# Patient Record
Sex: FEMALE | Race: WHITE | NOT HISPANIC OR LATINO | URBAN - METROPOLITAN AREA
[De-identification: names, ages, dates, MRNs, and addresses within clinical notes are randomized per-mention and may not be internally consistent; named-entity substitution may affect disease eponyms.]

---

## 2017-03-06 ENCOUNTER — EMERGENCY (EMERGENCY)
Facility: HOSPITAL | Age: 52
LOS: 1 days | Discharge: HOME | End: 2017-03-06
Attending: EMERGENCY MEDICINE | Admitting: EMERGENCY MEDICINE
Payer: MEDICARE

## 2017-03-06 VITALS
HEIGHT: 58 IN | RESPIRATION RATE: 16 BRPM | TEMPERATURE: 98 F | OXYGEN SATURATION: 100 % | HEART RATE: 105 BPM | WEIGHT: 87.08 LBS | SYSTOLIC BLOOD PRESSURE: 100 MMHG | DIASTOLIC BLOOD PRESSURE: 67 MMHG

## 2017-03-06 VITALS
DIASTOLIC BLOOD PRESSURE: 62 MMHG | RESPIRATION RATE: 14 BRPM | SYSTOLIC BLOOD PRESSURE: 102 MMHG | HEART RATE: 90 BPM | OXYGEN SATURATION: 97 % | TEMPERATURE: 98 F

## 2017-03-06 DIAGNOSIS — F17.200 NICOTINE DEPENDENCE, UNSPECIFIED, UNCOMPLICATED: ICD-10-CM

## 2017-03-06 DIAGNOSIS — K82.9 DISEASE OF GALLBLADDER, UNSPECIFIED: Chronic | ICD-10-CM

## 2017-03-06 DIAGNOSIS — R10.32 LEFT LOWER QUADRANT PAIN: ICD-10-CM

## 2017-03-06 DIAGNOSIS — M54.9 DORSALGIA, UNSPECIFIED: ICD-10-CM

## 2017-03-06 DIAGNOSIS — R11.2 NAUSEA WITH VOMITING, UNSPECIFIED: ICD-10-CM

## 2017-03-06 DIAGNOSIS — J44.9 CHRONIC OBSTRUCTIVE PULMONARY DISEASE, UNSPECIFIED: ICD-10-CM

## 2017-03-06 DIAGNOSIS — F41.9 ANXIETY DISORDER, UNSPECIFIED: ICD-10-CM

## 2017-03-06 DIAGNOSIS — Z86.19 PERSONAL HISTORY OF OTHER INFECTIOUS AND PARASITIC DISEASES: ICD-10-CM

## 2017-03-06 DIAGNOSIS — E03.9 HYPOTHYROIDISM, UNSPECIFIED: ICD-10-CM

## 2017-03-06 DIAGNOSIS — J38.1 POLYP OF VOCAL CORD AND LARYNX: Chronic | ICD-10-CM

## 2017-03-06 DIAGNOSIS — K21.9 GASTRO-ESOPHAGEAL REFLUX DISEASE WITHOUT ESOPHAGITIS: ICD-10-CM

## 2017-03-06 LAB
ALBUMIN SERPL ELPH-MCNC: 3.5 G/DL — SIGNIFICANT CHANGE UP (ref 3.3–5)
ALP SERPL-CCNC: 90 U/L — SIGNIFICANT CHANGE UP (ref 40–120)
ALT FLD-CCNC: 26 U/L — SIGNIFICANT CHANGE UP (ref 12–78)
ANION GAP SERPL CALC-SCNC: 8 MMOL/L — SIGNIFICANT CHANGE UP (ref 5–17)
APPEARANCE UR: CLEAR — SIGNIFICANT CHANGE UP
AST SERPL-CCNC: 18 U/L — SIGNIFICANT CHANGE UP (ref 15–37)
BASOPHILS # BLD AUTO: 0.2 K/UL — SIGNIFICANT CHANGE UP (ref 0–0.2)
BASOPHILS NFR BLD AUTO: 1.5 % — SIGNIFICANT CHANGE UP (ref 0–2)
BILIRUB SERPL-MCNC: 0.1 MG/DL — LOW (ref 0.2–1.2)
BILIRUB UR-MCNC: NEGATIVE — SIGNIFICANT CHANGE UP
BUN SERPL-MCNC: 14 MG/DL — SIGNIFICANT CHANGE UP (ref 7–23)
CALCIUM SERPL-MCNC: 8.5 MG/DL — SIGNIFICANT CHANGE UP (ref 8.5–10.1)
CHLORIDE SERPL-SCNC: 105 MMOL/L — SIGNIFICANT CHANGE UP (ref 96–108)
CO2 SERPL-SCNC: 28 MMOL/L — SIGNIFICANT CHANGE UP (ref 22–31)
COLOR SPEC: SIGNIFICANT CHANGE UP
CREAT SERPL-MCNC: 0.89 MG/DL — SIGNIFICANT CHANGE UP (ref 0.5–1.3)
DIFF PNL FLD: NEGATIVE — SIGNIFICANT CHANGE UP
EOSINOPHIL # BLD AUTO: 0.3 K/UL — SIGNIFICANT CHANGE UP (ref 0–0.5)
EOSINOPHIL NFR BLD AUTO: 2.8 % — SIGNIFICANT CHANGE UP (ref 0–6)
GLUCOSE SERPL-MCNC: 98 MG/DL — SIGNIFICANT CHANGE UP (ref 70–99)
GLUCOSE UR QL: NEGATIVE — SIGNIFICANT CHANGE UP
HCT VFR BLD CALC: 38.8 % — SIGNIFICANT CHANGE UP (ref 34.5–45)
HGB BLD-MCNC: 13.1 G/DL — SIGNIFICANT CHANGE UP (ref 11.5–15.5)
KETONES UR-MCNC: NEGATIVE — SIGNIFICANT CHANGE UP
LEUKOCYTE ESTERASE UR-ACNC: NEGATIVE — SIGNIFICANT CHANGE UP
LYMPHOCYTES # BLD AUTO: 28.2 % — SIGNIFICANT CHANGE UP (ref 13–44)
LYMPHOCYTES # BLD AUTO: 3.2 K/UL — SIGNIFICANT CHANGE UP (ref 1–3.3)
MCHC RBC-ENTMCNC: 33.6 GM/DL — SIGNIFICANT CHANGE UP (ref 32–36)
MCHC RBC-ENTMCNC: 33.9 PG — SIGNIFICANT CHANGE UP (ref 27–34)
MCV RBC AUTO: 100.7 FL — HIGH (ref 80–100)
MONOCYTES # BLD AUTO: 0.9 K/UL — SIGNIFICANT CHANGE UP (ref 0–0.9)
MONOCYTES NFR BLD AUTO: 7.7 % — SIGNIFICANT CHANGE UP (ref 1–9)
NEUTROPHILS # BLD AUTO: 6.9 K/UL — SIGNIFICANT CHANGE UP (ref 1.8–7.4)
NEUTROPHILS NFR BLD AUTO: 59.8 % — SIGNIFICANT CHANGE UP (ref 43–77)
NITRITE UR-MCNC: NEGATIVE — SIGNIFICANT CHANGE UP
PH UR: 6 — SIGNIFICANT CHANGE UP (ref 4.8–8)
PLATELET # BLD AUTO: 309 K/UL — SIGNIFICANT CHANGE UP (ref 150–400)
POTASSIUM SERPL-MCNC: 4 MMOL/L — SIGNIFICANT CHANGE UP (ref 3.5–5.3)
POTASSIUM SERPL-SCNC: 4 MMOL/L — SIGNIFICANT CHANGE UP (ref 3.5–5.3)
PROT SERPL-MCNC: 6.5 G/DL — SIGNIFICANT CHANGE UP (ref 6–8.3)
PROT UR-MCNC: NEGATIVE — SIGNIFICANT CHANGE UP
RBC # BLD: 3.86 M/UL — SIGNIFICANT CHANGE UP (ref 3.8–5.2)
RBC # FLD: 11.6 % — SIGNIFICANT CHANGE UP (ref 10.3–14.5)
SODIUM SERPL-SCNC: 141 MMOL/L — SIGNIFICANT CHANGE UP (ref 135–145)
SP GR SPEC: 1 — LOW (ref 1.01–1.02)
UROBILINOGEN FLD QL: NEGATIVE — SIGNIFICANT CHANGE UP
WBC # BLD: 11.5 K/UL — HIGH (ref 3.8–10.5)
WBC # FLD AUTO: 11.5 K/UL — HIGH (ref 3.8–10.5)

## 2017-03-06 PROCEDURE — 87086 URINE CULTURE/COLONY COUNT: CPT

## 2017-03-06 PROCEDURE — 99284 EMERGENCY DEPT VISIT MOD MDM: CPT | Mod: 25

## 2017-03-06 PROCEDURE — 81003 URINALYSIS AUTO W/O SCOPE: CPT

## 2017-03-06 PROCEDURE — 85027 COMPLETE CBC AUTOMATED: CPT

## 2017-03-06 PROCEDURE — 96375 TX/PRO/DX INJ NEW DRUG ADDON: CPT

## 2017-03-06 PROCEDURE — 80053 COMPREHEN METABOLIC PANEL: CPT

## 2017-03-06 PROCEDURE — 74176 CT ABD & PELVIS W/O CONTRAST: CPT

## 2017-03-06 PROCEDURE — 96374 THER/PROPH/DIAG INJ IV PUSH: CPT

## 2017-03-06 PROCEDURE — 99285 EMERGENCY DEPT VISIT HI MDM: CPT

## 2017-03-06 PROCEDURE — 74176 CT ABD & PELVIS W/O CONTRAST: CPT | Mod: 26

## 2017-03-06 RX ORDER — ONDANSETRON 8 MG/1
4 TABLET, FILM COATED ORAL ONCE
Qty: 0 | Refills: 0 | Status: COMPLETED | OUTPATIENT
Start: 2017-03-06 | End: 2017-03-06

## 2017-03-06 RX ORDER — SODIUM CHLORIDE 9 MG/ML
3 INJECTION INTRAMUSCULAR; INTRAVENOUS; SUBCUTANEOUS ONCE
Qty: 0 | Refills: 0 | Status: COMPLETED | OUTPATIENT
Start: 2017-03-06 | End: 2017-03-06

## 2017-03-06 RX ORDER — MORPHINE SULFATE 50 MG/1
4 CAPSULE, EXTENDED RELEASE ORAL ONCE
Qty: 0 | Refills: 0 | Status: DISCONTINUED | OUTPATIENT
Start: 2017-03-06 | End: 2017-03-06

## 2017-03-06 RX ADMIN — MORPHINE SULFATE 4 MILLIGRAM(S): 50 CAPSULE, EXTENDED RELEASE ORAL at 15:43

## 2017-03-06 RX ADMIN — ONDANSETRON 4 MILLIGRAM(S): 8 TABLET, FILM COATED ORAL at 15:43

## 2017-03-06 RX ADMIN — SODIUM CHLORIDE 3 MILLILITER(S): 9 INJECTION INTRAMUSCULAR; INTRAVENOUS; SUBCUTANEOUS at 15:43

## 2017-03-06 NOTE — ED ADULT NURSE NOTE - OBJECTIVE STATEMENT
Presents to ER w c/o left flank pain radiating into left groin x 1 week.  Pt states she ate at her friends house a week ago and suddenly developed abd pain shortly after. States her girlfriend also developed GI symptoms and came to our ER as well. Pt states severe left flank pain, N,V,and D.

## 2017-03-06 NOTE — ED PROVIDER NOTE - PROGRESS NOTE DETAILS
All results were explained to patient and/or family and a copy of all available results given.  Pt has ho distended stomach and had bm yesterday.   pt felt better.

## 2017-03-06 NOTE — ED ADULT NURSE NOTE - PMH
Anxiety    COPD (chronic obstructive pulmonary disease)    Gallstones    GERD (gastroesophageal reflux disease)    Hepatitis C    Hypotension    Hypothyroidism    Lupus    Migraine    Neuropathy    Nicotine addiction    Sjogren's disease    UTI (urinary tract infection)    Vertigo

## 2017-03-07 LAB
CULTURE RESULTS: NO GROWTH — SIGNIFICANT CHANGE UP
SPECIMEN SOURCE: SIGNIFICANT CHANGE UP

## 2017-03-31 ENCOUNTER — INPATIENT (INPATIENT)
Facility: HOSPITAL | Age: 52
LOS: 0 days | Discharge: HOME | DRG: 191 | End: 2017-04-01
Attending: INTERNAL MEDICINE | Admitting: INTERNAL MEDICINE
Payer: COMMERCIAL

## 2017-03-31 VITALS
HEART RATE: 94 BPM | WEIGHT: 92.59 LBS | RESPIRATION RATE: 14 BRPM | SYSTOLIC BLOOD PRESSURE: 134 MMHG | TEMPERATURE: 98 F | HEIGHT: 58 IN | OXYGEN SATURATION: 100 % | DIASTOLIC BLOOD PRESSURE: 77 MMHG

## 2017-03-31 DIAGNOSIS — G43.909 MIGRAINE, UNSPECIFIED, NOT INTRACTABLE, WITHOUT STATUS MIGRAINOSUS: ICD-10-CM

## 2017-03-31 DIAGNOSIS — J44.9 CHRONIC OBSTRUCTIVE PULMONARY DISEASE, UNSPECIFIED: ICD-10-CM

## 2017-03-31 DIAGNOSIS — E87.1 HYPO-OSMOLALITY AND HYPONATREMIA: ICD-10-CM

## 2017-03-31 DIAGNOSIS — F41.9 ANXIETY DISORDER, UNSPECIFIED: ICD-10-CM

## 2017-03-31 DIAGNOSIS — K21.9 GASTRO-ESOPHAGEAL REFLUX DISEASE WITHOUT ESOPHAGITIS: ICD-10-CM

## 2017-03-31 DIAGNOSIS — F17.200 NICOTINE DEPENDENCE, UNSPECIFIED, UNCOMPLICATED: ICD-10-CM

## 2017-03-31 DIAGNOSIS — K82.9 DISEASE OF GALLBLADDER, UNSPECIFIED: Chronic | ICD-10-CM

## 2017-03-31 DIAGNOSIS — Z41.8 ENCOUNTER FOR OTHER PROCEDURES FOR PURPOSES OTHER THAN REMEDYING HEALTH STATE: ICD-10-CM

## 2017-03-31 DIAGNOSIS — J38.1 POLYP OF VOCAL CORD AND LARYNX: Chronic | ICD-10-CM

## 2017-03-31 DIAGNOSIS — E03.9 HYPOTHYROIDISM, UNSPECIFIED: ICD-10-CM

## 2017-03-31 DIAGNOSIS — F17.219 NICOTINE DEPENDENCE, CIGARETTES, WITH UNSPECIFIED NICOTINE-INDUCED DISORDERS: ICD-10-CM

## 2017-03-31 LAB
ALBUMIN SERPL ELPH-MCNC: 4.3 G/DL — SIGNIFICANT CHANGE UP (ref 3.3–5)
ALP SERPL-CCNC: 91 U/L — SIGNIFICANT CHANGE UP (ref 30–120)
ALT FLD-CCNC: 41 U/L DA — SIGNIFICANT CHANGE UP (ref 10–60)
ANION GAP SERPL CALC-SCNC: 10 MMOL/L — SIGNIFICANT CHANGE UP (ref 5–17)
ANISOCYTOSIS BLD QL: SLIGHT — SIGNIFICANT CHANGE UP
APPEARANCE UR: CLEAR — SIGNIFICANT CHANGE UP
APTT BLD: 28.5 SEC — SIGNIFICANT CHANGE UP (ref 27.5–37.4)
AST SERPL-CCNC: 26 U/L — SIGNIFICANT CHANGE UP (ref 10–40)
BACTERIA # UR AUTO: ABNORMAL
BASE EXCESS BLDA CALC-SCNC: -3.5 MMOL/L — LOW (ref -2–2)
BILIRUB SERPL-MCNC: 0.3 MG/DL — SIGNIFICANT CHANGE UP (ref 0.2–1.2)
BILIRUB UR-MCNC: NEGATIVE — SIGNIFICANT CHANGE UP
BLOOD GAS SOURCE: SIGNIFICANT CHANGE UP
BUN SERPL-MCNC: 10 MG/DL — SIGNIFICANT CHANGE UP (ref 7–23)
CALCIUM SERPL-MCNC: 9.1 MG/DL — SIGNIFICANT CHANGE UP (ref 8.4–10.5)
CHLORIDE SERPL-SCNC: 90 MMOL/L — LOW (ref 96–108)
CK MB CFR SERPL CALC: 3 NG/ML — SIGNIFICANT CHANGE UP (ref 0–3.6)
CO2 SERPL-SCNC: 26 MMOL/L — SIGNIFICANT CHANGE UP (ref 22–31)
COLOR SPEC: YELLOW — SIGNIFICANT CHANGE UP
CREAT SERPL-MCNC: 0.72 MG/DL — SIGNIFICANT CHANGE UP (ref 0.5–1.3)
DIFF PNL FLD: ABNORMAL
EPI CELLS # UR: SIGNIFICANT CHANGE UP
GLUCOSE SERPL-MCNC: 88 MG/DL — SIGNIFICANT CHANGE UP (ref 70–99)
GLUCOSE UR QL: NEGATIVE MG/DL — SIGNIFICANT CHANGE UP
HCO3 BLDA-SCNC: 22 MMOL/L — SIGNIFICANT CHANGE UP (ref 21–29)
HCT VFR BLD CALC: 41.5 % — SIGNIFICANT CHANGE UP (ref 34.5–45)
HGB BLD-MCNC: 14.5 G/DL — SIGNIFICANT CHANGE UP (ref 11.5–15.5)
HOROWITZ INDEX BLDA+IHG-RTO: 0.21 — SIGNIFICANT CHANGE UP
INR BLD: 0.9 RATIO — SIGNIFICANT CHANGE UP (ref 0.88–1.16)
KETONES UR-MCNC: NEGATIVE — SIGNIFICANT CHANGE UP
LACTATE SERPL-SCNC: 1 MMOL/L — SIGNIFICANT CHANGE UP (ref 0.7–2)
LEUKOCYTE ESTERASE UR-ACNC: ABNORMAL
LYMPHOCYTES # BLD AUTO: 11 % — LOW (ref 13–44)
MACROCYTES BLD QL: SLIGHT — SIGNIFICANT CHANGE UP
MANUAL SMEAR VERIFICATION: SIGNIFICANT CHANGE UP
MCHC RBC-ENTMCNC: 35 GM/DL — SIGNIFICANT CHANGE UP (ref 32–36)
MCHC RBC-ENTMCNC: 35.6 PG — HIGH (ref 27–34)
MCV RBC AUTO: 102 FL — HIGH (ref 80–100)
MONOCYTES NFR BLD AUTO: 13 % — SIGNIFICANT CHANGE UP (ref 2–14)
NEUTROPHILS NFR BLD AUTO: 70 % — SIGNIFICANT CHANGE UP (ref 43–77)
NEUTS BAND # BLD: 2 % — SIGNIFICANT CHANGE UP (ref 0–8)
NITRITE UR-MCNC: NEGATIVE — SIGNIFICANT CHANGE UP
NT-PROBNP SERPL-SCNC: 63 PG/ML — SIGNIFICANT CHANGE UP (ref 0–125)
OVALOCYTES BLD QL SMEAR: SLIGHT — SIGNIFICANT CHANGE UP
PCO2 BLDA: 39 MMHG — SIGNIFICANT CHANGE UP (ref 32–46)
PH BLD: 7.35 — SIGNIFICANT CHANGE UP (ref 7.35–7.45)
PH UR: 7 — SIGNIFICANT CHANGE UP (ref 4.8–8)
PLAT MORPH BLD: NORMAL — SIGNIFICANT CHANGE UP
PLATELET # BLD AUTO: 366 K/UL — SIGNIFICANT CHANGE UP (ref 150–400)
PLATELET COUNT - ESTIMATE: NORMAL — SIGNIFICANT CHANGE UP
PO2 BLDA: 96 MMHG — SIGNIFICANT CHANGE UP (ref 74–108)
POIKILOCYTOSIS BLD QL AUTO: SLIGHT — SIGNIFICANT CHANGE UP
POLYCHROMASIA BLD QL SMEAR: SLIGHT — SIGNIFICANT CHANGE UP
POTASSIUM SERPL-MCNC: 3.9 MMOL/L — SIGNIFICANT CHANGE UP (ref 3.5–5.3)
POTASSIUM SERPL-SCNC: 3.9 MMOL/L — SIGNIFICANT CHANGE UP (ref 3.5–5.3)
PROT SERPL-MCNC: 7.5 G/DL — SIGNIFICANT CHANGE UP (ref 6–8.3)
PROT UR-MCNC: 15 MG/DL
PROTHROM AB SERPL-ACNC: 9.8 SEC — SIGNIFICANT CHANGE UP (ref 9.8–12.7)
RBC # BLD: 4.07 M/UL — SIGNIFICANT CHANGE UP (ref 3.8–5.2)
RBC # FLD: 14.6 % — HIGH (ref 10.3–14.5)
RBC BLD AUTO: ABNORMAL
RBC CASTS # UR COMP ASSIST: SIGNIFICANT CHANGE UP /HPF (ref 0–4)
SAO2 % BLDA: 97 % — HIGH (ref 92–96)
SODIUM SERPL-SCNC: 126 MMOL/L — LOW (ref 135–145)
SP GR SPEC: 1.01 — SIGNIFICANT CHANGE UP (ref 1.01–1.02)
TOXIC GRANULES BLD QL SMEAR: PRESENT — SIGNIFICANT CHANGE UP
TROPONIN I SERPL-MCNC: 0 NG/ML — LOW (ref 0.02–0.06)
UROBILINOGEN FLD QL: NEGATIVE MG/DL — SIGNIFICANT CHANGE UP
VARIANT LYMPHS # BLD: 4 % — SIGNIFICANT CHANGE UP (ref 0–6)
WBC # BLD: 15.7 K/UL — HIGH (ref 3.8–10.5)
WBC # FLD AUTO: 15.7 K/UL — HIGH (ref 3.8–10.5)
WBC UR QL: SIGNIFICANT CHANGE UP

## 2017-03-31 PROCEDURE — 99284 EMERGENCY DEPT VISIT MOD MDM: CPT

## 2017-03-31 PROCEDURE — 71020: CPT | Mod: 26

## 2017-03-31 PROCEDURE — 93010 ELECTROCARDIOGRAM REPORT: CPT

## 2017-03-31 RX ORDER — PANTOPRAZOLE SODIUM 20 MG/1
40 TABLET, DELAYED RELEASE ORAL ONCE
Qty: 0 | Refills: 0 | Status: COMPLETED | OUTPATIENT
Start: 2017-03-31 | End: 2017-03-31

## 2017-03-31 RX ORDER — HYDROXYCHLOROQUINE SULFATE 200 MG
200 TABLET ORAL
Qty: 0 | Refills: 0 | Status: DISCONTINUED | OUTPATIENT
Start: 2017-03-31 | End: 2017-04-01

## 2017-03-31 RX ORDER — ACETAMINOPHEN 500 MG
650 TABLET ORAL EVERY 6 HOURS
Qty: 0 | Refills: 0 | Status: DISCONTINUED | OUTPATIENT
Start: 2017-03-31 | End: 2017-04-01

## 2017-03-31 RX ORDER — BUDESONIDE, MICRONIZED 100 %
0.5 POWDER (GRAM) MISCELLANEOUS
Qty: 0 | Refills: 0 | Status: DISCONTINUED | OUTPATIENT
Start: 2017-03-31 | End: 2017-04-01

## 2017-03-31 RX ORDER — SODIUM CHLORIDE 9 MG/ML
2000 INJECTION INTRAMUSCULAR; INTRAVENOUS; SUBCUTANEOUS ONCE
Qty: 0 | Refills: 0 | Status: COMPLETED | OUTPATIENT
Start: 2017-03-31 | End: 2017-03-31

## 2017-03-31 RX ORDER — PANTOPRAZOLE SODIUM 20 MG/1
40 TABLET, DELAYED RELEASE ORAL
Qty: 0 | Refills: 0 | Status: DISCONTINUED | OUTPATIENT
Start: 2017-03-31 | End: 2017-04-01

## 2017-03-31 RX ORDER — TIOTROPIUM BROMIDE 18 UG/1
1 CAPSULE ORAL; RESPIRATORY (INHALATION) DAILY
Qty: 0 | Refills: 0 | Status: DISCONTINUED | OUTPATIENT
Start: 2017-03-31 | End: 2017-04-01

## 2017-03-31 RX ORDER — SUCRALFATE 1 G
1 TABLET ORAL THREE TIMES A DAY
Qty: 0 | Refills: 0 | Status: DISCONTINUED | OUTPATIENT
Start: 2017-03-31 | End: 2017-04-01

## 2017-03-31 RX ORDER — LEVOTHYROXINE SODIUM 125 MCG
25 TABLET ORAL DAILY
Qty: 0 | Refills: 0 | Status: DISCONTINUED | OUTPATIENT
Start: 2017-03-31 | End: 2017-04-01

## 2017-03-31 RX ORDER — SODIUM CHLORIDE 9 MG/ML
1000 INJECTION INTRAMUSCULAR; INTRAVENOUS; SUBCUTANEOUS
Qty: 0 | Refills: 0 | Status: DISCONTINUED | OUTPATIENT
Start: 2017-03-31 | End: 2017-04-01

## 2017-03-31 RX ORDER — IPRATROPIUM/ALBUTEROL SULFATE 18-103MCG
3 AEROSOL WITH ADAPTER (GRAM) INHALATION ONCE
Qty: 0 | Refills: 0 | Status: COMPLETED | OUTPATIENT
Start: 2017-03-31 | End: 2017-03-31

## 2017-03-31 RX ORDER — ALBUTEROL 90 UG/1
2.5 AEROSOL, METERED ORAL EVERY 6 HOURS
Qty: 0 | Refills: 0 | Status: DISCONTINUED | OUTPATIENT
Start: 2017-03-31 | End: 2017-04-01

## 2017-03-31 RX ORDER — DIAZEPAM 5 MG
5 TABLET ORAL ONCE
Qty: 0 | Refills: 0 | Status: DISCONTINUED | OUTPATIENT
Start: 2017-03-31 | End: 2017-03-31

## 2017-03-31 RX ORDER — ENOXAPARIN SODIUM 100 MG/ML
40 INJECTION SUBCUTANEOUS EVERY 24 HOURS
Qty: 0 | Refills: 0 | Status: DISCONTINUED | OUTPATIENT
Start: 2017-03-31 | End: 2017-04-01

## 2017-03-31 RX ORDER — NICOTINE POLACRILEX 2 MG
1 GUM BUCCAL DAILY
Qty: 0 | Refills: 0 | Status: DISCONTINUED | OUTPATIENT
Start: 2017-03-31 | End: 2017-04-01

## 2017-03-31 RX ADMIN — ENOXAPARIN SODIUM 40 MILLIGRAM(S): 100 INJECTION SUBCUTANEOUS at 15:28

## 2017-03-31 RX ADMIN — Medication 5 MILLIGRAM(S): at 22:23

## 2017-03-31 RX ADMIN — TIOTROPIUM BROMIDE 1 CAPSULE(S): 18 CAPSULE ORAL; RESPIRATORY (INHALATION) at 18:38

## 2017-03-31 RX ADMIN — ALBUTEROL 2.5 MILLIGRAM(S): 90 AEROSOL, METERED ORAL at 20:18

## 2017-03-31 RX ADMIN — Medication 1 GRAM(S): at 21:36

## 2017-03-31 RX ADMIN — PANTOPRAZOLE SODIUM 40 MILLIGRAM(S): 20 TABLET, DELAYED RELEASE ORAL at 21:55

## 2017-03-31 RX ADMIN — Medication 40 MILLIGRAM(S): at 21:36

## 2017-03-31 RX ADMIN — Medication 0.5 MILLIGRAM(S): at 20:18

## 2017-03-31 RX ADMIN — Medication 75 MILLIGRAM(S): at 21:56

## 2017-03-31 RX ADMIN — SODIUM CHLORIDE 1000 MILLILITER(S): 9 INJECTION INTRAMUSCULAR; INTRAVENOUS; SUBCUTANEOUS at 13:07

## 2017-03-31 RX ADMIN — Medication 200 MILLIGRAM(S): at 16:54

## 2017-03-31 RX ADMIN — SODIUM CHLORIDE 60 MILLILITER(S): 9 INJECTION INTRAMUSCULAR; INTRAVENOUS; SUBCUTANEOUS at 22:17

## 2017-03-31 RX ADMIN — Medication 125 MILLIGRAM(S): at 13:07

## 2017-03-31 RX ADMIN — Medication 3 MILLILITER(S): at 13:07

## 2017-03-31 RX ADMIN — SODIUM CHLORIDE 60 MILLILITER(S): 9 INJECTION INTRAMUSCULAR; INTRAVENOUS; SUBCUTANEOUS at 14:40

## 2017-03-31 NOTE — H&P ADULT - PROBLEM SELECTOR PLAN 2
Etiology not clear.  Possibly related to poor solute intake but SIADH can't be ruled out.  Will place patient on NS infusion and monitor BMP.  Will get nephrology to see patient.   Further management as per patient's clinical course.

## 2017-03-31 NOTE — H&P ADULT - NSHPLABSRESULTS_GEN_ALL_CORE
14.5   15.7  )-----------( 366      ( 31 Mar 2017 12:50 )             41.5     31 Mar 2017 12:50    126    |  90     |  10     ----------------------------<  88     3.9     |  26     |  0.72     Ca    9.1        31 Mar 2017 12:50    TPro  7.5    /  Alb  4.3    /  TBili  0.3    /  DBili  x      /  AST  26     /  ALT  41     /  AlkPhos  91     31 Mar 2017 12:50    CAPILLARY BLOOD GLUCOSE    PT/INR - ( 31 Mar 2017 12:50 )   PT: 9.8 sec;   INR: 0.90 ratio         PTT - ( 31 Mar 2017 12:50 )  PTT:28.5 sec 14.5   15.7  )-----------( 366      ( 31 Mar 2017 12:50 )             41.5     31 Mar 2017 12:50    126    |  90     |  10     ----------------------------<  88     3.9     |  26     |  0.72     Ca    9.1        31 Mar 2017 12:50    TPro  7.5    /  Alb  4.3    /  TBili  0.3    /  DBili  x      /  AST  26     /  ALT  41     /  AlkPhos  91     31 Mar 2017 12:50    CAPILLARY BLOOD GLUCOSE    PT/INR - ( 31 Mar 2017 12:50 )   PT: 9.8 sec;   INR: 0.90 ratio         PTT - ( 31 Mar 2017 12:50 )  PTT:28.5 sec    EXAM:  CHEST PA & LAT                                  PROCEDURE DATE:  03/31/2017        INTERPRETATION:  Chest PA and lateral 2 views:    Clinical history:    Vomiting and difficulty in breathing and cough for 2 days.    Findings:    Cardiac sizeand configuration appear normal. Val appear normal. Trachea   is midline. Lungs increased markings, left upper lung, subsegmental.   Otherwise, appear normal. CP angles appear normal. No pneumothorax. Bones   appear normal. Right axillary surgical clips. Evidence of cholecystectomy.    Impression:    Possible developing left upper lung subsegmental airspace disease.   Clinical and lab correlation requested. If clinically indicated, if   clinically warranted, further evaluation by CT scan of the chest   requested.

## 2017-03-31 NOTE — ED PROVIDER NOTE - OBJECTIVE STATEMENT
Chronic smoker with cough and wheezing for 2 days. Recently treated for shingles. Took Medrol at home for her breathing today. Denies fever, sputum, CP.

## 2017-03-31 NOTE — H&P ADULT - NSHPSOCIALHISTORY_GEN_ALL_CORE
+ Smoker + Smoker, smokes 3 PPD.  Has been trying to cut down.   Agreeable to nicotine replacement therapy.  Denies any alcohol abuse.

## 2017-03-31 NOTE — ED ADULT NURSE NOTE - OBJECTIVE STATEMENT
51Y female walked in ER c/o "im feeling really sick" pt indicates she has had vomiting, diarrhea and a dry cough, chills X2 days. pt is afebrile, denies any pain/discomfort. Pt ambulates well and speaks in complete sentences.

## 2017-03-31 NOTE — H&P ADULT - PROBLEM SELECTOR PLAN 3
The patient’s tobacco use - ( x ) YES   (     ) NO   Advised to quit and impact of smoking•  ( x )  Yes   (     ) N0  Assessed willingness to attempt to quit•  (    ) YES   (     ) No  Providing methods, Resources and skills for cessation• ( x ) Yes   (    )  No     (     ) Refused  Medication management of smoking session drugs•	( x )  yes  (    ) No  setting quit date• (   )  Yes   (      ) No	(  x ) not at this time  Follow-up arranged•  (  x  ) Yes   (      ) No	(    ) refused  Amount of time spent counseling patient   ( x )  >10min     (       )  <10 min

## 2017-03-31 NOTE — H&P ADULT - NSHPREVIEWOFSYSTEMS_GEN_ALL_CORE
REVIEW OF SYSTEMS:  CONSTITUTIONAL: Felt feverish, No weight loss. + fatigue  EYES: No eye pain, visual disturbances, or discharge  ENMT:  No difficulty hearing, tinnitus, vertigo; No sinus or throat pain  NECK: No pain or stiffness  BREASTS: No pain, masses, or nipple discharge  RESPIRATORY: + cough, + wheezing and + shortness of breath.   CARDIOVASCULAR: No chest pain, palpitations, dizziness, or leg swelling  GASTROINTESTINAL: No abdominal or epigastric pain. No nausea, vomiting, or hematemesis; No diarrhea or constipation. No melena or hematochezia.  GENITOURINARY: No dysuria, frequency, hematuria, or incontinence  NEUROLOGICAL: No headaches, memory loss, loss of strength, numbness, or tremors  SKIN: No itching, burning, rashes, or lesions   LYMPH NODES: No enlarged glands  ENDOCRINE: No heat or cold intolerance; No hair loss; No polydipsia or polyuria  MUSCULOSKELETAL: No joint pain or swelling; No muscle, back, or extremity pain  PSYCHIATRIC: No depression, anxiety, mood swings, or difficulty sleeping  HEME/LYMPH: No easy bruising, or bleeding gums  ALLERGY AND IMMUNOLOGIC: No hives or eczema

## 2017-03-31 NOTE — H&P ADULT - NSHPPHYSICALEXAM_GEN_ALL_CORE
PHYSICAL EXAM:  GENERAL: NAD, well-groomed, well-developed  HEAD:  Atraumatic, Normocephalic  EYES: EOMI, PERRLA, conjunctiva and sclera clear  ENMT: No tonsillar erythema, exudates, or enlargement; Moist mucous membranes, Good dentition, No lesions  NECK: Supple, No JVD, Normal thyroid  NERVOUS SYSTEM:  Alert & Oriented X3, Good concentration; Motor Strength 5/5 B/L upper and lower extremities; DTRs 2+ intact and symmetric  CHEST/LUNG: + Wheezing.   HEART: Regular rate and rhythm; No murmurs, rubs, or gallops  ABDOMEN: Soft, Nontender, Nondistended; Bowel sounds present  EXTREMITIES:  2+ Peripheral Pulses, No clubbing, cyanosis, or edema  LYMPH: No lymphadenopathy noted  SKIN: No rashes or lesions PHYSICAL EXAM:  GENERAL: NAD, well-groomed, well-developed  HEAD:  Atraumatic, Normocephalic  EYES: EOMI, PERRLA, conjunctiva and sclera clear  ENMT: No tonsillar erythema, exudates, or enlargement; Moist mucous membranes, Good dentition, No lesions  NECK: Supple, No JVD, Normal thyroid  NERVOUS SYSTEM:  Alert & Oriented X3, Good concentration; Motor Strength 5/5 B/L upper and lower extremities; DTRs 2+ intact and symmetric  CHEST/LUNG: + Wheezing. No rhonchi. Decreased breath sounds at bases.   HEART: Regular rate and rhythm; No murmurs, rubs, or gallops  ABDOMEN: Soft, Nontender, Nondistended; Bowel sounds present  EXTREMITIES:  2+ Peripheral Pulses, No clubbing, cyanosis, or edema  LYMPH: No lymphadenopathy noted  SKIN: No rashes or lesions

## 2017-03-31 NOTE — H&P ADULT - ASSESSMENT
51 years old female with history of COPD, Tobacco abuse, Lupus, hypothyroidism and anxiety who comes in with 2-3 days history of increasing shortness of breath, cough and wheezing which is not getting better despite nebulizers at home. Patient has acute exacerbation of COPD and is being admitted for further management as per Pulmonary. She is also noted to have hyponatremia on her labs.

## 2017-03-31 NOTE — ED ADULT NURSE REASSESSMENT NOTE - NS ED NURSE REASSESS COMMENT FT1
pt is stable, pt responded well from breathing treatments and IV fluids. pt L/S are clear bilat. pt continues to have non reproductive cough. pt denies any pain/distress. "im feeling so much better now". pt is admitted, report given to HERMILO Mcdonald

## 2017-03-31 NOTE — H&P ADULT - HISTORY OF PRESENT ILLNESS
51 years old female with 51 years old female with history of COPD, current smoker, Lupus, GERD, Hypotension, hypothyroidism, anxiety, who came in to Er with c/o cough and shortness of breath for last few days. Patient states she started having cough, which was productive of small amount of clear to tan sputum 2-3 days ago and started having wheezing. She has been using nebulizer at home without much relief. As her symptoms got worse today she came in to ER. Patient was seen by Dr. Pinto (covering for Dr. Silver) for pulmonary and is advised admission to the hospital.     Patient denies any chest pain or pressure.   Denies any nausea or vomiting.   Denies any fevers but felt feverish. No chills or rigors.   Denies any dizziness or syncope.

## 2017-03-31 NOTE — CONSULT NOTE ADULT - PROBLEM SELECTOR RECOMMENDATION 9
albuterol every 6 hours  solumedrol IV every 8 hrs  check ABG  official cxr report reviewed  incentive leopoldo  spiriva  pulmicort BID  monitor sats  keep sat > 88 percent

## 2017-03-31 NOTE — CONSULT NOTE ADULT - SUBJECTIVE AND OBJECTIVE BOX
PULMONARY CONSULT NOTE      DOUGLAS AKINS  MRN-[id number]    Patient is a 51y old  Female who presents with a chief complaint of SOB  started prednisone at home  has copd ex  smokes 3 packs per day  has hx of advanced COPD  has SLE and other medical issues  on disability  lives at home  recent admission at [facility], treated for Zoster      HISTORY OF PRESENT ILLNESS:    MEDICATIONS  (STANDING):  nicotine - 21 mG/24Hr(s) Patch 1patch Transdermal daily  sodium chloride 0.9%. 1000milliLiter(s) IV Continuous <Continuous>  ALBUTerol    0.083% 2.5milliGRAM(s) Nebulizer every 6 hours  tiotropium 18 MICROgram(s) Capsule 1Capsule(s) Inhalation daily  methylPREDNISolone sodium succinate Injectable 40milliGRAM(s) IV Push every 8 hours  buDESOnide   0.5 milliGRAM(s) Respule 0.5milliGRAM(s) Inhalation two times a day  pregabalin 75milliGRAM(s) Oral every 8 hours  hydroxychloroquine 200milliGRAM(s) Oral two times a day with meals  sucralfate 1Gram(s) Oral three times a day  pantoprazole    Tablet 40milliGRAM(s) Oral before breakfast  levothyroxine 25MICROGram(s) Oral daily      MEDICATIONS  (PRN):      Allergies    aspirin (Nausea)  Zithromax (Pruritus; Rash)    Intolerances        PAST MEDICAL & SURGICAL HISTORY:  Nicotine addiction  COPD (chronic obstructive pulmonary disease)  Neuropathy  Migraine  Anxiety  Hepatitis C  Gallstones  Hypothyroidism  Hypotension  GERD (gastroesophageal reflux disease)  UTI (urinary tract infection)  Vertigo  Sjogren&#x27;s disease  Lupus  Gall bladder disease: REMOVAL  Vocal cord polyp: REMOVAL      FAMILY HISTORY:  No pertinent family history in first degree relatives  No significant family history      SOCIAL HISTORY  Smoking History:     REVIEW OF SYSTEMS: SOB, VILLALOBOS    REVIEW OF SYSTEMS      General:	anxious    Skin/Breast:  	  Ophthalmologic:  	  ENMT:	    Respiratory and Thorax: dyspnea  	  Cardiovascular:	    Gastrointestinal:	    Genitourinary:	    Musculoskeletal: pains	    Neurological:	    Psychiatric:	    Hematology/Lymphatics:	    Endocrine:	    Allergic/Immunologic:	    Vital Signs Last 24 Hrs  T(C): 36.9, Max: 36.9 ( @ 13:26)  T(F): 98.4, Max: 98.4 ( @ 13:26)  HR: 90 (88 - 98)  BP: 128/90 (128/90 - 134/77)  BP(mean): --  RR: 20 (14 - 20)  SpO2: 99% (99% - 100%)    PHYSICAL EXAMINATION:  PHYSICAL EXAM:      Constitutional:    Eyes:    ENMT: at    Neck: supple    Breasts:    Back:    Respiratory: dim BS, no wheeze    Cardiovascular: s1s2    Gastrointestinal:    Genitourinary:    Rectal:    Extremities:    Vascular:    Neurological:    Skin:    Lymph Nodes:    Musculoskeletal:    Psychiatric:          LABS:                        14.5   15.7  )-----------( 366      ( 31 Mar 2017 12:50 )             41.5     31 Mar 2017 12:50    126    |  90     |  10     ----------------------------<  88     3.9     |  26     |  0.72     Ca    9.1        31 Mar 2017 12:50    TPro  7.5    /  Alb  4.3    /  TBili  0.3    /  DBili  x      /  AST  26     /  ALT  41     /  AlkPhos  91     31 Mar 2017 12:50    PT/INR - ( 31 Mar 2017 12:50 )   PT: 9.8 sec;   INR: 0.90 ratio         PTT - ( 31 Mar 2017 12:50 )  PTT:28.5 sec  Urinalysis Basic - ( 31 Mar 2017 12:59 )    Color: Yellow / Appearance: Clear / S.010 / pH: x  Gluc: x / Ketone: Negative  / Bili: Negative / Urobili: Negative mg/dL   Blood: x / Protein: 15 mg/dL / Nitrite: Negative   Leuk Esterase: Trace / RBC: 0-2 /HPF / WBC 0-2   Sq Epi: x / Non Sq Epi: Few / Bacteria: Few        CARDIAC MARKERS ( 31 Mar 2017 12:50 )  .004 ng/mL / x     / x     / x     / 3.0 ng/mL          Lactate, Blood: 1.0 mmol/L ( @ 12:50)        MICROBIOLOGY:    RADIOLOGY & ADDITIONAL STUDIES:

## 2017-03-31 NOTE — ED PROVIDER NOTE - MEDICAL DECISION MAKING DETAILS
52 yo female heavy smoker with COPD exacerbation. Plan- CXR, Labs, IV steroids, Duonebs. Consult [facility].

## 2017-03-31 NOTE — H&P ADULT - PROBLEM SELECTOR PLAN 1
Will admit to Tele.  Will place patient on Steroids and Nebulizers.  Oxygen supplementation as needed.   Hold off on antibiotics at this time as per discussions with pulmonary.   Further management as per patient's clinical course.

## 2017-04-01 ENCOUNTER — TRANSCRIPTION ENCOUNTER (OUTPATIENT)
Age: 52
End: 2017-04-01

## 2017-04-01 VITALS
SYSTOLIC BLOOD PRESSURE: 99 MMHG | TEMPERATURE: 98 F | OXYGEN SATURATION: 97 % | HEART RATE: 95 BPM | RESPIRATION RATE: 16 BRPM | DIASTOLIC BLOOD PRESSURE: 63 MMHG

## 2017-04-01 DIAGNOSIS — M32.9 SYSTEMIC LUPUS ERYTHEMATOSUS, UNSPECIFIED: ICD-10-CM

## 2017-04-01 DIAGNOSIS — F17.210 NICOTINE DEPENDENCE, CIGARETTES, UNCOMPLICATED: ICD-10-CM

## 2017-04-01 LAB
ANION GAP SERPL CALC-SCNC: 7 MMOL/L — SIGNIFICANT CHANGE UP (ref 5–17)
BASOPHILS # BLD AUTO: 0 K/UL — SIGNIFICANT CHANGE UP (ref 0–0.2)
BASOPHILS NFR BLD AUTO: 0.3 % — SIGNIFICANT CHANGE UP (ref 0–2)
BUN SERPL-MCNC: 10 MG/DL — SIGNIFICANT CHANGE UP (ref 7–23)
CALCIUM SERPL-MCNC: 9.2 MG/DL — SIGNIFICANT CHANGE UP (ref 8.4–10.5)
CHLORIDE SERPL-SCNC: 101 MMOL/L — SIGNIFICANT CHANGE UP (ref 96–108)
CHOLEST SERPL-MCNC: 243 MG/DL — HIGH (ref 10–199)
CO2 SERPL-SCNC: 26 MMOL/L — SIGNIFICANT CHANGE UP (ref 22–31)
CREAT SERPL-MCNC: 0.66 MG/DL — SIGNIFICANT CHANGE UP (ref 0.5–1.3)
CRP SERPL-MCNC: 0.38 MG/DL — SIGNIFICANT CHANGE UP (ref 0–0.4)
CULTURE RESULTS: NO GROWTH — SIGNIFICANT CHANGE UP
EOSINOPHIL # BLD AUTO: 0 K/UL — SIGNIFICANT CHANGE UP (ref 0–0.5)
EOSINOPHIL NFR BLD AUTO: 0.2 % — SIGNIFICANT CHANGE UP (ref 0–6)
GLUCOSE SERPL-MCNC: 112 MG/DL — HIGH (ref 70–99)
HBA1C BLD-MCNC: 5.7 % — HIGH (ref 4–5.6)
HCT VFR BLD CALC: 39.4 % — SIGNIFICANT CHANGE UP (ref 34.5–45)
HDLC SERPL-MCNC: 93 MG/DL — SIGNIFICANT CHANGE UP (ref 40–125)
HGB BLD-MCNC: 13.2 G/DL — SIGNIFICANT CHANGE UP (ref 11.5–15.5)
LIPID PNL WITH DIRECT LDL SERPL: 127 MG/DL — SIGNIFICANT CHANGE UP
LYMPHOCYTES # BLD AUTO: 1.1 K/UL — SIGNIFICANT CHANGE UP (ref 1–3.3)
LYMPHOCYTES # BLD AUTO: 6.9 % — LOW (ref 13–44)
MAGNESIUM SERPL-MCNC: 2.1 MG/DL — SIGNIFICANT CHANGE UP (ref 1.6–2.6)
MCHC RBC-ENTMCNC: 33.4 GM/DL — SIGNIFICANT CHANGE UP (ref 32–36)
MCHC RBC-ENTMCNC: 34.5 PG — HIGH (ref 27–34)
MCV RBC AUTO: 103.3 FL — HIGH (ref 80–100)
MONOCYTES # BLD AUTO: 1 K/UL — HIGH (ref 0–0.9)
MONOCYTES NFR BLD AUTO: 5.9 % — SIGNIFICANT CHANGE UP (ref 2–14)
NEUTROPHILS # BLD AUTO: 14.2 K/UL — HIGH (ref 1.8–7.4)
NEUTROPHILS NFR BLD AUTO: 86.7 % — HIGH (ref 43–77)
PHOSPHATE SERPL-MCNC: 3 MG/DL — SIGNIFICANT CHANGE UP (ref 2.5–4.5)
PLATELET # BLD AUTO: 316 K/UL — SIGNIFICANT CHANGE UP (ref 150–400)
POTASSIUM SERPL-MCNC: 4 MMOL/L — SIGNIFICANT CHANGE UP (ref 3.5–5.3)
POTASSIUM SERPL-SCNC: 4 MMOL/L — SIGNIFICANT CHANGE UP (ref 3.5–5.3)
PROCALCITONIN SERPL-MCNC: <0.05 NG/ML — SIGNIFICANT CHANGE UP (ref 0–0.05)
RBC # BLD: 3.81 M/UL — SIGNIFICANT CHANGE UP (ref 3.8–5.2)
RBC # FLD: 14.6 % — HIGH (ref 10.3–14.5)
SODIUM SERPL-SCNC: 134 MMOL/L — LOW (ref 135–145)
SPECIMEN SOURCE: SIGNIFICANT CHANGE UP
T3 SERPL-MCNC: 80 NG/DL — SIGNIFICANT CHANGE UP (ref 80–200)
T4 AB SER-ACNC: 6.4 UG/DL — SIGNIFICANT CHANGE UP (ref 4.6–12)
TOTAL CHOLESTEROL/HDL RATIO MEASUREMENT: 2.6 RATIO — LOW (ref 3.3–7.1)
TRIGL SERPL-MCNC: 116 MG/DL — SIGNIFICANT CHANGE UP (ref 10–149)
TSH SERPL-MCNC: 0.26 UIU/ML — LOW (ref 0.27–4.2)
TSH SERPL-MCNC: 0.41 UIU/ML — SIGNIFICANT CHANGE UP (ref 0.27–4.2)
WBC # BLD: 16.3 K/UL — HIGH (ref 3.8–10.5)
WBC # FLD AUTO: 16.3 K/UL — HIGH (ref 3.8–10.5)

## 2017-04-01 PROCEDURE — 84145 PROCALCITONIN (PCT): CPT

## 2017-04-01 PROCEDURE — 80048 BASIC METABOLIC PNL TOTAL CA: CPT

## 2017-04-01 PROCEDURE — 71046 X-RAY EXAM CHEST 2 VIEWS: CPT

## 2017-04-01 PROCEDURE — 85027 COMPLETE CBC AUTOMATED: CPT

## 2017-04-01 PROCEDURE — 86140 C-REACTIVE PROTEIN: CPT

## 2017-04-01 PROCEDURE — 83605 ASSAY OF LACTIC ACID: CPT

## 2017-04-01 PROCEDURE — 80053 COMPREHEN METABOLIC PANEL: CPT

## 2017-04-01 PROCEDURE — 84436 ASSAY OF TOTAL THYROXINE: CPT

## 2017-04-01 PROCEDURE — 87040 BLOOD CULTURE FOR BACTERIA: CPT

## 2017-04-01 PROCEDURE — 84443 ASSAY THYROID STIM HORMONE: CPT

## 2017-04-01 PROCEDURE — 94640 AIRWAY INHALATION TREATMENT: CPT

## 2017-04-01 PROCEDURE — 82803 BLOOD GASES ANY COMBINATION: CPT

## 2017-04-01 PROCEDURE — 96374 THER/PROPH/DIAG INJ IV PUSH: CPT

## 2017-04-01 PROCEDURE — 84480 ASSAY TRIIODOTHYRONINE (T3): CPT

## 2017-04-01 PROCEDURE — 85730 THROMBOPLASTIN TIME PARTIAL: CPT

## 2017-04-01 PROCEDURE — 99285 EMERGENCY DEPT VISIT HI MDM: CPT | Mod: 25

## 2017-04-01 PROCEDURE — 84100 ASSAY OF PHOSPHORUS: CPT

## 2017-04-01 PROCEDURE — 80061 LIPID PANEL: CPT

## 2017-04-01 PROCEDURE — 83735 ASSAY OF MAGNESIUM: CPT

## 2017-04-01 PROCEDURE — 85610 PROTHROMBIN TIME: CPT

## 2017-04-01 PROCEDURE — 87086 URINE CULTURE/COLONY COUNT: CPT

## 2017-04-01 PROCEDURE — 83880 ASSAY OF NATRIURETIC PEPTIDE: CPT

## 2017-04-01 PROCEDURE — 83036 HEMOGLOBIN GLYCOSYLATED A1C: CPT

## 2017-04-01 PROCEDURE — 93005 ELECTROCARDIOGRAM TRACING: CPT

## 2017-04-01 PROCEDURE — 82553 CREATINE MB FRACTION: CPT

## 2017-04-01 PROCEDURE — 84484 ASSAY OF TROPONIN QUANT: CPT

## 2017-04-01 PROCEDURE — 81001 URINALYSIS AUTO W/SCOPE: CPT

## 2017-04-01 PROCEDURE — 96372 THER/PROPH/DIAG INJ SC/IM: CPT | Mod: XU

## 2017-04-01 PROCEDURE — 94664 DEMO&/EVAL PT USE INHALER: CPT

## 2017-04-01 RX ORDER — NICOTINE POLACRILEX 2 MG
1 GUM BUCCAL
Qty: 1 | Refills: 0 | OUTPATIENT
Start: 2017-04-01 | End: 2017-05-01

## 2017-04-01 RX ADMIN — Medication 0.5 MILLIGRAM(S): at 07:43

## 2017-04-01 RX ADMIN — Medication 40 MILLIGRAM(S): at 13:21

## 2017-04-01 RX ADMIN — Medication 200 MILLIGRAM(S): at 08:41

## 2017-04-01 RX ADMIN — SODIUM CHLORIDE 60 MILLILITER(S): 9 INJECTION INTRAMUSCULAR; INTRAVENOUS; SUBCUTANEOUS at 08:41

## 2017-04-01 RX ADMIN — Medication 1 GRAM(S): at 05:56

## 2017-04-01 RX ADMIN — Medication 40 MILLIGRAM(S): at 05:56

## 2017-04-01 RX ADMIN — Medication 25 MICROGRAM(S): at 05:56

## 2017-04-01 RX ADMIN — Medication 1 GRAM(S): at 13:21

## 2017-04-01 RX ADMIN — PANTOPRAZOLE SODIUM 40 MILLIGRAM(S): 20 TABLET, DELAYED RELEASE ORAL at 06:00

## 2017-04-01 RX ADMIN — Medication 75 MILLIGRAM(S): at 13:21

## 2017-04-01 RX ADMIN — ALBUTEROL 2.5 MILLIGRAM(S): 90 AEROSOL, METERED ORAL at 07:43

## 2017-04-01 RX ADMIN — TIOTROPIUM BROMIDE 1 CAPSULE(S): 18 CAPSULE ORAL; RESPIRATORY (INHALATION) at 06:20

## 2017-04-01 RX ADMIN — ALBUTEROL 2.5 MILLIGRAM(S): 90 AEROSOL, METERED ORAL at 03:31

## 2017-04-01 RX ADMIN — Medication 75 MILLIGRAM(S): at 05:56

## 2017-04-01 RX ADMIN — ALBUTEROL 2.5 MILLIGRAM(S): 90 AEROSOL, METERED ORAL at 13:38

## 2017-04-01 NOTE — DISCHARGE NOTE ADULT - PLAN OF CARE
Breath better. Regular diet.  Increase activity as tolerated.  Stop Smoking.   Follow up with Dr. Silver next week.

## 2017-04-01 NOTE — DISCHARGE NOTE ADULT - NS AS ACTIVITY OBS
Do not make important decisions/Bathing allowed/Driving allowed/No Heavy lifting/straining/Showering allowed/Walking-Outdoors allowed/Stairs allowed/Walking-Indoors allowed/Do not drive or operate machinery

## 2017-04-01 NOTE — PROGRESS NOTE ADULT - PROBLEM SELECTOR PLAN 2
nebs  solu medrol  can go home with medrol ALYSHA  will follow up with Dr. Silver  no evidence for ABX need at present, normal Procalcitonin, afebrile, no purulent sputum
The patient’s tobacco use - (X) YES   (     ) NO   Advised to quit and impact of smoking•  (X)  Yes   (     ) N0  Assessed willingness to attempt to quit•  (X) YES   (     ) No  Providing methods, Resources and skills for cessation• (X) Yes   (    )  No  (     ) Refused  Medication management of smoking cessation drugs• (X)  yes  (    ) No  setting quit date• (      )  Yes   (      ) NO (X) not at this time  Follow-up arranged•  (X) Yes   (      ) No (    ) refused  Amount of time spent counseling patient   (X)  >10min     (       )  <10 min

## 2017-04-01 NOTE — PROGRESS NOTE ADULT - ASSESSMENT
51 years old female with COPD exacerbation, SLE, Nicotine addition. Patient is doing better and wants to go home.

## 2017-04-01 NOTE — DISCHARGE NOTE ADULT - PATIENT PORTAL LINK FT
“You can access the [facility] Patient Portal, offered by [facility], by registering with the following website: [email or web address]”

## 2017-04-01 NOTE — DISCHARGE NOTE ADULT - CARE PROVIDER_API CALL
Dilip Silver), Critical Care Medicine; Internal Medicine; Pulmonary Disease  [address] [place] [address]  Phone: [phone]  Fax: [phone]

## 2017-04-01 NOTE — DISCHARGE NOTE ADULT - MEDICATION SUMMARY - MEDICATIONS TO CHANGE
I will SWITCH the dose or number of times a day I take the medications listed below when I get home from the hospital:    predniSONE  -- 40 milligram(s) by mouth once a day  -- take with food  for 5 days

## 2017-04-01 NOTE — DISCHARGE NOTE ADULT - CARE PLAN
Principal Discharge DX:	Chronic obstructive pulmonary disease, unspecified COPD type  Goal:	Breath better.  Instructions for follow-up, activity and diet:	Regular diet.  Increase activity as tolerated.  Stop Smoking.   Follow up with Dr. Silver next week.  Secondary Diagnosis:	Gastroesophageal reflux disease, esophagitis presence not specified  Secondary Diagnosis:	Hyponatremia  Secondary Diagnosis:	Systemic lupus erythematosus, unspecified SLE type, unspecified organ involvement status  Secondary Diagnosis:	Anxiety  Secondary Diagnosis:	Cigarette nicotine dependence without complication

## 2017-04-01 NOTE — DISCHARGE NOTE ADULT - MEDICATION SUMMARY - MEDICATIONS TO TAKE
I will START or STAY ON the medications listed below when I get home from the hospital:    Pulmicort Respules 0.5 mg/2 mL inhalation suspension  -- 1  inhaled 2 times a day  -- Indication: For COPD    predniSONE 10 mg oral tablet  -- 4 tab(s) by mouth day x 3 days  3 tab(s) by mouth once a day x 3 days  2 tab(s) by mouth once a day x 3 days  1 tab(s) by mouth once a day x 3 days  -- It is very important that you take or use this exactly as directed.  Do not skip doses or discontinue unless directed by your doctor.  Obtain medical advice before taking any non-prescription drugs as some may affect the action of this medication.  Take with food or milk.    -- Indication: For COPD E    Lyrica 75 mg oral capsule  --  by mouth 3 times a day  -- Indication: For Neuropathy    Plaquenil Sulfate 200 mg oral tablet  --  by mouth 2 times a day  -- Indication: For Lupus    DuoNeb 0.5 mg-2.5 mg/3 mL inhalation solution  -- 3 milliliter(s) inhaled every 4 hours  -- Indication: For Chronic obstructive pulmonary disease    Spiriva 18 mcg inhalation capsule  -- 1 cap(s) inhaled once a day  -- Indication: For Chronic obstructive pulmonary disease    Carafate 1 g oral tablet  -- 1 tab(s) by mouth 3 times a day  -- Indication: For Gastroesophageal reflux disease, esophagitis presence not specified    omeprazole 20 mg oral delayed release capsule  --  by mouth 2 times a day  -- Indication: For Gastroesophageal reflux disease, esophagitis presence not specified    nicotine 21 mg/24 hr transdermal film, extended release  -- 1 patch by transdermal patch once a day  -- Indication: For Nicotine addiction    Synthroid 25 mcg (0.025 mg) oral tablet  -- 1 tab(s) by mouth once a day  -- Indication: For Hypothyroidism, unspecified type

## 2017-04-01 NOTE — PROGRESS NOTE ADULT - PROBLEM SELECTOR PROBLEM 2
Chronic obstructive pulmonary disease, unspecified COPD type
Cigarette nicotine dependence without complication

## 2017-04-01 NOTE — PROGRESS NOTE ADULT - SUBJECTIVE AND OBJECTIVE BOX
Patient is a 51y old  Female who presents with a chief complaint of Shortness of breath, cough (2017 16:21)    INTERVAL HPI/OVERNIGHT EVENTS: Doing better.  Wants to go home.  breathing better      REVIEW OF SYSTEMS:    CONSTITUTIONAL: No weakness, fevers or chills  EYES/ENT: No visual changes;  No vertigo or throat pain   NECK: No pain or stiffness  RESPIRATORY: No cough, wheezing, hemoptysis; No shortness of breath  CARDIOVASCULAR: No chest pain or palpitations  GASTROINTESTINAL: No abdominal or epigastric pain. No nausea, vomiting, or hematemesis; No diarrhea or constipation. No melena or hematochezia.  GENITOURINARY: No dysuria, frequency or hematuria  NEUROLOGICAL: No numbness or weakness  SKIN: No itching, burning, rashes, or lesions   All other review of systems is negative unless indicated above.  Allergies    aspirin (Nausea)  Zithromax (Pruritus; Rash)    Intolerances      MEDICATIONS  (STANDING):  nicotine - 21 mG/24Hr(s) Patch 1patch Transdermal daily  sodium chloride 0.9%. 1000milliLiter(s) IV Continuous <Continuous>  ALBUTerol    0.083% 2.5milliGRAM(s) Nebulizer every 6 hours  tiotropium 18 MICROgram(s) Capsule 1Capsule(s) Inhalation daily  methylPREDNISolone sodium succinate Injectable 40milliGRAM(s) IV Push every 8 hours  buDESOnide   0.5 milliGRAM(s) Respule 0.5milliGRAM(s) Inhalation two times a day  pregabalin 75milliGRAM(s) Oral every 8 hours  hydroxychloroquine 200milliGRAM(s) Oral two times a day with meals  sucralfate 1Gram(s) Oral three times a day  pantoprazole    Tablet 40milliGRAM(s) Oral before breakfast  levothyroxine 25MICROGram(s) Oral daily  enoxaparin Injectable 40milliGRAM(s) SubCutaneous every 24 hours    MEDICATIONS  (PRN):  acetaminophen   Tablet. 650milliGRAM(s) Oral every 6 hours PRN Mild Pain (1 - 3)    Vital Signs Last 24 Hrs  T(C): 37.1, Max: 37.1 ( @ 14:00)  T(F): 98.7, Max: 98.7 ( @ 14:00)  HR: 86 (64 - 91)  BP: 90/54 (90/54 - 117/74)  BP(mean): --  RR: 16 (16 - 22)  SpO2: 98% (95% - 100%)  PHYSICAL EXAM:    Constitutional: NAD, awake and alert, well-developed  HEENT: PERR, EOMI, Normal Hearing, MMM  Neck: Soft and supple, No LAD, No JVD  Respiratory: Breath sounds are clear bilaterally, No wheezing, rales or rhonchi  Cardiovascular: S1 and S2, regular rate and rhythm, no Murmurs, gallops or rubs  Gastrointestinal: Bowel Sounds present, soft, nontender, nondistended, no guarding, no rebound  Extremities: No peripheral edema  Vascular: 2+ peripheral pulses  Neurological: A/O x 3, no focal deficits  Musculoskeletal: 5/5 strength b/l upper and lower extremities  Skin: No rashes      2017 08:23    134    |  101    |  10     ----------------------------<  112    4.0     |  26     |  0.66     Ca    9.2        2017 08:23  Phos  3.0       2017 08:23  Mg     2.1       2017 08:23    TPro  7.5    /  Alb  4.3    /  TBili  0.3    /  DBili  x      /  AST  26     /  ALT  41     /  AlkPhos  91     31 Mar 2017 12:50                          13.2   16.3  )-----------( 316      ( 2017 08:23 )             39.4     PT/INR - ( 31 Mar 2017 12:50 )   PT: 9.8 sec;   INR: 0.90 ratio         PTT - ( 31 Mar 2017 12:50 )  PTT:28.5 sec  Urinalysis Basic - ( 31 Mar 2017 12:59 )    Color: Yellow / Appearance: Clear / S.010 / pH: x  Gluc: x / Ketone: Negative  / Bili: Negative / Urobili: Negative mg/dL   Blood: x / Protein: 15 mg/dL / Nitrite: Negative   Leuk Esterase: Trace / RBC: 0-2 /HPF / WBC 0-2   Sq Epi: x / Non Sq Epi: Few / Bacteria: Few      CARDIAC MARKERS ( 31 Mar 2017 12:50 )  .004 ng/mL / x     / x     / x     / 3.0 ng/mL      CAPILLARY BLOOD GLUCOSE    HEALTH ISSUES - PROBLEM Dx:  Need for prophylactic measure: Need for prophylactic measure  Migraine without status migrainosus, not intractable, unspecified migraine type: Migraine without status migrainosus, not intractable, unspecified migraine type  Gastroesophageal reflux disease, esophagitis presence not specified: Gastroesophageal reflux disease, esophagitis presence not specified  Hypothyroidism, unspecified type: Hypothyroidism, unspecified type  Anxiety: Anxiety  Cigarette nicotine dependence with nicotine-induced disorder: Cigarette nicotine dependence with nicotine-induced disorder  Hyponatremia: Hyponatremia  Nicotine addiction: Nicotine addiction  Chronic obstructive pulmonary disease, unspecified COPD type: Chronic obstructive pulmonary disease, unspecified COPD type

## 2017-04-01 NOTE — DISCHARGE NOTE ADULT - MEDICATION SUMMARY - MEDICATIONS TO STOP TAKING
I will STOP taking the medications listed below when I get home from the hospital:    Medrol Dosepak  -- 1

## 2017-04-01 NOTE — DISCHARGE NOTE ADULT - SECONDARY DIAGNOSIS.
Gastroesophageal reflux disease, esophagitis presence not specified Hyponatremia Systemic lupus erythematosus, unspecified SLE type, unspecified organ involvement status Anxiety Cigarette nicotine dependence without complication

## 2017-04-01 NOTE — PROGRESS NOTE ADULT - SUBJECTIVE AND OBJECTIVE BOX
Date/Time Patient Seen:  		  Referring MD:   Data Reviewed	       Patient is a 51y old  Female who presents with a chief complaint of Shortness of breath, cough (31 Mar 2017 14:22)  seen and examined  on room air  feels better  wants to go home      Subjective/HPI       Medication list         MEDICATIONS  (STANDING):  nicotine - 21 mG/24Hr(s) Patch 1patch Transdermal daily  sodium chloride 0.9%. 1000milliLiter(s) IV Continuous <Continuous>  ALBUTerol    0.083% 2.5milliGRAM(s) Nebulizer every 6 hours  tiotropium 18 MICROgram(s) Capsule 1Capsule(s) Inhalation daily  methylPREDNISolone sodium succinate Injectable 40milliGRAM(s) IV Push every 8 hours  buDESOnide   0.5 milliGRAM(s) Respule 0.5milliGRAM(s) Inhalation two times a day  pregabalin 75milliGRAM(s) Oral every 8 hours  hydroxychloroquine 200milliGRAM(s) Oral two times a day with meals  sucralfate 1Gram(s) Oral three times a day  pantoprazole    Tablet 40milliGRAM(s) Oral before breakfast  levothyroxine 25MICROGram(s) Oral daily  enoxaparin Injectable 40milliGRAM(s) SubCutaneous every 24 hours    MEDICATIONS  (PRN):  acetaminophen   Tablet. 650milliGRAM(s) Oral every 6 hours PRN Mild Pain (1 - 3)         Vitals log        ICU Vital Signs Last 24 Hrs  T(C): 36.7, Max: 36.9 (03-31 @ 13:26)  T(F): 98.1, Max: 98.4 (03-31 @ 13:26)  HR: 80 (64 - 98)  BP: 113/64 (113/64 - 134/77)  BP(mean): --  ABP: --  ABP(mean): --  RR: 16 (14 - 22)  SpO2: 100% (95% - 100%)           Input and Output:  I&O's Detail    I & Os for current day (as of 01 Apr 2017 06:42)  =============================================  IN:    Sodium Chloride 0.9% IV Bolus: 2000 ml    sodium chloride 0.9%.: 720 ml    Oral Fluid: 240 ml    Total IN: 2960 ml  ---------------------------------------------  OUT:    Voided: 550 ml    Total OUT: 550 ml  ---------------------------------------------  Total NET: 2410 ml      Lab Data                        14.5   15.7  )-----------( 366      ( 31 Mar 2017 12:50 )             41.5     31 Mar 2017 12:50    126    |  90     |  10     ----------------------------<  88     3.9     |  26     |  0.72     Ca    9.1        31 Mar 2017 12:50    TPro  7.5    /  Alb  4.3    /  TBili  0.3    /  DBili  x      /  AST  26     /  ALT  41     /  AlkPhos  91     31 Mar 2017 12:50    ABG - ( 31 Mar 2017 15:03 )  pH: x     /  pCO2: 39    /  pO2: 96    / HCO3: 22    / Base Excess: -3.5  /  SaO2: 97                CARDIAC MARKERS ( 31 Mar 2017 12:50 )  .004 ng/mL / x     / x     / x     / 3.0 ng/mL        Review of Systems	      Objective       Resp  no wheeze  no rhonchi  occ cough    CV       GI    Extremities       Neuro       Skin         Pertinent Lab findings & Imaging      Zoila:  NO   Adequate UO     I&O's Detail    I & Os for current day (as of 01 Apr 2017 06:42)  =============================================  IN:    Sodium Chloride 0.9% IV Bolus: 2000 ml    sodium chloride 0.9%.: 720 ml    Oral Fluid: 240 ml    Total IN: 2960 ml  ---------------------------------------------  OUT:    Voided: 550 ml    Total OUT: 550 ml  ---------------------------------------------  Total NET: 2410 ml           Discussed with:     Cultures:	        Radiology

## 2017-04-01 NOTE — PROGRESS NOTE ADULT - PROBLEM SELECTOR PLAN 5
Improved.  Most likely due to poor solute intake.  Advised follow up with Dr. Silver as out patient.

## 2017-04-01 NOTE — PROGRESS NOTE ADULT - PROBLEM SELECTOR PROBLEM 1
Gastroesophageal reflux disease, esophagitis presence not specified
Chronic obstructive pulmonary disease, unspecified COPD type

## 2017-04-01 NOTE — DISCHARGE NOTE ADULT - HOSPITAL COURSE
Patient was admitted with shortness of breath and cough. She had COPD exacerbation. Patient was also noted to have hyponatremia. Patient was treated with IV steroids, neb treatments and IV saline. Patient has improved and is being discharged as she is cleared by Pulmonary. Patient is counselled to quit smoking.

## 2017-04-01 NOTE — PROGRESS NOTE ADULT - PROBLEM SELECTOR PLAN 1
PPI
Improved. Cleared by Pulmonary for discharge.   Will discharge home on Prednisone taper and Nebs.  Out patient follow up with Dr. Silver.

## 2017-04-01 NOTE — PROGRESS NOTE ADULT - PROBLEM SELECTOR PROBLEM 3
Nicotine addiction
Systemic lupus erythematosus, unspecified SLE type, unspecified organ involvement status

## 2017-04-01 NOTE — PROGRESS NOTE ADULT - PROBLEM SELECTOR PLAN 3
pt needs to stop smoking  3 packs a day is deadly  discussed with pt  NRT  Smoking cessation  will follow up with Dr. Silver and explore
Continue Plaquenil.

## 2017-04-05 LAB
CULTURE RESULTS: SIGNIFICANT CHANGE UP
CULTURE RESULTS: SIGNIFICANT CHANGE UP
SPECIMEN SOURCE: SIGNIFICANT CHANGE UP
SPECIMEN SOURCE: SIGNIFICANT CHANGE UP

## 2017-04-28 ENCOUNTER — INPATIENT (INPATIENT)
Facility: HOSPITAL | Age: 52
LOS: 1 days | Discharge: HOME | DRG: 394 | End: 2017-04-30
Attending: INTERNAL MEDICINE | Admitting: INTERNAL MEDICINE
Payer: COMMERCIAL

## 2017-04-28 VITALS
WEIGHT: 95.02 LBS | OXYGEN SATURATION: 98 % | HEART RATE: 79 BPM | SYSTOLIC BLOOD PRESSURE: 134 MMHG | TEMPERATURE: 99 F | DIASTOLIC BLOOD PRESSURE: 78 MMHG | RESPIRATION RATE: 14 BRPM | HEIGHT: 58 IN

## 2017-04-28 DIAGNOSIS — J38.1 POLYP OF VOCAL CORD AND LARYNX: Chronic | ICD-10-CM

## 2017-04-28 DIAGNOSIS — K82.9 DISEASE OF GALLBLADDER, UNSPECIFIED: Chronic | ICD-10-CM

## 2017-04-28 LAB
ALBUMIN SERPL ELPH-MCNC: 3.5 G/DL — SIGNIFICANT CHANGE UP (ref 3.3–5)
ALP SERPL-CCNC: 63 U/L — SIGNIFICANT CHANGE UP (ref 30–120)
ALT FLD-CCNC: 31 U/L DA — SIGNIFICANT CHANGE UP (ref 10–60)
ANION GAP SERPL CALC-SCNC: 11 MMOL/L — SIGNIFICANT CHANGE UP (ref 5–17)
APTT BLD: 28.2 SEC — SIGNIFICANT CHANGE UP (ref 27.5–37.4)
AST SERPL-CCNC: 23 U/L — SIGNIFICANT CHANGE UP (ref 10–40)
BASOPHILS # BLD AUTO: 0.2 K/UL — SIGNIFICANT CHANGE UP (ref 0–0.2)
BASOPHILS NFR BLD AUTO: 1.3 % — SIGNIFICANT CHANGE UP (ref 0–2)
BILIRUB SERPL-MCNC: 0.3 MG/DL — SIGNIFICANT CHANGE UP (ref 0.2–1.2)
BUN SERPL-MCNC: 9 MG/DL — SIGNIFICANT CHANGE UP (ref 7–23)
CALCIUM SERPL-MCNC: 8.4 MG/DL — SIGNIFICANT CHANGE UP (ref 8.4–10.5)
CHLORIDE SERPL-SCNC: 93 MMOL/L — LOW (ref 96–108)
CO2 SERPL-SCNC: 24 MMOL/L — SIGNIFICANT CHANGE UP (ref 22–31)
CREAT SERPL-MCNC: 0.83 MG/DL — SIGNIFICANT CHANGE UP (ref 0.5–1.3)
EOSINOPHIL # BLD AUTO: 0.1 K/UL — SIGNIFICANT CHANGE UP (ref 0–0.5)
EOSINOPHIL NFR BLD AUTO: 0.4 % — SIGNIFICANT CHANGE UP (ref 0–6)
GLUCOSE SERPL-MCNC: 86 MG/DL — SIGNIFICANT CHANGE UP (ref 70–99)
HCT VFR BLD CALC: 35.4 % — SIGNIFICANT CHANGE UP (ref 34.5–45)
HGB BLD-MCNC: 12.2 G/DL — SIGNIFICANT CHANGE UP (ref 11.5–15.5)
INR BLD: 1.04 RATIO — SIGNIFICANT CHANGE UP (ref 0.88–1.16)
LACTATE SERPL-SCNC: 0.6 MMOL/L — LOW (ref 0.7–2)
LYMPHOCYTES # BLD AUTO: 18.6 % — SIGNIFICANT CHANGE UP (ref 13–44)
LYMPHOCYTES # BLD AUTO: 2.6 K/UL — SIGNIFICANT CHANGE UP (ref 1–3.3)
MCHC RBC-ENTMCNC: 34.2 PG — HIGH (ref 27–34)
MCHC RBC-ENTMCNC: 34.6 GM/DL — SIGNIFICANT CHANGE UP (ref 32–36)
MCV RBC AUTO: 98.9 FL — SIGNIFICANT CHANGE UP (ref 80–100)
MONOCYTES # BLD AUTO: 1.2 K/UL — HIGH (ref 0–0.9)
MONOCYTES NFR BLD AUTO: 8.3 % — SIGNIFICANT CHANGE UP (ref 2–14)
NEUTROPHILS # BLD AUTO: 9.9 K/UL — HIGH (ref 1.8–7.4)
NEUTROPHILS NFR BLD AUTO: 71.5 % — SIGNIFICANT CHANGE UP (ref 43–77)
PLATELET # BLD AUTO: 315 K/UL — SIGNIFICANT CHANGE UP (ref 150–400)
POTASSIUM SERPL-MCNC: 4 MMOL/L — SIGNIFICANT CHANGE UP (ref 3.5–5.3)
POTASSIUM SERPL-SCNC: 4 MMOL/L — SIGNIFICANT CHANGE UP (ref 3.5–5.3)
PROT SERPL-MCNC: 6.4 G/DL — SIGNIFICANT CHANGE UP (ref 6–8.3)
PROTHROM AB SERPL-ACNC: 11.4 SEC — SIGNIFICANT CHANGE UP (ref 9.8–12.7)
RBC # BLD: 3.57 M/UL — LOW (ref 3.8–5.2)
RBC # FLD: 13.1 % — SIGNIFICANT CHANGE UP (ref 10.3–14.5)
SODIUM SERPL-SCNC: 128 MMOL/L — LOW (ref 135–145)
WBC # BLD: 13.9 K/UL — HIGH (ref 3.8–10.5)
WBC # FLD AUTO: 13.9 K/UL — HIGH (ref 3.8–10.5)

## 2017-04-28 RX ORDER — SODIUM CHLORIDE 9 MG/ML
1000 INJECTION INTRAMUSCULAR; INTRAVENOUS; SUBCUTANEOUS ONCE
Qty: 0 | Refills: 0 | Status: COMPLETED | OUTPATIENT
Start: 2017-04-28 | End: 2017-04-28

## 2017-04-28 RX ORDER — NICOTINE POLACRILEX 2 MG
1 GUM BUCCAL DAILY
Qty: 0 | Refills: 0 | Status: DISCONTINUED | OUTPATIENT
Start: 2017-04-28 | End: 2017-04-30

## 2017-04-28 RX ORDER — MORPHINE SULFATE 50 MG/1
4 CAPSULE, EXTENDED RELEASE ORAL ONCE
Qty: 0 | Refills: 0 | Status: DISCONTINUED | OUTPATIENT
Start: 2017-04-28 | End: 2017-04-28

## 2017-04-28 RX ADMIN — MORPHINE SULFATE 4 MILLIGRAM(S): 50 CAPSULE, EXTENDED RELEASE ORAL at 21:08

## 2017-04-28 RX ADMIN — Medication 1 PATCH: at 23:16

## 2017-04-28 RX ADMIN — MORPHINE SULFATE 4 MILLIGRAM(S): 50 CAPSULE, EXTENDED RELEASE ORAL at 23:15

## 2017-04-28 RX ADMIN — SODIUM CHLORIDE 2000 MILLILITER(S): 9 INJECTION INTRAMUSCULAR; INTRAVENOUS; SUBCUTANEOUS at 21:08

## 2017-04-28 NOTE — ED PROVIDER NOTE - MEDICAL DECISION MAKING DETAILS
pt with rectal bleed several days ago seen at [facility], ct with splenic flexure colitis, state sent by gi to er. check labs, consult gi, no bleeding since [facility]

## 2017-04-28 NOTE — ED PROVIDER NOTE - PROGRESS NOTE DETAILS
case dw dr mobley gi, pt with ct and hx cw vascular ischemic colitis and feels pt requires admit, surg eval dr ayoub called and refusing admit, believes pt should go to surg D/w Dr. Yadav who feels not a surgical issue. Requests admission to hospitalist

## 2017-04-28 NOTE — ED PROVIDER NOTE - OBJECTIVE STATEMENT
53 y/o F pt presents to the ED c/o rectal bleeding starting 3 days ago. Pt was at [place] 2 days ago and has not had bleeding since then, but her PCP saw the results of her tests done at [facility] and suggested that she come to the ED. Denies nausea, vomiting. No further complaints at this time.     Dr. Jr Reid 53 y/o F pt presents to the ED c/o rectal bleeding starting 3 days ago. Pt was at [place] 2 days ago and has not had bleeding since then, but her PCP saw the results of her tests done at [place] and suggested that she come to the ED. Denies nausea, vomiting. No further complaints at this time.   Dr. Jr Rodriguez

## 2017-04-28 NOTE — ED ADULT NURSE NOTE - OBJECTIVE STATEMENT
Patient states having had multiple bouts of rectal bleeding states 20 in 48 hours.  Patient pleasant and cooperative.

## 2017-04-29 DIAGNOSIS — J44.9 CHRONIC OBSTRUCTIVE PULMONARY DISEASE, UNSPECIFIED: ICD-10-CM

## 2017-04-29 DIAGNOSIS — E03.9 HYPOTHYROIDISM, UNSPECIFIED: ICD-10-CM

## 2017-04-29 DIAGNOSIS — M32.9 SYSTEMIC LUPUS ERYTHEMATOSUS, UNSPECIFIED: ICD-10-CM

## 2017-04-29 DIAGNOSIS — G62.9 POLYNEUROPATHY, UNSPECIFIED: ICD-10-CM

## 2017-04-29 DIAGNOSIS — K52.9 NONINFECTIVE GASTROENTERITIS AND COLITIS, UNSPECIFIED: ICD-10-CM

## 2017-04-29 DIAGNOSIS — Z29.9 ENCOUNTER FOR PROPHYLACTIC MEASURES, UNSPECIFIED: ICD-10-CM

## 2017-04-29 DIAGNOSIS — E87.1 HYPO-OSMOLALITY AND HYPONATREMIA: ICD-10-CM

## 2017-04-29 DIAGNOSIS — K21.9 GASTRO-ESOPHAGEAL REFLUX DISEASE WITHOUT ESOPHAGITIS: ICD-10-CM

## 2017-04-29 DIAGNOSIS — D72.829 ELEVATED WHITE BLOOD CELL COUNT, UNSPECIFIED: ICD-10-CM

## 2017-04-29 DIAGNOSIS — R19.7 DIARRHEA, UNSPECIFIED: ICD-10-CM

## 2017-04-29 DIAGNOSIS — F17.210 NICOTINE DEPENDENCE, CIGARETTES, UNCOMPLICATED: ICD-10-CM

## 2017-04-29 DIAGNOSIS — F41.9 ANXIETY DISORDER, UNSPECIFIED: ICD-10-CM

## 2017-04-29 LAB
ANION GAP SERPL CALC-SCNC: 9 MMOL/L — SIGNIFICANT CHANGE UP (ref 5–17)
BUN SERPL-MCNC: 8 MG/DL — SIGNIFICANT CHANGE UP (ref 7–23)
CALCIUM SERPL-MCNC: 7.4 MG/DL — LOW (ref 8.4–10.5)
CHLORIDE SERPL-SCNC: 98 MMOL/L — SIGNIFICANT CHANGE UP (ref 96–108)
CO2 SERPL-SCNC: 25 MMOL/L — SIGNIFICANT CHANGE UP (ref 22–31)
CREAT SERPL-MCNC: 0.57 MG/DL — SIGNIFICANT CHANGE UP (ref 0.5–1.3)
GLUCOSE SERPL-MCNC: 81 MG/DL — SIGNIFICANT CHANGE UP (ref 70–99)
HCT VFR BLD CALC: 34.1 % — LOW (ref 34.5–45)
HGB BLD-MCNC: 11.5 G/DL — SIGNIFICANT CHANGE UP (ref 11.5–15.5)
MCHC RBC-ENTMCNC: 33.7 GM/DL — SIGNIFICANT CHANGE UP (ref 32–36)
MCHC RBC-ENTMCNC: 35 PG — HIGH (ref 27–34)
MCV RBC AUTO: 103.8 FL — HIGH (ref 80–100)
PLATELET # BLD AUTO: 245 K/UL — SIGNIFICANT CHANGE UP (ref 150–400)
POTASSIUM SERPL-MCNC: 4.1 MMOL/L — SIGNIFICANT CHANGE UP (ref 3.5–5.3)
POTASSIUM SERPL-SCNC: 4.1 MMOL/L — SIGNIFICANT CHANGE UP (ref 3.5–5.3)
RBC # BLD: 3.29 M/UL — LOW (ref 3.8–5.2)
RBC # FLD: 13.8 % — SIGNIFICANT CHANGE UP (ref 10.3–14.5)
SODIUM SERPL-SCNC: 132 MMOL/L — LOW (ref 135–145)
WBC # BLD: 13.1 K/UL — HIGH (ref 3.8–10.5)
WBC # FLD AUTO: 13.1 K/UL — HIGH (ref 3.8–10.5)

## 2017-04-29 PROCEDURE — 99223 1ST HOSP IP/OBS HIGH 75: CPT | Mod: AI

## 2017-04-29 PROCEDURE — 93010 ELECTROCARDIOGRAM REPORT: CPT

## 2017-04-29 PROCEDURE — 99285 EMERGENCY DEPT VISIT HI MDM: CPT

## 2017-04-29 RX ORDER — SODIUM CHLORIDE 9 MG/ML
1000 INJECTION INTRAMUSCULAR; INTRAVENOUS; SUBCUTANEOUS ONCE
Qty: 0 | Refills: 0 | Status: COMPLETED | OUTPATIENT
Start: 2017-04-29 | End: 2017-04-29

## 2017-04-29 RX ORDER — SODIUM CHLORIDE 9 MG/ML
1000 INJECTION INTRAMUSCULAR; INTRAVENOUS; SUBCUTANEOUS
Qty: 0 | Refills: 0 | Status: DISCONTINUED | OUTPATIENT
Start: 2017-04-29 | End: 2017-04-29

## 2017-04-29 RX ORDER — ONDANSETRON 8 MG/1
4 TABLET, FILM COATED ORAL EVERY 6 HOURS
Qty: 0 | Refills: 0 | Status: DISCONTINUED | OUTPATIENT
Start: 2017-04-29 | End: 2017-04-30

## 2017-04-29 RX ORDER — METRONIDAZOLE 500 MG
500 TABLET ORAL ONCE
Qty: 0 | Refills: 0 | Status: COMPLETED | OUTPATIENT
Start: 2017-04-29 | End: 2017-04-29

## 2017-04-29 RX ORDER — SODIUM CHLORIDE 9 MG/ML
1000 INJECTION INTRAMUSCULAR; INTRAVENOUS; SUBCUTANEOUS
Qty: 0 | Refills: 0 | Status: DISCONTINUED | OUTPATIENT
Start: 2017-04-29 | End: 2017-04-30

## 2017-04-29 RX ORDER — IPRATROPIUM/ALBUTEROL SULFATE 18-103MCG
3 AEROSOL WITH ADAPTER (GRAM) INHALATION EVERY 6 HOURS
Qty: 0 | Refills: 0 | Status: DISCONTINUED | OUTPATIENT
Start: 2017-04-29 | End: 2017-04-30

## 2017-04-29 RX ORDER — TIOTROPIUM BROMIDE 18 UG/1
1 CAPSULE ORAL; RESPIRATORY (INHALATION) DAILY
Qty: 0 | Refills: 0 | Status: DISCONTINUED | OUTPATIENT
Start: 2017-04-29 | End: 2017-04-30

## 2017-04-29 RX ORDER — SUCRALFATE 1 G
1 TABLET ORAL THREE TIMES A DAY
Qty: 0 | Refills: 0 | Status: DISCONTINUED | OUTPATIENT
Start: 2017-04-29 | End: 2017-04-30

## 2017-04-29 RX ORDER — LEVOTHYROXINE SODIUM 125 MCG
25 TABLET ORAL DAILY
Qty: 0 | Refills: 0 | Status: DISCONTINUED | OUTPATIENT
Start: 2017-04-29 | End: 2017-04-30

## 2017-04-29 RX ORDER — CIPROFLOXACIN LACTATE 400MG/40ML
400 VIAL (ML) INTRAVENOUS EVERY 12 HOURS
Qty: 0 | Refills: 0 | Status: DISCONTINUED | OUTPATIENT
Start: 2017-04-29 | End: 2017-04-29

## 2017-04-29 RX ORDER — PIPERACILLIN AND TAZOBACTAM 4; .5 G/20ML; G/20ML
3.38 INJECTION, POWDER, LYOPHILIZED, FOR SOLUTION INTRAVENOUS ONCE
Qty: 0 | Refills: 0 | Status: COMPLETED | OUTPATIENT
Start: 2017-04-29 | End: 2017-04-29

## 2017-04-29 RX ORDER — PIPERACILLIN AND TAZOBACTAM 4; .5 G/20ML; G/20ML
3.38 INJECTION, POWDER, LYOPHILIZED, FOR SOLUTION INTRAVENOUS EVERY 8 HOURS
Qty: 0 | Refills: 0 | Status: DISCONTINUED | OUTPATIENT
Start: 2017-04-29 | End: 2017-04-30

## 2017-04-29 RX ORDER — PANTOPRAZOLE SODIUM 20 MG/1
40 TABLET, DELAYED RELEASE ORAL
Qty: 0 | Refills: 0 | Status: DISCONTINUED | OUTPATIENT
Start: 2017-04-29 | End: 2017-04-30

## 2017-04-29 RX ORDER — IPRATROPIUM/ALBUTEROL SULFATE 18-103MCG
3 AEROSOL WITH ADAPTER (GRAM) INHALATION ONCE
Qty: 0 | Refills: 0 | Status: COMPLETED | OUTPATIENT
Start: 2017-04-29 | End: 2017-04-29

## 2017-04-29 RX ORDER — MORPHINE SULFATE 50 MG/1
2 CAPSULE, EXTENDED RELEASE ORAL EVERY 4 HOURS
Qty: 0 | Refills: 0 | Status: DISCONTINUED | OUTPATIENT
Start: 2017-04-29 | End: 2017-04-30

## 2017-04-29 RX ORDER — HYDROXYCHLOROQUINE SULFATE 200 MG
200 TABLET ORAL
Qty: 0 | Refills: 0 | Status: DISCONTINUED | OUTPATIENT
Start: 2017-04-29 | End: 2017-04-30

## 2017-04-29 RX ORDER — MORPHINE SULFATE 50 MG/1
4 CAPSULE, EXTENDED RELEASE ORAL
Qty: 0 | Refills: 0 | Status: DISCONTINUED | OUTPATIENT
Start: 2017-04-29 | End: 2017-04-30

## 2017-04-29 RX ORDER — MORPHINE SULFATE 50 MG/1
4 CAPSULE, EXTENDED RELEASE ORAL ONCE
Qty: 0 | Refills: 0 | Status: DISCONTINUED | OUTPATIENT
Start: 2017-04-29 | End: 2017-04-30

## 2017-04-29 RX ORDER — BUDESONIDE, MICRONIZED 100 %
0.5 POWDER (GRAM) MISCELLANEOUS
Qty: 0 | Refills: 0 | Status: DISCONTINUED | OUTPATIENT
Start: 2017-04-29 | End: 2017-04-30

## 2017-04-29 RX ORDER — CIPROFLOXACIN LACTATE 400MG/40ML
400 VIAL (ML) INTRAVENOUS ONCE
Qty: 0 | Refills: 0 | Status: COMPLETED | OUTPATIENT
Start: 2017-04-29 | End: 2017-04-29

## 2017-04-29 RX ORDER — METRONIDAZOLE 500 MG
500 TABLET ORAL EVERY 8 HOURS
Qty: 0 | Refills: 0 | Status: DISCONTINUED | OUTPATIENT
Start: 2017-04-29 | End: 2017-04-29

## 2017-04-29 RX ADMIN — SODIUM CHLORIDE 125 MILLILITER(S): 9 INJECTION INTRAMUSCULAR; INTRAVENOUS; SUBCUTANEOUS at 03:20

## 2017-04-29 RX ADMIN — Medication 200 MILLIGRAM(S): at 03:20

## 2017-04-29 RX ADMIN — ONDANSETRON 4 MILLIGRAM(S): 8 TABLET, FILM COATED ORAL at 15:25

## 2017-04-29 RX ADMIN — SODIUM CHLORIDE 100 MILLILITER(S): 9 INJECTION INTRAMUSCULAR; INTRAVENOUS; SUBCUTANEOUS at 18:16

## 2017-04-29 RX ADMIN — Medication 75 MILLIGRAM(S): at 21:20

## 2017-04-29 RX ADMIN — TIOTROPIUM BROMIDE 1 CAPSULE(S): 18 CAPSULE ORAL; RESPIRATORY (INHALATION) at 08:39

## 2017-04-29 RX ADMIN — PIPERACILLIN AND TAZOBACTAM 25 GRAM(S): 4; .5 INJECTION, POWDER, LYOPHILIZED, FOR SOLUTION INTRAVENOUS at 18:16

## 2017-04-29 RX ADMIN — Medication 1 GRAM(S): at 21:20

## 2017-04-29 RX ADMIN — Medication 3 MILLILITER(S): at 18:41

## 2017-04-29 RX ADMIN — Medication 3 MILLILITER(S): at 03:25

## 2017-04-29 RX ADMIN — SODIUM CHLORIDE 100 MILLILITER(S): 9 INJECTION INTRAMUSCULAR; INTRAVENOUS; SUBCUTANEOUS at 21:37

## 2017-04-29 RX ADMIN — SODIUM CHLORIDE 1000 MILLILITER(S): 9 INJECTION INTRAMUSCULAR; INTRAVENOUS; SUBCUTANEOUS at 09:57

## 2017-04-29 RX ADMIN — Medication 0.5 MILLIGRAM(S): at 18:41

## 2017-04-29 RX ADMIN — ONDANSETRON 4 MILLIGRAM(S): 8 TABLET, FILM COATED ORAL at 08:39

## 2017-04-29 RX ADMIN — PIPERACILLIN AND TAZOBACTAM 25 GRAM(S): 4; .5 INJECTION, POWDER, LYOPHILIZED, FOR SOLUTION INTRAVENOUS at 21:20

## 2017-04-29 RX ADMIN — Medication 3 MILLILITER(S): at 08:39

## 2017-04-29 RX ADMIN — Medication 0.5 MILLIGRAM(S): at 08:39

## 2017-04-29 RX ADMIN — PIPERACILLIN AND TAZOBACTAM 200 GRAM(S): 4; .5 INJECTION, POWDER, LYOPHILIZED, FOR SOLUTION INTRAVENOUS at 10:17

## 2017-04-29 RX ADMIN — Medication 3 MILLILITER(S): at 14:08

## 2017-04-29 RX ADMIN — Medication 100 MILLIGRAM(S): at 02:55

## 2017-04-29 NOTE — H&P ADULT - PROBLEM SELECTOR PLAN 3
moderate, asymptomatic, was 134 meq/L 2 days ago as per records, received NS resuscitation as stated above, will repeat sodium level in am, and go from there.

## 2017-04-29 NOTE — H&P ADULT - NSHPPHYSICALEXAM_GEN_ALL_CORE
GENERAL: NAD, well-groomed, well-developed.  HEAD:  Atraumatic, Normocephalic.  EYES: PERRLA, conjunctiva clear.  ENMT: no nasal discharge, no tiffany-pharyngeal erythema or exudates, dry MM.  NECK: Supple, No JVD.  NERVOUS SYSTEM:  Alert & oriented X3, neurologically intact grossly.  CHEST/LUNG: Fair air entry B/L, (+) rales B/L lung base, no rhonchi or wheezing.  HEART: Normal S1 & S2, no murmurs, or extra sounds.  ABDOMEN: Soft, Diffuse mild tenderness with guarding, non distended; bowel sounds present, no palpable masses or organomegaly.  EXTREMITIES:  (+) mild clubbing, no cyanosis, or edema, extensive erythematous rash over both forearms & arms.  VASCULAR: 2+ radial, DPA / PTA pulses B/L.  SKIN: As stated above.  PSYCH: normal affect & behavior.

## 2017-04-29 NOTE — H&P ADULT - PROBLEM SELECTOR PLAN 8
Patient is at high risk for VTE, Pharmacological DVT prophylaxis is contraindicated given the active bleeding, will start her on B/L sequential compression device for mechanical DVT prophylaxis.

## 2017-04-29 NOTE — H&P ADULT - NSHPLABSRESULTS_GEN_ALL_CORE
Labs:    Lactate Trend  04-28 @ 20:27 Lactate:0.6                           12.2   13.9  )-----------( 315      ( 28 Apr 2017 20:27 )             35.4            04-28    128<L>  |  93<L>  |  9   ----------------------------<  86  4.0   |  24  |  0.83    Ca    8.4      28 Apr 2017 20:27    TPro  6.4  /  Alb  3.5  /  TBili  0.3  /  DBili  x   /  AST  23  /  ALT  31  /  AlkPhos  63  04-28            PT/INR - ( 28 Apr 2017 20:27 )   PT: 11.4 sec;   INR: 1.04 ratio         PTT - ( 28 Apr 2017 20:27 )  PTT:28.2 sec        Culture Results:   No growth (03-31 @ 21:12)  Culture Results:   No growth at 5 days. (03-31 @ 21:09)  Culture Results:   No growth at 5 days. (03-31 @ 21:09)      CT RENAL STONE HUNT      No obstructive uropathy or urolithiasis.   Distended stomach similar to prior possible chronic gastroparesis.  Constipation, possibly also chronic. No bowel obstruction.      CT abdomen & pelvis with PO & IV contrast  2 days ago :  Colitis of left colonic flexure of undetermined aretology.              EKG   As per my review show SR at 75/min, normal DC & QTc intervals, normal QRS voltage, duration, and axis ( +45), with normal transition, no ST-T abnormality.

## 2017-04-29 NOTE — H&P ADULT - PROBLEM SELECTOR PLAN 4
stable, currently heavily smoker, will continue on Spiriva, Pulmicort INH, and Albuterol/ipratropium nebs Q 6h around the clock. patient states that she is almost constantly on nebs at home, will finish the Prednisone tapering course that patient is already on.

## 2017-04-29 NOTE — H&P ADULT - NSHPREVIEWOFSYSTEMS_GEN_ALL_CORE
CONSTITUTIONAL: No fever, weight loss, or fatigue.  EYES: No eye pain, visual disturbances, or discharge.  ENMT:  No difficulty hearing, tinnitus, had vertigo in the past but not anymore, no sinus or throat pain, but reports dry mouth constantly.  NECK: No pain or stiffness.  RESPIRATORY: (+) infrequent cough, no wheezing, or hemoptysis; no shortness of breath.  CARDIOVASCULAR: No chest pain, palpitations, dizziness, or leg swelling.  GASTROINTESTINAL: (+) crampy abdominal, no nausea, vomiting, or hematemesis; (+) diarrhea preceded by couple o days with constipation no melena, only bright red blood per rectum mixed with diarrhea.  GENITOURINARY: No dysuria but reports difficulty sometimes to pass urine. no hematuria, or incontinence.  NEUROLOGICAL: reports yasmani facial pain episodes, no loss of strength, numbness, or tremors.  SKIN: No itching or burning, but almost constantly has rash for her Lupus.  MUSCULOSKELETAL: No muscle or back pain.  PSYCHIATRIC: No depression, or agitation, reports anxiety.  HEME/LYMPH: No easy bruising, bleeding gums, or nose bleed.

## 2017-04-29 NOTE — CONSULT NOTE ADULT - SUBJECTIVE AND OBJECTIVE BOX
full consult dictated  hematochezia/colitis ? ischemic vs IBD  zorfran prn  cipro and flagyl  stool cultures  colonoscopy monday  diet as tolerated  daily cbc   transfuse if hgb less than 8  smoking cessation  seq teds

## 2017-04-29 NOTE — H&P ADULT - PROBLEM SELECTOR PLAN 1
following ana bright red bleeding per rectum, patient keeps photos of large amounts of bright blood with big dark blood clots on her cellphone, CT with contrast 2 days ago showed left colic flexure colitis that raised the suspicion for watershed ischemic colitis, was sent home from [facility] on Cipro & Flagyl & was resent by her GI to ED for surgical evaluation, admitted to telemetry unit, will continue Cipro & Flagyl, Clear liquid diet, IVF hydration with NS at 125 ml/h following 2000 ml NS bolus, pain control, monitor H&H, stool culture, surgery consult with Dr. Thomas was called by ED physician, also GI consult with Dr. Velarde was called.

## 2017-04-29 NOTE — H&P ADULT - HISTORY OF PRESENT ILLNESS
This is a 51 y/o F with PMH of HTN, COPD, SLE, Sjogren's Syndrome, Glossopharyngeal Neuropathy pending Gamma Knife procedure, Hep-C, Hypothyroidism, GERD, Migraine, and anxiety presented with bloody diarrhea over the past 3 days. Patient states that she was admitted with rectal bleeding in [facility] 2 days ago, sent home on Cipro & Flagyl for colitis, blood per rectum is much less now but still having abdominal cramps, distension with episodes of "castro" abdomen, and watery bloody diarrhea about 10 times a day, was seen by her gastroenterologist who sent her to ED to be evaluated for the possibility of bowel ischemia as an underlying cause of her problem. Denies nausea, vomiting, or fever but reports intermittent chills, no bleeding from any other site in the body.

## 2017-04-29 NOTE — H&P ADULT - FAMILY HISTORY
Sibling  Still living? Yes, Estimated age: Age Unknown  Family history of systemic lupus erythematosus (SLE) in mother, Age at diagnosis: Age Unknown  Family history of psoriatic arthritis, Age at diagnosis: Age Unknown  Family history of diabetes mellitus, Age at diagnosis: Age Unknown

## 2017-04-29 NOTE — CONSULT NOTE ADULT - SUBJECTIVE AND OBJECTIVE BOX
Onset 3 days ago of red blood and clots per rectum, small to moderate amount (pt showed me phot of blood in bowl on her phone) Her GI Doctor Jennifer sent her to ER at [place], who sent her home.  Since then she has had colicky abdominal pain, sometimes severe and diffuse, with diarrhea "over 30 times" but with no further blood.  Pain now mild.  No prior history of GI bleed.      PMH lap levar, SLE, glossopharyngaeal neuralgia, COPD Rest of PMH reviewed on chart    SH; cigarette smoker, current;  denies alcohol use    Afeb  BP as low as 84/43 at & AM, 98/56 now  HR 72    Abd very mild diffuse tenderness, no distention    WBC 13.1  HB 12.2 to 11.5 this AM    Impr poss colitis, poss c.diff since she was on antibiotic 2 weeks ago for COPD.    P  as per GI  ID and medicine

## 2017-04-29 NOTE — H&P ADULT - PROBLEM SELECTOR PLAN 2
Possibly reactive versus infectious colitis, TLC was 12.4 2 days ago, now 13.9, also patient is on tapering course of prednisone, will continue Cipro & flagyl as stated above & f/u stool culture, and trend TLC.

## 2017-04-29 NOTE — PROGRESS NOTE ADULT - SUBJECTIVE AND OBJECTIVE BOX
Patient is a 52y old  Female who presents with a chief complaint of rectal bleeding recently admitted to [facility] (29 Apr 2017 07:26)    HPI:  This is a 53 y/o F with PMH of HTN, COPD, SLE, Sjogren's Syndrome, Glossopharyngeal Neuropathy pending Gamma Knife procedure, Hep-C, Hypothyroidism, GERD, Migraine, and anxiety presented with bloody diarrhea over the past 3 days. Patient states that she was admitted with rectal bleeding in [facility] 2 days ago, sent home on Cipro & Flagyl for colitis, blood per rectum is much less now but still having abdominal cramps, distension with episodes of "castro" abdomen, and watery bloody diarrhea about 10 times a day, was seen by her gastroenterologist who sent her to ED to be evaluated for the possibility of bowel ischemia as an underlying cause of her problem. Denies nausea, vomiting, or fever but reports intermittent chills, no bleeding from any other site in the body. (29 Apr 2017 04:18)    INTERVAL HPI/OVERNIGHT EVENTS:  Chart reviewed, notes reviewed.   Patient seen and examined.  C/o some abdominal discomfort.  No further rectal bleeding.  No nausea or vomiting.   No Fever or chills.  Wants to go home soon.       Consultant(s) Notes Reviewed:  [X] Yes    Care Discussed with Consultants/Other Providers: [ ] Yes    REVIEW OF SYSTEMS:  CONSTITUTIONAL: + Fatigue.  EYES: No eye pain, visual disturbances, or discharge  ENMT:  No difficulty hearing, tinnitus, vertigo; No sinus or throat pain  NECK: No pain or stiffness  BREASTS: No pain, masses, or nipple discharge  RESPIRATORY: No cough, wheezing, chills or hemoptysis; No shortness of breath  CARDIOVASCULAR: No chest pain, palpitations, dizziness, or leg swelling  GASTROINTESTINAL: Some abdominal discomfort. No further rectal bleeding.   GENITOURINARY: No dysuria, frequency, hematuria, or incontinence  NEUROLOGICAL: No headaches, memory loss, loss of strength, numbness, or tremors  SKIN: No itching, burning, rashes, or lesions   LYMPH NODES: No enlarged glands  ENDOCRINE: No heat or cold intolerance; No hair loss; No polydipsia or polyuria  MUSCULOSKELETAL: No joint pain or swelling; No muscle, back, or extremity pain  PSYCHIATRIC: No depression, anxiety, mood swings, or difficulty sleeping  HEME/LYMPH: No easy bruising, or bleeding gums  ALLERGY AND IMMUNOLOGIC: No hives or eczema  Allergies    aspirin (Nausea)  Zithromax (Pruritus; Rash)    Intolerances      MEDICATIONS  (STANDING):  nicotine - 21 mG/24Hr(s) Patch 1patch Transdermal daily  sodium chloride 0.9%. 1000milliLiter(s) IV Continuous <Continuous>  morphine  - Injectable 4milliGRAM(s) IV Push once  ALBUTerol/ipratropium for Nebulization 3milliLiter(s) Nebulizer every 6 hours  predniSONE   Tablet 20milliGRAM(s) Oral daily  buDESOnide   0.5 milliGRAM(s) Respule 0.5milliGRAM(s) Inhalation two times a day  pregabalin 75milliGRAM(s) Oral three times a day  hydroxychloroquine 200milliGRAM(s) Oral two times a day with meals  tiotropium 18 MICROgram(s) Capsule 1Capsule(s) Inhalation daily  sucralfate 1Gram(s) Oral three times a day  pantoprazole    Tablet 40milliGRAM(s) Oral before breakfast  levothyroxine 25MICROGram(s) Oral daily  piperacillin/tazobactam IVPB. 3.375Gram(s) IV Intermittent every 8 hours    MEDICATIONS  (PRN):  ondansetron Injectable 4milliGRAM(s) IV Push every 6 hours PRN Nausea  morphine  - Injectable 4milliGRAM(s) IV Push every 3 hours PRN Severe Pain (7 - 10)  morphine  - Injectable 2milliGRAM(s) IV Push every 4 hours PRN Moderate Pain (4 - 6)    Vital Signs Last 24 Hrs  T(C): 36.7, Max: 37 (04-28 @ 19:03)  T(F): 98.1, Max: 98.6 (04-28 @ 19:03)  HR: 76 (72 - 82)  BP: 126/85 (84/43 - 134/78)  BP(mean): --  RR: 20 (14 - 21)  SpO2: 93% (93% - 98%)    PHYSICAL EXAM:  GENERAL: NAD, well-groomed, well-developed  HEAD:  Atraumatic, Normocephalic  EYES: EOMI, PERRLA, conjunctiva and sclera clear  ENMT: No tonsillar erythema, exudates, or enlargement; Moist mucous membranes, Good dentition, No lesions  NECK: Supple, No JVD, Normal thyroid  CHEST/LUNG: Clear to auscultation bilaterally; No rales, rhonchi, wheezing, or rubs  HEART: Regular rate and rhythm; No murmurs, rubs, or gallops  ABDOMEN: Soft, Nontender, Nondistended; Bowel sounds present  EXTREMITIES:  2+ Peripheral Pulses, No clubbing, cyanosis, or edema  MS: No joint swelling or deformity.   LYMPH: No lymphadenopathy noted  SKIN: No rashes or lesions  NERVOUS SYSTEM:  Motor Strength 4/5 B/L upper and lower extremities; DTRs 2+ intact and symmetric  PSYCH:  Alert & Oriented X3, Good concentration.                          11.5   13.1  )-----------( 245      ( 29 Apr 2017 06:12 )             34.1     04-29    132<L>  |  98  |  8   ----------------------------<  81  4.1   |  25  |  0.57    Ca    7.4<L>      29 Apr 2017 06:12    TPro  6.4  /  Alb  3.5  /  TBili  0.3  /  DBili  x   /  AST  23  /  ALT  31  /  AlkPhos  63  04-28    CAPILLARY BLOOD GLUCOSE    PT/INR - ( 28 Apr 2017 20:27 )   PT: 11.4 sec;   INR: 1.04 ratio         PTT - ( 28 Apr 2017 20:27 )  PTT:28.2 sec  04-01 [id number] 5.7    04-01 Chol 243<H>  HDL 93 Trig 116    Thyroid      PT/INR - ( 28 Apr 2017 20:27 )   PT: 11.4 sec;   INR: 1.04 ratio         PTT - ( 28 Apr 2017 20:27 )  PTT:28.2 sec      RADIOLOGY TEST: (IMAGES REVIEWED BY ME)    Imaging Personally Reviewed:  [X] YES        HEALTH ISSUES - PROBLEM Dx:  Need for prophylactic measure: Need for prophylactic measure  Hypothyroidism: Hypothyroidism  Lupus: Lupus  Neuropathy: Neuropathy  COPD (chronic obstructive pulmonary disease): COPD (chronic obstructive pulmonary disease)  Hyponatremia: Hyponatremia  Leukocytosis: Leukocytosis  Bloody diarrhea: Bloody diarrhea Patient is a 52y old  Female who presents with a chief complaint of rectal bleeding recently admitted to [facility] (29 Apr 2017 07:26)    HPI:  This is a 51 y/o F with PMH of HTN, COPD, SLE, Sjogren's Syndrome, Glossopharyngeal Neuropathy pending Gamma Knife procedure, Hep-C, Hypothyroidism, GERD, Migraine, and anxiety presented with bloody diarrhea over the past 3 days. Patient states that she was admitted with rectal bleeding in [facility] 2 days ago, sent home on Cipro & Flagyl for colitis, blood per rectum is much less now but still having abdominal cramps, distension with episodes of "castro" abdomen, and watery bloody diarrhea about 10 times a day, was seen by her gastroenterologist who sent her to ED to be evaluated for the possibility of bowel ischemia as an underlying cause of her problem. Denies nausea, vomiting, or fever but reports intermittent chills, no bleeding from any other site in the body. (29 Apr 2017 04:18)    INTERVAL HPI/OVERNIGHT EVENTS:  Chart reviewed, notes reviewed.   Patient seen and examined.  C/o some abdominal discomfort.  No further rectal bleeding.  No nausea or vomiting.   No Fever or chills.  Wants to go home soon.       Consultant(s) Notes Reviewed:  [X] Yes    Care Discussed with Consultants/Other Providers: [ ] Yes    REVIEW OF SYSTEMS:  CONSTITUTIONAL: + Fatigue.  EYES: No eye pain, visual disturbances, or discharge  ENMT:  No difficulty hearing, tinnitus, vertigo; No sinus or throat pain  NECK: No pain or stiffness  BREASTS: No pain, masses, or nipple discharge  RESPIRATORY: No cough, wheezing, chills or hemoptysis; No shortness of breath  CARDIOVASCULAR: No chest pain, palpitations, dizziness, or leg swelling  GASTROINTESTINAL: Some abdominal discomfort. No further rectal bleeding.   GENITOURINARY: No dysuria, frequency, hematuria, or incontinence  NEUROLOGICAL: No headaches, memory loss, loss of strength, numbness, or tremors  SKIN: No itching, burning, rashes, or lesions   LYMPH NODES: No enlarged glands  ENDOCRINE: No heat or cold intolerance; No hair loss; No polydipsia or polyuria  MUSCULOSKELETAL: No joint pain or swelling; No muscle, back, or extremity pain  PSYCHIATRIC: No depression, anxiety, mood swings, or difficulty sleeping  HEME/LYMPH: No easy bruising, or bleeding gums  ALLERGY AND IMMUNOLOGIC: No hives or eczema  Allergies    aspirin (Nausea)  Zithromax (Pruritus; Rash)    Intolerances      MEDICATIONS  (STANDING):  nicotine - 21 mG/24Hr(s) Patch 1patch Transdermal daily  sodium chloride 0.9%. 1000milliLiter(s) IV Continuous <Continuous>  morphine  - Injectable 4milliGRAM(s) IV Push once  ALBUTerol/ipratropium for Nebulization 3milliLiter(s) Nebulizer every 6 hours  predniSONE   Tablet 20milliGRAM(s) Oral daily  buDESOnide   0.5 milliGRAM(s) Respule 0.5milliGRAM(s) Inhalation two times a day  pregabalin 75milliGRAM(s) Oral three times a day  hydroxychloroquine 200milliGRAM(s) Oral two times a day with meals  tiotropium 18 MICROgram(s) Capsule 1Capsule(s) Inhalation daily  sucralfate 1Gram(s) Oral three times a day  pantoprazole    Tablet 40milliGRAM(s) Oral before breakfast  levothyroxine 25MICROGram(s) Oral daily  piperacillin/tazobactam IVPB. 3.375Gram(s) IV Intermittent every 8 hours    MEDICATIONS  (PRN):  ondansetron Injectable 4milliGRAM(s) IV Push every 6 hours PRN Nausea  morphine  - Injectable 4milliGRAM(s) IV Push every 3 hours PRN Severe Pain (7 - 10)  morphine  - Injectable 2milliGRAM(s) IV Push every 4 hours PRN Moderate Pain (4 - 6)    Vital Signs Last 24 Hrs  T(C): 36.7, Max: 37 (04-28 @ 19:03)  T(F): 98.1, Max: 98.6 (04-28 @ 19:03)  HR: 76 (72 - 82)  BP: 126/85 (84/43 - 134/78)  BP(mean): --  RR: 20 (14 - 21)  SpO2: 93% (93% - 98%)    PHYSICAL EXAM:  GENERAL: NAD, well-groomed, well-developed  HEAD:  Atraumatic, Normocephalic  EYES: EOMI, PERRLA, conjunctiva and sclera clear  ENMT: No tonsillar erythema, exudates, or enlargement; Moist mucous membranes, Good dentition, No lesions  NECK: Supple, No JVD, Normal thyroid  CHEST/LUNG: Clear to auscultation bilaterally; No rales, rhonchi, wheezing, or rubs  HEART: Regular rate and rhythm; No murmurs, rubs, or gallops  ABDOMEN: Soft, slight discomfort + to palpation in LUQ. No masses felt. BS +  EXTREMITIES:  2+ Peripheral Pulses, No clubbing, cyanosis, or edema  MS: No joint swelling or deformity.   LYMPH: No lymphadenopathy noted  SKIN: No rashes or lesions  NERVOUS SYSTEM:  Motor Strength 4/5 B/L upper and lower extremities; DTRs 2+ intact and symmetric  PSYCH:  Alert & Oriented X3, Good concentration.                          11.5   13.1  )-----------( 245      ( 29 Apr 2017 06:12 )             34.1     04-29    132<L>  |  98  |  8   ----------------------------<  81  4.1   |  25  |  0.57    Ca    7.4<L>      29 Apr 2017 06:12    TPro  6.4  /  Alb  3.5  /  TBili  0.3  /  DBili  x   /  AST  23  /  ALT  31  /  AlkPhos  63  04-28    CAPILLARY BLOOD GLUCOSE    PT/INR - ( 28 Apr 2017 20:27 )   PT: 11.4 sec;   INR: 1.04 ratio         PTT - ( 28 Apr 2017 20:27 )  PTT:28.2 sec  04-01 [id number] 5.7    04-01 Chol 243<H>  HDL 93 Trig 116    Thyroid      PT/INR - ( 28 Apr 2017 20:27 )   PT: 11.4 sec;   INR: 1.04 ratio         PTT - ( 28 Apr 2017 20:27 )  PTT:28.2 sec      RADIOLOGY TEST: (IMAGES REVIEWED BY ME)    Imaging Personally Reviewed:  [X] YES        HEALTH ISSUES - PROBLEM Dx:  Need for prophylactic measure: Need for prophylactic measure  Hypothyroidism: Hypothyroidism  Lupus: Lupus  Neuropathy: Neuropathy  COPD (chronic obstructive pulmonary disease): COPD (chronic obstructive pulmonary disease)  Hyponatremia: Hyponatremia  Leukocytosis: Leukocytosis  Bloody diarrhea: Bloody diarrhea

## 2017-04-29 NOTE — H&P ADULT - PROBLEM SELECTOR PLAN 5
Glossopharyngeal Neuropathy, supposed to go for Gamma Knife procedure on Monday as per patient, will continue Lyrica at the same dose in addition to PRN Morphine IVP Q 4 h for pain control.

## 2017-04-29 NOTE — H&P ADULT - ASSESSMENT
53 y/o F with PMH of HTN, COPD, SLE, Sjogren's Syndrome, Glossopharyngeal Neuropathy pending Gamma Knife procedure, Hep-C, Hypothyroidism, GERD, Migraine, and anxiety presented with rectal bleeding followed by bloody diarrhea over the past 3 days.

## 2017-04-30 ENCOUNTER — TRANSCRIPTION ENCOUNTER (OUTPATIENT)
Age: 52
End: 2017-04-30

## 2017-04-30 VITALS
HEART RATE: 83 BPM | TEMPERATURE: 99 F | OXYGEN SATURATION: 95 % | DIASTOLIC BLOOD PRESSURE: 71 MMHG | SYSTOLIC BLOOD PRESSURE: 111 MMHG | RESPIRATION RATE: 19 BRPM

## 2017-04-30 LAB
ANION GAP SERPL CALC-SCNC: 9 MMOL/L — SIGNIFICANT CHANGE UP (ref 5–17)
BASOPHILS # BLD AUTO: 0.1 K/UL — SIGNIFICANT CHANGE UP (ref 0–0.2)
BASOPHILS NFR BLD AUTO: 1.4 % — SIGNIFICANT CHANGE UP (ref 0–2)
BUN SERPL-MCNC: 5 MG/DL — LOW (ref 7–23)
CALCIUM SERPL-MCNC: 8.1 MG/DL — LOW (ref 8.4–10.5)
CHLORIDE SERPL-SCNC: 106 MMOL/L — SIGNIFICANT CHANGE UP (ref 96–108)
CO2 SERPL-SCNC: 24 MMOL/L — SIGNIFICANT CHANGE UP (ref 22–31)
CREAT SERPL-MCNC: 0.77 MG/DL — SIGNIFICANT CHANGE UP (ref 0.5–1.3)
EOSINOPHIL # BLD AUTO: 0 K/UL — SIGNIFICANT CHANGE UP (ref 0–0.5)
EOSINOPHIL NFR BLD AUTO: 0.5 % — SIGNIFICANT CHANGE UP (ref 0–6)
GLUCOSE SERPL-MCNC: 89 MG/DL — SIGNIFICANT CHANGE UP (ref 70–99)
HCT VFR BLD CALC: 34.4 % — LOW (ref 34.5–45)
HGB BLD-MCNC: 12.5 G/DL — SIGNIFICANT CHANGE UP (ref 11.5–15.5)
LYMPHOCYTES # BLD AUTO: 1.8 K/UL — SIGNIFICANT CHANGE UP (ref 1–3.3)
LYMPHOCYTES # BLD AUTO: 22.1 % — SIGNIFICANT CHANGE UP (ref 13–44)
MAGNESIUM SERPL-MCNC: 2.1 MG/DL — SIGNIFICANT CHANGE UP (ref 1.6–2.6)
MCHC RBC-ENTMCNC: 36.4 GM/DL — HIGH (ref 32–36)
MCHC RBC-ENTMCNC: 36.4 PG — HIGH (ref 27–34)
MCV RBC AUTO: 100.1 FL — HIGH (ref 80–100)
MONOCYTES # BLD AUTO: 0.8 K/UL — SIGNIFICANT CHANGE UP (ref 0–0.9)
MONOCYTES NFR BLD AUTO: 9.7 % — SIGNIFICANT CHANGE UP (ref 2–14)
NEUTROPHILS # BLD AUTO: 5.5 K/UL — SIGNIFICANT CHANGE UP (ref 1.8–7.4)
NEUTROPHILS NFR BLD AUTO: 66.3 % — SIGNIFICANT CHANGE UP (ref 43–77)
PLATELET # BLD AUTO: 315 K/UL — SIGNIFICANT CHANGE UP (ref 150–400)
POTASSIUM SERPL-MCNC: 3.8 MMOL/L — SIGNIFICANT CHANGE UP (ref 3.5–5.3)
POTASSIUM SERPL-SCNC: 3.8 MMOL/L — SIGNIFICANT CHANGE UP (ref 3.5–5.3)
RBC # BLD: 3.43 M/UL — LOW (ref 3.8–5.2)
RBC # FLD: 13.4 % — SIGNIFICANT CHANGE UP (ref 10.3–14.5)
SODIUM SERPL-SCNC: 139 MMOL/L — SIGNIFICANT CHANGE UP (ref 135–145)
WBC # BLD: 8.3 K/UL — SIGNIFICANT CHANGE UP (ref 3.8–10.5)
WBC # FLD AUTO: 8.3 K/UL — SIGNIFICANT CHANGE UP (ref 3.8–10.5)

## 2017-04-30 PROCEDURE — 83735 ASSAY OF MAGNESIUM: CPT

## 2017-04-30 PROCEDURE — 85610 PROTHROMBIN TIME: CPT

## 2017-04-30 PROCEDURE — 80048 BASIC METABOLIC PNL TOTAL CA: CPT

## 2017-04-30 PROCEDURE — 80053 COMPREHEN METABOLIC PANEL: CPT

## 2017-04-30 PROCEDURE — 85730 THROMBOPLASTIN TIME PARTIAL: CPT

## 2017-04-30 PROCEDURE — 86850 RBC ANTIBODY SCREEN: CPT

## 2017-04-30 PROCEDURE — 93005 ELECTROCARDIOGRAM TRACING: CPT

## 2017-04-30 PROCEDURE — 86900 BLOOD TYPING SEROLOGIC ABO: CPT

## 2017-04-30 PROCEDURE — 99285 EMERGENCY DEPT VISIT HI MDM: CPT | Mod: 25

## 2017-04-30 PROCEDURE — 94640 AIRWAY INHALATION TREATMENT: CPT

## 2017-04-30 PROCEDURE — 96374 THER/PROPH/DIAG INJ IV PUSH: CPT

## 2017-04-30 PROCEDURE — 85027 COMPLETE CBC AUTOMATED: CPT

## 2017-04-30 PROCEDURE — 96375 TX/PRO/DX INJ NEW DRUG ADDON: CPT

## 2017-04-30 PROCEDURE — 83605 ASSAY OF LACTIC ACID: CPT

## 2017-04-30 PROCEDURE — 86901 BLOOD TYPING SEROLOGIC RH(D): CPT

## 2017-04-30 RX ORDER — CIPROFLOXACIN LACTATE 400MG/40ML
500 VIAL (ML) INTRAVENOUS EVERY 12 HOURS
Qty: 0 | Refills: 0 | Status: DISCONTINUED | OUTPATIENT
Start: 2017-04-30 | End: 2017-04-30

## 2017-04-30 RX ORDER — METRONIDAZOLE 500 MG
500 TABLET ORAL EVERY 8 HOURS
Qty: 0 | Refills: 0 | Status: DISCONTINUED | OUTPATIENT
Start: 2017-04-30 | End: 2017-04-30

## 2017-04-30 RX ORDER — METRONIDAZOLE 500 MG
1 TABLET ORAL
Qty: 15 | Refills: 0 | OUTPATIENT
Start: 2017-04-30 | End: 2017-05-05

## 2017-04-30 RX ORDER — CIPROFLOXACIN LACTATE 400MG/40ML
1 VIAL (ML) INTRAVENOUS
Qty: 10 | Refills: 0 | OUTPATIENT
Start: 2017-04-30 | End: 2017-05-05

## 2017-04-30 RX ADMIN — Medication 75 MILLIGRAM(S): at 13:03

## 2017-04-30 RX ADMIN — PANTOPRAZOLE SODIUM 40 MILLIGRAM(S): 20 TABLET, DELAYED RELEASE ORAL at 05:44

## 2017-04-30 RX ADMIN — Medication 20 MILLIGRAM(S): at 05:45

## 2017-04-30 RX ADMIN — Medication 200 MILLIGRAM(S): at 08:53

## 2017-04-30 RX ADMIN — TIOTROPIUM BROMIDE 1 CAPSULE(S): 18 CAPSULE ORAL; RESPIRATORY (INHALATION) at 08:54

## 2017-04-30 RX ADMIN — Medication 25 MICROGRAM(S): at 05:44

## 2017-04-30 RX ADMIN — Medication 75 MILLIGRAM(S): at 05:47

## 2017-04-30 RX ADMIN — Medication 500 MILLIGRAM(S): at 13:03

## 2017-04-30 RX ADMIN — Medication 0.5 MILLIGRAM(S): at 07:46

## 2017-04-30 RX ADMIN — PIPERACILLIN AND TAZOBACTAM 25 GRAM(S): 4; .5 INJECTION, POWDER, LYOPHILIZED, FOR SOLUTION INTRAVENOUS at 05:44

## 2017-04-30 RX ADMIN — Medication 1 GRAM(S): at 05:44

## 2017-04-30 RX ADMIN — Medication 500 MILLIGRAM(S): at 11:49

## 2017-04-30 RX ADMIN — Medication 3 MILLILITER(S): at 07:46

## 2017-04-30 RX ADMIN — SODIUM CHLORIDE 100 MILLILITER(S): 9 INJECTION INTRAMUSCULAR; INTRAVENOUS; SUBCUTANEOUS at 05:45

## 2017-04-30 NOTE — CONSULT NOTE ADULT - SUBJECTIVE AND OBJECTIVE BOX
Infectious Diseases Consult by Sav Nunez MD    Reason for Consult : possible ischemic colitis or infectious diarrhea     HPI:  This is a 53 y/o F active smoker  with PMH of HTN, COPD, SLE, Sjogren's Syndrome, Glossopharyngeal Neuropathy pending Gamma Knife procedure, Hep-C, Hypothyroidism, GERD, Migraine, and anxiety presented with bloody diarrhea over the past 3 days. She had seen her GI Dr. Wilson who sent her to [facility],  Patient states that she was admitted overnight with rectal bleeding in [facility] 2 days ago, sent home on Cipro & Flagyl for colitis, blood per rectum is much less now but still having abdominal cramps, distension with episodes of "castro" abdomen, and watery bloody diarrhea about 10 times a day, was seen by her gastroenterologist who sent her to ED to be evaluated for the possibility of bowel ischemia as an underlying cause of her problem. She had taken imodium before she came her . Denies nausea, vomiting, or fever but reports intermittent chills, no bleeding from any other site in the body. She has been treated on and off with antibiotics for COPD exacerbation, bronchitis . last time was 2 weeks ago , she does not remember what she got.    Since she has been her , she has had no BM. She was seen by surgery and GI. She refused colonoscopy want it done by her GI .      PAST MEDICAL & SURGICAL HISTORY:  Nicotine addiction  COPD (chronic obstructive pulmonary disease)  Neuropathy  Migraine  Anxiety  Hepatitis C  Gallstones  Hypothyroidism  Hypotension  GERD (gastroesophageal reflux disease)  UTI (urinary tract infection)  Vertigo  Sjogren's disease  Lupus  Gall bladder disease: REMOVAL  Vocal cord polyp: REMOVAL      Social History-- Disabled lives alone   EtOH: denies   Tobacco: Smokes over 1 PPD, not motivated to quit   Drug Use: denies       FAMILY HISTORY:  Family history of diabetes mellitus (Sibling): Sister  Family history of psoriatic arthritis (Sibling): Sister  Family history of systemic lupus erythematosus (SLE) in mother (Sibling): Sister  No pertinent family history in first degree relatives  No significant family history      Allergies    aspirin (Nausea)  Zithromax (Pruritus; Rash)    Intolerances    Home/ Out patient  Medications :  * Patient Currently Takes Medications as of 01-Apr-2017 16:26 documented in Prescription [facility]  · 	predniSONE 10 mg oral tablet: 4 tab(s) orally day x 3 days  	3 tab(s) orally once a day x 3 days  	2 tab(s) orally once a day x 3 days  	1 tab(s) orally once a day x 3 days  · 	nicotine 21 mg/24 hr transdermal film, extended release: 1 patch transdermal once a day  · 	Pulmicort Respules 0.5 mg/2 mL inhalation suspension: 1  inhaled 2 times a day  · 	Carafate 1 g oral tablet: 1 tab(s) orally 3 times a day  · 	DuoNeb 0.5 mg-2.5 mg/3 mL inhalation solution: 3 milliliter(s) inhaled every 4 hours  · 	Spiriva 18 mcg inhalation capsule: 1 cap(s) inhaled once a day  · 	Synthroid 25 mcg (0.025 mg) oral tablet: 1 tab(s) orally once a day  · 	omeprazole 20 mg oral delayed release capsule:  orally 2 times a day  · 	Plaquenil Sulfate 200 mg oral tablet:  orally 2 times a day  · 	Lyrica 75 mg oral capsule:  orally 3 times a day      Current Inpatient Medications :    ANTIBIOTICS:   hydroxychloroquine 200milliGRAM(s) Oral two times a day with meals  piperacillin/tazobactam IVPB. 3.375Gram(s) IV Intermittent every 8 hours      OTHER RELEVANT MEDICATIONS :  morphine  - Injectable 4milliGRAM(s) IV Push once  ondansetron Injectable 4milliGRAM(s) IV Push every 6 hours PRN  morphine  - Injectable 4milliGRAM(s) IV Push every 3 hours PRN  morphine  - Injectable 2milliGRAM(s) IV Push every 4 hours PRN  ALBUTerol/ipratropium for Nebulization 3milliLiter(s) Nebulizer every 6 hours  buDESOnide   0.5 milliGRAM(s) Respule 0.5milliGRAM(s) Inhalation two times a day  pregabalin 75milliGRAM(s) Oral three times a day  tiotropium 18 MICROgram(s) Capsule 1Capsule(s) Inhalation daily  sucralfate 1Gram(s) Oral three times a day  pantoprazole    Tablet 40milliGRAM(s) Oral before breakfast  levothyroxine 25MICROGram(s) Oral daily  sodium chloride 0.9%. 1000milliLiter(s) IV Continuous <Continuous>      ROS:  CONSTITUTIONAL:  Negative fever or chills, feels well, good appetite  EYES:  Negative  blurry vision or double vision  CARDIOVASCULAR:  Negative for chest pain or palpitations  RESPIRATORY:  Negative for cough, wheezing, or SOB   GASTROINTESTINAL:  Negative for nausea, vomiting, diarrhea, constipation, or abdominal pain  GENITOURINARY:  Negative frequency, urgency , dysuria or hematuria   NEUROLOGIC:  No headache, confusion, dizziness, lightheadedness  All other systems were reviewed and are negative          I&O's Detail    I & Os for current day (as of 30 Apr 2017 09:26)  =============================================  IN:    sodium chloride 0.9%.: 1100 ml    Oral Fluid: 400 ml    Solution: 200 ml    Total IN: 1700 ml  ---------------------------------------------  OUT:    Voided: 350 ml    Total OUT: 350 ml  ---------------------------------------------  Total NET: 1350 ml      Physical Exam:  Vital Signs Last 24 Hrs  T(C): 37.2, Max: 37.2 (04-29 @ 21:42)  T(F): 98.9, Max: 99 (04-29 @ 21:42)  HR: 74 (70 - 84)  BP: 117/76 (97/74 - 129/75)  BP(mean): --  RR: 20 (19 - 20)  SpO2: 96% (93% - 99%)    GEN: NAD, pleasant  HEENT: normocephalic and atraumatic. EOMI. DIRK. Conjunctival  & sclera clear, Moist mucosa. Clear Posterior pharynx.  NECK: Supple. No carotid bruits.  No lymphadenopathy or thyromegaly.  LUNGS: Clear to auscultation.  HEART: Regular rate and rhythm without murmur.  ABDOMEN: Soft, nontender, and nondistended.  Positive bowel sounds.  No hepatosplenomegaly was noted.  EXTREMITIES: Without any cyanosis, clubbing, rash, lesions or edema. Peripheral pulses  2 +  NEUROLOGIC: A & O x 3 , No focal neurological deficits   SKIN: No ulceration or induration present.      Labs:   04-30    139  |  106  |  5<L>  ----------------------------<  89  3.8   |  24  |  0.77    Ca    8.1<L>      30 Apr 2017 07:00    TPro  6.4  /  Alb  3.5  /  TBili  0.3  /  DBili  x   /  AST  23  /  ALT  31  /  AlkPhos  63  04-28                          12.5   8.3   )-----------( 315      ( 30 Apr 2017 07:00 )             34.4       PT/INR - ( 28 Apr 2017 20:27 )   PT: 11.4 sec;   INR: 1.04 ratio         PTT - ( 28 Apr 2017 20:27 )  PTT:28.2 sec    LIVER FUNCTIONS - ( 28 Apr 2017 20:27 )  Alb: 3.5 g/dL / Pro: 6.4 g/dL / ALK PHOS: 63 U/L / ALT: 31 U/L DA / AST: 23 U/L / GGT: x           Assessment :     Labs, Radiology, Cardiology, and Other Results: Labs:  Lactate Trend 04-28 @ 20:27 Lactate:0.6                        12.2  13.9  )-----------( 315      ( 28 Apr 2017 20:27 )            35.4          04-28  128<L>  |  93<L>  |  9  ----------------------------<  86 4.0   |  24  |  0.83  Ca    8.4      28 Apr 2017 20:27  TPro  6.4  /  Alb  3.5  /  TBili  0.3  /  DBili  x   /  AST  23  /  ALT  31  /  AlkPhos  63  04-28        PT/INR - ( 28 Apr 2017 20:27 )   PT: 11.4 sec;   INR: 1.04 ratio      PTT - ( 28 Apr 2017 20:27 )  PTT:28.2 sec    Culture Results:  No growth (03-31 @ 21:12) Culture Results:  No growth at 5 days. (03-31 @ 21:09) Culture Results:  No growth at 5 days. (03-31 @ 21:09)   CT RENAL STONE HUNT     No obstructive uropathy or urolithiasis.  Distended stomach similar to prior possible chronic gastroparesis. Constipation, possibly also chronic. No bowel obstruction.   CT abdomen & pelvis with PO & IV contrast  2 days ago done at [facility]  Colitis of left colonic flexure of undetermined aretology.            	    Assessment and Plan:   Assessment:  · Assessment		  53 y/o F with PMH of HTN, COPD, SLE, Sjogren's Syndrome, Glossopharyngeal Neuropathy pending Gamma Knife procedure, Hep-C, Hypothyroidism, GERD, Migraine, and anxiety presented with rectal bleeding followed by bloody diarrhea over the past 3 days. Most likely has ischemic colitis ,in absence of fever, leukocytosis doubt it is infectious or inflammatory bowel disease. Although sh is at high risk for C difficile due to repeated use of antibiotics she has no BM since admission and has no leukocytosis. She ideally needs either a full colonoscopy or even a flexible sigmoidoscopy to evaluate her colitis  . patient was offered and she refused.    Plan :   - DC stool for C difficile and isolation   - if she tolerated low residue diet and has no pain or diarrhea , she can be dc home   - change back to Cipro and flagyl x 5 more days   - follow with her GI dr. Xavier  after dc and consider colonoscopy   - patient advised to stop smoking and continue with nicotine patch     Continue with present supportive  regime .  Appropriate use of antibiotics and adverse effects reviewed.      I have discussed the above plan of care with patient  in detail. She expressed understanding of the treatment plan . Risks, benefits and alternatives discussed in detail. I have asked if she has any questions or concerns and appropriately addressed them to the best of my ability .      > 45 minutes spent in direct patient care reviewing  the notes, lab data/ imaging , discussion with multidisciplinary team. All questions were addressed and answered to the best of my capacity .    Thank you for allowing me to participate in the care of your patient .      Sav Nunez MD  [phone]

## 2017-04-30 NOTE — PROGRESS NOTE ADULT - PROBLEM SELECTOR PLAN 5
Will change patient back to Omeprazole and Carafate on discharge.
Continue patient on Protonix and Carafate.

## 2017-04-30 NOTE — PROGRESS NOTE ADULT - ATTENDING COMMENTS
Stable for discharge home.   I spent more than 40 mins on discharging the patient.
Patient in detail.

## 2017-04-30 NOTE — PROGRESS NOTE ADULT - SUBJECTIVE AND OBJECTIVE BOX
Patient is a 52y old  Female who presents with a chief complaint of rectal bleeding recently admitted to [facility] (30 Apr 2017 11:40)    HPI:  This is a 53 y/o F with PMH of HTN, COPD, SLE, Sjogren's Syndrome, Glossopharyngeal Neuropathy pending Gamma Knife procedure, Hep-C, Hypothyroidism, GERD, Migraine, and anxiety presented with bloody diarrhea over the past 3 days. Patient states that she was admitted with rectal bleeding in [facility] 2 days ago, sent home on Cipro & Flagyl for colitis, blood per rectum is much less now but still having abdominal cramps, distension with episodes of "castro" abdomen, and watery bloody diarrhea about 10 times a day, was seen by her gastroenterologist who sent her to ED to be evaluated for the possibility of bowel ischemia as an underlying cause of her problem. Denies nausea, vomiting, or fever but reports intermittent chills, no bleeding from any other site in the body. (29 Apr 2017 04:18)    INTERVAL HPI/OVERNIGHT EVENTS:  Chart reviewed, notes reviewed.   Patient seen and examined.  Being followed by following specialists: GI and ID.    Consultant(s) Notes Reviewed:  [x] Yes    Care Discussed with Consultants/Other Providers: [x] Yes    Patient denies any further diarrhea. No bleeding per rectum.   Tolerating diet. Cleared by GI and ID for discharge.     REVIEW OF SYSTEMS:  CONSTITUTIONAL: No fever, weight loss, or fatigue  EYES: No eye pain, visual disturbances, or discharge  ENMT:  No difficulty hearing, tinnitus, vertigo; No sinus or throat pain  NECK: No pain or stiffness  BREASTS: No pain, masses, or nipple discharge  RESPIRATORY: No cough, wheezing, chills or hemoptysis; No shortness of breath  CARDIOVASCULAR: No chest pain, palpitations, dizziness, or leg swelling  GASTROINTESTINAL: No further diarrhea or bleeding per rectum.   GENITOURINARY: No dysuria, frequency, hematuria, or incontinence  NEUROLOGICAL: No headaches, memory loss, loss of strength, numbness, or tremors  SKIN: No itching, burning, rashes, or lesions   LYMPH NODES: No enlarged glands  ENDOCRINE: No heat or cold intolerance; No hair loss; No polydipsia or polyuria  MUSCULOSKELETAL: No joint pain or swelling; No muscle, back, or extremity pain  PSYCHIATRIC: No depression, anxiety, mood swings, or difficulty sleeping  HEME/LYMPH: No easy bruising, or bleeding gums  ALLERGY AND IMMUNOLOGIC: No hives or eczema  Allergies    aspirin (Nausea)  Zithromax (Pruritus; Rash)    Intolerances      MEDICATIONS  (STANDING):  nicotine - 21 mG/24Hr(s) Patch 1patch Transdermal daily  morphine  - Injectable 4milliGRAM(s) IV Push once  ALBUTerol/ipratropium for Nebulization 3milliLiter(s) Nebulizer every 6 hours  buDESOnide   0.5 milliGRAM(s) Respule 0.5milliGRAM(s) Inhalation two times a day  pregabalin 75milliGRAM(s) Oral three times a day  hydroxychloroquine 200milliGRAM(s) Oral two times a day with meals  tiotropium 18 MICROgram(s) Capsule 1Capsule(s) Inhalation daily  sucralfate 1Gram(s) Oral three times a day  pantoprazole    Tablet 40milliGRAM(s) Oral before breakfast  levothyroxine 25MICROGram(s) Oral daily  ciprofloxacin     Tablet 500milliGRAM(s) Oral every 12 hours  metroNIDAZOLE    Tablet 500milliGRAM(s) Oral every 8 hours    MEDICATIONS  (PRN):  ondansetron Injectable 4milliGRAM(s) IV Push every 6 hours PRN Nausea  morphine  - Injectable 4milliGRAM(s) IV Push every 3 hours PRN Severe Pain (7 - 10)  morphine  - Injectable 2milliGRAM(s) IV Push every 4 hours PRN Moderate Pain (4 - 6)    Vital Signs Last 24 Hrs  T(C): 37, Max: 37.2 (04-29 @ 21:42)  T(F): 98.6, Max: 99 (04-29 @ 21:42)  HR: 83 (70 - 84)  BP: 111/71 (103/67 - 129/75)  BP(mean): --  RR: 19 (19 - 20)  SpO2: 95% (93% - 99%)    PHYSICAL EXAM:  GENERAL: NAD, well-groomed, well-developed  HEAD:  Atraumatic, Normocephalic  EYES: EOMI, PERRLA, conjunctiva and sclera clear  ENMT: No tonsillar erythema, exudates, or enlargement; Moist mucous membranes, Good dentition, No lesions  NECK: Supple, No JVD, Normal thyroid  CHEST/LUNG: Clear to auscultation bilaterally; No rales, rhonchi, wheezing, or rubs  HEART: Regular rate and rhythm; No murmurs, rubs, or gallops  ABDOMEN: Soft, Nontender, Nondistended; Bowel sounds present  EXTREMITIES:  2+ Peripheral Pulses, No clubbing, cyanosis, or edema  MS: No joint swelling or deformity.   LYMPH: No lymphadenopathy noted  SKIN: No rashes or lesions  NERVOUS SYSTEM:  Motor Strength 4/5 B/L upper and lower extremities; DTRs 2+ intact and symmetric  PSYCH:  Alert & Oriented X3, Good concentration.                          12.5   8.3   )-----------( 315      ( 30 Apr 2017 07:00 )             34.4     04-30    139  |  106  |  5<L>  ----------------------------<  89  3.8   |  24  |  0.77    Ca    8.1<L>      30 Apr 2017 07:00  Mg     2.1     04-30    TPro  6.4  /  Alb  3.5  /  TBili  0.3  /  DBili  x   /  AST  23  /  ALT  31  /  AlkPhos  63  04-28    CAPILLARY BLOOD GLUCOSE    PT/INR - ( 28 Apr 2017 20:27 )   PT: 11.4 sec;   INR: 1.04 ratio         PTT - ( 28 Apr 2017 20:27 )  PTT:28.2 sec  04-01 [id number] 5.7    04-01 Chol 243<H>  HDL 93 Trig 116    Thyroid      PT/INR - ( 28 Apr 2017 20:27 )   PT: 11.4 sec;   INR: 1.04 ratio         PTT - ( 28 Apr 2017 20:27 )  PTT:28.2 sec      RADIOLOGY TEST: (IMAGES REVIEWED BY ME)    Imaging Personally Reviewed:  [ ] YES        HEALTH ISSUES - PROBLEM Dx:  Anxiety: Anxiety  Cigarette nicotine dependence without complication: Cigarette nicotine dependence without complication  Systemic lupus erythematosus, unspecified SLE type, unspecified organ involvement status: Systemic lupus erythematosus, unspecified SLE type, unspecified organ involvement status  Gastroesophageal reflux disease, esophagitis presence not specified: Gastroesophageal reflux disease, esophagitis presence not specified  Hypothyroidism, unspecified type: Hypothyroidism, unspecified type  Chronic obstructive pulmonary disease, unspecified COPD type: Chronic obstructive pulmonary disease, unspecified COPD type  Need for prophylactic measure: Need for prophylactic measure  Hypothyroidism: Hypothyroidism  Lupus: Lupus  Neuropathy: Neuropathy  COPD (chronic obstructive pulmonary disease): COPD (chronic obstructive pulmonary disease)  Hyponatremia: Hyponatremia  Leukocytosis: Leukocytosis  Bloody diarrhea: Bloody diarrhea

## 2017-04-30 NOTE — PROGRESS NOTE ADULT - PROBLEM SELECTOR PLAN 3
Continue patient on Prednisone taper and bronchodilators.
Continue patient on Prednisone taper and bronchodilators.

## 2017-04-30 NOTE — DISCHARGE NOTE ADULT - SECONDARY DIAGNOSIS.
Chronic obstructive pulmonary disease, unspecified COPD type Bloody diarrhea Hypothyroidism, unspecified type Gastroesophageal reflux disease, esophagitis presence not specified Hyponatremia Hypotension, unspecified hypotension type

## 2017-04-30 NOTE — DISCHARGE NOTE ADULT - CARE PLAN
Principal Discharge DX:	Colitis  Goal:	Resolution of colitis.  Instructions for follow-up, activity and diet:	Low fiber diet.  Increase activity as tolerated at home.  Complete course of antibiotics at home.  Taper Prednisone complete course.   Stop Smoking.   Follow up with Dr. Silver as out patient in 1 week.  Follow up with Dr. Durán (GI) in 1 week.  Secondary Diagnosis:	Chronic obstructive pulmonary disease, unspecified COPD type  Secondary Diagnosis:	Bloody diarrhea  Secondary Diagnosis:	Gastroesophageal reflux disease, esophagitis presence not specified  Secondary Diagnosis:	Hyponatremia  Secondary Diagnosis:	Hypothyroidism, unspecified type  Secondary Diagnosis:	Hypotension, unspecified hypotension type

## 2017-04-30 NOTE — DISCHARGE NOTE ADULT - NS AS ACTIVITY OBS
Showering allowed/Driving allowed/Stairs allowed/Do not make important decisions/No Heavy lifting/straining/Walking-Indoors allowed/Bathing allowed/Walking-Outdoors allowed

## 2017-04-30 NOTE — DISCHARGE NOTE ADULT - MEDICATION SUMMARY - MEDICATIONS TO TAKE
I will START or STAY ON the medications listed below when I get home from the hospital:    Pulmicort Respules 0.5 mg/2 mL inhalation suspension  -- 1  inhaled 2 times a day  -- Indication: For COPD (chronic obstructive pulmonary disease)    predniSONE 10 mg oral tablet  -- 2 tab(s) by mouth once a day x 3 days  1 tab(s) by mouth once a day x 3 days  -- It is very important that you take or use this exactly as directed.  Do not skip doses or discontinue unless directed by your doctor.  Obtain medical advice before taking any non-prescription drugs as some may affect the action of this medication.  Take with food or milk.    -- Indication: For COPD (chronic obstructive pulmonary disease)    metroNIDAZOLE 500 mg oral tablet  -- 1 tab(s) by mouth every 8 hours  -- Indication: For Colitis    Lyrica 75 mg oral capsule  --  by mouth 3 times a day  -- Indication: For pain    Plaquenil Sulfate 200 mg oral tablet  --  by mouth 2 times a day  -- Indication: For RA    DuoNeb 0.5 mg-2.5 mg/3 mL inhalation solution  -- 3 milliliter(s) inhaled every 4 hours  -- Indication: For COPD (chronic obstructive pulmonary disease)    Spiriva 18 mcg inhalation capsule  -- 1 cap(s) inhaled once a day  -- Indication: For COPD (chronic obstructive pulmonary disease)    Carafate 1 g oral tablet  -- 1 tab(s) by mouth 3 times a day  -- Indication: For GERD    omeprazole 20 mg oral delayed release capsule  --  by mouth 2 times a day  -- Indication: For GERD    ciprofloxacin 500 mg oral tablet  -- 1 tab(s) by mouth every 12 hours  -- Indication: For Colitis    nicotine 21 mg/24 hr transdermal film, extended release  -- 1 patch by transdermal patch once a day  -- Indication: For Nicotine dependence.     Synthroid 25 mcg (0.025 mg) oral tablet  -- 1 tab(s) by mouth once a day  -- Indication: For Hypothyroidism

## 2017-04-30 NOTE — DISCHARGE NOTE ADULT - CARE PROVIDER_API CALL
Dilip Silver (MD), Critical Care Medicine; Internal Medicine; Pulmonary Disease  [address] [place] [address]  Phone: [phone]  Fax: [phone]    Jr Rodriguez (), Gastroenterology  [address] [place] [address]  Phone: [phone]  Fax: [phone]

## 2017-04-30 NOTE — PROGRESS NOTE ADULT - PROBLEM SELECTOR PLAN 2
Possibly due to poor oral intake of solutes and SIADH from pain.   improved.
Possibly due to poor oral intake of solutes and SIADH from pain.   Improving with NS infusion.  Monitor serial BMP.

## 2017-04-30 NOTE — DISCHARGE NOTE ADULT - PLAN OF CARE
Resolution of colitis. Low fiber diet.  Increase activity as tolerated at home.  Complete course of antibiotics at home.  Taper Prednisone complete course.   Stop Smoking.   Follow up with Dr. Silver as out patient in 1 week.  Follow up with Dr. Durán (GI) in 1 week.

## 2017-04-30 NOTE — DISCHARGE NOTE ADULT - REASON FOR ADMISSION
[facility]

## 2017-04-30 NOTE — DISCHARGE NOTE ADULT - HOSPITAL COURSE
Patient admitted with bloody diarrhea and some abdominal pain. Patient was seen by GI and ID.   Patient was treated with IV antibiotics and IVF.  Patient most likely has ischemic colitis.  She was ruled out for Sepsis.  Patient has history of chronic hypotension which improved with IVF in hospital.   Patient had hyponatremia most likely due to poor solute intake and SIADH from abdominal pain and nausea. Her sodium improved with NS infusion.   patient is cleared by ID and GI for discharge.   She is being discharged home.  patient was counselled to quit smoking.

## 2017-04-30 NOTE — PROGRESS NOTE ADULT - PROBLEM SELECTOR PLAN 1
Possibly from Ischemic colitis.  Seems to have improved.  Tolerating diet.   GI consult noted.   ID input noted.  Will discharge patient home on oral antibiotics and low residue diet.   Out patient follow up with Dr. Silver and GI.
Possibly from Ischemic colitis.  Seems to have improved.  Will continue clear liquid diet and antibiotics for now.   GI consult noted.   Awaiting ID eval, called by hospitalist.   Further management as per patient's clinical course.

## 2017-04-30 NOTE — DISCHARGE NOTE ADULT - MEDICATION SUMMARY - MEDICATIONS TO CHANGE
I will SWITCH the dose or number of times a day I take the medications listed below when I get home from the hospital:    predniSONE 10 mg oral tablet  -- 4 tab(s) by mouth day x 3 days  3 tab(s) by mouth once a day x 3 days  2 tab(s) by mouth once a day x 3 days  1 tab(s) by mouth once a day x 3 days  -- It is very important that you take or use this exactly as directed.  Do not skip doses or discontinue unless directed by your doctor.  Obtain medical advice before taking any non-prescription drugs as some may affect the action of this medication.  Take with food or milk.

## 2017-04-30 NOTE — PROGRESS NOTE ADULT - SUBJECTIVE AND OBJECTIVE BOX
No diarrhea or bloody stools over night  No nausea, vomiting, fever or chills    t- 98.9/117/76/70/20/94percent  HEENT alert and oriented x 3 NAD anicteric no jvd  LUNGS cta b/l no wheezing or rhonchi  CVS s1s2 RRR  ABD soft NT ND pos bs  EXT no edema    wbc 13.1  HGB 11.5  stool cx pend  assessment   hematochezia/colitis ? ischemic vs IBD- patient refusing colonoscopy   GERD  clinically improved from yesterday  rec   zorfran prn  currently on zosyn rec 7 day cours eof antibiotics for presumed ischemia   f/u stool cultures  low residue diet lactose free  daily cbc   transfuse if hgb less than 8  smoking cessation  seq teds  pronix daily/carafate   patient desires to go home

## 2017-04-30 NOTE — PROGRESS NOTE ADULT - PROBLEM SELECTOR PROBLEM 6
Systemic lupus erythematosus, unspecified SLE type, unspecified organ involvement status
Systemic lupus erythematosus, unspecified SLE type, unspecified organ involvement status

## 2017-04-30 NOTE — PROGRESS NOTE ADULT - PROBLEM SELECTOR PROBLEM 7
Cigarette nicotine dependence without complication
Cigarette nicotine dependence without complication

## 2017-04-30 NOTE — PROGRESS NOTE ADULT - PROBLEM SELECTOR PLAN 7
The patient’s tobacco use - (X) YES   (     ) NO   Advised to quit and impact of smoking•  (X)  Yes   (     ) N0  Assessed willingness to attempt to quit•  (X) YES   (     ) No  Providing methods, Resources and skills for cessation• (X) Yes ( )  No   (  ) Refused  Medication management of smoking session drugs•	(X )  yes  (  ) No   setting quit date• (      )  Yes   (      ) No	(X) not at this time  Follow-up arranged•  (      )    (      ) No	(X) refused  Amount of time spent counseling patient   (    )  >10min     (X)  <10 min
The patient’s tobacco use - (X) YES   (     ) NO   Advised to quit and impact of smoking•  (X)  Yes   (     ) N0  Assessed willingness to attempt to quit•  (X) YES   (     ) No  Providing methods, Resources and skills for cessation• (X) Yes ( )  No   (  ) Refused  Medication management of smoking session drugs•	(X )  yes  (  ) No   setting quit date• (      )  Yes   (      ) No	(X) not at this time  Follow-up arranged•  (      )    (      ) No	(X) refused  Amount of time spent counseling patient   (    )  >10min     (X)  <10 min

## 2017-04-30 NOTE — PROGRESS NOTE ADULT - PROBLEM SELECTOR PROBLEM 5
Gastroesophageal reflux disease, esophagitis presence not specified
Gastroesophageal reflux disease, esophagitis presence not specified

## 2017-04-30 NOTE — DISCHARGE NOTE ADULT - PATIENT PORTAL LINK FT
“You can access the [facility] Patient Portal, offered by [facility], by registering with the following website: [email or web address]”

## 2017-05-31 ENCOUNTER — EMERGENCY (EMERGENCY)
Facility: HOSPITAL | Age: 52
LOS: 1 days | Discharge: HOME | End: 2017-05-31
Attending: EMERGENCY MEDICINE | Admitting: EMERGENCY MEDICINE
Payer: MEDICARE

## 2017-05-31 VITALS
HEART RATE: 88 BPM | TEMPERATURE: 98 F | DIASTOLIC BLOOD PRESSURE: 86 MMHG | SYSTOLIC BLOOD PRESSURE: 110 MMHG | OXYGEN SATURATION: 98 % | RESPIRATION RATE: 17 BRPM

## 2017-05-31 VITALS
OXYGEN SATURATION: 100 % | SYSTOLIC BLOOD PRESSURE: 131 MMHG | DIASTOLIC BLOOD PRESSURE: 85 MMHG | TEMPERATURE: 98 F | HEIGHT: 58 IN | HEART RATE: 96 BPM | RESPIRATION RATE: 18 BRPM | WEIGHT: 88.18 LBS

## 2017-05-31 DIAGNOSIS — J38.1 POLYP OF VOCAL CORD AND LARYNX: Chronic | ICD-10-CM

## 2017-05-31 DIAGNOSIS — K82.9 DISEASE OF GALLBLADDER, UNSPECIFIED: Chronic | ICD-10-CM

## 2017-05-31 LAB
ALBUMIN SERPL ELPH-MCNC: 3.8 G/DL — SIGNIFICANT CHANGE UP (ref 3.3–5)
ALP SERPL-CCNC: 85 U/L — SIGNIFICANT CHANGE UP (ref 30–120)
ALT FLD-CCNC: 32 U/L DA — SIGNIFICANT CHANGE UP (ref 10–60)
AMPHET UR-MCNC: NEGATIVE — SIGNIFICANT CHANGE UP
ANION GAP SERPL CALC-SCNC: 10 MMOL/L — SIGNIFICANT CHANGE UP (ref 5–17)
APPEARANCE UR: CLEAR — SIGNIFICANT CHANGE UP
APTT BLD: 32.7 SEC — SIGNIFICANT CHANGE UP (ref 27.5–37.4)
AST SERPL-CCNC: 23 U/L — SIGNIFICANT CHANGE UP (ref 10–40)
BACTERIA # UR AUTO: ABNORMAL
BARBITURATES UR SCN-MCNC: POSITIVE
BASOPHILS # BLD AUTO: 0.1 K/UL — SIGNIFICANT CHANGE UP (ref 0–0.2)
BASOPHILS NFR BLD AUTO: 0.8 % — SIGNIFICANT CHANGE UP (ref 0–2)
BENZODIAZ UR-MCNC: POSITIVE
BILIRUB SERPL-MCNC: 0.3 MG/DL — SIGNIFICANT CHANGE UP (ref 0.2–1.2)
BILIRUB UR-MCNC: NEGATIVE — SIGNIFICANT CHANGE UP
BUN SERPL-MCNC: 9 MG/DL — SIGNIFICANT CHANGE UP (ref 7–23)
CALCIUM SERPL-MCNC: 9.3 MG/DL — SIGNIFICANT CHANGE UP (ref 8.4–10.5)
CHLORIDE SERPL-SCNC: 94 MMOL/L — LOW (ref 96–108)
CK MB CFR SERPL CALC: 2.2 NG/ML — SIGNIFICANT CHANGE UP (ref 0–3.6)
CO2 SERPL-SCNC: 24 MMOL/L — SIGNIFICANT CHANGE UP (ref 22–31)
COCAINE METAB.OTHER UR-MCNC: NEGATIVE — SIGNIFICANT CHANGE UP
COLOR SPEC: YELLOW — SIGNIFICANT CHANGE UP
CREAT SERPL-MCNC: 0.75 MG/DL — SIGNIFICANT CHANGE UP (ref 0.5–1.3)
DIFF PNL FLD: ABNORMAL
EOSINOPHIL # BLD AUTO: 0.1 K/UL — SIGNIFICANT CHANGE UP (ref 0–0.5)
EOSINOPHIL NFR BLD AUTO: 1.1 % — SIGNIFICANT CHANGE UP (ref 0–6)
EPI CELLS # UR: SIGNIFICANT CHANGE UP
ETHANOL SERPL-MCNC: <3 MG/DL — SIGNIFICANT CHANGE UP (ref 0–3)
FLUAV SPEC QL CULT: NEGATIVE — SIGNIFICANT CHANGE UP
FLUBV AG SPEC QL IA: NEGATIVE — SIGNIFICANT CHANGE UP
GLUCOSE SERPL-MCNC: 66 MG/DL — LOW (ref 70–99)
GLUCOSE UR QL: NEGATIVE — SIGNIFICANT CHANGE UP
HCT VFR BLD CALC: 39.8 % — SIGNIFICANT CHANGE UP (ref 34.5–45)
HGB BLD-MCNC: 14.1 G/DL — SIGNIFICANT CHANGE UP (ref 11.5–15.5)
INR BLD: 1.08 RATIO — SIGNIFICANT CHANGE UP (ref 0.88–1.16)
KETONES UR-MCNC: ABNORMAL
LACTATE SERPL-SCNC: 1 MMOL/L — SIGNIFICANT CHANGE UP (ref 0.7–2)
LEUKOCYTE ESTERASE UR-ACNC: ABNORMAL
LYMPHOCYTES # BLD AUTO: 2.1 K/UL — SIGNIFICANT CHANGE UP (ref 1–3.3)
LYMPHOCYTES # BLD AUTO: 21.6 % — SIGNIFICANT CHANGE UP (ref 13–44)
MCHC RBC-ENTMCNC: 33.7 PG — SIGNIFICANT CHANGE UP (ref 27–34)
MCHC RBC-ENTMCNC: 35.3 GM/DL — SIGNIFICANT CHANGE UP (ref 32–36)
MCV RBC AUTO: 95.4 FL — SIGNIFICANT CHANGE UP (ref 80–100)
METHADONE UR-MCNC: NEGATIVE — SIGNIFICANT CHANGE UP
MONOCYTES # BLD AUTO: 0.9 K/UL — SIGNIFICANT CHANGE UP (ref 0–0.9)
MONOCYTES NFR BLD AUTO: 9.3 % — SIGNIFICANT CHANGE UP (ref 2–14)
NEUTROPHILS # BLD AUTO: 6.7 K/UL — SIGNIFICANT CHANGE UP (ref 1.8–7.4)
NEUTROPHILS NFR BLD AUTO: 67.2 % — SIGNIFICANT CHANGE UP (ref 43–77)
NITRITE UR-MCNC: NEGATIVE — SIGNIFICANT CHANGE UP
OPIATES UR-MCNC: NEGATIVE — SIGNIFICANT CHANGE UP
PCP SPEC-MCNC: SIGNIFICANT CHANGE UP
PCP UR-MCNC: NEGATIVE — SIGNIFICANT CHANGE UP
PH UR: 5 — SIGNIFICANT CHANGE UP (ref 5–8)
PLATELET # BLD AUTO: 394 K/UL — SIGNIFICANT CHANGE UP (ref 150–400)
POTASSIUM SERPL-MCNC: 4.3 MMOL/L — SIGNIFICANT CHANGE UP (ref 3.5–5.3)
POTASSIUM SERPL-SCNC: 4.3 MMOL/L — SIGNIFICANT CHANGE UP (ref 3.5–5.3)
PROT SERPL-MCNC: 7 G/DL — SIGNIFICANT CHANGE UP (ref 6–8.3)
PROT UR-MCNC: 15
PROTHROM AB SERPL-ACNC: 11.8 SEC — SIGNIFICANT CHANGE UP (ref 9.8–12.7)
RBC # BLD: 4.17 M/UL — SIGNIFICANT CHANGE UP (ref 3.8–5.2)
RBC # FLD: 12.5 % — SIGNIFICANT CHANGE UP (ref 10.3–14.5)
RBC CASTS # UR COMP ASSIST: SIGNIFICANT CHANGE UP /HPF (ref 0–4)
SODIUM SERPL-SCNC: 128 MMOL/L — LOW (ref 135–145)
SP GR SPEC: 1.01 — SIGNIFICANT CHANGE UP (ref 1.01–1.02)
THC UR QL: NEGATIVE — SIGNIFICANT CHANGE UP
TROPONIN I SERPL-MCNC: 0 NG/ML — LOW (ref 0.02–0.06)
UROBILINOGEN FLD QL: NEGATIVE — SIGNIFICANT CHANGE UP
WBC # BLD: 9.9 K/UL — SIGNIFICANT CHANGE UP (ref 3.8–10.5)
WBC # FLD AUTO: 9.9 K/UL — SIGNIFICANT CHANGE UP (ref 3.8–10.5)
WBC UR QL: SIGNIFICANT CHANGE UP

## 2017-05-31 PROCEDURE — 87486 CHLMYD PNEUM DNA AMP PROBE: CPT

## 2017-05-31 PROCEDURE — 85610 PROTHROMBIN TIME: CPT

## 2017-05-31 PROCEDURE — 87040 BLOOD CULTURE FOR BACTERIA: CPT

## 2017-05-31 PROCEDURE — 84484 ASSAY OF TROPONIN QUANT: CPT

## 2017-05-31 PROCEDURE — 71046 X-RAY EXAM CHEST 2 VIEWS: CPT

## 2017-05-31 PROCEDURE — 83605 ASSAY OF LACTIC ACID: CPT

## 2017-05-31 PROCEDURE — 81001 URINALYSIS AUTO W/SCOPE: CPT

## 2017-05-31 PROCEDURE — 87086 URINE CULTURE/COLONY COUNT: CPT

## 2017-05-31 PROCEDURE — 80053 COMPREHEN METABOLIC PANEL: CPT

## 2017-05-31 PROCEDURE — 87400 INFLUENZA A/B EACH AG IA: CPT

## 2017-05-31 PROCEDURE — 99285 EMERGENCY DEPT VISIT HI MDM: CPT

## 2017-05-31 PROCEDURE — 80307 DRUG TEST PRSMV CHEM ANLYZR: CPT

## 2017-05-31 PROCEDURE — 85027 COMPLETE CBC AUTOMATED: CPT

## 2017-05-31 PROCEDURE — 87633 RESP VIRUS 12-25 TARGETS: CPT

## 2017-05-31 PROCEDURE — 99284 EMERGENCY DEPT VISIT MOD MDM: CPT | Mod: 25

## 2017-05-31 PROCEDURE — 82553 CREATINE MB FRACTION: CPT

## 2017-05-31 PROCEDURE — 87798 DETECT AGENT NOS DNA AMP: CPT

## 2017-05-31 PROCEDURE — 85730 THROMBOPLASTIN TIME PARTIAL: CPT

## 2017-05-31 PROCEDURE — 74020: CPT | Mod: 26

## 2017-05-31 PROCEDURE — 71020: CPT | Mod: 26

## 2017-05-31 PROCEDURE — 87581 M.PNEUMON DNA AMP PROBE: CPT

## 2017-05-31 PROCEDURE — 74020: CPT

## 2017-05-31 RX ORDER — SODIUM CHLORIDE 9 MG/ML
2000 INJECTION INTRAMUSCULAR; INTRAVENOUS; SUBCUTANEOUS ONCE
Qty: 0 | Refills: 0 | Status: COMPLETED | OUTPATIENT
Start: 2017-05-31 | End: 2017-05-31

## 2017-05-31 RX ADMIN — SODIUM CHLORIDE 1000 MILLILITER(S): 9 INJECTION INTRAMUSCULAR; INTRAVENOUS; SUBCUTANEOUS at 12:45

## 2017-05-31 NOTE — ED PROVIDER NOTE - PROGRESS NOTE DETAILS
No diarrhea while in ED. Tolerating PO fluids. Refused to give stool sample in ED. will take steroids as ordered by Dr. Xavier and fu in his office as discussed.

## 2017-05-31 NOTE — ED ADULT TRIAGE NOTE - CHIEF COMPLAINT QUOTE
" I have colitis, I have diarrhea x 3 days " " I have colitis, I have diarrhea x 3 days, I may also have pneumonia "

## 2017-05-31 NOTE — ED ADULT NURSE NOTE - OBJECTIVE STATEMENT
Pt came in for diarrhea x 3 days now. Pt is ambulatory, comfortable looking, no in distress but reported abdominal pain comes in waves. Pt came in for multiple episodes of diarrhea abdominal pain x 3 days now. Pt is ambulatory, comfortable looking, not in distress but reported abdominal pain comes in waves. Pt also complains of chest congestion, cough and chest pain with coughing. No fever no chills

## 2017-05-31 NOTE — ED PROVIDER NOTE - DETAILS:
Corby Lamb MD - The scribe's documentation has been prepared under my direction and personally reviewed by me in its entirety. I confirm that the note above accurately reflects all work, treatment, procedures, and medical decision making performed by me.

## 2017-05-31 NOTE — ED PROVIDER NOTE - NS ED MD SCRIBE ATTENDING SCRIBE SECTIONS
INTAKE ASSESSMENT/SCREENINGS/HISTORY OF PRESENT ILLNESS/PHYSICAL EXAM/VITAL SIGNS( Pullset)/HIV/PAST MEDICAL/SURGICAL/SOCIAL HISTORY/DISPOSITION/REVIEW OF SYSTEMS

## 2017-05-31 NOTE — ED PROVIDER NOTE - OBJECTIVE STATEMENT
53 y/o F presents to the ED c/o multiple episodes of diarrhea with associated abdominal pain x couple days. Pt notes bloody stools in the earlier episodes of diarrhea. Pt notes pain with coughing. She also notes congestion. Yellowish green sputum noted. Pt notes recent hospitalizations for colitis and COPD. Pt went to visited her GI who advised pt to visit [facility] to r/o COPD and PNA. Pt denies fever, chills, or any other complaints. Pt smokes .5 pack daily. Denies ETOH usage. 51 y/o F presents to the ED c/o multiple episodes of diarrhea with associated abdominal pain x couple days. Pt notes bloody stools in the earlier episodes of diarrhea. Pt notes pain with coughing. She also notes congestion. Yellowish green sputum noted. Pt notes recent hospitalizations for colitis and COPD. Pt went to visited her GI who advised pt to visit [facility] to r/o COPD and PNA. Pt denies fever, chills, or any other complaints. Pt smokes .5 pack daily. Denies ETOH usage. Pt has RX for medrol dosepak filled today for these symptoms.

## 2017-06-01 LAB
CULTURE RESULTS: SIGNIFICANT CHANGE UP
SPECIMEN SOURCE: SIGNIFICANT CHANGE UP

## 2017-08-03 ENCOUNTER — APPOINTMENT (OUTPATIENT)
Dept: GASTROENTEROLOGY | Facility: CLINIC | Age: 52
End: 2017-08-03
Payer: MEDICARE

## 2017-08-03 VITALS
BODY MASS INDEX: 17.42 KG/M2 | WEIGHT: 83 LBS | HEIGHT: 58 IN | HEART RATE: 78 BPM | OXYGEN SATURATION: 99 % | SYSTOLIC BLOOD PRESSURE: 103 MMHG | DIASTOLIC BLOOD PRESSURE: 69 MMHG

## 2017-08-03 DIAGNOSIS — Z86.39 PERSONAL HISTORY OF OTHER ENDOCRINE, NUTRITIONAL AND METABOLIC DISEASE: ICD-10-CM

## 2017-08-03 DIAGNOSIS — Z86.79 PERSONAL HISTORY OF OTHER DISEASES OF THE CIRCULATORY SYSTEM: ICD-10-CM

## 2017-08-03 DIAGNOSIS — Z86.69 PERSONAL HISTORY OF OTHER DISEASES OF THE NERVOUS SYSTEM AND SENSE ORGANS: ICD-10-CM

## 2017-08-03 DIAGNOSIS — Z87.39 PERSONAL HISTORY OF OTHER DISEASES OF THE MUSCULOSKELETAL SYSTEM AND CONNECTIVE TISSUE: ICD-10-CM

## 2017-08-03 DIAGNOSIS — Z87.19 PERSONAL HISTORY OF OTHER DISEASES OF THE DIGESTIVE SYSTEM: ICD-10-CM

## 2017-08-03 DIAGNOSIS — R19.7 DIARRHEA, UNSPECIFIED: ICD-10-CM

## 2017-08-03 DIAGNOSIS — Z78.9 OTHER SPECIFIED HEALTH STATUS: ICD-10-CM

## 2017-08-03 DIAGNOSIS — Z87.09 PERSONAL HISTORY OF OTHER DISEASES OF THE RESPIRATORY SYSTEM: ICD-10-CM

## 2017-08-03 DIAGNOSIS — J38.1 POLYP OF VOCAL CORD AND LARYNX: ICD-10-CM

## 2017-08-03 DIAGNOSIS — F17.210 NICOTINE DEPENDENCE, CIGARETTES, UNCOMPLICATED: ICD-10-CM

## 2017-08-03 DIAGNOSIS — Z86.19 PERSONAL HISTORY OF OTHER INFECTIOUS AND PARASITIC DISEASES: ICD-10-CM

## 2017-08-03 PROBLEM — Z00.00 ENCOUNTER FOR PREVENTIVE HEALTH EXAMINATION: Noted: 2017-08-03

## 2017-08-03 PROCEDURE — 99204 OFFICE O/P NEW MOD 45 MIN: CPT

## 2017-08-04 PROBLEM — Z86.19 HISTORY OF HEPATITIS: Status: RESOLVED | Noted: 2017-08-03 | Resolved: 2017-08-04

## 2017-08-04 PROBLEM — F17.210 CIGARETTE SMOKER: Status: ACTIVE | Noted: 2017-08-04

## 2017-08-04 PROBLEM — J38.1 VOCAL CORD POLYP: Status: RESOLVED | Noted: 2017-08-03 | Resolved: 2017-08-04

## 2017-08-04 PROBLEM — Z87.39 HISTORY OF OSTEOPOROSIS: Status: RESOLVED | Noted: 2017-08-03 | Resolved: 2017-08-04

## 2017-08-04 PROBLEM — Z87.19 HISTORY OF GASTRIC ULCER: Status: RESOLVED | Noted: 2017-08-03 | Resolved: 2017-08-04

## 2017-08-04 PROBLEM — Z87.09 HISTORY OF PULMONARY EMPHYSEMA: Status: RESOLVED | Noted: 2017-08-03 | Resolved: 2017-08-04

## 2017-08-04 PROBLEM — Z87.39 HISTORY OF SJOGREN'S DISEASE: Status: RESOLVED | Noted: 2017-08-03 | Resolved: 2017-08-04

## 2017-08-04 PROBLEM — Z87.19 HISTORY OF ISCHEMIC COLITIS: Status: RESOLVED | Noted: 2017-08-03 | Resolved: 2017-08-04

## 2017-08-04 PROBLEM — Z86.79 HISTORY OF RAYNAUD'S SYNDROME: Status: RESOLVED | Noted: 2017-08-03 | Resolved: 2017-08-04

## 2017-08-04 PROBLEM — Z86.39 HISTORY OF THYROID DISORDER: Status: RESOLVED | Noted: 2017-08-03 | Resolved: 2017-08-04

## 2017-08-04 PROBLEM — Z87.39 HISTORY OF SYSTEMIC LUPUS ERYTHEMATOSUS (SLE): Status: RESOLVED | Noted: 2017-08-03 | Resolved: 2017-08-04

## 2017-08-04 PROBLEM — Z87.19 HISTORY OF GASTROESOPHAGEAL REFLUX (GERD): Status: RESOLVED | Noted: 2017-08-03 | Resolved: 2017-08-04

## 2017-08-04 PROBLEM — Z86.69 HISTORY OF GLOSSOPHARYNGEAL NEURALGIA: Status: RESOLVED | Noted: 2017-08-03 | Resolved: 2017-08-04

## 2017-08-30 ENCOUNTER — LABORATORY RESULT (OUTPATIENT)
Age: 52
End: 2017-08-30

## 2017-09-06 LAB — C DIFF TOX B STL QL CT TISS CULT: NORMAL

## 2017-09-07 ENCOUNTER — APPOINTMENT (OUTPATIENT)
Dept: GASTROENTEROLOGY | Facility: CLINIC | Age: 52
End: 2017-09-07

## 2017-09-07 ENCOUNTER — APPOINTMENT (OUTPATIENT)
Dept: GASTROENTEROLOGY | Facility: CLINIC | Age: 52
End: 2017-09-07
Payer: MEDICARE

## 2017-09-07 VITALS
BODY MASS INDEX: 17.42 KG/M2 | HEIGHT: 58 IN | WEIGHT: 83 LBS | OXYGEN SATURATION: 100 % | SYSTOLIC BLOOD PRESSURE: 95 MMHG | DIASTOLIC BLOOD PRESSURE: 64 MMHG | HEART RATE: 67 BPM

## 2017-09-07 DIAGNOSIS — K21.9 GASTRO-ESOPHAGEAL REFLUX DISEASE W/OUT ESOPHAGITIS: ICD-10-CM

## 2017-09-07 LAB — DEPRECATED O AND P PREP STL: NORMAL

## 2017-09-07 PROCEDURE — 99214 OFFICE O/P EST MOD 30 MIN: CPT

## 2017-09-08 ENCOUNTER — LABORATORY RESULT (OUTPATIENT)
Age: 52
End: 2017-09-08

## 2017-09-18 ENCOUNTER — RESULT REVIEW (OUTPATIENT)
Age: 52
End: 2017-09-18

## 2017-09-18 ENCOUNTER — OUTPATIENT (OUTPATIENT)
Dept: OUTPATIENT SERVICES | Facility: HOSPITAL | Age: 52
LOS: 1 days | Discharge: HOME | End: 2017-09-18
Payer: MEDICARE

## 2017-09-18 ENCOUNTER — TRANSCRIPTION ENCOUNTER (OUTPATIENT)
Age: 52
End: 2017-09-18

## 2017-09-18 ENCOUNTER — RX RENEWAL (OUTPATIENT)
Age: 52
End: 2017-09-18

## 2017-09-18 ENCOUNTER — APPOINTMENT (OUTPATIENT)
Dept: GASTROENTEROLOGY | Facility: HOSPITAL | Age: 52
End: 2017-09-18

## 2017-09-18 DIAGNOSIS — K82.9 DISEASE OF GALLBLADDER, UNSPECIFIED: Chronic | ICD-10-CM

## 2017-09-18 DIAGNOSIS — R19.7 DIARRHEA, UNSPECIFIED: ICD-10-CM

## 2017-09-18 DIAGNOSIS — J38.1 POLYP OF VOCAL CORD AND LARYNX: Chronic | ICD-10-CM

## 2017-09-18 DIAGNOSIS — K21.9 GASTRO-ESOPHAGEAL REFLUX DISEASE WITHOUT ESOPHAGITIS: ICD-10-CM

## 2017-09-18 PROCEDURE — 43239 EGD BIOPSY SINGLE/MULTIPLE: CPT

## 2017-09-18 PROCEDURE — 88305 TISSUE EXAM BY PATHOLOGIST: CPT

## 2017-09-18 PROCEDURE — 88313 SPECIAL STAINS GROUP 2: CPT

## 2017-09-18 PROCEDURE — 88312 SPECIAL STAINS GROUP 1: CPT | Mod: 26

## 2017-09-18 PROCEDURE — 88313 SPECIAL STAINS GROUP 2: CPT | Mod: 26

## 2017-09-18 PROCEDURE — 45380 COLONOSCOPY AND BIOPSY: CPT

## 2017-09-18 PROCEDURE — 43239 EGD BIOPSY SINGLE/MULTIPLE: CPT | Mod: 59

## 2017-09-18 PROCEDURE — 88305 TISSUE EXAM BY PATHOLOGIST: CPT | Mod: 26

## 2017-09-18 PROCEDURE — 88312 SPECIAL STAINS GROUP 1: CPT

## 2017-09-20 LAB — SURGICAL PATHOLOGY FINAL REPORT - CH: SIGNIFICANT CHANGE UP

## 2017-09-21 DIAGNOSIS — Z88.6 ALLERGY STATUS TO ANALGESIC AGENT: ICD-10-CM

## 2017-09-21 DIAGNOSIS — F17.210 NICOTINE DEPENDENCE, CIGARETTES, UNCOMPLICATED: ICD-10-CM

## 2017-09-21 DIAGNOSIS — K21.9 GASTRO-ESOPHAGEAL REFLUX DISEASE WITHOUT ESOPHAGITIS: ICD-10-CM

## 2017-09-21 DIAGNOSIS — E03.9 HYPOTHYROIDISM, UNSPECIFIED: ICD-10-CM

## 2017-09-21 DIAGNOSIS — K29.50 UNSPECIFIED CHRONIC GASTRITIS WITHOUT BLEEDING: ICD-10-CM

## 2017-09-21 DIAGNOSIS — J44.9 CHRONIC OBSTRUCTIVE PULMONARY DISEASE, UNSPECIFIED: ICD-10-CM

## 2017-09-21 DIAGNOSIS — D64.9 ANEMIA, UNSPECIFIED: ICD-10-CM

## 2017-09-21 DIAGNOSIS — Z88.3 ALLERGY STATUS TO OTHER ANTI-INFECTIVE AGENTS: ICD-10-CM

## 2017-09-21 DIAGNOSIS — M32.9 SYSTEMIC LUPUS ERYTHEMATOSUS, UNSPECIFIED: ICD-10-CM

## 2017-09-22 PROBLEM — K21.9 GERD WITHOUT ESOPHAGITIS: Status: ACTIVE | Noted: 2017-09-22

## 2017-09-25 ENCOUNTER — RX RENEWAL (OUTPATIENT)
Age: 52
End: 2017-09-25

## 2017-10-10 ENCOUNTER — INPATIENT (INPATIENT)
Facility: HOSPITAL | Age: 52
LOS: 1 days | Discharge: HOME | DRG: 191 | End: 2017-10-12
Attending: INTERNAL MEDICINE | Admitting: INTERNAL MEDICINE
Payer: MEDICARE

## 2017-10-10 VITALS
OXYGEN SATURATION: 100 % | TEMPERATURE: 98 F | HEART RATE: 61 BPM | SYSTOLIC BLOOD PRESSURE: 119 MMHG | HEIGHT: 58 IN | RESPIRATION RATE: 18 BRPM | WEIGHT: 82.01 LBS | DIASTOLIC BLOOD PRESSURE: 74 MMHG

## 2017-10-10 DIAGNOSIS — L93.0 DISCOID LUPUS ERYTHEMATOSUS: ICD-10-CM

## 2017-10-10 DIAGNOSIS — E87.1 HYPO-OSMOLALITY AND HYPONATREMIA: ICD-10-CM

## 2017-10-10 DIAGNOSIS — J44.1 CHRONIC OBSTRUCTIVE PULMONARY DISEASE WITH (ACUTE) EXACERBATION: ICD-10-CM

## 2017-10-10 DIAGNOSIS — B18.2 CHRONIC VIRAL HEPATITIS C: ICD-10-CM

## 2017-10-10 DIAGNOSIS — J38.1 POLYP OF VOCAL CORD AND LARYNX: Chronic | ICD-10-CM

## 2017-10-10 DIAGNOSIS — F41.9 ANXIETY DISORDER, UNSPECIFIED: ICD-10-CM

## 2017-10-10 DIAGNOSIS — K21.9 GASTRO-ESOPHAGEAL REFLUX DISEASE WITHOUT ESOPHAGITIS: ICD-10-CM

## 2017-10-10 DIAGNOSIS — F17.210 NICOTINE DEPENDENCE, CIGARETTES, UNCOMPLICATED: ICD-10-CM

## 2017-10-10 DIAGNOSIS — Z29.9 ENCOUNTER FOR PROPHYLACTIC MEASURES, UNSPECIFIED: ICD-10-CM

## 2017-10-10 DIAGNOSIS — K82.9 DISEASE OF GALLBLADDER, UNSPECIFIED: Chronic | ICD-10-CM

## 2017-10-10 PROBLEM — Z00.00 ENCOUNTER FOR PREVENTIVE HEALTH EXAMINATION: Noted: 2017-10-10

## 2017-10-10 LAB
ALBUMIN SERPL ELPH-MCNC: 3.5 G/DL — SIGNIFICANT CHANGE UP (ref 3.3–5)
ALP SERPL-CCNC: 66 U/L — SIGNIFICANT CHANGE UP (ref 30–120)
ALT FLD-CCNC: 19 U/L DA — SIGNIFICANT CHANGE UP (ref 10–60)
ANION GAP SERPL CALC-SCNC: 8 MMOL/L — SIGNIFICANT CHANGE UP (ref 5–17)
AST SERPL-CCNC: 18 U/L — SIGNIFICANT CHANGE UP (ref 10–40)
BASOPHILS # BLD AUTO: 0.1 K/UL — SIGNIFICANT CHANGE UP (ref 0–0.2)
BASOPHILS NFR BLD AUTO: 0.6 % — SIGNIFICANT CHANGE UP (ref 0–2)
BILIRUB SERPL-MCNC: 0.4 MG/DL — SIGNIFICANT CHANGE UP (ref 0.2–1.2)
BUN SERPL-MCNC: 9 MG/DL — SIGNIFICANT CHANGE UP (ref 7–23)
CALCIUM SERPL-MCNC: 8.4 MG/DL — SIGNIFICANT CHANGE UP (ref 8.4–10.5)
CHLORIDE SERPL-SCNC: 89 MMOL/L — LOW (ref 96–108)
CO2 SERPL-SCNC: 25 MMOL/L — SIGNIFICANT CHANGE UP (ref 22–31)
CREAT SERPL-MCNC: 0.7 MG/DL — SIGNIFICANT CHANGE UP (ref 0.5–1.3)
EOSINOPHIL # BLD AUTO: 0.2 K/UL — SIGNIFICANT CHANGE UP (ref 0–0.5)
EOSINOPHIL NFR BLD AUTO: 1.5 % — SIGNIFICANT CHANGE UP (ref 0–6)
GLUCOSE SERPL-MCNC: 83 MG/DL — SIGNIFICANT CHANGE UP (ref 70–99)
HCT VFR BLD CALC: 36.8 % — SIGNIFICANT CHANGE UP (ref 34.5–45)
HGB BLD-MCNC: 12.3 G/DL — SIGNIFICANT CHANGE UP (ref 11.5–15.5)
LACTATE SERPL-SCNC: 0.6 MMOL/L — LOW (ref 0.7–2)
LYMPHOCYTES # BLD AUTO: 2.8 K/UL — SIGNIFICANT CHANGE UP (ref 1–3.3)
LYMPHOCYTES # BLD AUTO: 22.5 % — SIGNIFICANT CHANGE UP (ref 13–44)
MCHC RBC-ENTMCNC: 32.7 PG — SIGNIFICANT CHANGE UP (ref 27–34)
MCHC RBC-ENTMCNC: 33.3 GM/DL — SIGNIFICANT CHANGE UP (ref 32–36)
MCV RBC AUTO: 98.2 FL — SIGNIFICANT CHANGE UP (ref 80–100)
MONOCYTES # BLD AUTO: 0.8 K/UL — SIGNIFICANT CHANGE UP (ref 0–0.9)
MONOCYTES NFR BLD AUTO: 6.4 % — SIGNIFICANT CHANGE UP (ref 2–14)
NEUTROPHILS # BLD AUTO: 8.5 K/UL — HIGH (ref 1.8–7.4)
NEUTROPHILS NFR BLD AUTO: 68.9 % — SIGNIFICANT CHANGE UP (ref 43–77)
PLATELET # BLD AUTO: 252 K/UL — SIGNIFICANT CHANGE UP (ref 150–400)
POTASSIUM SERPL-MCNC: 3.8 MMOL/L — SIGNIFICANT CHANGE UP (ref 3.5–5.3)
POTASSIUM SERPL-SCNC: 3.8 MMOL/L — SIGNIFICANT CHANGE UP (ref 3.5–5.3)
PROT SERPL-MCNC: 6.1 G/DL — SIGNIFICANT CHANGE UP (ref 6–8.3)
RBC # BLD: 3.75 M/UL — LOW (ref 3.8–5.2)
RBC # FLD: 11 % — SIGNIFICANT CHANGE UP (ref 10.3–14.5)
SODIUM SERPL-SCNC: 122 MMOL/L — LOW (ref 135–145)
WBC # BLD: 12.3 K/UL — HIGH (ref 3.8–10.5)
WBC # FLD AUTO: 12.3 K/UL — HIGH (ref 3.8–10.5)

## 2017-10-10 PROCEDURE — 99285 EMERGENCY DEPT VISIT HI MDM: CPT

## 2017-10-10 PROCEDURE — 71010: CPT | Mod: 26

## 2017-10-10 RX ORDER — HYDROXYCHLOROQUINE SULFATE 200 MG
200 TABLET ORAL
Qty: 0 | Refills: 0 | Status: DISCONTINUED | OUTPATIENT
Start: 2017-10-10 | End: 2017-10-12

## 2017-10-10 RX ORDER — ENOXAPARIN SODIUM 100 MG/ML
30 INJECTION SUBCUTANEOUS EVERY 24 HOURS
Qty: 0 | Refills: 0 | Status: DISCONTINUED | OUTPATIENT
Start: 2017-10-10 | End: 2017-10-12

## 2017-10-10 RX ORDER — BUDESONIDE AND FORMOTEROL FUMARATE DIHYDRATE 160; 4.5 UG/1; UG/1
2 AEROSOL RESPIRATORY (INHALATION)
Qty: 0 | Refills: 0 | Status: DISCONTINUED | OUTPATIENT
Start: 2017-10-10 | End: 2017-10-11

## 2017-10-10 RX ORDER — FAMOTIDINE 10 MG/ML
20 INJECTION INTRAVENOUS
Qty: 0 | Refills: 0 | Status: DISCONTINUED | OUTPATIENT
Start: 2017-10-10 | End: 2017-10-12

## 2017-10-10 RX ORDER — PANTOPRAZOLE SODIUM 20 MG/1
40 TABLET, DELAYED RELEASE ORAL
Qty: 0 | Refills: 0 | Status: DISCONTINUED | OUTPATIENT
Start: 2017-10-10 | End: 2017-10-12

## 2017-10-10 RX ORDER — NICOTINE POLACRILEX 2 MG
1 GUM BUCCAL DAILY
Qty: 0 | Refills: 0 | Status: DISCONTINUED | OUTPATIENT
Start: 2017-10-10 | End: 2017-10-12

## 2017-10-10 RX ORDER — DIAZEPAM 5 MG
5 TABLET ORAL THREE TIMES A DAY
Qty: 0 | Refills: 0 | Status: DISCONTINUED | OUTPATIENT
Start: 2017-10-10 | End: 2017-10-12

## 2017-10-10 RX ORDER — IPRATROPIUM/ALBUTEROL SULFATE 18-103MCG
3 AEROSOL WITH ADAPTER (GRAM) INHALATION ONCE
Qty: 0 | Refills: 0 | Status: COMPLETED | OUTPATIENT
Start: 2017-10-10 | End: 2017-10-10

## 2017-10-10 RX ORDER — OXCARBAZEPINE 300 MG/1
300 TABLET, FILM COATED ORAL
Qty: 0 | Refills: 0 | Status: DISCONTINUED | OUTPATIENT
Start: 2017-10-10 | End: 2017-10-12

## 2017-10-10 RX ORDER — NICOTINE POLACRILEX 2 MG
1 GUM BUCCAL
Qty: 0 | Refills: 0 | Status: DISCONTINUED | OUTPATIENT
Start: 2017-10-10 | End: 2017-10-12

## 2017-10-10 RX ORDER — LEVOTHYROXINE SODIUM 125 MCG
25 TABLET ORAL DAILY
Qty: 0 | Refills: 0 | Status: DISCONTINUED | OUTPATIENT
Start: 2017-10-10 | End: 2017-10-12

## 2017-10-10 RX ORDER — IPRATROPIUM/ALBUTEROL SULFATE 18-103MCG
3 AEROSOL WITH ADAPTER (GRAM) INHALATION EVERY 4 HOURS
Qty: 0 | Refills: 0 | Status: DISCONTINUED | OUTPATIENT
Start: 2017-10-10 | End: 2017-10-12

## 2017-10-10 RX ORDER — ACETAMINOPHEN 500 MG
650 TABLET ORAL EVERY 6 HOURS
Qty: 0 | Refills: 0 | Status: DISCONTINUED | OUTPATIENT
Start: 2017-10-10 | End: 2017-10-12

## 2017-10-10 RX ORDER — TRAZODONE HCL 50 MG
50 TABLET ORAL AT BEDTIME
Qty: 0 | Refills: 0 | Status: DISCONTINUED | OUTPATIENT
Start: 2017-10-10 | End: 2017-10-12

## 2017-10-10 RX ADMIN — Medication 5 MILLIGRAM(S): at 22:58

## 2017-10-10 RX ADMIN — BUDESONIDE AND FORMOTEROL FUMARATE DIHYDRATE 2 PUFF(S): 160; 4.5 AEROSOL RESPIRATORY (INHALATION) at 18:46

## 2017-10-10 RX ADMIN — ENOXAPARIN SODIUM 30 MILLIGRAM(S): 100 INJECTION SUBCUTANEOUS at 16:12

## 2017-10-10 RX ADMIN — FAMOTIDINE 20 MILLIGRAM(S): 10 INJECTION INTRAVENOUS at 17:43

## 2017-10-10 RX ADMIN — Medication 125 MILLIGRAM(S): at 16:12

## 2017-10-10 RX ADMIN — OXCARBAZEPINE 300 MILLIGRAM(S): 300 TABLET, FILM COATED ORAL at 17:43

## 2017-10-10 RX ADMIN — Medication 75 MILLIGRAM(S): at 22:58

## 2017-10-10 RX ADMIN — Medication 40 MILLIGRAM(S): at 22:58

## 2017-10-10 RX ADMIN — Medication 200 MILLIGRAM(S): at 17:44

## 2017-10-10 RX ADMIN — Medication 3 MILLILITER(S): at 15:33

## 2017-10-10 NOTE — H&P ADULT - HISTORY OF PRESENT ILLNESS
52 years old female with history of COPD, Tobacco abuse, Lupus, Chronic Hepatitis C, Colitis, GERD, hypotension, hypothyroidism, Anxiety and insomnia, who is current 2 pack per day smoker, who came in to Er from Dr. Silver's office due to worsening shortness of breath for last 2 weeks. Patient was treated as out patient for COPD exacerbation, but she continued to have shortness of breath and cough and was advised to come in to ER.   She denies any fevers.  She has a cough, which is not productive for any sputum.   She denies any nausea or vomiting.   Patient has history of similar  episodes in past.   no chest pain or pressure.   No orthopnea or PND.

## 2017-10-10 NOTE — H&P ADULT - PROBLEM SELECTOR PLAN 3
Patient counselled at length about smoking cessation.   Will place on Nicotine patch and Nicotine inhaler.

## 2017-10-10 NOTE — H&P ADULT - PROBLEM SELECTOR PLAN 2
Patient with hyponatremia, etiology not clear.  Will place on Fluid restriction.   Will get Nephrology to see patient.   Follow serial BMP.  Further management as per patient's clinical course.

## 2017-10-10 NOTE — H&P ADULT - ASSESSMENT
52 years old female with history of COPD, Hypothyroidism, GERD, Anxiety, insomnia, current smoker who comes in to ER with 2 weeks history of increasing shortness of breath. She is being admitted for COPD exacerbation. Patient is also noted to have hyponatremia. She is being admitted for further management.

## 2017-10-10 NOTE — ED ADULT NURSE REASSESSMENT NOTE - NS ED NURSE REASSESS COMMENT FT1
Spoke with Dr. Dominguez and pt will be seen in morning by another MD to have her sodium addressed.  Also pt can be transferred off monitor to [facility].  Kenna from [facility] has been made aware of our conversation.

## 2017-10-10 NOTE — H&P ADULT - PROBLEM SELECTOR PLAN 4
Continue on PPI and H2 blockers as per out patient regimen.   Change to Protonix and Pepcid per hospital formulary.

## 2017-10-10 NOTE — H&P ADULT - PROBLEM SELECTOR PLAN 1
Patient with COPD exacerbation.  Will place on IV Solu Medrol and Duoneb.  Continue Symbicort.  Pulmonary follow up.   Further management as per patient's clinical course.

## 2017-10-10 NOTE — H&P ADULT - NSHPSOCIALHISTORY_GEN_ALL_CORE
Social History:    Marital Status: Single  Occupation: Disabled  Lives with: Mother    Substance Use :  Tobacco Usage:  (   ) never smoked   (   ) former smoker   (X) current smoker  (     ) pack year  (        ) last tobacco use date  Alcohol Usage: Denies.

## 2017-10-10 NOTE — H&P ADULT - NSHPPHYSICALEXAM_GEN_ALL_CORE
Vital Signs Last 24 Hrs  T(C): 36.8 (10 Oct 2017 12:30), Max: 36.8 (10 Oct 2017 12:30)  T(F): 98.3 (10 Oct 2017 12:30), Max: 98.3 (10 Oct 2017 12:30)  HR: 61 (10 Oct 2017 12:30) (61 - 61)  BP: 119/74 (10 Oct 2017 12:30) (119/74 - 119/74)  BP(mean): --  RR: 18 (10 Oct 2017 12:30) (18 - 18)  SpO2: 97% (10 Oct 2017 14:22) (97% - 100%)    PHYSICAL EXAM:  GENERAL: well-groomed, thin built.   HEAD:  Atraumatic, Normocephalic  EYES: EOMI, PERRLA, conjunctiva and sclera clear  ENMT: No tonsillar erythema, exudates, or enlargement; Moist mucous membranes, Good dentition, No lesions  NECK: Supple, No JVD, Normal thyroid  CHEST/LUNG: Occasional expiratory wheeze +  HEART: Regular rate and rhythm; No murmurs, rubs, or gallops  ABDOMEN: Soft, Nontender, Nondistended; Bowel sounds present  EXTREMITIES:  2+ Peripheral Pulses, No clubbing, cyanosis, or edema  MS: Right wrist in splint.   LYMPH: No lymphadenopathy noted  SKIN: No rashes or lesions  NERVOUS SYSTEM:  Motor Strength 4/5 B/L upper and lower extremities; DTRs 2+ intact and symmetric  PSYCH:  Alert & Oriented X3, Good concentration.

## 2017-10-10 NOTE — H&P ADULT - NSHPREVIEWOFSYSTEMS_GEN_ALL_CORE
REVIEW OF SYSTEMS:  CONSTITUTIONAL: No fever, + weight loss + fatigue  EYES: No eye pain, visual disturbances, or discharge  ENMT:  No difficulty hearing, tinnitus, vertigo; No sinus or throat pain  NECK: No pain or stiffness  BREASTS: No pain, masses, or nipple discharge  RESPIRATORY: + cough, + wheezing, + shortness of breath  CARDIOVASCULAR: No chest pain, palpitations, dizziness, or leg swelling  GASTROINTESTINAL: No abdominal or epigastric pain. No nausea, vomiting, or hematemesis; No diarrhea or constipation. No melena or hematochezia.  GENITOURINARY: No dysuria, frequency, hematuria, or incontinence  NEUROLOGICAL: No headaches, memory loss, loss of strength, numbness, or tremors  SKIN: No itching, burning, rashes, or lesions   LYMPH NODES: No enlarged glands  ENDOCRINE: No heat or cold intolerance; No hair loss; No polydipsia or polyuria  MUSCULOSKELETAL: Chronic joint pain.   PSYCHIATRIC: + anxiety, + difficulty sleeping  HEME/LYMPH: No easy bruising, or bleeding gums  ALLERGY AND IMMUNOLOGIC: No hives or eczema

## 2017-10-10 NOTE — ED PROVIDER NOTE - OBJECTIVE STATEMENT
51 y/o F pt w/ PMHx anxiety, COPD, Hepatitis C, Hypotension, ischemic colitis, lupus, nicotine addiction present to the ED c/o SOB x 2 weeks. Pt states she saw Dr. Silver this morning and was sent to [facility] for admission for COPD exacerbation. Pt states a Chest X-ray was completed today, which was negative. Pt also states she was vomiting this morning. Pt denies fever or any other complaints at this time.

## 2017-10-10 NOTE — ED ADULT NURSE REASSESSMENT NOTE - NS ED NURSE REASSESS COMMENT FT1
Report given to Kenna on [address].  Call out to Dr. Dominguez to address sodium level and to get a SELMA.  Awaiting call back.

## 2017-10-10 NOTE — H&P ADULT - NSHPLABSRESULTS_GEN_ALL_CORE
LABS:                   12.3   12.3  )-----------( 252      ( 10 Oct 2017 14:25 )             36.8     10 Oct 2017 14:25    122    |  89     |  9      ----------------------------<  83     3.8     |  25     |  0.70     Ca    8.4        10 Oct 2017 14:25    TPro  6.1    /  Alb  3.5    /  TBili  0.4    /  DBili  x      /  AST  18     /  ALT  19     /  AlkPhos  66     10 Oct 2017 14:25    < from: Xray Chest 1 View AP/PA (10.10.17 @ 14:30) >    EXAM:  CHEST (SINGLE AP PA)                        PROCEDURE DATE:  10/10/2017    INTERPRETATION:  Clinical information: Difficulty breathing    Portable exam, 2:29 PM    Comparison study dated 5/31/2017    Flattened diaphragm, an emphysematous change. No sign of lobar   consolidation vascular congestion or effusion. Heart size within normal   limits. Hilar regions, mediastinal contours intact. No acute osseous   abnormalities. Surgical clips present right axilla.    IMPRESSION:    See above report    < end of copied text >  X-ray reviewed by me on the monitor.

## 2017-10-11 LAB
ANION GAP SERPL CALC-SCNC: 7 MMOL/L — SIGNIFICANT CHANGE UP (ref 5–17)
BUN SERPL-MCNC: 10 MG/DL — SIGNIFICANT CHANGE UP (ref 7–23)
CALCIUM SERPL-MCNC: 8.7 MG/DL — SIGNIFICANT CHANGE UP (ref 8.4–10.5)
CHLORIDE SERPL-SCNC: 93 MMOL/L — LOW (ref 96–108)
CO2 SERPL-SCNC: 25 MMOL/L — SIGNIFICANT CHANGE UP (ref 22–31)
CREAT SERPL-MCNC: 0.83 MG/DL — SIGNIFICANT CHANGE UP (ref 0.5–1.3)
GLUCOSE SERPL-MCNC: 103 MG/DL — HIGH (ref 70–99)
HCT VFR BLD CALC: 40.2 % — SIGNIFICANT CHANGE UP (ref 34.5–45)
HGB BLD-MCNC: 13.2 G/DL — SIGNIFICANT CHANGE UP (ref 11.5–15.5)
MCHC RBC-ENTMCNC: 32.7 PG — SIGNIFICANT CHANGE UP (ref 27–34)
MCHC RBC-ENTMCNC: 33 GM/DL — SIGNIFICANT CHANGE UP (ref 32–36)
MCV RBC AUTO: 99.2 FL — SIGNIFICANT CHANGE UP (ref 80–100)
PLATELET # BLD AUTO: 311 K/UL — SIGNIFICANT CHANGE UP (ref 150–400)
POTASSIUM SERPL-MCNC: 4.4 MMOL/L — SIGNIFICANT CHANGE UP (ref 3.5–5.3)
POTASSIUM SERPL-SCNC: 4.4 MMOL/L — SIGNIFICANT CHANGE UP (ref 3.5–5.3)
RBC # BLD: 4.05 M/UL — SIGNIFICANT CHANGE UP (ref 3.8–5.2)
RBC # FLD: 11.3 % — SIGNIFICANT CHANGE UP (ref 10.3–14.5)
SODIUM SERPL-SCNC: 125 MMOL/L — LOW (ref 135–145)
T4 FREE SERPL-MCNC: 1.3 NG/DL — SIGNIFICANT CHANGE UP (ref 0.9–1.8)
TSH SERPL-MCNC: 0.33 UIU/ML — SIGNIFICANT CHANGE UP (ref 0.27–4.2)
URATE SERPL-MCNC: 2.2 MG/DL — LOW (ref 2.5–7)
WBC # BLD: 15.1 K/UL — HIGH (ref 3.8–10.5)
WBC # FLD AUTO: 15.1 K/UL — HIGH (ref 3.8–10.5)

## 2017-10-11 PROCEDURE — 71250 CT THORAX DX C-: CPT | Mod: 26

## 2017-10-11 RX ORDER — SODIUM CHLORIDE 9 MG/ML
2 INJECTION INTRAMUSCULAR; INTRAVENOUS; SUBCUTANEOUS EVERY 6 HOURS
Qty: 0 | Refills: 0 | Status: COMPLETED | OUTPATIENT
Start: 2017-10-11 | End: 2017-10-11

## 2017-10-11 RX ORDER — SODIUM CHLORIDE 9 MG/ML
1000 INJECTION INTRAMUSCULAR; INTRAVENOUS; SUBCUTANEOUS
Qty: 0 | Refills: 0 | Status: DISCONTINUED | OUTPATIENT
Start: 2017-10-11 | End: 2017-10-12

## 2017-10-11 RX ORDER — BUDESONIDE AND FORMOTEROL FUMARATE DIHYDRATE 160; 4.5 UG/1; UG/1
2 AEROSOL RESPIRATORY (INHALATION)
Qty: 0 | Refills: 0 | Status: DISCONTINUED | OUTPATIENT
Start: 2017-10-11 | End: 2017-10-12

## 2017-10-11 RX ORDER — TIOTROPIUM BROMIDE 18 UG/1
1 CAPSULE ORAL; RESPIRATORY (INHALATION) DAILY
Qty: 0 | Refills: 0 | Status: DISCONTINUED | OUTPATIENT
Start: 2017-10-11 | End: 2017-10-12

## 2017-10-11 RX ADMIN — TIOTROPIUM BROMIDE 1 CAPSULE(S): 18 CAPSULE ORAL; RESPIRATORY (INHALATION) at 09:44

## 2017-10-11 RX ADMIN — Medication 650 MILLIGRAM(S): at 10:15

## 2017-10-11 RX ADMIN — FAMOTIDINE 20 MILLIGRAM(S): 10 INJECTION INTRAVENOUS at 18:00

## 2017-10-11 RX ADMIN — Medication 200 MILLIGRAM(S): at 08:50

## 2017-10-11 RX ADMIN — SODIUM CHLORIDE 2 GRAM(S): 9 INJECTION INTRAMUSCULAR; INTRAVENOUS; SUBCUTANEOUS at 18:00

## 2017-10-11 RX ADMIN — Medication 40 MILLIGRAM(S): at 13:46

## 2017-10-11 RX ADMIN — Medication 200 MILLIGRAM(S): at 17:59

## 2017-10-11 RX ADMIN — Medication 1 PATCH: at 11:38

## 2017-10-11 RX ADMIN — Medication 3 MILLILITER(S): at 18:00

## 2017-10-11 RX ADMIN — Medication 40 MILLIGRAM(S): at 22:18

## 2017-10-11 RX ADMIN — BUDESONIDE AND FORMOTEROL FUMARATE DIHYDRATE 2 PUFF(S): 160; 4.5 AEROSOL RESPIRATORY (INHALATION) at 09:45

## 2017-10-11 RX ADMIN — ENOXAPARIN SODIUM 30 MILLIGRAM(S): 100 INJECTION SUBCUTANEOUS at 18:00

## 2017-10-11 RX ADMIN — BUDESONIDE AND FORMOTEROL FUMARATE DIHYDRATE 2 PUFF(S): 160; 4.5 AEROSOL RESPIRATORY (INHALATION) at 05:42

## 2017-10-11 RX ADMIN — OXCARBAZEPINE 300 MILLIGRAM(S): 300 TABLET, FILM COATED ORAL at 18:00

## 2017-10-11 RX ADMIN — Medication 650 MILLIGRAM(S): at 11:00

## 2017-10-11 RX ADMIN — Medication 40 MILLIGRAM(S): at 05:42

## 2017-10-11 RX ADMIN — Medication 25 MICROGRAM(S): at 05:42

## 2017-10-11 RX ADMIN — FAMOTIDINE 20 MILLIGRAM(S): 10 INJECTION INTRAVENOUS at 05:42

## 2017-10-11 RX ADMIN — Medication 75 MILLIGRAM(S): at 22:18

## 2017-10-11 RX ADMIN — Medication 75 MILLIGRAM(S): at 13:46

## 2017-10-11 RX ADMIN — SODIUM CHLORIDE 50 MILLILITER(S): 9 INJECTION INTRAMUSCULAR; INTRAVENOUS; SUBCUTANEOUS at 11:41

## 2017-10-11 RX ADMIN — PANTOPRAZOLE SODIUM 40 MILLIGRAM(S): 20 TABLET, DELAYED RELEASE ORAL at 05:42

## 2017-10-11 RX ADMIN — SODIUM CHLORIDE 2 GRAM(S): 9 INJECTION INTRAMUSCULAR; INTRAVENOUS; SUBCUTANEOUS at 10:13

## 2017-10-11 RX ADMIN — BUDESONIDE AND FORMOTEROL FUMARATE DIHYDRATE 2 PUFF(S): 160; 4.5 AEROSOL RESPIRATORY (INHALATION) at 20:38

## 2017-10-11 RX ADMIN — OXCARBAZEPINE 300 MILLIGRAM(S): 300 TABLET, FILM COATED ORAL at 05:42

## 2017-10-11 RX ADMIN — Medication 50 MILLIGRAM(S): at 22:18

## 2017-10-11 NOTE — CONSULT NOTE ADULT - ASSESSMENT
·	Hyponatremia, ? Chronic: Low solute intake, SIADH  ·	COPD      Maintain PO fluid restriction. Check urine sodium, urine osm and serum uric acid level. Avoid hypotonic fluids. Monitor BP closely. Check thyroid function tests if not recently checked. Check serial sodium levels. Salt tablets if BP acceptable. Pt advised on compliance with PO fluid restriction. Rx for COPD. Will follow electrolytes and renal function trend. Further recommendations pending clinical course. Thank you for the courtesy of this referral.

## 2017-10-11 NOTE — CONSULT NOTE ADULT - SUBJECTIVE AND OBJECTIVE BOX
Patient is a 52y old  Female who presents with a chief complaint of Shortness of breath, cough (10 Oct 2017 15:28)    HPI:  52 years old female with history of COPD, Tobacco abuse, Lupus, Chronic Hepatitis C, Colitis, GERD, hypotension, hypothyroidism, Anxiety and insomnia, who is current 2 pack per day smoker, who came in to Er from Dr. Silver's office due to worsening shortness of breath for last 2 weeks. Patient was treated as out patient for COPD exacerbation, but she continued to have shortness of breath and cough and was advised to come in to ER.   She denies any fevers.  She has a cough, which is not productive for any sputum.   She denies any nausea or vomiting.   Patient has history of similar  episodes in past.   no chest pain or pressure.   No orthopnea or PND. (10 Oct 2017 15:28)      Renal consult called for hyponatremia. Pt had nausea / vomitting on admission. Pt states that she has had low sodium for a while. Denies any urinary complaints or use of diuretics. Poor PO intake secondary to ? colitis.       PAST MEDICAL HISTORY:  Nicotine addiction  COPD (chronic obstructive pulmonary disease)  Neuropathy  Migraine  Anxiety  Hepatitis C  Gallstones  Hypothyroidism  Hypotension  GERD (gastroesophageal reflux disease)  UTI (urinary tract infection)  Vertigo  Sjogren's disease  Lupus  ? Colitis      PAST SURGICAL HISTORY:  Gall bladder disease  Vocal cord polyp      FAMILY HISTORY:  Family history of diabetes mellitus (Sibling): Sister  Family history of psoriatic arthritis (Sibling): Sister  Family history of systemic lupus erythematosus (SLE) in mother (Sibling): Sister      SOCIAL HISTORY: + smoking, no alcohol use    Allergies    aspirin (Nausea)  Zithromax (Pruritus; Rash)    Intolerances      Home Medications:  DuoNeb 0.5 mg-2.5 mg/3 mL inhalation solution: 3 milliliter(s) inhaled every 4 hours (31 May 2017 11:48)  Fioricet oral tablet: 2 tab(s) orally every 4 hours, As Needed (31 May 2017 11:48)  hydroxychloroquine 200 mg oral tablet: 1 tab(s) orally 2 times a day (31 May 2017 11:48)  levothyroxine 25 mcg (0.025 mg) oral capsule: 1 cap(s) orally once a day (31 May 2017 11:48)  Lyrica 75 mg oral capsule:  orally 3 times a day, As Needed (31 May 2017 11:48)  omeprazole 40 mg oral delayed release capsule: 1 cap(s) orally once a day (31 May 2017 11:48)  OXcarbazepine 150 mg oral tablet: 1 tab(s) orally 4 times a day (31 May 2017 11:48)  ProAir HFA 90 mcg/inh inhalation aerosol: 2 puff(s) inhaled 4 times a day, As Needed (31 May 2017 11:49)  raNITIdine 150 mg oral tablet: 1 tab(s) orally once a day (31 May 2017 11:48)  Symbicort 80 mcg-4.5 mcg/inh inhalation aerosol: 2 puff(s) inhaled 2 times a day (31 May 2017 11:49)  traZODone 50 mg oral tablet: 1 tab(s) orally 3 times a day, As Needed (31 May 2017 11:48)  Valium 5 mg oral tablet: 1 tab(s) orally 3 times a day, As Needed (31 May 2017 11:48)    MEDICATIONS  (STANDING):  buDESOnide 160 MICROgram(s)/formoterol 4.5 MICROgram(s) Inhaler 2 Puff(s) Inhalation two times a day  enoxaparin Injectable 30 milliGRAM(s) SubCutaneous every 24 hours  famotidine    Tablet 20 milliGRAM(s) Oral two times a day  hydroxychloroquine 200 milliGRAM(s) Oral two times a day with meals  levothyroxine 25 MICROGram(s) Oral daily  methylPREDNISolone sodium succinate Injectable 40 milliGRAM(s) IV Push every 8 hours  nicotine - 21 mG/24Hr(s) Patch 1 patch Transdermal daily  OXcarbazepine 300 milliGRAM(s) Oral two times a day  pantoprazole    Tablet 40 milliGRAM(s) Oral before breakfast  pregabalin 75 milliGRAM(s) Oral three times a day  sodium chloride 0.9%. 1000 milliLiter(s) (50 mL/Hr) IV Continuous <Continuous>  tiotropium 18 MICROgram(s) Capsule 1 Capsule(s) Inhalation daily    MEDICATIONS  (PRN):  acetaminophen   Tablet. 650 milliGRAM(s) Oral every 6 hours PRN Mild Pain (1 - 3)  ALBUTerol/ipratropium for Nebulization 3 milliLiter(s) Nebulizer every 4 hours PRN Shortness of Breath and/or Wheezing  diazepam    Tablet 5 milliGRAM(s) Oral three times a day PRN Anxiety  nicotine - Inhaler 1 Each Inhalation every 2 hours PRN Nicotine withdrawal  traZODone 50 milliGRAM(s) Oral at bedtime PRN Insomnia      REVIEW OF SYSTEMS:  General: NAD  Respiratory: No cough, SOB  Cardiovascular: No CP or Palpitations	  Gastrointestinal: + nausea, Vomiting. No diarrhea  Genitourinary: No urinary complaints	  Musculoskeletal: No edema, No new rash or lesions	  	    T(F): 98 (10-11-17 @ 05:21), Max: 98.3 (10-10-17 @ 12:30)  HR: 59 (10-11-17 @ 05:21) (56 - 61)  BP: 107/67 (10-11-17 @ 05:21) (93/58 - 119/74)  RR: 18 (10-11-17 @ 05:21) (18 - 18)  SpO2: 98% (10-11-17 @ 05:21) (96% - 100%)  Wt(kg): --    PHYSICAL EXAM:  General: NAD  Respiratory: b/l air entry  Cardiovascular: S1 S2  Gastrointestinal: soft  Extremities: no edema        10-10    122<L>  |  89<L>  |  9   ----------------------------<  83  3.8   |  25  |  0.70    Ca    8.4      10 Oct 2017 14:25    TPro  6.1  /  Alb  3.5  /  TBili  0.4  /  DBili  x   /  AST  18  /  ALT  19  /  AlkPhos  66  10-10                          12.3   12.3  )-----------( 252      ( 10 Oct 2017 14:25 )             36.8       Calcium, Total Serum: 8.4 mg/dL (10-10 @ 14:25)  Blood Urea Nitrogen, Serum: 9 mg/dL (10-10 @ 14:25)  Potassium, Serum: 3.8 mmol/L (10-10 @ 14:25)  Hemoglobin: 12.3 g/dL (10-10 @ 14:25)      Creatinine, Serum: 0.70 (10-10 @ 14:25)        LIVER FUNCTIONS - ( 10 Oct 2017 14:25 )  Alb: 3.5 g/dL / Pro: 6.1 g/dL / ALK PHOS: 66 U/L / ALT: 19 U/L DA / AST: 18 U/L / GGT: x                       I&O's Detail    10 Oct 2017 07:01  -  11 Oct 2017 07:00  --------------------------------------------------------  IN:    Oral Fluid: 120 mL  Total IN: 120 mL    OUT:  Total OUT: 0 mL    Total NET: 120 mL    < from: Xray Chest 1 View AP/PA (10.10.17 @ 14:30) >  EXAM:  CHEST (SINGLE AP PA)                                  PROCEDURE DATE:  10/10/2017          INTERPRETATION:  Clinical information: Difficulty breathing    Portable exam, 2:29 PM    Comparison study dated 5/31/2017    Flattened diaphragm, an emphysematous change. No sign of lobar   consolidation vascular congestion or effusion. Heart size within normal   limits. Hilar regions, mediastinal contours intact. No acute osseous   abnormalities. Surgical clips present right axilla.    IMPRESSION:    See above report    < end of copied text >

## 2017-10-11 NOTE — DIETITIAN INITIAL EVALUATION ADULT. - OTHER INFO
COPD exacerbation Hx: smoking, Lupus, Hep C, colitis, GERD, hypotension, hypothyroidism, anxiety COPD exacerbation Hx: smoking, Lupus, Hep C, colitis, GERD, hypotension, hypothyroidism, anxiety. Pt states she has had low po intake and lost 13 # over 2 months (severe weight loss) She was diagnosed c colitis about 4 months ago and states that ever since she has had N/V/D and pain which caused her to lose her appetite. Currently she states po intake slightly improved. Diet rx: Regular c 1000 cc FR: Reinforced importance of compliance to FR.  Pt tolerating diet along c ensure supplements.  Provided diet education for colitis as pt stated she didn't know how to eat. Encouraged balanced, healthy diet.

## 2017-10-12 ENCOUNTER — APPOINTMENT (OUTPATIENT)
Dept: GASTROENTEROLOGY | Facility: CLINIC | Age: 52
End: 2017-10-12

## 2017-10-12 ENCOUNTER — TRANSCRIPTION ENCOUNTER (OUTPATIENT)
Age: 52
End: 2017-10-12

## 2017-10-12 VITALS
DIASTOLIC BLOOD PRESSURE: 81 MMHG | HEART RATE: 78 BPM | RESPIRATION RATE: 18 BRPM | OXYGEN SATURATION: 96 % | TEMPERATURE: 98 F | SYSTOLIC BLOOD PRESSURE: 120 MMHG

## 2017-10-12 LAB
ALBUMIN SERPL ELPH-MCNC: 3.7 G/DL — SIGNIFICANT CHANGE UP (ref 3.3–5)
ALP SERPL-CCNC: 78 U/L — SIGNIFICANT CHANGE UP (ref 30–120)
ALT FLD-CCNC: 18 U/L DA — SIGNIFICANT CHANGE UP (ref 10–60)
ANION GAP SERPL CALC-SCNC: 7 MMOL/L — SIGNIFICANT CHANGE UP (ref 5–17)
AST SERPL-CCNC: 12 U/L — SIGNIFICANT CHANGE UP (ref 10–40)
BILIRUB SERPL-MCNC: 0.2 MG/DL — SIGNIFICANT CHANGE UP (ref 0.2–1.2)
BUN SERPL-MCNC: 12 MG/DL — SIGNIFICANT CHANGE UP (ref 7–23)
CALCIUM SERPL-MCNC: 9.2 MG/DL — SIGNIFICANT CHANGE UP (ref 8.4–10.5)
CHLORIDE SERPL-SCNC: 96 MMOL/L — SIGNIFICANT CHANGE UP (ref 96–108)
CO2 SERPL-SCNC: 27 MMOL/L — SIGNIFICANT CHANGE UP (ref 22–31)
CREAT SERPL-MCNC: 0.8 MG/DL — SIGNIFICANT CHANGE UP (ref 0.5–1.3)
GLUCOSE SERPL-MCNC: 115 MG/DL — HIGH (ref 70–99)
HCT VFR BLD CALC: 38.7 % — SIGNIFICANT CHANGE UP (ref 34.5–45)
HGB BLD-MCNC: 13.6 G/DL — SIGNIFICANT CHANGE UP (ref 11.5–15.5)
MCHC RBC-ENTMCNC: 34.6 PG — HIGH (ref 27–34)
MCHC RBC-ENTMCNC: 35 GM/DL — SIGNIFICANT CHANGE UP (ref 32–36)
MCV RBC AUTO: 98.8 FL — SIGNIFICANT CHANGE UP (ref 80–100)
PLATELET # BLD AUTO: 301 K/UL — SIGNIFICANT CHANGE UP (ref 150–400)
POTASSIUM SERPL-MCNC: 4.4 MMOL/L — SIGNIFICANT CHANGE UP (ref 3.5–5.3)
POTASSIUM SERPL-SCNC: 4.4 MMOL/L — SIGNIFICANT CHANGE UP (ref 3.5–5.3)
PROT SERPL-MCNC: 6.9 G/DL — SIGNIFICANT CHANGE UP (ref 6–8.3)
RBC # BLD: 3.92 M/UL — SIGNIFICANT CHANGE UP (ref 3.8–5.2)
RBC # FLD: 11.4 % — SIGNIFICANT CHANGE UP (ref 10.3–14.5)
SODIUM SERPL-SCNC: 130 MMOL/L — LOW (ref 135–145)
WBC # BLD: 17.5 K/UL — HIGH (ref 3.8–10.5)
WBC # FLD AUTO: 17.5 K/UL — HIGH (ref 3.8–10.5)

## 2017-10-12 PROCEDURE — 94760 N-INVAS EAR/PLS OXIMETRY 1: CPT

## 2017-10-12 PROCEDURE — 80053 COMPREHEN METABOLIC PANEL: CPT

## 2017-10-12 PROCEDURE — 84550 ASSAY OF BLOOD/URIC ACID: CPT

## 2017-10-12 PROCEDURE — 94664 DEMO&/EVAL PT USE INHALER: CPT

## 2017-10-12 PROCEDURE — 85027 COMPLETE CBC AUTOMATED: CPT

## 2017-10-12 PROCEDURE — 80048 BASIC METABOLIC PNL TOTAL CA: CPT

## 2017-10-12 PROCEDURE — 84300 ASSAY OF URINE SODIUM: CPT

## 2017-10-12 PROCEDURE — 83935 ASSAY OF URINE OSMOLALITY: CPT

## 2017-10-12 PROCEDURE — 83605 ASSAY OF LACTIC ACID: CPT

## 2017-10-12 PROCEDURE — 71045 X-RAY EXAM CHEST 1 VIEW: CPT

## 2017-10-12 PROCEDURE — 99285 EMERGENCY DEPT VISIT HI MDM: CPT | Mod: 25

## 2017-10-12 PROCEDURE — 84439 ASSAY OF FREE THYROXINE: CPT

## 2017-10-12 PROCEDURE — 84443 ASSAY THYROID STIM HORMONE: CPT

## 2017-10-12 PROCEDURE — 71250 CT THORAX DX C-: CPT

## 2017-10-12 PROCEDURE — 87040 BLOOD CULTURE FOR BACTERIA: CPT

## 2017-10-12 PROCEDURE — 94640 AIRWAY INHALATION TREATMENT: CPT

## 2017-10-12 RX ORDER — BUDESONIDE AND FORMOTEROL FUMARATE DIHYDRATE 160; 4.5 UG/1; UG/1
2 AEROSOL RESPIRATORY (INHALATION)
Qty: 0 | Refills: 0 | COMMUNITY

## 2017-10-12 RX ORDER — NICOTINE POLACRILEX 2 MG
1 GUM BUCCAL
Qty: 1 | Refills: 0 | OUTPATIENT
Start: 2017-10-12 | End: 2017-11-11

## 2017-10-12 RX ORDER — BUDESONIDE AND FORMOTEROL FUMARATE DIHYDRATE 160; 4.5 UG/1; UG/1
2 AEROSOL RESPIRATORY (INHALATION)
Qty: 1 | Refills: 0 | OUTPATIENT
Start: 2017-10-12 | End: 2017-11-11

## 2017-10-12 RX ORDER — SODIUM CHLORIDE 9 MG/ML
2 INJECTION INTRAMUSCULAR; INTRAVENOUS; SUBCUTANEOUS ONCE
Qty: 0 | Refills: 0 | Status: COMPLETED | OUTPATIENT
Start: 2017-10-12 | End: 2017-10-12

## 2017-10-12 RX ORDER — TIOTROPIUM BROMIDE 18 UG/1
1 CAPSULE ORAL; RESPIRATORY (INHALATION)
Qty: 1 | Refills: 0
Start: 2017-10-12 | End: 2017-11-11

## 2017-10-12 RX ADMIN — Medication 1 PATCH: at 11:15

## 2017-10-12 RX ADMIN — BUDESONIDE AND FORMOTEROL FUMARATE DIHYDRATE 2 PUFF(S): 160; 4.5 AEROSOL RESPIRATORY (INHALATION) at 06:02

## 2017-10-12 RX ADMIN — OXCARBAZEPINE 300 MILLIGRAM(S): 300 TABLET, FILM COATED ORAL at 06:01

## 2017-10-12 RX ADMIN — PANTOPRAZOLE SODIUM 40 MILLIGRAM(S): 20 TABLET, DELAYED RELEASE ORAL at 06:01

## 2017-10-12 RX ADMIN — SODIUM CHLORIDE 2 GRAM(S): 9 INJECTION INTRAMUSCULAR; INTRAVENOUS; SUBCUTANEOUS at 13:30

## 2017-10-12 RX ADMIN — FAMOTIDINE 20 MILLIGRAM(S): 10 INJECTION INTRAVENOUS at 06:01

## 2017-10-12 RX ADMIN — Medication 75 MILLIGRAM(S): at 06:01

## 2017-10-12 RX ADMIN — Medication 75 MILLIGRAM(S): at 13:02

## 2017-10-12 RX ADMIN — TIOTROPIUM BROMIDE 1 CAPSULE(S): 18 CAPSULE ORAL; RESPIRATORY (INHALATION) at 06:53

## 2017-10-12 RX ADMIN — Medication 25 MICROGRAM(S): at 06:01

## 2017-10-12 RX ADMIN — Medication 1 PATCH: at 11:26

## 2017-10-12 RX ADMIN — Medication 200 MILLIGRAM(S): at 08:05

## 2017-10-12 RX ADMIN — Medication 40 MILLIGRAM(S): at 06:01

## 2017-10-12 RX ADMIN — Medication 5 MILLIGRAM(S): at 11:38

## 2017-10-12 RX ADMIN — Medication 3 MILLILITER(S): at 05:43

## 2017-10-12 NOTE — DISCHARGE NOTE ADULT - SECONDARY DIAGNOSIS.
Cigarette nicotine dependence without complication Hyponatremia Hypothyroidism, unspecified type Lupus erythematosus, unspecified form Gastroesophageal reflux disease without esophagitis Chronic hepatitis C without hepatic coma

## 2017-10-12 NOTE — PROGRESS NOTE ADULT - PROBLEM SELECTOR PLAN 8
Lovenox for DVT prophylaxis.

## 2017-10-12 NOTE — DISCHARGE NOTE ADULT - HOSPITAL COURSE
Patient admitted with COPD exacerbation and hyponatremia. She was seen by Pulmonary and nephrology. Patient improved with IV steroids and Duoneb. She was placed on fluid restriction and NS. Her sodium improved. Her hyponatremia is most likely due to SIADH.  She is changed to oral steroids and cleared by Pulmonary for discharge. Patient is counselled on smoking cessation. She is provided with scripts for patches and nicotine inhaler.

## 2017-10-12 NOTE — DISCHARGE NOTE ADULT - CARE PROVIDER_API CALL
Dilip Silver), Critical Care Medicine; Internal Medicine; Pulmonary Disease  [address] [place] [address]  Phone: [phone]  Fax: [phone]

## 2017-10-12 NOTE — PROGRESS NOTE ADULT - PROBLEM SELECTOR PROBLEM 5
Chronic hepatitis C without hepatic coma

## 2017-10-12 NOTE — DISCHARGE NOTE ADULT - PLAN OF CARE
Breath better. Regular diet.   Fluid restriction to 1500 ml/day  Quit Smoking.  Increase activity as tolerated.   Follow up with Dr. Silver next week.

## 2017-10-12 NOTE — DISCHARGE NOTE ADULT - MEDICATION SUMMARY - MEDICATIONS TO TAKE
I will START or STAY ON the medications listed below when I get home from the hospital:    predniSONE 5 mg oral tablet  -- 4 tab(s) once a day x 4 days  3 tab(s)  once a day x 4 days  2 tab(s)  once a day x 4 days  1 tab(s)  once a day x 4 days  -- Indication: For COPD with acute exacerbation    Fioricet oral tablet  -- 2 tab(s) by mouth every 4 hours, As Needed  -- Indication: For Migraine    OXcarbazepine 150 mg oral tablet  -- 1 tab(s) by mouth 4 times a day  -- Indication: For Seizure    Lyrica 75 mg oral capsule  --  by mouth 3 times a day, As Needed  -- Indication: For =    Valium 5 mg oral tablet  -- 1 tab(s) by mouth 3 times a day, As Needed  -- Indication: For Anxiety    traZODone 50 mg oral tablet  -- 1 tab(s) by mouth 3 times a day, As Needed  -- Indication: For =    hydroxychloroquine 200 mg oral tablet  -- 1 tab(s) by mouth 2 times a day  -- Indication: For Lupus erythematosus, unspecified form    DuoNeb 0.5 mg-2.5 mg/3 mL inhalation solution  -- 3 milliliter(s) inhaled every 4 hours  -- Indication: For COPD with acute exacerbation    ProAir HFA 90 mcg/inh inhalation aerosol  -- 2 puff(s) inhaled 4 times a day, As Needed  -- Indication: For COPD with acute exacerbation    budesonide-formoterol 160 mcg-4.5 mcg/inh inhalation aerosol  -- 2 puff(s) inhaled 2 times a day  -- Indication: For COPD with acute exacerbation    tiotropium 18 mcg inhalation capsule  -- 1 cap(s) inhaled once a day  -- Indication: For COPD with acute exacerbation    raNITIdine 150 mg oral tablet  -- 1 tab(s) by mouth once a day  -- Indication: For Gastroesophageal reflux disease without esophagitis    omeprazole 40 mg oral delayed release capsule  -- 1 cap(s) by mouth once a day  -- Indication: For Gastroesophageal reflux disease without esophagitis    nicotine 21 mg/24 hr transdermal film, extended release  -- 1 patch by transdermal patch once a day   -- Indication: For Cigarette nicotine dependence without complication    nicotine 10 mg inhalation device  -- 1 dose(s) inhaled every 2 hours, As Needed   -- Indication: For Cigarette nicotine dependence without complication    levothyroxine 25 mcg (0.025 mg) oral capsule  -- 1 cap(s) by mouth once a day  -- Indication: For Hypothyroidism

## 2017-10-12 NOTE — PROGRESS NOTE ADULT - PROBLEM SELECTOR PLAN 2
Patient with hyponatremia, ? Siadh  Will place on Fluid restriction.     Follow serial BMP.  IV NS  CT chest
Patient with hyponatremia, etiology not clear.  Will place on Fluid restriction.   Renal eval noted  Started on Salt Tabs  Serial bmp  CT chest noted
Patient with hyponatremia, most likely due to SIADH.  Improved.   Will continue fluid restriction.   Out patient follow up.
Patient with hyponatremia, etiology not clear.  Will place on Fluid restriction.   Will get Nephrology to see patient.   Follow serial BMP.  IV NS  CT chest

## 2017-10-12 NOTE — PROGRESS NOTE ADULT - PROBLEM SELECTOR PLAN 3
Patient counselled at length about smoking cessation.   Continue Nicotine patch and Nicotine inhaler.
Patient counselled at length about smoking cessation.   Will place on Nicotine patch and Nicotine inhaler.

## 2017-10-12 NOTE — PROGRESS NOTE ADULT - ASSESSMENT
52 years old female with history of COPD, Hypothyroidism, GERD, Anxiety, insomnia, current smoker who comes in to ER with 2 weeks history of increasing shortness of breath. She is being admitted for COPD exacerbation. Patient is also noted to have hyponatremia. She is being admitted for further management.
·	Hyponatremia, ? Chronic: Low solute intake, SIADH  ·	COPD      Improved sodium levels. Salt tabs x a dose. Clinically stable. Rx for COPD. D/c planning.
52 years old female with history of COPD, Hypothyroidism, GERD, Anxiety, insomnia, current smoker who comes in to ER with 2 weeks history of increasing shortness of breath. She is being admitted for COPD exacerbation. Patient is also noted to have hyponatremia. She is being admitted for further management.
52 years old female with history of COPD, Hypothyroidism, GERD, Anxiety, insomnia, current smoker who comes in to ER with 2 weeks history of increasing shortness of breath. She is being admitted for COPD exacerbation. Patient is also noted to have hyponatremia. She is being admitted for further management. May have had respiratory infection which resolved but left residual airway inflammation.  Pt. has intractable nicotine addiction despite frequent exacerbations.   Markedly improved.  Wants to go home  Can follow with me in office Monday.  Will need to recheck Na.
52 years old female with history of COPD, Hypothyroidism, GERD, Anxiety, insomnia, current smoker who comes in to ER with 2 weeks history of increasing shortness of breath. She is being admitted for COPD exacerbation. Patient is also noted to have hyponatremia. She is being admitted for further management. May have had respiratory infection which resolved but left residual airway inflammation.  Pt. has intractable nicotine addiction despite frequent exacerbations.

## 2017-10-12 NOTE — PROGRESS NOTE ADULT - PROBLEM SELECTOR PLAN 1
Patient with COPD exacerbation.  Improving.   Continue prednisone taper as per pulmonary.  Continue Symbicort and DuoNeb  Will DC home as per pulmonary.
Patient with COPD exacerbation.  Will place on IV Solu Medrol and Duoneb.  Continue Symbicort.  Pulmonary follow up.   Further management as per patient's clinical course.  CT chest noted
Prednisone slow taper over 2 weeks.  Continue Symbicort 160  Spiriva
IV Solu Medrol and Duoneb.  Continue Symbicort 160  Spiriva

## 2017-10-12 NOTE — PROGRESS NOTE ADULT - PROBLEM SELECTOR PLAN 4
Continue on PPI and H2 blockers as per out patient regimen.   Change to Protonix and Pepcid per hospital formulary.
Continue on PPI and H2 blockers as per out patient regimen.
Continue on PPI and H2 blockers as per out patient regimen.
Continue on PPI and H2 blockers as per out patient regimen.   Change to Protonix and Pepcid per hospital formulary.

## 2017-10-12 NOTE — PROGRESS NOTE ADULT - PROBLEM SELECTOR PROBLEM 3
Cigarette nicotine dependence without complication

## 2017-10-12 NOTE — DISCHARGE NOTE ADULT - PATIENT PORTAL LINK FT
“You can access the [facility] Patient Portal, offered by [facility], by registering with the following website: [email or web address]”

## 2017-10-12 NOTE — PROGRESS NOTE ADULT - PROBLEM SELECTOR PROBLEM 1
COPD with acute exacerbation

## 2017-10-12 NOTE — DISCHARGE NOTE ADULT - MEDICATION SUMMARY - MEDICATIONS TO CHANGE
I will SWITCH the dose or number of times a day I take the medications listed below when I get home from the hospital:    Symbicort 80 mcg-4.5 mcg/inh inhalation aerosol  -- 2 puff(s) inhaled 2 times a day

## 2017-10-12 NOTE — PROGRESS NOTE ADULT - PROBLEM SELECTOR PROBLEM 6
Lupus erythematosus, unspecified form

## 2017-10-12 NOTE — PROGRESS NOTE ADULT - PROBLEM SELECTOR PLAN 5
Not on any active treatment at this time.  Supportive care.

## 2017-10-12 NOTE — PROGRESS NOTE ADULT - SUBJECTIVE AND OBJECTIVE BOX
Patient is a 52y old  Female who presents with a chief complaint of Shortness of breath, cough (10 Oct 2017 15:28)      Patient seen in follow up for hyponatremia. Improving sodium levels.     PAST MEDICAL HISTORY:  Nicotine addiction  COPD (chronic obstructive pulmonary disease)  Neuropathy  Migraine  Anxiety  Hepatitis C  Gallstones  Hypothyroidism  Hypotension  GERD (gastroesophageal reflux disease)  UTI (urinary tract infection)  Vertigo  Sjogren's disease  Lupus    MEDICATIONS  (STANDING):  buDESOnide 160 MICROgram(s)/formoterol 4.5 MICROgram(s) Inhaler 2 Puff(s) Inhalation two times a day  enoxaparin Injectable 30 milliGRAM(s) SubCutaneous every 24 hours  famotidine    Tablet 20 milliGRAM(s) Oral two times a day  hydroxychloroquine 200 milliGRAM(s) Oral two times a day with meals  levothyroxine 25 MICROGram(s) Oral daily  nicotine - 21 mG/24Hr(s) Patch 1 patch Transdermal daily  OXcarbazepine 300 milliGRAM(s) Oral two times a day  pantoprazole    Tablet 40 milliGRAM(s) Oral before breakfast  predniSONE   Tablet 40 milliGRAM(s) Oral daily  pregabalin 75 milliGRAM(s) Oral three times a day  sodium chloride 0.9%. 1000 milliLiter(s) (50 mL/Hr) IV Continuous <Continuous>  tiotropium 18 MICROgram(s) Capsule 1 Capsule(s) Inhalation daily    MEDICATIONS  (PRN):  acetaminophen   Tablet. 650 milliGRAM(s) Oral every 6 hours PRN Mild Pain (1 - 3)  ALBUTerol/ipratropium for Nebulization 3 milliLiter(s) Nebulizer every 4 hours PRN Shortness of Breath and/or Wheezing  diazepam    Tablet 5 milliGRAM(s) Oral three times a day PRN Anxiety  nicotine - Inhaler 1 Each Inhalation every 2 hours PRN Nicotine withdrawal  traZODone 50 milliGRAM(s) Oral at bedtime PRN Insomnia    T(C): 36.9 (10-12-17 @ 09:54), Max: 36.9 (10-12-17 @ 09:54)  HR: 85 (10-12-17 @ 09:54) (59 - 98)  BP: 94/53 (10-12-17 @ 09:54) (92/57 - 108/58)  RR: 18 (10-12-17 @ 09:54) (16 - 18)  SpO2: 96% (10-12-17 @ 09:54) (96% - 100%)  Wt(kg): --  I&O's Detail    11 Oct 2017 07:01  -  12 Oct 2017 07:00  --------------------------------------------------------  IN:    Oral Fluid: 520 mL    sodium chloride 0.9%.: 1050 mL  Total IN: 1570 mL    OUT:  Total OUT: 0 mL    Total NET: 1570 mL          PHYSICAL EXAM:  General: NAD  Respiratory: b/l air entry  Cardiovascular: S1 S2  Gastrointestinal: soft  Extremities:  no edema                          13.6   17.5  )-----------( 301      ( 12 Oct 2017 08:03 )             38.7     10-12    130<L>  |  96  |  12  ----------------------------<  115<H>  4.4   |  27  |  0.80    Ca    9.2      12 Oct 2017 08:03    TPro  6.9  /  Alb  3.7  /  TBili  0.2  /  DBili  x   /  AST  12  /  ALT  18  /  AlkPhos  78  10-12        LIVER FUNCTIONS - ( 12 Oct 2017 08:03 )  Alb: 3.7 g/dL / Pro: 6.9 g/dL / ALK PHOS: 78 U/L / ALT: 18 U/L DA / AST: 12 U/L / GGT: x               Sodium, Serum: 130 (10-12 @ 08:03)  Sodium, Serum: 125 (10-11 @ 12:31)  Sodium, Serum: 122 (10-10 @ 14:25)    Creatinine, Serum: 0.80 (10-12 @ 08:03)  Creatinine, Serum: 0.83 (10-11 @ 12:31)  Creatinine, Serum: 0.70 (10-10 @ 14:25)    Potassium, Serum: 4.4 (10-12 @ 08:03)  Potassium, Serum: 4.4 (10-11 @ 12:31)  Potassium, Serum: 3.8 (10-10 @ 14:25)    Hemoglobin: 13.6 (10-12 @ 08:03)  Hemoglobin: 13.2 (10-11 @ 12:31)  Hemoglobin: 12.3 (10-10 @ 14:25)
Patient is a 52y old  Female who presents with a chief complaint of Shortness of breath, cough (12 Oct 2017 14:58)    HPI:  52 years old female with history of COPD, Tobacco abuse, Lupus, Chronic Hepatitis C, Colitis, GERD, hypotension, hypothyroidism, Anxiety and insomnia, who is current 2 pack per day smoker, who came in to Er from Dr. Silver's office due to worsening shortness of breath for last 2 weeks. Patient was treated as out patient for COPD exacerbation, but she continued to have shortness of breath and cough and was advised to come in to ER.   She denies any fevers.  She has a cough, which is not productive for any sputum.   She denies any nausea or vomiting.   Patient has history of similar  episodes in past.   no chest pain or pressure.   No orthopnea or PND. (10 Oct 2017 15:28)    INTERVAL HPI/OVERNIGHT EVENTS:  Chart reviewed, notes reviewed.   Patient seen and examined.  Feeling better.   Breathing is better.  Cough is better.   Cleared by pulmonary for discharge.   Being followed by following specialists: Pulmonary and Nephrology.     Consultant(s) Notes Reviewed:  [X] Yes    Care Discussed with Consultants/Other Providers: [X] Yes    REVIEW OF SYSTEMS:  CONSTITUTIONAL: + Fatigue  EYES: No eye pain, visual disturbances, or discharge  ENMT:  No difficulty hearing, tinnitus, vertigo; No sinus or throat pain  NECK: No pain or stiffness  BREASTS: No pain, masses, or nipple discharge  RESPIRATORY: + shortness of breath (improved), + Cough (improved)  CARDIOVASCULAR: No chest pain, palpitations, dizziness, or leg swelling  GASTROINTESTINAL: No abdominal or epigastric pain. No nausea, vomiting, or hematemesis; No diarrhea or constipation. No melena or hematochezia.  GENITOURINARY: No dysuria, frequency, hematuria, or incontinence  NEUROLOGICAL: No headaches, memory loss, loss of strength, numbness, or tremors  SKIN: No itching, burning, rashes, or lesions   LYMPH NODES: No enlarged glands  ENDOCRINE: No heat or cold intolerance; No hair loss; No polydipsia or polyuria  MUSCULOSKELETAL: No joint pain or swelling; No muscle, back, or extremity pain  PSYCHIATRIC: No depression, anxiety, mood swings, or difficulty sleeping  HEME/LYMPH: No easy bruising, or bleeding gums  ALLERGY AND IMMUNOLOGIC: No hives or eczema    Allergies: aspirin (Nausea), Zithromax (Pruritus; Rash)    Intolerances    MEDICATIONS  (STANDING):  buDESOnide 160 MICROgram(s)/formoterol 4.5 MICROgram(s) Inhaler 2 Puff(s) Inhalation two times a day  enoxaparin Injectable 30 milliGRAM(s) SubCutaneous every 24 hours  famotidine    Tablet 20 milliGRAM(s) Oral two times a day  hydroxychloroquine 200 milliGRAM(s) Oral two times a day with meals  levothyroxine 25 MICROGram(s) Oral daily  nicotine - 21 mG/24Hr(s) Patch 1 patch Transdermal daily  OXcarbazepine 300 milliGRAM(s) Oral two times a day  pantoprazole    Tablet 40 milliGRAM(s) Oral before breakfast  predniSONE   Tablet 40 milliGRAM(s) Oral daily  pregabalin 75 milliGRAM(s) Oral three times a day  sodium chloride 0.9%. 1000 milliLiter(s) (50 mL/Hr) IV Continuous <Continuous>  tiotropium 18 MICROgram(s) Capsule 1 Capsule(s) Inhalation daily    MEDICATIONS  (PRN):  acetaminophen   Tablet. 650 milliGRAM(s) Oral every 6 hours PRN Mild Pain (1 - 3)  ALBUTerol/ipratropium for Nebulization 3 milliLiter(s) Nebulizer every 4 hours PRN Shortness of Breath and/or Wheezing  diazepam    Tablet 5 milliGRAM(s) Oral three times a day PRN Anxiety  nicotine - Inhaler 1 Each Inhalation every 2 hours PRN Nicotine withdrawal  traZODone 50 milliGRAM(s) Oral at bedtime PRN Insomnia    Vital Signs Last 24 Hrs  T(C): 36.7 (12 Oct 2017 13:45), Max: 36.9 (12 Oct 2017 09:54)  T(F): 98.1 (12 Oct 2017 13:45), Max: 98.4 (12 Oct 2017 09:54)  HR: 78 (12 Oct 2017 13:45) (61 - 98)  BP: 99/73 (12 Oct 2017 13:45) (92/57 - 108/58)  BP(mean): --  RR: 18 (12 Oct 2017 13:45) (17 - 18)  SpO2: 96% (12 Oct 2017 13:45) (96% - 100%)    PHYSICAL EXAM:  GENERAL: NAD, well-groomed, well-developed  HEAD:  Atraumatic, Normocephalic  EYES: EOMI, PERRLA, conjunctiva and sclera clear  ENMT: No tonsillar erythema, exudates, or enlargement; Moist mucous membranes, Good dentition, No lesions  NECK: Supple, No JVD, Normal thyroid  CHEST/LUNG: occasional exp wheeze +  HEART: Regular rate and rhythm; No murmurs, rubs, or gallops  ABDOMEN: Soft, Nontender, Nondistended; Bowel sounds present  EXTREMITIES:  2+ Peripheral Pulses, No clubbing, cyanosis, or edema  MS: No joint swelling or deformity.   LYMPH: No lymphadenopathy noted  SKIN: No rashes or lesions  NERVOUS SYSTEM:  Motor Strength 4/5 B/L upper and lower extremities; DTRs 2+ intact and symmetric  PSYCH:  Alert & Oriented X3, Good concentration.      LABS:                     13.6   17.5  )-----------( 301      ( 12 Oct 2017 08:03 )             38.7     12 Oct 2017 08:03    130    |  96     |  12     ----------------------------<  115    4.4     |  27     |  0.80     Ca    9.2        12 Oct 2017 08:03    TPro  6.9    /  Alb  3.7    /  TBili  0.2    /  DBili  x      /  AST  12     /  ALT  18     /  AlkPhos  78     12 Oct 2017 08:03    Thyroid Stimulating Hormone, Serum: 0.33 uIU/mL (10-11 @ 15:43)  Free Thyroxine, Serum: 1.3 ng/dL (10-11 @ 15:43)    Culture Results:   No growth to date. (10-11 @ 00:51)  Culture Results:   No growth to date. (10-11 @ 00:51)    RADIOLOGY TEST: (IMAGES REVIEWED BY ME)    Imaging Personally Reviewed:  [ ] YES        HEALTH ISSUES - PROBLEM Dx:  Need for prophylactic measure: Need for prophylactic measure  Anxiety: Anxiety  Lupus erythematosus, unspecified form: Lupus erythematosus, unspecified form  Chronic hepatitis C without hepatic coma: Chronic hepatitis C without hepatic coma  Gastroesophageal reflux disease without esophagitis: Gastroesophageal reflux disease without esophagitis  Cigarette nicotine dependence without complication: Cigarette nicotine dependence without complication  Hyponatremia: Hyponatremia  COPD with acute exacerbation: COPD with acute exacerbation
PULMONARY/CRITICAL CARE      INTERVAL HPI/OVERNIGHT EVENTS: Much improved. No sob, wheeze. No fever. Ambulated. Wants to go home.    52y FemaleHPI:  52 years old female with history of COPD, Tobacco abuse, Lupus, Chronic Hepatitis C, Colitis, GERD, hypotension, hypothyroidism, Anxiety and insomnia, who is current 2 pack per day smoker, who came in to Er from Dr. Silver's office due to worsening shortness of breath for last 2 weeks. Patient was treated as out patient for COPD exacerbation, but she continued to have shortness of breath and cough and was advised to come in to ER.   She denies any fevers.  She has a cough, which is not productive for any sputum.   She denies any nausea or vomiting.   Patient has history of similar  episodes in past.   no chest pain or pressure.   No orthopnea or PND. (10 Oct 2017 15:28)        PAST MEDICAL & SURGICAL HISTORY:  Nicotine addiction  COPD (chronic obstructive pulmonary disease)  Neuropathy  Migraine  Anxiety  Hepatitis C  Gallstones  Hypothyroidism  Hypotension  GERD (gastroesophageal reflux disease)  UTI (urinary tract infection)  Vertigo  Sjogren's disease  Lupus  Gall bladder disease: REMOVAL  Vocal cord polyp: REMOVAL        ICU Vital Signs Last 24 Hrs  T(C): 36.8 (12 Oct 2017 05:15), Max: 36.8 (11 Oct 2017 21:35)  T(F): 98.2 (12 Oct 2017 05:15), Max: 98.3 (11 Oct 2017 21:35)  HR: 73 (12 Oct 2017 05:43) (61 - 98)  BP: 101/60 (12 Oct 2017 05:15) (92/57 - 108/58)  BP(mean): --  ABP: --  ABP(mean): --  RR: 18 (12 Oct 2017 05:15) (16 - 18)  SpO2: 98% (12 Oct 2017 05:43) (97% - 100%)    Qtts:     I&O's Summary    11 Oct 2017 07:01  -  12 Oct 2017 07:00  --------------------------------------------------------  IN: 1570 mL / OUT: 0 mL / NET: 1570 mL            REVIEW OF SYSTEMS:    CONSTITUTIONAL: No fever, weight loss, or fatigue  EYES: No eye pain, visual disturbances, or discharge  ENMT:  No difficulty hearing, tinnitus, vertigo; No sinus or throat pain  NECK: No pain or stiffness  BREASTS: No pain, masses, or nipple discharge  RESPIRATORY: No cough, wheezing, chills or hemoptysis; No shortness of breath  CARDIOVASCULAR: No chest pain, palpitations, dizziness, or leg swelling  GASTROINTESTINAL: No abdominal or epigastric pain. No nausea, vomiting, or hematemesis; No diarrhea or constipation. No melena or hematochezia.  GENITOURINARY: No dysuria, frequency, hematuria, or incontinence  NEUROLOGICAL: No headaches, memory loss, loss of strength, numbness, or tremors  SKIN: No itching, burning, rashes, or lesions   LYMPH NODES: No enlarged glands  ENDOCRINE: No heat or cold intolerance; No hair loss  MUSCULOSKELETAL: No joint pain or swelling; No muscle, back, or extremity pain, no calf tenderness  PSYCHIATRIC: No depression, anxiety, mood swings, or difficulty sleeping  HEME/LYMPH: No easy bruising, or bleeding gums  ALLERGY AND IMMUNOLOGIC: No hives or eczema      PHYSICAL EXAM:    GENERAL: NAD, well-groomed, well-developed, NAD  HEAD:  Atraumatic, Normocephalic  EYES: EOMI, PERRLA, conjunctiva and sclera clear  ENMT: No tonsillar erythema, exudates, or enlargement; Moist mucous membranes, Good dentition, No lesions  NECK: Supple, No JVD, Normal thyroid  NERVOUS SYSTEM:  Alert & Oriented X3, Good concentration; Motor Strength 5/5 B/L upper and lower extremities  CHEST/LUNG: Clear to percussion bilaterally; No rales, rhonchi, wheezing, or rubs  Decreased bs.  HEART: Regular rate and rhythm; No murmurs, rubs, or gallops  ABDOMEN: Soft, Nontender, Nondistended; Bowel sounds present  EXTREMITIES:  2+ Peripheral Pulses, No clubbing, cyanosis, or edema  LYMPH: No lymphadenopathy noted  SKIN: No rashes or lesions        LABS:                        13.2   15.1  )-----------( 311      ( 11 Oct 2017 12:31 )             40.2     10-11    125<L>  |  93<L>  |  10  ----------------------------<  103<H>  4.4   |  25  |  0.83    Ca    8.7      11 Oct 2017 12:31    TPro  6.1  /  Alb  3.5  /  TBili  0.4  /  DBili  x   /  AST  18  /  ALT  19  /  AlkPhos  66  10-10            RADIOLOGY & ADDITIONAL STUDIES:  CT chest--emphysema    CRITICAL CARE TIME SPENT:
Patient is a 52y old  Female who presents with a chief complaint of Shortness of breath, cough (10 Oct 2017 15:28)      INTERVAL HPI/OVERNIGHT EVENTS:Pt feels ok, still with sob, renal and pulm noted    MEDICATIONS  (STANDING):  buDESOnide 160 MICROgram(s)/formoterol 4.5 MICROgram(s) Inhaler 2 Puff(s) Inhalation two times a day  enoxaparin Injectable 30 milliGRAM(s) SubCutaneous every 24 hours  famotidine    Tablet 20 milliGRAM(s) Oral two times a day  hydroxychloroquine 200 milliGRAM(s) Oral two times a day with meals  levothyroxine 25 MICROGram(s) Oral daily  methylPREDNISolone sodium succinate Injectable 40 milliGRAM(s) IV Push every 8 hours  nicotine - 21 mG/24Hr(s) Patch 1 patch Transdermal daily  OXcarbazepine 300 milliGRAM(s) Oral two times a day  pantoprazole    Tablet 40 milliGRAM(s) Oral before breakfast  pregabalin 75 milliGRAM(s) Oral three times a day  sodium chloride 2 Gram(s) Oral every 6 hours  sodium chloride 0.9%. 1000 milliLiter(s) (50 mL/Hr) IV Continuous <Continuous>  tiotropium 18 MICROgram(s) Capsule 1 Capsule(s) Inhalation daily    MEDICATIONS  (PRN):  acetaminophen   Tablet. 650 milliGRAM(s) Oral every 6 hours PRN Mild Pain (1 - 3)  ALBUTerol/ipratropium for Nebulization 3 milliLiter(s) Nebulizer every 4 hours PRN Shortness of Breath and/or Wheezing  diazepam    Tablet 5 milliGRAM(s) Oral three times a day PRN Anxiety  nicotine - Inhaler 1 Each Inhalation every 2 hours PRN Nicotine withdrawal  traZODone 50 milliGRAM(s) Oral at bedtime PRN Insomnia      Allergies    aspirin (Nausea)  Zithromax (Pruritus; Rash)    Intolerances        REVIEW OF SYSTEMS:  CONSTITUTIONAL: No fever, weight loss, or fatigue  EYES: No eye pain, visual disturbances  ENMT:  No difficulty hearing, tinnitus, vertigo; No sinus or throat pain  NECK: No pain or stiffness  RESPIRATORY: sob  CARDIOVASCULAR: No chest pain, palpitations, dizziness  GASTROINTESTINAL: No abdominal or epigastric pain. No nausea, vomiting, or hematemesis; No diarrhea or constipation. No melena or hematochezia.  GENITOURINARY: No dysuria, frequency, hematuria, or incontinence  NEUROLOGICAL: No headaches, memory loss, loss of strength, numbness, or tremors  SKIN: No itching, burning  LYMPH NODES: No enlarged glands  MUSCULOSKELETAL: No joint pain or swelling; No muscle, back, or extremity pain  PSYCHIATRIC: No depression, mood swings  HEME/LYMPH: No easy bruising, or bleeding gums  ALLERGY AND IMMUNOLOGIC: No hives    Vital Signs Last 24 Hrs  T(C): 36.7 (11 Oct 2017 10:19), Max: 36.8 (10 Oct 2017 12:30)  T(F): 98.1 (11 Oct 2017 10:19), Max: 98.3 (10 Oct 2017 12:30)  HR: 74 (11 Oct 2017 10:19) (56 - 74)  BP: 97/59 (11 Oct 2017 10:19) (93/58 - 119/74)  BP(mean): --  RR: 16 (11 Oct 2017 10:19) (16 - 18)  SpO2: 98% (11 Oct 2017 10:19) (96% - 100%)    PHYSICAL EXAM:  GENERAL: NAD  HEAD:  Atraumatic, Normocephalic  EYES: EOMI, PERRLA, conjunctiva and sclera clear  ENMT: No tonsillar erythema, exudates, or enlargement   NECK: Supple, No JVD  NERVOUS SYSTEM:  Alert & Oriented X3, Good concentration  CHEST/LUNG: decreased bs, no wheezing  HEART: Regular rate and rhythm  ABDOMEN: Soft, Nontender, Nondistended; Bowel sounds present  EXTREMITIES:  2+ Peripheral Pulses   LYMPH: No lymphadenopathy noted  SKIN: No rashes     LABS:                        12.3   12.3  )-----------( 252      ( 10 Oct 2017 14:25 )             36.8     10 Oct 2017 14:25    122    |  89     |  9      ----------------------------<  83     3.8     |  25     |  0.70     Ca    8.4        10 Oct 2017 14:25    TPro  6.1    /  Alb  3.5    /  TBili  0.4    /  DBili  x      /  AST  18     /  ALT  19     /  AlkPhos  66     10 Oct 2017 14:25        CAPILLARY BLOOD GLUCOSE                RADIOLOGY & ADDITIONAL TESTS:  < from: CT Chest No Cont (10.11.17 @ 08:38) >    EXAM:  CT CHEST                                  PROCEDURE DATE:  10/11/2017          INTERPRETATION:  Clinical information: Pneumonia, emphysema.    Axial images obtained, coronal and sagittal images computer reformatted.   Noncontrast study limits evaluation of hilar and mediastinal regions.    Comparison exam dated 9/23/2014.    No thoracic aortic aneurysm or pericardial effusion. No mediastinal or   hilar lesions, evaluation limited due to lack of IV contrast. Central   airway intact. Thyroid gland not enlarged.    Evidence of centrilobular emphysema, mild. No evidence of lobar   consolidation effusion or vascular congestion. Linear stranding at the   lung bases, minimal finding, chronic. Trace pleural-parenchymal stranding   lung apices, left greater than right.    No adrenal lesions. Postcholecystectomy clips present. Surgical clips   present right axilla.    IMPRESSION:    Mild centrilobular emphysema, see above report.                  DANUTA MEJÍA M.D.,ATTENDING RADIOLOGIST  This document has been electronically signed. Oct 11 2017 10:21AM          < end of copied text >        Consultant(s) Notes Reviewed:  [x ] YES  [ ] NO    Care Discussed with Consultants/Other Providers [ x] YES  [ ] NO    Advanced Care Planning Discussed with Patien/Family x ] YES  [ ] NO
PULMONARY/CRITICAL CARE      INTERVAL HPI/OVERNIGHT EVENTS:    52y FemaleHPI:  Pt. well known to me came to office for severe sob, cough, wheezing for one week. Had been on prednisone and Levaquin, with no improvement as outpt.  Improved today. No purulent sputum.  52 years old female with history of COPD, Tobacco abuse, Lupus, Chronic Hepatitis C, Colitis, GERD, hypotension, hypothyroidism, Anxiety and insomnia, who is current 2 pack per day smoker, who came in to Er from Dr. Silver's office due to worsening shortness of breath for last 2 weeks. Patient was treated as out patient for COPD exacerbation, but she continued to have shortness of breath and cough and was advised to come in to ER.   She denies any fevers.  She has a cough, which is not productive for any sputum.   She denies any nausea or vomiting.   Patient has history of similar  episodes in past.   no chest pain or pressure.   No orthopnea or PND. (10 Oct 2017 15:28)        PAST MEDICAL & SURGICAL HISTORY:  Nicotine addiction  COPD (chronic obstructive pulmonary disease)  Neuropathy  Migraine  Anxiety  Hepatitis C  Gallstones  Hypothyroidism  Hypotension  GERD (gastroesophageal reflux disease)  UTI (urinary tract infection)  Vertigo  Sjogren's disease  Lupus  Gall bladder disease: REMOVAL  Vocal cord polyp: REMOVAL    FAM AND SOC HX:  smokes 1-2ppd for more than 20 yrs. No etoh.    ICU Vital Signs Last 24 Hrs  T(C): 36.7 (11 Oct 2017 05:21), Max: 36.8 (10 Oct 2017 12:30)  T(F): 98 (11 Oct 2017 05:21), Max: 98.3 (10 Oct 2017 12:30)  HR: 59 (11 Oct 2017 05:21) (56 - 61)  BP: 107/67 (11 Oct 2017 05:21) (93/58 - 119/74)  BP(mean): --  ABP: --  ABP(mean): --  RR: 18 (11 Oct 2017 05:21) (18 - 18)  SpO2: 98% (11 Oct 2017 05:21) (96% - 100%)    Qtts:     I&O's Summary    10 Oct 2017 07:01  -  11 Oct 2017 07:00  --------------------------------------------------------  IN: 120 mL / OUT: 0 mL / NET: 120 mL            REVIEW OF SYSTEMS:    CONSTITUTIONAL: No fever, weight loss, or fatigue  EYES: No eye pain, visual disturbances, or discharge  ENMT:  No difficulty hearing, tinnitus, vertigo; No sinus or throat pain  NECK: No pain or stiffness  BREASTS: No pain, masses, or nipple discharge  RESPIRATORY: Had cough, wheezing, No chills or hemoptysis; sob shortness of breath  CARDIOVASCULAR: No chest pain, palpitations, dizziness, or leg swelling  GASTROINTESTINAL: No abdominal or epigastric pain. No nausea, vomiting, or hematemesis; No diarrhea or constipation. No melena or hematochezia.  GENITOURINARY: No dysuria, frequency, hematuria, or incontinence  NEUROLOGICAL: No headaches, memory loss, loss of strength, numbness, or tremors  SKIN: No itching, burning, rashes, or lesions   LYMPH NODES: No enlarged glands  ENDOCRINE: No heat or cold intolerance; No hair loss  MUSCULOSKELETAL: No joint pain or swelling; No muscle, back, or extremity pain, no calf tenderness  PSYCHIATRIC: No depression, anxiety, mood swings, or difficulty sleeping  HEME/LYMPH: No easy bruising, or bleeding gums  ALLERGY AND IMMUNOLOGIC: No hives or eczema      PHYSICAL EXAM:    GENERAL: NAD, well-groomed, well-developed, NAD  HEAD:  Atraumatic, Normocephalic  EYES: EOMI, PERRLA, conjunctiva and sclera clear  ENMT: No tonsillar erythema, exudates, or enlargement; Moist mucous membranes, Good dentition, No lesions  NECK: Supple, No JVD, Normal thyroid  NERVOUS SYSTEM:  Alert & Oriented X3, Good concentration; Motor Strength 5/5 B/L upper and lower extremities  CHEST/LUNG: Few wheezing bilat, good excursion, decreased bs. No rubs  HEART: Regular rate and rhythm; No murmurs, rubs, or gallops  ABDOMEN: Soft, Nontender, Nondistended; Bowel sounds present  EXTREMITIES:  2+ Peripheral Pulses, No clubbing, cyanosis, or edema  LYMPH: No lymphadenopathy noted  SKIN: No rashes or lesions        LABS:                        12.3   12.3  )-----------( 252      ( 10 Oct 2017 14:25 )             36.8     10-10    122<L>  |  89<L>  |  9   ----------------------------<  83  3.8   |  25  |  0.70    Ca    8.4      10 Oct 2017 14:25    TPro  6.1  /  Alb  3.5  /  TBili  0.4  /  DBili  x   /  AST  18  /  ALT  19  /  AlkPhos  66  10-10          vanco through     RADIOLOGY & ADDITIONAL STUDIES:  CXR-copd    CRITICAL CARE TIME SPENT:

## 2017-10-12 NOTE — DISCHARGE NOTE ADULT - CARE PLAN
Principal Discharge DX:	COPD with acute exacerbation  Goal:	Breath better.  Instructions for follow-up, activity and diet:	Regular diet.   Fluid restriction to 1500 ml/day  Quit Smoking.  Increase activity as tolerated.   Follow up with Dr. Silver next week.  Secondary Diagnosis:	Cigarette nicotine dependence without complication  Secondary Diagnosis:	Hyponatremia  Secondary Diagnosis:	Hypothyroidism, unspecified type  Secondary Diagnosis:	Lupus erythematosus, unspecified form  Secondary Diagnosis:	Gastroesophageal reflux disease without esophagitis  Secondary Diagnosis:	Chronic hepatitis C without hepatic coma

## 2017-10-12 NOTE — PROGRESS NOTE ADULT - PROBLEM SELECTOR PLAN 7
Continue Valium as needed.

## 2017-10-13 PROBLEM — Z00.00 ENCOUNTER FOR PREVENTIVE HEALTH EXAMINATION: Status: ACTIVE | Noted: 2017-10-13

## 2017-10-13 LAB
OSMOLALITY UR: 489 MOS/KG — SIGNIFICANT CHANGE UP (ref 50–1200)
SODIUM UR-SCNC: 122 MMOL/L — SIGNIFICANT CHANGE UP

## 2017-12-27 NOTE — ED PROVIDER NOTE - ALLERGIC/IMMUNOLOGIC NEGATIVE STATEMENT, MLM
CBC/INR/EKG/BMP/PT/PTT
no dermatitis, no environmental allergies, no food allergies, no immunosuppressive disorder, and no pruritus.

## 2018-01-15 ENCOUNTER — APPOINTMENT (OUTPATIENT)
Dept: GASTROENTEROLOGY | Facility: CLINIC | Age: 53
End: 2018-01-15
Payer: MEDICARE

## 2018-01-15 VITALS
SYSTOLIC BLOOD PRESSURE: 100 MMHG | DIASTOLIC BLOOD PRESSURE: 60 MMHG | HEART RATE: 77 BPM | WEIGHT: 63 LBS | OXYGEN SATURATION: 98 % | BODY MASS INDEX: 13.23 KG/M2 | HEIGHT: 58 IN | TEMPERATURE: 98.8 F

## 2018-01-15 DIAGNOSIS — K64.8 OTHER HEMORRHOIDS: ICD-10-CM

## 2018-01-15 DIAGNOSIS — K62.5 HEMORRHAGE OF ANUS AND RECTUM: ICD-10-CM

## 2018-01-15 DIAGNOSIS — R10.12 LEFT UPPER QUADRANT PAIN: ICD-10-CM

## 2018-01-15 DIAGNOSIS — K86.9 DISEASE OF PANCREAS, UNSPECIFIED: ICD-10-CM

## 2018-01-15 PROCEDURE — 99214 OFFICE O/P EST MOD 30 MIN: CPT

## 2018-01-16 ENCOUNTER — EMERGENCY (EMERGENCY)
Facility: HOSPITAL | Age: 53
LOS: 1 days | Discharge: HOME | End: 2018-01-16
Attending: EMERGENCY MEDICINE | Admitting: EMERGENCY MEDICINE
Payer: MEDICARE

## 2018-01-16 VITALS
TEMPERATURE: 98 F | WEIGHT: 84 LBS | HEIGHT: 58 IN | OXYGEN SATURATION: 99 % | RESPIRATION RATE: 18 BRPM | DIASTOLIC BLOOD PRESSURE: 66 MMHG | HEART RATE: 72 BPM | SYSTOLIC BLOOD PRESSURE: 116 MMHG

## 2018-01-16 VITALS
DIASTOLIC BLOOD PRESSURE: 65 MMHG | SYSTOLIC BLOOD PRESSURE: 110 MMHG | OXYGEN SATURATION: 100 % | RESPIRATION RATE: 16 BRPM | TEMPERATURE: 98 F | HEART RATE: 70 BPM

## 2018-01-16 DIAGNOSIS — K82.9 DISEASE OF GALLBLADDER, UNSPECIFIED: Chronic | ICD-10-CM

## 2018-01-16 DIAGNOSIS — J38.1 POLYP OF VOCAL CORD AND LARYNX: Chronic | ICD-10-CM

## 2018-01-16 PROCEDURE — 99284 EMERGENCY DEPT VISIT MOD MDM: CPT

## 2018-01-16 PROCEDURE — 70450 CT HEAD/BRAIN W/O DYE: CPT

## 2018-01-16 PROCEDURE — 96372 THER/PROPH/DIAG INJ SC/IM: CPT

## 2018-01-16 PROCEDURE — 99284 EMERGENCY DEPT VISIT MOD MDM: CPT | Mod: 25

## 2018-01-16 PROCEDURE — 70450 CT HEAD/BRAIN W/O DYE: CPT | Mod: 26

## 2018-01-16 RX ORDER — KETOROLAC TROMETHAMINE 30 MG/ML
60 SYRINGE (ML) INJECTION ONCE
Qty: 0 | Refills: 0 | Status: DISCONTINUED | OUTPATIENT
Start: 2018-01-16 | End: 2018-01-16

## 2018-01-16 RX ADMIN — Medication 60 MILLIGRAM(S): at 14:38

## 2018-01-16 RX ADMIN — Medication 60 MILLIGRAM(S): at 16:30

## 2018-01-16 NOTE — ED ADULT TRIAGE NOTE - CHIEF COMPLAINT QUOTE
"I have a severe headache and vomiting for a few days. I can only tolerate morphine with antinausea meds"

## 2018-01-16 NOTE — ED PROVIDER NOTE - MEDICAL DECISION MAKING DETAILS
53 yo female with multiple med problems pain mgmnt meds co headache and multiple somatic complaints for a few days. Has pain meds at home but claims she didn't take them. Requesting morphine.  [facility] review reveals monthly scripts for Valium and Lyrica. Also has Fioricet at home. Pt continues to smoke. Looks comfortable on exam. No positive findings on PE. Plan - CT brain, Toradol IM. Reassess.  No aspirin allergy reported by pt. Has stomach upset from PO aspirin.

## 2018-01-16 NOTE — ED PROVIDER NOTE - OBJECTIVE STATEMENT
53 y/o F pt with history of anxiety, COPD, hypotension, hypothyroidism, Srogren's disease, migraines, neuropathy presents to the ED c/o right-sided headache, right shoulder pain, dizziness and vomiting for the past few days. Pt took Advil this morning, with no relief. Pt is also c/o difficulty seeing out of right eye. Denies numbness/tingling, abdominal pain. No further complaints at this time.  PCP: Dr. Bev Forman: Dr. Silver 51 y/o F pt with history of anxiety, COPD, hypotension, hypothyroidism, Sjogren's disease, migraines, neuropathy presents to the ED c/o right-sided headache, right shoulder pain, dizziness and vomiting for the past few days. Pt took Advil this morning, with no relief. Pt is also c/o difficulty seeing out of right eye. Denies numbness/tingling, abdominal pain. No further complaints at this time.  PCP: Dr. Bev Forman: Dr. Silver

## 2018-01-20 PROBLEM — K64.8 INTERNAL HEMORRHOIDS: Status: ACTIVE | Noted: 2018-01-20

## 2018-02-02 ENCOUNTER — INPATIENT (INPATIENT)
Facility: HOSPITAL | Age: 53
LOS: 1 days | Discharge: HOME | DRG: 389 | End: 2018-02-04
Attending: HOSPITALIST | Admitting: INTERNAL MEDICINE
Payer: MEDICARE

## 2018-02-02 VITALS
SYSTOLIC BLOOD PRESSURE: 149 MMHG | OXYGEN SATURATION: 98 % | HEART RATE: 85 BPM | WEIGHT: 78.04 LBS | DIASTOLIC BLOOD PRESSURE: 70 MMHG | RESPIRATION RATE: 22 BRPM | HEIGHT: 58 IN

## 2018-02-02 DIAGNOSIS — K56.609 UNSPECIFIED INTESTINAL OBSTRUCTION, UNSPECIFIED AS TO PARTIAL VERSUS COMPLETE OBSTRUCTION: ICD-10-CM

## 2018-02-02 DIAGNOSIS — K82.9 DISEASE OF GALLBLADDER, UNSPECIFIED: Chronic | ICD-10-CM

## 2018-02-02 DIAGNOSIS — R42 DIZZINESS AND GIDDINESS: ICD-10-CM

## 2018-02-02 DIAGNOSIS — Z29.9 ENCOUNTER FOR PROPHYLACTIC MEASURES, UNSPECIFIED: ICD-10-CM

## 2018-02-02 DIAGNOSIS — E03.9 HYPOTHYROIDISM, UNSPECIFIED: ICD-10-CM

## 2018-02-02 DIAGNOSIS — S69.91XD UNSPECIFIED INJURY OF RIGHT WRIST, HAND AND FINGER(S), SUBSEQUENT ENCOUNTER: Chronic | ICD-10-CM

## 2018-02-02 DIAGNOSIS — J44.9 CHRONIC OBSTRUCTIVE PULMONARY DISEASE, UNSPECIFIED: ICD-10-CM

## 2018-02-02 DIAGNOSIS — L93.0 DISCOID LUPUS ERYTHEMATOSUS: ICD-10-CM

## 2018-02-02 DIAGNOSIS — J38.1 POLYP OF VOCAL CORD AND LARYNX: Chronic | ICD-10-CM

## 2018-02-02 DIAGNOSIS — I95.9 HYPOTENSION, UNSPECIFIED: ICD-10-CM

## 2018-02-02 DIAGNOSIS — K21.9 GASTRO-ESOPHAGEAL REFLUX DISEASE WITHOUT ESOPHAGITIS: ICD-10-CM

## 2018-02-02 DIAGNOSIS — F41.9 ANXIETY DISORDER, UNSPECIFIED: ICD-10-CM

## 2018-02-02 DIAGNOSIS — M35.00 SJOGREN SYNDROME, UNSPECIFIED: ICD-10-CM

## 2018-02-02 LAB
ALBUMIN SERPL ELPH-MCNC: 4.1 G/DL — SIGNIFICANT CHANGE UP (ref 3.3–5)
ALP SERPL-CCNC: 74 U/L — SIGNIFICANT CHANGE UP (ref 40–120)
ALT FLD-CCNC: 30 U/L — SIGNIFICANT CHANGE UP (ref 12–78)
ANION GAP SERPL CALC-SCNC: 9 MMOL/L — SIGNIFICANT CHANGE UP (ref 5–17)
APTT BLD: 29 SEC — SIGNIFICANT CHANGE UP (ref 27.5–37.4)
AST SERPL-CCNC: 23 U/L — SIGNIFICANT CHANGE UP (ref 15–37)
BASOPHILS # BLD AUTO: 0.1 K/UL — SIGNIFICANT CHANGE UP (ref 0–0.2)
BASOPHILS NFR BLD AUTO: 0.6 % — SIGNIFICANT CHANGE UP (ref 0–2)
BILIRUB SERPL-MCNC: 0.3 MG/DL — SIGNIFICANT CHANGE UP (ref 0.2–1.2)
BUN SERPL-MCNC: 12 MG/DL — SIGNIFICANT CHANGE UP (ref 7–23)
CALCIUM SERPL-MCNC: 8.7 MG/DL — SIGNIFICANT CHANGE UP (ref 8.5–10.1)
CHLORIDE SERPL-SCNC: 94 MMOL/L — LOW (ref 96–108)
CO2 SERPL-SCNC: 28 MMOL/L — SIGNIFICANT CHANGE UP (ref 22–31)
CREAT SERPL-MCNC: 0.95 MG/DL — SIGNIFICANT CHANGE UP (ref 0.5–1.3)
EOSINOPHIL # BLD AUTO: 0.1 K/UL — SIGNIFICANT CHANGE UP (ref 0–0.5)
EOSINOPHIL NFR BLD AUTO: 0.6 % — SIGNIFICANT CHANGE UP (ref 0–6)
GLUCOSE SERPL-MCNC: 117 MG/DL — HIGH (ref 70–99)
HCT VFR BLD CALC: 39.5 % — SIGNIFICANT CHANGE UP (ref 34.5–45)
HGB BLD-MCNC: 13.7 G/DL — SIGNIFICANT CHANGE UP (ref 11.5–15.5)
INR BLD: 1.01 RATIO — SIGNIFICANT CHANGE UP (ref 0.88–1.16)
LACTATE SERPL-SCNC: 1.3 MMOL/L — SIGNIFICANT CHANGE UP (ref 0.7–2)
LIDOCAIN IGE QN: 88 U/L — SIGNIFICANT CHANGE UP (ref 73–393)
LYMPHOCYTES # BLD AUTO: 15.7 % — SIGNIFICANT CHANGE UP (ref 13–44)
LYMPHOCYTES # BLD AUTO: 2.7 K/UL — SIGNIFICANT CHANGE UP (ref 1–3.3)
MCHC RBC-ENTMCNC: 33.9 PG — SIGNIFICANT CHANGE UP (ref 27–34)
MCHC RBC-ENTMCNC: 34.6 GM/DL — SIGNIFICANT CHANGE UP (ref 32–36)
MCV RBC AUTO: 97.8 FL — SIGNIFICANT CHANGE UP (ref 80–100)
MONOCYTES # BLD AUTO: 1 K/UL — HIGH (ref 0–0.9)
MONOCYTES NFR BLD AUTO: 5.8 % — SIGNIFICANT CHANGE UP (ref 1–9)
NEUTROPHILS # BLD AUTO: 13.5 K/UL — HIGH (ref 1.8–7.4)
NEUTROPHILS NFR BLD AUTO: 77.2 % — HIGH (ref 43–77)
PLATELET # BLD AUTO: 324 K/UL — SIGNIFICANT CHANGE UP (ref 150–400)
POTASSIUM SERPL-MCNC: 4.3 MMOL/L — SIGNIFICANT CHANGE UP (ref 3.5–5.3)
POTASSIUM SERPL-SCNC: 4.3 MMOL/L — SIGNIFICANT CHANGE UP (ref 3.5–5.3)
PROT SERPL-MCNC: 7 G/DL — SIGNIFICANT CHANGE UP (ref 6–8.3)
PROTHROM AB SERPL-ACNC: 11 SEC — SIGNIFICANT CHANGE UP (ref 9.8–12.7)
RBC # BLD: 4.03 M/UL — SIGNIFICANT CHANGE UP (ref 3.8–5.2)
RBC # FLD: 11.4 % — SIGNIFICANT CHANGE UP (ref 10.3–14.5)
SODIUM SERPL-SCNC: 131 MMOL/L — LOW (ref 135–145)
WBC # BLD: 17.4 K/UL — HIGH (ref 3.8–10.5)
WBC # FLD AUTO: 17.4 K/UL — HIGH (ref 3.8–10.5)

## 2018-02-02 PROCEDURE — 99223 1ST HOSP IP/OBS HIGH 75: CPT | Mod: AI,GC

## 2018-02-02 PROCEDURE — 99285 EMERGENCY DEPT VISIT HI MDM: CPT

## 2018-02-02 PROCEDURE — 93010 ELECTROCARDIOGRAM REPORT: CPT

## 2018-02-02 PROCEDURE — 71045 X-RAY EXAM CHEST 1 VIEW: CPT | Mod: 26

## 2018-02-02 RX ORDER — BUDESONIDE AND FORMOTEROL FUMARATE DIHYDRATE 160; 4.5 UG/1; UG/1
2 AEROSOL RESPIRATORY (INHALATION)
Qty: 0 | Refills: 0 | Status: DISCONTINUED | OUTPATIENT
Start: 2018-02-02 | End: 2018-02-04

## 2018-02-02 RX ORDER — ONDANSETRON 8 MG/1
4 TABLET, FILM COATED ORAL ONCE
Qty: 0 | Refills: 0 | Status: COMPLETED | OUTPATIENT
Start: 2018-02-02 | End: 2018-02-02

## 2018-02-02 RX ORDER — MORPHINE SULFATE 50 MG/1
4 CAPSULE, EXTENDED RELEASE ORAL ONCE
Qty: 0 | Refills: 0 | Status: DISCONTINUED | OUTPATIENT
Start: 2018-02-02 | End: 2018-02-02

## 2018-02-02 RX ORDER — RANITIDINE HYDROCHLORIDE 150 MG/1
1 TABLET, FILM COATED ORAL
Qty: 0 | Refills: 0 | COMMUNITY

## 2018-02-02 RX ORDER — OMEPRAZOLE 10 MG/1
1 CAPSULE, DELAYED RELEASE ORAL
Qty: 0 | Refills: 0 | COMMUNITY

## 2018-02-02 RX ORDER — OXCARBAZEPINE 300 MG/1
150 TABLET, FILM COATED ORAL
Qty: 0 | Refills: 0 | Status: DISCONTINUED | OUTPATIENT
Start: 2018-02-02 | End: 2018-02-04

## 2018-02-02 RX ORDER — SODIUM CHLORIDE 9 MG/ML
1000 INJECTION INTRAMUSCULAR; INTRAVENOUS; SUBCUTANEOUS
Qty: 0 | Refills: 0 | Status: DISCONTINUED | OUTPATIENT
Start: 2018-02-02 | End: 2018-02-04

## 2018-02-02 RX ORDER — MECLIZINE HCL 12.5 MG
12.5 TABLET ORAL THREE TIMES A DAY
Qty: 0 | Refills: 0 | Status: DISCONTINUED | OUTPATIENT
Start: 2018-02-02 | End: 2018-02-04

## 2018-02-02 RX ORDER — PANTOPRAZOLE SODIUM 20 MG/1
40 TABLET, DELAYED RELEASE ORAL
Qty: 0 | Refills: 0 | Status: DISCONTINUED | OUTPATIENT
Start: 2018-02-02 | End: 2018-02-03

## 2018-02-02 RX ORDER — OXCARBAZEPINE 300 MG/1
1 TABLET, FILM COATED ORAL
Qty: 0 | Refills: 0 | COMMUNITY

## 2018-02-02 RX ORDER — TRAZODONE HCL 50 MG
1 TABLET ORAL
Qty: 0 | Refills: 0 | COMMUNITY

## 2018-02-02 RX ORDER — LEVOTHYROXINE SODIUM 125 MCG
25 TABLET ORAL DAILY
Qty: 0 | Refills: 0 | Status: DISCONTINUED | OUTPATIENT
Start: 2018-02-02 | End: 2018-02-04

## 2018-02-02 RX ORDER — ALBUTEROL 90 UG/1
2 AEROSOL, METERED ORAL EVERY 6 HOURS
Qty: 0 | Refills: 0 | Status: DISCONTINUED | OUTPATIENT
Start: 2018-02-02 | End: 2018-02-04

## 2018-02-02 RX ORDER — SUCRALFATE 1 G
1 TABLET ORAL
Qty: 0 | Refills: 0 | COMMUNITY

## 2018-02-02 RX ORDER — HYDROXYCHLOROQUINE SULFATE 200 MG
200 TABLET ORAL EVERY 12 HOURS
Qty: 0 | Refills: 0 | Status: DISCONTINUED | OUTPATIENT
Start: 2018-02-02 | End: 2018-02-04

## 2018-02-02 RX ORDER — NICOTINE POLACRILEX 2 MG
1 GUM BUCCAL DAILY
Qty: 0 | Refills: 0 | Status: DISCONTINUED | OUTPATIENT
Start: 2018-02-02 | End: 2018-02-04

## 2018-02-02 RX ORDER — MORPHINE SULFATE 50 MG/1
2 CAPSULE, EXTENDED RELEASE ORAL ONCE
Qty: 0 | Refills: 0 | Status: DISCONTINUED | OUTPATIENT
Start: 2018-02-02 | End: 2018-02-02

## 2018-02-02 RX ORDER — DIAZEPAM 5 MG
1 TABLET ORAL
Qty: 0 | Refills: 0 | COMMUNITY

## 2018-02-02 RX ORDER — FLUDROCORTISONE ACETATE 0.1 MG/1
0.1 TABLET ORAL DAILY
Qty: 0 | Refills: 0 | Status: DISCONTINUED | OUTPATIENT
Start: 2018-02-02 | End: 2018-02-04

## 2018-02-02 RX ORDER — SODIUM CHLORIDE 9 MG/ML
3 INJECTION INTRAMUSCULAR; INTRAVENOUS; SUBCUTANEOUS ONCE
Qty: 0 | Refills: 0 | Status: COMPLETED | OUTPATIENT
Start: 2018-02-02 | End: 2018-02-02

## 2018-02-02 RX ORDER — ONDANSETRON 8 MG/1
8 TABLET, FILM COATED ORAL THREE TIMES A DAY
Qty: 0 | Refills: 0 | Status: DISCONTINUED | OUTPATIENT
Start: 2018-02-02 | End: 2018-02-03

## 2018-02-02 RX ORDER — SODIUM CHLORIDE 9 MG/ML
1000 INJECTION INTRAMUSCULAR; INTRAVENOUS; SUBCUTANEOUS
Qty: 0 | Refills: 0 | Status: COMPLETED | OUTPATIENT
Start: 2018-02-02 | End: 2018-02-02

## 2018-02-02 RX ADMIN — MORPHINE SULFATE 4 MILLIGRAM(S): 50 CAPSULE, EXTENDED RELEASE ORAL at 22:00

## 2018-02-02 RX ADMIN — SODIUM CHLORIDE 1000 MILLILITER(S): 9 INJECTION INTRAMUSCULAR; INTRAVENOUS; SUBCUTANEOUS at 19:51

## 2018-02-02 RX ADMIN — Medication 1 PATCH: at 22:02

## 2018-02-02 RX ADMIN — ONDANSETRON 4 MILLIGRAM(S): 8 TABLET, FILM COATED ORAL at 19:51

## 2018-02-02 RX ADMIN — MORPHINE SULFATE 4 MILLIGRAM(S): 50 CAPSULE, EXTENDED RELEASE ORAL at 20:22

## 2018-02-02 RX ADMIN — SODIUM CHLORIDE 3 MILLILITER(S): 9 INJECTION INTRAMUSCULAR; INTRAVENOUS; SUBCUTANEOUS at 19:51

## 2018-02-02 RX ADMIN — SODIUM CHLORIDE 1000 MILLILITER(S): 9 INJECTION INTRAMUSCULAR; INTRAVENOUS; SUBCUTANEOUS at 20:21

## 2018-02-02 RX ADMIN — ONDANSETRON 4 MILLIGRAM(S): 8 TABLET, FILM COATED ORAL at 20:22

## 2018-02-02 RX ADMIN — MORPHINE SULFATE 4 MILLIGRAM(S): 50 CAPSULE, EXTENDED RELEASE ORAL at 21:06

## 2018-02-02 RX ADMIN — MORPHINE SULFATE 4 MILLIGRAM(S): 50 CAPSULE, EXTENDED RELEASE ORAL at 19:51

## 2018-02-02 NOTE — H&P ADULT - PROBLEM SELECTOR PLAN 10
-Lovenox for DVT ppx -DVT PPX SCD's  IMPROVE VTE Individual Risk Assessment          RISK                                                          Points  [  ] Previous VTE                                                3  [  ] Thrombophilia                                             2  [  ] Lower limb paralysis                                   2        (unable to hold up >15 seconds)    [  ] Current Cancer                                             2         (within 6 months)  [  ] Immobilization > 24 hrs                              1  [  ] ICU/CCU stay > 24 hours                             1  [  ] Age > 60                                                         1    IMPROVE VTE Score: 0

## 2018-02-02 NOTE — H&P ADULT - PMH
Anxiety    COPD (chronic obstructive pulmonary disease)    Gallstones    GERD (gastroesophageal reflux disease)    Hepatitis C    Hypotension    Hypothyroidism    Lupus    Migraine    Neuropathy    Nicotine addiction    Sjogren's disease    UTI (urinary tract infection)    Vertigo Anxiety    COPD (chronic obstructive pulmonary disease)    Gallstones    GERD (gastroesophageal reflux disease)    Glossopharyngeal neuralgia syndrome    Hepatitis C    Hypotension    Hypothyroidism    Lupus    Migraine    Neuropathy    Nicotine addiction    Sjogren's disease    UTI (urinary tract infection)    Vertigo

## 2018-02-02 NOTE — H&P ADULT - NSHPREVIEWOFSYSTEMS_GEN_ALL_CORE
Constitutional: denies fever, chills, diaphoresis   HEENT: denies blurry vision, double vision, eye pain, difficulty hearing  Respiratory: denies SOB, VILLALOBOS, cough, sputum production, wheezing, hemoptysis  Cardiovascular: denies CP, palpitations, edema  Gastrointestinal: +nausea and abdominal pain, denies vomiting, diarrhea, constipation, abdominal pain, melena, hematochezia   Genitourinary: denies dysuria, frequency, urgency, hematuria   Skin/Breast: denies rash, itching  Musculoskeletal: denies myalgias, joint swelling, muscle weakness  Neurologic: denies headache, weakness, dizziness, paresthesias, numbness/tingling  Psychiatric: denies feeling anxious, depressed, suicidal, homicidal thoughts  Hematology/Oncology: denies bruising, tender or enlarged lymph nodes   ROS negative except as noted above Constitutional: denies fever, chills, diaphoresis   HEENT: denies blurry vision, double vision, eye pain, difficulty hearing  Respiratory: +SOB   Cardiovascular: denies CP, palpitations, edema  Gastrointestinal: +nausea and abdominal pain, + vomiting, +melena/hematochezia, +RUQ abd pain  Genitourinary: denies dysuria, frequency, urgency, hematuria   Skin/Breast: denies rash, itching  Musculoskeletal: denies myalgias, joint swelling, muscle weakness  Neurologic: +weakness, neuropathic pain fingers and toes, also sharp pain in neck-glossopharyngeal neuralgia

## 2018-02-02 NOTE — H&P ADULT - ATTENDING COMMENTS
Seen and examined by attending MD, agree with above and edited to reflect my findings. Case discussed with patient who is alert and oriented , and voiced understanding and agreement to aforementioned plan.

## 2018-02-02 NOTE — H&P ADULT - HISTORY OF PRESENT ILLNESS
51 y/o f pmhx anxiety, COPD, gallstones, GERD, hep C, hypotension, hypothyroidism, lupus, migrane, neuropathy, nictoine addiction, Sjogren's disease, vertigo presented to the ED sent in by GI for SBO. Pt stated she has had intermittent abdominal pain x3 months. She went to see her GI who did outpatient CT which showed SBO and sent her in. ***    In the ED: HR 85, /70, RR 22, O2 98% on RA  WBC 17.4, sodium 131 51 y/o f pmhx anxiety, COPD, gallstones, GERD, hep C, hypotension, hypothyroidism, lupus, migrane, neuropathy, nictoine addiction, Sjogren's disease, vertigo presented to the ED sent in by GI for SBO. Pt stated she has had intermittent abdominal pain x 7 month accompanied with rectal bleeding, nausea, and at times bilious and bloody vomiting. Patient was told she had ischemic colitis 2/2 vascular compromise due to her Lupus. As per patient pain 10/10 sharp, located RUQ but can radiate to whole abdomen, she takes as many pills and bottles of imodium as she "can get her hands on" -more than prescribed, and Gas-ex tabs with minimal improvement. Patient had episode of bright red blood w/ dark stool last week. Saw GI Dr. Murphy who recommended CT scan- performed today revealed SBO and told to come in.     In the ED: HR 85, /70, RR 22, O2 98% on RA  WBC 17.4, sodium 131    In ED given Zofran, morphine for pain, nicotine patch, 2x 1L bolus NS

## 2018-02-02 NOTE — ED ADULT NURSE NOTE - OBJECTIVE STATEMENT
received pt in bed #10a Pt A&Oc/o reectal bleeding on/off, colitis & abdominal pain x 7 months states had a CT scan today & was told she has a bowel obstruction

## 2018-02-02 NOTE — H&P ADULT - PSH
Gall bladder disease  REMOVAL  Vocal cord polyp  REMOVAL Gall bladder disease  REMOVAL  Injury of right wrist, subsequent encounter    Vocal cord polyp  REMOVAL

## 2018-02-02 NOTE — ED ADULT NURSE NOTE - CHIEF COMPLAINT QUOTE
patient sent for small bowel obstruction had imaging at [facility] today

## 2018-02-02 NOTE — H&P ADULT - ASSESSMENT
53 y/o f pmhx anxiety, COPD, gallstones, GERD, hep C, hypotension, hypothyroidism, lupus, migraine neuropathy, nictoine addiction, Sjogren's disease, vertigo sent in by GI for SBO a/w SBO, hyponatremia and elevated WBC

## 2018-02-02 NOTE — H&P ADULT - NSHPPHYSICALEXAM_GEN_ALL_CORE
Physical Exam:  General: Well developed, well nourished, NAD  HEENT: NCAT, PERRLA, EOMI bl, moist mucous membranes   Neck: Supple, nontender, no mass  Neurology: A&Ox3, nonfocal, CN II-XII grossly intact, sensation intact, no gait abnormalities   Respiratory: CTA B/L, No W/R/R  CV: RRR, +S1/S2, no murmurs, rubs or gallops  Abdominal: Soft, NT, ND +BSx4  Extremities: No C/C/E, + peripheral pulses  MSK: Normal ROM, no joint erythema or warmth, no joint swelling   Skin: warm, dry, normal color, no rash or abnormal lesions Physical Exam:  General:  female in NAD  HEENT: NCAT, PERRLA, EOMI bl, dry mucous membranes, NG tube in place  Neck: Supple, tender, no mass  Neurology: A&Ox3, nonfocal, CN II-XII grossly intact  Respiratory: CTA B/L, No W/R/R  CV: RRR, +S1/S2, no murmurs, rubs or gallops  Abdominal: Mild guarding, RUQ tender to palpation, Hyperactive bowel sounds upper quadrants, diminished sounds in lower quadrants  Extremities: +Right Wrist spint, No C/C/E, + peripheral pulses  MSK: no joint erythema or warmth, no joint swelling   Skin: warm, dry, normal color, no rash or abnormal lesions

## 2018-02-02 NOTE — H&P ADULT - NSHPSOCIALHISTORY_GEN_ALL_CORE
Current daily smoker ***pack years  ETOH use:   Lives with:  Flu shot: Current daily smoker 84 years  ETOH use: Denies  Lives with: Parents at home  Flu shot: this season  Colonoscopy 2017- unknown results

## 2018-02-02 NOTE — ED PROVIDER NOTE - CHPI ED SYMPTOMS NEG
no dysuria/no fever/no burning urination/no hematuria/no chills/no diarrhea/no blood in stool/no abdominal distension

## 2018-02-02 NOTE — ED PROVIDER NOTE - OBJECTIVE STATEMENT
53 yo F p/w chronic abd pain, on / off nv x past few months. Pt seen by GI and was sent for outpt CT. Pt found to have SBO and was sent for admission. No cp/sob/palp. NO fever/chills. no weak / dizzy / malaise. NO agg/allev factors. No other inj or co. NO recent travel / sick contacts. No other inj or co.

## 2018-02-03 DIAGNOSIS — D72.829 ELEVATED WHITE BLOOD CELL COUNT, UNSPECIFIED: ICD-10-CM

## 2018-02-03 DIAGNOSIS — R93.3 ABNORMAL FINDINGS ON DIAGNOSTIC IMAGING OF OTHER PARTS OF DIGESTIVE TRACT: ICD-10-CM

## 2018-02-03 DIAGNOSIS — E87.1 HYPO-OSMOLALITY AND HYPONATREMIA: ICD-10-CM

## 2018-02-03 LAB
ANION GAP SERPL CALC-SCNC: 6 MMOL/L — SIGNIFICANT CHANGE UP (ref 5–17)
BUN SERPL-MCNC: 12 MG/DL — SIGNIFICANT CHANGE UP (ref 7–23)
CALCIUM SERPL-MCNC: 7.6 MG/DL — LOW (ref 8.5–10.1)
CHLORIDE SERPL-SCNC: 105 MMOL/L — SIGNIFICANT CHANGE UP (ref 96–108)
CO2 SERPL-SCNC: 28 MMOL/L — SIGNIFICANT CHANGE UP (ref 22–31)
CREAT SERPL-MCNC: 0.67 MG/DL — SIGNIFICANT CHANGE UP (ref 0.5–1.3)
GLUCOSE SERPL-MCNC: 86 MG/DL — SIGNIFICANT CHANGE UP (ref 70–99)
HCT VFR BLD CALC: 35.4 % — SIGNIFICANT CHANGE UP (ref 34.5–45)
HGB BLD-MCNC: 11.9 G/DL — SIGNIFICANT CHANGE UP (ref 11.5–15.5)
MCHC RBC-ENTMCNC: 33.5 GM/DL — SIGNIFICANT CHANGE UP (ref 32–36)
MCHC RBC-ENTMCNC: 33.7 PG — SIGNIFICANT CHANGE UP (ref 27–34)
MCV RBC AUTO: 100.6 FL — HIGH (ref 80–100)
PLATELET # BLD AUTO: 264 K/UL — SIGNIFICANT CHANGE UP (ref 150–400)
POTASSIUM SERPL-MCNC: 3.9 MMOL/L — SIGNIFICANT CHANGE UP (ref 3.5–5.3)
POTASSIUM SERPL-SCNC: 3.9 MMOL/L — SIGNIFICANT CHANGE UP (ref 3.5–5.3)
RBC # BLD: 3.52 M/UL — LOW (ref 3.8–5.2)
RBC # FLD: 11.7 % — SIGNIFICANT CHANGE UP (ref 10.3–14.5)
SODIUM SERPL-SCNC: 139 MMOL/L — SIGNIFICANT CHANGE UP (ref 135–145)
WBC # BLD: 12.2 K/UL — HIGH (ref 3.8–10.5)
WBC # FLD AUTO: 12.2 K/UL — HIGH (ref 3.8–10.5)

## 2018-02-03 PROCEDURE — 99233 SBSQ HOSP IP/OBS HIGH 50: CPT

## 2018-02-03 PROCEDURE — 74019 RADEX ABDOMEN 2 VIEWS: CPT | Mod: 26

## 2018-02-03 RX ORDER — PANTOPRAZOLE SODIUM 20 MG/1
40 TABLET, DELAYED RELEASE ORAL
Qty: 0 | Refills: 0 | Status: DISCONTINUED | OUTPATIENT
Start: 2018-02-03 | End: 2018-02-04

## 2018-02-03 RX ORDER — HYDROMORPHONE HYDROCHLORIDE 2 MG/ML
1 INJECTION INTRAMUSCULAR; INTRAVENOUS; SUBCUTANEOUS EVERY 4 HOURS
Qty: 0 | Refills: 0 | Status: DISCONTINUED | OUTPATIENT
Start: 2018-02-03 | End: 2018-02-03

## 2018-02-03 RX ORDER — MORPHINE SULFATE 50 MG/1
4 CAPSULE, EXTENDED RELEASE ORAL EVERY 6 HOURS
Qty: 0 | Refills: 0 | Status: DISCONTINUED | OUTPATIENT
Start: 2018-02-03 | End: 2018-02-04

## 2018-02-03 RX ORDER — METOCLOPRAMIDE HCL 10 MG
10 TABLET ORAL ONCE
Qty: 0 | Refills: 0 | Status: COMPLETED | OUTPATIENT
Start: 2018-02-03 | End: 2018-02-03

## 2018-02-03 RX ORDER — BENZOCAINE 10 %
1 GEL (GRAM) MUCOUS MEMBRANE THREE TIMES A DAY
Qty: 0 | Refills: 0 | Status: DISCONTINUED | OUTPATIENT
Start: 2018-02-03 | End: 2018-02-04

## 2018-02-03 RX ORDER — METOCLOPRAMIDE HCL 10 MG
10 TABLET ORAL EVERY 8 HOURS
Qty: 0 | Refills: 0 | Status: DISCONTINUED | OUTPATIENT
Start: 2018-02-03 | End: 2018-02-04

## 2018-02-03 RX ORDER — ACETAMINOPHEN 500 MG
650 TABLET ORAL EVERY 6 HOURS
Qty: 0 | Refills: 0 | Status: DISCONTINUED | OUTPATIENT
Start: 2018-02-03 | End: 2018-02-04

## 2018-02-03 RX ORDER — MORPHINE SULFATE 50 MG/1
2 CAPSULE, EXTENDED RELEASE ORAL EVERY 4 HOURS
Qty: 0 | Refills: 0 | Status: DISCONTINUED | OUTPATIENT
Start: 2018-02-03 | End: 2018-02-04

## 2018-02-03 RX ORDER — ONDANSETRON 8 MG/1
4 TABLET, FILM COATED ORAL
Qty: 0 | Refills: 0 | Status: DISCONTINUED | OUTPATIENT
Start: 2018-02-03 | End: 2018-02-03

## 2018-02-03 RX ORDER — HYDROMORPHONE HYDROCHLORIDE 2 MG/ML
1 INJECTION INTRAMUSCULAR; INTRAVENOUS; SUBCUTANEOUS ONCE
Qty: 0 | Refills: 0 | Status: DISCONTINUED | OUTPATIENT
Start: 2018-02-03 | End: 2018-02-03

## 2018-02-03 RX ORDER — MORPHINE SULFATE 50 MG/1
2 CAPSULE, EXTENDED RELEASE ORAL EVERY 4 HOURS
Qty: 0 | Refills: 0 | Status: DISCONTINUED | OUTPATIENT
Start: 2018-02-03 | End: 2018-02-03

## 2018-02-03 RX ORDER — ONDANSETRON 8 MG/1
4 TABLET, FILM COATED ORAL
Qty: 0 | Refills: 0 | Status: DISCONTINUED | OUTPATIENT
Start: 2018-02-03 | End: 2018-02-04

## 2018-02-03 RX ADMIN — Medication 10 MILLIGRAM(S): at 02:54

## 2018-02-03 RX ADMIN — BUDESONIDE AND FORMOTEROL FUMARATE DIHYDRATE 2 PUFF(S): 160; 4.5 AEROSOL RESPIRATORY (INHALATION) at 19:04

## 2018-02-03 RX ADMIN — Medication 200 MILLIGRAM(S): at 18:41

## 2018-02-03 RX ADMIN — MORPHINE SULFATE 4 MILLIGRAM(S): 50 CAPSULE, EXTENDED RELEASE ORAL at 10:47

## 2018-02-03 RX ADMIN — BUDESONIDE AND FORMOTEROL FUMARATE DIHYDRATE 2 PUFF(S): 160; 4.5 AEROSOL RESPIRATORY (INHALATION) at 07:57

## 2018-02-03 RX ADMIN — MORPHINE SULFATE 4 MILLIGRAM(S): 50 CAPSULE, EXTENDED RELEASE ORAL at 04:07

## 2018-02-03 RX ADMIN — MORPHINE SULFATE 4 MILLIGRAM(S): 50 CAPSULE, EXTENDED RELEASE ORAL at 21:24

## 2018-02-03 RX ADMIN — ONDANSETRON 4 MILLIGRAM(S): 8 TABLET, FILM COATED ORAL at 21:16

## 2018-02-03 RX ADMIN — Medication 20 MILLIGRAM(S): at 06:27

## 2018-02-03 RX ADMIN — ONDANSETRON 4 MILLIGRAM(S): 8 TABLET, FILM COATED ORAL at 00:20

## 2018-02-03 RX ADMIN — MORPHINE SULFATE 2 MILLIGRAM(S): 50 CAPSULE, EXTENDED RELEASE ORAL at 00:20

## 2018-02-03 RX ADMIN — SODIUM CHLORIDE 100 MILLILITER(S): 9 INJECTION INTRAMUSCULAR; INTRAVENOUS; SUBCUTANEOUS at 00:24

## 2018-02-03 RX ADMIN — SODIUM CHLORIDE 100 MILLILITER(S): 9 INJECTION INTRAMUSCULAR; INTRAVENOUS; SUBCUTANEOUS at 11:30

## 2018-02-03 RX ADMIN — Medication 1 PATCH: at 12:03

## 2018-02-03 RX ADMIN — Medication 200 MILLIGRAM(S): at 06:28

## 2018-02-03 RX ADMIN — OXCARBAZEPINE 150 MILLIGRAM(S): 300 TABLET, FILM COATED ORAL at 18:41

## 2018-02-03 RX ADMIN — OXCARBAZEPINE 150 MILLIGRAM(S): 300 TABLET, FILM COATED ORAL at 06:28

## 2018-02-03 RX ADMIN — PANTOPRAZOLE SODIUM 40 MILLIGRAM(S): 20 TABLET, DELAYED RELEASE ORAL at 18:41

## 2018-02-03 RX ADMIN — Medication 10 MILLIGRAM(S): at 14:14

## 2018-02-03 RX ADMIN — MORPHINE SULFATE 4 MILLIGRAM(S): 50 CAPSULE, EXTENDED RELEASE ORAL at 11:17

## 2018-02-03 RX ADMIN — PANTOPRAZOLE SODIUM 40 MILLIGRAM(S): 20 TABLET, DELAYED RELEASE ORAL at 06:27

## 2018-02-03 RX ADMIN — ALBUTEROL 2 PUFF(S): 90 AEROSOL, METERED ORAL at 07:56

## 2018-02-03 RX ADMIN — Medication 25 MICROGRAM(S): at 06:28

## 2018-02-03 RX ADMIN — MORPHINE SULFATE 4 MILLIGRAM(S): 50 CAPSULE, EXTENDED RELEASE ORAL at 03:37

## 2018-02-03 RX ADMIN — FLUDROCORTISONE ACETATE 0.1 MILLIGRAM(S): 0.1 TABLET ORAL at 06:28

## 2018-02-03 RX ADMIN — ONDANSETRON 4 MILLIGRAM(S): 8 TABLET, FILM COATED ORAL at 10:37

## 2018-02-03 NOTE — DIETITIAN INITIAL EVALUATION ADULT. - PROBLEM SELECTOR PLAN 1
-Admit to [facility]  -NPO  -NGT  -IVF while NPO  -GI consulted Dr. Mahmood, appreciate recs  Surgery Dr Pulido consulted  -Obtain CD of CT abdomen (report in chart)  F/u AM labs

## 2018-02-03 NOTE — DIETITIAN INITIAL EVALUATION ADULT. - PROBLEM SELECTOR PLAN 10
-DVT PPX SCD's  IMPROVE VTE Individual Risk Assessment          RISK                                                          Points  [  ] Previous VTE                                                3  [  ] Thrombophilia                                             2  [  ] Lower limb paralysis                                   2        (unable to hold up >15 seconds)    [  ] Current Cancer                                             2         (within 6 months)  [  ] Immobilization > 24 hrs                              1  [  ] ICU/CCU stay > 24 hours                             1  [  ] Age > 60                                                         1    IMPROVE VTE Score: 0

## 2018-02-03 NOTE — PATIENT PROFILE ADULT. - PSH
Gall bladder disease  REMOVAL  Injury of right wrist, subsequent encounter    Vocal cord polyp  REMOVAL

## 2018-02-03 NOTE — CONSULT NOTE ADULT - SUBJECTIVE AND OBJECTIVE BOX
pt is a 53 yo female with 7 months history of abdominal pain. PT states pain is in middle abdomen.  Sharp in nature.  No relieving or aggravating factors.  PT states for 7 months along with abdominal pain she has been having vomiting along with diarrhea.  PT diarrhea had episodes of blood in it.  Ranging  from small flecks to massive clots.  The diarrhea worsens to point where pt needs to drink a whole bottle of immodium to resolve.  PT states she had multiple colonoscopy and workup with given diagnosis of ulcerative colitis and ischemic colitis.  Currently pt last BM was yesterday, loose.  Last flatus yesterday evening.  Seen by GI and had outpt CT that showed SBO. Directed to come to ER    REVIEW OF SYSTEMS:    CONSTITUTIONAL+ weakness, fatigue, +wt lost  EYES: No visual changes; No double vision,  No vertigo, eye pain  Ears: no otalgia, no otorhea, no hearing loss, tinnitus  Nose: no epistaxis, rhinorrhea, sinus pressure  Throat: no throat pain, no oral lesions  NECK: No pain or stiffness  RESPIRATORY: No cough (productive or dry), wheezing, hemoptysis; No shortness of breath  CARDIOVASCULAR: No chest pain or palpitations,    GASTROINTESTINAL: as above  GENITOURINARY: No dysuria, frequency, urgency or hematuria,    NEUROLOGICAL: + headaches, weakness  SKIN: No pruritis, rashes, lesions or new moles    Medical history  Glossopharyngeal neuralgia syndrome  Nicotine addiction  COPD (chronic obstructive pulmonary disease)  Neuropathy  Migraine  Anxiety  Hepatitis C  Gallstones  Hypothyroidism  Hypotension  GERD (gastroesophageal reflux disease)  UTI (urinary tract infection)  Vertigo  Sjogren's disease  Lupus  Injury of right wrist, subsequent encounter      Surgical History  cholecystectomy  Vocal cord polyp: REMOVAL      Home Medications:  Advair Diskus 250 mcg-50 mcg inhalation powder: 1 puff(s) inhaled 2 times a day (02 Feb 2018 23:21)  dilTIAZem 30 mg oral tablet: 1 tab(s) orally every 8 hours (02 Feb 2018 23:21)  Fioricet oral capsule: 1 cap(s) orally 2 times a day, As Needed (02 Feb 2018 23:21)  Florinef Acetate 0.1 mg oral tablet: 1 tab(s) orally once a day (02 Feb 2018 23:21)  hydroxychloroquine 200 mg oral tablet: 1 tab(s) orally 2 times a day (02 Feb 2018 23:21)  Imodium 2 mg oral capsule: 3 cap(s) orally once a day (02 Feb 2018 23:21)  levothyroxine 25 mcg (0.025 mg) oral capsule: 1 cap(s) orally once a day (02 Feb 2018 23:21)  Lyrica 75 mg oral capsule:  orally 3 times a day, As Needed (02 Feb 2018 23:21)  meclizine 12.5 mg oral tablet: 1 tab(s) orally 3 times a day, As Needed (02 Feb 2018 23:21)  omeprazole 40 mg oral delayed release capsule: 1 cap(s) orally 2 times a day (02 Feb 2018 23:21)  ondansetron 8 mg oral tablet: 1 tab(s) orally 3 times a day (02 Feb 2018 23:21)  OXcarbazepine 150 mg oral tablet: 1 tab(s) orally 2 times a day (02 Feb 2018 23:21)  predniSONE 20 mg oral tablet: 1 tab(s) orally once a day (02 Feb 2018 23:21)  ProAir HFA 90 mcg/inh inhalation aerosol: 2 puff(s) inhaled 4 times a day, As Needed (02 Feb 2018 23:21)  traZODone 50 mg oral tablet: 1 tab(s) orally 2 times a day, As Needed (02 Feb 2018 23:21)  Valium 5 mg oral tablet: 1 tab(s) orally 2 times a day, As Needed (02 Feb 2018 23:21)    Allergies    Zithromax (Stomach Upset)    Intolerances    aspirin (Stomach Upset)    SH- 2 ppd cigarettes, -etoh, -drugs      .  VITAL SIGNS:  T(C): 36.8 (02-03-18 @ 07:20), Max: 36.8 (02-03-18 @ 07:20)  T(F): 98.2 (02-03-18 @ 07:20), Max: 98.2 (02-03-18 @ 07:20)  HR: 77 (02-03-18 @ 07:20) (67 - 87)  BP: 101/67 (02-03-18 @ 07:20) (92/60 - 149/70)  BP(mean): --  RR: 17 (02-03-18 @ 07:20) (17 - 189)  SpO2: 89% (02-03-18 @ 07:20) (89% - 99%)  Wt(kg): --    PHYSICAL EXAM:    Constitutional:  mild distress, very anxious  Head: NC/AT  Eyes: PERRL b/l  ENT: MMM  Neck: supple; no JVD or thyromegaly  Respiratory: CTA B/L   Cardiac: +S1/S2; RRR   Gastrointestinal: soft,  ND, tender epigastric area  -rebound, -guarding  Musculoskeletal: NROM x4;   Dermatologic: skin warm, dry and intact; no rashes, wounds, or scars  Lymphatic: no submandibular, cervical or  LAD  Neurologic: AAOx3; CNII-XII grossly intact; no focal deficits                            11.9   12.2  )-----------( 264      ( 03 Feb 2018 09:41 )             35.4   02-03    139  |  105  |  12  ----------------------------<  86  3.9   |  28  |  0.67    Ca    7.6<L>      03 Feb 2018 09:41    TPro  7.0  /  Alb  4.1  /  TBili  0.3  /  DBili  x   /  AST  23  /  ALT  30  /  AlkPhos  74  02-02      CT- outpt- mild SBO

## 2018-02-03 NOTE — PATIENT PROFILE ADULT. - PMH
Anxiety    COPD (chronic obstructive pulmonary disease)    Gallstones    GERD (gastroesophageal reflux disease)    Glossopharyngeal neuralgia syndrome    Hepatitis C    Hypotension    Hypothyroidism    Lupus    Migraine    Neuropathy    Nicotine addiction    Sjogren's disease    UTI (urinary tract infection)    Vertigo

## 2018-02-03 NOTE — PROGRESS NOTE ADULT - SUBJECTIVE AND OBJECTIVE BOX
pt is a 53 yo female with 7 months history of abdominal pain. PT states pain is in middle abdomen.  Sharp in nature.  No relieving or aggravating factors.  PT states for 7 months along with abdominal pain she has been having vomiting along with diarrhea.  PT diarrhea had episodes of blood in it.  Ranging  from small flecks to massive clots.  The diarrhea worsens to point where pt needs to drink a whole bottle of immodium to resolve.  PT states she had multiple colonoscopy and workup with given diagnosis of ulcerative colitis and ischemic colitis.  Currently pt last BM was yesterday, loose.  Last flatus yesterday evening.  Seen by GI and had outpt CT that showed SBO. Directed to come to ER    INTERVAL HPI: Pt seen and examined bedside, c/o pain in abd with Nausea. has NGT  OVERNIGHT EVENTS:  T(F): 98.2 (02-03-18 @ 07:20), Max: 98.2 (02-03-18 @ 07:20)  HR: 75 (02-03-18 @ 14:09) (67 - 87)  BP: 108/67 (02-03-18 @ 14:09) (92/60 - 149/70)  RR: 17 (02-03-18 @ 07:20) (17 - 189)  SpO2: 94% (02-03-18 @ 11:43) (89% - 99%)  Wt(kg): --  I&O's Summary    02 Feb 2018 07:01  -  03 Feb 2018 07:00  --------------------------------------------------------  IN: 0 mL / OUT: 850 mL / NET: -850 mL    03 Feb 2018 07:01  -  03 Feb 2018 15:12  --------------------------------------------------------  IN: 1000 mL / OUT: 1050 mL / NET: -50 mL        REVIEW OF SYSTEM:    Constitutional: No fever, chills, fatigue  Neuro: No headache, numbness, weakness  Resp: No cough, wheezing, shortness of breath  CVS: No chest pain, palpitations, leg swelling  GI: + abdominal pain, +nausea, no vomiting, or diarrhea   : No dysuria, frequency, incontinence  Skin: No itching, burning, rashes, or lesions   Msk: No joint pain or swelling  Psych: No depression, anxiety, mood swings          PHYSICAL EXAM:  GENERAL: NAD, well-developed  HEAD:  Atraumatic, Normocephalic  EYES: EOMI, PERRLA, conjunctiva and sclera clear  ENMT: No tonsillar erythema, exudates, or enlargement; Moist mucous membranes,   NECK: Supple, No JVD, Normal thyroid  HEART: Regular rate and rhythm; No murmurs, rubs, or gallops  RESPIRATORY: CTA B/L, No wheezing / rhonchi  ABDOMEN: Soft, mild tenderness in RUQ, Nondistended; Bowel sounds not present  NEUROLOGY: A&Ox3, nonfocal, moving all extremities.  EXTREMITIES:  2+ Peripheral Pulses, No clubbing, cyanosis, or edema  SKIN: warm, dry, normal color, no rash or abnormal lesions        LABS:                        11.9   12.2  )-----------( 264      ( 03 Feb 2018 09:41 )             35.4     02-03    139  |  105  |  12  ----------------------------<  86  3.9   |  28  |  0.67    Ca    7.6<L>      03 Feb 2018 09:41    TPro  7.0  /  Alb  4.1  /  TBili  0.3  /  DBili  x   /  AST  23  /  ALT  30  /  AlkPhos  74  02-02    PT/INR - ( 02 Feb 2018 19:57 )   PT: 11.0 sec;   INR: 1.01 ratio         PTT - ( 02 Feb 2018 19:57 )  PTT:29.0 sec    CAPILLARY BLOOD GLUCOSE                  MEDICATIONS  (STANDING):  buDESOnide 160 MICROgram(s)/formoterol 4.5 MICROgram(s) Inhaler 2 Puff(s) Inhalation two times a day  diltiazem    Tablet 30 milliGRAM(s) Oral every 8 hours  fludroCORTISONE 0.1 milliGRAM(s) Oral daily  hydroxychloroquine 200 milliGRAM(s) Oral every 12 hours  levothyroxine 25 MICROGram(s) Oral daily  methylPREDNISolone sodium succinate Injectable 20 milliGRAM(s) IV Push daily  nicotine -   7 mG/24Hr(s) Patch 1 patch Transdermal daily  OXcarbazepine 150 milliGRAM(s) Oral two times a day  pantoprazole  Injectable 40 milliGRAM(s) IV Push two times a day  sodium chloride 0.9%. 1000 milliLiter(s) (100 mL/Hr) IV Continuous <Continuous>    MEDICATIONS  (PRN):  acetaminophen   Tablet. 650 milliGRAM(s) Oral every 6 hours PRN Mild Pain (1 - 3)  ALBUTerol    90 MICROgram(s) HFA Inhaler 2 Puff(s) Inhalation every 6 hours PRN Shortness of Breath and/or Wheezing  benzocaine 20% Spray 1 Spray(s) Topical three times a day PRN Throat pain  meclizine 12.5 milliGRAM(s) Oral three times a day PRN Dizziness  metoclopramide Injectable 10 milliGRAM(s) IV Push every 8 hours PRN nausea/vomiting  morphine  - Injectable 4 milliGRAM(s) IV Push every 6 hours PRN Severe Pain (7 - 10)  morphine  - Injectable 2 milliGRAM(s) IV Push every 4 hours PRN Moderate Pain (4 - 6)  ondansetron Injectable 4 milliGRAM(s) IV Push two times a day PRN Nausea  pregabalin 75 milliGRAM(s) Oral three times a day PRN neuropathy

## 2018-02-03 NOTE — CONSULT NOTE ADULT - ASSESSMENT
53 yo with multiple medical problems presents with chronic abdominal pain, diarrhea/vomiting.  As per PT has had multiple workups by multiple doctors.  9/17 had upper and lower endoscopy with no significant findings.  Unsure where diagnosis of ulcerative colitis and ischemic colitis came from. At thistime, unable to discern what pt's medical condition is contributing to above symptoms     -abdominal xray ordered to evaluate SBO     -cont NGT for now     -pt's primary GI doctor consulted.  Spoke with consulting MD     -no acute surgical intervention indicated     -will cont to follow

## 2018-02-03 NOTE — CONSULT NOTE ADULT - SUBJECTIVE AND OBJECTIVE BOX
53 y/o woman with hx of Lupus, Sjogren's, Raynaud's who presents for follow up of diarrhea.     She says since the past 8 months she can have up to 20 watery stools a day. She takes Imodium regularly to control her BMs. At times she has fecal incontinence. She is constipated sometimes. Sometimes she sees red blood in stools. She gets shooting rectal pain.     She complains of severe sharp pains in LUQ - she can be in bed for 4 days because of the pain. As soon as she eats she gets bloated, her abdomen gets "hard as a rock." She has "tremendous amount of gas". She says in the past she was told she has a "pancreatic lesion".     Abdominal pain and diarrhea can wake her up at night.     She has been vomiting a couple times a week mainly in the morning for a few years. She has to take nausea pills- gets gets nauseas "24 hours a day". She takes Prilosec, gas pills, IBGuard.     On March of 2017 the patient had a CAT scan without IV contrast. She was found to have markedly distended stomach with fluid and similar to prior study possibly functional. Functional distention of the colon with large volume retained stool similar to prior. Bladder markedly distended.    On May of 2017 she had an abdominal x-ray for diarrhea and was found to have moderate colonic stool burden.    On September 19, 2017 the patient had an upper endoscopy was found to have mild gastric erythema status post biopsy. She also underwent a colonoscopy was found to have mild erythema in the rectum that was biopsied. Random biopsies were also taken of the right colon to rule out microscopic colitis otherwise normal exam from rectum to the terminal ileum. Duodenal biopsies were normal. Gastric biopsies showed mild chronic gastritis without H. pylori bacteria. GE junction biopsies were normal. Random biopsies of right colon were normal. Random biopsies of the rectum were normal.    Stool studies on August of 2017 with normal.    Her last blood test on file was in October of 2017. She was found to have a WBC of 17.5. Normal hemoglobin and hematocrit. Her sodium was 130 previously it had been as low as 122.      From prior notes:   Pt seen by multiple GI's in past for constipation, now having diarrhea 5-20 times per day, worked up with prior colonoscopies, imaging. She says she may have ischemic colitis, though CT reports not available. Her reported hospitalization at [facility] is not seen on computer. SHe reports Bloating, reflux, heartburn. Low stable weight. On multiple medications with multiple med problems. Frustrated that advice from GI not helping, including use of Imodium and LINZESS for alternating bowel habits.      Cancers found in family include: cousins ovarian cancer/cervical cancer. Grandfather lung cancer.     The patient is now admitted after having out pt CT reveal ?SBO.  She reports profuse diarrhea preceding CT for which she took "a whole bottle of imodium".  She continued to have diffuse abdominal pain and diarrhea with bloating.  No n/v.                          11.9   12.2  )-----------( 264      ( 03 Feb 2018 09:41 )             35.4              02-03    139  |  105  |  12  ----------------------------<  86  3.9   |  28  |  0.67    Ca    7.6<L>      03 Feb 2018 09:41    TPro  7.0  /  Alb  4.1  /  TBili  0.3  /  DBili  x   /  AST  23  /  ALT  30  /  AlkPhos  74  02-02             11.9   < from: Xray Abdomen 2 Views (02.03.18 @ 11:49) >    EXAM:  XR ABDOMEN 2V                            PROCEDURE DATE:  02/03/2018          INTERPRETATION:  Clinical information: Chronic abdominal pain. CT scan   performed at outside institution demonstrates small bowel obstruction.    Abdomen supine and upright.    Prior CT scan is not available for comparison.    Nasogastric tube is present tip in the region of the stomach left abdomen.    Fluid is present within the stomach.    There are scattered air/fluid-filled loops of small bowel centrally.  There is enteric contrast within the right and transverse colon.    No gross free intraperitoneal air is noted.    Surgical clips are present right upper quadrant.    Impression:    Findings as discussed which may reflect resolving or partial smallbowel   obstruction.      PE reveals a thin female supine in bed with NGT clamped... hungry, asking for fluids  scl anict, mmm, opc  Lungs cta ant   CV rr  abd soft, ntnd with hsm  a and o x 3

## 2018-02-03 NOTE — PROGRESS NOTE ADULT - ASSESSMENT
53 yo with multiple medical problems presents with chronic abdominal pain, diarrhea/vomiting.  As per PT has had multiple workups by multiple doctors.  9/17 had upper and lower endoscopy with no significant findings.  Unsure where diagnosis of ulcerative colitis and ischemic colitis came from. At thistime, unable to discern what pt's medical condition is contributing to above symptoms

## 2018-02-04 ENCOUNTER — TRANSCRIPTION ENCOUNTER (OUTPATIENT)
Age: 53
End: 2018-02-04

## 2018-02-04 VITALS
HEART RATE: 86 BPM | RESPIRATION RATE: 17 BRPM | SYSTOLIC BLOOD PRESSURE: 115 MMHG | DIASTOLIC BLOOD PRESSURE: 74 MMHG | TEMPERATURE: 99 F | OXYGEN SATURATION: 96 %

## 2018-02-04 LAB
ANION GAP SERPL CALC-SCNC: 7 MMOL/L — SIGNIFICANT CHANGE UP (ref 5–17)
BUN SERPL-MCNC: 10 MG/DL — SIGNIFICANT CHANGE UP (ref 7–23)
CALCIUM SERPL-MCNC: 8.3 MG/DL — LOW (ref 8.5–10.1)
CHLORIDE SERPL-SCNC: 102 MMOL/L — SIGNIFICANT CHANGE UP (ref 96–108)
CO2 SERPL-SCNC: 28 MMOL/L — SIGNIFICANT CHANGE UP (ref 22–31)
CREAT SERPL-MCNC: 0.61 MG/DL — SIGNIFICANT CHANGE UP (ref 0.5–1.3)
GLUCOSE SERPL-MCNC: 58 MG/DL — LOW (ref 70–99)
HCT VFR BLD CALC: 39.2 % — SIGNIFICANT CHANGE UP (ref 34.5–45)
HGB BLD-MCNC: 13.5 G/DL — SIGNIFICANT CHANGE UP (ref 11.5–15.5)
MAGNESIUM SERPL-MCNC: 2.1 MG/DL — SIGNIFICANT CHANGE UP (ref 1.6–2.6)
MCHC RBC-ENTMCNC: 34.2 PG — HIGH (ref 27–34)
MCHC RBC-ENTMCNC: 34.4 GM/DL — SIGNIFICANT CHANGE UP (ref 32–36)
MCV RBC AUTO: 99.5 FL — SIGNIFICANT CHANGE UP (ref 80–100)
PLATELET # BLD AUTO: 274 K/UL — SIGNIFICANT CHANGE UP (ref 150–400)
POTASSIUM SERPL-MCNC: 3.5 MMOL/L — SIGNIFICANT CHANGE UP (ref 3.5–5.3)
POTASSIUM SERPL-SCNC: 3.5 MMOL/L — SIGNIFICANT CHANGE UP (ref 3.5–5.3)
RBC # BLD: 3.94 M/UL — SIGNIFICANT CHANGE UP (ref 3.8–5.2)
RBC # FLD: 11.8 % — SIGNIFICANT CHANGE UP (ref 10.3–14.5)
SODIUM SERPL-SCNC: 137 MMOL/L — SIGNIFICANT CHANGE UP (ref 135–145)
WBC # BLD: 13.1 K/UL — HIGH (ref 3.8–10.5)
WBC # FLD AUTO: 13.1 K/UL — HIGH (ref 3.8–10.5)

## 2018-02-04 PROCEDURE — 99231 SBSQ HOSP IP/OBS SF/LOW 25: CPT

## 2018-02-04 PROCEDURE — 74019 RADEX ABDOMEN 2 VIEWS: CPT | Mod: 26

## 2018-02-04 PROCEDURE — 96376 TX/PRO/DX INJ SAME DRUG ADON: CPT

## 2018-02-04 PROCEDURE — 85610 PROTHROMBIN TIME: CPT

## 2018-02-04 PROCEDURE — 83735 ASSAY OF MAGNESIUM: CPT

## 2018-02-04 PROCEDURE — 80048 BASIC METABOLIC PNL TOTAL CA: CPT

## 2018-02-04 PROCEDURE — 93005 ELECTROCARDIOGRAM TRACING: CPT

## 2018-02-04 PROCEDURE — 71045 X-RAY EXAM CHEST 1 VIEW: CPT

## 2018-02-04 PROCEDURE — 85027 COMPLETE CBC AUTOMATED: CPT

## 2018-02-04 PROCEDURE — 83605 ASSAY OF LACTIC ACID: CPT

## 2018-02-04 PROCEDURE — 99239 HOSP IP/OBS DSCHRG MGMT >30: CPT

## 2018-02-04 PROCEDURE — 96375 TX/PRO/DX INJ NEW DRUG ADDON: CPT

## 2018-02-04 PROCEDURE — 94640 AIRWAY INHALATION TREATMENT: CPT

## 2018-02-04 PROCEDURE — 83690 ASSAY OF LIPASE: CPT

## 2018-02-04 PROCEDURE — 96374 THER/PROPH/DIAG INJ IV PUSH: CPT

## 2018-02-04 PROCEDURE — 85730 THROMBOPLASTIN TIME PARTIAL: CPT

## 2018-02-04 PROCEDURE — 74019 RADEX ABDOMEN 2 VIEWS: CPT

## 2018-02-04 PROCEDURE — 80053 COMPREHEN METABOLIC PANEL: CPT

## 2018-02-04 PROCEDURE — 99285 EMERGENCY DEPT VISIT HI MDM: CPT | Mod: 25

## 2018-02-04 RX ORDER — NICOTINE POLACRILEX 2 MG
1 GUM BUCCAL
Qty: 71 | Refills: 0 | OUTPATIENT
Start: 2018-02-04

## 2018-02-04 RX ADMIN — OXCARBAZEPINE 150 MILLIGRAM(S): 300 TABLET, FILM COATED ORAL at 05:29

## 2018-02-04 RX ADMIN — Medication 1 PATCH: at 11:44

## 2018-02-04 RX ADMIN — MORPHINE SULFATE 2 MILLIGRAM(S): 50 CAPSULE, EXTENDED RELEASE ORAL at 09:23

## 2018-02-04 RX ADMIN — Medication 10 MILLIGRAM(S): at 01:31

## 2018-02-04 RX ADMIN — Medication 20 MILLIGRAM(S): at 05:29

## 2018-02-04 RX ADMIN — Medication 200 MILLIGRAM(S): at 05:30

## 2018-02-04 RX ADMIN — Medication 25 MICROGRAM(S): at 05:30

## 2018-02-04 RX ADMIN — FLUDROCORTISONE ACETATE 0.1 MILLIGRAM(S): 0.1 TABLET ORAL at 05:30

## 2018-02-04 RX ADMIN — PANTOPRAZOLE SODIUM 40 MILLIGRAM(S): 20 TABLET, DELAYED RELEASE ORAL at 05:29

## 2018-02-04 RX ADMIN — Medication 1 PATCH: at 11:45

## 2018-02-04 RX ADMIN — ONDANSETRON 4 MILLIGRAM(S): 8 TABLET, FILM COATED ORAL at 09:23

## 2018-02-04 RX ADMIN — BUDESONIDE AND FORMOTEROL FUMARATE DIHYDRATE 2 PUFF(S): 160; 4.5 AEROSOL RESPIRATORY (INHALATION) at 05:31

## 2018-02-04 RX ADMIN — MORPHINE SULFATE 2 MILLIGRAM(S): 50 CAPSULE, EXTENDED RELEASE ORAL at 09:59

## 2018-02-04 NOTE — DISCHARGE NOTE ADULT - CARE PLAN
Principal Discharge DX:	SBO (small bowel obstruction)  Goal:	Resolved  Assessment and plan of treatment:	Tolerated regular diet.  follow with GI  Secondary Diagnosis:	COPD (chronic obstructive pulmonary disease)  Assessment and plan of treatment:	Stable on home inhalers and nebs.  follow with PMD  Secondary Diagnosis:	Anxiety  Assessment and plan of treatment:	C/W home meds. follow with Psych  Secondary Diagnosis:	Lupus  Assessment and plan of treatment:	C/W home meds.   follow with PMD  Secondary Diagnosis:	Sjogren's disease  Assessment and plan of treatment:	C/W home meds.   follow with PMD  Secondary Diagnosis:	Vertigo  Assessment and plan of treatment:	C/W home meds.   follow with PMD  Secondary Diagnosis:	Depression  Assessment and plan of treatment:	C/W home meds.   follow with Psych

## 2018-02-04 NOTE — DISCHARGE NOTE ADULT - PLAN OF CARE
Resolved Tolerated regular diet.  follow with GI Stable on home inhalers and nebs.  follow with PMD C/W home meds. follow with Psych C/W home meds.   follow with PMD C/W home meds.   follow with Psych

## 2018-02-04 NOTE — DISCHARGE NOTE ADULT - CARE PROVIDER_API CALL
Lorne Pablo (DO), Family Medicine  Internal Medicine  [address] [place] [address]  Phone: [phone]  Fax: [phone]    Erinn Mahmood), Gastroenterology; Internal Medicine  [address] [place] [address]  Phone: [phone]  Fax: [phone]

## 2018-02-04 NOTE — PROGRESS NOTE ADULT - SUBJECTIVE AND OBJECTIVE BOX
53 y/o woman with hx of Lupus, Sjogren's, Raynaud's who presents for follow up of diarrhea.     She says since the past 8 months she can have up to 20 watery stools a day. She takes Imodium regularly to control her BMs. At times she has fecal incontinence. She is constipated sometimes. Sometimes she sees red blood in stools. She gets shooting rectal pain.     She complains of severe sharp pains in LUQ - she can be in bed for 4 days because of the pain. As soon as she eats she gets bloated, her abdomen gets "hard as a rock." She has "tremendous amount of gas". She says in the past she was told she has a "pancreatic lesion".     Abdominal pain and diarrhea can wake her up at night.     She has been vomiting a couple times a week mainly in the morning for a few years. She has to take nausea pills- gets gets nauseas "24 hours a day". She takes Prilosec, gas pills, IBGuard.     On March of 2017 the patient had a CAT scan without IV contrast. She was found to have markedly distended stomach with fluid and similar to prior study possibly functional. Functional distention of the colon with large volume retained stool similar to prior. Bladder markedly distended.    On May of 2017 she had an abdominal x-ray for diarrhea and was found to have moderate colonic stool burden.    On September 19, 2017 the patient had an upper endoscopy was found to have mild gastric erythema status post biopsy. She also underwent a colonoscopy was found to have mild erythema in the rectum that was biopsied. Random biopsies were also taken of the right colon to rule out microscopic colitis otherwise normal exam from rectum to the terminal ileum. Duodenal biopsies were normal. Gastric biopsies showed mild chronic gastritis without H. pylori bacteria. GE junction biopsies were normal. Random biopsies of right colon were normal. Random biopsies of the rectum were normal.    Stool studies on August of 2017 with normal.    Her last blood test on file was in October of 2017. She was found to have a WBC of 17.5. Normal hemoglobin and hematocrit. Her sodium was 130 previously it had been as low as 122.      From prior notes:   Pt seen by multiple GI's in past for constipation, now having diarrhea 5-20 times per day, worked up with prior colonoscopies, imaging. She says she may have ischemic colitis, though CT reports not available. Her reported hospitalization at [facility] is not seen on computer. SHe reports Bloating, reflux, heartburn. Low stable weight. On multiple medications with multiple med problems. Frustrated that advice from GI not helping, including use of Imodium and LINZESS for alternating bowel habits.      Cancers found in family include: cousins ovarian cancer/cervical cancer. Grandfather lung cancer.     The patient is now admitted after having out pt CT reveal ?SBO.  She reports profuse diarrhea preceding CT for which she took "a whole bottle of imodium".  She continued to have diffuse abdominal pain and diarrhea with bloating.  No n/v.        Today's visit:         MEDICATIONS  (STANDING):  buDESOnide 160 MICROgram(s)/formoterol 4.5 MICROgram(s) Inhaler 2 Puff(s) Inhalation two times a day  diltiazem    Tablet 30 milliGRAM(s) Oral every 8 hours  fludroCORTISONE 0.1 milliGRAM(s) Oral daily  hydroxychloroquine 200 milliGRAM(s) Oral every 12 hours  levothyroxine 25 MICROGram(s) Oral daily  methylPREDNISolone sodium succinate Injectable 20 milliGRAM(s) IV Push daily  nicotine -   7 mG/24Hr(s) Patch 1 patch Transdermal daily  OXcarbazepine 150 milliGRAM(s) Oral two times a day  pantoprazole  Injectable 40 milliGRAM(s) IV Push two times a day  sodium chloride 0.9%. 1000 milliLiter(s) (100 mL/Hr) IV Continuous <Continuous>    MEDICATIONS  (PRN):  acetaminophen   Tablet. 650 milliGRAM(s) Oral every 6 hours PRN Mild Pain (1 - 3)  ALBUTerol    90 MICROgram(s) HFA Inhaler 2 Puff(s) Inhalation every 6 hours PRN Shortness of Breath and/or Wheezing  benzocaine 20% Spray 1 Spray(s) Topical three times a day PRN Throat pain  meclizine 12.5 milliGRAM(s) Oral three times a day PRN Dizziness  metoclopramide Injectable 10 milliGRAM(s) IV Push every 8 hours PRN nausea/vomiting  morphine  - Injectable 4 milliGRAM(s) IV Push every 6 hours PRN Severe Pain (7 - 10)  morphine  - Injectable 2 milliGRAM(s) IV Push every 4 hours PRN Moderate Pain (4 - 6)  ondansetron Injectable 4 milliGRAM(s) IV Push two times a day PRN Nausea  pregabalin 75 milliGRAM(s) Oral three times a day PRN neuropathy        ICU Vital Signs Last 24 Hrs  T(C): 37 (04 Feb 2018 07:43), Max: 37 (04 Feb 2018 00:13)  T(F): 98.6 (04 Feb 2018 07:43), Max: 98.6 (04 Feb 2018 00:13)  HR: 86 (04 Feb 2018 07:43) (66 - 86)  BP: 115/74 (04 Feb 2018 07:43) (104/65 - 121/72)  BP(mean): --  ABP: --  ABP(mean): --  RR: 17 (04 Feb 2018 07:43) (16 - 17)  SpO2: 96% (04 Feb 2018 07:43) (93% - 100%)        PHYSICAL EXAM:    Constitutional:  Comfortable appearing  HEENT: NCAT, anicteric sclera, moist mucous membranes  Respiratory:  CTA b/l, no w/r/r  Cardiovascular:  nl S1, S2, no m/r/g  Gastrointestinal:  Soft, +BS, NT, ND, no hepatosplenomegally  Extremities:  no E/C/C  Neurological:  Alert and orriented x 3.  Skin:  no Jaundice  Lymph Nodes:  no lymphadenopathy in neck, no supraclavicular adenopathy  Musculoskeletal:  normal gait  Psychiatric:  Normal Mood and affect                              11.9   12.2  )-----------( 264      ( 03 Feb 2018 09:41 )             35.4     02-03    139  |  105  |  12  ----------------------------<  86  3.9   |  28  |  0.67    Ca    7.6<L>      03 Feb 2018 09:41    TPro  7.0  /  Alb  4.1  /  TBili  0.3  /  DBili  x   /  AST  23  /  ALT  30  /  AlkPhos  74  02-02              EXAM:  XR ABDOMEN 2V                            PROCEDURE DATE:  02/03/2018          INTERPRETATION:  Clinical information: Chronic abdominal pain. CT scan   performed at outside institution demonstrates small bowel obstruction.    Abdomen supine and upright.    Prior CT scan is not available for comparison.    Nasogastric tube is present tip in the region of the stomach left abdomen.    Fluid is present within the stomach.    There are scattered air/fluid-filled loops of small bowel centrally.  There is enteric contrast within the right and transverse colon.    No gross free intraperitoneal air is noted.    Surgical clips are present right upper quadrant.    Impression:    Findings as discussed which may reflect resolving or partial smallbowel   obstruction. 51 y/o woman with hx of Lupus, Sjogren's, Raynaud's who presents for follow up of diarrhea.     She says since the past 8 months she can have up to 20 watery stools a day. She takes Imodium regularly to control her BMs. At times she has fecal incontinence. She is constipated sometimes. Sometimes she sees red blood in stools. She gets shooting rectal pain.     She complains of severe sharp pains in LUQ - she can be in bed for 4 days because of the pain. As soon as she eats she gets bloated, her abdomen gets "hard as a rock." She has "tremendous amount of gas". She says in the past she was told she has a "pancreatic lesion".     Abdominal pain and diarrhea can wake her up at night.     She has been vomiting a couple times a week mainly in the morning for a few years. She has to take nausea pills- gets gets nauseas "24 hours a day". She takes Prilosec, gas pills, IBGuard.     On March of 2017 the patient had a CAT scan without IV contrast. She was found to have markedly distended stomach with fluid and similar to prior study possibly functional. Functional distention of the colon with large volume retained stool similar to prior. Bladder markedly distended.    On May of 2017 she had an abdominal x-ray for diarrhea and was found to have moderate colonic stool burden.    On September 19, 2017 the patient had an upper endoscopy was found to have mild gastric erythema status post biopsy. She also underwent a colonoscopy was found to have mild erythema in the rectum that was biopsied. Random biopsies were also taken of the right colon to rule out microscopic colitis otherwise normal exam from rectum to the terminal ileum. Duodenal biopsies were normal. Gastric biopsies showed mild chronic gastritis without H. pylori bacteria. GE junction biopsies were normal. Random biopsies of right colon were normal. Random biopsies of the rectum were normal.    Stool studies on August of 2017 with normal.    Her last blood test on file was in October of 2017. She was found to have a WBC of 17.5. Normal hemoglobin and hematocrit. Her sodium was 130 previously it had been as low as 122.      From prior notes:   Pt seen by multiple GI's in past for constipation, now having diarrhea 5-20 times per day, worked up with prior colonoscopies, imaging. She says she may have ischemic colitis, though CT reports not available. Her reported hospitalization at [facility] is not seen on computer. SHe reports Bloating, reflux, heartburn. Low stable weight. On multiple medications with multiple med problems. Frustrated that advice from GI not helping, including use of Imodium and LINZESS for alternating bowel habits.      Cancers found in family include: cousins ovarian cancer/cervical cancer. Grandfather lung cancer.     The patient is now admitted after having out pt CT reveal ?SBO.  She reports profuse diarrhea preceding CT for which she took "a whole bottle of imodium".  She continued to have diffuse abdominal pain and diarrhea with bloating.  No n/v.        Today's visit:   Pt reports feeling better today.  She says abdominal pain has completely resolved.  She is nauseas but no longer vomiting.  She had once small BM yesterday.   No BM today.  She really wants to have NG tube removed and go home today.            MEDICATIONS  (STANDING):  buDESOnide 160 MICROgram(s)/formoterol 4.5 MICROgram(s) Inhaler 2 Puff(s) Inhalation two times a day  diltiazem    Tablet 30 milliGRAM(s) Oral every 8 hours  fludroCORTISONE 0.1 milliGRAM(s) Oral daily  hydroxychloroquine 200 milliGRAM(s) Oral every 12 hours  levothyroxine 25 MICROGram(s) Oral daily  methylPREDNISolone sodium succinate Injectable 20 milliGRAM(s) IV Push daily  nicotine -   7 mG/24Hr(s) Patch 1 patch Transdermal daily  OXcarbazepine 150 milliGRAM(s) Oral two times a day  pantoprazole  Injectable 40 milliGRAM(s) IV Push two times a day  sodium chloride 0.9%. 1000 milliLiter(s) (100 mL/Hr) IV Continuous <Continuous>    MEDICATIONS  (PRN):  acetaminophen   Tablet. 650 milliGRAM(s) Oral every 6 hours PRN Mild Pain (1 - 3)  ALBUTerol    90 MICROgram(s) HFA Inhaler 2 Puff(s) Inhalation every 6 hours PRN Shortness of Breath and/or Wheezing  benzocaine 20% Spray 1 Spray(s) Topical three times a day PRN Throat pain  meclizine 12.5 milliGRAM(s) Oral three times a day PRN Dizziness  metoclopramide Injectable 10 milliGRAM(s) IV Push every 8 hours PRN nausea/vomiting  morphine  - Injectable 4 milliGRAM(s) IV Push every 6 hours PRN Severe Pain (7 - 10)  morphine  - Injectable 2 milliGRAM(s) IV Push every 4 hours PRN Moderate Pain (4 - 6)  ondansetron Injectable 4 milliGRAM(s) IV Push two times a day PRN Nausea  pregabalin 75 milliGRAM(s) Oral three times a day PRN neuropathy        ICU Vital Signs Last 24 Hrs  T(C): 37 (04 Feb 2018 07:43), Max: 37 (04 Feb 2018 00:13)  T(F): 98.6 (04 Feb 2018 07:43), Max: 98.6 (04 Feb 2018 00:13)  HR: 86 (04 Feb 2018 07:43) (66 - 86)  BP: 115/74 (04 Feb 2018 07:43) (104/65 - 121/72)  BP(mean): --  ABP: --  ABP(mean): --  RR: 17 (04 Feb 2018 07:43) (16 - 17)  SpO2: 96% (04 Feb 2018 07:43) (93% - 100%)        PHYSICAL EXAM:    Constitutional:  Comfortable appearing  HEENT: NCAT, anicteric sclera, moist mucous membranes  Respiratory:  CTA b/l, no w/r/r  Cardiovascular:  nl S1, S2, no m/r/g  Gastrointestinal:  Soft, +BS, NT, ND, no hepatosplenomegally  Extremities:  no E/C/C  Neurological:  Alert and orriented x 3.  Skin:  no Jaundice  Lymph Nodes:  no lymphadenopathy in neck, no supraclavicular adenopathy  Musculoskeletal:  normal gait  Psychiatric:  Normal Mood and affect                              11.9   12.2  )-----------( 264      ( 03 Feb 2018 09:41 )             35.4     02-03    139  |  105  |  12  ----------------------------<  86  3.9   |  28  |  0.67    Ca    7.6<L>      03 Feb 2018 09:41    TPro  7.0  /  Alb  4.1  /  TBili  0.3  /  DBili  x   /  AST  23  /  ALT  30  /  AlkPhos  74  02-02              EXAM:  XR ABDOMEN 2V                            PROCEDURE DATE:  02/03/2018          INTERPRETATION:  Clinical information: Chronic abdominal pain. CT scan   performed at outside institution demonstrates small bowel obstruction.    Abdomen supine and upright.    Prior CT scan is not available for comparison.    Nasogastric tube is present tip in the region of the stomach left abdomen.    Fluid is present within the stomach.    There are scattered air/fluid-filled loops of small bowel centrally.  There is enteric contrast within the right and transverse colon.    No gross free intraperitoneal air is noted.    Surgical clips are present right upper quadrant.    Impression:    Findings as discussed which may reflect resolving or partial smallbowel   obstruction. 51 y/o woman with hx of Lupus, Sjogren's, Raynaud's who presents for follow up of diarrhea.     She says since the past 8 months she can have up to 20 watery stools a day. She takes Imodium regularly to control her BMs. At times she has fecal incontinence. She is constipated sometimes. Sometimes she sees red blood in stools. She gets shooting rectal pain.     She complains of severe sharp pains in LUQ - she can be in bed for 4 days because of the pain. As soon as she eats she gets bloated, her abdomen gets "hard as a rock." She has "tremendous amount of gas". She says in the past she was told she has a "pancreatic lesion".     Abdominal pain and diarrhea can wake her up at night.     She has been vomiting a couple times a week mainly in the morning for a few years. She has to take nausea pills- gets gets nauseas "24 hours a day". She takes Prilosec, gas pills, IBGuard.     On March of 2017 the patient had a CAT scan without IV contrast. She was found to have markedly distended stomach with fluid and similar to prior study possibly functional. Functional distention of the colon with large volume retained stool similar to prior. Bladder markedly distended.    On May of 2017 she had an abdominal x-ray for diarrhea and was found to have moderate colonic stool burden.    On September 19, 2017 the patient had an upper endoscopy was found to have mild gastric erythema status post biopsy. She also underwent a colonoscopy was found to have mild erythema in the rectum that was biopsied. Random biopsies were also taken of the right colon to rule out microscopic colitis otherwise normal exam from rectum to the terminal ileum. Duodenal biopsies were normal. Gastric biopsies showed mild chronic gastritis without H. pylori bacteria. GE junction biopsies were normal. Random biopsies of right colon were normal. Random biopsies of the rectum were normal.    Stool studies on August of 2017 with normal.    Her last blood test on file was in October of 2017. She was found to have a WBC of 17.5. Normal hemoglobin and hematocrit. Her sodium was 130 previously it had been as low as 122.      From prior notes:   Pt seen by multiple GI's in past for constipation, now having diarrhea 5-20 times per day, worked up with prior colonoscopies, imaging. She says she may have ischemic colitis, though CT reports not available. Her reported hospitalization at [facility] is not seen on computer. SHe reports Bloating, reflux, heartburn. Low stable weight. On multiple medications with multiple med problems. Frustrated that advice from GI not helping, including use of Imodium and LINZESS for alternating bowel habits.      Cancers found in family include: cousins ovarian cancer/cervical cancer. Grandfather lung cancer.     The patient is now admitted after having out pt CT reveal ?SBO.  She reports profuse diarrhea preceding CT for which she took "a whole bottle of imodium".  She continued to have diffuse abdominal pain and diarrhea with bloating.  No n/v.        Today's visit:   Pt reports feeling better today.  She says abdominal pain has completely resolved.  She is nauseas but no longer vomiting.  She had once small BM yesterday.   No BM today.  She really wants to have NG tube removed and go home today.            MEDICATIONS  (STANDING):  buDESOnide 160 MICROgram(s)/formoterol 4.5 MICROgram(s) Inhaler 2 Puff(s) Inhalation two times a day  diltiazem    Tablet 30 milliGRAM(s) Oral every 8 hours  fludroCORTISONE 0.1 milliGRAM(s) Oral daily  hydroxychloroquine 200 milliGRAM(s) Oral every 12 hours  levothyroxine 25 MICROGram(s) Oral daily  methylPREDNISolone sodium succinate Injectable 20 milliGRAM(s) IV Push daily  nicotine -   7 mG/24Hr(s) Patch 1 patch Transdermal daily  OXcarbazepine 150 milliGRAM(s) Oral two times a day  pantoprazole  Injectable 40 milliGRAM(s) IV Push two times a day  sodium chloride 0.9%. 1000 milliLiter(s) (100 mL/Hr) IV Continuous <Continuous>    MEDICATIONS  (PRN):  acetaminophen   Tablet. 650 milliGRAM(s) Oral every 6 hours PRN Mild Pain (1 - 3)  ALBUTerol    90 MICROgram(s) HFA Inhaler 2 Puff(s) Inhalation every 6 hours PRN Shortness of Breath and/or Wheezing  benzocaine 20% Spray 1 Spray(s) Topical three times a day PRN Throat pain  meclizine 12.5 milliGRAM(s) Oral three times a day PRN Dizziness  metoclopramide Injectable 10 milliGRAM(s) IV Push every 8 hours PRN nausea/vomiting  morphine  - Injectable 4 milliGRAM(s) IV Push every 6 hours PRN Severe Pain (7 - 10)  morphine  - Injectable 2 milliGRAM(s) IV Push every 4 hours PRN Moderate Pain (4 - 6)  ondansetron Injectable 4 milliGRAM(s) IV Push two times a day PRN Nausea  pregabalin 75 milliGRAM(s) Oral three times a day PRN neuropathy        ICU Vital Signs Last 24 Hrs  T(C): 37 (04 Feb 2018 07:43), Max: 37 (04 Feb 2018 00:13)  T(F): 98.6 (04 Feb 2018 07:43), Max: 98.6 (04 Feb 2018 00:13)  HR: 86 (04 Feb 2018 07:43) (66 - 86)  BP: 115/74 (04 Feb 2018 07:43) (104/65 - 121/72)  BP(mean): --  ABP: --  ABP(mean): --  RR: 17 (04 Feb 2018 07:43) (16 - 17)  SpO2: 96% (04 Feb 2018 07:43) (93% - 100%)        PHYSICAL EXAM:    Constitutional:  Lying in bed with NG Tube    Gastrointestinal:  Soft, +BS, NT, ND, no hepatosplenomegally  Extremities:  no E/C/C  Neurological:  Alert and orriented x 3.                            11.9   12.2  )-----------( 264      ( 03 Feb 2018 09:41 )             35.4     02-03    139  |  105  |  12  ----------------------------<  86  3.9   |  28  |  0.67    Ca    7.6<L>      03 Feb 2018 09:41    TPro  7.0  /  Alb  4.1  /  TBili  0.3  /  DBili  x   /  AST  23  /  ALT  30  /  AlkPhos  74  02-02              EXAM:  XR ABDOMEN 2V                            PROCEDURE DATE:  02/03/2018          INTERPRETATION:  Clinical information: Chronic abdominal pain. CT scan   performed at outside institution demonstrates small bowel obstruction.    Abdomen supine and upright.    Prior CT scan is not available for comparison.    Nasogastric tube is present tip in the region of the stomach left abdomen.    Fluid is present within the stomach.    There are scattered air/fluid-filled loops of small bowel centrally.  There is enteric contrast within the right and transverse colon.    No gross free intraperitoneal air is noted.    Surgical clips are present right upper quadrant.    Impression:    Findings as discussed which may reflect resolving or partial smallbowel   obstruction.

## 2018-02-04 NOTE — PROGRESS NOTE ADULT - PROBLEM SELECTOR PLAN 1
1.  Serial abdominal x ray  2.  Continue NG Tube to LIWS  3.  Follow up with surgeon as to when NG tube can be removed.  4.  Avoid narcotics 1.  Serial abdominal x rays  2.  Continue NG Tube to LIWS  3.  Out of bed to chair, encourage movement in bed  4.  Follow up with surgeon as to when NG tube can be removed.  5.  Avoid narcotics

## 2018-02-04 NOTE — PROGRESS NOTE ADULT - ASSESSMENT
51 y/o woman with hx of Lupus, Sjogren's Raynaud's with hx of chronic diarrhea, LUQ pain, nausea, vomiting.  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  s/p recent EGD/colonoscopy with unrmarkbale biopsies.  Recent visit to the office, she was advised that chronic diarreha likely overflow diarrhea since previous imagin both CT scan and x-ray showed moderate stool burdne.  She was advised to discontinue imodium and to try MiraLAX dialy.   She was advised that rectal bleeding likely from hemorrhoids.     Her current admission of partial small bowel obstruction likely from ileus, perhaps due to excessive imodium. 53 y/o woman with hx of Lupus, Sjogren's Raynaud's with hx of chronic diarrhea, LUQ pain, nausea, vomiting.  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  s/p recent EGD/colonoscopy with unrmarkbale biopsies.  Recent visit to the office, she was advised that chronic diarreha likely overflow diarrhea since previous imagin both CT scan and x-ray showed moderate stool burdne.  She was advised to discontinue imodium and to try MiraLAX dialy.   She was advised that rectal bleeding likely from hemorrhoids.     Her current admission of partial small bowel obstruction likely from ileus, perhaps due to excessive imodium use.  Unclear if functional bowel disorder from underlying vasculitis, lupus.  Follow up with Rheumatology advised.

## 2018-02-04 NOTE — DISCHARGE NOTE ADULT - PATIENT PORTAL LINK FT
You can access the [facility], offered by [facility], by registering with the following website: [email or web address]

## 2018-02-04 NOTE — PROGRESS NOTE ADULT - SUBJECTIVE AND OBJECTIVE BOX
pt seen  feeling better  -n-v  tolerating liquids  +F+BM  ICU Vital Signs Last 24 Hrs  T(C): 37 (04 Feb 2018 07:43), Max: 37 (04 Feb 2018 00:13)  T(F): 98.6 (04 Feb 2018 07:43), Max: 98.6 (04 Feb 2018 00:13)  HR: 86 (04 Feb 2018 07:43) (66 - 86)  BP: 115/74 (04 Feb 2018 07:43) (104/65 - 121/72)  BP(mean): --  ABP: --  ABP(mean): --  RR: 17 (04 Feb 2018 07:43) (16 - 17)  SpO2: 96% (04 Feb 2018 07:43) (93% - 100%)  NAD  CTA b./l  RRR  soft NT/ND    I&O's Detail    03 Feb 2018 07:01  -  04 Feb 2018 07:00  --------------------------------------------------------  IN:    sodium chloride 0.9%.: 2100 mL  Total IN: 2100 mL    OUT:    Nasoenteral Tube: 250 mL    Voided: 3400 mL  Total OUT: 3650 mL    Total NET: -1550 mL      04 Feb 2018 07:01  -  04 Feb 2018 10:23  --------------------------------------------------------  IN:    Oral Fluid: 400 mL  Total IN: 400 mL    OUT:    Nasoenteral Tube: 600 mL    Voided: 1000 mL  Total OUT: 1600 mL    Total NET: -1200 mL        AXR- no signs of obstruction,  contrast in colon

## 2018-02-04 NOTE — PROGRESS NOTE ADULT - ASSESSMENT
53 yo with resolved sbo      d/c ngt      regular diet      if tolerates cleared for d/c from surgical standpoint

## 2018-02-04 NOTE — DISCHARGE NOTE ADULT - MEDICATION SUMMARY - MEDICATIONS TO TAKE
I will START or STAY ON the medications listed below when I get home from the hospital:    predniSONE 20 mg oral tablet  -- 1 tab(s) by mouth once a day  -- Indication: For COPD (chronic obstructive pulmonary disease)    Florinef Acetate 0.1 mg oral tablet  -- 1 tab(s) by mouth once a day  -- Indication: For Hypotension    Fioricet oral capsule  -- 1 cap(s) by mouth 2 times a day, As Needed  -- Indication: For Headache    dilTIAZem 30 mg oral tablet  -- 1 tab(s) by mouth every 8 hours  -- Indication: For HTN    Lyrica 75 mg oral capsule  --  by mouth 3 times a day, As Needed  -- Indication: For Neuropathy    Valium 5 mg oral tablet  -- 1 tab(s) by mouth 2 times a day, As Needed  -- Indication: For Anxiety    OXcarbazepine 150 mg oral tablet  -- 1 tab(s) by mouth 2 times a day  -- Indication: For depression    traZODone 50 mg oral tablet  -- 1 tab(s) by mouth 2 times a day, As Needed  -- Indication: For depression    Imodium 2 mg oral capsule  -- 3 cap(s) by mouth once a day  -- Indication: For diarrhea    ondansetron 8 mg oral tablet  -- 1 tab(s) by mouth 3 times a day  -- Indication: For Nausea    meclizine 12.5 mg oral tablet  -- 1 tab(s) by mouth 3 times a day, As Needed  -- Indication: For Vertigo    hydroxychloroquine 200 mg oral tablet  -- 1 tab(s) by mouth 2 times a day  -- Indication: For Lupus    ProAir HFA 90 mcg/inh inhalation aerosol  -- 2 puff(s) inhaled 4 times a day, As Needed  -- Indication: For COPD (chronic obstructive pulmonary disease)    tiotropium 18 mcg inhalation capsule  -- 1 cap(s) inhaled once a day  -- Indication: For COPD (chronic obstructive pulmonary disease)    Advair Diskus 250 mcg-50 mcg inhalation powder  -- 1 puff(s) inhaled 2 times a day  -- Indication: For COPD (chronic obstructive pulmonary disease)    omeprazole 40 mg oral delayed release capsule  -- 1 cap(s) by mouth 2 times a day  -- Indication: For GERD (gastroesophageal reflux disease)    nicotine 7 mg/24 hr transdermal film, extended release  -- 1 patch by transdermal patch once a day  -- Indication: For Smoking    levothyroxine 25 mcg (0.025 mg) oral capsule  -- 1 cap(s) by mouth once a day  -- Indication: For Hypothyroidism

## 2018-02-04 NOTE — DISCHARGE NOTE ADULT - HOSPITAL COURSE
51 y/o f pmhx anxiety, COPD, gallstones, GERD, hep C, hypotension, hypothyroidism, lupus, migrane, neuropathy, nictoine addiction, Sjogren's disease, vertigo presented to the ED sent in by GI for SBO. Pt stated she has had intermittent abdominal pain x 7 month accompanied with rectal bleeding, nausea, and at times bilious and bloody vomiting. Patient was told she had ischemic colitis 2/2 vascular compromise due to her Lupus. As per patient pain 10/10 sharp, located RUQ but can radiate to whole abdomen, she takes as many pills and bottles of imodium as she "can get her hands on" -more than prescribed, and Gas-ex tabs with minimal improvement. Patient had episode of bright red blood w/ dark stool last week. Saw GI Dr. Murphy who recommended CT scan- performed today revealed SBO and told to come in.   In the ED: HR 85, /70, RR 22, O2 98% on RA,WBC 17.4, sodium 131.  In ED given Zofran, morphine for pain, nicotine patch, 2x 1L bolus NS  Admitted for SBO, Hyponatremia and leucocytosis.  Pt was evaluated by GI and Surgery. had NGT to suction and kept NPO. Treated with IVF and pain meds. on 2/3/18 abd Xray was c/w partial obs of small bowel. Leucocytosis was 2/2 prednisone. no evidence of infection.   Today pt is doing well NGT d/raymond and she was feeling better. tolerated PO diet and passing flatus. As per sx pt is stable for d/c. and will follow with GI as out pt.  Vital Signs Last 24 Hrs  T(C): 37 (04 Feb 2018 07:43), Max: 37 (04 Feb 2018 00:13)  T(F): 98.6 (04 Feb 2018 07:43), Max: 98.6 (04 Feb 2018 00:13)  HR: 86 (04 Feb 2018 07:43) (66 - 86)  BP: 115/74 (04 Feb 2018 07:43) (104/65 - 121/72)  BP(mean): --  RR: 17 (04 Feb 2018 07:43) (16 - 17)  SpO2: 96% (04 Feb 2018 07:43) (93% - 100%)  General: NAD,   Neurology: A&Ox3, nonfocal,  moving all extremities  Respiratory: CTA B/L  CV: RRR, S1S2, no murmurs, rubs or gallops  Abdominal: Soft, NT, ND +BS  Extremities: No edema, + peripheral pulses  LABS:                        13.5   13.1  )-----------( 274      ( 04 Feb 2018 08:39 )             39.2     04 Feb 2018 08:39    137    |  102    |  10     ----------------------------<  58     3.5     |  28     |  0.61     Ca    8.3        04 Feb 2018 08:39  Mg     2.1       04 Feb 2018 08:39      PT/INR - ( 02 Feb 2018 19:57 )   PT: 11.0 sec;   INR: 1.01 ratio         PTT - ( 02 Feb 2018 19:57 )  PTT:29.0 sec    PMD Dr. Pablo service called and left message.

## 2018-02-27 ENCOUNTER — RX RENEWAL (OUTPATIENT)
Age: 53
End: 2018-02-27

## 2018-03-15 ENCOUNTER — RX RENEWAL (OUTPATIENT)
Age: 53
End: 2018-03-15

## 2018-03-21 ENCOUNTER — RX RENEWAL (OUTPATIENT)
Age: 53
End: 2018-03-21

## 2018-04-05 ENCOUNTER — APPOINTMENT (OUTPATIENT)
Dept: CT IMAGING | Facility: CLINIC | Age: 53
End: 2018-04-05

## 2018-04-05 ENCOUNTER — OUTPATIENT (OUTPATIENT)
Dept: OUTPATIENT SERVICES | Facility: HOSPITAL | Age: 53
LOS: 1 days | End: 2018-04-05
Payer: MEDICARE

## 2018-04-05 DIAGNOSIS — J38.1 POLYP OF VOCAL CORD AND LARYNX: Chronic | ICD-10-CM

## 2018-04-05 DIAGNOSIS — S69.91XD UNSPECIFIED INJURY OF RIGHT WRIST, HAND AND FINGER(S), SUBSEQUENT ENCOUNTER: Chronic | ICD-10-CM

## 2018-04-05 DIAGNOSIS — Z00.8 ENCOUNTER FOR OTHER GENERAL EXAMINATION: ICD-10-CM

## 2018-04-05 DIAGNOSIS — K82.9 DISEASE OF GALLBLADDER, UNSPECIFIED: Chronic | ICD-10-CM

## 2018-04-05 PROCEDURE — 71250 CT THORAX DX C-: CPT

## 2018-04-05 PROCEDURE — 71250 CT THORAX DX C-: CPT | Mod: 26

## 2018-04-06 ENCOUNTER — APPOINTMENT (OUTPATIENT)
Dept: GASTROENTEROLOGY | Facility: CLINIC | Age: 53
End: 2018-04-06

## 2018-04-11 ENCOUNTER — RX RENEWAL (OUTPATIENT)
Age: 53
End: 2018-04-11

## 2018-05-14 ENCOUNTER — RX RENEWAL (OUTPATIENT)
Age: 53
End: 2018-05-14

## 2018-05-17 ENCOUNTER — OUTPATIENT (OUTPATIENT)
Dept: OUTPATIENT SERVICES | Facility: HOSPITAL | Age: 53
LOS: 1 days | End: 2018-05-17
Payer: MEDICARE

## 2018-05-17 DIAGNOSIS — S69.91XD UNSPECIFIED INJURY OF RIGHT WRIST, HAND AND FINGER(S), SUBSEQUENT ENCOUNTER: Chronic | ICD-10-CM

## 2018-05-17 DIAGNOSIS — K82.9 DISEASE OF GALLBLADDER, UNSPECIFIED: Chronic | ICD-10-CM

## 2018-05-17 DIAGNOSIS — M54.16 RADICULOPATHY, LUMBAR REGION: ICD-10-CM

## 2018-05-17 DIAGNOSIS — J38.1 POLYP OF VOCAL CORD AND LARYNX: Chronic | ICD-10-CM

## 2018-05-17 PROCEDURE — 77003 FLUOROGUIDE FOR SPINE INJECT: CPT

## 2018-05-17 PROCEDURE — 62323 NJX INTERLAMINAR LMBR/SAC: CPT

## 2018-05-31 ENCOUNTER — RX RENEWAL (OUTPATIENT)
Age: 53
End: 2018-05-31

## 2018-06-12 ENCOUNTER — APPOINTMENT (OUTPATIENT)
Dept: GASTROENTEROLOGY | Facility: CLINIC | Age: 53
End: 2018-06-12
Payer: MEDICARE

## 2018-06-12 VITALS
OXYGEN SATURATION: 95 % | WEIGHT: 88 LBS | SYSTOLIC BLOOD PRESSURE: 101 MMHG | HEIGHT: 58 IN | DIASTOLIC BLOOD PRESSURE: 67 MMHG | HEART RATE: 75 BPM | BODY MASS INDEX: 18.47 KG/M2

## 2018-06-12 DIAGNOSIS — K56.600 PARTIAL INTESTINAL OBSTRUCTION, UNSPECIFIED AS TO CAUSE: ICD-10-CM

## 2018-06-12 PROCEDURE — 99214 OFFICE O/P EST MOD 30 MIN: CPT

## 2018-06-12 RX ORDER — ONDANSETRON 8 MG/1
8 TABLET ORAL EVERY 8 HOURS
Qty: 90 | Refills: 1 | Status: DISCONTINUED | COMMUNITY
Start: 2018-01-15 | End: 2018-06-12

## 2018-06-12 RX ORDER — ONDANSETRON 4 MG/1
4 TABLET ORAL
Qty: 60 | Refills: 3 | Status: DISCONTINUED | COMMUNITY
Start: 2017-06-06 | End: 2018-06-12

## 2018-06-12 RX ORDER — CEFDINIR 300 MG/1
300 CAPSULE ORAL
Qty: 20 | Refills: 0 | Status: DISCONTINUED | COMMUNITY
Start: 2017-06-06 | End: 2018-06-12

## 2018-06-14 ENCOUNTER — OUTPATIENT (OUTPATIENT)
Dept: OUTPATIENT SERVICES | Facility: HOSPITAL | Age: 53
LOS: 1 days | End: 2018-06-14
Payer: MEDICARE

## 2018-06-14 DIAGNOSIS — J38.1 POLYP OF VOCAL CORD AND LARYNX: Chronic | ICD-10-CM

## 2018-06-14 DIAGNOSIS — K82.9 DISEASE OF GALLBLADDER, UNSPECIFIED: Chronic | ICD-10-CM

## 2018-06-14 DIAGNOSIS — M54.16 RADICULOPATHY, LUMBAR REGION: ICD-10-CM

## 2018-06-14 DIAGNOSIS — S69.91XD UNSPECIFIED INJURY OF RIGHT WRIST, HAND AND FINGER(S), SUBSEQUENT ENCOUNTER: Chronic | ICD-10-CM

## 2018-06-14 PROCEDURE — 62323 NJX INTERLAMINAR LMBR/SAC: CPT

## 2018-06-14 PROCEDURE — 77003 FLUOROGUIDE FOR SPINE INJECT: CPT

## 2018-06-22 ENCOUNTER — APPOINTMENT (OUTPATIENT)
Dept: GASTROENTEROLOGY | Facility: CLINIC | Age: 53
End: 2018-06-22
Payer: MEDICARE

## 2018-06-22 DIAGNOSIS — R19.7 DIARRHEA, UNSPECIFIED: ICD-10-CM

## 2018-06-22 DIAGNOSIS — K92.0 HEMATEMESIS: ICD-10-CM

## 2018-06-22 PROCEDURE — 99214 OFFICE O/P EST MOD 30 MIN: CPT

## 2018-06-22 RX ORDER — CEFUROXIME AXETIL 250 MG/1
250 TABLET ORAL
Qty: 20 | Refills: 0 | Status: DISCONTINUED | COMMUNITY
Start: 2017-04-05 | End: 2018-06-22

## 2018-06-24 PROBLEM — R19.7 OVERFLOW DIARRHEA: Status: ACTIVE | Noted: 2018-01-15

## 2018-06-27 ENCOUNTER — INPATIENT (INPATIENT)
Facility: HOSPITAL | Age: 53
LOS: 1 days | Discharge: HOME | DRG: 191 | End: 2018-06-29
Attending: INTERNAL MEDICINE | Admitting: INTERNAL MEDICINE
Payer: MEDICARE

## 2018-06-27 VITALS
WEIGHT: 91.05 LBS | DIASTOLIC BLOOD PRESSURE: 72 MMHG | SYSTOLIC BLOOD PRESSURE: 115 MMHG | OXYGEN SATURATION: 100 % | RESPIRATION RATE: 18 BRPM | HEIGHT: 57 IN | HEART RATE: 68 BPM | TEMPERATURE: 99 F

## 2018-06-27 DIAGNOSIS — F17.200 NICOTINE DEPENDENCE, UNSPECIFIED, UNCOMPLICATED: ICD-10-CM

## 2018-06-27 DIAGNOSIS — J38.1 POLYP OF VOCAL CORD AND LARYNX: Chronic | ICD-10-CM

## 2018-06-27 DIAGNOSIS — L93.0 DISCOID LUPUS ERYTHEMATOSUS: ICD-10-CM

## 2018-06-27 DIAGNOSIS — J44.9 CHRONIC OBSTRUCTIVE PULMONARY DISEASE, UNSPECIFIED: ICD-10-CM

## 2018-06-27 DIAGNOSIS — E87.1 HYPO-OSMOLALITY AND HYPONATREMIA: ICD-10-CM

## 2018-06-27 DIAGNOSIS — E03.9 HYPOTHYROIDISM, UNSPECIFIED: ICD-10-CM

## 2018-06-27 DIAGNOSIS — K82.9 DISEASE OF GALLBLADDER, UNSPECIFIED: Chronic | ICD-10-CM

## 2018-06-27 DIAGNOSIS — G62.9 POLYNEUROPATHY, UNSPECIFIED: ICD-10-CM

## 2018-06-27 DIAGNOSIS — J18.9 PNEUMONIA, UNSPECIFIED ORGANISM: ICD-10-CM

## 2018-06-27 DIAGNOSIS — S69.91XD UNSPECIFIED INJURY OF RIGHT WRIST, HAND AND FINGER(S), SUBSEQUENT ENCOUNTER: Chronic | ICD-10-CM

## 2018-06-27 LAB
ALBUMIN SERPL ELPH-MCNC: 3.9 G/DL — SIGNIFICANT CHANGE UP (ref 3.3–5)
ALP SERPL-CCNC: 63 U/L — SIGNIFICANT CHANGE UP (ref 30–120)
ALT FLD-CCNC: 29 U/L DA — SIGNIFICANT CHANGE UP (ref 10–60)
ANION GAP SERPL CALC-SCNC: 11 MMOL/L — SIGNIFICANT CHANGE UP (ref 5–17)
APPEARANCE UR: CLEAR — SIGNIFICANT CHANGE UP
AST SERPL-CCNC: 25 U/L — SIGNIFICANT CHANGE UP (ref 10–40)
BACTERIA # UR AUTO: ABNORMAL
BASOPHILS # BLD AUTO: 0.04 K/UL — SIGNIFICANT CHANGE UP (ref 0–0.2)
BASOPHILS NFR BLD AUTO: 0.3 % — SIGNIFICANT CHANGE UP (ref 0–2)
BILIRUB SERPL-MCNC: 0.3 MG/DL — SIGNIFICANT CHANGE UP (ref 0.2–1.2)
BILIRUB UR-MCNC: NEGATIVE — SIGNIFICANT CHANGE UP
BUN SERPL-MCNC: 13 MG/DL — SIGNIFICANT CHANGE UP (ref 7–23)
CALCIUM SERPL-MCNC: 8.6 MG/DL — SIGNIFICANT CHANGE UP (ref 8.4–10.5)
CHLORIDE SERPL-SCNC: 85 MMOL/L — LOW (ref 96–108)
CO2 SERPL-SCNC: 24 MMOL/L — SIGNIFICANT CHANGE UP (ref 22–31)
COLOR SPEC: YELLOW — SIGNIFICANT CHANGE UP
CREAT SERPL-MCNC: 0.69 MG/DL — SIGNIFICANT CHANGE UP (ref 0.5–1.3)
DIFF PNL FLD: NEGATIVE — SIGNIFICANT CHANGE UP
EOSINOPHIL # BLD AUTO: 0.03 K/UL — SIGNIFICANT CHANGE UP (ref 0–0.5)
EOSINOPHIL NFR BLD AUTO: 0.2 % — SIGNIFICANT CHANGE UP (ref 0–6)
GLUCOSE SERPL-MCNC: 81 MG/DL — SIGNIFICANT CHANGE UP (ref 70–99)
GLUCOSE UR QL: NEGATIVE MG/DL — SIGNIFICANT CHANGE UP
HCT VFR BLD CALC: 34.8 % — SIGNIFICANT CHANGE UP (ref 34.5–45)
HGB BLD-MCNC: 12.5 G/DL — SIGNIFICANT CHANGE UP (ref 11.5–15.5)
IMM GRANULOCYTES NFR BLD AUTO: 1 % — SIGNIFICANT CHANGE UP (ref 0–1.5)
KETONES UR-MCNC: NEGATIVE — SIGNIFICANT CHANGE UP
LACTATE SERPL-SCNC: 0.5 MMOL/L — LOW (ref 0.7–2)
LEUKOCYTE ESTERASE UR-ACNC: ABNORMAL
LIDOCAIN IGE QN: 83 U/L — SIGNIFICANT CHANGE UP (ref 73–393)
LYMPHOCYTES # BLD AUTO: 17.2 % — SIGNIFICANT CHANGE UP (ref 13–44)
LYMPHOCYTES # BLD AUTO: 2.61 K/UL — SIGNIFICANT CHANGE UP (ref 1–3.3)
MCHC RBC-ENTMCNC: 34.4 PG — HIGH (ref 27–34)
MCHC RBC-ENTMCNC: 35.9 GM/DL — SIGNIFICANT CHANGE UP (ref 32–36)
MCV RBC AUTO: 95.9 FL — SIGNIFICANT CHANGE UP (ref 80–100)
MONOCYTES # BLD AUTO: 1.24 K/UL — HIGH (ref 0–0.9)
MONOCYTES NFR BLD AUTO: 8.2 % — SIGNIFICANT CHANGE UP (ref 2–14)
NEUTROPHILS # BLD AUTO: 11.07 K/UL — HIGH (ref 1.8–7.4)
NEUTROPHILS NFR BLD AUTO: 73.1 % — SIGNIFICANT CHANGE UP (ref 43–77)
NITRITE UR-MCNC: NEGATIVE — SIGNIFICANT CHANGE UP
NRBC # BLD: 0 /100 WBCS — SIGNIFICANT CHANGE UP (ref 0–0)
PH UR: 7 — SIGNIFICANT CHANGE UP (ref 5–8)
PLATELET # BLD AUTO: 352 K/UL — SIGNIFICANT CHANGE UP (ref 150–400)
POTASSIUM SERPL-MCNC: 4.8 MMOL/L — SIGNIFICANT CHANGE UP (ref 3.5–5.3)
POTASSIUM SERPL-SCNC: 4.8 MMOL/L — SIGNIFICANT CHANGE UP (ref 3.5–5.3)
PROT SERPL-MCNC: 6.7 G/DL — SIGNIFICANT CHANGE UP (ref 6–8.3)
PROT UR-MCNC: NEGATIVE MG/DL — SIGNIFICANT CHANGE UP
RBC # BLD: 3.63 M/UL — LOW (ref 3.8–5.2)
RBC # FLD: 12.4 % — SIGNIFICANT CHANGE UP (ref 10.3–14.5)
RBC CASTS # UR COMP ASSIST: SIGNIFICANT CHANGE UP /HPF (ref 0–4)
SODIUM SERPL-SCNC: 120 MMOL/L — CRITICAL LOW (ref 135–145)
SP GR SPEC: 1.01 — SIGNIFICANT CHANGE UP (ref 1.01–1.02)
UROBILINOGEN FLD QL: NEGATIVE MG/DL — SIGNIFICANT CHANGE UP
WBC # BLD: 15.14 K/UL — HIGH (ref 3.8–10.5)
WBC # FLD AUTO: 15.14 K/UL — HIGH (ref 3.8–10.5)
WBC UR QL: SIGNIFICANT CHANGE UP

## 2018-06-27 PROCEDURE — 93010 ELECTROCARDIOGRAM REPORT: CPT

## 2018-06-27 PROCEDURE — 99285 EMERGENCY DEPT VISIT HI MDM: CPT

## 2018-06-27 PROCEDURE — 71046 X-RAY EXAM CHEST 2 VIEWS: CPT | Mod: 26

## 2018-06-27 RX ORDER — NICOTINE POLACRILEX 2 MG
1 GUM BUCCAL DAILY
Qty: 0 | Refills: 0 | Status: DISCONTINUED | OUTPATIENT
Start: 2018-06-27 | End: 2018-06-29

## 2018-06-27 RX ORDER — PANTOPRAZOLE SODIUM 20 MG/1
40 TABLET, DELAYED RELEASE ORAL
Qty: 0 | Refills: 0 | Status: DISCONTINUED | OUTPATIENT
Start: 2018-06-27 | End: 2018-06-29

## 2018-06-27 RX ORDER — FLUTICASONE PROPIONATE AND SALMETEROL 50; 250 UG/1; UG/1
1 POWDER ORAL; RESPIRATORY (INHALATION)
Qty: 0 | Refills: 0 | COMMUNITY

## 2018-06-27 RX ORDER — MORPHINE SULFATE 50 MG/1
2 CAPSULE, EXTENDED RELEASE ORAL ONCE
Qty: 0 | Refills: 0 | Status: DISCONTINUED | OUTPATIENT
Start: 2018-06-27 | End: 2018-06-27

## 2018-06-27 RX ORDER — LEVOTHYROXINE SODIUM 125 MCG
25 TABLET ORAL DAILY
Qty: 0 | Refills: 0 | Status: DISCONTINUED | OUTPATIENT
Start: 2018-06-27 | End: 2018-06-29

## 2018-06-27 RX ORDER — IPRATROPIUM/ALBUTEROL SULFATE 18-103MCG
3 AEROSOL WITH ADAPTER (GRAM) INHALATION EVERY 6 HOURS
Qty: 0 | Refills: 0 | Status: DISCONTINUED | OUTPATIENT
Start: 2018-06-27 | End: 2018-06-29

## 2018-06-27 RX ORDER — OXCARBAZEPINE 300 MG/1
150 TABLET, FILM COATED ORAL
Qty: 0 | Refills: 0 | Status: DISCONTINUED | OUTPATIENT
Start: 2018-06-27 | End: 2018-06-29

## 2018-06-27 RX ORDER — SODIUM CHLORIDE 9 MG/ML
1000 INJECTION INTRAMUSCULAR; INTRAVENOUS; SUBCUTANEOUS ONCE
Qty: 0 | Refills: 0 | Status: COMPLETED | OUTPATIENT
Start: 2018-06-27 | End: 2018-06-27

## 2018-06-27 RX ORDER — FLUDROCORTISONE ACETATE 0.1 MG/1
0.1 TABLET ORAL DAILY
Qty: 0 | Refills: 0 | Status: DISCONTINUED | OUTPATIENT
Start: 2018-06-27 | End: 2018-06-27

## 2018-06-27 RX ORDER — IPRATROPIUM/ALBUTEROL SULFATE 18-103MCG
3 AEROSOL WITH ADAPTER (GRAM) INHALATION ONCE
Qty: 0 | Refills: 0 | Status: COMPLETED | OUTPATIENT
Start: 2018-06-27 | End: 2018-06-27

## 2018-06-27 RX ORDER — HYDROXYCHLOROQUINE SULFATE 200 MG
200 TABLET ORAL
Qty: 0 | Refills: 0 | Status: DISCONTINUED | OUTPATIENT
Start: 2018-06-27 | End: 2018-06-29

## 2018-06-27 RX ORDER — TRAZODONE HCL 50 MG
50 TABLET ORAL
Qty: 0 | Refills: 0 | Status: DISCONTINUED | OUTPATIENT
Start: 2018-06-27 | End: 2018-06-29

## 2018-06-27 RX ORDER — MORPHINE SULFATE 50 MG/1
2 CAPSULE, EXTENDED RELEASE ORAL EVERY 6 HOURS
Qty: 0 | Refills: 0 | Status: DISCONTINUED | OUTPATIENT
Start: 2018-06-27 | End: 2018-06-29

## 2018-06-27 RX ORDER — SODIUM CHLORIDE 9 MG/ML
1000 INJECTION INTRAMUSCULAR; INTRAVENOUS; SUBCUTANEOUS
Qty: 0 | Refills: 0 | Status: DISCONTINUED | OUTPATIENT
Start: 2018-06-27 | End: 2018-06-28

## 2018-06-27 RX ORDER — BUDESONIDE AND FORMOTEROL FUMARATE DIHYDRATE 160; 4.5 UG/1; UG/1
2 AEROSOL RESPIRATORY (INHALATION)
Qty: 0 | Refills: 0 | Status: DISCONTINUED | OUTPATIENT
Start: 2018-06-27 | End: 2018-06-29

## 2018-06-27 RX ORDER — TIOTROPIUM BROMIDE 18 UG/1
1 CAPSULE ORAL; RESPIRATORY (INHALATION) DAILY
Qty: 0 | Refills: 0 | Status: DISCONTINUED | OUTPATIENT
Start: 2018-06-27 | End: 2018-06-29

## 2018-06-27 RX ORDER — ONDANSETRON 8 MG/1
4 TABLET, FILM COATED ORAL ONCE
Qty: 0 | Refills: 0 | Status: COMPLETED | OUTPATIENT
Start: 2018-06-27 | End: 2018-06-27

## 2018-06-27 RX ORDER — SODIUM CHLORIDE 9 MG/ML
3 INJECTION INTRAMUSCULAR; INTRAVENOUS; SUBCUTANEOUS ONCE
Qty: 0 | Refills: 0 | Status: COMPLETED | OUTPATIENT
Start: 2018-06-27 | End: 2018-06-27

## 2018-06-27 RX ADMIN — MORPHINE SULFATE 2 MILLIGRAM(S): 50 CAPSULE, EXTENDED RELEASE ORAL at 23:27

## 2018-06-27 RX ADMIN — SODIUM CHLORIDE 100 MILLILITER(S): 9 INJECTION INTRAMUSCULAR; INTRAVENOUS; SUBCUTANEOUS at 23:25

## 2018-06-27 RX ADMIN — Medication 125 MILLIGRAM(S): at 16:35

## 2018-06-27 RX ADMIN — MORPHINE SULFATE 2 MILLIGRAM(S): 50 CAPSULE, EXTENDED RELEASE ORAL at 19:35

## 2018-06-27 RX ADMIN — SODIUM CHLORIDE 500 MILLILITER(S): 9 INJECTION INTRAMUSCULAR; INTRAVENOUS; SUBCUTANEOUS at 14:00

## 2018-06-27 RX ADMIN — Medication 1 PATCH: at 22:07

## 2018-06-27 RX ADMIN — ONDANSETRON 4 MILLIGRAM(S): 8 TABLET, FILM COATED ORAL at 16:00

## 2018-06-27 RX ADMIN — OXCARBAZEPINE 150 MILLIGRAM(S): 300 TABLET, FILM COATED ORAL at 22:07

## 2018-06-27 RX ADMIN — Medication 3 MILLILITER(S): at 17:56

## 2018-06-27 RX ADMIN — MORPHINE SULFATE 2 MILLIGRAM(S): 50 CAPSULE, EXTENDED RELEASE ORAL at 22:57

## 2018-06-27 RX ADMIN — MORPHINE SULFATE 2 MILLIGRAM(S): 50 CAPSULE, EXTENDED RELEASE ORAL at 19:05

## 2018-06-27 RX ADMIN — SODIUM CHLORIDE 3 MILLILITER(S): 9 INJECTION INTRAMUSCULAR; INTRAVENOUS; SUBCUTANEOUS at 15:25

## 2018-06-27 RX ADMIN — ONDANSETRON 4 MILLIGRAM(S): 8 TABLET, FILM COATED ORAL at 22:56

## 2018-06-27 NOTE — ED ADULT NURSE NOTE - OBJECTIVE STATEMENT
patient is dx with PNA from [facility], c/o weakness and cough x about 4 days, hx of copd and currently smoker, no sick contact, Pt is in no acute distress and Pt's vital sign is within normal range. will continue to monitor.

## 2018-06-27 NOTE — H&P ADULT - PROBLEM SELECTOR PLAN 2
sent by her pulm  iv levaquin  cxr in er neg for pna  iv steroids  duo nebs  continue home inhalers  pulm fu

## 2018-06-27 NOTE — H&P ADULT - HISTORY OF PRESENT ILLNESS
54 y/o F pt with hx of lupus, COPD and cholecystectomy  presents to ED c/o constant cough, fatigue and SOB for the past few days. She was seen by Dr. Silver today CXR completed; dx with Pneumonia. Pt admits to having chronic abdominal pain which is being worked up, recent CT abdomen 3 days ago; awaiting results. Admits having prior intestinal blockage. No recent hospitalization within the last 3 months. Denies fevers, nausea, vomiting.  no further complaints at this time.

## 2018-06-27 NOTE — ED PROVIDER NOTE - CARE PLAN
Principal Discharge DX:	Pneumonia due to infectious organism, unspecified laterality, unspecified part of lung  Secondary Diagnosis:	Hyponatremia

## 2018-06-28 DIAGNOSIS — J18.9 PNEUMONIA, UNSPECIFIED ORGANISM: ICD-10-CM

## 2018-06-28 DIAGNOSIS — K21.9 GASTRO-ESOPHAGEAL REFLUX DISEASE WITHOUT ESOPHAGITIS: ICD-10-CM

## 2018-06-28 DIAGNOSIS — M35.00 SJOGREN SYNDROME, UNSPECIFIED: ICD-10-CM

## 2018-06-28 LAB
ANION GAP SERPL CALC-SCNC: 8 MMOL/L — SIGNIFICANT CHANGE UP (ref 5–17)
BUN SERPL-MCNC: 17 MG/DL — SIGNIFICANT CHANGE UP (ref 7–23)
CALCIUM SERPL-MCNC: 8.1 MG/DL — LOW (ref 8.4–10.5)
CHLORIDE SERPL-SCNC: 101 MMOL/L — SIGNIFICANT CHANGE UP (ref 96–108)
CO2 SERPL-SCNC: 26 MMOL/L — SIGNIFICANT CHANGE UP (ref 22–31)
CREAT SERPL-MCNC: 0.82 MG/DL — SIGNIFICANT CHANGE UP (ref 0.5–1.3)
GLUCOSE SERPL-MCNC: 126 MG/DL — HIGH (ref 70–99)
GRAM STN FLD: SIGNIFICANT CHANGE UP
HCT VFR BLD CALC: 33.5 % — LOW (ref 34.5–45)
HGB BLD-MCNC: 11.6 G/DL — SIGNIFICANT CHANGE UP (ref 11.5–15.5)
MCHC RBC-ENTMCNC: 33.9 PG — SIGNIFICANT CHANGE UP (ref 27–34)
MCHC RBC-ENTMCNC: 34.6 GM/DL — SIGNIFICANT CHANGE UP (ref 32–36)
MCV RBC AUTO: 98 FL — SIGNIFICANT CHANGE UP (ref 80–100)
NRBC # BLD: 0 /100 WBCS — SIGNIFICANT CHANGE UP (ref 0–0)
PLATELET # BLD AUTO: 329 K/UL — SIGNIFICANT CHANGE UP (ref 150–400)
POTASSIUM SERPL-MCNC: 4.1 MMOL/L — SIGNIFICANT CHANGE UP (ref 3.5–5.3)
POTASSIUM SERPL-SCNC: 4.1 MMOL/L — SIGNIFICANT CHANGE UP (ref 3.5–5.3)
PROCALCITONIN SERPL-MCNC: 0.02 NG/ML — SIGNIFICANT CHANGE UP (ref 0.02–0.1)
RBC # BLD: 3.42 M/UL — LOW (ref 3.8–5.2)
RBC # FLD: 12.9 % — SIGNIFICANT CHANGE UP (ref 10.3–14.5)
SODIUM SERPL-SCNC: 135 MMOL/L — SIGNIFICANT CHANGE UP (ref 135–145)
SPECIMEN SOURCE: SIGNIFICANT CHANGE UP
WBC # BLD: 22.2 K/UL — HIGH (ref 3.8–10.5)
WBC # FLD AUTO: 22.2 K/UL — HIGH (ref 3.8–10.5)

## 2018-06-28 PROCEDURE — 12345: CPT | Mod: NC

## 2018-06-28 PROCEDURE — 71250 CT THORAX DX C-: CPT | Mod: 26

## 2018-06-28 RX ORDER — SODIUM CHLORIDE 9 MG/ML
1000 INJECTION INTRAMUSCULAR; INTRAVENOUS; SUBCUTANEOUS
Qty: 0 | Refills: 0 | Status: DISCONTINUED | OUTPATIENT
Start: 2018-06-28 | End: 2018-06-29

## 2018-06-28 RX ORDER — ONDANSETRON 8 MG/1
4 TABLET, FILM COATED ORAL EVERY 6 HOURS
Qty: 0 | Refills: 0 | Status: DISCONTINUED | OUTPATIENT
Start: 2018-06-28 | End: 2018-06-29

## 2018-06-28 RX ORDER — BUPROPION HYDROCHLORIDE 150 MG/1
150 TABLET, EXTENDED RELEASE ORAL DAILY
Qty: 0 | Refills: 0 | Status: DISCONTINUED | OUTPATIENT
Start: 2018-06-28 | End: 2018-06-29

## 2018-06-28 RX ORDER — ONDANSETRON 8 MG/1
4 TABLET, FILM COATED ORAL ONCE
Qty: 0 | Refills: 0 | Status: COMPLETED | OUTPATIENT
Start: 2018-06-28 | End: 2018-06-28

## 2018-06-28 RX ADMIN — OXCARBAZEPINE 150 MILLIGRAM(S): 300 TABLET, FILM COATED ORAL at 05:30

## 2018-06-28 RX ADMIN — ONDANSETRON 4 MILLIGRAM(S): 8 TABLET, FILM COATED ORAL at 05:39

## 2018-06-28 RX ADMIN — BUDESONIDE AND FORMOTEROL FUMARATE DIHYDRATE 2 PUFF(S): 160; 4.5 AEROSOL RESPIRATORY (INHALATION) at 08:36

## 2018-06-28 RX ADMIN — Medication 3 MILLILITER(S): at 08:51

## 2018-06-28 RX ADMIN — MORPHINE SULFATE 2 MILLIGRAM(S): 50 CAPSULE, EXTENDED RELEASE ORAL at 05:51

## 2018-06-28 RX ADMIN — TIOTROPIUM BROMIDE 1 CAPSULE(S): 18 CAPSULE ORAL; RESPIRATORY (INHALATION) at 08:37

## 2018-06-28 RX ADMIN — MORPHINE SULFATE 2 MILLIGRAM(S): 50 CAPSULE, EXTENDED RELEASE ORAL at 16:49

## 2018-06-28 RX ADMIN — MORPHINE SULFATE 2 MILLIGRAM(S): 50 CAPSULE, EXTENDED RELEASE ORAL at 17:30

## 2018-06-28 RX ADMIN — MORPHINE SULFATE 2 MILLIGRAM(S): 50 CAPSULE, EXTENDED RELEASE ORAL at 11:55

## 2018-06-28 RX ADMIN — ONDANSETRON 4 MILLIGRAM(S): 8 TABLET, FILM COATED ORAL at 16:49

## 2018-06-28 RX ADMIN — SODIUM CHLORIDE 50 MILLILITER(S): 9 INJECTION INTRAMUSCULAR; INTRAVENOUS; SUBCUTANEOUS at 16:37

## 2018-06-28 RX ADMIN — MORPHINE SULFATE 2 MILLIGRAM(S): 50 CAPSULE, EXTENDED RELEASE ORAL at 23:00

## 2018-06-28 RX ADMIN — Medication 1 PATCH: at 11:24

## 2018-06-28 RX ADMIN — MORPHINE SULFATE 2 MILLIGRAM(S): 50 CAPSULE, EXTENDED RELEASE ORAL at 23:15

## 2018-06-28 RX ADMIN — ONDANSETRON 4 MILLIGRAM(S): 8 TABLET, FILM COATED ORAL at 11:25

## 2018-06-28 RX ADMIN — Medication 3 MILLILITER(S): at 19:54

## 2018-06-28 RX ADMIN — SODIUM CHLORIDE 100 MILLILITER(S): 9 INJECTION INTRAMUSCULAR; INTRAVENOUS; SUBCUTANEOUS at 05:52

## 2018-06-28 RX ADMIN — MORPHINE SULFATE 2 MILLIGRAM(S): 50 CAPSULE, EXTENDED RELEASE ORAL at 05:31

## 2018-06-28 RX ADMIN — Medication 200 MILLIGRAM(S): at 05:30

## 2018-06-28 RX ADMIN — OXCARBAZEPINE 150 MILLIGRAM(S): 300 TABLET, FILM COATED ORAL at 18:17

## 2018-06-28 RX ADMIN — Medication 1 PATCH: at 22:15

## 2018-06-28 RX ADMIN — ONDANSETRON 4 MILLIGRAM(S): 8 TABLET, FILM COATED ORAL at 23:00

## 2018-06-28 RX ADMIN — MORPHINE SULFATE 2 MILLIGRAM(S): 50 CAPSULE, EXTENDED RELEASE ORAL at 11:25

## 2018-06-28 RX ADMIN — PANTOPRAZOLE SODIUM 40 MILLIGRAM(S): 20 TABLET, DELAYED RELEASE ORAL at 06:07

## 2018-06-28 RX ADMIN — Medication 40 MILLIGRAM(S): at 05:31

## 2018-06-28 RX ADMIN — Medication 3 MILLILITER(S): at 13:04

## 2018-06-28 RX ADMIN — Medication 25 MICROGRAM(S): at 05:30

## 2018-06-28 RX ADMIN — Medication 200 MILLIGRAM(S): at 18:17

## 2018-06-29 ENCOUNTER — TRANSCRIPTION ENCOUNTER (OUTPATIENT)
Age: 53
End: 2018-06-29

## 2018-06-29 VITALS
OXYGEN SATURATION: 97 % | TEMPERATURE: 98 F | RESPIRATION RATE: 18 BRPM | HEART RATE: 82 BPM | SYSTOLIC BLOOD PRESSURE: 121 MMHG | DIASTOLIC BLOOD PRESSURE: 69 MMHG

## 2018-06-29 DIAGNOSIS — F17.218 NICOTINE DEPENDENCE, CIGARETTES, WITH OTHER NICOTINE-INDUCED DISORDERS: ICD-10-CM

## 2018-06-29 DIAGNOSIS — J44.1 CHRONIC OBSTRUCTIVE PULMONARY DISEASE WITH (ACUTE) EXACERBATION: ICD-10-CM

## 2018-06-29 DIAGNOSIS — Z29.9 ENCOUNTER FOR PROPHYLACTIC MEASURES, UNSPECIFIED: ICD-10-CM

## 2018-06-29 LAB
ANION GAP SERPL CALC-SCNC: 7 MMOL/L — SIGNIFICANT CHANGE UP (ref 5–17)
BASOPHILS # BLD AUTO: 0.03 K/UL — SIGNIFICANT CHANGE UP (ref 0–0.2)
BASOPHILS NFR BLD AUTO: 0.2 % — SIGNIFICANT CHANGE UP (ref 0–2)
BUN SERPL-MCNC: 16 MG/DL — SIGNIFICANT CHANGE UP (ref 7–23)
CALCIUM SERPL-MCNC: 8.3 MG/DL — LOW (ref 8.4–10.5)
CHLORIDE SERPL-SCNC: 98 MMOL/L — SIGNIFICANT CHANGE UP (ref 96–108)
CO2 SERPL-SCNC: 26 MMOL/L — SIGNIFICANT CHANGE UP (ref 22–31)
CREAT SERPL-MCNC: 0.83 MG/DL — SIGNIFICANT CHANGE UP (ref 0.5–1.3)
EOSINOPHIL # BLD AUTO: 0.08 K/UL — SIGNIFICANT CHANGE UP (ref 0–0.5)
EOSINOPHIL NFR BLD AUTO: 0.6 % — SIGNIFICANT CHANGE UP (ref 0–6)
GLUCOSE SERPL-MCNC: 94 MG/DL — SIGNIFICANT CHANGE UP (ref 70–99)
HCT VFR BLD CALC: 32.9 % — LOW (ref 34.5–45)
HGB BLD-MCNC: 11.5 G/DL — SIGNIFICANT CHANGE UP (ref 11.5–15.5)
IMM GRANULOCYTES NFR BLD AUTO: 0.5 % — SIGNIFICANT CHANGE UP (ref 0–1.5)
LYMPHOCYTES # BLD AUTO: 14.6 % — SIGNIFICANT CHANGE UP (ref 13–44)
LYMPHOCYTES # BLD AUTO: 2 K/UL — SIGNIFICANT CHANGE UP (ref 1–3.3)
MCHC RBC-ENTMCNC: 34.1 PG — HIGH (ref 27–34)
MCHC RBC-ENTMCNC: 35 GM/DL — SIGNIFICANT CHANGE UP (ref 32–36)
MCV RBC AUTO: 97.6 FL — SIGNIFICANT CHANGE UP (ref 80–100)
MONOCYTES # BLD AUTO: 0.94 K/UL — HIGH (ref 0–0.9)
MONOCYTES NFR BLD AUTO: 6.8 % — SIGNIFICANT CHANGE UP (ref 2–14)
NEUTROPHILS # BLD AUTO: 10.61 K/UL — HIGH (ref 1.8–7.4)
NEUTROPHILS NFR BLD AUTO: 77.3 % — HIGH (ref 43–77)
PLATELET # BLD AUTO: 338 K/UL — SIGNIFICANT CHANGE UP (ref 150–400)
POTASSIUM SERPL-MCNC: 4.4 MMOL/L — SIGNIFICANT CHANGE UP (ref 3.5–5.3)
POTASSIUM SERPL-SCNC: 4.4 MMOL/L — SIGNIFICANT CHANGE UP (ref 3.5–5.3)
RBC # BLD: 3.37 M/UL — LOW (ref 3.8–5.2)
RBC # FLD: 12.7 % — SIGNIFICANT CHANGE UP (ref 10.3–14.5)
SODIUM SERPL-SCNC: 131 MMOL/L — LOW (ref 135–145)
WBC # BLD: 13.73 K/UL — HIGH (ref 3.8–10.5)
WBC # FLD AUTO: 13.73 K/UL — HIGH (ref 3.8–10.5)

## 2018-06-29 PROCEDURE — 94640 AIRWAY INHALATION TREATMENT: CPT

## 2018-06-29 PROCEDURE — 85027 COMPLETE CBC AUTOMATED: CPT

## 2018-06-29 PROCEDURE — 87070 CULTURE OTHR SPECIMN AEROBIC: CPT

## 2018-06-29 PROCEDURE — 96374 THER/PROPH/DIAG INJ IV PUSH: CPT

## 2018-06-29 PROCEDURE — 94664 DEMO&/EVAL PT USE INHALER: CPT

## 2018-06-29 PROCEDURE — 99285 EMERGENCY DEPT VISIT HI MDM: CPT | Mod: 25

## 2018-06-29 PROCEDURE — 93005 ELECTROCARDIOGRAM TRACING: CPT

## 2018-06-29 PROCEDURE — 94760 N-INVAS EAR/PLS OXIMETRY 1: CPT

## 2018-06-29 PROCEDURE — 84145 PROCALCITONIN (PCT): CPT

## 2018-06-29 PROCEDURE — 71250 CT THORAX DX C-: CPT

## 2018-06-29 PROCEDURE — 36415 COLL VENOUS BLD VENIPUNCTURE: CPT

## 2018-06-29 PROCEDURE — 71046 X-RAY EXAM CHEST 2 VIEWS: CPT

## 2018-06-29 PROCEDURE — 80053 COMPREHEN METABOLIC PANEL: CPT

## 2018-06-29 PROCEDURE — 96375 TX/PRO/DX INJ NEW DRUG ADDON: CPT

## 2018-06-29 PROCEDURE — 83605 ASSAY OF LACTIC ACID: CPT

## 2018-06-29 PROCEDURE — 87040 BLOOD CULTURE FOR BACTERIA: CPT

## 2018-06-29 PROCEDURE — 80048 BASIC METABOLIC PNL TOTAL CA: CPT

## 2018-06-29 PROCEDURE — 83690 ASSAY OF LIPASE: CPT

## 2018-06-29 PROCEDURE — 81001 URINALYSIS AUTO W/SCOPE: CPT

## 2018-06-29 RX ORDER — BUPROPION HYDROCHLORIDE 150 MG/1
1 TABLET, EXTENDED RELEASE ORAL
Qty: 30 | Refills: 0
Start: 2018-06-29 | End: 2018-07-28

## 2018-06-29 RX ADMIN — PANTOPRAZOLE SODIUM 40 MILLIGRAM(S): 20 TABLET, DELAYED RELEASE ORAL at 05:10

## 2018-06-29 RX ADMIN — ONDANSETRON 4 MILLIGRAM(S): 8 TABLET, FILM COATED ORAL at 05:11

## 2018-06-29 RX ADMIN — OXCARBAZEPINE 150 MILLIGRAM(S): 300 TABLET, FILM COATED ORAL at 05:09

## 2018-06-29 RX ADMIN — TIOTROPIUM BROMIDE 1 CAPSULE(S): 18 CAPSULE ORAL; RESPIRATORY (INHALATION) at 05:10

## 2018-06-29 RX ADMIN — Medication 200 MILLIGRAM(S): at 05:09

## 2018-06-29 RX ADMIN — ONDANSETRON 4 MILLIGRAM(S): 8 TABLET, FILM COATED ORAL at 11:21

## 2018-06-29 RX ADMIN — MORPHINE SULFATE 2 MILLIGRAM(S): 50 CAPSULE, EXTENDED RELEASE ORAL at 11:16

## 2018-06-29 RX ADMIN — Medication 1 PATCH: at 11:17

## 2018-06-29 RX ADMIN — Medication 40 MILLIGRAM(S): at 05:10

## 2018-06-29 RX ADMIN — MORPHINE SULFATE 2 MILLIGRAM(S): 50 CAPSULE, EXTENDED RELEASE ORAL at 05:15

## 2018-06-29 RX ADMIN — Medication 3 MILLILITER(S): at 07:42

## 2018-06-29 RX ADMIN — Medication 25 MICROGRAM(S): at 05:09

## 2018-06-29 RX ADMIN — BUDESONIDE AND FORMOTEROL FUMARATE DIHYDRATE 2 PUFF(S): 160; 4.5 AEROSOL RESPIRATORY (INHALATION) at 05:19

## 2018-06-29 RX ADMIN — MORPHINE SULFATE 2 MILLIGRAM(S): 50 CAPSULE, EXTENDED RELEASE ORAL at 05:00

## 2018-06-29 NOTE — DISCHARGE NOTE ADULT - MEDICATION SUMMARY - MEDICATIONS TO TAKE
I will START or STAY ON the medications listed below when I get home from the hospital:    Florinef Acetate 0.1 mg oral tablet  -- 1 tab(s) by mouth once a day  -- Indication: For Home med    predniSONE 10 mg oral tablet  -- 4 tab(s)  once a day x 5 days  3 tab(s)  once a day x 5 days  2 tab(s)  once a day x 5 days  1 tab(s)  once a day x 5 days  -- Indication: For COPD (chronic obstructive pulmonary disease)    Fioricet oral capsule  -- 1 cap(s) by mouth 2 times a day, As Needed  -- Indication: For Migraine    dilTIAZem 30 mg oral tablet  -- 1 tab(s) by mouth every 8 hours  -- Indication: For Hypertension    Lyrica 75 mg oral capsule  --  by mouth 3 times a day, As Needed  -- Indication: For Pain    OXcarbazepine 150 mg oral tablet  -- 1 tab(s) by mouth 2 times a day  -- Indication: For Sjogren's disease    Valium 5 mg oral tablet  -- 1 tab(s) by mouth 2 times a day, As Needed  -- Indication: For Anxiety    traZODone 50 mg oral tablet  -- 1 tab(s) by mouth 2 times a day, As Needed  -- Indication: For Insomnia    Imodium 2 mg oral capsule  -- 3 cap(s) by mouth once a day  -- Indication: For Loose stools.     ondansetron 8 mg oral tablet  -- 1 tab(s) by mouth 3 times a day  -- Indication: For Nausea    meclizine 12.5 mg oral tablet  -- 1 tab(s) by mouth 3 times a day, As Needed  -- Indication: For Dizziness    hydroxychloroquine 200 mg oral tablet  -- 1 tab(s) by mouth 2 times a day  -- Indication: For Lupus    ProAir HFA 90 mcg/inh inhalation aerosol  -- 2 puff(s) inhaled 4 times a day, As Needed  -- Indication: For COPD (chronic obstructive pulmonary disease)    tiotropium 18 mcg inhalation capsule  -- 1 cap(s) inhaled once a day  -- Indication: For COPD (chronic obstructive pulmonary disease)    Advair Diskus 500 mcg-50 mcg inhalation powder  -- 1 puff(s) inhaled 2 times a day  -- Indication: For COPD (chronic obstructive pulmonary disease)    omeprazole 40 mg oral delayed release capsule  -- 1 cap(s) by mouth 2 times a day  -- Indication: For GERD (gastroesophageal reflux disease)    levoFLOXacin 500 mg oral tablet  -- 1 tab(s) by mouth once a day   -- Indication: For Bronchitis    buPROPion 150 mg/24 hours (XL) oral tablet, extended release  -- 1 tab(s) by mouth once a day  -- Indication: For Depression    nicotine 7 mg/24 hr transdermal film, extended release  -- 1 patch by transdermal patch once a day  -- Indication: For Nicotine addiction    levothyroxine 25 mcg (0.025 mg) oral capsule  -- 1 cap(s) by mouth once a day  -- Indication: For Hypothyroidism

## 2018-06-29 NOTE — CHART NOTE - NSCHARTNOTEFT_GEN_A_CORE
Late entry,  Called earlier by RN for neck swelling, seen & examined at the bedside, AAO X3, states that she just noticed a swelling of her face/upper neck on both sides, and is worried that it may close her throat, O/E: B/L enlarged submandibular salivary gland, vaguely tender, (+) 2 small upper deep cervical LN, freely mobile, and non tender, throat is unremarkable, no mouth ulcers, no obvious dental infection, no postnasal drip or sinus tenderness. Patient has H/O Sjogren syndrome, reassurance.

## 2018-06-29 NOTE — PROGRESS NOTE ADULT - SUBJECTIVE AND OBJECTIVE BOX
PULMONARY/CRITICAL CARE      INTERVAL HPI/OVERNIGHT EVENTS:  Still very sob, cough, wheeze, but better.  No fever.  53y FemaleHPI:  54 y/o F pt with hx of lupus, COPD and cholecystectomy  presents to ED c/o constant cough, fatigue and SOB for the past few days. Pt admits to having chronic abdominal pain which is being worked up, recent CT abdomen 3 days ago; awaiting results. Admits having prior intestinal blockage. No recent hospitalization within the last 3 months. Denies fevers, nausea, vomiting.  no further complaints at this time. (2018 20:07)        PAST MEDICAL & SURGICAL HISTORY:  Glossopharyngeal neuralgia syndrome  Nicotine addiction  COPD (chronic obstructive pulmonary disease)  Neuropathy  Migraine  Anxiety  Hepatitis C  Gallstones  Hypothyroidism  Hypotension  GERD (gastroesophageal reflux disease)  UTI (urinary tract infection)  Vertigo  Sjogren's disease  Lupus  Injury of right wrist, subsequent encounter  Gall bladder disease: REMOVAL  Vocal cord polyp: REMOVAL        ICU Vital Signs Last 24 Hrs  T(C): 37 (2018 23:00), Max: 37.4 (2018 20:00)  T(F): 98.6 (2018 23:00), Max: 99.3 (2018 20:00)  HR: 87 (2018 05:00) (65 - 92)  BP: 101/59 (2018 05:00) (88/52 - 128/67)  BP(mean): --  ABP: --  ABP(mean): --  RR: 16 (2018 05:00) (16 - 18)  SpO2: 97% (2018 05:00) (96% - 100%)    Qtts:     I&O's Summary    2018 07:01  -  2018 07:00  --------------------------------------------------------  IN: 700 mL / OUT: 0 mL / NET: 700 mL            REVIEW OF SYSTEMS:    CONSTITUTIONAL: No fever, weight loss, or fatigue  EYES: No eye pain, visual disturbances, or discharge  ENMT:  No difficulty hearing, tinnitus, vertigo; No sinus or throat pain  NECK: No pain or stiffness  BREASTS: No pain, masses, or nipple discharge  RESPIRATORY: has  cough, wheezing , no  chills or hemoptysis; moderate shortness of breath  CARDIOVASCULAR: No chest pain, palpitations, dizziness, or leg swelling  GASTROINTESTINAL: No abdominal or epigastric pain. No nausea, vomiting, or hematemesis; No diarrhea or constipation. No melena or hematochezia.  GENITOURINARY: No dysuria, frequency, hematuria, or incontinence  NEUROLOGICAL: No headaches, memory loss, loss of strength, numbness, or tremors  SKIN: No itching, burning, rashes, or lesions   LYMPH NODES: No enlarged glands  ENDOCRINE: No heat or cold intolerance; No hair loss  MUSCULOSKELETAL: No joint pain or swelling; No muscle, back, or extremity pain, no calf tenderness  PSYCHIATRIC: No depression, anxiety, mood swings, or difficulty sleeping  HEME/LYMPH: No easy bruising, or bleeding gums  ALLERGY AND IMMUNOLOGIC: No hives or eczema      PHYSICAL EXAM:    GENERAL: NAD, well-groomed, well-developed, NAD  HEAD:  Atraumatic, Normocephalic  EYES: EOMI, PERRLA, conjunctiva and sclera clear  ENMT: No tonsillar erythema, exudates, or enlargement; Moist mucous membranes, Good dentition, No lesions  NECK: Supple, No JVD, Normal thyroid  NERVOUS SYSTEM:  Alert & Oriented X3, Good concentration; Motor Strength 5/5 B/L upper and lower extremities  CHEST/LUNG: few bilat, rhonchi, wheezing,  Good excursion, no rubs  HEART: Regular rate and rhythm; No murmurs, rubs, or gallops  ABDOMEN: Soft, Nontender, Nondistended; Bowel sounds present  EXTREMITIES:  2+ Peripheral Pulses, No clubbing, cyanosis, or edema  LYMPH: No lymphadenopathy noted  SKIN: No rashes or lesions        LABS:                        12.5   15.14 )-----------( 352      ( 2018 15:58 )             34.8         120<LL>  |  85<L>  |  13  ----------------------------<  81  4.8   |  24  |  0.69    Ca    8.6      2018 15:58    TPro  6.7  /  Alb  3.9  /  TBili  0.3  /  DBili  x   /  AST  25  /  ALT  29  /  AlkPhos  63        Urinalysis Basic - ( 2018 19:17 )    Color: Yellow / Appearance: Clear / S.010 / pH: x  Gluc: x / Ketone: Negative  / Bili: Negative / Urobili: Negative mg/dL   Blood: x / Protein: Negative mg/dL / Nitrite: Negative   Leuk Esterase: Trace / RBC: 0-2 /HPF / WBC 0-2   Sq Epi: x / Non Sq Epi: x / Bacteria: Few        vanco through     RADIOLOGY & ADDITIONAL STUDIES:< from: Xray Chest 2 Views PA/Lat (18 @ 19:10) >    EXAM:  XR CHEST PA LAT 2V                          < from: CT Chest No Cont (18 @ 14:52) >  EXAM:  CT CHEST        PROCEDURE DATE:  2018           INTERPRETATION:  Reason for Exam:  Nodule    CT of the chest was performed from the thoracic inlet to the level of the   adrenal glands without contrast injection.    Comparison: 2017 and 2014    Tubes/Lines: None    Mediastinum/Vessels/Heart: Aorta and pulmonary arteries are normal in   size. Thyroid gland is unremarkable. No mediastinal lymphadenopathy.   Enlarged left axillary lymph node is seen measuring 7 x 13 mm, new since   previous examination. This is a nonspecific finding.      Lungs/Pleura/Airways: Centrilobular emphysema noted. Innumerable   bilateral ground glass centrilobular nodules seen consistent with   respiratory bronchiolitis. A more solid-appearing 3 mm nodule seen in the   right lung on series 3, image 87 and is stable since 2017   exam.     Visualized abdomen: Unremarkable    Bones and soft tissues: Unremarkable     IMPRESSION:    Centrilobular emphysema.    Innumerable bilateral centrilobular groundglass nodules consistent with   respiratory bronchiolitis/smoking-related interstitial lung disease.    Solid-appearing 3 mm nodule seen in the right lung is stable since   2014. No new solid appearing nodules.                    RADHA GRAY M.D., ATTENDING RADIOLOGIST  This document has been electronically signed. 2018 12:03PM              < end of copied text >          PROCEDURE DATE:  2018          INTERPRETATION:  Comparison exam dated 10/10/2017    Clinical information: Cough    2 views, frontal, lateral.    Flattened diaphragm, an emphysematous change. There is no sign of acute   infiltrate effusion or congestive failure. Heart size is within normal   limits. Hilar regions and mediastinal contours are unremarkable. The bony   thorax appears intact. Surgical clips present right axilla.    IMPRESSION: See above report                  DANUTA MEJÍA M.D.,ATTENDING RADIOLOGIST  This document has been electronically signed. 2018  5:52PM        < end of copied text >        CRITICAL CARE TIME SPENT:
PULMONARY/CRITICAL CARE      INTERVAL HPI/OVERNIGHT EVENTS: Less sob. Ambulated. Feels neck is slightly swollen. Minimal symmetrical adenopathy noted.    53y FemaleHPI:  52 y/o F pt with hx of lupus, COPD and cholecystectomy  presents to ED c/o constant cough, fatigue and SOB for the past few days. She was seen by Dr. Silver today CXR completed; dx with Pneumonia. Pt admits to having chronic abdominal pain which is being worked up, recent CT abdomen 3 days ago; awaiting results. Admits having prior intestinal blockage. No recent hospitalization within the last 3 months. Denies fevers, nausea, vomiting.  no further complaints at this time. (2018 20:07)        PAST MEDICAL & SURGICAL HISTORY:  Glossopharyngeal neuralgia syndrome  Nicotine addiction  COPD (chronic obstructive pulmonary disease)  Neuropathy  Migraine  Anxiety  Hepatitis C  Gallstones  Hypothyroidism  Hypotension  GERD (gastroesophageal reflux disease)  UTI (urinary tract infection)  Vertigo  Sjogren's disease  Lupus  Injury of right wrist, subsequent encounter  Gall bladder disease: REMOVAL  Vocal cord polyp: REMOVAL        ICU Vital Signs Last 24 Hrs  T(C): 37.3 (2018 09:57), Max: 37.3 (2018 09:57)  T(F): 99.1 (2018 09:57), Max: 99.1 (2018 09:57)  HR: 81 (2018 09:57) (62 - 89)  BP: 109/72 (2018 09:57) (99/59 - 121/54)  BP(mean): --  ABP: --  ABP(mean): --  RR: 16 (2018 09:57) (16 - 18)  SpO2: 96% (2018 09:57) (92% - 99%)    Qtts:     I&O's Summary    2018 07:01  -  2018 07:00  --------------------------------------------------------  IN: 0 mL / OUT: 400 mL / NET: -400 mL    REVIEW OF SYSTEMS:    CONSTITUTIONAL: No fever, weight loss, or fatigue  EYES: No eye pain, visual disturbances, or discharge  ENMT:  No difficulty hearing, tinnitus, vertigo; No sinus or throat pain  NECK: No pain or stiffness  BREASTS: No pain, masses, or nipple discharge  RESPIRATORY: has  cough, wheezing , no  chills or hemoptysis; moderate shortness of breath  CARDIOVASCULAR: No chest pain, palpitations, dizziness, or leg swelling  GASTROINTESTINAL: No abdominal or epigastric pain. No nausea, vomiting, or hematemesis; No diarrhea or constipation. No melena or hematochezia.  GENITOURINARY: No dysuria, frequency, hematuria, or incontinence  NEUROLOGICAL: No headaches, memory loss, loss of strength, numbness, or tremors  SKIN: No itching, burning, rashes, or lesions   LYMPH NODES: No enlarged glands  ENDOCRINE: No heat or cold intolerance; No hair loss  MUSCULOSKELETAL: No joint pain or swelling; No muscle, back, or extremity pain, no calf tenderness  PSYCHIATRIC: No depression, anxiety, mood swings, or difficulty sleeping  HEME/LYMPH: No easy bruising, or bleeding gums  ALLERGY AND IMMUNOLOGIC: No hives or eczema      PHYSICAL EXAM:    GENERAL: NAD, well-groomed, well-developed, NAD  HEAD:  Atraumatic, Normocephalic  EYES: EOMI, PERRLA, conjunctiva and sclera clear  ENMT: No tonsillar erythema, exudates, or enlargement; Moist mucous membranes, Good dentition, No lesions  NECK: Supple, No JVD, Normal thyroid  NERVOUS SYSTEM:  Alert & Oriented X3, Good concentration; Motor Strength 5/5 B/L upper and lower extremities  CHEST/LUNG: few bilat, rhonchi, wheezing,  Good excursion, no rubs  HEART: Regular rate and rhythm; No murmurs, rubs, or gallops  ABDOMEN: Soft, Nontender, Nondistended; Bowel sounds present  EXTREMITIES:  2+ Peripheral Pulses, No clubbing, cyanosis, or edema  LYMPH: No lymphadenopathy noted  SKIN: No rashes or lesions              LABS:                        11.5   13.73 )-----------( 338      ( 2018 08:06 )             32.9     -    131<L>  |  98  |  16  ----------------------------<  94  4.4   |  26  |  0.83    Ca    8.3<L>      2018 08:06    TPro  6.7  /  Alb  3.9  /  TBili  0.3  /  DBili  x   /  AST  25  /  ALT  29  /  AlkPhos  63        Urinalysis Basic - ( 2018 19:17 )    Color: Yellow / Appearance: Clear / S.010 / pH: x  Gluc: x / Ketone: Negative  / Bili: Negative / Urobili: Negative mg/dL   Blood: x / Protein: Negative mg/dL / Nitrite: Negative   Leuk Esterase: Trace / RBC: 0-2 /HPF / WBC 0-2   Sq Epi: x / Non Sq Epi: x / Bacteria: Few        vanco through     RADIOLOGY & ADDITIONAL STUDIES:  < from: CT Chest No Cont (18 @ 10:34) >  EXAM:  CT CHEST                                  PROCEDURE DATE:  2018          INTERPRETATION:  .    CLINICAL INFORMATION: Cough. Evaluate for pneumonia. Pulmonary nodule   follow-up.    TECHNIQUE: Helical axial images of the chest were obtained from the   thoracic inlet to the adrenal glands without the administration of   intravenous contrast. Sagittal and coronal reformations were obtained   from the source data.     COMPARISON: Prior chest CT examination from 2018.     FINDINGS: There are unchanged nonspecific bilateral breasts   calcifications.    There is no axillary, mediastinal, or hilar lymphadenopathy. Right-sided   axillary metallic surgical clips are noted.    The heart size is normal. The pericardium appears unremarkable. There is   minimal atherosclerosis of the aortic arch. There is atherosclerotic   disease of the infrarenal abdominal aorta. The imaged portions of the   aorta are normal in caliber.    The central airways remain patent. A tiny adherent secretion is notable   within the left side of the trachea. Emphysema is again notable.   Scattered areas of linear atelectasis are notable within the bilateral   lung bases. There is no lobar consolidation to suggest pneumonia. There   is an unchanged 3 mm solid pulmonary nodule within the right upper lobe   (series 4, image 43). Tiny calcified granulomas are notable within the   left lower lobe. Previously noted findings of respiratory bronchiolitis   are not seen on the current exam.    Oral contrast is notable within the splenic flexure of the colon. The   patient is status post cholecystectomy. The other visualized upper   abdominal organs appear unremarkable.    The visualized osseous structures are unremarkable.    IMPRESSION: No pneumonia.    Emphysema.    Unchanged 3 mm solid pulmonary nodule within the right upper lobe. A   follow-up chest CT examination in 6 months is recommended to assess for   stability or change.                  CABRERA VANCE M.D., ATTENDING RADIOLOGIST  This document has been electronically signed. Marcos 28 2018 10:36AM              < end of copied text >      CRITICAL CARE TIME SPENT:
Patient is a 53y old  Female who presents with a chief complaint of abd pain, SBO (2018 23:40)    HPI: 54 y/o F pt with hx of lupus, COPD and cholecystectomy  presents to ED c/o constant cough, fatigue and SOB for the past few days. She was seen by Dr. Silver today CXR completed; dx with Pneumonia. Pt admits to having chronic abdominal pain which is being worked up, recent CT abdomen 3 days ago; awaiting results. Admits having prior intestinal blockage. No recent hospitalization within the last 3 months. Denies fevers, nausea, vomiting.  no further complaints at this time. (2018 20:07)    INTERVAL HPI/OVERNIGHT EVENTS:  Chart reviewed, notes reviewed.   Patient seen and examined.  Being followed by following specialists: Pulmonary/CC.     Consultant(s) Notes Reviewed:  [X] Yes    Care Discussed with Consultants/Other Providers: [X] Yes    18 --> patient is feeling better. Cough is better. Denies any chest pain or pressure. Wants to go home. No dizziness or syncope.     REVIEW OF SYSTEMS:  CONSTITUTIONAL:  + fatigue  EYES: No eye pain or discharge  ENMT:  No sinus or throat pain  NECK: No pain or stiffness  BREASTS: No pain, masses, or nipple discharge  RESPIRATORY: + cough, + wheezing, + shortness of breath  CARDIOVASCULAR: No chest pain, palpitations, dizziness, or leg swelling  GASTROINTESTINAL: No abdominal or epigastric pain. No nausea, vomiting, or hematemesis; No diarrhea or constipation. No melena or hematochezia.  GENITOURINARY: No dysuria, frequency, hematuria, or incontinence  NEUROLOGICAL: No headaches, memory loss, loss of strength, numbness, or tremors  SKIN: No itching, burning, rashes, or lesions   LYMPH NODES: No enlarged glands  ENDOCRINE: No heat or cold intolerance; No hair loss; No polydipsia or polyuria  MUSCULOSKELETAL: No joint pain or swelling; No muscle, back, or extremity pain  PSYCHIATRIC: No depression, anxiety, mood swings, or difficulty sleeping  HEME/LYMPH: No easy bruising, or bleeding gums  ALLERGY AND IMMUNOLOGIC: No hives or eczema    Allergies: Zithromax (Stomach Upset)    Intolerances    aspirin (Stomach Upset)    MEDICATIONS  (STANDING):  ALBUTerol/ipratropium for Nebulization 3 milliLiter(s) Nebulizer every 6 hours  buDESOnide 160 MICROgram(s)/formoterol 4.5 MICROgram(s) Inhaler 2 Puff(s) Inhalation two times a day  buPROPion XL . 150 milliGRAM(s) Oral daily  diltiazem    Tablet 30 milliGRAM(s) Oral every 8 hours  hydroxychloroquine 200 milliGRAM(s) Oral two times a day  levoFLOXacin IVPB 500 milliGRAM(s) IV Intermittent every 24 hours  levothyroxine 25 MICROGram(s) Oral daily  methylPREDNISolone sodium succinate Injectable 40 milliGRAM(s) IV Push daily  nicotine -   7 mG/24Hr(s) Patch 1 patch Transdermal daily  OXcarbazepine 150 milliGRAM(s) Oral two times a day  pantoprazole    Tablet 40 milliGRAM(s) Oral before breakfast  sodium chloride 0.9%. 1000 milliLiter(s) (50 mL/Hr) IV Continuous <Continuous>  tiotropium 18 MICROgram(s) Capsule 1 Capsule(s) Inhalation daily    MEDICATIONS  (PRN):  diazepam    Tablet 5 milliGRAM(s) Oral two times a day PRN anxiety  morphine  - Injectable 2 milliGRAM(s) IV Push every 6 hours PRN Severe Pain (7 - 10)  ondansetron Injectable 4 milliGRAM(s) IV Push every 6 hours PRN Nausea  pregabalin 75 milliGRAM(s) Oral three times a day PRN pain  traZODone 50 milliGRAM(s) Oral two times a day PRN psych    Vital Signs Last 24 Hrs  T(C): 36.5 (2018 10:24), Max: 37.4 (2018 20:00)  T(F): 97.7 (2018 10:24), Max: 99.3 (2018 20:00)  HR: 87 (2018 13:06) (65 - 92)  BP: 115/72 (2018 10:24) (88/52 - 128/67)  BP(mean): --  RR: 16 (2018 10:24) (16 - 18)  SpO2: 99% (2018 13:06) (96% - 100%)    PHYSICAL EXAM:  GENERAL: NAD, well-groomed, well-developed  HEAD:  Atraumatic, Normocephalic  EYES: EOMI, PERRLA, conjunctiva and sclera clear  ENMT: No tonsillar erythema, exudates, or enlargement; Moist mucous membranes, Good dentition, No lesions  NECK: Supple, No JVD, Normal thyroid  CHEST/LUNG: decreased breath sounds at bases, rest is clear.   HEART: Regular rate and rhythm; No murmurs, rubs, or gallops  ABDOMEN: Soft, Nontender, Nondistended; Bowel sounds present  EXTREMITIES:  2+ Peripheral Pulses, No clubbing, cyanosis, or edema  MS: No joint swelling or deformity.   LYMPH: No lymphadenopathy noted  SKIN: No rashes or lesions  NERVOUS SYSTEM:  No focal deficit.   PSYCH:  Awake and alert.     LABS:                       12.5   15.14 )-----------( 352      ( 2018 15:58 )             34.8     2018 15:58    120    |  85     |  13     ----------------------------<  81     4.8     |  24     |  0.69     Ca    8.6        2018 15:58    TPro  6.7    /  Alb  3.9    /  TBili  0.3    /  DBili  x      /  AST  25     /  ALT  29     /  AlkPhos  63     2018 15:58    Urinalysis Basic - ( 2018 19:17 )    Color: Yellow / Appearance: Clear / S.010 / pH: x  Gluc: x / Ketone: Negative  / Bili: Negative / Urobili: Negative mg/dL   Blood: x / Protein: Negative mg/dL / Nitrite: Negative   Leuk Esterase: Trace / RBC: 0-2 /HPF / WBC 0-2   Sq Epi: x / Non Sq Epi: x / Bacteria: Few    RADIOLOGY TEST: (IMAGES REVIEWED BY ME)  < from: CT Chest No Cont (18 @ 10:34) >  EXAM:  CT CHEST                        PROCEDURE DATE:  2018    INTERPRETATION:  .    CLINICAL INFORMATION: Cough. Evaluate for pneumonia. Pulmonary nodule   follow-up.    TECHNIQUE: Helical axial images of the chest were obtained from the   thoracic inlet to the adrenal glands without the administration of   intravenous contrast. Sagittal and coronal reformations were obtained   from the source data.     COMPARISON: Prior chest CT examination from 2018.     FINDINGS: There are unchanged nonspecific bilateral breasts   calcifications.    There is no axillary, mediastinal, or hilar lymphadenopathy. Right-sided   axillary metallic surgical clips are noted.    The heart size is normal. The pericardium appears unremarkable. There is   minimal atherosclerosis of the aortic arch. There is atherosclerotic   disease of the infrarenal abdominal aorta. The imaged portions of the   aorta are normal in caliber.    The central airways remain patent. A tiny adherent secretion is notable   within the left side of the trachea. Emphysema is again notable.   Scattered areas of linear atelectasis are notable within the bilateral   lung bases. There is no lobar consolidation to suggest pneumonia. There   is an unchanged 3 mm solid pulmonary nodule within the right upper lobe   (series 4, image 43). Tiny calcified granulomas are notable within the   left lower lobe. Previously noted findings of respiratory bronchiolitis   are not seen on the current exam.    Oral contrast is notable within the splenic flexure of the colon. The   patient is status post cholecystectomy. The other visualized upper   abdominal organs appear unremarkable.    The visualized osseous structures are unremarkable.    IMPRESSION: No pneumonia.    Emphysema.    Unchanged 3 mm solid pulmonary nodule within the right upper lobe. A   follow-up chest CT examination in 6 months is recommended to assess for   stability or change.    < end of copied text >    Imaging Personally Reviewed:  [X] YES        HEALTH ISSUES - PROBLEM Dx:  GERD (gastroesophageal reflux disease): GERD (gastroesophageal reflux disease)  Sjogren's disease: Sjogren&#x27;s disease  Pneumonia due to infectious organism, unspecified laterality, unspecified part of lung: Pneumonia due to infectious organism, unspecified laterality, unspecified part of lung  Lupus: Lupus  Hypothyroidism: Hypothyroidism  Neuropathy: Neuropathy  Nicotine addiction: Nicotine addiction  COPD (chronic obstructive pulmonary disease): COPD (chronic obstructive pulmonary disease)  Hyponatremia: Hyponatremia

## 2018-06-29 NOTE — DISCHARGE NOTE ADULT - NS AS ACTIVITY OBS
Showering allowed/Walking-Indoors allowed/Walking-Outdoors allowed/Stairs allowed/Bathing allowed/No Heavy lifting/straining/Do not drive or operate machinery

## 2018-06-29 NOTE — DISCHARGE NOTE ADULT - HOSPITAL COURSE
Patient was admitted with shortness of breath. She was found to have COPD exacerbation. Concern was Pneumonia. Patient was ruled out for pneumonia by CT scan. Patient also had hyponatremia, most likely die to chronic SIADH. Patient improved with IV steroids and Nebs. She was on IV antibiotics. She has submandibular lymphadenopathy and  is recommended out patient follow up with ENT or heme/Onc for further work up and management. Patient is counselled on smoking cessation.

## 2018-06-29 NOTE — DISCHARGE NOTE ADULT - CARE PROVIDER_API CALL
Dilip Silver), Critical Care Medicine; Internal Medicine; Pulmonary Disease  [address] [place] [address]  Phone: [phone]  Fax: [phone]

## 2018-06-29 NOTE — PROGRESS NOTE ADULT - PROBLEM SELECTOR PLAN 2
Inhalers  Steroids  HHN
Inhalers  Steroids  HHN
Patient without any pneumonia on CT scan. RULED OUT FOR PNEUMONIA.   Continue abx as per pulmonary.

## 2018-06-29 NOTE — PROGRESS NOTE ADULT - PROBLEM SELECTOR PROBLEM 2
COPD (chronic obstructive pulmonary disease)
COPD (chronic obstructive pulmonary disease)
R/O Pneumonia due to infectious organism, unspecified laterality, unspecified part of lung

## 2018-06-29 NOTE — DISCHARGE NOTE ADULT - ADDITIONAL INSTRUCTIONS
Follow up with Dr. Silver next week.   Follow up with ENT or Dr. Ferrell (hematology/oncology) for lymphadenopathy of neck.

## 2018-06-29 NOTE — PROGRESS NOTE ADULT - PROBLEM SELECTOR PLAN 1
Levaquin  FU CT chest
Levaquin  FU CT chest unchanged
Patient with COPD exacerbation, POA.  Continue IV steroids and empiric antibiotics.   Continue BD and Nebs.   Pulmonary follow up.

## 2018-06-29 NOTE — DISCHARGE NOTE ADULT - PATIENT PORTAL LINK FT
You can access the [facility] Patient Portal, offered by [facility], by registering with the following website: [email or web address]

## 2018-06-29 NOTE — DISCHARGE NOTE ADULT - NS MD DC FALL RISK RISK
For information on Fall & Injury Prevention, visit [email or web address]
For information on Fall & Injury Prevention, visit [email or web address]

## 2018-06-29 NOTE — DISCHARGE NOTE ADULT - PLAN OF CARE
Breath better Regular diet.  Complete course of antibiotics as prescribed.   Taper Prednisone as out patient.   STOP SMOKING  Follow up with Dr. Silver next week.   Follow up with ENT or Dr. Ferrell (hematology/oncology) for lymphadenopathy of neck.

## 2018-06-29 NOTE — PROGRESS NOTE ADULT - PROBLEM SELECTOR PLAN 3
Watch Na
Patient with history of chronic hyponatremia, possibly due to SIADH.  Continue NS and repeat BMP
Watch Na

## 2018-06-29 NOTE — PROGRESS NOTE ADULT - PROBLEM SELECTOR PROBLEM 1
Pneumonia due to infectious organism, unspecified laterality, unspecified part of lung
Chronic obstructive pulmonary disease with acute exacerbation
Pneumonia due to infectious organism, unspecified laterality, unspecified part of lung

## 2018-06-29 NOTE — PROGRESS NOTE ADULT - ASSESSMENT
Pt with severe COPD, continued smoking, admitted for exac. COPD, possible early pneumonia. Slight improvement
HPI:  52 y/o F pt with hx of lupus, COPD and cholecystectomy  presents to ED c/o constant cough, fatigue and SOB for the past few days. She was seen by Dr. Silver today CXR completed; dx with Pneumonia. Pt admits to having chronic abdominal pain which is being worked up, recent CT abdomen 3 days ago; awaiting results. Admits having prior intestinal blockage. No recent hospitalization within the last 3 months. Denies fevers, nausea, vomiting.  no further complaints at this time. (27 Jun 2018 20:07)
Pt with severe COPD, continued smoking, admitted for exac. COPD, possible early pneumonia. Slight improvement  Pt. wants to go home. Advised avoid cigarettes.  See me in office next week.  Taper steroids slowly.

## 2018-06-29 NOTE — DISCHARGE NOTE ADULT - CARE PLAN
Principal Discharge DX:	Chronic obstructive pulmonary disease with acute exacerbation  Goal:	Breath better  Assessment and plan of treatment:	Regular diet.  Complete course of antibiotics as prescribed.   Taper Prednisone as out patient.   STOP SMOKING  Follow up with Dr. Silver next week.   Follow up with ENT or Dr. Ferrell (hematology/oncology) for lymphadenopathy of neck.  Secondary Diagnosis:	Nicotine addiction  Secondary Diagnosis:	Neuropathy  Secondary Diagnosis:	Lupus  Secondary Diagnosis:	Migraine  Secondary Diagnosis:	Hypothyroidism  Secondary Diagnosis:	Hyponatremia

## 2018-06-29 NOTE — DISCHARGE NOTE ADULT - PATIENT PORTAL LINK FT
You can access the [facility] Patient Portal, offered by [facility], by registering with the following website: [email or web address]

## 2018-06-30 LAB
CULTURE RESULTS: SIGNIFICANT CHANGE UP
SPECIMEN SOURCE: SIGNIFICANT CHANGE UP

## 2018-07-03 ENCOUNTER — RX RENEWAL (OUTPATIENT)
Age: 53
End: 2018-07-03
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
## 2018-07-17 NOTE — PROGRESS NOTE ADULT - PROBLEM/PLAN-2
Additional Anticipated Plans: If malignant consider curettage and destruction DISPLAY PLAN FREE TEXT

## 2018-07-24 PROBLEM — Z78.9 ALCOHOL USE: Status: ACTIVE | Noted: 2017-08-04

## 2018-07-31 ENCOUNTER — APPOINTMENT (OUTPATIENT)
Dept: GASTROENTEROLOGY | Facility: HOSPITAL | Age: 53
End: 2018-07-31

## 2018-08-06 ENCOUNTER — RX RENEWAL (OUTPATIENT)
Age: 53
End: 2018-08-06

## 2018-08-07 ENCOUNTER — RX RENEWAL (OUTPATIENT)
Age: 53
End: 2018-08-07

## 2018-08-09 ENCOUNTER — INPATIENT (INPATIENT)
Facility: HOSPITAL | Age: 53
LOS: 1 days | Discharge: HOME | DRG: 392 | End: 2018-08-11
Attending: FAMILY MEDICINE | Admitting: FAMILY MEDICINE
Payer: MEDICARE

## 2018-08-09 VITALS
HEIGHT: 57 IN | DIASTOLIC BLOOD PRESSURE: 59 MMHG | TEMPERATURE: 98 F | WEIGHT: 85.1 LBS | OXYGEN SATURATION: 97 % | SYSTOLIC BLOOD PRESSURE: 92 MMHG | HEART RATE: 82 BPM | RESPIRATION RATE: 16 BRPM

## 2018-08-09 DIAGNOSIS — F17.200 NICOTINE DEPENDENCE, UNSPECIFIED, UNCOMPLICATED: ICD-10-CM

## 2018-08-09 DIAGNOSIS — S69.91XD UNSPECIFIED INJURY OF RIGHT WRIST, HAND AND FINGER(S), SUBSEQUENT ENCOUNTER: Chronic | ICD-10-CM

## 2018-08-09 DIAGNOSIS — L93.0 DISCOID LUPUS ERYTHEMATOSUS: ICD-10-CM

## 2018-08-09 DIAGNOSIS — J44.9 CHRONIC OBSTRUCTIVE PULMONARY DISEASE, UNSPECIFIED: ICD-10-CM

## 2018-08-09 DIAGNOSIS — K82.9 DISEASE OF GALLBLADDER, UNSPECIFIED: Chronic | ICD-10-CM

## 2018-08-09 DIAGNOSIS — E87.1 HYPO-OSMOLALITY AND HYPONATREMIA: ICD-10-CM

## 2018-08-09 DIAGNOSIS — E03.9 HYPOTHYROIDISM, UNSPECIFIED: ICD-10-CM

## 2018-08-09 DIAGNOSIS — R10.9 UNSPECIFIED ABDOMINAL PAIN: ICD-10-CM

## 2018-08-09 DIAGNOSIS — J38.1 POLYP OF VOCAL CORD AND LARYNX: Chronic | ICD-10-CM

## 2018-08-09 LAB
ALBUMIN SERPL ELPH-MCNC: 4.5 G/DL — SIGNIFICANT CHANGE UP (ref 3.3–5)
ALP SERPL-CCNC: 82 U/L — SIGNIFICANT CHANGE UP (ref 30–120)
ALT FLD-CCNC: 41 U/L DA — SIGNIFICANT CHANGE UP (ref 10–60)
ANION GAP SERPL CALC-SCNC: 11 MMOL/L — SIGNIFICANT CHANGE UP (ref 5–17)
ANION GAP SERPL CALC-SCNC: 12 MMOL/L — SIGNIFICANT CHANGE UP (ref 5–17)
APPEARANCE UR: CLEAR — SIGNIFICANT CHANGE UP
APTT BLD: 30 SEC — SIGNIFICANT CHANGE UP (ref 27.5–37.4)
AST SERPL-CCNC: 34 U/L — SIGNIFICANT CHANGE UP (ref 10–40)
BASOPHILS # BLD AUTO: 0.05 K/UL — SIGNIFICANT CHANGE UP (ref 0–0.2)
BASOPHILS NFR BLD AUTO: 0.4 % — SIGNIFICANT CHANGE UP (ref 0–2)
BILIRUB SERPL-MCNC: 0.7 MG/DL — SIGNIFICANT CHANGE UP (ref 0.2–1.2)
BILIRUB UR-MCNC: NEGATIVE — SIGNIFICANT CHANGE UP
BUN SERPL-MCNC: 12 MG/DL — SIGNIFICANT CHANGE UP (ref 7–23)
BUN SERPL-MCNC: 9 MG/DL — SIGNIFICANT CHANGE UP (ref 7–23)
CALCIUM SERPL-MCNC: 7.9 MG/DL — LOW (ref 8.4–10.5)
CALCIUM SERPL-MCNC: 9.4 MG/DL — SIGNIFICANT CHANGE UP (ref 8.4–10.5)
CHLORIDE SERPL-SCNC: 101 MMOL/L — SIGNIFICANT CHANGE UP (ref 96–108)
CHLORIDE SERPL-SCNC: 87 MMOL/L — LOW (ref 96–108)
CO2 SERPL-SCNC: 18 MMOL/L — LOW (ref 22–31)
CO2 SERPL-SCNC: 22 MMOL/L — SIGNIFICANT CHANGE UP (ref 22–31)
COLOR SPEC: YELLOW — SIGNIFICANT CHANGE UP
CREAT SERPL-MCNC: 0.64 MG/DL — SIGNIFICANT CHANGE UP (ref 0.5–1.3)
CREAT SERPL-MCNC: 0.69 MG/DL — SIGNIFICANT CHANGE UP (ref 0.5–1.3)
DIFF PNL FLD: NEGATIVE — SIGNIFICANT CHANGE UP
EOSINOPHIL # BLD AUTO: 0.04 K/UL — SIGNIFICANT CHANGE UP (ref 0–0.5)
EOSINOPHIL NFR BLD AUTO: 0.3 % — SIGNIFICANT CHANGE UP (ref 0–6)
GLUCOSE SERPL-MCNC: 104 MG/DL — HIGH (ref 70–99)
GLUCOSE SERPL-MCNC: 56 MG/DL — LOW (ref 70–99)
GLUCOSE UR QL: NEGATIVE MG/DL — SIGNIFICANT CHANGE UP
HCT VFR BLD CALC: 43.4 % — SIGNIFICANT CHANGE UP (ref 34.5–45)
HGB BLD-MCNC: 15.8 G/DL — HIGH (ref 11.5–15.5)
IMM GRANULOCYTES NFR BLD AUTO: 0.9 % — SIGNIFICANT CHANGE UP (ref 0–1.5)
INR BLD: 0.93 RATIO — SIGNIFICANT CHANGE UP (ref 0.88–1.16)
KETONES UR-MCNC: NEGATIVE — SIGNIFICANT CHANGE UP
LACTATE SERPL-SCNC: 0.8 MMOL/L — SIGNIFICANT CHANGE UP (ref 0.7–2)
LACTATE SERPL-SCNC: 1.6 MMOL/L — SIGNIFICANT CHANGE UP (ref 0.7–2)
LEUKOCYTE ESTERASE UR-ACNC: NEGATIVE — SIGNIFICANT CHANGE UP
LIDOCAIN IGE QN: 135 U/L — SIGNIFICANT CHANGE UP (ref 73–393)
LYMPHOCYTES # BLD AUTO: 1.65 K/UL — SIGNIFICANT CHANGE UP (ref 1–3.3)
LYMPHOCYTES # BLD AUTO: 11.8 % — LOW (ref 13–44)
MCHC RBC-ENTMCNC: 33.8 PG — SIGNIFICANT CHANGE UP (ref 27–34)
MCHC RBC-ENTMCNC: 36.4 GM/DL — HIGH (ref 32–36)
MCV RBC AUTO: 92.9 FL — SIGNIFICANT CHANGE UP (ref 80–100)
MONOCYTES # BLD AUTO: 1.09 K/UL — HIGH (ref 0–0.9)
MONOCYTES NFR BLD AUTO: 7.8 % — SIGNIFICANT CHANGE UP (ref 2–14)
NEUTROPHILS # BLD AUTO: 11.01 K/UL — HIGH (ref 1.8–7.4)
NEUTROPHILS NFR BLD AUTO: 78.8 % — HIGH (ref 43–77)
NITRITE UR-MCNC: NEGATIVE — SIGNIFICANT CHANGE UP
PH UR: 7 — SIGNIFICANT CHANGE UP (ref 5–8)
PLATELET # BLD AUTO: 413 K/UL — HIGH (ref 150–400)
POTASSIUM SERPL-MCNC: 3.6 MMOL/L — SIGNIFICANT CHANGE UP (ref 3.5–5.3)
POTASSIUM SERPL-MCNC: 4.1 MMOL/L — SIGNIFICANT CHANGE UP (ref 3.5–5.3)
POTASSIUM SERPL-SCNC: 3.6 MMOL/L — SIGNIFICANT CHANGE UP (ref 3.5–5.3)
POTASSIUM SERPL-SCNC: 4.1 MMOL/L — SIGNIFICANT CHANGE UP (ref 3.5–5.3)
PROT SERPL-MCNC: 8 G/DL — SIGNIFICANT CHANGE UP (ref 6–8.3)
PROT UR-MCNC: NEGATIVE MG/DL — SIGNIFICANT CHANGE UP
PROTHROM AB SERPL-ACNC: 10.1 SEC — SIGNIFICANT CHANGE UP (ref 9.8–12.7)
RBC # BLD: 4.67 M/UL — SIGNIFICANT CHANGE UP (ref 3.8–5.2)
RBC # FLD: 11.4 % — SIGNIFICANT CHANGE UP (ref 10.3–14.5)
SODIUM SERPL-SCNC: 121 MMOL/L — LOW (ref 135–145)
SODIUM SERPL-SCNC: 130 MMOL/L — LOW (ref 135–145)
SODIUM SERPL-SCNC: 131 MMOL/L — LOW (ref 135–145)
SP GR SPEC: 1 — LOW (ref 1.01–1.02)
UROBILINOGEN FLD QL: NEGATIVE MG/DL — SIGNIFICANT CHANGE UP
WBC # BLD: 13.97 K/UL — HIGH (ref 3.8–10.5)
WBC # FLD AUTO: 13.97 K/UL — HIGH (ref 3.8–10.5)

## 2018-08-09 PROCEDURE — 99285 EMERGENCY DEPT VISIT HI MDM: CPT

## 2018-08-09 PROCEDURE — 70450 CT HEAD/BRAIN W/O DYE: CPT | Mod: 26

## 2018-08-09 PROCEDURE — 99223 1ST HOSP IP/OBS HIGH 75: CPT | Mod: AI

## 2018-08-09 PROCEDURE — 74177 CT ABD & PELVIS W/CONTRAST: CPT | Mod: 26

## 2018-08-09 PROCEDURE — 72040 X-RAY EXAM NECK SPINE 2-3 VW: CPT | Mod: 26

## 2018-08-09 PROCEDURE — 93010 ELECTROCARDIOGRAM REPORT: CPT

## 2018-08-09 RX ORDER — BUDESONIDE AND FORMOTEROL FUMARATE DIHYDRATE 160; 4.5 UG/1; UG/1
2 AEROSOL RESPIRATORY (INHALATION)
Qty: 0 | Refills: 0 | Status: DISCONTINUED | OUTPATIENT
Start: 2018-08-09 | End: 2018-08-11

## 2018-08-09 RX ORDER — ACETAMINOPHEN 500 MG
650 TABLET ORAL EVERY 6 HOURS
Qty: 0 | Refills: 0 | Status: DISCONTINUED | OUTPATIENT
Start: 2018-08-09 | End: 2018-08-11

## 2018-08-09 RX ORDER — NICOTINE POLACRILEX 2 MG
1 GUM BUCCAL DAILY
Qty: 0 | Refills: 0 | Status: DISCONTINUED | OUTPATIENT
Start: 2018-08-09 | End: 2018-08-11

## 2018-08-09 RX ORDER — SODIUM CHLORIDE 9 MG/ML
1000 INJECTION INTRAMUSCULAR; INTRAVENOUS; SUBCUTANEOUS ONCE
Qty: 0 | Refills: 0 | Status: COMPLETED | OUTPATIENT
Start: 2018-08-09 | End: 2018-08-09

## 2018-08-09 RX ORDER — OXCARBAZEPINE 300 MG/1
150 TABLET, FILM COATED ORAL
Qty: 0 | Refills: 0 | Status: DISCONTINUED | OUTPATIENT
Start: 2018-08-09 | End: 2018-08-11

## 2018-08-09 RX ORDER — SODIUM CHLORIDE 9 MG/ML
3 INJECTION INTRAMUSCULAR; INTRAVENOUS; SUBCUTANEOUS ONCE
Qty: 0 | Refills: 0 | Status: COMPLETED | OUTPATIENT
Start: 2018-08-09 | End: 2018-08-09

## 2018-08-09 RX ORDER — SODIUM CHLORIDE 9 MG/ML
1000 INJECTION, SOLUTION INTRAVENOUS
Qty: 0 | Refills: 0 | Status: DISCONTINUED | OUTPATIENT
Start: 2018-08-09 | End: 2018-08-09

## 2018-08-09 RX ORDER — HYDROMORPHONE HYDROCHLORIDE 2 MG/ML
0.5 INJECTION INTRAMUSCULAR; INTRAVENOUS; SUBCUTANEOUS ONCE
Qty: 0 | Refills: 0 | Status: DISCONTINUED | OUTPATIENT
Start: 2018-08-09 | End: 2018-08-09

## 2018-08-09 RX ORDER — HYDROXYCHLOROQUINE SULFATE 200 MG
200 TABLET ORAL
Qty: 0 | Refills: 0 | Status: DISCONTINUED | OUTPATIENT
Start: 2018-08-09 | End: 2018-08-11

## 2018-08-09 RX ORDER — PIPERACILLIN AND TAZOBACTAM 4; .5 G/20ML; G/20ML
3.38 INJECTION, POWDER, LYOPHILIZED, FOR SOLUTION INTRAVENOUS ONCE
Qty: 0 | Refills: 0 | Status: COMPLETED | OUTPATIENT
Start: 2018-08-09 | End: 2018-08-09

## 2018-08-09 RX ORDER — BUPROPION HYDROCHLORIDE 150 MG/1
150 TABLET, EXTENDED RELEASE ORAL DAILY
Qty: 0 | Refills: 0 | Status: DISCONTINUED | OUTPATIENT
Start: 2018-08-09 | End: 2018-08-11

## 2018-08-09 RX ORDER — ONDANSETRON 8 MG/1
4 TABLET, FILM COATED ORAL ONCE
Qty: 0 | Refills: 0 | Status: COMPLETED | OUTPATIENT
Start: 2018-08-09 | End: 2018-08-09

## 2018-08-09 RX ORDER — HEPARIN SODIUM 5000 [USP'U]/ML
5000 INJECTION INTRAVENOUS; SUBCUTANEOUS EVERY 12 HOURS
Qty: 0 | Refills: 0 | Status: DISCONTINUED | OUTPATIENT
Start: 2018-08-09 | End: 2018-08-11

## 2018-08-09 RX ORDER — DOCUSATE SODIUM 100 MG
100 CAPSULE ORAL THREE TIMES A DAY
Qty: 0 | Refills: 0 | Status: DISCONTINUED | OUTPATIENT
Start: 2018-08-09 | End: 2018-08-11

## 2018-08-09 RX ORDER — SODIUM CHLORIDE 9 MG/ML
1000 INJECTION INTRAMUSCULAR; INTRAVENOUS; SUBCUTANEOUS
Qty: 0 | Refills: 0 | Status: DISCONTINUED | OUTPATIENT
Start: 2018-08-09 | End: 2018-08-11

## 2018-08-09 RX ORDER — ONDANSETRON 8 MG/1
4 TABLET, FILM COATED ORAL EVERY 6 HOURS
Qty: 0 | Refills: 0 | Status: DISCONTINUED | OUTPATIENT
Start: 2018-08-09 | End: 2018-08-11

## 2018-08-09 RX ORDER — MECLIZINE HCL 12.5 MG
12.5 TABLET ORAL THREE TIMES A DAY
Qty: 0 | Refills: 0 | Status: DISCONTINUED | OUTPATIENT
Start: 2018-08-09 | End: 2018-08-11

## 2018-08-09 RX ORDER — ALBUTEROL 90 UG/1
2 AEROSOL, METERED ORAL EVERY 6 HOURS
Qty: 0 | Refills: 0 | Status: DISCONTINUED | OUTPATIENT
Start: 2018-08-09 | End: 2018-08-11

## 2018-08-09 RX ORDER — IOHEXOL 300 MG/ML
30 INJECTION, SOLUTION INTRAVENOUS ONCE
Qty: 0 | Refills: 0 | Status: COMPLETED | OUTPATIENT
Start: 2018-08-09 | End: 2018-08-09

## 2018-08-09 RX ORDER — MECLIZINE HCL 12.5 MG
25 TABLET ORAL ONCE
Qty: 0 | Refills: 0 | Status: COMPLETED | OUTPATIENT
Start: 2018-08-09 | End: 2018-08-09

## 2018-08-09 RX ORDER — TIOTROPIUM BROMIDE 18 UG/1
1 CAPSULE ORAL; RESPIRATORY (INHALATION) DAILY
Qty: 0 | Refills: 0 | Status: DISCONTINUED | OUTPATIENT
Start: 2018-08-09 | End: 2018-08-11

## 2018-08-09 RX ORDER — KETOROLAC TROMETHAMINE 30 MG/ML
30 SYRINGE (ML) INJECTION ONCE
Qty: 0 | Refills: 0 | Status: DISCONTINUED | OUTPATIENT
Start: 2018-08-09 | End: 2018-08-09

## 2018-08-09 RX ORDER — LEVOTHYROXINE SODIUM 125 MCG
25 TABLET ORAL DAILY
Qty: 0 | Refills: 0 | Status: DISCONTINUED | OUTPATIENT
Start: 2018-08-09 | End: 2018-08-11

## 2018-08-09 RX ORDER — FLUDROCORTISONE ACETATE 0.1 MG/1
0.1 TABLET ORAL DAILY
Qty: 0 | Refills: 0 | Status: DISCONTINUED | OUTPATIENT
Start: 2018-08-09 | End: 2018-08-11

## 2018-08-09 RX ORDER — IPRATROPIUM/ALBUTEROL SULFATE 18-103MCG
3 AEROSOL WITH ADAPTER (GRAM) INHALATION ONCE
Qty: 0 | Refills: 0 | Status: COMPLETED | OUTPATIENT
Start: 2018-08-09 | End: 2018-08-09

## 2018-08-09 RX ORDER — PIPERACILLIN AND TAZOBACTAM 4; .5 G/20ML; G/20ML
3.38 INJECTION, POWDER, LYOPHILIZED, FOR SOLUTION INTRAVENOUS EVERY 8 HOURS
Qty: 0 | Refills: 0 | Status: DISCONTINUED | OUTPATIENT
Start: 2018-08-09 | End: 2018-08-11

## 2018-08-09 RX ORDER — FAMOTIDINE 10 MG/ML
20 INJECTION INTRAVENOUS
Qty: 0 | Refills: 0 | Status: DISCONTINUED | OUTPATIENT
Start: 2018-08-09 | End: 2018-08-11

## 2018-08-09 RX ORDER — TRAMADOL HYDROCHLORIDE 50 MG/1
50 TABLET ORAL EVERY 6 HOURS
Qty: 0 | Refills: 0 | Status: DISCONTINUED | OUTPATIENT
Start: 2018-08-09 | End: 2018-08-11

## 2018-08-09 RX ADMIN — SODIUM CHLORIDE 1000 MILLILITER(S): 9 INJECTION INTRAMUSCULAR; INTRAVENOUS; SUBCUTANEOUS at 15:55

## 2018-08-09 RX ADMIN — SODIUM CHLORIDE 50 MILLILITER(S): 9 INJECTION INTRAMUSCULAR; INTRAVENOUS; SUBCUTANEOUS at 20:43

## 2018-08-09 RX ADMIN — ONDANSETRON 4 MILLIGRAM(S): 8 TABLET, FILM COATED ORAL at 18:41

## 2018-08-09 RX ADMIN — ONDANSETRON 4 MILLIGRAM(S): 8 TABLET, FILM COATED ORAL at 12:45

## 2018-08-09 RX ADMIN — FAMOTIDINE 20 MILLIGRAM(S): 10 INJECTION INTRAVENOUS at 18:37

## 2018-08-09 RX ADMIN — Medication 30 MILLIGRAM(S): at 16:03

## 2018-08-09 RX ADMIN — SODIUM CHLORIDE 3 MILLILITER(S): 9 INJECTION INTRAMUSCULAR; INTRAVENOUS; SUBCUTANEOUS at 12:45

## 2018-08-09 RX ADMIN — Medication 30 MILLIGRAM(S): at 13:57

## 2018-08-09 RX ADMIN — TIOTROPIUM BROMIDE 1 CAPSULE(S): 18 CAPSULE ORAL; RESPIRATORY (INHALATION) at 18:38

## 2018-08-09 RX ADMIN — PIPERACILLIN AND TAZOBACTAM 200 GRAM(S): 4; .5 INJECTION, POWDER, LYOPHILIZED, FOR SOLUTION INTRAVENOUS at 18:41

## 2018-08-09 RX ADMIN — IOHEXOL 30 MILLILITER(S): 300 INJECTION, SOLUTION INTRAVENOUS at 13:17

## 2018-08-09 RX ADMIN — TRAMADOL HYDROCHLORIDE 50 MILLIGRAM(S): 50 TABLET ORAL at 20:21

## 2018-08-09 RX ADMIN — SODIUM CHLORIDE 250 MILLILITER(S): 9 INJECTION, SOLUTION INTRAVENOUS at 13:45

## 2018-08-09 RX ADMIN — Medication 100 MILLIGRAM(S): at 22:53

## 2018-08-09 RX ADMIN — BUDESONIDE AND FORMOTEROL FUMARATE DIHYDRATE 2 PUFF(S): 160; 4.5 AEROSOL RESPIRATORY (INHALATION) at 18:37

## 2018-08-09 RX ADMIN — Medication 12.5 MILLIGRAM(S): at 18:37

## 2018-08-09 RX ADMIN — OXCARBAZEPINE 150 MILLIGRAM(S): 300 TABLET, FILM COATED ORAL at 18:37

## 2018-08-09 RX ADMIN — Medication 25 MILLIGRAM(S): at 13:17

## 2018-08-09 RX ADMIN — Medication 3 MILLILITER(S): at 13:09

## 2018-08-09 RX ADMIN — SODIUM CHLORIDE 1000 MILLILITER(S): 9 INJECTION INTRAMUSCULAR; INTRAVENOUS; SUBCUTANEOUS at 12:45

## 2018-08-09 RX ADMIN — Medication 75 MILLIGRAM(S): at 20:21

## 2018-08-09 RX ADMIN — Medication 200 MILLIGRAM(S): at 18:46

## 2018-08-09 RX ADMIN — SODIUM CHLORIDE 1000 MILLILITER(S): 9 INJECTION INTRAMUSCULAR; INTRAVENOUS; SUBCUTANEOUS at 18:37

## 2018-08-09 RX ADMIN — SODIUM CHLORIDE 1000 MILLILITER(S): 9 INJECTION INTRAMUSCULAR; INTRAVENOUS; SUBCUTANEOUS at 17:02

## 2018-08-09 RX ADMIN — ONDANSETRON 4 MILLIGRAM(S): 8 TABLET, FILM COATED ORAL at 15:00

## 2018-08-09 NOTE — H&P ADULT - ASSESSMENT
Patient is 52 yo female with complexed PMHX presenting with     1.  Abdominal pain.  Associated with N/V/D.  Abdm CT scan noticed.  Will start patient on IVF, IV antibiotic pending Blood culture, and stool culture.  GI consult    2. dizziness.  seems to be chronic in setting of dehydration.  Head CT scan pending.  Neurologically intact.  Continue with meclizine    3.  COPD.  Counselled about smoking.  stable.  Continue with home inhalation.  Nicotine patch    4.  GERD.  Continue with pepcid    5.  Hyponatremia.  Will obtain Urine osmolarity, serum osmolarity, urine sodium, and Nephrology consult.  1 liter fluid restriction.  fluid hydration, and Monitor sodium level closely.      Plan of care was discussed with patient in great details, All questions were answered to their satisfaction  Seems to understand, and in agreement

## 2018-08-09 NOTE — ED PROVIDER NOTE - ABDOMINAL TENDER
epigastric/left upper quadrant/left lower quadrant/right lower quadrant/right upper quadrant/periumbilical/umbilical

## 2018-08-09 NOTE — ED ADULT NURSE REASSESSMENT NOTE - NS ED NURSE REASSESS COMMENT FT1
Patient complained of 10/10 neck pain, MD Levine was notified, awaiting intervention.
Patient received from HERMILO Santana, Celio3, ambulatory with a steady gait, complaining of dizziness, abdominal pain and diarrhea. Patient denies any pain at present, noted to have fluids infusing, awaiting CT scan, results and disposition, nursing care ongoing.
Patient returned from CT scan in stable condition, awaiting results from CT scan and disposition, nursing care ongoing.
Patient taken to CT scan.
Patient was transferred to admitting floor by PCA in stable condition with fluids infusing, report was given to Norm Patton.
Pt reported feeling better Pt able to tolerate PO contrast No Vomiting noted.

## 2018-08-09 NOTE — ED PROVIDER NOTE - PROGRESS NOTE DETAILS
54 y/o F pt with Hx of lupus, recently hospitalized at [place] for intestinal obstruction. C/o of vomiting and ab pain since yesterday. Vomiting bilious, denies fever.   PE: benign abd.   Plan: r/o recurrence of intestinal obstruction with CAT scan. Likely will need admission. Attending Contribution to Care: 52 y/o F pt with Hx of lupus, recently hospitalized at [place] for intestinal obstruction. C/o of vomiting and ab pain since yesterday. Vomiting bilious, denies fever.   PE: benign abd.   Plan: r/o recurrence of intestinal obstruction with CAT scan. Likely will need admission. hyponatremic and hypoglycemia. will give IVF. ct pending LABS AND CT REVIEWED. WILL ADMIT TO HOSPITALIST. WILL CONSULT GI

## 2018-08-09 NOTE — H&P ADULT - NEGATIVE NEUROLOGICAL SYMPTOMS
no difficulty walking/no generalized seizures/no syncope/no tremors/no focal seizures/no transient paralysis/no weakness/no paresthesias/no loss of sensation/no headache

## 2018-08-09 NOTE — ED PROVIDER NOTE - CARE PLAN
Principal Discharge DX:	Hyponatremia Principal Discharge DX:	Hyponatremia  Secondary Diagnosis:	Abdominal pain

## 2018-08-09 NOTE — ED PROVIDER NOTE - OBJECTIVE STATEMENT
pt is a 54yo female with pmhx of lupus, COPD ON HOME O2 IN PM, HCV, GERD, VERTIGO, SBO C/O ABD PAIN x 2 days. pt reports n/v with one episode of diarrhea and sharp constant severe abd pain all over. pt reports dizziness but associates that with her chronic neck pain and vertigo . pt reports this "feels like her normal vertigo". pt reports dizziness when moving, intermittent. pt not dizzy at this time.   PMD: CARVO  PULM: [place].

## 2018-08-09 NOTE — ED ADULT NURSE NOTE - NSFALLRSKHRMRISKTYPE_ED_ALL_ED
coagulation(Bleeding disorder R/T clinical cond/anti-coags)/bones(Osteoporosis,prev fx,steroid use,metastatic bone ca)

## 2018-08-09 NOTE — ED ADULT NURSE NOTE - NSIMPLEMENTINTERV_GEN_ALL_ED
Implemented All Fall with Harm Risk Interventions:  [place] to call system. Call bell, personal items and telephone within reach. Instruct patient to call for assistance. Room bathroom lighting operational. Non-slip footwear when patient is off stretcher. Physically safe environment: no spills, clutter or unnecessary equipment. Stretcher in lowest position, wheels locked, appropriate side rails in place. Provide visual cue, wrist band, yellow gown, etc. Monitor gait and stability. Monitor for mental status changes and reorient to person, place, and time. Review medications for side effects contributing to fall risk. Reinforce activity limits and safety measures with patient and family. Provide visual clues: red socks.

## 2018-08-09 NOTE — H&P ADULT - HISTORY OF PRESENT ILLNESS
Patient is 54 yo female with hx of SBO, HTN, and Vertigo presenting with diffuse acute on Chronic abdominal pain that has been going on for the  past several days, and associated with multiple episode of N/V/D.  Afebrile.  Patient denies any headache, blurry vision, CP, SOB, palpitation, blood per stool, numbness or weakness  + chronic dizziness

## 2018-08-10 DIAGNOSIS — R10.84 GENERALIZED ABDOMINAL PAIN: ICD-10-CM

## 2018-08-10 LAB
ALBUMIN SERPL ELPH-MCNC: 3.3 G/DL — SIGNIFICANT CHANGE UP (ref 3.3–5)
ALP SERPL-CCNC: 60 U/L — SIGNIFICANT CHANGE UP (ref 30–120)
ALT FLD-CCNC: 30 U/L DA — SIGNIFICANT CHANGE UP (ref 10–60)
ANION GAP SERPL CALC-SCNC: 6 MMOL/L — SIGNIFICANT CHANGE UP (ref 5–17)
ANION GAP SERPL CALC-SCNC: 9 MMOL/L — SIGNIFICANT CHANGE UP (ref 5–17)
AST SERPL-CCNC: 22 U/L — SIGNIFICANT CHANGE UP (ref 10–40)
BILIRUB SERPL-MCNC: 0.5 MG/DL — SIGNIFICANT CHANGE UP (ref 0.2–1.2)
BUN SERPL-MCNC: 11 MG/DL — SIGNIFICANT CHANGE UP (ref 7–23)
BUN SERPL-MCNC: 7 MG/DL — SIGNIFICANT CHANGE UP (ref 7–23)
CALCIUM SERPL-MCNC: 8 MG/DL — LOW (ref 8.4–10.5)
CALCIUM SERPL-MCNC: 8.4 MG/DL — SIGNIFICANT CHANGE UP (ref 8.4–10.5)
CHLORIDE SERPL-SCNC: 101 MMOL/L — SIGNIFICANT CHANGE UP (ref 96–108)
CHLORIDE SERPL-SCNC: 101 MMOL/L — SIGNIFICANT CHANGE UP (ref 96–108)
CO2 SERPL-SCNC: 22 MMOL/L — SIGNIFICANT CHANGE UP (ref 22–31)
CO2 SERPL-SCNC: 26 MMOL/L — SIGNIFICANT CHANGE UP (ref 22–31)
CREAT SERPL-MCNC: 0.75 MG/DL — SIGNIFICANT CHANGE UP (ref 0.5–1.3)
CREAT SERPL-MCNC: 0.77 MG/DL — SIGNIFICANT CHANGE UP (ref 0.5–1.3)
GLUCOSE SERPL-MCNC: 80 MG/DL — SIGNIFICANT CHANGE UP (ref 70–99)
GLUCOSE SERPL-MCNC: 96 MG/DL — SIGNIFICANT CHANGE UP (ref 70–99)
HCT VFR BLD CALC: 37.5 % — SIGNIFICANT CHANGE UP (ref 34.5–45)
HGB BLD-MCNC: 13.3 G/DL — SIGNIFICANT CHANGE UP (ref 11.5–15.5)
MCHC RBC-ENTMCNC: 33.7 PG — SIGNIFICANT CHANGE UP (ref 27–34)
MCHC RBC-ENTMCNC: 35.5 GM/DL — SIGNIFICANT CHANGE UP (ref 32–36)
MCV RBC AUTO: 94.9 FL — SIGNIFICANT CHANGE UP (ref 80–100)
NRBC # BLD: 0 /100 WBCS — SIGNIFICANT CHANGE UP (ref 0–0)
OSMOLALITY SERPL: 274 MOS/KG — LOW (ref 275–300)
OSMOLALITY UR: 184 MOS/KG — SIGNIFICANT CHANGE UP (ref 50–1200)
PLATELET # BLD AUTO: 193 K/UL — SIGNIFICANT CHANGE UP (ref 150–400)
POTASSIUM SERPL-MCNC: 4.1 MMOL/L — SIGNIFICANT CHANGE UP (ref 3.5–5.3)
POTASSIUM SERPL-MCNC: 4.1 MMOL/L — SIGNIFICANT CHANGE UP (ref 3.5–5.3)
POTASSIUM SERPL-SCNC: 4.1 MMOL/L — SIGNIFICANT CHANGE UP (ref 3.5–5.3)
POTASSIUM SERPL-SCNC: 4.1 MMOL/L — SIGNIFICANT CHANGE UP (ref 3.5–5.3)
PROCALCITONIN SERPL-MCNC: 0.02 NG/ML — SIGNIFICANT CHANGE UP (ref 0.02–0.1)
PROT SERPL-MCNC: 5.6 G/DL — LOW (ref 6–8.3)
RBC # BLD: 3.95 M/UL — SIGNIFICANT CHANGE UP (ref 3.8–5.2)
RBC # FLD: 11.6 % — SIGNIFICANT CHANGE UP (ref 10.3–14.5)
SODIUM SERPL-SCNC: 132 MMOL/L — LOW (ref 135–145)
SODIUM SERPL-SCNC: 133 MMOL/L — LOW (ref 135–145)
WBC # BLD: 13.86 K/UL — HIGH (ref 3.8–10.5)
WBC # FLD AUTO: 13.86 K/UL — HIGH (ref 3.8–10.5)

## 2018-08-10 PROCEDURE — 99233 SBSQ HOSP IP/OBS HIGH 50: CPT

## 2018-08-10 RX ORDER — ALPRAZOLAM 0.25 MG
0.25 TABLET ORAL ONCE
Qty: 0 | Refills: 0 | Status: DISCONTINUED | OUTPATIENT
Start: 2018-08-10 | End: 2018-08-10

## 2018-08-10 RX ORDER — LACTOBACILLUS ACIDOPHILUS 100MM CELL
1 CAPSULE ORAL
Qty: 0 | Refills: 0 | Status: DISCONTINUED | OUTPATIENT
Start: 2018-08-10 | End: 2018-08-11

## 2018-08-10 RX ADMIN — Medication 1 TABLET(S): at 16:57

## 2018-08-10 RX ADMIN — Medication 200 MILLIGRAM(S): at 18:37

## 2018-08-10 RX ADMIN — TRAMADOL HYDROCHLORIDE 50 MILLIGRAM(S): 50 TABLET ORAL at 02:21

## 2018-08-10 RX ADMIN — BUDESONIDE AND FORMOTEROL FUMARATE DIHYDRATE 2 PUFF(S): 160; 4.5 AEROSOL RESPIRATORY (INHALATION) at 06:32

## 2018-08-10 RX ADMIN — TRAMADOL HYDROCHLORIDE 50 MILLIGRAM(S): 50 TABLET ORAL at 09:30

## 2018-08-10 RX ADMIN — TRAMADOL HYDROCHLORIDE 50 MILLIGRAM(S): 50 TABLET ORAL at 22:23

## 2018-08-10 RX ADMIN — TRAMADOL HYDROCHLORIDE 50 MILLIGRAM(S): 50 TABLET ORAL at 08:34

## 2018-08-10 RX ADMIN — Medication 1 TABLET(S): at 12:43

## 2018-08-10 RX ADMIN — TRAMADOL HYDROCHLORIDE 50 MILLIGRAM(S): 50 TABLET ORAL at 21:46

## 2018-08-10 RX ADMIN — ALBUTEROL 2 PUFF(S): 90 AEROSOL, METERED ORAL at 13:43

## 2018-08-10 RX ADMIN — PIPERACILLIN AND TAZOBACTAM 25 GRAM(S): 4; .5 INJECTION, POWDER, LYOPHILIZED, FOR SOLUTION INTRAVENOUS at 10:17

## 2018-08-10 RX ADMIN — HEPARIN SODIUM 5000 UNIT(S): 5000 INJECTION INTRAVENOUS; SUBCUTANEOUS at 18:37

## 2018-08-10 RX ADMIN — Medication 0.25 MILLIGRAM(S): at 23:22

## 2018-08-10 RX ADMIN — PIPERACILLIN AND TAZOBACTAM 25 GRAM(S): 4; .5 INJECTION, POWDER, LYOPHILIZED, FOR SOLUTION INTRAVENOUS at 18:37

## 2018-08-10 RX ADMIN — TIOTROPIUM BROMIDE 1 CAPSULE(S): 18 CAPSULE ORAL; RESPIRATORY (INHALATION) at 06:32

## 2018-08-10 RX ADMIN — PIPERACILLIN AND TAZOBACTAM 25 GRAM(S): 4; .5 INJECTION, POWDER, LYOPHILIZED, FOR SOLUTION INTRAVENOUS at 01:27

## 2018-08-10 RX ADMIN — Medication 25 MICROGRAM(S): at 05:58

## 2018-08-10 RX ADMIN — FAMOTIDINE 20 MILLIGRAM(S): 10 INJECTION INTRAVENOUS at 16:58

## 2018-08-10 RX ADMIN — Medication 1 PATCH: at 11:48

## 2018-08-10 RX ADMIN — SODIUM CHLORIDE 50 MILLILITER(S): 9 INJECTION INTRAMUSCULAR; INTRAVENOUS; SUBCUTANEOUS at 18:40

## 2018-08-10 RX ADMIN — TRAMADOL HYDROCHLORIDE 50 MILLIGRAM(S): 50 TABLET ORAL at 02:45

## 2018-08-10 RX ADMIN — OXCARBAZEPINE 150 MILLIGRAM(S): 300 TABLET, FILM COATED ORAL at 18:37

## 2018-08-10 RX ADMIN — Medication 30 MILLILITER(S): at 19:48

## 2018-08-10 RX ADMIN — OXCARBAZEPINE 150 MILLIGRAM(S): 300 TABLET, FILM COATED ORAL at 05:58

## 2018-08-10 RX ADMIN — FAMOTIDINE 20 MILLIGRAM(S): 10 INJECTION INTRAVENOUS at 05:58

## 2018-08-10 RX ADMIN — FLUDROCORTISONE ACETATE 0.1 MILLIGRAM(S): 0.1 TABLET ORAL at 05:58

## 2018-08-10 RX ADMIN — ONDANSETRON 4 MILLIGRAM(S): 8 TABLET, FILM COATED ORAL at 21:46

## 2018-08-10 RX ADMIN — Medication 200 MILLIGRAM(S): at 05:58

## 2018-08-10 RX ADMIN — HEPARIN SODIUM 5000 UNIT(S): 5000 INJECTION INTRAVENOUS; SUBCUTANEOUS at 05:58

## 2018-08-10 RX ADMIN — BUDESONIDE AND FORMOTEROL FUMARATE DIHYDRATE 2 PUFF(S): 160; 4.5 AEROSOL RESPIRATORY (INHALATION) at 18:37

## 2018-08-10 NOTE — CONSULT NOTE ADULT - ASSESSMENT
Pt with multiple admission for abd pain.  Alt between constipation and diarrhea suggestive of iatrogenic (meds) and functional etiologies.  However, given ?CT will consider repeat colonoscopy as outpt.  Cont abx for min time needed and have pt f/u in office.

## 2018-08-10 NOTE — CONSULT NOTE ADULT - SUBJECTIVE AND OBJECTIVE BOX
HPI:   Patient is a 53y old  Female who presents with vomiting, diarrhea, dizziness. Found to have low serum Na on presentation. Responded well to isotonic saline. No dizziness at present. Of note, receives Trileptal for glosso-pharyngeal neurolgia. Relates chronic intermittent hypoNa. in past.           PAST MEDICAL & SURGICAL HISTORY:  Glossopharyngeal neuralgia syndrome  Nicotine addiction  COPD (chronic obstructive pulmonary disease)  Neuropathy  Migraine  Anxiety  Hepatitis C  Gallstones  Hypothyroidism  Hypotension  GERD (gastroesophageal reflux disease)  UTI (urinary tract infection)  Vertigo  Sjogren's disease  Lupus  Injury of right wrist, subsequent encounter  Gall bladder disease: REMOVAL  Vocal cord polyp: REMOVAL      Social Hx:     FAMILY HISTORY:  Family history of diabetes mellitus (Sibling): Sister  Family history of psoriatic arthritis (Sibling): Sister  Family history of systemic lupus erythematosus (SLE) in mother (Sibling): Sister      Allergies    Zithromax (Stomach Upset)    Intolerances    aspirin (Stomach Upset)      MEDICATIONS  (STANDING):  buDESOnide 160 MICROgram(s)/formoterol 4.5 MICROgram(s) Inhaler 2 Puff(s) Inhalation two times a day  buPROPion XL . 150 milliGRAM(s) Oral daily  diltiazem    Tablet 30 milliGRAM(s) Oral every 8 hours  docusate sodium 100 milliGRAM(s) Oral three times a day  famotidine    Tablet 20 milliGRAM(s) Oral two times a day  fludroCORTISONE 0.1 milliGRAM(s) Oral daily  heparin  Injectable 5000 Unit(s) SubCutaneous every 12 hours  hydroxychloroquine 200 milliGRAM(s) Oral two times a day  lactobacillus acidophilus 1 Tablet(s) Oral three times a day with meals  levothyroxine 25 MICROGram(s) Oral daily  nicotine - 21 mG/24Hr(s) Patch 1 patch Transdermal daily  OXcarbazepine 150 milliGRAM(s) Oral two times a day  piperacillin/tazobactam IVPB. 3.375 Gram(s) IV Intermittent every 8 hours  sodium chloride 0.9%. 1000 milliLiter(s) (50 mL/Hr) IV Continuous <Continuous>  tiotropium 18 MICROgram(s) Capsule 1 Capsule(s) Inhalation daily    MEDICATIONS  (PRN):  acetaminophen   Tablet. 650 milliGRAM(s) Oral every 6 hours PRN Mild Pain (1 - 3)  ALBUTerol    90 MICROgram(s) HFA Inhaler 2 Puff(s) Inhalation every 6 hours PRN Shortness of Breath and/or Wheezing  meclizine 12.5 milliGRAM(s) Oral three times a day PRN Dizziness  ondansetron Injectable 4 milliGRAM(s) IV Push every 6 hours PRN Nausea  pregabalin 75 milliGRAM(s) Oral three times a day PRN neuropathy  traMADol 50 milliGRAM(s) Oral every 6 hours PRN Severe Pain (7 - 10)      Daily     Daily     Drug Dosing Weight  Height (cm): 144.78 (09 Aug 2018 11:11)  Weight (kg): 38.6 (09 Aug 2018 11:11)  BMI (kg/m2): 18.4 (09 Aug 2018 11:11)  BSA (m2): 1.25 (09 Aug 2018 11:11)      REVIEW OF SYSTEMS:    CONSTITUTIONAL: fatigue  ENMT:  No difficulty hearing, tinnitus, vertigo; No sinus or throat pain  NECK: No pain or stiffness  RESPIRATORY: No cough, wheezing, chills or hemoptysis; No shortness of breath  CARDIOVASCULAR: No chest pain, palpitations, dizziness, or leg swelling  GASTROINTESTINAL: No abdominal or epigastric pain. No nausea, vomiting, or hematemesis; No diarrhea or constipation. No melena or hematochezia.  GENITOURINARY: No dysuria, frequency, hematuria, or incontinence  SKIN: No itching, burning, rashes, or lesions   LYMPH NODES: No enlarged glands  NEURO: no asterixis            I&O's Detail    09 Aug 2018 07:01  -  10 Aug 2018 07:00  --------------------------------------------------------  IN:    IV PiggyBack: 100 mL    sodium chloride 0.9%.: 460 mL  Total IN: 560 mL    OUT:  Total OUT: 0 mL    Total NET: 560 mL           @ 07:  -  08-10 @ 07:00  --------------------------------------------------------  IN: 560 mL / OUT: 0 mL / NET: 560 mL        PHYSICAL EXAM:    GENERAL: NAD  HEAD:  Atraumatic, Normocephalic  EYES: EOMI, PERRLA, conjunctiva and sclera clear  ENMT: No tonsillar erythema, exudates, or enlargement; Moist mucous membranes, Good dentition, No lesions  NECK: Supple, No JVD, Normal thyroid  NERVOUS SYSTEM:  Alert & Oriented X3, Good concentration; Motor Strength 5/5 B/L upper and lower extremities; DTRs 2+ intact and symmetric  CHEST/LUNG: Clear to percussion bilaterally; No rales, rhonchi, wheezing, or rubs  HEART: Regular rate and rhythm; No murmurs, rubs, or gallops  ABDOMEN: Soft, Nontender, Nondistended; Bowel sounds present  EXTREMITIES:  2+ Peripheral Pulses, No clubbing, cyanosis, or edema  LYMPH: No lymphadenopathy noted  SKIN: No rashes or lesions    LABS:  CBC Full  -  ( 10 Aug 2018 06:48 )  WBC Count : 13.86 K/uL  Hemoglobin : 13.3 g/dL  Hematocrit : 37.5 %  Platelet Count - Automated : 193 K/uL  Mean Cell Volume : 94.9 fl  Mean Cell Hemoglobin : 33.7 pg  Mean Cell Hemoglobin Concentration : 35.5 gm/dL  Auto Neutrophil # : x  Auto Lymphocyte # : x  Auto Monocyte # : x  Auto Eosinophil # : x  Auto Basophil # : x  Auto Neutrophil % : x  Auto Lymphocyte % : x  Auto Monocyte % : x  Auto Eosinophil % : x  Auto Basophil % : x    08-10    132<L>  |  101  |  7   ----------------------------<  80  4.1   |  22  |  0.75    Ca    8.4      10 Aug 2018 06:48    TPro  5.6<L>  /  Alb  3.3  /  TBili  0.5  /  DBili  x   /  AST  22  /  ALT  30  /  AlkPhos  60  08-10    CAPILLARY BLOOD GLUCOSE      POCT Blood Glucose.: 173 mg/dL (10 Aug 2018 00:13)    PT/INR - ( 09 Aug 2018 12:44 )   PT: 10.1 sec;   INR: 0.93 ratio         PTT - ( 09 Aug 2018 12:44 )  PTT:30.0 sec  Urinalysis Basic - ( 09 Aug 2018 20:56 )    Color: Yellow / Appearance: Clear / S.005 / pH: x  Gluc: x / Ketone: Negative  / Bili: Negative / Urobili: Negative mg/dL   Blood: x / Protein: Negative mg/dL / Nitrite: Negative   Leuk Esterase: Negative / RBC: x / WBC x   Sq Epi: x / Non Sq Epi: x / Bacteria: x            Impression:  * Acute HypoNa -- hypovolemic. Corrected on isotonic saline.   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 indication for florinef ( pt is uncertain why on it )\  * Outpt w/u for chronic hypoNa. FR stressed
Pt came in with worsening pain and diarrhea.  CT with ?col thickening.  Now pain is resolving on abx.  No n/v/f/c.    Tolerating diet.                          13.3   13.86 )-----------( 193      ( 10 Aug 2018 06:48 )             37.5   108-10    133<L>  |  101  |  11  ----------------------------<  96  4.1   |  26  |  0.77    Ca    8.0<L>      10 Aug 2018 17:06    TPro  5.6<L>  /  Alb  3.3  /  TBili  0.5  /  DBili  x   /  AST  22  /  ALT  30  /  AlkPhos  60  08-10

## 2018-08-10 NOTE — PROVIDER CONTACT NOTE (OTHER) - BACKGROUND
admitted for abdominal pain,nausea,malaise and vertigo today. BS on admission was 56.pt was treated in ER with D5L/R prior to arrive on unit

## 2018-08-10 NOTE — DIETITIAN INITIAL EVALUATION ADULT. - OTHER INFO
Pt was assessed as per nutrition triggers: BMI 18 and N/V PTA. Pt c hx Anxiety, COPD, gallstones, glossopharyngeal syndrome, Hep C, Hypotension, lupus, Sjogren's disease, Vertigo, UTI, Migraine. admitted c abdominal pain, N/V/D. Pt states she wasn't eating well PTA but doesn't believe she lost weight over past few months. Per EMR, pt was 85# as of November 2017. Current weight 84#. Weight during June 2018 [facility] admission 84# Pt weight seems to be stable although she is considered underweight.  Pt reports N/V improving but still having loose stool. However, she reports that this is her baseline. (states hx of IBS) Per CT, no bowel obstruction. Colonoscopy recommended. Briefly reviewed nutritional strategies to help c diarrhea ie: consuming low fiber foods and avoiding lactose, coffee. Pt was assessed as per nutrition triggers: BMI 18 and N/V PTA. Pt c hx Anxiety, COPD, gallstones, glossopharyngeal syndrome, Hep C, Hypotension, lupus, Sjogren's disease, Vertigo, UTI, Migraine. admitted c abdominal pain, N/V/D and hyponatremia. Pt states she wasn't eating well PTA but doesn't believe she lost weight over past few months. Per EMR, pt was 85# as of November 2017. Current weight 84#. Pt weight seems to be stable although she is considered underweight.  Pt reports N/V improving but still having loose stool. However, she reports that this is her baseline. (states hx of IBS) Per CT, no bowel obstruction. Colonoscopy recommended. Briefly reviewed nutritional strategies to help c diarrhea ie: consuming low fiber foods and avoiding lactose, coffee. Pt on 1000cc FR due to hyponatremia. Reinforced importance of compliance

## 2018-08-11 ENCOUNTER — TRANSCRIPTION ENCOUNTER (OUTPATIENT)
Age: 53
End: 2018-08-11

## 2018-08-11 VITALS
HEART RATE: 71 BPM | OXYGEN SATURATION: 96 % | RESPIRATION RATE: 16 BRPM | TEMPERATURE: 99 F | DIASTOLIC BLOOD PRESSURE: 76 MMHG | SYSTOLIC BLOOD PRESSURE: 109 MMHG

## 2018-08-11 LAB
ANION GAP SERPL CALC-SCNC: 5 MMOL/L — SIGNIFICANT CHANGE UP (ref 5–17)
BUN SERPL-MCNC: 8 MG/DL — SIGNIFICANT CHANGE UP (ref 7–23)
CALCIUM SERPL-MCNC: 8.4 MG/DL — SIGNIFICANT CHANGE UP (ref 8.4–10.5)
CHLORIDE SERPL-SCNC: 101 MMOL/L — SIGNIFICANT CHANGE UP (ref 96–108)
CO2 SERPL-SCNC: 26 MMOL/L — SIGNIFICANT CHANGE UP (ref 22–31)
CREAT SERPL-MCNC: 0.78 MG/DL — SIGNIFICANT CHANGE UP (ref 0.5–1.3)
GLUCOSE SERPL-MCNC: 84 MG/DL — SIGNIFICANT CHANGE UP (ref 70–99)
HCT VFR BLD CALC: 35.4 % — SIGNIFICANT CHANGE UP (ref 34.5–45)
HGB BLD-MCNC: 12.4 G/DL — SIGNIFICANT CHANGE UP (ref 11.5–15.5)
MCHC RBC-ENTMCNC: 33.7 PG — SIGNIFICANT CHANGE UP (ref 27–34)
MCHC RBC-ENTMCNC: 35 GM/DL — SIGNIFICANT CHANGE UP (ref 32–36)
MCV RBC AUTO: 96.2 FL — SIGNIFICANT CHANGE UP (ref 80–100)
NRBC # BLD: 0 /100 WBCS — SIGNIFICANT CHANGE UP (ref 0–0)
PLATELET # BLD AUTO: 304 K/UL — SIGNIFICANT CHANGE UP (ref 150–400)
POTASSIUM SERPL-MCNC: 4.5 MMOL/L — SIGNIFICANT CHANGE UP (ref 3.5–5.3)
POTASSIUM SERPL-SCNC: 4.5 MMOL/L — SIGNIFICANT CHANGE UP (ref 3.5–5.3)
RBC # BLD: 3.68 M/UL — LOW (ref 3.8–5.2)
RBC # FLD: 11.8 % — SIGNIFICANT CHANGE UP (ref 10.3–14.5)
SODIUM SERPL-SCNC: 132 MMOL/L — LOW (ref 135–145)
WBC # BLD: 8.53 K/UL — SIGNIFICANT CHANGE UP (ref 3.8–10.5)
WBC # FLD AUTO: 8.53 K/UL — SIGNIFICANT CHANGE UP (ref 3.8–10.5)

## 2018-08-11 PROCEDURE — 83935 ASSAY OF URINE OSMOLALITY: CPT

## 2018-08-11 PROCEDURE — 80048 BASIC METABOLIC PNL TOTAL CA: CPT

## 2018-08-11 PROCEDURE — 87040 BLOOD CULTURE FOR BACTERIA: CPT

## 2018-08-11 PROCEDURE — 94640 AIRWAY INHALATION TREATMENT: CPT

## 2018-08-11 PROCEDURE — 82962 GLUCOSE BLOOD TEST: CPT

## 2018-08-11 PROCEDURE — 96376 TX/PRO/DX INJ SAME DRUG ADON: CPT

## 2018-08-11 PROCEDURE — 80053 COMPREHEN METABOLIC PANEL: CPT

## 2018-08-11 PROCEDURE — 84295 ASSAY OF SERUM SODIUM: CPT

## 2018-08-11 PROCEDURE — 74177 CT ABD & PELVIS W/CONTRAST: CPT

## 2018-08-11 PROCEDURE — 99285 EMERGENCY DEPT VISIT HI MDM: CPT | Mod: 25

## 2018-08-11 PROCEDURE — 87045 FECES CULTURE AEROBIC BACT: CPT

## 2018-08-11 PROCEDURE — 99239 HOSP IP/OBS DSCHRG MGMT >30: CPT

## 2018-08-11 PROCEDURE — 83930 ASSAY OF BLOOD OSMOLALITY: CPT

## 2018-08-11 PROCEDURE — 70450 CT HEAD/BRAIN W/O DYE: CPT

## 2018-08-11 PROCEDURE — 83605 ASSAY OF LACTIC ACID: CPT

## 2018-08-11 PROCEDURE — 81003 URINALYSIS AUTO W/O SCOPE: CPT

## 2018-08-11 PROCEDURE — 87046 STOOL CULTR AEROBIC BACT EA: CPT

## 2018-08-11 PROCEDURE — 85027 COMPLETE CBC AUTOMATED: CPT

## 2018-08-11 PROCEDURE — 93005 ELECTROCARDIOGRAM TRACING: CPT

## 2018-08-11 PROCEDURE — 72040 X-RAY EXAM NECK SPINE 2-3 VW: CPT

## 2018-08-11 PROCEDURE — 96375 TX/PRO/DX INJ NEW DRUG ADDON: CPT

## 2018-08-11 PROCEDURE — 85610 PROTHROMBIN TIME: CPT

## 2018-08-11 PROCEDURE — 36415 COLL VENOUS BLD VENIPUNCTURE: CPT

## 2018-08-11 PROCEDURE — 84145 PROCALCITONIN (PCT): CPT

## 2018-08-11 PROCEDURE — 85730 THROMBOPLASTIN TIME PARTIAL: CPT

## 2018-08-11 PROCEDURE — 83690 ASSAY OF LIPASE: CPT

## 2018-08-11 PROCEDURE — 96374 THER/PROPH/DIAG INJ IV PUSH: CPT

## 2018-08-11 RX ORDER — LACTOBACILLUS ACIDOPHILUS 100MM CELL
1 CAPSULE ORAL
Qty: 5 | Refills: 0 | OUTPATIENT
Start: 2018-08-11

## 2018-08-11 RX ORDER — LOPERAMIDE HCL 2 MG
3 TABLET ORAL
Qty: 0 | Refills: 0 | COMMUNITY

## 2018-08-11 RX ORDER — METRONIDAZOLE 500 MG
1 TABLET ORAL
Qty: 15 | Refills: 0 | OUTPATIENT
Start: 2018-08-11

## 2018-08-11 RX ORDER — MECLIZINE HCL 12.5 MG
1 TABLET ORAL
Qty: 30 | Refills: 0
Start: 2018-08-11

## 2018-08-11 RX ORDER — MECLIZINE HCL 12.5 MG
1 TABLET ORAL
Qty: 0 | Refills: 0 | COMMUNITY

## 2018-08-11 RX ADMIN — PIPERACILLIN AND TAZOBACTAM 25 GRAM(S): 4; .5 INJECTION, POWDER, LYOPHILIZED, FOR SOLUTION INTRAVENOUS at 09:44

## 2018-08-11 RX ADMIN — Medication 1 TABLET(S): at 09:37

## 2018-08-11 RX ADMIN — PIPERACILLIN AND TAZOBACTAM 25 GRAM(S): 4; .5 INJECTION, POWDER, LYOPHILIZED, FOR SOLUTION INTRAVENOUS at 02:31

## 2018-08-11 RX ADMIN — FAMOTIDINE 20 MILLIGRAM(S): 10 INJECTION INTRAVENOUS at 05:46

## 2018-08-11 RX ADMIN — Medication 200 MILLIGRAM(S): at 05:47

## 2018-08-11 RX ADMIN — BUDESONIDE AND FORMOTEROL FUMARATE DIHYDRATE 2 PUFF(S): 160; 4.5 AEROSOL RESPIRATORY (INHALATION) at 06:36

## 2018-08-11 RX ADMIN — HEPARIN SODIUM 5000 UNIT(S): 5000 INJECTION INTRAVENOUS; SUBCUTANEOUS at 05:46

## 2018-08-11 RX ADMIN — Medication 1 PATCH: at 11:29

## 2018-08-11 RX ADMIN — TIOTROPIUM BROMIDE 1 CAPSULE(S): 18 CAPSULE ORAL; RESPIRATORY (INHALATION) at 06:36

## 2018-08-11 RX ADMIN — Medication 25 MICROGRAM(S): at 05:46

## 2018-08-11 RX ADMIN — FLUDROCORTISONE ACETATE 0.1 MILLIGRAM(S): 0.1 TABLET ORAL at 05:46

## 2018-08-11 RX ADMIN — OXCARBAZEPINE 150 MILLIGRAM(S): 300 TABLET, FILM COATED ORAL at 05:46

## 2018-08-11 RX ADMIN — Medication 1 PATCH: at 11:28

## 2018-08-11 NOTE — DISCHARGE NOTE ADULT - MEDICATION SUMMARY - MEDICATIONS TO TAKE
I will START or STAY ON the medications listed below when I get home from the hospital:    Florinef Acetate 0.1 mg oral tablet  -- 1 tab(s) by mouth once a day  -- Indication: For Hormonal deficiency    Fioricet oral capsule  -- 1 cap(s) by mouth 2 times a day, As Needed  -- Indication: For migraine pain    dilTIAZem 30 mg oral tablet  -- 1 tab(s) by mouth every 8 hours  -- Indication: For HTN    OXcarbazepine 150 mg oral tablet  -- 1 tab(s) by mouth 2 times a day  -- Indication: For Neuralgia    Valium 5 mg oral tablet  -- 1 tab(s) by mouth 2 times a day, As Needed  -- Indication: For Neuralgia    Lyrica 75 mg oral capsule  -- 1 cap(s) by mouth 3 times a day, As Needed for nerve pain MDD:225 mg  -- Indication: For Nerve pain    traZODone 50 mg oral tablet  -- 1 tab(s) by mouth 2 times a day, As Needed  -- Indication: For depression    Imodium 2 mg oral capsule  -- 3 cap(s) by mouth once a day, As Needed for diarrhea  -- Indication: For diarrhea    ondansetron 8 mg oral tablet  -- 1 tab(s) by mouth 3 times a day  -- Indication: For Nausea    meclizine 12.5 mg oral tablet  -- 1 tab(s) by mouth 3 times a day, As Needed  -- Indication: For Vertigo    hydroxychloroquine 200 mg oral tablet  -- 1 tab(s) by mouth 2 times a day  -- Indication: For SLE    ProAir HFA 90 mcg/inh inhalation aerosol  -- 2 puff(s) inhaled 4 times a day, As Needed  -- Indication: For COPD (chronic obstructive pulmonary disease)    tiotropium 18 mcg inhalation capsule  -- 1 cap(s) inhaled once a day  -- Indication: For COPD (chronic obstructive pulmonary disease)    Advair Diskus 500 mcg-50 mcg inhalation powder  -- 1 puff(s) inhaled 2 times a day  -- Indication: For COPD (chronic obstructive pulmonary disease)    omeprazole 40 mg oral delayed release capsule  -- 1 cap(s) by mouth 2 times a day  -- Indication: For GeRD    buPROPion 150 mg/24 hours (XL) oral tablet, extended release  -- 1 tab(s) by mouth once a day  -- Indication: For depression    nicotine 7 mg/24 hr transdermal film, extended release  -- 1 patch by transdermal patch once a day  -- Indication: For smoker    levothyroxine 25 mcg (0.025 mg) oral capsule  -- 1 cap(s) by mouth once a day  -- Indication: For Hypothyroidism

## 2018-08-11 NOTE — DISCHARGE NOTE ADULT - SECONDARY DIAGNOSIS.
COPD (chronic obstructive pulmonary disease) Vertigo Lupus Hyponatremia Glossopharyngeal neuralgia syndrome

## 2018-08-11 NOTE — DISCHARGE NOTE ADULT - PLAN OF CARE
feel better No confirmed evidence of active colitis.  Procalcitonin negative.  Doubt benefit of abx; may increase chances of c.diff inhalers meclizine home meds. Steroids as per PCP repeat BMP next week and f/up with PCP home meds

## 2018-08-11 NOTE — DISCHARGE NOTE ADULT - CARE PROVIDER_API CALL
Lorne Pablo (), Family Medicine  Internal Medicine  [address] [place] [address]  Phone: [phone]  Fax: [phone]    Jaxson Haro), Gastroenterology  [address] [place] [address]  Phone: [phone]  Fax: [phone]

## 2018-08-11 NOTE — DISCHARGE NOTE ADULT - MEDICATION SUMMARY - MEDICATIONS TO STOP TAKING
I will STOP taking the medications listed below when I get home from the hospital:    levoFLOXacin 500 mg oral tablet  -- 1 tab(s) by mouth once a day

## 2018-08-11 NOTE — PROGRESS NOTE ADULT - PROBLEM SELECTOR PROBLEM 6
COPD (chronic obstructive pulmonary disease)
COPD (chronic obstructive pulmonary disease)
Hypothyroidism

## 2018-08-11 NOTE — DISCHARGE NOTE ADULT - HOSPITAL COURSE
Patient is 52 yo female with hx of SBO, HTN, and Vertigo presenting with diffuse acute on Chronic abdominal pain that has been going on for the  past several days, and associated with multiple episode of N/V/D.  Afebrile.  Patient denies any headache, blurry vision, CP, SOB, palpitation, blood per stool, numbness or weakness  + chronic dizziness - takes Meclizine.  No acute findings on CT abdomen.  Clinically improved and able to tolerate diet without N/V.  Outpatient PCP/GI f/up and repeat labs next week.    Patient seen on day of discharge.  NAD  lungs clear  RRR  abdomen soft non-tender  no edema

## 2018-08-11 NOTE — PROGRESS NOTE ADULT - SUBJECTIVE AND OBJECTIVE BOX
CC.  Abdm pain  HPI.  Patient reports abdominal pain improving.  N/V/D resolved.  Afebrile.  Neck pain/Chronic improving.  Dizziness resolved              Constitutional: No fever, fatigue or weight loss.  Skin: No rash.  Eyes: No recent vision problems or eye pain.  ENT: No congestion, ear pain, or sore throat.  Endocrine: No thyroid problems.  Cardiovascular: No chest pain or palpation.  Respiratory: No cough, shortness of breath, congestion, or wheezing.  Gastrointestinal: No nausea, vomiting, or diarrhea.  Genitourinary: No dysuria.  Musculoskeletal: No joint swelling.  Neurologic: No headache.      Vital Signs Last 24 Hrs  T(C): 37.1 (08-10-18 @ 09:22), Max: 38.2 (18 @ 22:54)  T(F): 98.7 (08-10-18 @ 09:22), Max: 100.7 (18 @ 22:54)  HR: 73 (08-10-18 @ 09:22) (67 - 82)  BP: 131/76 (08-10-18 @ 09:22) (92/59 - 131/76)  BP(mean): 70 (18 @ 18:07) (70 - 70)  RR: 16 (08-10-18 @ 09:22) (15 - 16)  SpO2: 97% (08-10-18 @ 09:22) (96% - 100%)        PHYSICAL EXAM-  GENERAL: NAD, well-groomed, well-developed  HEAD:  Atraumatic, Normocephalic  EYES: EOMI, PERRLA, conjunctiva and sclera clear  NECK: Supple, No JVD, Normal thyroid  NERVOUS SYSTEM:  Alert & Oriented X3, Motor Strength 5/5 B/L upper and lower extremities; DTRs 2+ intact and symmetric  CHEST/LUNG: Clear to percussion bilaterally; No rales, rhonchi, wheezing, or rubs  HEART: Regular rate and rhythm; No murmurs, rubs, or gallops  ABDOMEN: Soft, Mild tendrness, Nondistended; Bowel sounds present  EXTREMITIES:  2+ Peripheral Pulses, No clubbing, cyanosis, or edema  SKIN: No rashes or lesions                                  13.3   13.86 )-----------( 193      ( 10 Aug 2018 06:48 )             37.5     08-10    132<L>  |  101  |  7   ----------------------------<  80  4.1   |  22  |  0.75    Ca    8.4      10 Aug 2018 06:48    TPro  5.6<L>  /  Alb  3.3  /  TBili  0.5  /  DBili  x   /  AST  22  /  ALT  30  /  AlkPhos  60  08-10          Urinalysis Basic - ( 09 Aug 2018 20:56 )    Color: Yellow / Appearance: Clear / S.005 / pH: x  Gluc: x / Ketone: Negative  / Bili: Negative / Urobili: Negative mg/dL   Blood: x / Protein: Negative mg/dL / Nitrite: Negative   Leuk Esterase: Negative / RBC: x / WBC x   Sq Epi: x / Non Sq Epi: x / Bacteria: x      PT/INR - ( 09 Aug 2018 12:44 )   PT: 10.1 sec;   INR: 0.93 ratio         PTT - ( 09 Aug 2018 12:44 )  PTT:30.0 sec        MEDICATIONS  (STANDING):  buDESOnide 160 MICROgram(s)/formoterol 4.5 MICROgram(s) Inhaler 2 Puff(s) Inhalation two times a day  buPROPion XL . 150 milliGRAM(s) Oral daily  diltiazem    Tablet 30 milliGRAM(s) Oral every 8 hours  docusate sodium 100 milliGRAM(s) Oral three times a day  famotidine    Tablet 20 milliGRAM(s) Oral two times a day  fludroCORTISONE 0.1 milliGRAM(s) Oral daily  heparin  Injectable 5000 Unit(s) SubCutaneous every 12 hours  hydroxychloroquine 200 milliGRAM(s) Oral two times a day  levothyroxine 25 MICROGram(s) Oral daily  nicotine - 21 mG/24Hr(s) Patch 1 patch Transdermal daily  OXcarbazepine 150 milliGRAM(s) Oral two times a day  piperacillin/tazobactam IVPB. 3.375 Gram(s) IV Intermittent every 8 hours  sodium chloride 0.9%. 1000 milliLiter(s) (50 mL/Hr) IV Continuous <Continuous>  tiotropium 18 MICROgram(s) Capsule 1 Capsule(s) Inhalation daily    MEDICATIONS  (PRN):  acetaminophen   Tablet. 650 milliGRAM(s) Oral every 6 hours PRN Mild Pain (1 - 3)  ALBUTerol    90 MICROgram(s) HFA Inhaler 2 Puff(s) Inhalation every 6 hours PRN Shortness of Breath and/or Wheezing  meclizine 12.5 milliGRAM(s) Oral three times a day PRN Dizziness  ondansetron Injectable 4 milliGRAM(s) IV Push every 6 hours PRN Nausea  pregabalin 75 milliGRAM(s) Oral three times a day PRN neuropathy  traMADol 50 milliGRAM(s) Oral every 6 hours PRN Severe Pain (7 - 10)          RADIOLOGY RESULTS:
PULMONARY/CRITICAL CARE      INTERVAL HPI/OVERNIGHT EVENTS:  Pt. well known to me from office  53y FemaleHPI:  · Chief Complaint: The patient is a 53y Female complaining of dizziness/vertigo.	  · HPI Objective Statement: pt is a 54yo female with pmhx of lupus, COPD ON HOME O2 IN PM, HCV, GERD, VERTIGO, SBO C/O ABD PAIN x 2 days. pt reports n/v with one episode of diarrhea and sharp constant severe abd pain all over. pt reports dizziness but associates that with her chronic neck pain and vertigo . pt reports this "feels like her normal vertigo". pt reports dizziness when moving, intermittent. pt not dizzy at this time.   PMD: CARVO PULM: NEWMARK.	  · Presenting Symptoms: DIARRHEA, NAUSEA, PAIN, TENDERNESS, VOMITING	  · Negative Findings: no burning urination, no dysuria	  · Location: abdomen	        PAST MEDICAL & SURGICAL HISTORY:  Glossopharyngeal neuralgia syndrome  Nicotine addiction  COPD (chronic obstructive pulmonary disease)  Neuropathy  Migraine  Anxiety  Hepatitis C  Gallstones  Hypothyroidism  Hypotension  GERD (gastroesophageal reflux disease)  UTI (urinary tract infection)  Vertigo  Sjogren's disease  Lupus  Injury of right wrist, subsequent encounter  Gall bladder disease: REMOVAL  Vocal cord polyp: REMOVAL   Fam and SOCIAL: Smokes 1ppd, no etoh      ICU Vital Signs Last 24 Hrs  T(C): 36.8 (09 Aug 2018 16:12), Max: 36.8 (09 Aug 2018 16:12)  T(F): 98.2 (09 Aug 2018 16:12), Max: 98.2 (09 Aug 2018 16:12)  HR: 69 (09 Aug 2018 16:12) (69 - 82)  BP: 122/69 (09 Aug 2018 16:12) (92/59 - 122/69)  BP(mean): --  ABP: --  ABP(mean): --  RR: 16 (09 Aug 2018 16:12) (16 - 16)  SpO2: 100% (09 Aug 2018 16:12) (97% - 100%)    Qtts:     I&O's Summary          REVIEW OF SYSTEMS:    CONSTITUTIONAL: No fever, weight loss, or fatigue  EYES: No eye pain, visual disturbances, or discharge  ENMT:  No difficulty hearing, tinnitus,  Has vertigo; No sinus or throat pain  NECK: No pain or stiffness  BREASTS: No pain, masses, or nipple discharge  RESPIRATORY: No cough,  Has wheezing, no chills or hemoptysis; No shortness of breath  CARDIOVASCULAR: No chest pain, palpitations, dizziness, or leg swelling  GASTROINTESTINAL: mild  abdominal  pain. had  nausea, no vomiting, or hematemesis; No diarrhea or constipation. No melena or hematochezia.  GENITOURINARY: No dysuria, frequency, hematuria, or incontinence  NEUROLOGICAL: No headaches, memory loss, loss of strength, numbness, or tremors  SKIN: No itching, burning, rashes, or lesions   LYMPH NODES: No enlarged glands  ENDOCRINE: No heat or cold intolerance; No hair loss  MUSCULOSKELETAL: No joint pain or swelling; No muscle, back, or extremity pain, no calf tenderness  PSYCHIATRIC: No depression, anxiety, mood swings, or difficulty sleeping  HEME/LYMPH: No easy bruising, or bleeding gums  ALLERGY AND IMMUNOLOGIC: No hives or eczema      PHYSICAL EXAM:    GENERAL: NAD, well-groomed, well-developed, NAD  HEAD:  Atraumatic, Normocephalic  EYES: EOMI, PERRLA, conjunctiva and sclera clear  ENMT: No tonsillar erythema, exudates, or enlargement; Moist mucous membranes, Good dentition, No lesions  NECK: Supple, No JVD, Normal thyroid  NERVOUS SYSTEM:  Alert & Oriented X3, Good concentration; Motor Strength 5/5 B/L upper and lower extremities  CHEST/LUNG:few , rhonchi, wheezing,  Good excursion,  no rubs  HEART: Regular rate and rhythm; No murmurs, rubs, or gallops  ABDOMEN: Soft, Nontender, Nondistended; Bowel sounds present  EXTREMITIES:  2+ Peripheral Pulses, No clubbing, cyanosis, or edema  LYMPH: No lymphadenopathy noted  SKIN: No rashes or lesions        LABS:                        15.8   13.97 )-----------( 413      ( 09 Aug 2018 12:44 )             43.4     08-09    121<L>  |  87<L>  |  12  ----------------------------<  56<L>  4.1   |  22  |  0.69    Ca    9.4      09 Aug 2018 12:44    TPro  8.0  /  Alb  4.5  /  TBili  0.7  /  DBili  x   /  AST  34  /  ALT  41  /  AlkPhos  82  08-09    PT/INR - ( 09 Aug 2018 12:44 )   PT: 10.1 sec;   INR: 0.93 ratio         PTT - ( 09 Aug 2018 12:44 )  PTT:30.0 sec      vanco through     RADIOLOGY & ADDITIONAL STUDIES:< from: CT Abdomen and Pelvis w/ Oral Cont and w/ IV Cont (08.09.18 @ 15:49) >  EXAM:  CT ABDOMEN AND PELVIS OC IC                                  PROCEDURE DATE:  08/09/2018          INTERPRETATION:  Comparison exam dated 3/6/2017    Clinical information: Abdominal pain    Axial images obtained, coronal and sagittal images computer reformatted.    Intravenous and oral contrast material administered. 80 cc of Omnipaque   300 intravenously administered, 20 cc discarded.    Lung bases clear. Hepatic parenchyma homogeneous. Spleen is not enlarged.   Pancreas is unremarkable.No adrenal lesions present. Normal caliber   abdominal aorta. No retroperitoneal lymphadenopathy, ascites, or biliary   ductal dilatation. Postcholecystectomy clips present.    Right kidney shows no masses obstructive changes or calculi.    Hypodensity anterior upper pole left kidney has the appearance of a cyst.   Additional hypodensities mid polar region and lower pole, too small to   characterize. Obtain follow-up sonography to confirm.    No bowel obstruction. No appendicitis. Minimal diverticulosis present.   Question mild wall thickening versus underdistention left side of large   bowel. Follow up with colonoscopy when clinically appropriate.    Inguinal regions intact. Urinary bladder is filled, no stones evident. No   evidence of freepelvic fluid. Vacuum disc phenomena L4-L5. Posterior   spurring L5-S1.    IMPRESSION: No bowel obstruction. Questionable mild wall thickening   versus underdistention left side of large bowel.    See additional findings and follow-up recommendation as described above.                  DANUTA MEJÍA M.D.,ATTENDING RADIOLOGIST  This document has been electronically signed. Aug  9 2018  4:07PM              < end of copied text >        CRITICAL CARE TIME SPENT:
PULMONARY/CRITICAL CARE      INTERVAL HPI/OVERNIGHT EVENTS: No sob, no fever. Slight vertigo. Ambulated.     53y FemaleHPI:  Patient is 52 yo female with hx of SBO, HTN, and Vertigo presenting with diffuse acute on Chronic abdominal pain that has been going on for the  past several days, and associated with multiple episode of N/V/D.  Afebrile.  Patient denies any headache, blurry vision, CP, SOB, palpitation, blood per stool, numbness or weakness  + chronic dizziness (09 Aug 2018 18:07)        PAST MEDICAL & SURGICAL HISTORY:  Glossopharyngeal neuralgia syndrome  Nicotine addiction  COPD (chronic obstructive pulmonary disease)  Neuropathy  Migraine  Anxiety  Hepatitis C  Gallstones  Hypothyroidism  Hypotension  GERD (gastroesophageal reflux disease)  UTI (urinary tract infection)  Vertigo  Sjogren's disease  Lupus  Injury of right wrist, subsequent encounter  Gall bladder disease: REMOVAL  Vocal cord polyp: REMOVAL        ICU Vital Signs Last 24 Hrs  T(C): 36.8 (11 Aug 2018 05:02), Max: 37.2 (10 Aug 2018 13:32)  T(F): 98.3 (11 Aug 2018 05:02), Max: 98.9 (10 Aug 2018 13:32)  HR: 56 (11 Aug 2018 05:02) (56 - 76)  BP: 115/68 (11 Aug 2018 05:02) (111/68 - 131/76)  BP(mean): --  ABP: --  ABP(mean): --  RR: 18 (11 Aug 2018 05:02) (16 - 18)  SpO2: 100% (11 Aug 2018 05:02) (97% - 100%)    Qtts:     I&O's Summary    10 Aug 2018 07:01  -  11 Aug 2018 07:00  --------------------------------------------------------  IN: 600 mL      REVIEW OF SYSTEMS:    CONSTITUTIONAL: No fever, weight loss, or fatigue  EYES: No eye pain, visual disturbances, or discharge  ENMT:  No difficulty hearing, tinnitus,  Has vertigo; No sinus or throat pain  NECK: No pain or stiffness  BREASTS: No pain, masses, or nipple discharge  RESPIRATORY: No cough,  Has wheezing, no chills or hemoptysis; No shortness of breath  CARDIOVASCULAR: No chest pain, palpitations, dizziness, or leg swelling  GASTROINTESTINAL: mild  abdominal  pain. had  nausea, no vomiting, or hematemesis; No diarrhea or constipation. No melena or hematochezia.  GENITOURINARY: No dysuria, frequency, hematuria, or incontinence  NEUROLOGICAL: No headaches, memory loss, loss of strength, numbness, or tremors  SKIN: No itching, burning, rashes, or lesions   LYMPH NODES: No enlarged glands  ENDOCRINE: No heat or cold intolerance; No hair loss  MUSCULOSKELETAL: No joint pain or swelling; No muscle, back, or extremity pain, no calf tenderness  PSYCHIATRIC: No depression, anxiety, mood swings, or difficulty sleeping  HEME/LYMPH: No easy bruising, or bleeding gums  ALLERGY AND IMMUNOLOGIC: No hives or eczema      PHYSICAL EXAM:    GENERAL: NAD, well-groomed, well-developed, NAD  HEAD:  Atraumatic, Normocephalic  EYES: EOMI, PERRLA, conjunctiva and sclera clear  ENMT: No tonsillar erythema, exudates, or enlargement; Moist mucous membranes, Good dentition, No lesions  NECK: Supple, No JVD, Normal thyroid  NERVOUS SYSTEM:  Alert & Oriented X3, Good concentration; Motor Strength 5/5 B/L upper and lower extremities  CHEST/LUNG: few , rhonchi, wheezing,  Good excursion,  no rubs  HEART: Regular rate and rhythm; No murmurs, rubs, or gallops  ABDOMEN: Soft, Nontender, Nondistended; Bowel sounds present  EXTREMITIES:  2+ Peripheral Pulses, No clubbing, cyanosis, or edema  LYMPH: No lymphadenopathy noted  SKIN: No rashes or lesions    LABS:                        12.4   8.53  )-----------( 304      ( 11 Aug 2018 07:14 )             35.4     08-11    132<L>  |  101  |  8   ----------------------------<  84  4.5   |  26  |  0.78    Ca    8.4      11 Aug 2018 07:14    TPro  5.6<L>  /  Alb  3.3  /  TBili  0.5  /  DBili  x   /  AST  22  /  ALT  30  /  AlkPhos  60  08-10    PT/INR - ( 09 Aug 2018 12:44 )   PT: 10.1 sec;   INR: 0.93 ratio         PTT - ( 09 Aug 2018 12:44 )  PTT:30.0 sec  Urinalysis Basic - ( 09 Aug 2018 20:56 )    Color: Yellow / Appearance: Clear / S.005 / pH: x  Gluc: x / Ketone: Negative  / Bili: Negative / Urobili: Negative mg/dL   Blood: x / Protein: Negative mg/dL / Nitrite: Negative   Leuk Esterase: Negative / RBC: x / WBC x   Sq Epi: x / Non Sq Epi: x / Bacteria: x        vanco through     RADIOLOGY & ADDITIONAL STUDIES:< from: Xray Cervical Spine AP, Lat, Dens Max 3 View (18 @ 19:35) >    EXAM:  C-SPINE AP LAT DENS MAX 3 VIEWS                                  PROCEDURE DATE:  2018          INTERPRETATION:  Neck pain.    Three-view cervical spine.  Dilated limited. C7 partially obscured on lateral projection  Straightening lordosis may be positional or reflect muscle spasm. There   is no fracture or subluxation. Mild narrowing C5/C6 disc space.   Multilevel facet arthropathy.    Impression: As above                  LUMA GARDNER M.D., ATTENDING RADIOLOGIST  This documenthas been electronically signed. Aug  9 2018  7:51PM              < end of copied text >  < from: CT Head No Cont (18 @ 18:24) >    EXAM:  CT BRAIN                                  PROCEDURE DATE:  2018    < from: CT Abdomen and Pelvis w/ Oral Cont and w/ IV Cont (18 @ 15:49) >  EXAM:  CT ABDOMEN AND PELVIS OC IC                                  PROCEDURE DATE:  2018          INTERPRETATION:  Comparison exam dated 3/6/2017    Clinical information: Abdominal pain    Axial images obtained, coronal and sagittal images computer reformatted.    Intravenous and oral contrast material administered. 80 cc of Omnipaque   300 intravenously administered, 20 cc discarded.    Lung bases clear. Hepatic parenchyma homogeneous. Spleen is not enlarged.   Pancreas is unremarkable.No adrenal lesions present. Normal caliber   abdominal aorta. No retroperitoneal lymphadenopathy, ascites, or biliary   ductal dilatation. Postcholecystectomy clips present.    Right kidney shows no masses obstructive changes or calculi.    Hypodensity anterior upper pole left kidney has the appearance of a cyst.   Additional hypodensities mid polar region and lower pole, too small to   characterize. Obtain follow-up sonography to confirm.    No bowel obstruction. No appendicitis. Minimal diverticulosis present.   Question mild wall thickening versus underdistention left side of large   bowel. Follow up with colonoscopy when clinically appropriate.    Inguinal regions intact. Urinary bladder is filled, no stones evident. No   evidence of freepelvic fluid. Vacuum disc phenomena L4-L5. Posterior   spurring L5-S1.    IMPRESSION: No bowel obstruction. Questionable mild wall thickening   versus underdistention left side of large bowel.    See additional findings and follow-up recommendation as described above.                  DANUTA MEJÍA M.D.,ATTENDING RADIOLOGIST  This document has been electronically signed. Aug  9 2018  4:07PM            < from: CT Chest No Cont (18 @ 10:34) >    EXAM:  CT CHEST                                  PROCEDURE DATE:  2018          INTERPRETATION:  .    CLINICAL INFORMATION: Cough. Evaluate for pneumonia. Pulmonary nodule   follow-up.    TECHNIQUE: Helical axial images of the chest were obtained from the   thoracic inlet to the adrenal glands without the administration of   intravenous contrast. Sagittal and coronal reformations were obtained   from the source data.     COMPARISON: Prior chest CT examination from 2018.     FINDINGS: There are unchanged nonspecific bilateral breasts   calcifications.    There is no axillary, mediastinal, or hilar lymphadenopathy. Right-sided   axillary metallic surgical clips are noted.    The heart size is normal. The pericardium appears unremarkable. There is   minimal atherosclerosis of the aortic arch. There is atherosclerotic   disease of the infrarenal abdominal aorta. The imaged portions of the   aorta are normal in caliber.    The central airways remain patent. A tiny adherent secretion is notable   within the left side of the trachea. Emphysema is again notable.   Scattered areas of linear atelectasis are notable within the bilateral   lung bases. There is no lobar consolidation to suggest pneumonia. There   is an unchanged 3 mm solid pulmonary nodule within the right upper lobe   (series 4, image 43). Tiny calcified granulomas are notable within the   left lower lobe. Previously noted findings of respiratory bronchiolitis   are not seen on the current exam.    Oral contrast is notable within the splenic flexure of the colon. The   patient is status post cholecystectomy. The other visualized upper   abdominal organs appear unremarkable.    The visualized osseous structures are unremarkable.    IMPRESSION: No pneumonia.    Emphysema.    Unchanged 3 mm solid pulmonary nodule within the right upper lobe. A   follow-up chest CT examination in 6 months is recommended to assess for   stability or change.        < end of copied text >    < end of copied text >        INTERPRETATION:  History: Vertigo, rule out acoustic neuroma.    Noncontrast head CT. Prior 2018.    This brain morphology parenchymal volume and density normal for age and   stable. No bleed extra-axial collection edema territorial infarct or mass   effect. No gross widening of the internal auditory canal bilaterally.   Mucosal thickening paranasal sinuses. Cranium intact.    Impression:    No acute or focal pathology on this noncontrast study. Stable exam.    To maximize diagnostic confidence in excluding acoustic neurinoma suggest   a contrast-enhanced MR                   LUMA GARDNER M.D., ATTENDING RADIOLOGIST  This document has been electronically signed. Aug  9 2018  6:30PM        < end of copied text >        CRITICAL CARE TIME SPENT:
PULMONARY/CRITICAL CARE      INTERVAL HPI/OVERNIGHT EVENTS:    53y FemaleHPI:  pt has less vertigo. Low grade temp. Mild sob. No cough.  Patient is 54 yo female with hx of SBO, HTN, and Vertigo presenting with diffuse acute on Chronic abdominal pain that has been going on for the  past several days, and associated with multiple episode of N/V/D.  Afebrile.  Patient denies any headache, blurry vision, CP, SOB, palpitation, blood per stool, numbness or weakness  + chronic dizziness (09 Aug 2018 18:07)        PAST MEDICAL & SURGICAL HISTORY:  Glossopharyngeal neuralgia syndrome  Nicotine addiction  COPD (chronic obstructive pulmonary disease)  Neuropathy  Migraine  Anxiety  Hepatitis C  Gallstones  Hypothyroidism  Hypotension  GERD (gastroesophageal reflux disease)  UTI (urinary tract infection)  Vertigo  Sjogren's disease  Lupus  Injury of right wrist, subsequent encounter  Gall bladder disease: REMOVAL  Vocal cord polyp: REMOVAL        ICU Vital Signs Last 24 Hrs  T(C): 36.9 (10 Aug 2018 05:12), Max: 38.2 (09 Aug 2018 22:54)  T(F): 98.4 (10 Aug 2018 05:12), Max: 100.7 (09 Aug 2018 22:54)  HR: 70 (10 Aug 2018 05:12) (67 - 82)  BP: 106/66 (10 Aug 2018 05:12) (92/59 - 122/69)  BP(mean): 70 (09 Aug 2018 18:07) (70 - 70)  ABP: --  ABP(mean): --  RR: 16 (10 Aug 2018 05:12) (15 - 16)  SpO2: 96% (10 Aug 2018 05:12) (96% - 100%)    Qtts:     I&O's Summary    09 Aug 2018 07:01  -  10 Aug 2018 07:00  --------------------------------------------------------  IN: 560 mL / OUT: 0 mL / NET: 560 mL      REVIEW OF SYSTEMS:    CONSTITUTIONAL: No fever, weight loss, or fatigue  EYES: No eye pain, visual disturbances, or discharge  ENMT:  No difficulty hearing, tinnitus,  Has vertigo; No sinus or throat pain  NECK: No pain or stiffness  BREASTS: No pain, masses, or nipple discharge  RESPIRATORY: No cough,  Has wheezing, no chills or hemoptysis; No shortness of breath  CARDIOVASCULAR: No chest pain, palpitations, dizziness, or leg swelling  GASTROINTESTINAL: mild  abdominal  pain. had  nausea, no vomiting, or hematemesis; No diarrhea or constipation. No melena or hematochezia.  GENITOURINARY: No dysuria, frequency, hematuria, or incontinence  NEUROLOGICAL: No headaches, memory loss, loss of strength, numbness, or tremors  SKIN: No itching, burning, rashes, or lesions   LYMPH NODES: No enlarged glands  ENDOCRINE: No heat or cold intolerance; No hair loss  MUSCULOSKELETAL: No joint pain or swelling; No muscle, back, or extremity pain, no calf tenderness  PSYCHIATRIC: No depression, anxiety, mood swings, or difficulty sleeping  HEME/LYMPH: No easy bruising, or bleeding gums  ALLERGY AND IMMUNOLOGIC: No hives or eczema      PHYSICAL EXAM:    GENERAL: NAD, well-groomed, well-developed, NAD  HEAD:  Atraumatic, Normocephalic  EYES: EOMI, PERRLA, conjunctiva and sclera clear  ENMT: No tonsillar erythema, exudates, or enlargement; Moist mucous membranes, Good dentition, No lesions  NECK: Supple, No JVD, Normal thyroid  NERVOUS SYSTEM:  Alert & Oriented X3, Good concentration; Motor Strength 5/5 B/L upper and lower extremities  CHEST/LUNG: few , rhonchi, wheezing,  Good excursion,  no rubs  HEART: Regular rate and rhythm; No murmurs, rubs, or gallops  ABDOMEN: Soft, Nontender, Nondistended; Bowel sounds present  EXTREMITIES:  2+ Peripheral Pulses, No clubbing, cyanosis, or edema  LYMPH: No lymphadenopathy noted  SKIN: No rashes or lesions          LABS:                        13.3   13.86 )-----------( 193      ( 10 Aug 2018 06:48 )             37.5     08-10    132<L>  |  101  |  7   ----------------------------<  80  4.1   |  22  |  0.75    Ca    8.4      10 Aug 2018 06:48    TPro  5.6<L>  /  Alb  3.3  /  TBili  0.5  /  DBili  x   /  AST  22  /  ALT  30  /  AlkPhos  60  08-10    PT/INR - ( 09 Aug 2018 12:44 )   PT: 10.1 sec;   INR: 0.93 ratio         PTT - ( 09 Aug 2018 12:44 )  PTT:30.0 sec  Urinalysis Basic - ( 09 Aug 2018 20:56 )    Color: Yellow / Appearance: Clear / S.005 / pH: x  Gluc: x / Ketone: Negative  / Bili: Negative / Urobili: Negative mg/dL   Blood: x / Protein: Negative mg/dL / Nitrite: Negative   Leuk Esterase: Negative / RBC: x / WBC x   Sq Epi: x / Non Sq Epi: x / Bacteria: x        vanco through     RADIOLOGY & ADDITIONAL STUDIES:      CRITICAL CARE TIME SPENT:

## 2018-08-11 NOTE — DISCHARGE NOTE ADULT - PATIENT PORTAL LINK FT
You can access the [facility] Portal, offered by [facility], by registering with the following website: [email or web address]

## 2018-08-11 NOTE — DISCHARGE NOTE ADULT - CARE PLAN
Principal Discharge DX:	Abdominal pain  Goal:	feel better  Assessment and plan of treatment:	No confirmed evidence of active colitis.  Procalcitonin negative.  Doubt benefit of abx; may increase chances of c.diff  Secondary Diagnosis:	COPD (chronic obstructive pulmonary disease)  Assessment and plan of treatment:	inhalers  Secondary Diagnosis:	Vertigo  Assessment and plan of treatment:	meclizine  Secondary Diagnosis:	Lupus  Assessment and plan of treatment:	home meds. Steroids as per PCP  Secondary Diagnosis:	Hyponatremia  Assessment and plan of treatment:	repeat BMP next week and f/up with PCP  Secondary Diagnosis:	Glossopharyngeal neuralgia syndrome  Assessment and plan of treatment:	home meds

## 2018-08-11 NOTE — PROGRESS NOTE ADULT - ASSESSMENT
Patient is 52 yo female with complexed PMHX presenting with     1.  Abdominal pain.  Associated with N/V/D.  N/V/D resolved.  Abdm CT scan noticed.  Continue with IVF, and IV antibiotic pending Blood culture, and stool culture.  GI consult pending    2. dizziness.  seems to be chronic in setting of dehydration.  Head CT scan shows no acute process.  Neurologically intact.  Continue with meclizine    3.  COPD.  Counselled about smoking.  stable.  Continue with home inhalation.  Nicotine patch    4.  GERD.  Continue with pepcid    5.  Hyponatremia.  Urine osmolarity, serum osmolarity, urine sodium, are pending.  Improving.  Continue with 1 liter fluid restriction, and nephrology to follow up.  monitor sodium level closely.      Plan of care was discussed with patient in great details, All questions were answered to their satisfaction  Seems to understand, and in agreement
Pt well known to me with severe COPD, nicotine addiction admitted for vertigo and nonspecific abdominal pain.  Hyponatremia  Elevated WBC
Pt well known to me with severe COPD, nicotine addiction admitted for vertigo and nonspecific abdominal pain.  Hyponatremia improved  Elevated WBC and low grade temp, but no obvious source of infection. ??viral  Negative procalcitonin.  No abd pain  Improved today    May dc pt to see me in office next week.
Pt well known to me with severe COPD, nicotine addiction admitted for vertigo and nonspecific abdominal pain.  Hyponatremia improved  Elevated WBC and low grade temp, but no obvious source of infection. ??viral

## 2018-08-12 LAB
CULTURE RESULTS: SIGNIFICANT CHANGE UP
SPECIMEN SOURCE: SIGNIFICANT CHANGE UP

## 2018-09-06 ENCOUNTER — OUTPATIENT (OUTPATIENT)
Dept: OUTPATIENT SERVICES | Facility: HOSPITAL | Age: 53
LOS: 1 days | End: 2018-09-06
Payer: MEDICARE

## 2018-09-06 DIAGNOSIS — S69.91XD UNSPECIFIED INJURY OF RIGHT WRIST, HAND AND FINGER(S), SUBSEQUENT ENCOUNTER: Chronic | ICD-10-CM

## 2018-09-06 DIAGNOSIS — M54.12 RADICULOPATHY, CERVICAL REGION: ICD-10-CM

## 2018-09-06 DIAGNOSIS — J38.1 POLYP OF VOCAL CORD AND LARYNX: Chronic | ICD-10-CM

## 2018-09-06 DIAGNOSIS — K82.9 DISEASE OF GALLBLADDER, UNSPECIFIED: Chronic | ICD-10-CM

## 2018-09-06 PROCEDURE — 77003 FLUOROGUIDE FOR SPINE INJECT: CPT

## 2018-09-06 PROCEDURE — 62321 NJX INTERLAMINAR CRV/THRC: CPT

## 2018-09-24 ENCOUNTER — APPOINTMENT (OUTPATIENT)
Dept: GASTROENTEROLOGY | Facility: CLINIC | Age: 53
End: 2018-09-24
Payer: MEDICARE

## 2018-09-24 VITALS
HEART RATE: 87 BPM | BODY MASS INDEX: 18.89 KG/M2 | HEIGHT: 58 IN | DIASTOLIC BLOOD PRESSURE: 68 MMHG | WEIGHT: 90 LBS | OXYGEN SATURATION: 99 % | SYSTOLIC BLOOD PRESSURE: 101 MMHG

## 2018-09-24 DIAGNOSIS — R93.3 ABNORMAL FINDINGS ON DIAGNOSTIC IMAGING OF OTHER PARTS OF DIGESTIVE TRACT: ICD-10-CM

## 2018-09-24 DIAGNOSIS — R14.0 ABDOMINAL DISTENSION (GASEOUS): ICD-10-CM

## 2018-09-24 DIAGNOSIS — K59.09 OTHER CONSTIPATION: ICD-10-CM

## 2018-09-24 DIAGNOSIS — K59.1 FUNCTIONAL DIARRHEA: ICD-10-CM

## 2018-09-24 DIAGNOSIS — K58.9 IRRITABLE BOWEL SYNDROME W/OUT DIARRHEA: ICD-10-CM

## 2018-09-24 DIAGNOSIS — R11.2 NAUSEA WITH VOMITING, UNSPECIFIED: ICD-10-CM

## 2018-09-24 PROCEDURE — 99214 OFFICE O/P EST MOD 30 MIN: CPT

## 2018-09-25 PROBLEM — R93.3 ABNORMAL CT SCAN, GASTROINTESTINAL TRACT: Status: ACTIVE | Noted: 2018-09-25

## 2018-09-25 PROBLEM — K58.9 IBS (IRRITABLE BOWEL SYNDROME): Status: ACTIVE | Noted: 2017-09-22

## 2018-09-25 PROBLEM — R14.0 ABDOMINAL BLOATING: Status: ACTIVE | Noted: 2018-01-15

## 2018-09-25 PROBLEM — R11.2 NAUSEA & VOMITING: Status: ACTIVE | Noted: 2018-01-15

## 2018-09-25 PROBLEM — K59.1 DIARRHEA, FUNCTIONAL: Status: ACTIVE | Noted: 2018-01-15

## 2018-09-25 PROBLEM — K59.09 CHRONIC CONSTIPATION: Status: ACTIVE | Noted: 2018-06-22

## 2018-09-25 RX ORDER — SODIUM SULFATE, POTASSIUM SULFATE, MAGNESIUM SULFATE 17.5; 3.13; 1.6 G/ML; G/ML; G/ML
17.5-3.13-1.6 SOLUTION, CONCENTRATE ORAL
Qty: 1 | Refills: 0 | Status: ACTIVE | COMMUNITY
Start: 2018-09-25 | End: 1900-01-01

## 2018-09-26 ENCOUNTER — OTHER (OUTPATIENT)
Age: 53
End: 2018-09-26

## 2018-09-26 ENCOUNTER — CHART COPY (OUTPATIENT)
Age: 53
End: 2018-09-26

## 2018-09-26 ENCOUNTER — CXD DOCUMENT (OUTPATIENT)
Age: 53
End: 2018-09-26

## 2018-09-26 ENCOUNTER — TRANSCRIPTION ENCOUNTER (OUTPATIENT)
Age: 53
End: 2018-09-26

## 2018-09-27 ENCOUNTER — APPOINTMENT (OUTPATIENT)
Dept: GASTROENTEROLOGY | Facility: HOSPITAL | Age: 53
End: 2018-09-27

## 2018-09-27 ENCOUNTER — OUTPATIENT (OUTPATIENT)
Dept: OUTPATIENT SERVICES | Facility: HOSPITAL | Age: 53
LOS: 1 days | End: 2018-09-27
Payer: MEDICARE

## 2018-09-27 ENCOUNTER — RESULT REVIEW (OUTPATIENT)
Age: 53
End: 2018-09-27

## 2018-09-27 DIAGNOSIS — S69.91XD UNSPECIFIED INJURY OF RIGHT WRIST, HAND AND FINGER(S), SUBSEQUENT ENCOUNTER: Chronic | ICD-10-CM

## 2018-09-27 DIAGNOSIS — R11.0 NAUSEA: ICD-10-CM

## 2018-09-27 DIAGNOSIS — J38.1 POLYP OF VOCAL CORD AND LARYNX: Chronic | ICD-10-CM

## 2018-09-27 DIAGNOSIS — K59.1 FUNCTIONAL DIARRHEA: ICD-10-CM

## 2018-09-27 DIAGNOSIS — R14.0 ABDOMINAL DISTENSION (GASEOUS): ICD-10-CM

## 2018-09-27 DIAGNOSIS — K62.5 HEMORRHAGE OF ANUS AND RECTUM: ICD-10-CM

## 2018-09-27 DIAGNOSIS — K59.09 OTHER CONSTIPATION: ICD-10-CM

## 2018-09-27 DIAGNOSIS — K82.9 DISEASE OF GALLBLADDER, UNSPECIFIED: Chronic | ICD-10-CM

## 2018-09-27 LAB — HCG UR QL: NEGATIVE — SIGNIFICANT CHANGE UP

## 2018-09-27 PROCEDURE — 43239 EGD BIOPSY SINGLE/MULTIPLE: CPT

## 2018-09-27 PROCEDURE — 88305 TISSUE EXAM BY PATHOLOGIST: CPT | Mod: 26

## 2018-09-27 PROCEDURE — 81025 URINE PREGNANCY TEST: CPT

## 2018-09-27 PROCEDURE — 45378 DIAGNOSTIC COLONOSCOPY: CPT

## 2018-09-27 PROCEDURE — 88313 SPECIAL STAINS GROUP 2: CPT | Mod: 26

## 2018-09-27 PROCEDURE — 88305 TISSUE EXAM BY PATHOLOGIST: CPT

## 2018-09-27 PROCEDURE — 88313 SPECIAL STAINS GROUP 2: CPT

## 2018-09-27 PROCEDURE — 88312 SPECIAL STAINS GROUP 1: CPT

## 2018-09-27 PROCEDURE — 88312 SPECIAL STAINS GROUP 1: CPT | Mod: 26

## 2018-10-16 ENCOUNTER — INPATIENT (INPATIENT)
Facility: HOSPITAL | Age: 53
LOS: 0 days | Discharge: HOME | DRG: 641 | End: 2018-10-17
Attending: HOSPITALIST | Admitting: HOSPITALIST
Payer: COMMERCIAL

## 2018-10-16 VITALS
DIASTOLIC BLOOD PRESSURE: 78 MMHG | SYSTOLIC BLOOD PRESSURE: 143 MMHG | OXYGEN SATURATION: 99 % | RESPIRATION RATE: 20 BRPM | HEART RATE: 74 BPM | TEMPERATURE: 98 F | HEIGHT: 58 IN | WEIGHT: 89.95 LBS

## 2018-10-16 DIAGNOSIS — F41.9 ANXIETY DISORDER, UNSPECIFIED: ICD-10-CM

## 2018-10-16 DIAGNOSIS — Z98.890 OTHER SPECIFIED POSTPROCEDURAL STATES: Chronic | ICD-10-CM

## 2018-10-16 DIAGNOSIS — K82.9 DISEASE OF GALLBLADDER, UNSPECIFIED: Chronic | ICD-10-CM

## 2018-10-16 DIAGNOSIS — R10.13 EPIGASTRIC PAIN: ICD-10-CM

## 2018-10-16 DIAGNOSIS — F17.219 NICOTINE DEPENDENCE, CIGARETTES, WITH UNSPECIFIED NICOTINE-INDUCED DISORDERS: ICD-10-CM

## 2018-10-16 DIAGNOSIS — J38.1 POLYP OF VOCAL CORD AND LARYNX: Chronic | ICD-10-CM

## 2018-10-16 DIAGNOSIS — Z29.9 ENCOUNTER FOR PROPHYLACTIC MEASURES, UNSPECIFIED: ICD-10-CM

## 2018-10-16 DIAGNOSIS — E87.1 HYPO-OSMOLALITY AND HYPONATREMIA: ICD-10-CM

## 2018-10-16 DIAGNOSIS — L93.0 DISCOID LUPUS ERYTHEMATOSUS: ICD-10-CM

## 2018-10-16 DIAGNOSIS — J44.9 CHRONIC OBSTRUCTIVE PULMONARY DISEASE, UNSPECIFIED: ICD-10-CM

## 2018-10-16 DIAGNOSIS — E03.9 HYPOTHYROIDISM, UNSPECIFIED: ICD-10-CM

## 2018-10-16 DIAGNOSIS — S69.91XD UNSPECIFIED INJURY OF RIGHT WRIST, HAND AND FINGER(S), SUBSEQUENT ENCOUNTER: Chronic | ICD-10-CM

## 2018-10-16 DIAGNOSIS — G52.1 DISORDERS OF GLOSSOPHARYNGEAL NERVE: ICD-10-CM

## 2018-10-16 LAB
ALBUMIN SERPL ELPH-MCNC: 4 G/DL — SIGNIFICANT CHANGE UP (ref 3.3–5)
ALP SERPL-CCNC: 81 U/L — SIGNIFICANT CHANGE UP (ref 30–120)
ALT FLD-CCNC: 40 U/L DA — SIGNIFICANT CHANGE UP (ref 10–60)
ANION GAP SERPL CALC-SCNC: 10 MMOL/L — SIGNIFICANT CHANGE UP (ref 5–17)
APPEARANCE UR: CLEAR — SIGNIFICANT CHANGE UP
AST SERPL-CCNC: 46 U/L — HIGH (ref 10–40)
BASOPHILS # BLD AUTO: 0.07 K/UL — SIGNIFICANT CHANGE UP (ref 0–0.2)
BASOPHILS NFR BLD AUTO: 0.5 % — SIGNIFICANT CHANGE UP (ref 0–2)
BILIRUB SERPL-MCNC: 0.6 MG/DL — SIGNIFICANT CHANGE UP (ref 0.2–1.2)
BILIRUB UR-MCNC: NEGATIVE — SIGNIFICANT CHANGE UP
BUN SERPL-MCNC: 11 MG/DL — SIGNIFICANT CHANGE UP (ref 7–23)
CALCIUM SERPL-MCNC: 9.3 MG/DL — SIGNIFICANT CHANGE UP (ref 8.4–10.5)
CHLORIDE SERPL-SCNC: 87 MMOL/L — LOW (ref 96–108)
CO2 SERPL-SCNC: 23 MMOL/L — SIGNIFICANT CHANGE UP (ref 22–31)
COLOR SPEC: YELLOW — SIGNIFICANT CHANGE UP
CREAT SERPL-MCNC: 0.56 MG/DL — SIGNIFICANT CHANGE UP (ref 0.5–1.3)
DIFF PNL FLD: NEGATIVE — SIGNIFICANT CHANGE UP
EOSINOPHIL # BLD AUTO: 0.27 K/UL — SIGNIFICANT CHANGE UP (ref 0–0.5)
EOSINOPHIL NFR BLD AUTO: 2.1 % — SIGNIFICANT CHANGE UP (ref 0–6)
GLUCOSE BLDC GLUCOMTR-MCNC: 109 MG/DL — HIGH (ref 70–99)
GLUCOSE SERPL-MCNC: 66 MG/DL — LOW (ref 70–99)
GLUCOSE UR QL: NEGATIVE MG/DL — SIGNIFICANT CHANGE UP
HCT VFR BLD CALC: 38 % — SIGNIFICANT CHANGE UP (ref 34.5–45)
HGB BLD-MCNC: 13.6 G/DL — SIGNIFICANT CHANGE UP (ref 11.5–15.5)
IMM GRANULOCYTES NFR BLD AUTO: 0.5 % — SIGNIFICANT CHANGE UP (ref 0–1.5)
KETONES UR-MCNC: ABNORMAL
LACTATE SERPL-SCNC: 0.7 MMOL/L — SIGNIFICANT CHANGE UP (ref 0.7–2)
LEUKOCYTE ESTERASE UR-ACNC: NEGATIVE — SIGNIFICANT CHANGE UP
LIDOCAIN IGE QN: 78 U/L — SIGNIFICANT CHANGE UP (ref 73–393)
LYMPHOCYTES # BLD AUTO: 1.85 K/UL — SIGNIFICANT CHANGE UP (ref 1–3.3)
LYMPHOCYTES # BLD AUTO: 14.3 % — SIGNIFICANT CHANGE UP (ref 13–44)
MCHC RBC-ENTMCNC: 34.3 PG — HIGH (ref 27–34)
MCHC RBC-ENTMCNC: 35.8 GM/DL — SIGNIFICANT CHANGE UP (ref 32–36)
MCV RBC AUTO: 96 FL — SIGNIFICANT CHANGE UP (ref 80–100)
MONOCYTES # BLD AUTO: 1.1 K/UL — HIGH (ref 0–0.9)
MONOCYTES NFR BLD AUTO: 8.5 % — SIGNIFICANT CHANGE UP (ref 2–14)
NEUTROPHILS # BLD AUTO: 9.62 K/UL — HIGH (ref 1.8–7.4)
NEUTROPHILS NFR BLD AUTO: 74.1 % — SIGNIFICANT CHANGE UP (ref 43–77)
NITRITE UR-MCNC: NEGATIVE — SIGNIFICANT CHANGE UP
PH UR: 7 — SIGNIFICANT CHANGE UP (ref 5–8)
PLATELET # BLD AUTO: 214 K/UL — SIGNIFICANT CHANGE UP (ref 150–400)
POTASSIUM SERPL-MCNC: 5.1 MMOL/L — SIGNIFICANT CHANGE UP (ref 3.5–5.3)
POTASSIUM SERPL-SCNC: 5.1 MMOL/L — SIGNIFICANT CHANGE UP (ref 3.5–5.3)
PROT SERPL-MCNC: 7 G/DL — SIGNIFICANT CHANGE UP (ref 6–8.3)
PROT UR-MCNC: NEGATIVE MG/DL — SIGNIFICANT CHANGE UP
RAPID RVP RESULT: SIGNIFICANT CHANGE UP
RBC # BLD: 3.96 M/UL — SIGNIFICANT CHANGE UP (ref 3.8–5.2)
RBC # FLD: 11.9 % — SIGNIFICANT CHANGE UP (ref 10.3–14.5)
SODIUM SERPL-SCNC: 120 MMOL/L — CRITICAL LOW (ref 135–145)
SP GR SPEC: 1.01 — SIGNIFICANT CHANGE UP (ref 1.01–1.02)
UROBILINOGEN FLD QL: NEGATIVE MG/DL — SIGNIFICANT CHANGE UP
WBC # BLD: 12.97 K/UL — HIGH (ref 3.8–10.5)
WBC # FLD AUTO: 12.97 K/UL — HIGH (ref 3.8–10.5)

## 2018-10-16 PROCEDURE — 99223 1ST HOSP IP/OBS HIGH 75: CPT | Mod: AI

## 2018-10-16 PROCEDURE — 99285 EMERGENCY DEPT VISIT HI MDM: CPT

## 2018-10-16 PROCEDURE — 74019 RADEX ABDOMEN 2 VIEWS: CPT | Mod: 26

## 2018-10-16 PROCEDURE — 71250 CT THORAX DX C-: CPT | Mod: 26

## 2018-10-16 PROCEDURE — 71046 X-RAY EXAM CHEST 2 VIEWS: CPT | Mod: 26

## 2018-10-16 RX ORDER — ENOXAPARIN SODIUM 100 MG/ML
40 INJECTION SUBCUTANEOUS EVERY 24 HOURS
Qty: 0 | Refills: 0 | Status: DISCONTINUED | OUTPATIENT
Start: 2018-10-16 | End: 2018-10-17

## 2018-10-16 RX ORDER — SODIUM CHLORIDE 9 MG/ML
1000 INJECTION INTRAMUSCULAR; INTRAVENOUS; SUBCUTANEOUS ONCE
Qty: 0 | Refills: 0 | Status: COMPLETED | OUTPATIENT
Start: 2018-10-16 | End: 2018-10-16

## 2018-10-16 RX ORDER — BUPROPION HYDROCHLORIDE 150 MG/1
150 TABLET, EXTENDED RELEASE ORAL DAILY
Qty: 0 | Refills: 0 | Status: DISCONTINUED | OUTPATIENT
Start: 2018-10-16 | End: 2018-10-17

## 2018-10-16 RX ORDER — HYDROXYCHLOROQUINE SULFATE 200 MG
200 TABLET ORAL DAILY
Qty: 0 | Refills: 0 | Status: DISCONTINUED | OUTPATIENT
Start: 2018-10-16 | End: 2018-10-17

## 2018-10-16 RX ORDER — TRAZODONE HCL 50 MG
50 TABLET ORAL
Qty: 0 | Refills: 0 | Status: DISCONTINUED | OUTPATIENT
Start: 2018-10-16 | End: 2018-10-17

## 2018-10-16 RX ORDER — SODIUM CHLORIDE 9 MG/ML
1000 INJECTION, SOLUTION INTRAVENOUS
Qty: 0 | Refills: 0 | Status: DISCONTINUED | OUTPATIENT
Start: 2018-10-16 | End: 2018-10-17

## 2018-10-16 RX ORDER — DILTIAZEM HCL 120 MG
1 CAPSULE, EXT RELEASE 24 HR ORAL
Qty: 0 | Refills: 0 | COMMUNITY

## 2018-10-16 RX ORDER — IPRATROPIUM/ALBUTEROL SULFATE 18-103MCG
3 AEROSOL WITH ADAPTER (GRAM) INHALATION EVERY 6 HOURS
Qty: 0 | Refills: 0 | Status: DISCONTINUED | OUTPATIENT
Start: 2018-10-16 | End: 2018-10-17

## 2018-10-16 RX ORDER — LEVOTHYROXINE SODIUM 125 MCG
25 TABLET ORAL DAILY
Qty: 0 | Refills: 0 | Status: DISCONTINUED | OUTPATIENT
Start: 2018-10-16 | End: 2018-10-17

## 2018-10-16 RX ORDER — OXCARBAZEPINE 300 MG/1
150 TABLET, FILM COATED ORAL
Qty: 0 | Refills: 0 | Status: DISCONTINUED | OUTPATIENT
Start: 2018-10-16 | End: 2018-10-16

## 2018-10-16 RX ORDER — FLUDROCORTISONE ACETATE 0.1 MG/1
1 TABLET ORAL
Qty: 0 | Refills: 0 | COMMUNITY

## 2018-10-16 RX ORDER — ONDANSETRON 8 MG/1
4 TABLET, FILM COATED ORAL ONCE
Qty: 0 | Refills: 0 | Status: COMPLETED | OUTPATIENT
Start: 2018-10-16 | End: 2018-10-16

## 2018-10-16 RX ORDER — INFLUENZA VIRUS VACCINE 15; 15; 15; 15 UG/.5ML; UG/.5ML; UG/.5ML; UG/.5ML
0.5 SUSPENSION INTRAMUSCULAR ONCE
Qty: 0 | Refills: 0 | Status: COMPLETED | OUTPATIENT
Start: 2018-10-16 | End: 2018-10-17

## 2018-10-16 RX ORDER — MECLIZINE HCL 12.5 MG
12.5 TABLET ORAL THREE TIMES A DAY
Qty: 0 | Refills: 0 | Status: DISCONTINUED | OUTPATIENT
Start: 2018-10-16 | End: 2018-10-17

## 2018-10-16 RX ORDER — SODIUM CHLORIDE 9 MG/ML
1000 INJECTION INTRAMUSCULAR; INTRAVENOUS; SUBCUTANEOUS
Qty: 0 | Refills: 0 | Status: DISCONTINUED | OUTPATIENT
Start: 2018-10-16 | End: 2018-10-16

## 2018-10-16 RX ORDER — PANTOPRAZOLE SODIUM 20 MG/1
40 TABLET, DELAYED RELEASE ORAL
Qty: 0 | Refills: 0 | Status: DISCONTINUED | OUTPATIENT
Start: 2018-10-16 | End: 2018-10-17

## 2018-10-16 RX ORDER — BUDESONIDE AND FORMOTEROL FUMARATE DIHYDRATE 160; 4.5 UG/1; UG/1
2 AEROSOL RESPIRATORY (INHALATION)
Qty: 0 | Refills: 0 | Status: DISCONTINUED | OUTPATIENT
Start: 2018-10-16 | End: 2018-10-17

## 2018-10-16 RX ORDER — ONDANSETRON 8 MG/1
4 TABLET, FILM COATED ORAL EVERY 8 HOURS
Qty: 0 | Refills: 0 | Status: DISCONTINUED | OUTPATIENT
Start: 2018-10-16 | End: 2018-10-17

## 2018-10-16 RX ORDER — DIAZEPAM 5 MG
1 TABLET ORAL
Qty: 0 | Refills: 0 | COMMUNITY

## 2018-10-16 RX ADMIN — Medication 200 MILLIGRAM(S): at 22:46

## 2018-10-16 RX ADMIN — ONDANSETRON 4 MILLIGRAM(S): 8 TABLET, FILM COATED ORAL at 17:41

## 2018-10-16 RX ADMIN — SODIUM CHLORIDE 1000 MILLILITER(S): 9 INJECTION INTRAMUSCULAR; INTRAVENOUS; SUBCUTANEOUS at 17:42

## 2018-10-16 RX ADMIN — SODIUM CHLORIDE 1000 MILLILITER(S): 9 INJECTION INTRAMUSCULAR; INTRAVENOUS; SUBCUTANEOUS at 20:40

## 2018-10-16 RX ADMIN — Medication 25 MICROGRAM(S): at 22:46

## 2018-10-16 RX ADMIN — ENOXAPARIN SODIUM 40 MILLIGRAM(S): 100 INJECTION SUBCUTANEOUS at 22:47

## 2018-10-16 RX ADMIN — Medication 75 MILLIGRAM(S): at 22:46

## 2018-10-16 RX ADMIN — ONDANSETRON 4 MILLIGRAM(S): 8 TABLET, FILM COATED ORAL at 22:11

## 2018-10-16 NOTE — H&P ADULT - PROBLEM SELECTOR PLAN 1
No mental status changes so not acute hyponatremia.   Likely a medication side effect given her multiple meds, especially oxcarbazepine.  Also could be worsened with vomiting/diarrhea.  Patient has had hyponatremia in the past that resolved when she is admitted.  Other concern would be SIADH from a malignancy such as SCLC in this habitual heavy smoker.    - admitted to medicine  - check serum and urine osms  - check urine sodium  - free water restrict to 1L  - CT chest to evaluate for any malignancies (high cancer risk, likely needs screening anyhow)  - hold oxcarbazepine for now

## 2018-10-16 NOTE — ED PROVIDER NOTE - PROGRESS NOTE DETAILS
Kaylee ZAMUDIO for Dr. Lamb: 52 y/o female with PMHx of lupus, COPD presents to the ED c/o NV and abd pain today. Recent hospitalization for same sx found to have hyponatremia. PMD Dr. Zavaleta. PE: Afebrile, abd benign, lungs clear. Plan for labs, CXR, possible admission. Pt admits to feeling better, no vomiting in ER. Sleeping prior to re-assessment. Pt admits to no pain currently. Abd soft NT. Xray negative. Hyponatremia noted. Discussed with Dr. Díaz, no further imaging currently, will admit. Discussed with Dr. John Sun, accepting admission, no acute intervention currently.

## 2018-10-16 NOTE — ED PROVIDER NOTE - OBJECTIVE STATEMENT
54 y/o female with PMHxAnxiety COPD, GERD, Glossopharyngeal neuralgia syndrome, Hepatitis C, Hypothyroidism, Lupus, Migraine , Neuropathy presents today due to N/V/D x 1 day. pt notes she had these similar symptoms previously in which was treated in this ER. Pt notes she has some abdominal pain described as cramping, non-radiating, and currently 8/10. Admits to smoking. Pt admits to taking Z-deng currently for sinus congestion. Pt denies hematuria, dysuria, fever, chills, recent travel, blood stool, or any other complaints. PCP Adrianne Santos (pulm).

## 2018-10-16 NOTE — H&P ADULT - ASSESSMENT
53F with COPD, chronic abdominal pain, GERD, anxiety, glossopharyngeal neuralgia, hx of hepatitis C, hypothyroidism, lupus, smoking, sjogren's disease, who presents with one day of abdominal pain, nausea/vomiting, diarrhea.

## 2018-10-16 NOTE — H&P ADULT - NSHPSOCIALHISTORY_GEN_ALL_CORE
+ current every day smoker (~2 ppd for about 43 years)  - denies any etoh use  - denies any illegal drug use  - lives with parents

## 2018-10-16 NOTE — H&P ADULT - PROBLEM SELECTOR PLAN 9
IMPROVE VTE Individual Risk Assessment          RISK                                                          Points  [  ] Previous VTE                                                 3  [  ] Thrombophilia                                              2  [  ] Lower limb paralysis                                    2        (unable to hold up >15 seconds)    [  ] Current Cancer                                             2         (within 6 months)  [  ] Immobilization > 24 hrs                              1  [  ] ICU/CCU stay > 24 hours                            1  [  ] Age > 60                                                        1    IMPROVE VTE Score 0     - will place on lovenox for DVT ppx

## 2018-10-16 NOTE — H&P ADULT - NSHPREVIEWOFSYSTEMS_GEN_ALL_CORE
REVIEW OF SYSTEMS:  CONSTITUTIONAL:    +feeling flushed, but denies any fevers, +weight loss of 7lbs in a month  EYES:    no eye pain or visual disturbances or discharge  ENMT:     +ear pain, +throat pain, no difficulty hearing or tinnitus or vertigo  NECK:    no pain or stiffness  RESPIRATORY:    no cough or wheezing or chills or hemoptysis, no shortness of breath  CARDIOVASCULAR:    no chest pain or palpitations or dizziness or leg swelling  GASTROINTESTINAL:    +abdominal pain, +nausea/vomiting, +diarrhea,   GENITOURINARY:    no dysuria or frequency or hematuria or incontinence  NEUROLOGICAL:    +sinus headache and congestion, no memory loss or loss of strength or numbness or tremors  SKIN:    no itching or burning or rashes or lesions   LYMPH NODES:    no enlarged glands  ENDOCRINE:    no heat or cold intolerance, no hair loss, no polydipsia or polyuria  MUSCULOSKELETAL:    +joint pain (wrist, etc.), no muscle or back or extremity pain  PSYCHIATRIC:    no depression or anxiety or mood swings or difficulty sleeping  HEME/LYMPH:    no easy bruising or bleeding gums  ALLERGY AND IMMUNOLOGIC:    no hives or eczema

## 2018-10-16 NOTE — ED PROVIDER NOTE - MEDICAL DECISION MAKING DETAILS
54 y/o female with PMHxAnxiety COPD, GERD, Glossopharyngeal neuralgia syndrome, Hepatitis C, Hypothyroidism, Lupus, Migraine , Neuropathy presents today due to N/V/D x 1 day. pt notes she had these similar symptoms previously in which was treated in this ER. Pt notes she has some abdominal pain described as cramping, non-radiating, and currently 8/10. Admits to smoking. Pt admits to taking Z-deng currently for sinus congestion.  PLan includes IVF and labs.

## 2018-10-16 NOTE — H&P ADULT - HISTORY OF PRESENT ILLNESS
53F with COPD, chronic abdominal pain, GERD, anxiety, glossopharyngeal neuralgia, hx of hepatitis C, hypothyroidism, lupus, smoking, sjogren's disease, who presents with one day of abdominal pain, nausea/vomiting, diarrhea.  Patient was admitted in August 2018 for similar symptoms, found to be hyponatremic (she is hyponatremic now too), CT A/P with questionable wall thickening, and was seen by GI who recommended outpatient colonoscopy.  Per patient, she did have an unremarkable outpatient colonoscopy.  This episode, symptoms started this morning with sharp, non-radiating waxing/waning epigastric pain.  Denied any alleviating/aggravating factors.  Also associated with nausea, non-bloody vomiting, and non-bloody diarrhea.  Denied any fevers though felt warm throughout the day.  Denied any travel or sick contacts.  Aside from the GI symptoms, patient has been suffering from sinus congestion symptoms for the past couple of months as well as throat, chest, ear pain from her glossopharyngeal neuralgia syndrome.  In the ED, patient's lab was significant for leukocytosis of 13 and sodium of 120 (was 121 on previous admission).  Patient's AXR was unremarkable.   Currently, patient's symptoms have improved and is already tolerating PO.

## 2018-10-16 NOTE — ED ADULT NURSE NOTE - NSIMPLEMENTINTERV_GEN_ALL_ED
Implemented All Universal Safety Interventions:  [place] to call system. Call bell, personal items and telephone within reach. Instruct patient to call for assistance. Room bathroom lighting operational. Non-slip footwear when patient is off stretcher. Physically safe environment: no spills, clutter or unnecessary equipment. Stretcher in lowest position, wheels locked, appropriate side rails in place.

## 2018-10-16 NOTE — H&P ADULT - PSH
Gall bladder disease  REMOVAL  H/O lumpectomy    Injury of right wrist, subsequent encounter    Vocal cord polyp  REMOVAL

## 2018-10-16 NOTE — H&P ADULT - NSHPLABSRESULTS_GEN_ALL_CORE
LABS:                        13.6   12.97<H> )-----------( 214      ( 16 Oct 2018 18:01 )             38.0     120<LL>  |  87<L>  |  11     ----------------------------<  66<L>    16 Oct 2018 17:56  5.1     |  23     |  0.56         Ca 9.3           16 Oct 2018 17:56        TPro  7.0    /  Alb  4.0    /  TBili  0.6    /  DBili  x      /  AST  46<H>  /  ALT  40     /  AlkPhos  81     16 Oct 2018 17:56      Urinalysis Basic - ( 16 Oct 2018 19:57 )    Color: Yellow / Appearance: Clear / S.010 / pH: x  Gluc: x / Ketone: Small  / Bili: Negative / Urobili: Negative mg/dL   Blood: x / Protein: Negative mg/dL / Nitrite: Negative   Leuk Esterase: Negative / RBC: x / WBC x   Sq Epi: x / Non Sq Epi: x / Bacteria: x    Radiology:  < from: Xray Abdomen 2 Views (10.16.18 @ 19:28) >    INTERPRETATION:  Clinical information: Vomiting.    Technique: Upright and supine AP abdominal films.    Comparison: Prior abdomen and pelvis CT examination from 2018.    Findings: Right upper quadrant metallic surgical clips are again noted.    There is a nonobstructive bowel gas pattern. There is no free air   visualized. The visualized osseous structures are unremarkable.     IMPRESSION: Nonobstructive bowel gas pattern.      < end of copied text >

## 2018-10-16 NOTE — H&P ADULT - PROBLEM SELECTOR PLAN 2
Had similar episode in the past.  Per patient outpatient GI colonoscopy negative.  Symptoms resolved already.    - contact Dr. Haro for colonoscopy report and consult  - trial of PO  - hold CT A/P for now

## 2018-10-16 NOTE — ED PROVIDER NOTE - CHPI ED SYMPTOMS NEG
no abdominal distension/no dysuria/no fever/no chills/no hematuria/no blood in stool/no burning urination

## 2018-10-17 ENCOUNTER — TRANSCRIPTION ENCOUNTER (OUTPATIENT)
Age: 53
End: 2018-10-17

## 2018-10-17 VITALS
SYSTOLIC BLOOD PRESSURE: 104 MMHG | HEART RATE: 80 BPM | OXYGEN SATURATION: 96 % | TEMPERATURE: 98 F | DIASTOLIC BLOOD PRESSURE: 60 MMHG | RESPIRATION RATE: 16 BRPM

## 2018-10-17 LAB
ALBUMIN SERPL ELPH-MCNC: 3.2 G/DL — LOW (ref 3.3–5)
ALP SERPL-CCNC: 73 U/L — SIGNIFICANT CHANGE UP (ref 30–120)
ALT FLD-CCNC: 30 U/L DA — SIGNIFICANT CHANGE UP (ref 10–60)
ANION GAP SERPL CALC-SCNC: 6 MMOL/L — SIGNIFICANT CHANGE UP (ref 5–17)
AST SERPL-CCNC: 18 U/L — SIGNIFICANT CHANGE UP (ref 10–40)
BASOPHILS # BLD AUTO: 0.05 K/UL — SIGNIFICANT CHANGE UP (ref 0–0.2)
BASOPHILS NFR BLD AUTO: 0.7 % — SIGNIFICANT CHANGE UP (ref 0–2)
BILIRUB SERPL-MCNC: 0.2 MG/DL — SIGNIFICANT CHANGE UP (ref 0.2–1.2)
BUN SERPL-MCNC: 9 MG/DL — SIGNIFICANT CHANGE UP (ref 7–23)
CALCIUM SERPL-MCNC: 8.3 MG/DL — LOW (ref 8.4–10.5)
CHLORIDE SERPL-SCNC: 103 MMOL/L — SIGNIFICANT CHANGE UP (ref 96–108)
CO2 SERPL-SCNC: 24 MMOL/L — SIGNIFICANT CHANGE UP (ref 22–31)
CREAT SERPL-MCNC: 0.64 MG/DL — SIGNIFICANT CHANGE UP (ref 0.5–1.3)
EOSINOPHIL # BLD AUTO: 0.21 K/UL — SIGNIFICANT CHANGE UP (ref 0–0.5)
EOSINOPHIL NFR BLD AUTO: 3 % — SIGNIFICANT CHANGE UP (ref 0–6)
GLUCOSE SERPL-MCNC: 69 MG/DL — LOW (ref 70–99)
HCT VFR BLD CALC: 37.6 % — SIGNIFICANT CHANGE UP (ref 34.5–45)
HGB BLD-MCNC: 13.3 G/DL — SIGNIFICANT CHANGE UP (ref 11.5–15.5)
IMM GRANULOCYTES NFR BLD AUTO: 0.3 % — SIGNIFICANT CHANGE UP (ref 0–1.5)
LYMPHOCYTES # BLD AUTO: 1.8 K/UL — SIGNIFICANT CHANGE UP (ref 1–3.3)
LYMPHOCYTES # BLD AUTO: 25.4 % — SIGNIFICANT CHANGE UP (ref 13–44)
MAGNESIUM SERPL-MCNC: 2 MG/DL — SIGNIFICANT CHANGE UP (ref 1.6–2.6)
MCHC RBC-ENTMCNC: 34.5 PG — HIGH (ref 27–34)
MCHC RBC-ENTMCNC: 35.4 GM/DL — SIGNIFICANT CHANGE UP (ref 32–36)
MCV RBC AUTO: 97.4 FL — SIGNIFICANT CHANGE UP (ref 80–100)
MONOCYTES # BLD AUTO: 0.95 K/UL — HIGH (ref 0–0.9)
MONOCYTES NFR BLD AUTO: 13.4 % — SIGNIFICANT CHANGE UP (ref 2–14)
NEUTROPHILS # BLD AUTO: 4.06 K/UL — SIGNIFICANT CHANGE UP (ref 1.8–7.4)
NEUTROPHILS NFR BLD AUTO: 57.2 % — SIGNIFICANT CHANGE UP (ref 43–77)
OSMOLALITY SERPL: 246 MOS/KG — LOW (ref 275–300)
OSMOLALITY UR: 222 MOS/KG — SIGNIFICANT CHANGE UP (ref 50–1200)
PHOSPHATE SERPL-MCNC: 3.3 MG/DL — SIGNIFICANT CHANGE UP (ref 2.5–4.5)
PLATELET # BLD AUTO: 369 K/UL — SIGNIFICANT CHANGE UP (ref 150–400)
POTASSIUM SERPL-MCNC: 4.1 MMOL/L — SIGNIFICANT CHANGE UP (ref 3.5–5.3)
POTASSIUM SERPL-SCNC: 4.1 MMOL/L — SIGNIFICANT CHANGE UP (ref 3.5–5.3)
PROT SERPL-MCNC: 5.7 G/DL — LOW (ref 6–8.3)
RBC # BLD: 3.86 M/UL — SIGNIFICANT CHANGE UP (ref 3.8–5.2)
RBC # FLD: 12.1 % — SIGNIFICANT CHANGE UP (ref 10.3–14.5)
SODIUM SERPL-SCNC: 133 MMOL/L — LOW (ref 135–145)
SODIUM UR-SCNC: 57 MMOL/L — SIGNIFICANT CHANGE UP
TSH SERPL-MCNC: 0.72 UIU/ML — SIGNIFICANT CHANGE UP (ref 0.27–4.2)
WBC # BLD: 7.09 K/UL — SIGNIFICANT CHANGE UP (ref 3.8–10.5)
WBC # FLD AUTO: 7.09 K/UL — SIGNIFICANT CHANGE UP (ref 3.8–10.5)

## 2018-10-17 PROCEDURE — 99239 HOSP IP/OBS DSCHRG MGMT >30: CPT

## 2018-10-17 RX ORDER — LEVOTHYROXINE SODIUM 125 MCG
1 TABLET ORAL
Qty: 0 | Refills: 0 | COMMUNITY

## 2018-10-17 RX ADMIN — INFLUENZA VIRUS VACCINE 0.5 MILLILITER(S): 15; 15; 15; 15 SUSPENSION INTRAMUSCULAR at 11:53

## 2018-10-17 RX ADMIN — BUDESONIDE AND FORMOTEROL FUMARATE DIHYDRATE 2 PUFF(S): 160; 4.5 AEROSOL RESPIRATORY (INHALATION) at 05:55

## 2018-10-17 RX ADMIN — PANTOPRAZOLE SODIUM 40 MILLIGRAM(S): 20 TABLET, DELAYED RELEASE ORAL at 05:54

## 2018-10-17 NOTE — DISCHARGE NOTE ADULT - CARE PLAN
Principal Discharge DX:	Epigastric pain  Goal:	Resolved Tolerating po intake.  Outpatient follow up with Dr. Haro  Assessment and plan of treatment:	regular diet  Secondary Diagnosis:	Hyponatremia  Goal:	Resolved.  Continue with 1 liter fluid restriction

## 2018-10-17 NOTE — DISCHARGE NOTE ADULT - ADDITIONAL INSTRUCTIONS
Please follow up with your doctor in one week  Please follow up with gastroenterology in 1 week  Please come back to the hospital if you develop any further abdominal pain, Nausea, vomiting, diarrhea, or any new symptoms or concerns

## 2018-10-17 NOTE — PROGRESS NOTE ADULT - ASSESSMENT
53F with COPD, chronic abdominal pain, GERD, anxiety, glossopharyngeal neuralgia, hx of hepatitis C, hypothyroidism, lupus, smoking, sjogren's disease, who presents with one day of abdominal pain, nausea/vomiting, which has resolved.  Recurrent hyponatremia

## 2018-10-17 NOTE — DISCHARGE NOTE ADULT - HOSPITAL COURSE
53F with COPD, chronic abdominal pain, GERD, anxiety, glossopharyngeal neuralgia, hx of hepatitis C, hypothyroidism, lupus, smoking, sjogren's disease, who presents with one day of abdominal pain, nausea/vomiting, diarrhea.     1.  Hyponatremia.  Resolved.  Continue with 1 liter fluid restriction.  outpatient follow up with PMD  Chest CT scan noticed.  Outpatient follow up with pulmonary     2:  Epigastric pain.Resolved now.  tolerating po intake.  No diarrhea/N/V.  Wants to go home, and follow up with Dr. samayoa    3.  Lupus erythematosus, cont with hydroxychloroquine.     4.  Hypothyroidism, cont with synthroid.   plan of care and discharge planning were discussed with patient in details.  Seems to understand, and in agreement

## 2018-10-17 NOTE — DISCHARGE NOTE ADULT - CARE PROVIDER_API CALL
Dilip Silver), Critical Care Medicine; Internal Medicine; Pulmonary Disease  [address] [place] [address]  Phone: [phone]  Fax: [phone]    Jaxson Haro), Gastroenterology  [address] [place] [address]  Phone: [phone]  Fax: [phone]

## 2018-10-17 NOTE — DISCHARGE NOTE ADULT - MEDICATION SUMMARY - MEDICATIONS TO TAKE
I will START or STAY ON the medications listed below when I get home from the hospital:    Fioricet oral capsule  -- 1 cap(s) by mouth 2 times a day, As Needed  -- Indication: For Headache    OXcarbazepine 150 mg oral tablet  -- 1 tab(s) by mouth 2 times a day  -- Indication: For med    Lyrica 75 mg oral capsule  -- 1 cap(s) by mouth 2 times a day  -- Indication: For neuropathy    traZODone 50 mg oral tablet  -- 1 tab(s) by mouth 2 times a day, As Needed  -- Indication: For insomnia    Imodium 2 mg oral capsule  -- 3 cap(s) by mouth once a day, As Needed for diarrhea  -- Indication: For diarrhea    meclizine 12.5 mg oral tablet  -- 1 tab(s) by mouth 3 times a day, As Needed  -- Indication: For dizziness    ondansetron 8 mg oral tablet  -- 1 tab(s) by mouth 3 times a day  -- Indication: For nausea/vomiting    hydroxychloroquine 200 mg oral tablet  -- 1 tab(s) by mouth 2 times a day  -- Indication: For Lupus erythematosus, unspecified form    ProAir HFA 90 mcg/inh inhalation aerosol  -- 2 puff(s) inhaled 4 times a day, As Needed  -- Indication: For Chronic obstructive pulmonary disease, unspecified COPD type    tiotropium 18 mcg inhalation capsule  -- 1 cap(s) inhaled once a day  -- Indication: For Chronic obstructive pulmonary disease, unspecified COPD type    Advair Diskus 500 mcg-50 mcg inhalation powder  -- 1 puff(s) inhaled 2 times a day  -- Indication: For Chronic obstructive pulmonary disease, unspecified COPD type    omeprazole 40 mg oral delayed release capsule  -- 1 cap(s) by mouth 2 times a day  -- Indication: For Gerd    buPROPion 150 mg/24 hours (XL) oral tablet, extended release  -- 1 tab(s) by mouth once a day  -- Indication: For Anxiety

## 2018-10-17 NOTE — PROGRESS NOTE ADULT - SUBJECTIVE AND OBJECTIVE BOX
PULMONARY/CRITICAL CARE      INTERVAL HPI/OVERNIGHT EVENTS:    53y FemaleHPI: Pt well known to me.  53F with COPD, chronic abdominal pain, GERD, anxiety, glossopharyngeal neuralgia, hx of hepatitis C, hypothyroidism, lupus, smoking, sjogren's disease, who presents with one day of abdominal pain, nausea/vomiting, diarrhea.  Patient was admitted in 2018 for similar symptoms, found to be hyponatremic (she is hyponatremic now too), CT A/P with questionable wall thickening, and was seen by GI who recommended outpatient colonoscopy.  Per patient, she did have an unremarkable outpatient colonoscopy.  This episode, symptoms started this morning with sharp, non-radiating waxing/waning epigastric pain.  Denied any alleviating/aggravating factors.  Also associated with nausea, non-bloody vomiting, and non-bloody diarrhea.  Denied any fevers though felt warm throughout the day.  Denied any travel or sick contacts.  Aside from the GI symptoms, patient has been suffering from sinus congestion symptoms for the past couple of months as well as throat, chest, ear pain from her glossopharyngeal neuralgia syndrome.  In the ED, patient's lab was significant for leukocytosis of 13 and sodium of 120 (was 121 on previous admission).  Patient's AXR was unremarkable.   Currently, patient's symptoms have improved and is already tolerating PO. (16 Oct 2018 20:31)    Smokes 1ppd for more than 30 yrs.    PAST MEDICAL & SURGICAL HISTORY:  Glossopharyngeal neuralgia syndrome  Nicotine addiction  COPD (chronic obstructive pulmonary disease)  Neuropathy  Migraine  Anxiety  Hepatitis C  Gallstones  Hypothyroidism  Hypotension  GERD (gastroesophageal reflux disease)  UTI (urinary tract infection)  Vertigo  Sjogren's disease  Lupus  H/O lumpectomy  Injury of right wrist, subsequent encounter  Gall bladder disease: REMOVAL  Vocal cord polyp: REMOVAL        ICU Vital Signs Last 24 Hrs  T(C): 36.9 (17 Oct 2018 05:45), Max: 36.9 (17 Oct 2018 05:45)  T(F): 98.4 (17 Oct 2018 05:45), Max: 98.4 (17 Oct 2018 05:45)  HR: 80 (17 Oct 2018 05:45) (65 - 80)  BP: 104/60 (17 Oct 2018 05:45) (104/60 - 143/78)  BP(mean): --  ABP: --  ABP(mean): --  RR: 16 (17 Oct 2018 05:45) (16 - 20)  SpO2: 96% (17 Oct 2018 05:45) (96% - 100%)    Qtts:     I&O's Summary          REVIEW OF SYSTEMS:    CONSTITUTIONAL: No fever, weight loss, or fatigue  EYES: No eye pain, visual disturbances, or discharge  ENMT:  No difficulty hearing, tinnitus, vertigo; No sinus or throat pain  NECK: No pain or stiffness  BREASTS: No pain, masses, or nipple discharge  RESPIRATORY: has chronic cough, wheezing,no chills or hemoptysis; has chronic shortness of breath  CARDIOVASCULAR: No chest pain, palpitations, dizziness, or leg swelling  GASTROINTESTINAL: had abdominal and  nausea, vomiting,  no  hematemesis; No diarrhea or constipation. No melena or hematochezia.  GENITOURINARY: No dysuria, frequency, hematuria, or incontinence  NEUROLOGICAL: No headaches, memory loss, loss of strength, numbness, or tremors  SKIN: No itching, burning, rashes, or lesions   LYMPH NODES: No enlarged glands  ENDOCRINE: No heat or cold intolerance; No hair loss  MUSCULOSKELETAL: No joint pain or swelling; No muscle, back, or extremity pain, no calf tenderness  PSYCHIATRIC: No depression, anxiety, mood swings, or difficulty sleeping  HEME/LYMPH: No easy bruising, or bleeding gums  ALLERGY AND IMMUNOLOGIC: No hives or eczema      PHYSICAL EXAM:    GENERAL: NAD, well-groomed, well-developed, NAD  HEAD:  Atraumatic, Normocephalic  EYES: EOMI, PERRLA, conjunctiva and sclera clear  ENMT: No tonsillar erythema, exudates, or enlargement; Moist mucous membranes, Good dentition, No lesions  NECK: Supple, No JVD, Normal thyroid  NERVOUS SYSTEM:  Alert & Oriented X3, Good concentration; Motor Strength 5/5 B/L upper and lower extremities  CHEST/LUNG: decreased bs ,no wheeze rhonchi rales.  HEART: Regular rate and rhythm; No murmurs, rubs, or gallops  ABDOMEN: Soft, Nontender, Nondistended; Bowel sounds present  EXTREMITIES:  2+ Peripheral Pulses, No clubbing, cyanosis, or edema  LYMPH: No lymphadenopathy noted  SKIN: No rashes or lesions        LABS:                        13.3   7.09  )-----------( 369      ( 17 Oct 2018 05:40 )             37.6     10-17    133<L>  |  103  |  9   ----------------------------<  69<L>  4.1   |  24  |  0.64    Ca    8.3<L>      17 Oct 2018 05:40  Phos  3.3     10-17  Mg     2.0     10-17    TPro  5.7<L>  /  Alb  3.2<L>  /  TBili  0.2  /  DBili  x   /  AST  18  /  ALT  30  /  AlkPhos  73  10-17      Urinalysis Basic - ( 16 Oct 2018 19:57 )    Color: Yellow / Appearance: Clear / S.010 / pH: x  Gluc: x / Ketone: Small  / Bili: Negative / Urobili: Negative mg/dL   Blood: x / Protein: Negative mg/dL / Nitrite: Negative   Leuk Esterase: Negative / RBC: x / WBC x   Sq Epi: x / Non Sq Epi: x / Bacteria: x        vanco through     RADIOLOGY & ADDITIONAL STUDIES:  < from: CT Chest No Cont (10.16.18 @ 22:45) >  EXAM:  CT CHEST                                  PROCEDURE DATE:  10/16/2018          INTERPRETATION:  [facility] RADIOLOG[facility] PRELIMINARY REPORT    EXAM:    CT Chest Without Intravenous Contrast     EXAM DATE/TIME:    10/16/2018 10:30 PM     CLINICAL HISTORY:    53 years old, female; Signs and symptoms; Cough; Smoker&apos;s cough     TECHNIQUE:    Axial computed tomography images of the chest without intravenous   contrast.    All CT scans at this facility use at least one of these dose   optimization   techniques: automated exposure control; mA and/or kV adjustment per   patient   size (includes targeted exams where dose is matched to clinical   indication); or   iterative reconstruction.    Coronal and sagittal reformatted images were created and reviewed.    MIP reconstructed images were created and reviewed.     COMPARISON:    CT CHEST 2018 9:49 AM     FINDINGS:    Lungs: Mild panlobular emphysematous changes. Bronchiectasis   bilaterally. Left   basilar atelectasis  . Pleural space:Normal. No pneumothorax. No pleural effusion.    Heart: Normal. No cardiomegaly. No pericardial effusion.    Aorta: Normal. No aortic aneurysm.    Lymph nodes:  Clips in the right axilla. Small nodes in the left axilla    Bones/joints: Unremarkable. No acute fracture.    Soft tissues: Unremarkable.     IMPRESSION:   Mild panlobular emphysematous changes. Minimal bronchiectasis   bilaterally. Left basilar   atelectasis.      AGREE WITH ABOVE REPORT      < end of copied text >  < from: Xray Abdomen 2 Views (10..18 @ 19:28) >    EXAM:  XR ABDOMEN 2V                                  PROCEDURE DATE:  10/16/2018          INTERPRETATION:  Clinical information: Vomiting.    Technique: Upright and supine AP abdominal films.    Comparison: Prior abdomen and pelvis CT examination from 2018.    Findings: Right upper quadrant metallic surgical clips are again noted.    There is a nonobstructive bowel gas pattern. There is no free air   visualized. The visualized osseous structures are unremarkable.     IMPRESSION: Nonobstructive bowel gas pattern.    < end of copied text >      CRITICAL CARE TIME SPENT:

## 2018-10-17 NOTE — DISCHARGE NOTE ADULT - PLAN OF CARE
Resolved Tolerating po intake.  Outpatient follow up with Dr. Haro regular diet Resolved.  Continue with 1 liter fluid restriction

## 2018-10-17 NOTE — DISCHARGE NOTE ADULT - PATIENT PORTAL LINK FT
You can access the [facility], offered by [facility], by registering with the following website: [email or web address]

## 2018-10-17 NOTE — PROGRESS NOTE ADULT - PROBLEM SELECTOR PLAN 2
No mental status changes so not acute hyponatremia.   Likely a medication side effect given her multiple meds, especially oxcarbazepine.  Also could be worsened with vomiting/diarrhea.  Patient has had hyponatremia in the past that resolved when she is admitted.  Other concern would be SIADH from a malignancy such as SCLC in this habitual heavy smoker.    - admitted to medicine  - check serum and urine osms  - check urine sodium  - free water restrict to 1L    - hold oxcarbazepine for now

## 2018-10-17 NOTE — ED ADULT NURSE REASSESSMENT NOTE - NS ED NURSE REASSESS COMMENT FT1
pt feels better and want to go home, dr abreu aware
Pt received awake and alert, reports abdominal pain has resolved. Pt still has mild nausea but wants to eat. Saltine crackers and ginger ale given.

## 2018-11-11 PROCEDURE — 71046 X-RAY EXAM CHEST 2 VIEWS: CPT

## 2018-11-11 PROCEDURE — 87486 CHLMYD PNEUM DNA AMP PROBE: CPT

## 2018-11-11 PROCEDURE — 87581 M.PNEUMON DNA AMP PROBE: CPT

## 2018-11-11 PROCEDURE — 83935 ASSAY OF URINE OSMOLALITY: CPT

## 2018-11-11 PROCEDURE — 96374 THER/PROPH/DIAG INJ IV PUSH: CPT

## 2018-11-11 PROCEDURE — 82962 GLUCOSE BLOOD TEST: CPT

## 2018-11-11 PROCEDURE — 94640 AIRWAY INHALATION TREATMENT: CPT

## 2018-11-11 PROCEDURE — 87633 RESP VIRUS 12-25 TARGETS: CPT

## 2018-11-11 PROCEDURE — 85027 COMPLETE CBC AUTOMATED: CPT

## 2018-11-11 PROCEDURE — 83930 ASSAY OF BLOOD OSMOLALITY: CPT

## 2018-11-11 PROCEDURE — 74019 RADEX ABDOMEN 2 VIEWS: CPT

## 2018-11-11 PROCEDURE — 36415 COLL VENOUS BLD VENIPUNCTURE: CPT

## 2018-11-11 PROCEDURE — 83690 ASSAY OF LIPASE: CPT

## 2018-11-11 PROCEDURE — 84300 ASSAY OF URINE SODIUM: CPT

## 2018-11-11 PROCEDURE — 84100 ASSAY OF PHOSPHORUS: CPT

## 2018-11-11 PROCEDURE — 99285 EMERGENCY DEPT VISIT HI MDM: CPT | Mod: 25

## 2018-11-11 PROCEDURE — 83735 ASSAY OF MAGNESIUM: CPT

## 2018-11-11 PROCEDURE — 81003 URINALYSIS AUTO W/O SCOPE: CPT

## 2018-11-11 PROCEDURE — 80053 COMPREHEN METABOLIC PANEL: CPT

## 2018-11-11 PROCEDURE — 90686 IIV4 VACC NO PRSV 0.5 ML IM: CPT

## 2018-11-11 PROCEDURE — 87040 BLOOD CULTURE FOR BACTERIA: CPT

## 2018-11-11 PROCEDURE — 71250 CT THORAX DX C-: CPT

## 2018-11-11 PROCEDURE — 96361 HYDRATE IV INFUSION ADD-ON: CPT

## 2018-11-11 PROCEDURE — 87798 DETECT AGENT NOS DNA AMP: CPT

## 2018-11-11 PROCEDURE — 83605 ASSAY OF LACTIC ACID: CPT

## 2018-11-11 PROCEDURE — 84443 ASSAY THYROID STIM HORMONE: CPT

## 2018-12-05 ENCOUNTER — RX RENEWAL (OUTPATIENT)
Age: 53
End: 2018-12-05

## 2018-12-17 NOTE — DISCHARGE NOTE ADULT - IF YOU ARE A SMOKER, IT IS IMPORTANT FOR YOUR HEALTH TO STOP SMOKING. PLEASE BE AWARE THAT SECOND HAND SMOKE IS ALSO HARMFUL.
"a spare tire fell on my head" was in garage and a spare tire on a shelf above his head came loose and hit him at 1430  fell to the ground and felt dizzy, did not lose consciousness  no blood thinners
Statement Selected

## 2019-01-01 ENCOUNTER — OUTPATIENT (OUTPATIENT)
Dept: OUTPATIENT SERVICES | Facility: HOSPITAL | Age: 54
LOS: 1 days | End: 2019-01-01

## 2019-01-01 DIAGNOSIS — K82.9 DISEASE OF GALLBLADDER, UNSPECIFIED: Chronic | ICD-10-CM

## 2019-01-01 DIAGNOSIS — Z98.890 OTHER SPECIFIED POSTPROCEDURAL STATES: Chronic | ICD-10-CM

## 2019-01-01 DIAGNOSIS — S69.91XD UNSPECIFIED INJURY OF RIGHT WRIST, HAND AND FINGER(S), SUBSEQUENT ENCOUNTER: Chronic | ICD-10-CM

## 2019-01-01 DIAGNOSIS — J38.1 POLYP OF VOCAL CORD AND LARYNX: Chronic | ICD-10-CM

## 2019-01-03 ENCOUNTER — RX RENEWAL (OUTPATIENT)
Age: 54
End: 2019-01-03

## 2019-01-14 ENCOUNTER — EMERGENCY (EMERGENCY)
Facility: HOSPITAL | Age: 54
LOS: 1 days | Discharge: HOME | End: 2019-01-14
Attending: EMERGENCY MEDICINE | Admitting: EMERGENCY MEDICINE
Payer: MEDICARE

## 2019-01-14 VITALS
HEART RATE: 141 BPM | OXYGEN SATURATION: 99 % | DIASTOLIC BLOOD PRESSURE: 61 MMHG | RESPIRATION RATE: 25 BRPM | WEIGHT: 89.95 LBS | HEIGHT: 57 IN | TEMPERATURE: 98 F | SYSTOLIC BLOOD PRESSURE: 107 MMHG

## 2019-01-14 VITALS
RESPIRATION RATE: 16 BRPM | DIASTOLIC BLOOD PRESSURE: 68 MMHG | SYSTOLIC BLOOD PRESSURE: 100 MMHG | TEMPERATURE: 98 F | HEART RATE: 88 BPM | OXYGEN SATURATION: 100 %

## 2019-01-14 DIAGNOSIS — J38.1 POLYP OF VOCAL CORD AND LARYNX: Chronic | ICD-10-CM

## 2019-01-14 DIAGNOSIS — Z98.890 OTHER SPECIFIED POSTPROCEDURAL STATES: Chronic | ICD-10-CM

## 2019-01-14 DIAGNOSIS — S69.91XD UNSPECIFIED INJURY OF RIGHT WRIST, HAND AND FINGER(S), SUBSEQUENT ENCOUNTER: Chronic | ICD-10-CM

## 2019-01-14 DIAGNOSIS — K82.9 DISEASE OF GALLBLADDER, UNSPECIFIED: Chronic | ICD-10-CM

## 2019-01-14 LAB
ALBUMIN SERPL ELPH-MCNC: 3.4 G/DL — SIGNIFICANT CHANGE UP (ref 3.3–5)
ALP SERPL-CCNC: 83 U/L — SIGNIFICANT CHANGE UP (ref 30–120)
ALT FLD-CCNC: 28 U/L DA — SIGNIFICANT CHANGE UP (ref 10–60)
ANION GAP SERPL CALC-SCNC: 9 MMOL/L — SIGNIFICANT CHANGE UP (ref 5–17)
APTT BLD: 32.8 SEC — SIGNIFICANT CHANGE UP (ref 28.5–37)
AST SERPL-CCNC: 19 U/L — SIGNIFICANT CHANGE UP (ref 10–40)
BASOPHILS # BLD AUTO: 0.04 K/UL — SIGNIFICANT CHANGE UP (ref 0–0.2)
BASOPHILS NFR BLD AUTO: 0.4 % — SIGNIFICANT CHANGE UP (ref 0–2)
BILIRUB SERPL-MCNC: 0.2 MG/DL — SIGNIFICANT CHANGE UP (ref 0.2–1.2)
BUN SERPL-MCNC: 14 MG/DL — SIGNIFICANT CHANGE UP (ref 7–23)
CALCIUM SERPL-MCNC: 8.7 MG/DL — SIGNIFICANT CHANGE UP (ref 8.4–10.5)
CHLORIDE SERPL-SCNC: 99 MMOL/L — SIGNIFICANT CHANGE UP (ref 96–108)
CO2 SERPL-SCNC: 28 MMOL/L — SIGNIFICANT CHANGE UP (ref 22–31)
CREAT SERPL-MCNC: 0.77 MG/DL — SIGNIFICANT CHANGE UP (ref 0.5–1.3)
EOSINOPHIL # BLD AUTO: 0.07 K/UL — SIGNIFICANT CHANGE UP (ref 0–0.5)
EOSINOPHIL NFR BLD AUTO: 0.7 % — SIGNIFICANT CHANGE UP (ref 0–6)
GLUCOSE SERPL-MCNC: 98 MG/DL — SIGNIFICANT CHANGE UP (ref 70–99)
HCT VFR BLD CALC: 42.8 % — SIGNIFICANT CHANGE UP (ref 34.5–45)
HGB BLD-MCNC: 14.7 G/DL — SIGNIFICANT CHANGE UP (ref 11.5–15.5)
IMM GRANULOCYTES NFR BLD AUTO: 0.5 % — SIGNIFICANT CHANGE UP (ref 0–1.5)
INR BLD: 0.96 RATIO — SIGNIFICANT CHANGE UP (ref 0.88–1.16)
LYMPHOCYTES # BLD AUTO: 1.91 K/UL — SIGNIFICANT CHANGE UP (ref 1–3.3)
LYMPHOCYTES # BLD AUTO: 18.2 % — SIGNIFICANT CHANGE UP (ref 13–44)
MCHC RBC-ENTMCNC: 34.3 GM/DL — SIGNIFICANT CHANGE UP (ref 32–36)
MCHC RBC-ENTMCNC: 34.6 PG — HIGH (ref 27–34)
MCV RBC AUTO: 100.7 FL — HIGH (ref 80–100)
MONOCYTES # BLD AUTO: 1.04 K/UL — HIGH (ref 0–0.9)
MONOCYTES NFR BLD AUTO: 9.9 % — SIGNIFICANT CHANGE UP (ref 2–14)
NEUTROPHILS # BLD AUTO: 7.4 K/UL — SIGNIFICANT CHANGE UP (ref 1.8–7.4)
NEUTROPHILS NFR BLD AUTO: 70.3 % — SIGNIFICANT CHANGE UP (ref 43–77)
NRBC # BLD: 0 /100 WBCS — SIGNIFICANT CHANGE UP (ref 0–0)
PLATELET # BLD AUTO: 354 K/UL — SIGNIFICANT CHANGE UP (ref 150–400)
POTASSIUM SERPL-MCNC: 3.8 MMOL/L — SIGNIFICANT CHANGE UP (ref 3.5–5.3)
POTASSIUM SERPL-SCNC: 3.8 MMOL/L — SIGNIFICANT CHANGE UP (ref 3.5–5.3)
PROT SERPL-MCNC: 6.6 G/DL — SIGNIFICANT CHANGE UP (ref 6–8.3)
PROTHROM AB SERPL-ACNC: 10.4 SEC — SIGNIFICANT CHANGE UP (ref 10–12.9)
RBC # BLD: 4.25 M/UL — SIGNIFICANT CHANGE UP (ref 3.8–5.2)
RBC # FLD: 12.8 % — SIGNIFICANT CHANGE UP (ref 10.3–14.5)
SODIUM SERPL-SCNC: 136 MMOL/L — SIGNIFICANT CHANGE UP (ref 135–145)
TROPONIN I SERPL-MCNC: 0 NG/ML — LOW (ref 0.02–0.06)
WBC # BLD: 10.51 K/UL — HIGH (ref 3.8–10.5)
WBC # FLD AUTO: 10.51 K/UL — HIGH (ref 3.8–10.5)

## 2019-01-14 PROCEDURE — 93005 ELECTROCARDIOGRAM TRACING: CPT

## 2019-01-14 PROCEDURE — 85730 THROMBOPLASTIN TIME PARTIAL: CPT

## 2019-01-14 PROCEDURE — 99284 EMERGENCY DEPT VISIT MOD MDM: CPT | Mod: 25

## 2019-01-14 PROCEDURE — 80053 COMPREHEN METABOLIC PANEL: CPT

## 2019-01-14 PROCEDURE — 93010 ELECTROCARDIOGRAM REPORT: CPT

## 2019-01-14 PROCEDURE — 85610 PROTHROMBIN TIME: CPT

## 2019-01-14 PROCEDURE — 71045 X-RAY EXAM CHEST 1 VIEW: CPT

## 2019-01-14 PROCEDURE — 84484 ASSAY OF TROPONIN QUANT: CPT

## 2019-01-14 PROCEDURE — 36415 COLL VENOUS BLD VENIPUNCTURE: CPT

## 2019-01-14 PROCEDURE — 99285 EMERGENCY DEPT VISIT HI MDM: CPT

## 2019-01-14 PROCEDURE — 96374 THER/PROPH/DIAG INJ IV PUSH: CPT

## 2019-01-14 PROCEDURE — 71045 X-RAY EXAM CHEST 1 VIEW: CPT | Mod: 26

## 2019-01-14 PROCEDURE — 85027 COMPLETE CBC AUTOMATED: CPT

## 2019-01-14 RX ORDER — ACETAMINOPHEN 500 MG
650 TABLET ORAL ONCE
Qty: 0 | Refills: 0 | Status: COMPLETED | OUTPATIENT
Start: 2019-01-14 | End: 2019-01-14

## 2019-01-14 RX ORDER — SODIUM CHLORIDE 9 MG/ML
1000 INJECTION INTRAMUSCULAR; INTRAVENOUS; SUBCUTANEOUS ONCE
Qty: 0 | Refills: 0 | Status: COMPLETED | OUTPATIENT
Start: 2019-01-14 | End: 2019-01-14

## 2019-01-14 RX ORDER — DIGOXIN 250 MCG
0.12 TABLET ORAL ONCE
Qty: 0 | Refills: 0 | Status: COMPLETED | OUTPATIENT
Start: 2019-01-14 | End: 2019-01-14

## 2019-01-14 RX ORDER — ADENOSINE 3 MG/ML
6 INJECTION INTRAVENOUS ONCE
Qty: 0 | Refills: 0 | Status: COMPLETED | OUTPATIENT
Start: 2019-01-14 | End: 2019-01-14

## 2019-01-14 RX ORDER — DIGOXIN 250 MCG
1 TABLET ORAL
Qty: 30 | Refills: 0
Start: 2019-01-14 | End: 2019-02-12

## 2019-01-14 RX ADMIN — Medication 0.12 MILLIGRAM(S): at 08:32

## 2019-01-14 RX ADMIN — Medication 1 CAPSULE(S): at 09:42

## 2019-01-14 RX ADMIN — SODIUM CHLORIDE 1000 MILLILITER(S): 9 INJECTION INTRAMUSCULAR; INTRAVENOUS; SUBCUTANEOUS at 07:25

## 2019-01-14 RX ADMIN — ADENOSINE 6 MILLIGRAM(S): 3 INJECTION INTRAVENOUS at 07:44

## 2019-01-14 RX ADMIN — Medication 1 CAPSULE(S): at 09:57

## 2019-01-14 RX ADMIN — SODIUM CHLORIDE 1000 MILLILITER(S): 9 INJECTION INTRAMUSCULAR; INTRAVENOUS; SUBCUTANEOUS at 08:25

## 2019-01-14 NOTE — ED PROVIDER NOTE - PROGRESS NOTE DETAILS
No further arrhythmia after adenosine. Seen by Dr. TJ Newman who spoke to pts cardiologist Dr Machado. Will start Digoxin 0.125 daily and FU in office for further eval. Reevaluated patient at bedside.  Patient feeling much improved.  Discussed the results of all diagnostic testing in ED and copies of all reports given.   An opportunity to ask questions was given.  Discussed the importance of prompt, close medical follow-up.  Patient will return with any changes, concerns or persistent / worsening symptoms.  Understanding of all instructions verbalized.

## 2019-01-14 NOTE — ED PROVIDER NOTE - CARE PROVIDER_API CALL
Lorne Pablo (), Family Medicine  Internal Medicine  [address] [place] [address]  Phone: [phone]  Fax: [phone]    Julia Machado), Cardiovascular Disease; Internal Medicine  [address] [place] [address]  Phone: [phone]  Fax: [phone]

## 2019-01-14 NOTE — ED PROVIDER NOTE - OBJECTIVE STATEMENT
52yo F with multiple med problems brought from pain mgmnt for eval of SVT noted in preop when she presented for epidural injection today. Pt denies symptoms, palps, CP, sob or other symptom. Had recent brain surgery at [facility] without complication. Continues to smoke.    PMD Carvo  Cardio Carter.

## 2019-01-14 NOTE — ED PROVIDER NOTE - MUSCULOSKELETAL, MLM
Spine appears normal, range of motion is not limited, no muscle or joint tenderness. Rt wrist in cast.

## 2019-01-14 NOTE — ED PROVIDER NOTE - NS ED MD EKG INTERPRETATION 1
normal sinus rhythm, Normal axis, Normal NC interval and QRS complex. There are no acute ischemic ST or T-wave changes./Repeat EKG NSR after adenosine

## 2019-01-14 NOTE — CONSULT NOTE ADULT - ASSESSMENT
The patient is a 53 year old female with a history of hypotension, hypothyroidism, SLE, Sjogren's, COPD, HCV who presents with palpitations in the setting of SVT, likely AVNRT.    Plan:  - Remains in sinus rhythm after adenosine  - Electrolytes within normal limits  - BP on low side with systolics 80s-100s  - Explained vagal maneuvers to patient  - Due to chronically low BPs, patient is not an ideal candidate for beta blocker or calcium channel blocker therapy. Discussed with patient that since episodes are not frequent, can watch and see how often they occur without medication therapy. Patient prefers to be started on medical therapy. Will start digoxin 0.125 mg daily. She will follow-up with her cardiologist as outpatient.

## 2019-01-14 NOTE — ED PROVIDER NOTE - CHPI ED SYMPTOMS NEG
no dizziness/no fever/no nausea/no chills/no back pain/no diaphoresis/no syncope/no chest pain/no cough/no shortness of breath/no vomiting

## 2019-01-14 NOTE — ED ADULT NURSE NOTE - OBJECTIVE STATEMENT
Sent from Pain Management, with rapid heart rate, 130's on the monitor. Pt denies chest pain, had an episode of palpitations this am.

## 2019-01-14 NOTE — CONSULT NOTE ADULT - SUBJECTIVE AND OBJECTIVE BOX
History of Present Illness: The patient is a 53 year old female with a history of hypotension, hypothyroidism, SLE, Sjogren's, COPD, HCV who presents with palpitations. She was scheduled for epidural of neck today. On ride to hospital, she felt lightheaded. When she arrived in pre-op area, she was noted to be tachycardic to 140s. She was sent to ED, given adenosine, which broke rhythm to normal. No associated chest pain or shortness of breath. She had a similar episode about one month ago that lasted for a few hours and self-terminated.    Past Medical/Surgical History:  hypotension, hypothyroidism, SLE, Sjogren's, COPD, HCV     Medications:  Home Medications:  Advair Diskus 500 mcg-50 mcg inhalation powder: 1 puff(s) inhaled 2 times a day (16 Oct 2018 17:08)  Fioricet oral capsule: 1 cap(s) orally 2 times a day, As Needed (16 Oct 2018 17:08)  hydroxychloroquine 200 mg oral tablet: 1 tab(s) orally 2 times a day (16 Oct 2018 17:08)  Imodium 2 mg oral capsule: 3 cap(s) orally once a day, As Needed for diarrhea (16 Oct 2018 17:08)  Lyrica 75 mg oral capsule: 1 cap(s) orally 2 times a day (16 Oct 2018 20:28)  omeprazole 40 mg oral delayed release capsule: 1 cap(s) orally 2 times a day (16 Oct 2018 17:08)  ondansetron 8 mg oral tablet: 1 tab(s) orally 3 times a day (16 Oct 2018 17:08)  OXcarbazepine 150 mg oral tablet: 1 tab(s) orally 2 times a day (16 Oct 2018 17:08)  ProAir HFA 90 mcg/inh inhalation aerosol: 2 puff(s) inhaled 4 times a day, As Needed (16 Oct 2018 17:08)  traZODone 50 mg oral tablet: 1 tab(s) orally 2 times a day, As Needed (16 Oct 2018 17:08)      Family History: Non-contributory family history of premature cardiovascular atherosclerotic disease    Social History: No tobacco, alcohol or drug use    Review of Systems:  General: No fevers, chills, weight loss or gain  Skin: No rashes, color changes  Cardiovascular: No chest pain, orthopnea  Respiratory: No shortness of breath, cough  Gastrointestinal: No nausea, abdominal pain  Genitourinary: No incontinence, pain with urination  Musculoskeletal: No pain, swelling, decreased range of motion  Neurological: No headache, weakness  Psychiatric: No depression, anxiety  Endocrine: No weight loss or gain, increased thirst  All other systems are comprehensively negative.    Physical Exam:  Vitals:        Vital Signs Last 24 Hrs  T(C): 36.8 (14 Jan 2019 07:02), Max: 36.8 (14 Jan 2019 07:02)  T(F): 98.2 (14 Jan 2019 07:02), Max: 98.2 (14 Jan 2019 07:02)  HR: 90 (14 Jan 2019 07:55) (90 - 144)  BP: 97/64 (14 Jan 2019 07:55) (87/63 - 107/61)  BP(mean): --  RR: 18 (14 Jan 2019 07:55) (18 - 25)  SpO2: 100% (14 Jan 2019 07:55) (99% - 100%)  General: NAD  HEENT: MMM  Neck: No JVD, no carotid bruit  Lungs: CTAB  CV: RRR, nl S1/S2, no M/R/G  Abdomen: S/NT/ND, +BS  Extremities: No LE edema, no cyanosis  Neuro: AAOx3, non-focal  Skin: No rash    Labs:                        14.7   10.51 )-----------( 354      ( 14 Jan 2019 07:34 )             42.8     01-14    136  |  99  |  14  ----------------------------<  98  3.8   |  28  |  0.77    Ca    8.7      14 Jan 2019 07:34    TPro  6.6  /  Alb  3.4  /  TBili  0.2  /  DBili  x   /  AST  19  /  ALT  28  /  AlkPhos  83  01-14    CARDIAC MARKERS ( 14 Jan 2019 07:34 )  .000 ng/mL / x     / x     / x     / x          PT/INR - ( 14 Jan 2019 07:34 )   PT: 10.4 sec;   INR: 0.96 ratio         PTT - ( 14 Jan 2019 07:34 )  PTT:32.8 sec    ECG:   #1: SVT, likely AVNRT  #2: NSR, normal axis, no ST abnormality

## 2019-01-17 DIAGNOSIS — Z71.89 OTHER SPECIFIED COUNSELING: ICD-10-CM

## 2019-02-05 ENCOUNTER — RX RENEWAL (OUTPATIENT)
Age: 54
End: 2019-02-05

## 2019-03-05 ENCOUNTER — RX RENEWAL (OUTPATIENT)
Age: 54
End: 2019-03-05

## 2019-03-10 ENCOUNTER — EMERGENCY (EMERGENCY)
Facility: HOSPITAL | Age: 54
LOS: 1 days | Discharge: HOME | End: 2019-03-10
Attending: EMERGENCY MEDICINE | Admitting: EMERGENCY MEDICINE
Payer: MEDICARE

## 2019-03-10 VITALS
HEIGHT: 57 IN | RESPIRATION RATE: 14 BRPM | HEART RATE: 94 BPM | TEMPERATURE: 97 F | WEIGHT: 95.02 LBS | SYSTOLIC BLOOD PRESSURE: 113 MMHG | OXYGEN SATURATION: 97 % | DIASTOLIC BLOOD PRESSURE: 62 MMHG

## 2019-03-10 DIAGNOSIS — J38.1 POLYP OF VOCAL CORD AND LARYNX: Chronic | ICD-10-CM

## 2019-03-10 DIAGNOSIS — S69.91XD UNSPECIFIED INJURY OF RIGHT WRIST, HAND AND FINGER(S), SUBSEQUENT ENCOUNTER: Chronic | ICD-10-CM

## 2019-03-10 DIAGNOSIS — K82.9 DISEASE OF GALLBLADDER, UNSPECIFIED: Chronic | ICD-10-CM

## 2019-03-10 DIAGNOSIS — Z98.890 OTHER SPECIFIED POSTPROCEDURAL STATES: Chronic | ICD-10-CM

## 2019-03-10 PROCEDURE — 93971 EXTREMITY STUDY: CPT

## 2019-03-10 PROCEDURE — 99284 EMERGENCY DEPT VISIT MOD MDM: CPT

## 2019-03-10 PROCEDURE — 93926 LOWER EXTREMITY STUDY: CPT

## 2019-03-10 PROCEDURE — 93971 EXTREMITY STUDY: CPT | Mod: 26,RT

## 2019-03-10 PROCEDURE — 96374 THER/PROPH/DIAG INJ IV PUSH: CPT

## 2019-03-10 PROCEDURE — 99284 EMERGENCY DEPT VISIT MOD MDM: CPT | Mod: 25

## 2019-03-10 PROCEDURE — 93926 LOWER EXTREMITY STUDY: CPT | Mod: 26

## 2019-03-10 RX ORDER — ONDANSETRON 8 MG/1
4 TABLET, FILM COATED ORAL ONCE
Qty: 0 | Refills: 0 | Status: COMPLETED | OUTPATIENT
Start: 2019-03-10 | End: 2019-03-10

## 2019-03-10 RX ORDER — MORPHINE SULFATE 50 MG/1
4 CAPSULE, EXTENDED RELEASE ORAL ONCE
Qty: 0 | Refills: 0 | Status: DISCONTINUED | OUTPATIENT
Start: 2019-03-10 | End: 2019-03-10

## 2019-03-10 RX ADMIN — MORPHINE SULFATE 4 MILLIGRAM(S): 50 CAPSULE, EXTENDED RELEASE ORAL at 14:48

## 2019-03-10 RX ADMIN — ONDANSETRON 4 MILLIGRAM(S): 8 TABLET, FILM COATED ORAL at 14:48

## 2019-03-10 NOTE — ED ADULT TRIAGE NOTE - CHIEF COMPLAINT QUOTE
"Ever since my heart ablation I have had severe pain in my groin."  pt had procedure over a month ago at [place] but states her surgeon has not addressed the groin pain; pt was seen at [facility] 2 days ago and diagnosed with blood clot in right arm and is currently on coated aspirin; pt states that she did not have [facility] address the groin pain because she was concerned over the lump in her arm

## 2019-03-10 NOTE — ED PROVIDER NOTE - OBJECTIVE STATEMENT
pt with hx cardiac ablation at [place] in january c/o right thigh pain radiating from puncture site down thigh to posterior knee. no fevers, chills, weakness, numbness, cp, sob, abd pain. pt refusing PO pain meds, insists can only take morphine  EP - [facility] ([place])  pmd - carvo

## 2019-03-10 NOTE — ED ADULT NURSE NOTE - PMH
Anxiety    COPD (chronic obstructive pulmonary disease)    Dvt femoral (deep venous thrombosis)  right arm  Gallstones    GERD (gastroesophageal reflux disease)    Glossopharyngeal neuralgia syndrome    Hepatitis C    Hypotension    Hypothyroidism    Lupus    Migraine    Neuropathy    Nicotine addiction    Sjogren's disease    UTI (urinary tract infection)    Vertigo

## 2019-03-10 NOTE — ED ADULT NURSE NOTE - CHIEF COMPLAINT QUOTE
"Ever since my heart ablation I have had severe pain in my groin."  pt had procedure over a month ago at [place] but states her surgeon has not addressed the groin pain; pt was seen at [facility] 2 days ago and diagnosed with blood clot in right arm and is currently on coated aspirin; pt states that she did not have [facility] address the groin pain because she was concerned over the lump in her arm

## 2019-03-29 ENCOUNTER — RESULT REVIEW (OUTPATIENT)
Age: 54
End: 2019-03-29

## 2019-04-03 DIAGNOSIS — R11.0 NAUSEA: ICD-10-CM

## 2019-04-17 ENCOUNTER — EMERGENCY (EMERGENCY)
Facility: HOSPITAL | Age: 54
LOS: 1 days | Discharge: HOME | End: 2019-04-17
Attending: EMERGENCY MEDICINE | Admitting: EMERGENCY MEDICINE
Payer: MEDICARE

## 2019-04-17 VITALS
OXYGEN SATURATION: 98 % | DIASTOLIC BLOOD PRESSURE: 64 MMHG | TEMPERATURE: 98 F | HEIGHT: 57 IN | SYSTOLIC BLOOD PRESSURE: 99 MMHG | HEART RATE: 71 BPM | WEIGHT: 95.02 LBS | RESPIRATION RATE: 15 BRPM

## 2019-04-17 VITALS
DIASTOLIC BLOOD PRESSURE: 54 MMHG | OXYGEN SATURATION: 99 % | SYSTOLIC BLOOD PRESSURE: 90 MMHG | HEART RATE: 57 BPM | RESPIRATION RATE: 16 BRPM | TEMPERATURE: 98 F

## 2019-04-17 DIAGNOSIS — K82.9 DISEASE OF GALLBLADDER, UNSPECIFIED: Chronic | ICD-10-CM

## 2019-04-17 DIAGNOSIS — J38.1 POLYP OF VOCAL CORD AND LARYNX: Chronic | ICD-10-CM

## 2019-04-17 DIAGNOSIS — Z98.890 OTHER SPECIFIED POSTPROCEDURAL STATES: Chronic | ICD-10-CM

## 2019-04-17 DIAGNOSIS — S69.91XD UNSPECIFIED INJURY OF RIGHT WRIST, HAND AND FINGER(S), SUBSEQUENT ENCOUNTER: Chronic | ICD-10-CM

## 2019-04-17 LAB
ALBUMIN SERPL ELPH-MCNC: 3.6 G/DL — SIGNIFICANT CHANGE UP (ref 3.3–5)
ALP SERPL-CCNC: 111 U/L — SIGNIFICANT CHANGE UP (ref 40–120)
ALT FLD-CCNC: 26 U/L — SIGNIFICANT CHANGE UP (ref 12–78)
ANION GAP SERPL CALC-SCNC: 5 MMOL/L — SIGNIFICANT CHANGE UP (ref 5–17)
APTT BLD: 33.6 SEC — SIGNIFICANT CHANGE UP (ref 28.5–37)
AST SERPL-CCNC: 22 U/L — SIGNIFICANT CHANGE UP (ref 15–37)
BASOPHILS # BLD AUTO: 0.05 K/UL — SIGNIFICANT CHANGE UP (ref 0–0.2)
BASOPHILS NFR BLD AUTO: 0.4 % — SIGNIFICANT CHANGE UP (ref 0–2)
BILIRUB SERPL-MCNC: 0.3 MG/DL — SIGNIFICANT CHANGE UP (ref 0.2–1.2)
BUN SERPL-MCNC: 11 MG/DL — SIGNIFICANT CHANGE UP (ref 7–23)
CALCIUM SERPL-MCNC: 8.5 MG/DL — SIGNIFICANT CHANGE UP (ref 8.5–10.1)
CHLORIDE SERPL-SCNC: 94 MMOL/L — LOW (ref 96–108)
CO2 SERPL-SCNC: 28 MMOL/L — SIGNIFICANT CHANGE UP (ref 22–31)
CREAT SERPL-MCNC: 0.84 MG/DL — SIGNIFICANT CHANGE UP (ref 0.5–1.3)
EOSINOPHIL # BLD AUTO: 0.24 K/UL — SIGNIFICANT CHANGE UP (ref 0–0.5)
EOSINOPHIL NFR BLD AUTO: 2.1 % — SIGNIFICANT CHANGE UP (ref 0–6)
GLUCOSE SERPL-MCNC: 115 MG/DL — HIGH (ref 70–99)
HCT VFR BLD CALC: 36.2 % — SIGNIFICANT CHANGE UP (ref 34.5–45)
HGB BLD-MCNC: 12.7 G/DL — SIGNIFICANT CHANGE UP (ref 11.5–15.5)
IMM GRANULOCYTES NFR BLD AUTO: 0.5 % — SIGNIFICANT CHANGE UP (ref 0–1.5)
INR BLD: 1.07 RATIO — SIGNIFICANT CHANGE UP (ref 0.88–1.16)
LACTATE SERPL-SCNC: 1 MMOL/L — SIGNIFICANT CHANGE UP (ref 0.7–2)
LIDOCAIN IGE QN: 69 U/L — LOW (ref 73–393)
LYMPHOCYTES # BLD AUTO: 2.71 K/UL — SIGNIFICANT CHANGE UP (ref 1–3.3)
LYMPHOCYTES # BLD AUTO: 24.1 % — SIGNIFICANT CHANGE UP (ref 13–44)
MAGNESIUM SERPL-MCNC: 2 MG/DL — SIGNIFICANT CHANGE UP (ref 1.6–2.6)
MCHC RBC-ENTMCNC: 33.2 PG — SIGNIFICANT CHANGE UP (ref 27–34)
MCHC RBC-ENTMCNC: 35.1 GM/DL — SIGNIFICANT CHANGE UP (ref 32–36)
MCV RBC AUTO: 94.8 FL — SIGNIFICANT CHANGE UP (ref 80–100)
MONOCYTES # BLD AUTO: 0.95 K/UL — HIGH (ref 0–0.9)
MONOCYTES NFR BLD AUTO: 8.5 % — SIGNIFICANT CHANGE UP (ref 2–14)
NEUTROPHILS # BLD AUTO: 7.23 K/UL — SIGNIFICANT CHANGE UP (ref 1.8–7.4)
NEUTROPHILS NFR BLD AUTO: 64.4 % — SIGNIFICANT CHANGE UP (ref 43–77)
NRBC # BLD: 0 /100 WBCS — SIGNIFICANT CHANGE UP (ref 0–0)
PLATELET # BLD AUTO: 317 K/UL — SIGNIFICANT CHANGE UP (ref 150–400)
POTASSIUM SERPL-MCNC: 4.1 MMOL/L — SIGNIFICANT CHANGE UP (ref 3.5–5.3)
POTASSIUM SERPL-SCNC: 4.1 MMOL/L — SIGNIFICANT CHANGE UP (ref 3.5–5.3)
PROT SERPL-MCNC: 6.5 G/DL — SIGNIFICANT CHANGE UP (ref 6–8.3)
PROTHROM AB SERPL-ACNC: 12.2 SEC — SIGNIFICANT CHANGE UP (ref 10–12.9)
RBC # BLD: 3.82 M/UL — SIGNIFICANT CHANGE UP (ref 3.8–5.2)
RBC # FLD: 12.3 % — SIGNIFICANT CHANGE UP (ref 10.3–14.5)
SODIUM SERPL-SCNC: 127 MMOL/L — LOW (ref 135–145)
TSH SERPL-MCNC: 1.53 UIU/ML — SIGNIFICANT CHANGE UP (ref 0.36–3.74)
WBC # BLD: 11.24 K/UL — HIGH (ref 3.8–10.5)
WBC # FLD AUTO: 11.24 K/UL — HIGH (ref 3.8–10.5)

## 2019-04-17 PROCEDURE — 36415 COLL VENOUS BLD VENIPUNCTURE: CPT

## 2019-04-17 PROCEDURE — 96374 THER/PROPH/DIAG INJ IV PUSH: CPT

## 2019-04-17 PROCEDURE — 87040 BLOOD CULTURE FOR BACTERIA: CPT

## 2019-04-17 PROCEDURE — 83690 ASSAY OF LIPASE: CPT

## 2019-04-17 PROCEDURE — 85730 THROMBOPLASTIN TIME PARTIAL: CPT

## 2019-04-17 PROCEDURE — 83735 ASSAY OF MAGNESIUM: CPT

## 2019-04-17 PROCEDURE — 99284 EMERGENCY DEPT VISIT MOD MDM: CPT | Mod: 25

## 2019-04-17 PROCEDURE — 85027 COMPLETE CBC AUTOMATED: CPT

## 2019-04-17 PROCEDURE — 85610 PROTHROMBIN TIME: CPT

## 2019-04-17 PROCEDURE — 96375 TX/PRO/DX INJ NEW DRUG ADDON: CPT

## 2019-04-17 PROCEDURE — 83605 ASSAY OF LACTIC ACID: CPT

## 2019-04-17 PROCEDURE — 99284 EMERGENCY DEPT VISIT MOD MDM: CPT

## 2019-04-17 PROCEDURE — 80053 COMPREHEN METABOLIC PANEL: CPT

## 2019-04-17 PROCEDURE — 84443 ASSAY THYROID STIM HORMONE: CPT

## 2019-04-17 RX ORDER — ONDANSETRON 8 MG/1
4 TABLET, FILM COATED ORAL ONCE
Qty: 0 | Refills: 0 | Status: COMPLETED | OUTPATIENT
Start: 2019-04-17 | End: 2019-04-17

## 2019-04-17 RX ORDER — SODIUM CHLORIDE 9 MG/ML
1000 INJECTION INTRAMUSCULAR; INTRAVENOUS; SUBCUTANEOUS ONCE
Qty: 0 | Refills: 0 | Status: COMPLETED | OUTPATIENT
Start: 2019-04-17 | End: 2019-04-17

## 2019-04-17 RX ORDER — FAMOTIDINE 10 MG/ML
20 INJECTION INTRAVENOUS ONCE
Qty: 0 | Refills: 0 | Status: COMPLETED | OUTPATIENT
Start: 2019-04-17 | End: 2019-04-17

## 2019-04-17 RX ORDER — MORPHINE SULFATE 50 MG/1
4 CAPSULE, EXTENDED RELEASE ORAL ONCE
Qty: 0 | Refills: 0 | Status: DISCONTINUED | OUTPATIENT
Start: 2019-04-17 | End: 2019-04-17

## 2019-04-17 RX ADMIN — SODIUM CHLORIDE 1000 MILLILITER(S): 9 INJECTION INTRAMUSCULAR; INTRAVENOUS; SUBCUTANEOUS at 17:51

## 2019-04-17 RX ADMIN — ONDANSETRON 4 MILLIGRAM(S): 8 TABLET, FILM COATED ORAL at 18:00

## 2019-04-17 RX ADMIN — FAMOTIDINE 20 MILLIGRAM(S): 10 INJECTION INTRAVENOUS at 18:01

## 2019-04-17 RX ADMIN — SODIUM CHLORIDE 1000 MILLILITER(S): 9 INJECTION INTRAMUSCULAR; INTRAVENOUS; SUBCUTANEOUS at 17:49

## 2019-04-17 RX ADMIN — MORPHINE SULFATE 4 MILLIGRAM(S): 50 CAPSULE, EXTENDED RELEASE ORAL at 18:00

## 2019-04-17 NOTE — ED ADULT NURSE NOTE - NSIMPLEMENTINTERV_GEN_ALL_ED
Implemented All Universal Safety Interventions:  [place] to call system. Call bell, personal items and telephone within reach. Instruct patient to call for assistance. Room bathroom lighting operational. Non-slip footwear when patient is off stretcher. Physically safe environment: no spills, clutter or unnecessary equipment. Stretcher in lowest position, wheels locked, appropriate side rails in place.

## 2019-04-17 NOTE — ED ADULT TRIAGE NOTE - LOCATION:
SUBMANDIBULAR SALIVARY GLAND INFECTION MEASURES  1. Use lemon slice sialogogues: 1/4 inch slice cut in half, bite and suck slowly for 5 minutes, then massage the left submandibular gland for five minutes, four times daily. Rinse your mouth with clear water after using the lemon slice. 2. Heating pad therapy:  Apply heat to the affected area as directed, four times daily, on medium heat setting, for 30 to 45 minutes, with a one minute break every 5 minutes. 3. Continue your Augmentin 825/125 BID for total 10 days. ADVERSE AND SIDE EFFECTS OF MEDICATIONS:    Please be aware of the following possible adverse reactions, side effects, and complications of the following medications, including, but not limited to: allergic reaction, interactions with other medications, nausea, headache, diarrhea, persistent symptoms, failure to improve, and the following:     Augmentin:  diarrhea, colitis (severe infection and inflammation of the large intestine), pseudomembranous colitis (severe infection and inflammation of the colon, usually due to C. difficile bacteria)  yeast or fungal  infections, Waite-Joaquín syndrome (very rare necrotic skin reaction with peeling of skin, blisters and arthritis), persistent symptoms/infection, and failure to improve. Taking Probiotic while you are taking antibiotics, may help to prevent diarrhea, stomach upset, pseudomembranous colitis, and C. difficile diarrhea. This may be obtained at your pharmacy or health food store. Alternatively, you may eat one cup of yogurt with active or live cultures twice daily while taking the antibiotic.   Continue for two to three days after completion of the antibiotic.    ~~~>>> Also please read all information given to you by the pharmacist.
Left arm;

## 2019-04-17 NOTE — ED PROVIDER NOTE - PROGRESS NOTE DETAILS
patient resting comfortably in bed, no bowel movement since she has been in the ER, labs reviewed, no acute findings, abdomen soft, non tender, dc home, f/u as outpaitent

## 2019-04-17 NOTE — ED PROVIDER NOTE - CLINICAL SUMMARY MEDICAL DECISION MAKING FREE TEXT BOX
h/o IBS with diarrhea, dehydrated, f/u labs, ua, send stool for c.diff/cultures iv fluids, having chronic pain from known pelvic fracture, pain control, nausea

## 2019-04-17 NOTE — ED ADULT NURSE NOTE - CHPI ED NUR SYMPTOMS NEG
no abdominal distension/no vomiting/no fever/no hematuria/no dysuria/no blood in stool/no burning urination/no chills/no nausea

## 2019-04-17 NOTE — ED ADULT NURSE NOTE - OBJECTIVE STATEMENT
53 y/o F patient presents to ED from home c/o diarrhea x4 weeks. As per patient she has had similar symptoms in the past and alternates between diarrhea and constipation intermittently. Patient reports she took 12 imodium today without relief. As per patient she has been feeling more tired and fatigued than usual. Patient A&Ox4. lungs CTA. skin warm and intact. abdomens soft, non tender, non distended. ambulatory. Patient denies HA, dizziness, SOB, chest pain, fevers/chills. Safety and comfort measures provided and maintained. MD bedside.

## 2019-04-17 NOTE — ED PROVIDER NOTE - OBJECTIVE STATEMENT
54 female presents to ER c/o diarrhea for the past 4 weeks, states she has a history of IBS and alternates diarrhea and constipation, states diarrhea is worse this time, today took 12 immodium with little relief, feels dehydrated, generalized weakness, denies recent antibiotic treatment and no recent travel, no fever, patient states she tried to go to her GI today Dr. Haro but was told there was a problem with her insurance and told to go to the ER.

## 2019-04-17 NOTE — ED ADULT NURSE REASSESSMENT NOTE - NS ED NURSE REASSESS COMMENT FT1
Patient resting and comfortable. No complaints of pain at this time. VSS. Safety and comfort measures provided and maintained.

## 2019-04-26 NOTE — ED ADULT TRIAGE NOTE - HEIGHT IN CM
Reviewed with patient and agree with technician note, also regarding: Past Ocular/Family Ocular Hx,   allergies and medications.   Past Ocular history notation:  GLC SUSPECT per Dr. Cho 2016; Right BRAO, (asymtpmatic Did have normal carotid dopplers and echo, and normal CRP).;floaters; Lattice degeneration left; mild lens changes; Xanthelasmas; controlled DM    CUP/DISC: 0.40/0.35 (left smaller nerve)  FAMILY HISTORY: negative for glaucoma  DILATED EXAM: Last done 3/2018  VISUAL FIELD: last done- 0  OCT (optical coherence tomography): Right eye- 86   Left eye- 74  Last done 2/2016  PACHYMETRY: Right eye- 567   Left eye- 556  Last done 2/5/2016 TODAY  GONIOSCOPY: last done- 2016  CONTRAINDICATIONS/ALLERGIES: none  TARGET INTRAOCULAR PRESSURE: < 20  PREVIOUS GLAUCOMA LASERS: none  PREVIOUS GLAUCOMA SURGICAL PROCEDURES: none.  PREVIOUS REFRACTIVE SURGERY: none  HIGHEST INTRAOCULAR PRESSURE: Right eye- 21  Left eye- 22  Date- 2/2016  (Retinoscopy entered with manifest when full refraction. Over refraction-only* is performed if patient had no visual complaints, or declined full refraction.)    Social/Occupational Hx/Notes: at home    Head/Face Exam: Normal  Mental Status:  Alert/Oriented x 3  See  other clinical data/information in relevant sections.    ASSESSMENT/PLAN  Refractive error - stable, no new glasses needed and a copy of the current glasses prescription was given  Type 2 Diabetes without retinopathy and without macular edema both eyes - discussed importance of good blood sugar control and target hemoglobin A1C of 7.0 or less. I stressed the importance of annual eye exams. Reinforced the need to comply with the current systemic diabetic treatment regimen.  Hx of:  Cataract: both eyes R 0-1, L 1-2 of 4, currently not affecting activities of daily living. Discussed elective nature of cataract removal.  Continue to follow conservatively, no surgery needed.  Glaucoma suspect: stable both eyes:  Generally, doing  well no glaucoma--follow as suspect                        144.78

## 2019-06-07 ENCOUNTER — RX RENEWAL (OUTPATIENT)
Age: 54
End: 2019-06-07

## 2019-06-08 ENCOUNTER — RX RENEWAL (OUTPATIENT)
Age: 54
End: 2019-06-08

## 2019-07-01 NOTE — ED ADULT NURSE NOTE - NS ED NURSE LEVEL OF CONSCIOUSNESS AFFECT
1058 am    Identified with spelling of name and date of birth. Medications and allergies reviewed. Denies taking aspirin containing medications, NSAIDs, fish oil, vitamin E  5 days prior to biopsy.  Denies prescription anti coagulating medications 5 days p Appropriate

## 2019-07-09 ENCOUNTER — RX RENEWAL (OUTPATIENT)
Age: 54
End: 2019-07-09

## 2019-07-11 ENCOUNTER — RX RENEWAL (OUTPATIENT)
Age: 54
End: 2019-07-11

## 2019-07-11 RX ORDER — ONDANSETRON 8 MG/1
8 TABLET, ORALLY DISINTEGRATING ORAL
Qty: 180 | Refills: 1 | Status: ACTIVE | COMMUNITY
Start: 2018-06-01 | End: 1900-01-01

## 2019-08-20 ENCOUNTER — RX RENEWAL (OUTPATIENT)
Age: 54
End: 2019-08-20

## 2019-08-20 RX ORDER — ONDANSETRON 8 MG/1
8 TABLET, ORALLY DISINTEGRATING ORAL
Qty: 30 | Refills: 0 | Status: ACTIVE | COMMUNITY
Start: 2019-04-03 | End: 1900-01-01

## 2019-08-29 NOTE — ED PROVIDER NOTE - ALLERGIC/IMMUNOLOGIC NEGATIVE STATEMENT, MLM
Patient states that [facility] is going to be giving dr. Pace a call to approve the rx.   
Reason for Call:  Other prescription    Detailed comments: Pt calling to state that [facility] has been unable to get a hold of Dr. Pace regarding Lisinopril and therefore he would need a ramesh refill of Lisinopril sent to [facility] in the meantime.     Phone Number Patient can be reached at: Home number on file [phone] (home)    Best Time: anytime    Can we leave a detailed message on this number? YES    Call taken on 8/29/2019 at 8:18 AM by Jatin Gutierrez            
See refill encounter from yesterday in Chart Review for further documentation.    Regla Mota RN      
no dermatitis, no environmental allergies, no food allergies, no immunosuppressive disorder, and no pruritus.

## 2019-09-25 ENCOUNTER — INPATIENT (INPATIENT)
Facility: HOSPITAL | Age: 54
LOS: 1 days | Discharge: HOME | DRG: 392 | End: 2019-09-27
Attending: HOSPITALIST | Admitting: HOSPITALIST
Payer: MEDICARE

## 2019-09-25 VITALS
RESPIRATION RATE: 16 BRPM | OXYGEN SATURATION: 97 % | HEIGHT: 58 IN | SYSTOLIC BLOOD PRESSURE: 131 MMHG | HEART RATE: 68 BPM | TEMPERATURE: 99 F | WEIGHT: 74.96 LBS | DIASTOLIC BLOOD PRESSURE: 76 MMHG

## 2019-09-25 DIAGNOSIS — K82.9 DISEASE OF GALLBLADDER, UNSPECIFIED: Chronic | ICD-10-CM

## 2019-09-25 DIAGNOSIS — Z98.890 OTHER SPECIFIED POSTPROCEDURAL STATES: Chronic | ICD-10-CM

## 2019-09-25 DIAGNOSIS — K52.9 NONINFECTIVE GASTROENTERITIS AND COLITIS, UNSPECIFIED: ICD-10-CM

## 2019-09-25 DIAGNOSIS — S69.91XD UNSPECIFIED INJURY OF RIGHT WRIST, HAND AND FINGER(S), SUBSEQUENT ENCOUNTER: Chronic | ICD-10-CM

## 2019-09-25 DIAGNOSIS — J38.1 POLYP OF VOCAL CORD AND LARYNX: Chronic | ICD-10-CM

## 2019-09-25 LAB
ALBUMIN SERPL ELPH-MCNC: 3.9 G/DL — SIGNIFICANT CHANGE UP (ref 3.3–5)
ALP SERPL-CCNC: 94 U/L — SIGNIFICANT CHANGE UP (ref 30–120)
ALT FLD-CCNC: 45 U/L DA — SIGNIFICANT CHANGE UP (ref 10–60)
ANION GAP SERPL CALC-SCNC: 12 MMOL/L — SIGNIFICANT CHANGE UP (ref 5–17)
APPEARANCE UR: CLEAR — SIGNIFICANT CHANGE UP
AST SERPL-CCNC: 38 U/L — SIGNIFICANT CHANGE UP (ref 10–40)
BASOPHILS # BLD AUTO: 0.06 K/UL — SIGNIFICANT CHANGE UP (ref 0–0.2)
BASOPHILS NFR BLD AUTO: 0.5 % — SIGNIFICANT CHANGE UP (ref 0–2)
BILIRUB SERPL-MCNC: 0.4 MG/DL — SIGNIFICANT CHANGE UP (ref 0.2–1.2)
BILIRUB UR-MCNC: NEGATIVE — SIGNIFICANT CHANGE UP
BUN SERPL-MCNC: 13 MG/DL — SIGNIFICANT CHANGE UP (ref 7–23)
CALCIUM SERPL-MCNC: 9.2 MG/DL — SIGNIFICANT CHANGE UP (ref 8.4–10.5)
CHLORIDE SERPL-SCNC: 92 MMOL/L — LOW (ref 96–108)
CO2 SERPL-SCNC: 24 MMOL/L — SIGNIFICANT CHANGE UP (ref 22–31)
COLOR SPEC: YELLOW — SIGNIFICANT CHANGE UP
CREAT SERPL-MCNC: 0.66 MG/DL — SIGNIFICANT CHANGE UP (ref 0.5–1.3)
DIFF PNL FLD: NEGATIVE — SIGNIFICANT CHANGE UP
EOSINOPHIL # BLD AUTO: 0.05 K/UL — SIGNIFICANT CHANGE UP (ref 0–0.5)
EOSINOPHIL NFR BLD AUTO: 0.4 % — SIGNIFICANT CHANGE UP (ref 0–6)
GLUCOSE SERPL-MCNC: 63 MG/DL — LOW (ref 70–99)
GLUCOSE UR QL: NEGATIVE MG/DL — SIGNIFICANT CHANGE UP
HCT VFR BLD CALC: 37.9 % — SIGNIFICANT CHANGE UP (ref 34.5–45)
HGB BLD-MCNC: 13.5 G/DL — SIGNIFICANT CHANGE UP (ref 11.5–15.5)
IMM GRANULOCYTES NFR BLD AUTO: 0.4 % — SIGNIFICANT CHANGE UP (ref 0–1.5)
KETONES UR-MCNC: ABNORMAL
LEUKOCYTE ESTERASE UR-ACNC: NEGATIVE — SIGNIFICANT CHANGE UP
LIDOCAIN IGE QN: 75 U/L — SIGNIFICANT CHANGE UP (ref 73–393)
LYMPHOCYTES # BLD AUTO: 1.8 K/UL — SIGNIFICANT CHANGE UP (ref 1–3.3)
LYMPHOCYTES # BLD AUTO: 15.9 % — SIGNIFICANT CHANGE UP (ref 13–44)
MCHC RBC-ENTMCNC: 33.2 PG — SIGNIFICANT CHANGE UP (ref 27–34)
MCHC RBC-ENTMCNC: 35.6 GM/DL — SIGNIFICANT CHANGE UP (ref 32–36)
MCV RBC AUTO: 93.1 FL — SIGNIFICANT CHANGE UP (ref 80–100)
MONOCYTES # BLD AUTO: 0.98 K/UL — HIGH (ref 0–0.9)
MONOCYTES NFR BLD AUTO: 8.7 % — SIGNIFICANT CHANGE UP (ref 2–14)
NEUTROPHILS # BLD AUTO: 8.38 K/UL — HIGH (ref 1.8–7.4)
NEUTROPHILS NFR BLD AUTO: 74.1 % — SIGNIFICANT CHANGE UP (ref 43–77)
NITRITE UR-MCNC: NEGATIVE — SIGNIFICANT CHANGE UP
NRBC # BLD: 0 /100 WBCS — SIGNIFICANT CHANGE UP (ref 0–0)
PH UR: 7 — SIGNIFICANT CHANGE UP (ref 5–8)
PLATELET # BLD AUTO: 363 K/UL — SIGNIFICANT CHANGE UP (ref 150–400)
POTASSIUM SERPL-MCNC: 4.3 MMOL/L — SIGNIFICANT CHANGE UP (ref 3.5–5.3)
POTASSIUM SERPL-SCNC: 4.3 MMOL/L — SIGNIFICANT CHANGE UP (ref 3.5–5.3)
PROT SERPL-MCNC: 6.8 G/DL — SIGNIFICANT CHANGE UP (ref 6–8.3)
PROT UR-MCNC: NEGATIVE MG/DL — SIGNIFICANT CHANGE UP
RBC # BLD: 4.07 M/UL — SIGNIFICANT CHANGE UP (ref 3.8–5.2)
RBC # FLD: 12.4 % — SIGNIFICANT CHANGE UP (ref 10.3–14.5)
SODIUM SERPL-SCNC: 128 MMOL/L — LOW (ref 135–145)
SP GR SPEC: 1 — LOW (ref 1.01–1.02)
UROBILINOGEN FLD QL: NEGATIVE MG/DL — SIGNIFICANT CHANGE UP
WBC # BLD: 11.32 K/UL — HIGH (ref 3.8–10.5)
WBC # FLD AUTO: 11.32 K/UL — HIGH (ref 3.8–10.5)

## 2019-09-25 PROCEDURE — 99223 1ST HOSP IP/OBS HIGH 75: CPT | Mod: AI

## 2019-09-25 PROCEDURE — 99285 EMERGENCY DEPT VISIT HI MDM: CPT

## 2019-09-25 PROCEDURE — 74177 CT ABD & PELVIS W/CONTRAST: CPT | Mod: 26

## 2019-09-25 RX ORDER — MORPHINE SULFATE 50 MG/1
2 CAPSULE, EXTENDED RELEASE ORAL ONCE
Refills: 0 | Status: DISCONTINUED | OUTPATIENT
Start: 2019-09-25 | End: 2019-09-25

## 2019-09-25 RX ORDER — BUPROPION HYDROCHLORIDE 150 MG/1
150 TABLET, EXTENDED RELEASE ORAL DAILY
Refills: 0 | Status: DISCONTINUED | OUTPATIENT
Start: 2019-09-25 | End: 2019-09-27

## 2019-09-25 RX ORDER — METRONIDAZOLE 500 MG
500 TABLET ORAL ONCE
Refills: 0 | Status: DISCONTINUED | OUTPATIENT
Start: 2019-09-25 | End: 2019-09-25

## 2019-09-25 RX ORDER — BUDESONIDE AND FORMOTEROL FUMARATE DIHYDRATE 160; 4.5 UG/1; UG/1
2 AEROSOL RESPIRATORY (INHALATION)
Refills: 0 | Status: DISCONTINUED | OUTPATIENT
Start: 2019-09-25 | End: 2019-09-27

## 2019-09-25 RX ORDER — MORPHINE SULFATE 50 MG/1
2 CAPSULE, EXTENDED RELEASE ORAL EVERY 4 HOURS
Refills: 0 | Status: DISCONTINUED | OUTPATIENT
Start: 2019-09-25 | End: 2019-09-27

## 2019-09-25 RX ORDER — TRAZODONE HCL 50 MG
1 TABLET ORAL
Qty: 0 | Refills: 0 | DISCHARGE

## 2019-09-25 RX ORDER — HYDROXYCHLOROQUINE SULFATE 200 MG
200 TABLET ORAL
Refills: 0 | Status: DISCONTINUED | OUTPATIENT
Start: 2019-09-25 | End: 2019-09-27

## 2019-09-25 RX ORDER — CLONAZEPAM 1 MG
1 TABLET ORAL
Refills: 0 | Status: DISCONTINUED | OUTPATIENT
Start: 2019-09-25 | End: 2019-09-27

## 2019-09-25 RX ORDER — ONDANSETRON 8 MG/1
4 TABLET, FILM COATED ORAL EVERY 8 HOURS
Refills: 0 | Status: DISCONTINUED | OUTPATIENT
Start: 2019-09-25 | End: 2019-09-27

## 2019-09-25 RX ORDER — PIPERACILLIN AND TAZOBACTAM 4; .5 G/20ML; G/20ML
3.38 INJECTION, POWDER, LYOPHILIZED, FOR SOLUTION INTRAVENOUS ONCE
Refills: 0 | Status: COMPLETED | OUTPATIENT
Start: 2019-09-25 | End: 2019-09-25

## 2019-09-25 RX ORDER — SODIUM CHLORIDE 9 MG/ML
1000 INJECTION INTRAMUSCULAR; INTRAVENOUS; SUBCUTANEOUS ONCE
Refills: 0 | Status: COMPLETED | OUTPATIENT
Start: 2019-09-25 | End: 2019-09-25

## 2019-09-25 RX ORDER — SODIUM CHLORIDE 9 MG/ML
1000 INJECTION, SOLUTION INTRAVENOUS
Refills: 0 | Status: DISCONTINUED | OUTPATIENT
Start: 2019-09-25 | End: 2019-09-25

## 2019-09-25 RX ORDER — PANTOPRAZOLE SODIUM 20 MG/1
40 TABLET, DELAYED RELEASE ORAL
Refills: 0 | Status: DISCONTINUED | OUTPATIENT
Start: 2019-09-25 | End: 2019-09-27

## 2019-09-25 RX ORDER — TIOTROPIUM BROMIDE 18 UG/1
1 CAPSULE ORAL; RESPIRATORY (INHALATION) DAILY
Refills: 0 | Status: DISCONTINUED | OUTPATIENT
Start: 2019-09-25 | End: 2019-09-27

## 2019-09-25 RX ORDER — OXCARBAZEPINE 300 MG/1
1 TABLET, FILM COATED ORAL
Qty: 0 | Refills: 0 | DISCHARGE

## 2019-09-25 RX ORDER — IOHEXOL 300 MG/ML
30 INJECTION, SOLUTION INTRAVENOUS ONCE
Refills: 0 | Status: COMPLETED | OUTPATIENT
Start: 2019-09-25 | End: 2019-09-25

## 2019-09-25 RX ORDER — FAMOTIDINE 10 MG/ML
20 INJECTION INTRAVENOUS ONCE
Refills: 0 | Status: COMPLETED | OUTPATIENT
Start: 2019-09-25 | End: 2019-09-25

## 2019-09-25 RX ORDER — CEFTRIAXONE 500 MG/1
1000 INJECTION, POWDER, FOR SOLUTION INTRAMUSCULAR; INTRAVENOUS ONCE
Refills: 0 | Status: DISCONTINUED | OUTPATIENT
Start: 2019-09-25 | End: 2019-09-25

## 2019-09-25 RX ORDER — ONDANSETRON 8 MG/1
4 TABLET, FILM COATED ORAL ONCE
Refills: 0 | Status: COMPLETED | OUTPATIENT
Start: 2019-09-25 | End: 2019-09-25

## 2019-09-25 RX ORDER — ALBUTEROL 90 UG/1
2 AEROSOL, METERED ORAL EVERY 6 HOURS
Refills: 0 | Status: DISCONTINUED | OUTPATIENT
Start: 2019-09-25 | End: 2019-09-27

## 2019-09-25 RX ORDER — PIPERACILLIN AND TAZOBACTAM 4; .5 G/20ML; G/20ML
3.38 INJECTION, POWDER, LYOPHILIZED, FOR SOLUTION INTRAVENOUS EVERY 8 HOURS
Refills: 0 | Status: DISCONTINUED | OUTPATIENT
Start: 2019-09-26 | End: 2019-09-27

## 2019-09-25 RX ORDER — ENOXAPARIN SODIUM 100 MG/ML
30 INJECTION SUBCUTANEOUS DAILY
Refills: 0 | Status: DISCONTINUED | OUTPATIENT
Start: 2019-09-25 | End: 2019-09-27

## 2019-09-25 RX ORDER — NICOTINE POLACRILEX 2 MG
1 GUM BUCCAL DAILY
Refills: 0 | Status: DISCONTINUED | OUTPATIENT
Start: 2019-09-25 | End: 2019-09-27

## 2019-09-25 RX ADMIN — SODIUM CHLORIDE 1000 MILLILITER(S): 9 INJECTION INTRAMUSCULAR; INTRAVENOUS; SUBCUTANEOUS at 18:15

## 2019-09-25 RX ADMIN — IOHEXOL 30 MILLILITER(S): 300 INJECTION, SOLUTION INTRAVENOUS at 17:41

## 2019-09-25 RX ADMIN — MORPHINE SULFATE 2 MILLIGRAM(S): 50 CAPSULE, EXTENDED RELEASE ORAL at 18:00

## 2019-09-25 RX ADMIN — SODIUM CHLORIDE 1000 MILLILITER(S): 9 INJECTION INTRAMUSCULAR; INTRAVENOUS; SUBCUTANEOUS at 19:15

## 2019-09-25 RX ADMIN — MORPHINE SULFATE 2 MILLIGRAM(S): 50 CAPSULE, EXTENDED RELEASE ORAL at 17:35

## 2019-09-25 RX ADMIN — PIPERACILLIN AND TAZOBACTAM 200 GRAM(S): 4; .5 INJECTION, POWDER, LYOPHILIZED, FOR SOLUTION INTRAVENOUS at 23:42

## 2019-09-25 RX ADMIN — FAMOTIDINE 20 MILLIGRAM(S): 10 INJECTION INTRAVENOUS at 17:34

## 2019-09-25 RX ADMIN — SODIUM CHLORIDE 1000 MILLILITER(S): 9 INJECTION INTRAMUSCULAR; INTRAVENOUS; SUBCUTANEOUS at 17:35

## 2019-09-25 RX ADMIN — ONDANSETRON 4 MILLIGRAM(S): 8 TABLET, FILM COATED ORAL at 17:34

## 2019-09-25 NOTE — H&P ADULT - NSHPREVIEWOFSYSTEMS_GEN_ALL_CORE
REVIEW OF SYSTEMS:  CONSTITUTIONAL:    +lost about 20 lbs over 6 months with decreased PO intake and appetite  EYES:    no eye pain or visual disturbances or discharge  ENMT:     no difficulty hearing or tinnitus or vertigo, no sinus or throat pain  NECK:    no pain or stiffness  RESPIRATORY:    no cough or wheezing or chills or hemoptysis, no shortness of breath  CARDIOVASCULAR:    no chest pain or palpitations or dizziness or leg swelling  GASTROINTESTINAL:    +abdominal pain, +nausea/vomiting, +diarrhea  GENITOURINARY:    no dysuria or frequency or hematuria or incontinence  NEUROLOGICAL:    no headaches or memory loss or loss of strength or numbness or tremors  SKIN:    no itching or burning or rashes or lesions   LYMPH NODES:    no enlarged glands  ENDOCRINE:    no heat or cold intolerance, no hair loss, no polydipsia or polyuria  MUSCULOSKELETAL:    +diffuse muscle aches in all extremities  PSYCHIATRIC:    +anxiety  HEME/LYMPH:    no easy bruising or bleeding gums  ALLERGY AND IMMUNOLOGIC:    no hives or eczema

## 2019-09-25 NOTE — H&P ADULT - HISTORY OF PRESENT ILLNESS
54F with lupus on plaquenil, anxiety, emphysema on night O2, hypothyroidism, hx of DVT, hx of hep C  who presents with abdominal pain and nausea/vomiting/diarrhea.  Started about 3 days ago.  Pain located on the right side/flank of her abdomen.  Described as a stabbing, waxing/waning pain.  No radiation.  Associated with nausea/vomiting/diarrhea.  No hematemesis or melena or hematochezia.  No foreign foods, recent travel, or sick contacts.  Had some chills and subjective fevers with diffuse myalgias.  Came to the ED and was found to have severe colitis on CT.  Labs also showed slight hyponatremia as well at 128.  Patient was started on ceftriaxone and flagyl by the ED.

## 2019-09-25 NOTE — H&P ADULT - NSHPSOCIALHISTORY_GEN_ALL_CORE
+ current every day smoker (2ppd for at least 44 years)  - no etoh use  - denies any illegal drug use

## 2019-09-25 NOTE — ED ADULT NURSE REASSESSMENT NOTE - NS ED NURSE REASSESS COMMENT FT1
1800- drinking & tolerating po contrast
Received pt at change of shift, pt AAOx3, c/o right shoulder pain 9/10. MD aware. UA sent, VSS, pt awaiting CT scan.
CT scan resulted, pt admitted, seen and evaluated by Dr. Sun at bedside. Report endorsed to hold nurse HERMILO Nur.

## 2019-09-25 NOTE — H&P ADULT - NSHPPHYSICALEXAM_GEN_ALL_CORE
PHYSICAL EXAM:  Vital Signs Last 24 Hrs  T(C): 36.6 (25 Sep 2019 19:30), Max: 37.1 (25 Sep 2019 16:48)  T(F): 97.8 (25 Sep 2019 19:30), Max: 98.8 (25 Sep 2019 16:48)  HR: 60 (25 Sep 2019 19:30) (60 - 68)  BP: 125/71 (25 Sep 2019 19:30) (125/71 - 131/76)  BP(mean): --  RR: 14 (25 Sep 2019 19:30) (14 - 16)  SpO2: 99% (25 Sep 2019 19:30) (97% - 99%)    GENERAL:     extremely thin female in NAD, tired appearing  HEAD:     atraumatic, normocephalic  EYES:     EOMI, conjunctiva and sclera clear  ENMT:     no tonsillar erythema or exudates or enlargement, no oral lesions, moist mucous membranes, good dentition  NECK:     supple, no JVD  RESPIRATORY:     slightly coarse but otherwise clear to auscultation bilaterally, no rales or rhonchi or wheezing or rubs  CARDIOVASCULAR:     regular rate and rhythm, no murmurs or rubs or gallops, 2+ peripheral pulses  GASTROINTESTINAL:     soft, slightly tender in RUQ/RLQ but no guarding or rebound, hyperactive BS, questionable hepatomegaly  EXTREMITIES:     no clubbing or cyanosis or edema  MUSCULOSKELETAL:     no joint pain or swelling or deformities  NERVOUS SYSTEM:     motor strength intact with 5/5 B/L upper and lower extremities, no gross sensory deficits  SKIN:     no rashes or lesions  PSYCH:     appropriate, alert and orientated x3, good concentration

## 2019-09-25 NOTE — ED PROVIDER NOTE - OBJECTIVE STATEMENT
54 y female accompanied to ED by friend, states she has been having abdominal pain, nvd x 3 days, denies fever, denies pain with urination,   states she has hx of "gi problems", where she has diarrhea alternating with constipation , states she is on immodium, stool softeners, nausea medication, morphine for chronic pain.  GI Dr Haro, Dr Rodriguez.  states she had her gallbladder removed many years ago.  + smoker, denies etoh.  denies recent travel, no sick contacts, no recent abx, denies change in diet.  PMD Dr Pablo

## 2019-09-25 NOTE — H&P ADULT - NSICDXPASTSURGICALHX_GEN_ALL_CORE_FT
PAST SURGICAL HISTORY:  Gall bladder disease REMOVAL    H/O lumpectomy     Injury of right wrist, subsequent encounter     Vocal cord polyp REMOVAL

## 2019-09-25 NOTE — H&P ADULT - NSHPLABSRESULTS_GEN_ALL_CORE
LABS:                        13.5   11.32<H> )-----------( 363      ( 25 Sep 2019 17:42 )             37.9     128<L>  |  92<L>  |  13     ----------------------------<  63<L>    25 Sep 2019 17:42  4.3     |  24     |  0.66         Ca 9.2           25 Sep 2019 17:42        TPro  6.8    /  Alb  3.9    /  TBili  0.4    /  DBili  x      /  AST  38     /  ALT  45     /  AlkPhos  94     25 Sep 2019 17:42      Urinalysis Basic - ( 25 Sep 2019 19:23 )    Color: Yellow / Appearance: Clear / S.005 / pH: x  Gluc: x / Ketone: Moderate  / Bili: Negative / Urobili: Negative mg/dL   Blood: x / Protein: Negative mg/dL / Nitrite: Negative   Leuk Esterase: Negative / RBC: x / WBC x   Sq Epi: x / Non Sq Epi: x / Bacteria: x    Troponin trend:    EKG:  Radiology:  < from: CT Abdomen and Pelvis w/ Oral Cont and w/ IV Cont (19 @ 20:07) >    IMPRESSION:  Severe colitis involving the distal transverse, descending and   rectosigmoid colon  Healing right inferior pubic ramus fracture not seen on the previous exam  Status post cholecystectomy  Left renal cyst again noted.    < end of copied text >

## 2019-09-25 NOTE — ED ADULT NURSE NOTE - OBJECTIVE STATEMENT
Pt c/o abd pain with nausea, vomiting, diarrhea for past 3 days. "I think I'm dehydrated". Color fair. Skin warm & dry to touch. Thin & frail appearing. Abd tender to palpation

## 2019-09-25 NOTE — H&P ADULT - NSICDXPASTMEDICALHX_GEN_ALL_CORE_FT
PAST MEDICAL HISTORY:  Anxiety     Dvt femoral (deep venous thrombosis) right arm    Emphysema/COPD     Gallstones     GERD (gastroesophageal reflux disease)     Glossopharyngeal neuralgia syndrome s/p brain sx in 12/2018    Hepatitis C     Hypotension     Hypothyroidism     Lupus     Migraine     Neuropathy     Nicotine addiction     Sjogren's disease     UTI (urinary tract infection)     Vertigo

## 2019-09-25 NOTE — H&P ADULT - NSICDXFAMILYHX_GEN_ALL_CORE_FT
FAMILY HISTORY:  Sibling  Still living? Yes, Estimated age: Age Unknown  Family history of diabetes mellitus, Age at diagnosis: Age Unknown  Family history of psoriatic arthritis, Age at diagnosis: Age Unknown  Family history of systemic lupus erythematosus (SLE) in mother, Age at diagnosis: Age Unknown

## 2019-09-25 NOTE — H&P ADULT - ASSESSMENT
54F with lupus on plaquenil, anxiety, emphysema on night O2, hypothyroidism, hx of DVT, hx of hep C  who presents with abdominal pain and nausea/vomiting/diarrhea.    Severe colitis  - admitted to medicine  - ED already spoke to Dr. Haro (will see)  - IV hydration for now with LR   - NPO for now  - start zosyn   - obtain blood cultures prior to starting abx    Hyponatremia   - free water restrict  - given dilute urine, possibly from excess free water intake  Emphysema  Smoking  Anxiety  Preventive measures 54F with lupus on plaquenil, anxiety, emphysema on night O2, hypothyroidism, hx of DVT, hx of hep C  who presents with abdominal pain and nausea/vomiting/diarrhea.    Severe colitis  - admitted to medicine  - ED already spoke to Dr. Haro (will see)  - NPO for now  - start pipercillin/tazobactam   - obtain blood cultures prior to starting abx    Hyponatremia   - free water restrict  - has dilute urine indicating likely excess free water -> free water restrict for now  - check midnight BMP to monitor correction  - patient maintaining her BP -> hold fluids for now until sodium is rechecked    Emphysema  - pulm consult  - cont with nightly O2 supplementation  - remote tele to monitor O2  - cont with inhalers  - cont with tiotropium  - given unintentional weight loss -> ?malignancy -> may need outpatient workup    Smoking  - nicotine patch 21mg/hr strength  - encouraged smoking cessation    Anxiety  - cont with buproprion  - cont with clonazepam  - patient has been more tired recently -> ?depression vs start of morphine recently -> will check TSH in AM    Lupus  - cont with hydroxychloroquine    Neuropathy  - cont with pregabalin  - was started on morphine ER recently by outpatient pain management -> will change to IV morphine 2mg q4h PRN    Preventive measures  IMPROVE VTE Individual Risk Assessment          RISK                                                          Points  [  ] Previous VTE                                                 3  [  ] Thrombophilia                                              2  [  ] Lower limb paralysis                                    2        (unable to hold up >15 seconds)    [  ] Current Cancer                                             2         (within 6 months)  [  ] Immobilization > 24 hrs                              1  [  ] ICU/CCU stay > 24 hours                            1  [X] Age > 60                                                        1    IMPROVE VTE Score 1  - use lovenox (weight based dose as per pharmacy) for DVT ppx 54F with lupus on plaquenil, anxiety, emphysema on night O2, hypothyroidism, hx of DVT, hx of hep C  who presents with abdominal pain and nausea/vomiting/diarrhea.    Severe colitis  - admitted to medicine  - ED already spoke to Dr. Haro (will see)  - NPO for now  - start pipercillin/tazobactam   - obtain blood cultures prior to starting abx    Hyponatremia   - free water restrict  - has dilute urine indicating likely excess free water -> free water restrict for now  - check midnight BMP to monitor correction  - patient maintaining her BP -> hold fluids for now until sodium is rechecked    Emphysema  - pulm consult  - cont with nightly O2 supplementation  - remote tele to monitor O2  - cont with inhalers  - cont with tiotropium  - given unintentional weight loss -> ?malignancy -> may need outpatient workup    Smoking  - nicotine patch 21mg/hr strength  - encouraged smoking cessation    Anxiety  - cont with buproprion  - cont with clonazepam  - check EKG for prolonged QT  - patient has been more tired recently -> ?depression vs start of morphine recently -> will check TSH in AM    Lupus  - cont with hydroxychloroquine    Neuropathy  - cont with pregabalin  - was started on morphine ER recently by outpatient pain management -> will change to IV morphine 2mg q4h PRN    Preventive measures  IMPROVE VTE Individual Risk Assessment          RISK                                                          Points  [  ] Previous VTE                                                 3  [  ] Thrombophilia                                              2  [  ] Lower limb paralysis                                    2        (unable to hold up >15 seconds)    [  ] Current Cancer                                             2         (within 6 months)  [  ] Immobilization > 24 hrs                              1  [  ] ICU/CCU stay > 24 hours                            1  [X] Age > 60                                                        1    IMPROVE VTE Score 1  - use lovenox (weight based dose as per pharmacy) for DVT ppx 54F with lupus on plaquenil, anxiety, emphysema on night O2, hypothyroidism, hx of DVT, hx of hep C  who presents with abdominal pain and nausea/vomiting/diarrhea.    Severe colitis  - admitted to medicine  - ED already spoke to Dr. Haro (will see)  - NPO for now  - start pipercillin/tazobactam   - obtain blood cultures prior to starting abx    Hyponatremia   - free water restrict  - has dilute urine indicating likely excess free water -> free water restrict for now  - recheck BMP to monitor correction  - patient maintaining her BP -> hold fluids for now until sodium is rechecked    Emphysema  - pulm consult  - cont with nightly O2 supplementation  - remote tele to monitor O2  - cont with inhalers  - cont with tiotropium  - given unintentional weight loss -> ?malignancy -> may need outpatient workup    Smoking  - nicotine patch 21mg/hr strength  - encouraged smoking cessation    Anxiety  - cont with buproprion  - cont with clonazepam  - check EKG for prolonged QT  - patient has been more tired recently -> ?depression vs start of morphine recently -> will check TSH in AM    Lupus  - cont with hydroxychloroquine    Neuropathy  - cont with pregabalin  - was started on morphine ER recently by outpatient pain management -> will change to IV morphine 2mg q4h PRN    Preventive measures  IMPROVE VTE Individual Risk Assessment          RISK                                                          Points  [  ] Previous VTE                                                 3  [  ] Thrombophilia                                              2  [  ] Lower limb paralysis                                    2        (unable to hold up >15 seconds)    [  ] Current Cancer                                             2         (within 6 months)  [  ] Immobilization > 24 hrs                              1  [  ] ICU/CCU stay > 24 hours                            1  [X] Age > 60                                                        1    IMPROVE VTE Score 1  - use lovenox (weight based dose as per pharmacy) for DVT ppx 54F with lupus on plaquenil, anxiety, emphysema on night O2, hypothyroidism, hx of DVT, hx of hep C  who presents with abdominal pain and nausea/vomiting/diarrhea.    Severe colitis  - admitted to medicine  - ED already spoke to Dr. Haro (will see)  - NPO for now  - start pipercillin/tazobactam   - obtain blood cultures prior to starting abx    Hyponatremia   - free water restrict  - has dilute urine indicating likely excess free water -> free water restrict for now  - recheck BMP to monitor correction  - patient maintaining her BP -> hold fluids for now until sodium is rechecked    Emphysema  - pulm consult  - cont with nightly O2 supplementation  - remote tele to monitor O2  - cont with inhalers  - cont with tiotropium  - given unintentional weight loss -> ?malignancy -> may need outpatient workup    Smoking  - nicotine patch 21mg/hr strength  - encouraged smoking cessation    Anxiety  - cont with buproprion  - cont with clonazepam  - check EKG for prolonged QT  - patient has been more tired recently -> ?depression vs initiation of morphine recently -> will first check TSH in AM    Lupus  - cont with hydroxychloroquine    Neuropathy  - cont with pregabalin  - was started on morphine ER recently by outpatient pain management -> will change to IV morphine 2mg q4h PRN    Preventive measures  IMPROVE VTE Individual Risk Assessment          RISK                                                          Points  [  ] Previous VTE                                                 3  [  ] Thrombophilia                                              2  [  ] Lower limb paralysis                                    2        (unable to hold up >15 seconds)    [  ] Current Cancer                                             2         (within 6 months)  [  ] Immobilization > 24 hrs                              1  [  ] ICU/CCU stay > 24 hours                            1  [X] Age > 60                                                        1    IMPROVE VTE Score 1  - use lovenox (weight based dose as per pharmacy) for DVT ppx

## 2019-09-25 NOTE — ED PROVIDER NOTE - PROGRESS NOTE DETAILS
Kaylee UGARTE for ED attending, Dr. Adam : 53 y/o female with a PMHx of COPD, hepatitis C, hypothyroid, hypotension, sjogren's, lupus presents to the ED c/o n/v/d, abd pain x 3 days. No recent travel. Also reports urinary retention x 1 month and pain in right shoulder from lifting more than usual. Per family, pt has lost a lot of weight in the past month. Denies dysuria, hematuria.  PE: chronically ill appearing, mild distress, dry mm, pink conjunctiva, abd soft, diffuse tenderness, no cvat. Kaylee UGARTE for ED attending, Dr. Adam : 53 y/o female with a PMHx of COPD, hepatitis C, hypothyroid, hypotension, sjogren's, lupus presents to the ED c/o n/v/d, abd pain x 3 days. No recent travel. Also reports urinary retention x 1 month and pain in right shoulder from lifting more than usual. Per family, pt has lost a lot of weight in the past month. Denies dysuria, hematuria.  PE: chronically ill appearing, mild distress, dry mm, pink conjunctiva, s1s2 rrr, lungs cta, abd soft, diffuse tenderness, no cvat, ext nt, full rom spoke with Dr Sun, hospitalist, case discussed, results discussed, will admit patient, requested call GI Dr Haro to inquire if can see patient in hospital spoke with Dr Haro, case discussed, will see patient tomorrow

## 2019-09-26 LAB
ALBUMIN SERPL ELPH-MCNC: 3.6 G/DL — SIGNIFICANT CHANGE UP (ref 3.3–5)
ALP SERPL-CCNC: 96 U/L — SIGNIFICANT CHANGE UP (ref 30–120)
ALT FLD-CCNC: 47 U/L DA — SIGNIFICANT CHANGE UP (ref 10–60)
ANION GAP SERPL CALC-SCNC: 13 MMOL/L — SIGNIFICANT CHANGE UP (ref 5–17)
AST SERPL-CCNC: 39 U/L — SIGNIFICANT CHANGE UP (ref 10–40)
BASOPHILS # BLD AUTO: 0.09 K/UL — SIGNIFICANT CHANGE UP (ref 0–0.2)
BASOPHILS NFR BLD AUTO: 0.7 % — SIGNIFICANT CHANGE UP (ref 0–2)
BILIRUB SERPL-MCNC: 0.5 MG/DL — SIGNIFICANT CHANGE UP (ref 0.2–1.2)
BUN SERPL-MCNC: 17 MG/DL — SIGNIFICANT CHANGE UP (ref 7–23)
CALCIUM SERPL-MCNC: 9.2 MG/DL — SIGNIFICANT CHANGE UP (ref 8.4–10.5)
CHLORIDE SERPL-SCNC: 101 MMOL/L — SIGNIFICANT CHANGE UP (ref 96–108)
CO2 SERPL-SCNC: 21 MMOL/L — LOW (ref 22–31)
CREAT SERPL-MCNC: 0.81 MG/DL — SIGNIFICANT CHANGE UP (ref 0.5–1.3)
EOSINOPHIL # BLD AUTO: 0.07 K/UL — SIGNIFICANT CHANGE UP (ref 0–0.5)
EOSINOPHIL NFR BLD AUTO: 0.5 % — SIGNIFICANT CHANGE UP (ref 0–6)
GLUCOSE SERPL-MCNC: 43 MG/DL — CRITICAL LOW (ref 70–99)
HCT VFR BLD CALC: 40 % — SIGNIFICANT CHANGE UP (ref 34.5–45)
HCV AB S/CO SERPL IA: 9.71 S/CO — HIGH (ref 0–0.99)
HCV AB SERPL-IMP: REACTIVE
HGB BLD-MCNC: 13.8 G/DL — SIGNIFICANT CHANGE UP (ref 11.5–15.5)
IMM GRANULOCYTES NFR BLD AUTO: 0.5 % — SIGNIFICANT CHANGE UP (ref 0–1.5)
LYMPHOCYTES # BLD AUTO: 1.98 K/UL — SIGNIFICANT CHANGE UP (ref 1–3.3)
LYMPHOCYTES # BLD AUTO: 15.4 % — SIGNIFICANT CHANGE UP (ref 13–44)
MAGNESIUM SERPL-MCNC: 1.7 MG/DL — SIGNIFICANT CHANGE UP (ref 1.6–2.6)
MCHC RBC-ENTMCNC: 33.3 PG — SIGNIFICANT CHANGE UP (ref 27–34)
MCHC RBC-ENTMCNC: 34.5 GM/DL — SIGNIFICANT CHANGE UP (ref 32–36)
MCV RBC AUTO: 96.4 FL — SIGNIFICANT CHANGE UP (ref 80–100)
MONOCYTES # BLD AUTO: 0.96 K/UL — HIGH (ref 0–0.9)
MONOCYTES NFR BLD AUTO: 7.5 % — SIGNIFICANT CHANGE UP (ref 2–14)
NEUTROPHILS # BLD AUTO: 9.7 K/UL — HIGH (ref 1.8–7.4)
NEUTROPHILS NFR BLD AUTO: 75.4 % — SIGNIFICANT CHANGE UP (ref 43–77)
NRBC # BLD: 0 /100 WBCS — SIGNIFICANT CHANGE UP (ref 0–0)
OSMOLALITY SERPL: 275 MOSMOL/KG — SIGNIFICANT CHANGE UP (ref 275–300)
OSMOLALITY UR: 420 MOSM/KG — SIGNIFICANT CHANGE UP (ref 50–1200)
PHOSPHATE SERPL-MCNC: 3.5 MG/DL — SIGNIFICANT CHANGE UP (ref 2.5–4.5)
PLATELET # BLD AUTO: 388 K/UL — SIGNIFICANT CHANGE UP (ref 150–400)
POTASSIUM SERPL-MCNC: 4 MMOL/L — SIGNIFICANT CHANGE UP (ref 3.5–5.3)
POTASSIUM SERPL-SCNC: 4 MMOL/L — SIGNIFICANT CHANGE UP (ref 3.5–5.3)
PROT SERPL-MCNC: 6.4 G/DL — SIGNIFICANT CHANGE UP (ref 6–8.3)
RBC # BLD: 4.15 M/UL — SIGNIFICANT CHANGE UP (ref 3.8–5.2)
RBC # FLD: 12.6 % — SIGNIFICANT CHANGE UP (ref 10.3–14.5)
SODIUM SERPL-SCNC: 135 MMOL/L — SIGNIFICANT CHANGE UP (ref 135–145)
TSH SERPL-MCNC: 0.19 UIU/ML — LOW (ref 0.27–4.2)
WBC # BLD: 12.87 K/UL — HIGH (ref 3.8–10.5)
WBC # FLD AUTO: 12.87 K/UL — HIGH (ref 3.8–10.5)

## 2019-09-26 PROCEDURE — 73030 X-RAY EXAM OF SHOULDER: CPT | Mod: 26,RT

## 2019-09-26 PROCEDURE — 93010 ELECTROCARDIOGRAM REPORT: CPT

## 2019-09-26 PROCEDURE — 99233 SBSQ HOSP IP/OBS HIGH 50: CPT | Mod: 25

## 2019-09-26 PROCEDURE — 99406 BEHAV CHNG SMOKING 3-10 MIN: CPT

## 2019-09-26 RX ORDER — DEXTROSE 50 % IN WATER 50 %
25 SYRINGE (ML) INTRAVENOUS ONCE
Refills: 0 | Status: DISCONTINUED | OUTPATIENT
Start: 2019-09-26 | End: 2019-09-27

## 2019-09-26 RX ORDER — BENZOCAINE AND MENTHOL 5; 1 G/100ML; G/100ML
1 LIQUID ORAL EVERY 8 HOURS
Refills: 0 | Status: DISCONTINUED | OUTPATIENT
Start: 2019-09-26 | End: 2019-09-26

## 2019-09-26 RX ORDER — DEXTROSE 50 % IN WATER 50 %
12.5 SYRINGE (ML) INTRAVENOUS ONCE
Refills: 0 | Status: DISCONTINUED | OUTPATIENT
Start: 2019-09-26 | End: 2019-09-27

## 2019-09-26 RX ORDER — DEXTROSE 50 % IN WATER 50 %
50 SYRINGE (ML) INTRAVENOUS ONCE
Refills: 0 | Status: COMPLETED | OUTPATIENT
Start: 2019-09-26 | End: 2019-09-26

## 2019-09-26 RX ORDER — SODIUM CHLORIDE 9 MG/ML
1000 INJECTION, SOLUTION INTRAVENOUS
Refills: 0 | Status: DISCONTINUED | OUTPATIENT
Start: 2019-09-26 | End: 2019-09-27

## 2019-09-26 RX ORDER — DEXTROSE 50 % IN WATER 50 %
15 SYRINGE (ML) INTRAVENOUS ONCE
Refills: 0 | Status: DISCONTINUED | OUTPATIENT
Start: 2019-09-26 | End: 2019-09-27

## 2019-09-26 RX ORDER — GLUCAGON INJECTION, SOLUTION 0.5 MG/.1ML
1 INJECTION, SOLUTION SUBCUTANEOUS ONCE
Refills: 0 | Status: DISCONTINUED | OUTPATIENT
Start: 2019-09-26 | End: 2019-09-27

## 2019-09-26 RX ORDER — INFLUENZA VIRUS VACCINE 15; 15; 15; 15 UG/.5ML; UG/.5ML; UG/.5ML; UG/.5ML
0.5 SUSPENSION INTRAMUSCULAR ONCE
Refills: 0 | Status: DISCONTINUED | OUTPATIENT
Start: 2019-09-26 | End: 2019-09-27

## 2019-09-26 RX ORDER — IPRATROPIUM/ALBUTEROL SULFATE 18-103MCG
3 AEROSOL WITH ADAPTER (GRAM) INHALATION ONCE
Refills: 0 | Status: COMPLETED | OUTPATIENT
Start: 2019-09-26 | End: 2019-09-26

## 2019-09-26 RX ORDER — BENZOCAINE AND MENTHOL 5; 1 G/100ML; G/100ML
1 LIQUID ORAL EVERY 8 HOURS
Refills: 0 | Status: DISCONTINUED | OUTPATIENT
Start: 2019-09-26 | End: 2019-09-27

## 2019-09-26 RX ADMIN — PIPERACILLIN AND TAZOBACTAM 25 GRAM(S): 4; .5 INJECTION, POWDER, LYOPHILIZED, FOR SOLUTION INTRAVENOUS at 00:18

## 2019-09-26 RX ADMIN — SODIUM CHLORIDE 75 MILLILITER(S): 9 INJECTION, SOLUTION INTRAVENOUS at 13:15

## 2019-09-26 RX ADMIN — Medication 200 MILLIGRAM(S): at 05:44

## 2019-09-26 RX ADMIN — ENOXAPARIN SODIUM 30 MILLIGRAM(S): 100 INJECTION SUBCUTANEOUS at 13:02

## 2019-09-26 RX ADMIN — MORPHINE SULFATE 2 MILLIGRAM(S): 50 CAPSULE, EXTENDED RELEASE ORAL at 22:50

## 2019-09-26 RX ADMIN — PANTOPRAZOLE SODIUM 40 MILLIGRAM(S): 20 TABLET, DELAYED RELEASE ORAL at 05:44

## 2019-09-26 RX ADMIN — PIPERACILLIN AND TAZOBACTAM 25 GRAM(S): 4; .5 INJECTION, POWDER, LYOPHILIZED, FOR SOLUTION INTRAVENOUS at 08:46

## 2019-09-26 RX ADMIN — BUDESONIDE AND FORMOTEROL FUMARATE DIHYDRATE 2 PUFF(S): 160; 4.5 AEROSOL RESPIRATORY (INHALATION) at 08:47

## 2019-09-26 RX ADMIN — MORPHINE SULFATE 2 MILLIGRAM(S): 50 CAPSULE, EXTENDED RELEASE ORAL at 15:54

## 2019-09-26 RX ADMIN — ALBUTEROL 2 PUFF(S): 90 AEROSOL, METERED ORAL at 05:46

## 2019-09-26 RX ADMIN — Medication 3 MILLILITER(S): at 21:40

## 2019-09-26 RX ADMIN — MORPHINE SULFATE 2 MILLIGRAM(S): 50 CAPSULE, EXTENDED RELEASE ORAL at 14:25

## 2019-09-26 RX ADMIN — Medication 1 PATCH: at 19:37

## 2019-09-26 RX ADMIN — MORPHINE SULFATE 2 MILLIGRAM(S): 50 CAPSULE, EXTENDED RELEASE ORAL at 23:17

## 2019-09-26 RX ADMIN — ONDANSETRON 4 MILLIGRAM(S): 8 TABLET, FILM COATED ORAL at 00:24

## 2019-09-26 RX ADMIN — MORPHINE SULFATE 2 MILLIGRAM(S): 50 CAPSULE, EXTENDED RELEASE ORAL at 00:42

## 2019-09-26 RX ADMIN — TIOTROPIUM BROMIDE 1 CAPSULE(S): 18 CAPSULE ORAL; RESPIRATORY (INHALATION) at 13:03

## 2019-09-26 RX ADMIN — ONDANSETRON 4 MILLIGRAM(S): 8 TABLET, FILM COATED ORAL at 14:22

## 2019-09-26 RX ADMIN — Medication 75 MILLIGRAM(S): at 05:44

## 2019-09-26 RX ADMIN — Medication 50 MILLILITER(S): at 06:36

## 2019-09-26 RX ADMIN — BUDESONIDE AND FORMOTEROL FUMARATE DIHYDRATE 2 PUFF(S): 160; 4.5 AEROSOL RESPIRATORY (INHALATION) at 19:47

## 2019-09-26 RX ADMIN — Medication 1 MILLIGRAM(S): at 06:12

## 2019-09-26 RX ADMIN — ALBUTEROL 2 PUFF(S): 90 AEROSOL, METERED ORAL at 19:53

## 2019-09-26 RX ADMIN — Medication 75 MILLIGRAM(S): at 17:10

## 2019-09-26 RX ADMIN — Medication 1 MILLIGRAM(S): at 21:31

## 2019-09-26 RX ADMIN — Medication 1 PATCH: at 13:03

## 2019-09-26 RX ADMIN — ONDANSETRON 4 MILLIGRAM(S): 8 TABLET, FILM COATED ORAL at 22:49

## 2019-09-26 RX ADMIN — MORPHINE SULFATE 2 MILLIGRAM(S): 50 CAPSULE, EXTENDED RELEASE ORAL at 00:57

## 2019-09-26 RX ADMIN — Medication 200 MILLIGRAM(S): at 17:10

## 2019-09-26 RX ADMIN — PIPERACILLIN AND TAZOBACTAM 25 GRAM(S): 4; .5 INJECTION, POWDER, LYOPHILIZED, FOR SOLUTION INTRAVENOUS at 16:08

## 2019-09-26 NOTE — CONSULT NOTE ADULT - ASSESSMENT
colitis, likely infectious but IBD in consideration    Suggest:     cont abx  check stool studies  outpt f/u

## 2019-09-26 NOTE — PROGRESS NOTE ADULT - ASSESSMENT
54F with lupus on plaquenil, anxiety, emphysema on night O2, hypothyroidism, hx of DVT, hx of hep C  who presents with abdominal pain and nausea/vomiting/diarrhea.    Severe colitis  - admitted to medicine  - ED already spoke to Dr. Haro (will see)  - start liquid diet with fluid restriction due to history of hyponatremia, encourage salty broth over other fluids  - start pipercillin/tazobactam   - obtain blood cultures prior to starting abx    Hyponatremia   - free water restrict  - monitor fluids/electrolytes closely    Emphysema  - pulm consult appreciated  - cont with nightly O2 supplementation  - remote tele to monitor O2  - cont with inhalers  - cont with tiotropium    Smoking  - nicotine patch 21mg/hr strength  - encouraged smoking cessation however patient not ready to quit  - time spent discussing benefits of quitting, harm of continued smoking and nicotine replacement Tx- 5 minutes.     Anxiety  - cont with buproprion  - cont with clonazepam  - QT acceptable on EKG    Lupus  - cont with hydroxychloroquine    Neuropathy  - cont with pregabalin  - was started on morphine ER recently by outpatient pain management -> will change to IV morphine 2mg q4h PRN    Weight Loss/ Cachexia  Probable malnutrition even prior to acute illness  need to check exact weight (historically- 10/2017-38.2kg; 3/2018-39.4kg; 6/2018-40.7kg)  RD consult     Preventive measures  - use lovenox (weight based dose as per pharmacy) for DVT ppx

## 2019-09-26 NOTE — CONSULT NOTE ADULT - SUBJECTIVE AND OBJECTIVE BOX
Pulmonary/Critical Care Followup    PAST MEDICAL & SURGICAL HISTORY:  Emphysema/COPD  Dvt femoral (deep venous thrombosis): right arm  Glossopharyngeal neuralgia syndrome: s/p brain sx in 2018  Nicotine addiction  Neuropathy  Migraine  Anxiety  Hepatitis C  Gallstones  Hypothyroidism  Hypotension  GERD (gastroesophageal reflux disease)  UTI (urinary tract infection)  Vertigo  Sjogren's disease  Lupus  H/O lumpectomy  Injury of right wrist, subsequent encounter  Gall bladder disease: REMOVAL  Vocal cord polyp: REMOVAL      Allergies    Vibramycin (Unknown)  Zithromax (Stomach Upset)    Intolerances    aspirin (Stomach Upset)      FAMILY HISTORY:  Family history of diabetes mellitus (Sibling): Sister  Family history of psoriatic arthritis (Sibling): Sister  Family history of systemic lupus erythematosus (SLE) in mother (Sibling): Sister      SOCIAL HISTORY:      MEDICATIONS  (STANDING):  buDESOnide 160 MICROgram(s)/formoterol 4.5 MICROgram(s) Inhaler 2 Puff(s) Inhalation two times a day  buPROPion XL . 150 milliGRAM(s) Oral daily  dextrose 5% + lactated ringers. 1000 milliLiter(s) (75 mL/Hr) IV Continuous <Continuous>  dextrose 5%. 1000 milliLiter(s) (50 mL/Hr) IV Continuous <Continuous>  dextrose 50% Injectable 12.5 Gram(s) IV Push once  dextrose 50% Injectable 25 Gram(s) IV Push once  dextrose 50% Injectable 25 Gram(s) IV Push once  enoxaparin Injectable 30 milliGRAM(s) SubCutaneous daily  hydroxychloroquine 200 milliGRAM(s) Oral two times a day  influenza   Vaccine 0.5 milliLiter(s) IntraMuscular once  nicotine - 21 mG/24Hr(s) Patch 1 patch Transdermal daily  pantoprazole    Tablet 40 milliGRAM(s) Oral before breakfast  piperacillin/tazobactam IVPB.. 3.375 Gram(s) IV Intermittent every 8 hours  pregabalin 75 milliGRAM(s) Oral two times a day  tiotropium 18 MICROgram(s) Capsule 1 Capsule(s) Inhalation daily    MEDICATIONS  (PRN):  ALBUTerol    90 MICROgram(s) HFA Inhaler 2 Puff(s) Inhalation every 6 hours PRN Shortness of Breath and/or Wheezing  clonazePAM  Tablet 1 milliGRAM(s) Oral two times a day PRN anxiety  dextrose 40% Gel 15 Gram(s) Oral once PRN Blood Glucose LESS THAN 70 milliGRAM(s)/deciLiter  glucagon  Injectable 1 milliGRAM(s) IntraMuscular once PRN Glucose <70 milliGRAM(s)/deciLiter  morphine  - Injectable 2 milliGRAM(s) IV Push every 4 hours PRN Severe Pain (7 - 10)  ondansetron Injectable 4 milliGRAM(s) IV Push every 8 hours PRN Nausea and/or Vomiting      Vital Signs Last 24 Hrs  T(C): 37.2 (26 Sep 2019 08:00), Max: 37.2 (26 Sep 2019 08:00)  T(F): 98.9 (26 Sep 2019 08:00), Max: 98.9 (26 Sep 2019 08:00)  HR: 68 (26 Sep 2019 08:00) (55 - 74)  BP: 99/57 (26 Sep 2019 08:00) (99/57 - 131/76)  BP(mean): --  RR: 14 (26 Sep 2019 08:00) (14 - 16)  SpO2: 96% (26 Sep 2019 08:00) (96% - 99%)    LABS:                        13.8   12.87 )-----------( 388      ( 26 Sep 2019 05:34 )             40.0         135  |  101  |  17  ----------------------------<  43<LL>  4.0   |  21<L>  |  0.81    Ca    9.2      26 Sep 2019 05:34  Phos  3.5       Mg     1.7         TPro  6.4  /  Alb  3.6  /  TBili  0.5  /  DBili  x   /  AST  39  /  ALT  47  /  AlkPhos  96        Urinalysis Basic - ( 25 Sep 2019 19:23 )    Color: Yellow / Appearance: Clear / S.005 / pH: x  Gluc: x / Ketone: Moderate  / Bili: Negative / Urobili: Negative mg/dL   Blood: x / Protein: Negative mg/dL / Nitrite: Negative   Leuk Esterase: Negative / RBC: x / WBC x   Sq Epi: x / Non Sq Epi: x / Bacteria: x            WBC:  WBC Count: 12.87 K/uL ( @ 05:34)  WBC Count: 11.32 K/uL ( @ 17:42)      MICROBIOLOGY:  RECENT CULTURES:                  Sodium:  Sodium, Serum: 135 mmol/L ( @ 05:34)  Sodium, Serum: 128 mmol/L ( @ 17:42)      0.81 mg/dL  @ 05:34  0.66 mg/dL  @ 17:42      Hemoglobin:  Hemoglobin: 13.8 g/dL ( @ 05:34)  Hemoglobin: 13.5 g/dL ( @ 17:42)      Platelets: Platelet Count - Automated: 388 K/uL ( @ 05:34)  Platelet Count - Automated: 363 K/uL ( @ 17:42)      LIVER FUNCTIONS - ( 26 Sep 2019 05:34 )  Alb: 3.6 g/dL / Pro: 6.4 g/dL / ALK PHOS: 96 U/L / ALT: 47 U/L DA / AST: 39 U/L / GGT: x             Urinalysis Basic - ( 25 Sep 2019 19:23 )    Color: Yellow / Appearance: Clear / S.005 / pH: x  Gluc: x / Ketone: Moderate  / Bili: Negative / Urobili: Negative mg/dL   Blood: x / Protein: Negative mg/dL / Nitrite: Negative   Leuk Esterase: Negative / RBC: x / WBC x   Sq Epi: x / Non Sq Epi: x / Bacteria: x        RADIOLOGY & ADDITIONAL STUDIES: DISPIRITO DOUGLAS [facility] [phone]  1965 DOA 2019 DR DARIANA ORLANDO   ALLERGY      vibramycin zithro asa   CONTACT       Other Jyothi Duffy  Mother Rebecca BANEGAS        Initial evaluation/Pulmonary Critical Care consultation requested on  2019   by Dr Silver/Dr Orlando  from Dr Anna   Patient examined chart reviewed    HOSPITAL ADMISSION   PATIENT CAME  FROM (if information available)      PATIENT  DISPIRISEEMA COLE [facility] [phone]  1965 DOA 2019 DR DARIANA ORLANDO                          PT DESCRIPTION       This section was excerpted from ER md note but was independently verified by me    · Chief Complaint: The patient is a 54y Female complaining of nausea, vomiting, diarrhea.  · HPI Objective Statement: 54 y female accompanied to ED by friend, states she has been having abdominal pain, nvd x 3 days, denies fever, denies pain with urination,   states she has hx of "gi problems", where she has diarrhea alternating with constipation , states she is on immodium, stool softeners, nausea medication, morphine for chronic pain.  GI Dr Haro, Dr Rodriguez.  states she had her gallbladder removed many years ago.  + smoker, denies etoh.  denies recent travel, no sick contacts, no recent abx, denies change in diet.  PMD Dr Pablo  · Presenting Symptoms: DIARRHEA, NAUSEA, PAIN, VOMITING  · Negative Findings: no abdominal distension, no dysuria, no fever  · Location: abdomen  · Laterality: generalized  · Area: diffuse  · Radiation: no radiation  · Duration: day(s)  3  · Quality: aching  · Severity: MODERATE  · Context: unknown  · Recent Exposure To: none known  · Aggravated Factors: eating, palpation  · Relieving Factors: none    HIV:    HIV Status:  · Offered: Declined    PAST MEDICAL/SURGICAL/FAMILY/SOCIAL HISTORY:    Past Medical History:  Anxiety    COPD (chronic obstructive pulmonary disease)    Dvt femoral (deep venous thrombosis)  right arm  Gallstones    GERD (gastroesophageal reflux disease)    Glossopharyngeal neuralgia syndrome    Hepatitis C    Hypotension    Hypothyroidism    Lupus    Migraine    Neuropathy    Nicotine addiction    Sjogren's disease    UTI (urinary tract infection)    Vertigo.     Tobacco Usage:  · Tobacco Usage Current every day smoker    ALLERGIES AND HOME MEDICATIONS:   Allergies:        Allergies:  Zithromax: Drug, Stomach Upset  Vibramycin: Drug, Unknown       Intolerances:  aspirin: Drug, Stomach Upset    Home Medications:   * Patient Currently Takes Medications as of 25-Sep-2019 17:07 documented in Structured Notes  · Lyrica 75 mg oral capsule: Last Dose Taken:  , 1 cap(s) orally 2 times a day  · meclizine 12.5 mg oral tablet: Last Dose Taken:  , 1 tab(s) orally 3 times a day, As Needed  · buPROPion 150 mg/24 hours (XL) oral tablet, extended release: Last Dose Taken:  , 1 tab(s) orally once a day  · tiotropium 18 mcg inhalation capsule: Last Dose Taken:  , 1 cap(s) inhaled once a day  · hydroxychloroquine 200 mg oral tablet: Last Dose Taken:  , 1 tab(s) orally 2 times a day  · ProAir HFA 90 mcg/inh inhalation aerosol: Last Dose Taken:  , 2 puff(s) inhaled 4 times a day, As Needed  · Fioricet oral capsule: Last Dose Taken:  , 1 cap(s) orally 2 times a day, As Needed  · omeprazole 40 mg oral delayed release capsule: Last Dose Taken:  , 1 cap(s) orally 2 times a day  · OXcarbazepine 150 mg oral tablet: Last Dose Taken:  , 1 tab(s) orally 2 times a day  · ondansetron 8 mg oral tablet: Last Dose Taken:  , 1 tab(s) orally 3 times a day  · Advair Diskus 500 mcg-50 mcg inhalation powder: Last Dose Taken:  , 1 puff(s) inhaled 2 times a day  · Imodium 2 mg oral capsule: Last Dose Taken:  , 3 cap(s) orally once a day, As Needed for diarrhea  · KlonoPIN 1 mg oral tablet: orally 2 times a day  · morphine: orally 2 times a day    REVIEW OF SYSTEMS:    Review of Systems:  · GASTROINTESTINAL: - - -  · Gastrointestinal [+]: ABDOMINAL PAIN, DIARRHEA, NAUSEA, VOMITING  · ROS STATEMENT: all other ROS negative except as per HPI    VITAL SIGNS( Pullset):    ,,ED ADULT Flow Sheet:    25-Sep-2019 16:48  · Temp (F): 98.8  · Temp (C) Temp (C): 37.1  · Temp site Temp Site: oral  · Heart Rate Heart Rate (beats/min): 68  · BP Systolic Systolic: 131  · BP Diastolic Diastolic (mm Hg): 76  · Respiration Rate (breaths/min) Respiration Rate (breaths/min): 16  · SpO2 (%) SpO2 (%): 97  · O2 delivery Patient On: room air  · How was the weight captured? Weight Type/Method: stated  · Dosing Weight (KILOGRAMS) Dosing Weight (KILOGRAMS): 34  · Dosing Weight  (POUNDS) Dosing Weight (POUNDS): 74.9  · Height (FEET) Height (FEET): 4  · Height (INCHES) Height (INCHES): 10  · Height (CENTIMETERS) Height (CENTIMETERS): 147.32  · BSA (m2): 1.2  · BMI (kG/m2) BMI (kG/m2): 15.7  · Presence of Pain: complains of pain/discomfort  · Pain Rating (0-10): Rest: 10  · Pain Rating (0-10): Activity: 10  · SpO2 (%) SpO2 (%): 97  · O2 delivery Patient On: room air    PHYSICAL EXAM:   · CONSTITUTIONAL: awake, alert, oriented to person, place, time/situation and in no apparent distress.  · ENMT: Airway patent, Nasal mucosa clear. Mouth with normal mucosa. Throat has no vesicles, no oropharyngeal exudates and uvula is midline.  · EYES: Clear bilaterally, pupils equal, round and reactive to light.  · CARDIAC: Normal rate, regular rhythm.  Heart sounds S1, S2.  No murmurs, rubs or gallops.  · RESPIRATORY: Breath sounds clear and equal bilaterally.  · GASTROINTESTINAL: - - -  · Abdominal Exam: soft, nondistended  · Abdominal Tenderness Location: left upper quadrant, right upper quadrant, left lower quadrant, right lower quadrant, epigastric  · Bowel Sounds: present x 4 quadrants  · GENITOURINARY: - - -              PATIENT DISPIRITO [facility] [phone]  1965 DOA 2019 DR DARIANA ORLANDO     VITALS/LABS       2019 afeb 63 120/58 14 97%   2019 W 12.8 Hb 13.8 Plt 388 Na 135 K 4 CO2 21 Cr .8     REVIEW OF SYMPTOMS     NOTE Noteworthy changes  if any  in ROS and PE are also entered  in note  below      Able to give ROS  Yes     RELIABLE No   CONSTITUTIONAL Weakness Yes  Chills No Vision changes No  ENDOCRINE No unexplained hair loss No heat or cold intolerance    ALLERGY No hives  Sore throat No   RESP Coughing blood no  Shortness of breath YES   NEURO No Headache  Confusion Pain neck No   CARDIAC No Chest pain No Palpitations   GI No Pain abdomen NO   Vomiting NO     PHYSICAL EXAM    HEENT Unremarkable PERRLA atraumatic   RESP Fair air entry EXP prolonged    Harsh breath sound Resp distres mild   CARDIAC S1 S2 No S3     NO JVD    ABDOMEN SOFT BS PRESENT NOT DISTENDED No hepatosplenomegaly PEDAL EDEMA present No calf tenderness  NO rash   GENERAL Not TOXIC looking     PATIENT DISPIRITO [facility] [phone]  1965 DOA 2019 DR DARIANA ORLANDO                             PATIENT DATA     ALLERGY      vibramycin zithro asa                                                                                                                   HEAD OF BED ELEVATION Yes   DVT PROPHYLAXIS     lvnx 30 ()                            DIET    clear ()                                                             RESPIRATORY GAS EXCHANGE   2019 ra 97%                                                                HEMODYNAMICS      2019 120/58                                                  IV FLUIDS         D5lr 75 ()                                   SMOKER  nicotine 21 ()   COPD   proventil hfa p ()   symbicort ()  spirib=va ()    EPISODE OF HYPOGLYCEMIA    g    D5lr 75 ()   LUPUS   plaquenil 200.2 ()   COLITIS   - W 11.3-12.8    ctap cw 2019 sever colitis distal trasnverse desc and rs colon   zosyn ()   HEP C   Is seeing Dr Haro  NEUROPSYCH  buprioip 150 ()   klonopin 1.2 ()               PATIENT DISPIRITO DOUGLAS [facility] [phone]  1965 DOA 2019 DR DARIANA ORLANDO                            Pt description                          55 f disabled on noct home O2 disabled sec lupus sjogrens copd op PMH lupus on plaquanil 2 ppd smoker current smoker  has been on numerous courses of prednisone on nocturnal Oxygen HO DVT HO Hep C admitted with nausea vomiting diarrhea managed as colitis with zosyn Pt was noted to be hyponatremic and was placed on free water restrn Pulm consulted for copd               home meds lyrica 75 bupropion 150 spiriva plaquenil 200 proair oxcarbazepine 150.2 advair klonopin 1.2 imodium              Past Medical History:  Anxiety    COPD (chronic obstructive pulmonary disease)    Dvt femoral (deep venous thrombosis)  right arm  Gallstones    GERD (gastroesophageal reflux disease)    Glossopharyngeal neuralgia syndrome    Hepatitis C    Hypotension    Hypothyroidism    Lupus    Migraine    Neuropathy    Nicotine addiction    Sjogren's disease    UTI (urinary tract infection)    Vertigo.

## 2019-09-26 NOTE — CONSULT NOTE ADULT - SUBJECTIVE AND OBJECTIVE BOX
pt adm with n/v/d found to have colitis on ct now feeling much better post abx.    PE comfortable with benign abd.

## 2019-09-26 NOTE — PROGRESS NOTE ADULT - SUBJECTIVE AND OBJECTIVE BOX
Patient is a 54y old  Female who presents with a chief complaint of abdominal pain, nausea/vomiting (26 Sep 2019 12:59)    INTERVAL HPI/OVERNIGHT EVENTS: feeling better, no abd pain. no diarrhea in 5 hours. breathing feels good.    MEDICATIONS  (STANDING):  buDESOnide 160 MICROgram(s)/formoterol 4.5 MICROgram(s) Inhaler 2 Puff(s) Inhalation two times a day  buPROPion XL . 150 milliGRAM(s) Oral daily  enoxaparin Injectable 30 milliGRAM(s) SubCutaneous daily  hydroxychloroquine 200 milliGRAM(s) Oral two times a day  influenza   Vaccine 0.5 milliLiter(s) IntraMuscular once  nicotine - 21 mG/24Hr(s) Patch 1 patch Transdermal daily  pantoprazole    Tablet 40 milliGRAM(s) Oral before breakfast  piperacillin/tazobactam IVPB.. 3.375 Gram(s) IV Intermittent every 8 hours  pregabalin 75 milliGRAM(s) Oral two times a day  tiotropium 18 MICROgram(s) Capsule 1 Capsule(s) Inhalation daily    MEDICATIONS  (PRN):  ALBUTerol    90 MICROgram(s) HFA Inhaler 2 Puff(s) Inhalation every 6 hours PRN Shortness of Breath and/or Wheezing  clonazePAM  Tablet 1 milliGRAM(s) Oral two times a day PRN anxiety  morphine  - Injectable 2 milliGRAM(s) IV Push every 4 hours PRN Severe Pain (7 - 10)  ondansetron Injectable 4 milliGRAM(s) IV Push every 8 hours PRN Nausea and/or Vomiting    Allergies  Vibramycin (Unknown)  Zithromax (Stomach Upset)    Intolerances  aspirin (Stomach Upset)    REVIEW OF SYSTEMS:  CONSTITUTIONAL: No fever,  or fatigue  EYES: No eye pain, visual disturbances, or discharge  ENMT:  No difficulty hearing, tinnitus, vertigo; No sinus or throat pain  NECK: No pain or stiffness  BREASTS: No pain, masses, or nipple discharge  RESPIRATORY: No cough, wheezing, chills or hemoptysis; No shortness of breath  CARDIOVASCULAR: No chest pain, palpitations, lightheadedness, or leg swelling  GASTROINTESTINAL: see hpi  GENITOURINARY: No dysuria, frequency, hematuria, or incontinence  NEUROLOGICAL: No headaches, memory loss, vertigo, loss of strength, numbness, or tremors  SKIN: No itching, burning, rashes, or lesions   LYMPH NODES: No enlarged glands  ENDOCRINE: No heat or cold intolerance; No hair loss; No polydipsia or polyuria  MUSCULOSKELETAL: No joint pain or swelling; No muscle, back, or extremity pain  PSYCHIATRIC: No depression, anxiety, or mood swings  HEME/LYMPH: No easy bruising, or bleeding gums  ALLERGY AND IMMUNOLOGIC: No hives or eczema    Vital Signs Last 24 Hrs  T(C): 37.2 (26 Sep 2019 08:00), Max: 37.2 (26 Sep 2019 08:00)  T(F): 98.9 (26 Sep 2019 08:00), Max: 98.9 (26 Sep 2019 08:00)  HR: 68 (26 Sep 2019 08:00) (55 - 74)  BP: 99/57 (26 Sep 2019 08:00) (99/57 - 131/76)  BP(mean): --  RR: 14 (26 Sep 2019 08:00) (14 - 16)  SpO2: 96% (26 Sep 2019 08:00) (96% - 99%)    PHYSICAL EXAM:  GENERAL: NAD, underweight BMI =15, cachectic   HEAD:  Atraumatic, Normocephalic  EYES:  conjunctiva and sclera clear  ENMT: Moist mucous membranes  NECK: Supple, No JVD  NERVOUS SYSTEM:  Alert & Oriented X3, Good concentration; All 4 extremities mobile, no gross sensory deficits.   CHEST/LUNG: Clear to auscultation bilaterally; No rales, rhonchi, wheezing, or rubs  HEART: Regular rate and rhythm;   ABDOMEN: Soft, Nontender, Nondistended; Bowel sounds present  EXTREMITIES:  2+ Peripheral Pulses, No clubbing, cyanosis, or edema  SKIN: No rashes or lesions    LABS:                        13.8   12.87 )-----------( 388      ( 26 Sep 2019 05:34 )             40.0     26 Sep 2019 05:34    135    |  101    |  17     ----------------------------<  43     4.0     |  21     |  0.81     Ca    9.2        26 Sep 2019 05:34  Phos  3.5       26 Sep 2019 05:34  Mg     1.7       26 Sep 2019 05:34    TPro  6.4    /  Alb  3.6    /  TBili  0.5    /  DBili  x      /  AST  39     /  ALT  47     /  AlkPhos  96     26 Sep 2019 05:34      CAPILLARY BLOOD GLUCOSE  POCT Blood Glucose.: 228 mg/dL (26 Sep 2019 06:59)  POCT Blood Glucose.: 38 mg/dL (26 Sep 2019 06:35)      RADIOLOGY & ADDITIONAL TESTS:    Imaging Personally Reviewed:  [x ] YES   ekg - NSR  Shoulder Xray - no fracture to my review, await official report    Consultant(s) Notes Reviewed:  pulm    Care Discussed with Consultants/Other Providers:    Advanced Directives: [ ] DNR  [ ] No feeding tube  [ ] MOLST in chart  [ ] MOLST completed today  [ ] Unknown

## 2019-09-26 NOTE — CONSULT NOTE ADULT - ASSESSMENT
HO lupus on plauenil 2ppd smoker here with diarrhea on zosyn Cont present Rx PATIENT DISPIRISEEMA COLE [facility] [phone]  1965 DOA 2019 DR DARIANA ORLANDO                            PULMONARY/CRITICAL CARE ASSESSMENT/RECOMMENDATIONS                    SMOKER   Is on dual smoking cessation pharmacotheray nicotine and bupropion   Counseled   COPD   Is on ics ibd   EPISODE OF HYPOGLYCEMIA    g   She may have  suppressed HPA axis Will check am cortisol and tsh    COLITIS   Is on zosyn () Is being Rxed for infectious colitis   HEP C                                         TIME SPENT Over 55 minutes aggregate care time spent on encounter; activities included   direct pt care, counseling and/or coordinating care reviewing notes, lab data/ imaging , discussion with multidisciplinary team/ pt /family. Risks, benefits, alternatives  discussed in detail.

## 2019-09-26 NOTE — PROVIDER CONTACT NOTE (CRITICAL VALUE NOTIFICATION) - ACTION/TREATMENT ORDERED:
give amp d50 IVP , recheck  Accu-Chek, non diabetic pt, sugar levels will return to baseline after IVP, cont to monitor give amp d50 IVP , recheck  Accu-Chek, non diabetic pt, sugar levels will return to baseline after IVP, cont to monitor  repeat Accu-Chek  228, pt still asymptomatic at this time.

## 2019-09-27 ENCOUNTER — TRANSCRIPTION ENCOUNTER (OUTPATIENT)
Age: 54
End: 2019-09-27

## 2019-09-27 VITALS
OXYGEN SATURATION: 98 % | RESPIRATION RATE: 18 BRPM | HEART RATE: 69 BPM | SYSTOLIC BLOOD PRESSURE: 111 MMHG | DIASTOLIC BLOOD PRESSURE: 72 MMHG | TEMPERATURE: 98 F

## 2019-09-27 LAB
ALBUMIN SERPL ELPH-MCNC: 3.6 G/DL — SIGNIFICANT CHANGE UP (ref 3.3–5)
ALP SERPL-CCNC: 90 U/L — SIGNIFICANT CHANGE UP (ref 30–120)
ALT FLD-CCNC: 57 U/L DA — SIGNIFICANT CHANGE UP (ref 10–60)
ANION GAP SERPL CALC-SCNC: 4 MMOL/L — LOW (ref 5–17)
AST SERPL-CCNC: 41 U/L — HIGH (ref 10–40)
BILIRUB SERPL-MCNC: 0.4 MG/DL — SIGNIFICANT CHANGE UP (ref 0.2–1.2)
BUN SERPL-MCNC: 7 MG/DL — SIGNIFICANT CHANGE UP (ref 7–23)
C PEPTIDE SERPL-MCNC: 1.5 NG/ML — SIGNIFICANT CHANGE UP (ref 1.1–4.4)
CALCIUM SERPL-MCNC: 9.1 MG/DL — SIGNIFICANT CHANGE UP (ref 8.4–10.5)
CHLORIDE SERPL-SCNC: 102 MMOL/L — SIGNIFICANT CHANGE UP (ref 96–108)
CO2 SERPL-SCNC: 31 MMOL/L — SIGNIFICANT CHANGE UP (ref 22–31)
CORTIS AM PEAK SERPL-MCNC: 32.7 UG/DL — HIGH (ref 6–18.4)
CREAT SERPL-MCNC: 0.79 MG/DL — SIGNIFICANT CHANGE UP (ref 0.5–1.3)
CULTURE RESULTS: SIGNIFICANT CHANGE UP
GLUCOSE SERPL-MCNC: 93 MG/DL — SIGNIFICANT CHANGE UP (ref 70–99)
HCT VFR BLD CALC: 38.1 % — SIGNIFICANT CHANGE UP (ref 34.5–45)
HGB BLD-MCNC: 12.8 G/DL — SIGNIFICANT CHANGE UP (ref 11.5–15.5)
MAGNESIUM SERPL-MCNC: 1.7 MG/DL — SIGNIFICANT CHANGE UP (ref 1.6–2.6)
MCHC RBC-ENTMCNC: 32.5 PG — SIGNIFICANT CHANGE UP (ref 27–34)
MCHC RBC-ENTMCNC: 33.6 GM/DL — SIGNIFICANT CHANGE UP (ref 32–36)
MCV RBC AUTO: 96.7 FL — SIGNIFICANT CHANGE UP (ref 80–100)
NRBC # BLD: 0 /100 WBCS — SIGNIFICANT CHANGE UP (ref 0–0)
PHOSPHATE SERPL-MCNC: 3 MG/DL — SIGNIFICANT CHANGE UP (ref 2.5–4.5)
PLATELET # BLD AUTO: 342 K/UL — SIGNIFICANT CHANGE UP (ref 150–400)
POTASSIUM SERPL-MCNC: 3.5 MMOL/L — SIGNIFICANT CHANGE UP (ref 3.5–5.3)
POTASSIUM SERPL-SCNC: 3.5 MMOL/L — SIGNIFICANT CHANGE UP (ref 3.5–5.3)
PROT SERPL-MCNC: 6.3 G/DL — SIGNIFICANT CHANGE UP (ref 6–8.3)
RBC # BLD: 3.94 M/UL — SIGNIFICANT CHANGE UP (ref 3.8–5.2)
RBC # FLD: 12.6 % — SIGNIFICANT CHANGE UP (ref 10.3–14.5)
SODIUM SERPL-SCNC: 137 MMOL/L — SIGNIFICANT CHANGE UP (ref 135–145)
SPECIMEN SOURCE: SIGNIFICANT CHANGE UP
T4 FREE SERPL-MCNC: 1.5 NG/DL — SIGNIFICANT CHANGE UP (ref 0.9–1.8)
TSH SERPL-MCNC: 0.29 UIU/ML — SIGNIFICANT CHANGE UP (ref 0.27–4.2)
TSH SERPL-MCNC: 0.31 UIU/ML — SIGNIFICANT CHANGE UP (ref 0.27–4.2)
WBC # BLD: 11.53 K/UL — HIGH (ref 3.8–10.5)
WBC # FLD AUTO: 11.53 K/UL — HIGH (ref 3.8–10.5)

## 2019-09-27 PROCEDURE — 96374 THER/PROPH/DIAG INJ IV PUSH: CPT

## 2019-09-27 PROCEDURE — 83527 ASSAY OF INSULIN: CPT

## 2019-09-27 PROCEDURE — 87507 IADNA-DNA/RNA PROBE TQ 12-25: CPT

## 2019-09-27 PROCEDURE — 85027 COMPLETE CBC AUTOMATED: CPT

## 2019-09-27 PROCEDURE — 80053 COMPREHEN METABOLIC PANEL: CPT

## 2019-09-27 PROCEDURE — 73030 X-RAY EXAM OF SHOULDER: CPT

## 2019-09-27 PROCEDURE — 87521 HEPATITIS C PROBE&RVRS TRNSC: CPT

## 2019-09-27 PROCEDURE — 96375 TX/PRO/DX INJ NEW DRUG ADDON: CPT

## 2019-09-27 PROCEDURE — 84443 ASSAY THYROID STIM HORMONE: CPT

## 2019-09-27 PROCEDURE — 99233 SBSQ HOSP IP/OBS HIGH 50: CPT

## 2019-09-27 PROCEDURE — 36415 COLL VENOUS BLD VENIPUNCTURE: CPT

## 2019-09-27 PROCEDURE — 82533 TOTAL CORTISOL: CPT

## 2019-09-27 PROCEDURE — 84100 ASSAY OF PHOSPHORUS: CPT

## 2019-09-27 PROCEDURE — 82962 GLUCOSE BLOOD TEST: CPT

## 2019-09-27 PROCEDURE — 84681 ASSAY OF C-PEPTIDE: CPT

## 2019-09-27 PROCEDURE — 99285 EMERGENCY DEPT VISIT HI MDM: CPT | Mod: 25

## 2019-09-27 PROCEDURE — 93005 ELECTROCARDIOGRAM TRACING: CPT

## 2019-09-27 PROCEDURE — 83935 ASSAY OF URINE OSMOLALITY: CPT

## 2019-09-27 PROCEDURE — 81003 URINALYSIS AUTO W/O SCOPE: CPT

## 2019-09-27 PROCEDURE — 83036 HEMOGLOBIN GLYCOSYLATED A1C: CPT

## 2019-09-27 PROCEDURE — 83525 ASSAY OF INSULIN: CPT

## 2019-09-27 PROCEDURE — 86803 HEPATITIS C AB TEST: CPT

## 2019-09-27 PROCEDURE — 83690 ASSAY OF LIPASE: CPT

## 2019-09-27 PROCEDURE — 96361 HYDRATE IV INFUSION ADD-ON: CPT

## 2019-09-27 PROCEDURE — 74177 CT ABD & PELVIS W/CONTRAST: CPT

## 2019-09-27 PROCEDURE — 94640 AIRWAY INHALATION TREATMENT: CPT

## 2019-09-27 PROCEDURE — 84439 ASSAY OF FREE THYROXINE: CPT

## 2019-09-27 PROCEDURE — 83735 ASSAY OF MAGNESIUM: CPT

## 2019-09-27 PROCEDURE — 87040 BLOOD CULTURE FOR BACTERIA: CPT

## 2019-09-27 PROCEDURE — 83930 ASSAY OF BLOOD OSMOLALITY: CPT

## 2019-09-27 RX ORDER — MORPHINE SULFATE 50 MG/1
1 CAPSULE, EXTENDED RELEASE ORAL
Qty: 0 | Refills: 0 | DISCHARGE
Start: 2019-09-27

## 2019-09-27 RX ORDER — LOPERAMIDE HCL 2 MG
3 TABLET ORAL
Qty: 0 | Refills: 0 | DISCHARGE

## 2019-09-27 RX ORDER — MORPHINE SULFATE 50 MG/1
15 CAPSULE, EXTENDED RELEASE ORAL
Refills: 0 | Status: DISCONTINUED | OUTPATIENT
Start: 2019-09-27 | End: 2019-09-27

## 2019-09-27 RX ORDER — MORPHINE SULFATE 50 MG/1
0 CAPSULE, EXTENDED RELEASE ORAL
Qty: 0 | Refills: 0 | DISCHARGE

## 2019-09-27 RX ORDER — NICOTINE POLACRILEX 2 MG
1 GUM BUCCAL
Qty: 0 | Refills: 0 | DISCHARGE
Start: 2019-09-27

## 2019-09-27 RX ADMIN — Medication 200 MILLIGRAM(S): at 05:41

## 2019-09-27 RX ADMIN — ENOXAPARIN SODIUM 30 MILLIGRAM(S): 100 INJECTION SUBCUTANEOUS at 11:13

## 2019-09-27 RX ADMIN — MORPHINE SULFATE 15 MILLIGRAM(S): 50 CAPSULE, EXTENDED RELEASE ORAL at 17:14

## 2019-09-27 RX ADMIN — TIOTROPIUM BROMIDE 1 CAPSULE(S): 18 CAPSULE ORAL; RESPIRATORY (INHALATION) at 06:16

## 2019-09-27 RX ADMIN — Medication 1 PATCH: at 12:55

## 2019-09-27 RX ADMIN — SODIUM CHLORIDE 75 MILLILITER(S): 9 INJECTION, SOLUTION INTRAVENOUS at 07:54

## 2019-09-27 RX ADMIN — Medication 1 PATCH: at 07:55

## 2019-09-27 RX ADMIN — Medication 75 MILLIGRAM(S): at 05:41

## 2019-09-27 RX ADMIN — Medication 75 MILLIGRAM(S): at 17:10

## 2019-09-27 RX ADMIN — MORPHINE SULFATE 2 MILLIGRAM(S): 50 CAPSULE, EXTENDED RELEASE ORAL at 10:27

## 2019-09-27 RX ADMIN — Medication 1 PATCH: at 11:13

## 2019-09-27 RX ADMIN — Medication 200 MILLIGRAM(S): at 17:12

## 2019-09-27 RX ADMIN — PIPERACILLIN AND TAZOBACTAM 25 GRAM(S): 4; .5 INJECTION, POWDER, LYOPHILIZED, FOR SOLUTION INTRAVENOUS at 07:54

## 2019-09-27 RX ADMIN — BUDESONIDE AND FORMOTEROL FUMARATE DIHYDRATE 2 PUFF(S): 160; 4.5 AEROSOL RESPIRATORY (INHALATION) at 06:31

## 2019-09-27 RX ADMIN — PANTOPRAZOLE SODIUM 40 MILLIGRAM(S): 20 TABLET, DELAYED RELEASE ORAL at 05:40

## 2019-09-27 RX ADMIN — ONDANSETRON 4 MILLIGRAM(S): 8 TABLET, FILM COATED ORAL at 10:05

## 2019-09-27 RX ADMIN — MORPHINE SULFATE 15 MILLIGRAM(S): 50 CAPSULE, EXTENDED RELEASE ORAL at 17:10

## 2019-09-27 RX ADMIN — MORPHINE SULFATE 2 MILLIGRAM(S): 50 CAPSULE, EXTENDED RELEASE ORAL at 10:05

## 2019-09-27 NOTE — DISCHARGE NOTE PROVIDER - HOSPITAL COURSE
54F with lupus on plaquenil, anxiety, emphysema on night O2, hypothyroidism, hx of DVT, hx of hep C  who presents with abdominal pain and nausea/vomiting/diarrhea.  Started about 3 days prior to admission.  Pain located on the right side/flank of her abdomen.  Described as a stabbing, waxing/waning pain.  No radiation.  Associated with nausea/vomiting/diarrhea.  No hematemesis or melena or hematochezia.  No foreign foods, recent travel, or sick contacts.  Had some chills and subjective fevers with diffuse myalgias.      In the ED and was found to have severe colitis on CT.  Labs also showed slight hyponatremia as well at 128.  Patient was started on ceftriaxone and flagyl by the ED.       Patient was admitted to medical floor.  Started on IV zosyn.     Seen by Dr Haro from GI.  Diet advanced slowly.  now tolerating low fiber diet.         Hyponatremia - recurrent for patient.  Was on a fluid restriction and sodium returned to normal.  Patient informed to follow fluid restriction.        Hepatitis C - known diagnosis.  Follows with Dr Mahmood from GI.  Will need to follow up with her post discharge.

## 2019-09-27 NOTE — PROGRESS NOTE ADULT - ASSESSMENT
PATIENT DISPIRISEEMA COLE [facility] [phone]  1965 DOA 2019 DR DARIANA ORLANDO                            PULMONARY/CRITICAL CARE ASSESSMENT/RECOMMENDATIONS                    SMOKER   Is on dual smoking cessation pharmacotheray nicotine and bupropion   Counseled   COPD   Is on ics ibd   EPISODE OF HYPOGLYCEMIA  NOW RESOLVED   g   2019 Serum cortisol 32.7 Doubt   suppressed HPA axis   COLITIS   Is on zosyn () Is being Rxed for infectious colitis   HEP C   HYPONATREMIA RESOLVED       TIME SPENT Over 25 minutes aggregate care time spent on encounter; activities included   direct pt care, counseling and/or coordinating care reviewing notes, lab data/ imaging , discussion with multidisciplinary team/ pt /family. Risks, benefits, alternatives  discussed in detai

## 2019-09-27 NOTE — PROGRESS NOTE ADULT - SUBJECTIVE AND OBJECTIVE BOX
Patient is a 54y old  Female who presents with a chief complaint of abdominal pain, nausea/vomiting (27 Sep 2019 12:00)    INTERVAL HPI/OVERNIGHT EVENTS: feeling better, wants to eat and go home.  does admit to 20lb weight loss over 2-3 months due to poor appetite.     MEDICATIONS  (STANDING):  buDESOnide 160 MICROgram(s)/formoterol 4.5 MICROgram(s) Inhaler 2 Puff(s) Inhalation two times a day  buPROPion XL . 150 milliGRAM(s) Oral daily  dextrose 5% + lactated ringers. 1000 milliLiter(s) (50 mL/Hr) IV Continuous <Continuous>  enoxaparin Injectable 30 milliGRAM(s) SubCutaneous daily  hydroxychloroquine 200 milliGRAM(s) Oral two times a day  influenza   Vaccine 0.5 milliLiter(s) IntraMuscular once  morphine ER Tablet 15 milliGRAM(s) Oral two times a day  nicotine - 21 mG/24Hr(s) Patch 1 patch Transdermal daily  pantoprazole    Tablet 40 milliGRAM(s) Oral before breakfast  piperacillin/tazobactam IVPB.. 3.375 Gram(s) IV Intermittent every 8 hours  pregabalin 75 milliGRAM(s) Oral two times a day  tiotropium 18 MICROgram(s) Capsule 1 Capsule(s) Inhalation daily    MEDICATIONS  (PRN):  ALBUTerol    90 MICROgram(s) HFA Inhaler 2 Puff(s) Inhalation every 6 hours PRN Shortness of Breath and/or Wheezing  benzocaine 15 mG/menthol 3.6 mG (Sugar-Free) Lozenge 1 Lozenge Oral every 8 hours PRN Sore Throat  clonazePAM  Tablet 1 milliGRAM(s) Oral two times a day PRN anxiety  morphine  - Injectable 2 milliGRAM(s) IV Push every 4 hours PRN Severe Pain (7 - 10)  ondansetron Injectable 4 milliGRAM(s) IV Push every 8 hours PRN Nausea and/or Vomiting    Allergies  Vibramycin (Unknown)  Zithromax (Stomach Upset)    Intolerances  aspirin (Stomach Upset)    REVIEW OF SYSTEMS:  CONSTITUTIONAL: No fever, or fatigue; +weight loss  EYES: No eye pain, visual disturbances, or discharge  ENMT:  No difficulty hearing, tinnitus, vertigo; No sinus or throat pain  NECK: No pain or stiffness  BREASTS: No pain, masses, or nipple discharge  RESPIRATORY: No cough, wheezing, chills or hemoptysis; No shortness of breath  CARDIOVASCULAR: No chest pain, palpitations, lightheadedness, or leg swelling  GASTROINTESTINAL: No abdominal or epigastric pain. No nausea, vomiting, or hematemesis; No diarrhea or constipation. No melena or hematochezia.  GENITOURINARY: No dysuria, frequency, hematuria, or incontinence  NEUROLOGICAL: No headaches, memory loss, vertigo, loss of strength, numbness, or tremors  SKIN: No itching, burning, rashes, or lesions   LYMPH NODES: No enlarged glands  ENDOCRINE: No heat or cold intolerance; No hair loss; No polydipsia or polyuria  MUSCULOSKELETAL: No joint pain or swelling; No muscle, back, or extremity pain  PSYCHIATRIC: No depression, anxiety, or mood swings  HEME/LYMPH: No easy bruising, or bleeding gums  ALLERGY AND IMMUNOLOGIC: No hives or eczema    Vital Signs Last 24 Hrs  T(C): 37.2 (27 Sep 2019 08:52), Max: 37.2 (27 Sep 2019 08:52)  T(F): 98.9 (27 Sep 2019 08:52), Max: 98.9 (27 Sep 2019 08:52)  HR: 59 (27 Sep 2019 08:52) (56 - 68)  BP: 117/68 (27 Sep 2019 08:52) (110/66 - 129/72)  BP(mean): --  RR: 16 (27 Sep 2019 08:52) (14 - 16)  SpO2: 99% (27 Sep 2019 08:52) (97% - 100%)    PHYSICAL EXAM:  GENERAL: underweight, bmi 15  HEAD:  Atraumatic, Normocephalic  EYES: conjunctiva and sclera clear  ENMT: Moist mucous membranes  NECK: Supple, No JVD  NERVOUS SYSTEM:  Alert & Oriented X3, Good concentration; All 4 extremities mobile, no gross sensory deficits.   CHEST/LUNG: Clear to auscultation bilaterally; No rales, rhonchi, wheezing, or rubs  HEART: Regular rate and rhythm; No murmurs, rubs, or gallops  ABDOMEN: Soft, Nontender, Nondistended; Bowel sounds present  EXTREMITIES:  2+ Peripheral Pulses, No clubbing, cyanosis, or edema    LABS:                        12.8   11.53 )-----------( 342      ( 27 Sep 2019 07:10 )             38.1     27 Sep 2019 07:10    137    |  102    |  7      ----------------------------<  93     3.5     |  31     |  0.79     Ca    9.1        27 Sep 2019 07:10  Phos  3.0       27 Sep 2019 07:10  Mg     1.7       27 Sep 2019 07:10    TPro  6.3    /  Alb  3.6    /  TBili  0.4    /  DBili  x      /  AST  41     /  ALT  57     /  AlkPhos  90     27 Sep 2019 07:10    CAPILLARY BLOOD GLUCOSE  POCT Blood Glucose.: 128 mg/dL (26 Sep 2019 17:03)  POCT Blood Glucose.: 72 mg/dL (26 Sep 2019 16:24)  POCT Blood Glucose.: 67 mg/dL (26 Sep 2019 16:03)      RADIOLOGY & ADDITIONAL TESTS:    Imaging Personally Reviewed:  [ ] YES     Consultant(s) Notes Reviewed:  gi    Care Discussed with Consultants/Other Providers:  Dr Pablo updated on admission and dc plan,     Advanced Directives: [ ] DNR  [ ] No feeding tube  [ ] MOLST in chart  [ ] MOLST completed today  [ ] Unknown  \

## 2019-09-27 NOTE — DISCHARGE NOTE PROVIDER - CARE PROVIDERS DIRECT ADDRESSES
,DirectAddress_Unknown,[email or web address]

## 2019-09-27 NOTE — PROGRESS NOTE ADULT - SUBJECTIVE AND OBJECTIVE BOX
Pulmonary/Critical Care Followup    PAST MEDICAL & SURGICAL HISTORY:  Emphysema/COPD  Dvt femoral (deep venous thrombosis): right arm  Glossopharyngeal neuralgia syndrome: s/p brain sx in 2018  Nicotine addiction  Neuropathy  Migraine  Anxiety  Hepatitis C  Gallstones  Hypothyroidism  Hypotension  GERD (gastroesophageal reflux disease)  UTI (urinary tract infection)  Vertigo  Sjogren's disease  Lupus  H/O lumpectomy  Injury of right wrist, subsequent encounter  Gall bladder disease: REMOVAL  Vocal cord polyp: REMOVAL      Allergies    Vibramycin (Unknown)  Zithromax (Stomach Upset)    Intolerances    aspirin (Stomach Upset)      FAMILY HISTORY:  Family history of diabetes mellitus (Sibling): Sister  Family history of psoriatic arthritis (Sibling): Sister  Family history of systemic lupus erythematosus (SLE) in mother (Sibling): Sister      SOCIAL HISTORY:      MEDICATIONS  (STANDING):  buDESOnide 160 MICROgram(s)/formoterol 4.5 MICROgram(s) Inhaler 2 Puff(s) Inhalation two times a day  buPROPion XL . 150 milliGRAM(s) Oral daily  dextrose 5% + lactated ringers. 1000 milliLiter(s) (50 mL/Hr) IV Continuous <Continuous>  dextrose 5%. 1000 milliLiter(s) (50 mL/Hr) IV Continuous <Continuous>  dextrose 50% Injectable 12.5 Gram(s) IV Push once  dextrose 50% Injectable 25 Gram(s) IV Push once  dextrose 50% Injectable 25 Gram(s) IV Push once  enoxaparin Injectable 30 milliGRAM(s) SubCutaneous daily  hydroxychloroquine 200 milliGRAM(s) Oral two times a day  influenza   Vaccine 0.5 milliLiter(s) IntraMuscular once  morphine ER Tablet 15 milliGRAM(s) Oral two times a day  nicotine - 21 mG/24Hr(s) Patch 1 patch Transdermal daily  pantoprazole    Tablet 40 milliGRAM(s) Oral before breakfast  piperacillin/tazobactam IVPB.. 3.375 Gram(s) IV Intermittent every 8 hours  pregabalin 75 milliGRAM(s) Oral two times a day  tiotropium 18 MICROgram(s) Capsule 1 Capsule(s) Inhalation daily    MEDICATIONS  (PRN):  ALBUTerol    90 MICROgram(s) HFA Inhaler 2 Puff(s) Inhalation every 6 hours PRN Shortness of Breath and/or Wheezing  benzocaine 15 mG/menthol 3.6 mG (Sugar-Free) Lozenge 1 Lozenge Oral every 8 hours PRN Sore Throat  clonazePAM  Tablet 1 milliGRAM(s) Oral two times a day PRN anxiety  dextrose 40% Gel 15 Gram(s) Oral once PRN Blood Glucose LESS THAN 70 milliGRAM(s)/deciLiter  glucagon  Injectable 1 milliGRAM(s) IntraMuscular once PRN Glucose <70 milliGRAM(s)/deciLiter  morphine  - Injectable 2 milliGRAM(s) IV Push every 4 hours PRN Severe Pain (7 - 10)  ondansetron Injectable 4 milliGRAM(s) IV Push every 8 hours PRN Nausea and/or Vomiting      Vital Signs Last 24 Hrs  T(C): 37.2 (27 Sep 2019 08:52), Max: 37.2 (27 Sep 2019 08:52)  T(F): 98.9 (27 Sep 2019 08:52), Max: 98.9 (27 Sep 2019 08:52)  HR: 59 (27 Sep 2019 08:52) (56 - 68)  BP: 117/68 (27 Sep 2019 08:52) (110/66 - 129/72)  BP(mean): --  RR: 16 (27 Sep 2019 08:52) (14 - 16)  SpO2: 99% (27 Sep 2019 08:52) (97% - 100%)    LABS:                        12.8   11.53 )-----------( 342      ( 27 Sep 2019 07:10 )             38.1         137  |  102  |  7   ----------------------------<  93  3.5   |  31  |  0.79    Ca    9.1      27 Sep 2019 07:10  Phos  3.0       Mg     1.7         TPro  6.3  /  Alb  3.6  /  TBili  0.4  /  DBili  x   /  AST  41<H>  /  ALT  57  /  AlkPhos  90        Urinalysis Basic - ( 25 Sep 2019 19:23 )    Color: Yellow / Appearance: Clear / S.005 / pH: x  Gluc: x / Ketone: Moderate  / Bili: Negative / Urobili: Negative mg/dL   Blood: x / Protein: Negative mg/dL / Nitrite: Negative   Leuk Esterase: Negative / RBC: x / WBC x   Sq Epi: x / Non Sq Epi: x / Bacteria: x            WBC:  WBC Count: 11.53 K/uL ( @ 07:10)  WBC Count: 12.87 K/uL ( @ 05:34)  WBC Count: 11.32 K/uL ( @ 17:42)      MICROBIOLOGY:  RECENT CULTURES:                  Sodium:  Sodium, Serum: 137 mmol/L ( @ 07:10)  Sodium, Serum: 135 mmol/L ( @ 05:34)  Sodium, Serum: 128 mmol/L ( @ 17:42)      0.79 mg/dL  @ 07:10  0.81 mg/dL  05:34  0.66 mg/dL  @ 17:42      Hemoglobin:  Hemoglobin: 12.8 g/dL ( @ 07:10)  Hemoglobin: 13.8 g/dL ( @ 05:34)  Hemoglobin: 13.5 g/dL ( @ 17:42)      Platelets: Platelet Count - Automated: 342 K/uL ( @ 07:10)  Platelet Count - Automated: 388 K/uL ( @ 05:34)  Platelet Count - Automated: 363 K/uL ( @ 17:42)      LIVER FUNCTIONS - ( 27 Sep 2019 07:10 )  Alb: 3.6 g/dL / Pro: 6.3 g/dL / ALK PHOS: 90 U/L / ALT: 57 U/L DA / AST: 41 U/L / GGT: x             Urinalysis Basic - ( 25 Sep 2019 19:23 )    Color: Yellow / Appearance: Clear / S.005 / pH: x  Gluc: x / Ketone: Moderate  / Bili: Negative / Urobili: Negative mg/dL   Blood: x / Protein: Negative mg/dL / Nitrite: Negative   Leuk Esterase: Negative / RBC: x / WBC x   Sq Epi: x / Non Sq Epi: x / Bacteria: x        RADIOLOGY & ADDITIONAL STUDIES:    PATIENT DISPIRITO DOUGLAS [facility] S [phone]  1965 DOA 2019 DR DARIANA ORLANDO     VITALS/LABS       2019 afeb 59 117/68 16 99%   2019 W 11.5 Hb 12.8 Plt 342 Na 137 K 3.5 CO2 31 Cr .7   2019 cortisol 32.7     REVIEW OF SYMPTOMS     NOTE Noteworthy changes  if any  in ROS and PE are also entered  in note  below      Able to give ROS  Yes     RELIABLE No   CONSTITUTIONAL Weakness Yes  Chills No Vision changes No  ENDOCRINE No unexplained hair loss No heat or cold intolerance    ALLERGY No hives  Sore throat No   RESP Coughing blood no  Shortness of breath YES   NEURO No Headache  Confusion Pain neck No   CARDIAC No Chest pain No Palpitations   GI No Pain abdomen NO   Vomiting NO     PHYSICAL EXAM    HEENT Unremarkable PERRLA atraumatic   RESP Fair air entry EXP prolonged    Harsh breath sound Resp distres mild   CARDIAC S1 S2 No S3     NO JVD    ABDOMEN SOFT BS PRESENT NOT DISTENDED No hepatosplenomegaly PEDAL EDEMA present No calf tenderness  NO rash   GENERAL Not TOXIC looking     PATIENT DISPIRITO [facility] S [phone]  1965 DOA 2019 DR DARIANA ORLANDO                             PATIENT DATA     ALLERGY      vibramycin zithro asa                                                                                                                   HEAD OF BED ELEVATION Yes   DVT PROPHYLAXIS     lvnx 30 ()                            DIET    clear () changed Lo fiber 1lfr ()                                                              RESPIRATORY GAS EXCHANGE   2019 ra 97%                                                                HEMODYNAMICS      2019 120/58                                                  IV FLUIDS         D5lr 75 ()                                     PATIENT DISPIRITO [facility] S [address] 05 [phone]  [address] DOA 2019 DR DARIANA ORLANDO                            Pt description                          55 f disabled on noct home O2 disabled sec lupus sjogrens copd op PMH lupus on plaquanil 2 ppd smoker current smoker  has been on numerous courses of prednisone on nocturnal Oxygen HO DVT HO Hep C admitted with nausea vomiting diarrhea managed as colitis with zosyn Pt was noted to be hyponatremic and was placed on free water restrn Pulm consulted for copd               home meds lyrica 75 bupropion 150 spiriva plaquenil 200 proair oxcarbazepine 150.2 advair klonopin 1.2 imodium              Past Medical History:  Anxiety    COPD (chronic obstructive pulmonary disease)    Dvt femoral (deep venous thrombosis)  right arm  Gallstones    GERD (gastroesophageal reflux disease)    Glossopharyngeal neuralgia syndrome

## 2019-09-27 NOTE — PROGRESS NOTE ADULT - ASSESSMENT
54F with lupus on plaquenil, anxiety, emphysema on night O2, hypothyroidism, hx of DVT, hx of hep C  who presents with abdominal pain and nausea/vomiting/diarrhea.    Severe colitis  - admitted to medicine  - GI eval appreciated  - advance diet  - start pipercillin/tazobactam -if tolerating diet can change to Augmentin on discharge  - blood cultures negative    Hyponatremia   - resolved, but has been recurrent  - continue monitor fluids/electrolytes closely    Emphysema  - pulm consult appreciated  - cont with nightly O2 supplementation  - remote tele to monitor O2  - cont with inhalers  - cont with tiotropium    Smoking  - nicotine patch 21mg/hr strength  - encouraged smoking cessation however patient not ready to quit    Anxiety  - cont with buproprion  - cont with clonazepam  - QT acceptable on EKG    Lupus  - cont with hydroxychloroquine    Neuropathy  - cont with pregabalin  - resume MS contin 15mg BID, dose confirmed, ISTOP printed and placed in paper chart.     Weight Loss/ Cachexia/ Severe protein calorie malnutrition  need to check exact weight (historically- 10/2017-38.2kg; 3/2018-39.4kg; 6/2018-40.7kg)  RD consult     Preventive measures  - use lovenox (weight based dose as per pharmacy) for DVT ppx

## 2019-09-27 NOTE — DISCHARGE NOTE PROVIDER - NSDCCPCAREPLAN_GEN_ALL_CORE_FT
PRINCIPAL DISCHARGE DIAGNOSIS  Diagnosis: Colitis, acute  Assessment and Plan of Treatment: complete antibiotics  Low fiber diet for 1 week  follow up with GI - you will probably need a colonoscopy in 6 weeks      SECONDARY DISCHARGE DIAGNOSES  Diagnosis: High degree of dependence on nicotine  Assessment and Plan of Treatment: quit smoking, it is leading to your COPD and can cause cancer and other diseases such as cancer  talk to Dr Pablo about methods to help you stop    Diagnosis: Malnutrition  Assessment and Plan of Treatment: small frequent meals  nutrionally dense foods.

## 2019-09-27 NOTE — DISCHARGE NOTE NURSING/CASE MANAGEMENT/SOCIAL WORK - PATIENT PORTAL LINK FT
You can access the FollowMyHealth Patient Portal offered by [facility] by registering at the following website: [email or web address]. By joining [employer]’s FollowMyHealth portal, you will also be able to view your health information using other applications (apps) compatible with our system.

## 2019-09-27 NOTE — DISCHARGE NOTE PROVIDER - CARE PROVIDER_API CALL
Lorne Pablo (DO)  Family Medicine  Internal Medicine, [address] [facility] [address] [place] [address]  Phone: [phone]  Fax: [phone]  Follow Up Time:     Erinn Mahmood)  Gastroenterology; Internal Medicine  [address] [place] [address], [place] [address]  Phone: [phone]  Fax: [phone]  Follow Up Time:

## 2019-09-27 NOTE — DISCHARGE NOTE NURSING/CASE MANAGEMENT/SOCIAL WORK - NSDCDMETYPESERV_GEN_ALL_CORE_FT
prior to this admission: you have  stair glide, home oxygen concentrator with portable tanks and nebulizer through [employer]

## 2019-09-30 PROBLEM — G52.1: Chronic | Status: ACTIVE | Noted: 2018-02-02

## 2019-09-30 LAB — INSULIN FREE SERPL-ACNC: SIGNIFICANT CHANGE UP

## 2019-11-01 ENCOUNTER — APPOINTMENT (OUTPATIENT)
Dept: GASTROENTEROLOGY | Facility: CLINIC | Age: 54
End: 2019-11-01

## 2020-03-09 ENCOUNTER — OUTPATIENT (OUTPATIENT)
Dept: OUTPATIENT SERVICES | Facility: HOSPITAL | Age: 55
LOS: 1 days | End: 2020-03-09
Payer: MEDICARE

## 2020-03-09 DIAGNOSIS — Z98.890 OTHER SPECIFIED POSTPROCEDURAL STATES: Chronic | ICD-10-CM

## 2020-03-09 DIAGNOSIS — J38.1 POLYP OF VOCAL CORD AND LARYNX: Chronic | ICD-10-CM

## 2020-03-09 DIAGNOSIS — K82.9 DISEASE OF GALLBLADDER, UNSPECIFIED: Chronic | ICD-10-CM

## 2020-03-09 DIAGNOSIS — M54.12 RADICULOPATHY, CERVICAL REGION: ICD-10-CM

## 2020-03-09 DIAGNOSIS — S69.91XD UNSPECIFIED INJURY OF RIGHT WRIST, HAND AND FINGER(S), SUBSEQUENT ENCOUNTER: Chronic | ICD-10-CM

## 2020-03-09 PROCEDURE — 62321 NJX INTERLAMINAR CRV/THRC: CPT

## 2020-03-09 PROCEDURE — 77003 FLUOROGUIDE FOR SPINE INJECT: CPT

## 2020-03-19 ENCOUNTER — OUTPATIENT (OUTPATIENT)
Dept: OUTPATIENT SERVICES | Facility: HOSPITAL | Age: 55
LOS: 1 days | End: 2020-03-19
Payer: MEDICARE

## 2020-03-19 DIAGNOSIS — Z98.890 OTHER SPECIFIED POSTPROCEDURAL STATES: Chronic | ICD-10-CM

## 2020-03-19 DIAGNOSIS — J38.1 POLYP OF VOCAL CORD AND LARYNX: Chronic | ICD-10-CM

## 2020-03-19 DIAGNOSIS — K82.9 DISEASE OF GALLBLADDER, UNSPECIFIED: Chronic | ICD-10-CM

## 2020-03-19 DIAGNOSIS — S69.91XD UNSPECIFIED INJURY OF RIGHT WRIST, HAND AND FINGER(S), SUBSEQUENT ENCOUNTER: Chronic | ICD-10-CM

## 2020-03-19 DIAGNOSIS — M54.12 RADICULOPATHY, CERVICAL REGION: ICD-10-CM

## 2020-03-19 PROCEDURE — 62321 NJX INTERLAMINAR CRV/THRC: CPT

## 2020-03-19 PROCEDURE — 77003 FLUOROGUIDE FOR SPINE INJECT: CPT

## 2020-05-14 PROBLEM — R10.12 LEFT UPPER QUADRANT PAIN: Status: ACTIVE | Noted: 2018-01-15

## 2020-09-15 NOTE — ED ADULT TRIAGE NOTE - NS ED TRIAGE AVPU SCALE
Quality 265: Biopsy Follow-Up: Biopsy results reviewed, communicated, tracked, and documented Alert-The patient is alert, awake and responds to voice. The patient is oriented to time, place, and person. The triage nurse is able to obtain subjective information. Detail Level: Zone

## 2020-09-25 ENCOUNTER — OUTPATIENT (OUTPATIENT)
Dept: OUTPATIENT SERVICES | Facility: HOSPITAL | Age: 55
LOS: 1 days | End: 2020-09-25
Payer: MEDICARE

## 2020-09-25 VITALS
HEART RATE: 58 BPM | RESPIRATION RATE: 10 BRPM | OXYGEN SATURATION: 100 % | TEMPERATURE: 98 F | SYSTOLIC BLOOD PRESSURE: 105 MMHG | HEIGHT: 57 IN | DIASTOLIC BLOOD PRESSURE: 79 MMHG | WEIGHT: 77.6 LBS

## 2020-09-25 DIAGNOSIS — M54.5 LOW BACK PAIN: ICD-10-CM

## 2020-09-25 DIAGNOSIS — S69.91XD UNSPECIFIED INJURY OF RIGHT WRIST, HAND AND FINGER(S), SUBSEQUENT ENCOUNTER: Chronic | ICD-10-CM

## 2020-09-25 DIAGNOSIS — J44.9 CHRONIC OBSTRUCTIVE PULMONARY DISEASE, UNSPECIFIED: ICD-10-CM

## 2020-09-25 DIAGNOSIS — Z01.818 ENCOUNTER FOR OTHER PREPROCEDURAL EXAMINATION: ICD-10-CM

## 2020-09-25 DIAGNOSIS — R63.4 ABNORMAL WEIGHT LOSS: ICD-10-CM

## 2020-09-25 DIAGNOSIS — J38.1 POLYP OF VOCAL CORD AND LARYNX: Chronic | ICD-10-CM

## 2020-09-25 DIAGNOSIS — M77.12 LATERAL EPICONDYLITIS, LEFT ELBOW: ICD-10-CM

## 2020-09-25 DIAGNOSIS — K82.9 DISEASE OF GALLBLADDER, UNSPECIFIED: Chronic | ICD-10-CM

## 2020-09-25 DIAGNOSIS — M25.9 JOINT DISORDER, UNSPECIFIED: Chronic | ICD-10-CM

## 2020-09-25 DIAGNOSIS — Z98.890 OTHER SPECIFIED POSTPROCEDURAL STATES: Chronic | ICD-10-CM

## 2020-09-25 DIAGNOSIS — Z11.59 ENCOUNTER FOR SCREENING FOR OTHER VIRAL DISEASES: ICD-10-CM

## 2020-09-25 DIAGNOSIS — M25.522 PAIN IN LEFT ELBOW: ICD-10-CM

## 2020-09-25 DIAGNOSIS — M32.9 SYSTEMIC LUPUS ERYTHEMATOSUS, UNSPECIFIED: ICD-10-CM

## 2020-09-25 PROCEDURE — G0463: CPT

## 2020-09-25 RX ORDER — FLUTICASONE PROPIONATE AND SALMETEROL 50; 250 UG/1; UG/1
1 POWDER ORAL; RESPIRATORY (INHALATION)
Qty: 0 | Refills: 0 | DISCHARGE

## 2020-09-25 RX ORDER — CLONAZEPAM 1 MG
0 TABLET ORAL
Qty: 0 | Refills: 0 | DISCHARGE

## 2020-09-25 RX ORDER — ALBUTEROL 90 UG/1
2 AEROSOL, METERED ORAL
Qty: 0 | Refills: 0 | DISCHARGE

## 2020-09-25 NOTE — H&P PST ADULT - VENOUS THROMBOEMBOLISM CURRENT STATUS
(1) other risk factor (includes escalating BMI, pack-years of smoking, diabetes requiring insulin, chemotherapy, female gender and length of surgery)/(2) malignancy (present or previous)/(1) serious lung disease, including pneumonia (within 1 month)/(1) varicose veins

## 2020-09-25 NOTE — H&P PST ADULT - NSICDXPASTSURGICALHX_GEN_ALL_CORE_FT
PAST SURGICAL HISTORY:  Ankle problem right ankle surgery    Gall bladder disease REMOVAL    H/O lumpectomy right shouler, axillary, both breasts head, right back    Injury of right wrist, subsequent encounter no surgery    S/P brain surgery 2018, also had gamma knife procedure    S/P cryoablation of arrhythmia     Vocal cord polyp REMOVAL

## 2020-09-25 NOTE — H&P PST ADULT - HEIGHT IN FEET
Composition of meals/snacks/continue diet Rx as ordered/Diets modified for specific foods and ingredients 4

## 2020-09-25 NOTE — H&P PST ADULT - NSANTHOSAYNRD_GEN_A_CORE
No. AYLEEN screening performed.  STOP BANG Legend: 0-2 = LOW Risk; 3-4 = INTERMEDIATE Risk; 5-8 = HIGH Risk

## 2020-09-25 NOTE — H&P PST ADULT - NSICDXPROBLEM_GEN_ALL_CORE_FT
PROBLEM DIAGNOSES  Problem: Chronic low back pain  Assessment and Plan:  to be pulled; may call patients pain management doctor for guidance; he is to prescribe postop pain meds for patient, dr. Akbar    Problem: Lupus  Assessment and Plan: advised to ask surgeon or rheumatologist when to stop plaquenil    Problem: Abnormal weight loss  Assessment and Plan: anesthesia to be aware    Problem: COPD, severe  Assessment and Plan: pulm clearance needed; uses O2 at night    Problem: Left elbow pain  Assessment and Plan: left elbow lateral nirschl; covid appt given, preop instructions given, went for cardiac and pulm clearance to have heart CT scan; had labs and ekg

## 2020-09-25 NOTE — H&P PST ADULT - ANESTHESIA, PREVIOUS REACTION, PROFILE
neck surgery as a child-stopped breathing, but ended up ok;  sister had bad reaction; lung collapsed

## 2020-09-25 NOTE — H&P PST ADULT - NSICDXFAMILYHX_GEN_ALL_CORE_FT
FAMILY HISTORY:  Family history of heart disease, htn-father  Family history of multiple sclerosis, sister  FH: arrhythmia, mother-dementia    Sibling  Still living? Yes, Estimated age: Age Unknown  Family history of diabetes mellitus, Age at diagnosis: Age Unknown  Family history of psoriatic arthritis, Age at diagnosis: Age Unknown  Family history of systemic lupus erythematosus (SLE) in mother, Age at diagnosis: Age Unknown

## 2020-09-25 NOTE — H&P PST ADULT - WEIGHT IN LBS
Nursing notes reviewed and accepted.    Lisette Lyons is a 18 month old female who presents for 18 month well child exam.  Patient presents with Mother and with sibling(s).    Concerns raised today include: per nurses notes     Feeding: on table food with good milk intake overall  Sleeping: No sleep or behavioral concerns.  Elimination:  Normal wet diapers and bowel movements.    SOCIAL:  Primary caretakers: mom and dad  Support at home:  Yes  Smoke exposure: none    DEVELOPMENT:  walks backward, points to 1-2 body parts (\"show me your nose, eyes,\"), drinks from a cup, imitates household chores such as sweeping, uses approximately 10 words but definitely progressing/ increasing and using to communicate, understands the meaning of \"Get up\" or \"Sit down\", stacks 2 blocks and scribbles    Birth history, medical history, surgical history, and family history reviewed and updated.  Past Medical History:   Diagnosis Date   • Torticollis, acquired 2016     Current Outpatient Prescriptions   Medication Sig Dispense Refill   • Cholecalciferol ([employer] VITAMIN D3 DROPS/INFANT PO)      • ibuprofen (MOTRIN,ADVIL) 100 MG/5ML suspension Take by mouth every 8 hours as needed for Fever.       No current facility-administered medications for this visit.      ALLERGIES:  No Known Allergies  ROS: No n/v/d; no rashes    PHYSICAL EXAM:  Height 33\" (83.8 cm), weight 12.1 kg, head circumference 50.1 cm (19.72\").  GENERAL:  Well appearing  female, nontoxic, no acute distress.  Alert and     interactive.  SKIN: Warm, normal turgor.  No cyanosis.  No bruises or lesions.  HEAD:  Normocephalic, atraumatic.  Anterior fontanel open, soft and flat.  EYES:  Conjunctivae appear normal, non-injected, non-icteric.  NOSE:  Appears normal, no flaring.  EARS:  Normal pinnae, no preauricular skin tags or pit. Tympanic membranes are transparent with good landmarks.  THROAT:  Oropharynx with moist mucous membranes and no lesions.  NECK:  Supple, no  lymphadenopathy or masses.  HEART:  Regular rate and rhythm.  Quiet precordium.  Normal S1, S2.  No murmurs, rubs, gallops.   LUNGS:  Clear to auscultation bilaterally.  No wheezes, rales, rhonchi.  Normal work of breathing.  ABDOMEN:  Soft, nontender.  No organomegaly or masses.  GENITOURINARY:  Long 1 female and No labial adhesions or lesions.  MUSCULOSKELETAL:  Hips within normal range of motion.  Negative Arteaga, Ortolani.  Spine straight.  Normal sacrum.  EXTREMITIES:  Warm, dry, without abnormalities.  NEUROLOGIC:  Normal tone, bulk, strength.    ASSESSMENT:  18 month old female well child with normal growth and development    PLAN:    All parental concerns and questions discussed.  Anticipatory guidance provided, handout given.              [facility]              Diet              Accident prevention: childproof home, water safety              Name and vocalize objects              Drink from cup              Analgesics/antipyretics              Sun exposure              Tobacco-free home              Dental care              Lead exposure risk: none              Vitamin D supplementation: not needed              Fluoride 0.25 mg PO daily:  not needed  Immunizations per orders- UTD.  Risks, benefits, and side effects discussed.  Return to clinic for 1 year old for well child examination or sooner if needed with illness/concerns.               77.6 76.5

## 2020-09-25 NOTE — H&P PST ADULT - NSICDXPASTMEDICALHX_GEN_ALL_CORE_FT
PAST MEDICAL HISTORY:  Anxiety     Burning mouth syndrome     Chronic low back pain with sciatica     Chronic neck pain cervical bone fused throught disc    Dvt femoral (deep venous thrombosis) right arm, past    Emphysema/COPD on oxygen at night 2L NC    GERD (gastroesophageal reflux disease) gastritis, vomits often    Glossopharyngeal neuralgia syndrome s/p brain sx in 12/2018    H/O osteoporosis severe, was on forteo    H/O Raynaud's syndrome     Hepatitis C body cleared    History of IBS had blockage about 6 months ago-resolved with NG tube, colitis    History of seizure disorder last seizure 8 yrs ago    History of weight loss lost 20 pounds within the last year-unknown cause    Hypotension in past had syncope related to brain problem-better recently    Hypothyroidism     Lupus     Migraine     Neuropathy     Nicotine addiction     Osteochondritis dissecans both ankles    Pain, wrist, right collapse    Personal history of skin cancer toe    Right shoulder pain     Sjogren's disease     Vertigo

## 2020-09-25 NOTE — H&P PST ADULT - HISTORY OF PRESENT ILLNESS
this is a 54 y/o female who has had left elbow pain for about 1 year; has torn tendon; to have repair of same

## 2020-09-30 ENCOUNTER — OUTPATIENT (OUTPATIENT)
Dept: OUTPATIENT SERVICES | Facility: HOSPITAL | Age: 55
LOS: 1 days | End: 2020-09-30
Payer: MEDICARE

## 2020-09-30 DIAGNOSIS — J38.1 POLYP OF VOCAL CORD AND LARYNX: Chronic | ICD-10-CM

## 2020-09-30 DIAGNOSIS — Z11.59 ENCOUNTER FOR SCREENING FOR OTHER VIRAL DISEASES: ICD-10-CM

## 2020-09-30 DIAGNOSIS — M25.9 JOINT DISORDER, UNSPECIFIED: Chronic | ICD-10-CM

## 2020-09-30 DIAGNOSIS — Z98.890 OTHER SPECIFIED POSTPROCEDURAL STATES: Chronic | ICD-10-CM

## 2020-09-30 DIAGNOSIS — K82.9 DISEASE OF GALLBLADDER, UNSPECIFIED: Chronic | ICD-10-CM

## 2020-09-30 DIAGNOSIS — S69.91XD UNSPECIFIED INJURY OF RIGHT WRIST, HAND AND FINGER(S), SUBSEQUENT ENCOUNTER: Chronic | ICD-10-CM

## 2020-09-30 LAB — SARS-COV-2 RNA SPEC QL NAA+PROBE: SIGNIFICANT CHANGE UP

## 2020-09-30 PROCEDURE — U0003: CPT

## 2020-10-01 ENCOUNTER — TRANSCRIPTION ENCOUNTER (OUTPATIENT)
Age: 55
End: 2020-10-01

## 2020-10-01 NOTE — ASU PATIENT PROFILE, ADULT - PSH
Ankle problem  right ankle surgery  Gall bladder disease  REMOVAL  H/O lumpectomy  right shouler, axillary, both breasts head, right back  Injury of right wrist, subsequent encounter  no surgery  S/P brain surgery  2018, also had gamma knife procedure  S/P cryoablation of arrhythmia    Vocal cord polyp  REMOVAL

## 2020-10-01 NOTE — ASU PATIENT PROFILE, ADULT - PMH
Anxiety    Burning mouth syndrome    Chronic low back pain with sciatica    Chronic neck pain  cervical bone fused throught disc  Dvt femoral (deep venous thrombosis)  right arm, past  Emphysema/COPD  on oxygen at night 2L NC  GERD (gastroesophageal reflux disease)  gastritis, vomits often  Glossopharyngeal neuralgia syndrome  s/p brain sx in 12/2018  H/O osteoporosis  severe, was on forteo  H/O Raynaud's syndrome    Hepatitis C  body cleared  History of IBS  had blockage about 6 months ago-resolved with NG tube, colitis  History of seizure disorder  last seizure 8 yrs ago  History of weight loss  lost 20 pounds within the last year-unknown cause  Hypotension  in past had syncope related to brain problem-better recently  Hypothyroidism    Lupus    Migraine    Neuropathy    Nicotine addiction    Osteochondritis dissecans  both ankles  Pain, wrist, right  collapse  Personal history of skin cancer  toe  Right shoulder pain    Sjogren's disease    Vertigo

## 2020-10-02 ENCOUNTER — OUTPATIENT (OUTPATIENT)
Dept: OUTPATIENT SERVICES | Facility: HOSPITAL | Age: 55
LOS: 1 days | End: 2020-10-02
Payer: MEDICARE

## 2020-10-02 VITALS
HEIGHT: 56 IN | HEART RATE: 58 BPM | RESPIRATION RATE: 13 BRPM | WEIGHT: 83.11 LBS | OXYGEN SATURATION: 100 % | TEMPERATURE: 98 F | DIASTOLIC BLOOD PRESSURE: 79 MMHG | SYSTOLIC BLOOD PRESSURE: 105 MMHG

## 2020-10-02 VITALS — SYSTOLIC BLOOD PRESSURE: 132 MMHG | OXYGEN SATURATION: 98 % | HEART RATE: 75 BPM | DIASTOLIC BLOOD PRESSURE: 62 MMHG

## 2020-10-02 DIAGNOSIS — Z98.890 OTHER SPECIFIED POSTPROCEDURAL STATES: Chronic | ICD-10-CM

## 2020-10-02 DIAGNOSIS — K82.9 DISEASE OF GALLBLADDER, UNSPECIFIED: Chronic | ICD-10-CM

## 2020-10-02 DIAGNOSIS — M77.12 LATERAL EPICONDYLITIS, LEFT ELBOW: ICD-10-CM

## 2020-10-02 DIAGNOSIS — M25.9 JOINT DISORDER, UNSPECIFIED: Chronic | ICD-10-CM

## 2020-10-02 DIAGNOSIS — S69.91XD UNSPECIFIED INJURY OF RIGHT WRIST, HAND AND FINGER(S), SUBSEQUENT ENCOUNTER: Chronic | ICD-10-CM

## 2020-10-02 DIAGNOSIS — J38.1 POLYP OF VOCAL CORD AND LARYNX: Chronic | ICD-10-CM

## 2020-10-02 LAB
ANION GAP SERPL CALC-SCNC: 2 MMOL/L — LOW (ref 5–17)
BUN SERPL-MCNC: 15 MG/DL — SIGNIFICANT CHANGE UP (ref 7–23)
CALCIUM SERPL-MCNC: 8.9 MG/DL — SIGNIFICANT CHANGE UP (ref 8.4–10.5)
CHLORIDE SERPL-SCNC: 98 MMOL/L — SIGNIFICANT CHANGE UP (ref 96–108)
CO2 SERPL-SCNC: 32 MMOL/L — HIGH (ref 22–31)
CREAT SERPL-MCNC: 0.84 MG/DL — SIGNIFICANT CHANGE UP (ref 0.5–1.3)
GLUCOSE SERPL-MCNC: 67 MG/DL — LOW (ref 70–99)
POTASSIUM SERPL-MCNC: 5.4 MMOL/L — HIGH (ref 3.5–5.3)
POTASSIUM SERPL-SCNC: 5.4 MMOL/L — HIGH (ref 3.5–5.3)
SODIUM SERPL-SCNC: 132 MMOL/L — LOW (ref 135–145)

## 2020-10-02 PROCEDURE — 80048 BASIC METABOLIC PNL TOTAL CA: CPT

## 2020-10-02 PROCEDURE — 24359 REPAIR ELBOW DEB/ATTCH OPEN: CPT | Mod: LT

## 2020-10-02 PROCEDURE — 36415 COLL VENOUS BLD VENIPUNCTURE: CPT

## 2020-10-02 PROCEDURE — C1713: CPT

## 2020-10-02 RX ORDER — FENTANYL CITRATE 50 UG/ML
50 INJECTION INTRAVENOUS
Refills: 0 | Status: DISCONTINUED | OUTPATIENT
Start: 2020-10-02 | End: 2020-10-02

## 2020-10-02 RX ORDER — SODIUM CHLORIDE 9 MG/ML
1000 INJECTION, SOLUTION INTRAVENOUS
Refills: 0 | Status: DISCONTINUED | OUTPATIENT
Start: 2020-10-02 | End: 2020-10-02

## 2020-10-02 RX ORDER — MORPHINE SULFATE 50 MG/1
15 CAPSULE, EXTENDED RELEASE ORAL ONCE
Refills: 0 | Status: DISCONTINUED | OUTPATIENT
Start: 2020-10-02 | End: 2020-10-02

## 2020-10-02 RX ORDER — ONDANSETRON 8 MG/1
4 TABLET, FILM COATED ORAL ONCE
Refills: 0 | Status: DISCONTINUED | OUTPATIENT
Start: 2020-10-02 | End: 2020-10-02

## 2020-10-02 NOTE — PRE-OP CHECKLIST - NSSDAENDDT_GEN_ALL_CORE
Coronary artery disease    Hypothyroidism    Left carotid stenosis    Lyme disease    Neuropathy    TIA (transient ischemic attack) 02-Oct-2020 02-Oct-2020 15:19

## 2020-10-02 NOTE — PROGRESS NOTE ADULT - SUBJECTIVE AND OBJECTIVE BOX
physical exam   left elbow pain  Allergic to aspirin, vibramycin, zithromax  NPO since 10/1/2020  took valium, morphine, omeprazole, ventalin today  vital signs stable

## 2020-10-02 NOTE — ASU DISCHARGE PLAN (ADULT/PEDIATRIC) - CARE PROVIDER_API CALL
Micheal Durand  ORTHOPAEDIC SURGERY  [address] [place] [address]  Phone: [phone]  Fax: [phone]  Follow Up Time:

## 2020-10-02 NOTE — ASU DISCHARGE PLAN (ADULT/PEDIATRIC) - CALL YOUR DOCTOR IF YOU HAVE ANY OF THE FOLLOWING:
Fever greater than (need to indicate Fahrenheit or Celsius)/Wound/Surgical Site with redness, or foul smelling discharge or pus/Numbness, tingling, color or temperature change to extremity

## 2020-11-12 PROBLEM — Z86.69 PERSONAL HISTORY OF OTHER DISEASES OF THE NERVOUS SYSTEM AND SENSE ORGANS: Chronic | Status: ACTIVE | Noted: 2020-09-25

## 2020-11-12 PROBLEM — Z87.898 PERSONAL HISTORY OF OTHER SPECIFIED CONDITIONS: Chronic | Status: ACTIVE | Noted: 2020-09-25

## 2020-11-12 PROBLEM — Z87.39 PERSONAL HISTORY OF OTHER DISEASES OF THE MUSCULOSKELETAL SYSTEM AND CONNECTIVE TISSUE: Chronic | Status: ACTIVE | Noted: 2020-09-25

## 2020-11-12 PROBLEM — M93.20 OSTEOCHONDRITIS DISSECANS OF UNSPECIFIED SITE: Chronic | Status: ACTIVE | Noted: 2020-09-25

## 2020-11-12 PROBLEM — M25.531 PAIN IN RIGHT WRIST: Chronic | Status: ACTIVE | Noted: 2020-09-25

## 2020-11-12 PROBLEM — Z85.828 PERSONAL HISTORY OF OTHER MALIGNANT NEOPLASM OF SKIN: Chronic | Status: ACTIVE | Noted: 2020-09-25

## 2020-11-12 PROBLEM — M25.511 PAIN IN RIGHT SHOULDER: Chronic | Status: ACTIVE | Noted: 2020-09-25

## 2020-11-12 PROBLEM — Z87.19 PERSONAL HISTORY OF OTHER DISEASES OF THE DIGESTIVE SYSTEM: Chronic | Status: ACTIVE | Noted: 2020-09-25

## 2020-11-12 PROBLEM — M54.40 LUMBAGO WITH SCIATICA, UNSPECIFIED SIDE: Chronic | Status: ACTIVE | Noted: 2020-09-25

## 2020-11-12 PROBLEM — K14.6 GLOSSODYNIA: Chronic | Status: ACTIVE | Noted: 2020-09-25

## 2020-11-12 PROBLEM — M54.2 CERVICALGIA: Chronic | Status: ACTIVE | Noted: 2020-09-25

## 2020-11-12 PROBLEM — I82.419 ACUTE EMBOLISM AND THROMBOSIS OF UNSPECIFIED FEMORAL VEIN: Chronic | Status: ACTIVE | Noted: 2019-03-10

## 2020-11-12 PROBLEM — Z86.79 PERSONAL HISTORY OF OTHER DISEASES OF THE CIRCULATORY SYSTEM: Chronic | Status: ACTIVE | Noted: 2020-09-25

## 2020-11-13 ENCOUNTER — OUTPATIENT (OUTPATIENT)
Dept: OUTPATIENT SERVICES | Facility: HOSPITAL | Age: 55
LOS: 1 days | End: 2020-11-13
Payer: MEDICARE

## 2020-11-13 VITALS
RESPIRATION RATE: 14 BRPM | WEIGHT: 77.16 LBS | OXYGEN SATURATION: 99 % | SYSTOLIC BLOOD PRESSURE: 93 MMHG | HEIGHT: 56 IN | TEMPERATURE: 98 F | HEART RATE: 76 BPM | DIASTOLIC BLOOD PRESSURE: 61 MMHG

## 2020-11-13 DIAGNOSIS — Z11.59 ENCOUNTER FOR SCREENING FOR OTHER VIRAL DISEASES: ICD-10-CM

## 2020-11-13 DIAGNOSIS — Z98.890 OTHER SPECIFIED POSTPROCEDURAL STATES: Chronic | ICD-10-CM

## 2020-11-13 DIAGNOSIS — Z01.818 ENCOUNTER FOR OTHER PREPROCEDURAL EXAMINATION: ICD-10-CM

## 2020-11-13 DIAGNOSIS — J38.1 POLYP OF VOCAL CORD AND LARYNX: Chronic | ICD-10-CM

## 2020-11-13 DIAGNOSIS — M77.11 LATERAL EPICONDYLITIS, RIGHT ELBOW: ICD-10-CM

## 2020-11-13 DIAGNOSIS — M25.9 JOINT DISORDER, UNSPECIFIED: Chronic | ICD-10-CM

## 2020-11-13 DIAGNOSIS — K82.9 DISEASE OF GALLBLADDER, UNSPECIFIED: Chronic | ICD-10-CM

## 2020-11-13 DIAGNOSIS — Z95.818 PRESENCE OF OTHER CARDIAC IMPLANTS AND GRAFTS: Chronic | ICD-10-CM

## 2020-11-13 DIAGNOSIS — S69.91XD UNSPECIFIED INJURY OF RIGHT WRIST, HAND AND FINGER(S), SUBSEQUENT ENCOUNTER: Chronic | ICD-10-CM

## 2020-11-13 PROCEDURE — G0463: CPT

## 2020-11-13 NOTE — H&P PST ADULT - HISTORY OF PRESENT ILLNESS
56yo right hand dominant female patient with approximately a year history of progressively worsening right elbow pain. She has had Cortisone injections with no significant improvement. She is s/p left elbow surgery recently. She rates the pain at 10/10. She is taking Morphine ER and IR prn with partial relief. She was told that surgical repair is recommended.

## 2020-11-13 NOTE — H&P PST ADULT - PRO TOBACCO QUIT READY
----- Message from Diane Poon sent at 3/2/2017  7:31 AM CST -----  Contact: Self  Pt said you told him to call and you would get him in early today. Tooth pulled and affected his sinuses.  Please call him at [phone] asap  
Arrives with complaints of cough, SOB, sore throat, and fever since last Saturday. Alert and oriented, no acute respiratory distress, ABCs intact.  
knows that she has to quit/thinking about quitting

## 2020-11-13 NOTE — H&P PST ADULT - ASSESSMENT
54yo female patient scheduled for surgery on 11/20/2020. She will obtain Cardiac and Pulmonary Clearances. She will be NPO as per Anesthesia and will take Levothyroxine, Omeprazole and Morphine on AM of surgery with a sip of water. She will hold Diclofenac topical starting today.  All pre-op instructions reviewed with patient. As per Covid 19 Protocol, she will be screened for Covid on 11/18/20, her history was obtained via telephone interview and PE will be done on admission.

## 2020-11-13 NOTE — H&P PST ADULT - NSICDXPROBLEM_GEN_ALL_CORE_FT
PROBLEM DIAGNOSES  Problem: Lateral epicondylitis, right elbow  Assessment and Plan: RIGHT Elbow Lateral Nirschl procedure on 11/20/2020

## 2020-11-13 NOTE — H&P PST ADULT - RS GEN PE MLT RESP DETAILS PC
good air movement/normal/airway patent/clear to auscultation bilaterally/breath sounds equal/respirations non-labored

## 2020-11-13 NOTE — H&P PST ADULT - ANESTHESIA, PREVIOUS REACTION, PROFILE
neck surgery as a child-stopped breathing.  but ended up ok;  sister had bad reaction; lung collapsed neck surgery as a child-delayed wakening/sister had surgery complications including pneumothorax- unclear if any complications r/t anesthesia/delayed awakening

## 2020-11-13 NOTE — H&P PST ADULT - NSICDXPASTSURGICALHX_GEN_ALL_CORE_FT
PAST SURGICAL HISTORY:  Ankle problem right ankle surgery    Gall bladder disease REMOVAL    H/O lumpectomy right shouler, axillary, both breasts head, right back    Injury of right wrist, subsequent encounter no surgery    S/P brain surgery 2018, also had gamma knife procedure    S/P cryoablation of arrhythmia     Vocal cord polyp REMOVAL     PAST SURGICAL HISTORY:  Ankle problem right ankle surgery    Gall bladder disease REMOVAL    H/O elbow surgery 10/2020 - LEFT elbow    H/O lumpectomy right shouler, axillary, both breasts head, right back    H/O squamous cell carcinoma excision toe    Injury of right wrist, subsequent encounter no surgery    S/P brain surgery 2018, also had gamma knife procedure-microvascular decompression- titanium plate in skull    S/P cryoablation of arrhythmia     Status post placement of implantable loop recorder     Vocal cord polyp REMOVAL

## 2020-11-13 NOTE — H&P PST ADULT - NSICDXPASTMEDICALHX_GEN_ALL_CORE_FT
PAST MEDICAL HISTORY:  Anxiety     Burning mouth syndrome     Chronic low back pain with sciatica     Chronic neck pain cervical bone fused throught disc    Dvt femoral (deep venous thrombosis) right arm, past    Emphysema/COPD on oxygen at night 2L NC    GERD (gastroesophageal reflux disease) gastritis, vomits often    Glossopharyngeal neuralgia syndrome s/p brain sx in 12/2018    H/O osteoporosis severe, was on forteo    H/O Raynaud's syndrome     Hepatitis C body cleared    History of IBS had blockage about 6 months ago-resolved with NG tube, colitis    History of seizure disorder last seizure 8 yrs ago    History of weight loss lost 20 pounds within the last year-unknown cause    Hypotension in past had syncope related to brain problem-better recently    Hypothyroidism     Lupus     Migraine     Neuropathy     Nicotine addiction     Osteochondritis dissecans both ankles    Pain, wrist, right collapse    Personal history of skin cancer toe    Right shoulder pain     Sjogren's disease     Vertigo      PAST MEDICAL HISTORY:  Anxiety     Burning mouth syndrome     Chronic low back pain with sciatica     Chronic neck pain cervical bone fused throught disc    Dvt femoral (deep venous thrombosis) right arm, past    Emphysema/COPD on oxygen at night 3L NC    GERD (gastroesophageal reflux disease) gastritis, vomits often    Glossopharyngeal neuralgia syndrome s/p brain sx in 12/2018    H/O osteoporosis severe, was on forteo    H/O paroxysmal supraventricular tachycardia     H/O Raynaud's syndrome     Hepatitis C body cleared    History of IBS had blockage about 6 months ago-resolved with NG tube, colitis    History of seizure disorder last seizure 8 yrs ago    History of weight loss lost 20 pounds within the last year-unknown cause    Hypotension in past had syncope related to brain problem-better recently    Hypothyroidism     Lupus     Migraine     Neuropathy     Nicotine addiction     Osteochondritis dissecans both ankles    Pain, wrist, right collapse    Personal history of skin cancer toe    Right shoulder pain     Sjogren's disease     Vertigo

## 2020-11-13 NOTE — H&P PST ADULT - VENOUS THROMBOEMBOLISM CURRENT STATUS
(1) other risk factor (includes escalating BMI, pack-years of smoking, diabetes requiring insulin, chemotherapy, female gender and length of surgery)/(1) varicose veins/(2) malignancy (present or previous)/(1) serious lung disease, including pneumonia (within 1 month)

## 2020-11-17 PROBLEM — J43.9 EMPHYSEMA, UNSPECIFIED: Chronic | Status: ACTIVE | Noted: 2019-09-25

## 2020-11-17 PROBLEM — Z86.79 PERSONAL HISTORY OF OTHER DISEASES OF THE CIRCULATORY SYSTEM: Chronic | Status: ACTIVE | Noted: 2020-11-13

## 2020-11-18 ENCOUNTER — OUTPATIENT (OUTPATIENT)
Dept: OUTPATIENT SERVICES | Facility: HOSPITAL | Age: 55
LOS: 1 days | End: 2020-11-18
Payer: MEDICARE

## 2020-11-18 DIAGNOSIS — Z98.890 OTHER SPECIFIED POSTPROCEDURAL STATES: Chronic | ICD-10-CM

## 2020-11-18 DIAGNOSIS — S69.91XD UNSPECIFIED INJURY OF RIGHT WRIST, HAND AND FINGER(S), SUBSEQUENT ENCOUNTER: Chronic | ICD-10-CM

## 2020-11-18 DIAGNOSIS — Z95.818 PRESENCE OF OTHER CARDIAC IMPLANTS AND GRAFTS: Chronic | ICD-10-CM

## 2020-11-18 DIAGNOSIS — M25.9 JOINT DISORDER, UNSPECIFIED: Chronic | ICD-10-CM

## 2020-11-18 DIAGNOSIS — J38.1 POLYP OF VOCAL CORD AND LARYNX: Chronic | ICD-10-CM

## 2020-11-18 DIAGNOSIS — K82.9 DISEASE OF GALLBLADDER, UNSPECIFIED: Chronic | ICD-10-CM

## 2020-11-18 DIAGNOSIS — Z11.59 ENCOUNTER FOR SCREENING FOR OTHER VIRAL DISEASES: ICD-10-CM

## 2020-11-18 LAB — SARS-COV-2 RNA SPEC QL NAA+PROBE: SIGNIFICANT CHANGE UP

## 2020-11-18 PROCEDURE — U0003: CPT

## 2020-11-19 ENCOUNTER — TRANSCRIPTION ENCOUNTER (OUTPATIENT)
Age: 55
End: 2020-11-19

## 2020-11-19 NOTE — ASU PATIENT PROFILE, ADULT - PSH
Ankle problem  right ankle surgery  Gall bladder disease  REMOVAL  H/O elbow surgery  10/2020 - LEFT elbow  H/O lumpectomy  right shouler, axillary, both breasts head, right back  H/O squamous cell carcinoma excision  toe  Injury of right wrist, subsequent encounter  no surgery  S/P brain surgery  2018, also had gamma knife procedure-microvascular decompression- titanium plate in skull  S/P cryoablation of arrhythmia    Status post placement of implantable loop recorder    Vocal cord polyp  REMOVAL

## 2020-11-19 NOTE — ASU PATIENT PROFILE, ADULT - PMH
Anxiety    Burning mouth syndrome    Chronic low back pain with sciatica    Chronic neck pain  cervical bone fused throught disc  Dvt femoral (deep venous thrombosis)  right arm, past  Emphysema/COPD  on oxygen at night 3L [place]  GERD (gastroesophageal reflux disease)  gastritis, vomits often  Glossopharyngeal neuralgia syndrome  s/p brain sx in 12/2018  H/O osteoporosis  severe, was on forteo  H/O paroxysmal supraventricular tachycardia    H/O Raynaud's syndrome    Hepatitis C  body cleared  History of IBS  had blockage about 6 months ago-resolved with NG tube, colitis  History of seizure disorder  last seizure 8 yrs ago  History of weight loss  lost 20 pounds within the last year-unknown cause  Hypotension  in past had syncope related to brain problem-better recently  Hypothyroidism    Lupus    Migraine    Neuropathy    Nicotine addiction    Osteochondritis dissecans  both ankles  Pain, wrist, right  collapse  Personal history of skin cancer  toe  Right shoulder pain    Sjogren's disease    Vertigo

## 2020-11-20 ENCOUNTER — OUTPATIENT (OUTPATIENT)
Dept: OUTPATIENT SERVICES | Facility: HOSPITAL | Age: 55
LOS: 1 days | End: 2020-11-20
Payer: MEDICARE

## 2020-11-20 VITALS
SYSTOLIC BLOOD PRESSURE: 93 MMHG | WEIGHT: 77.16 LBS | HEART RATE: 76 BPM | TEMPERATURE: 98 F | RESPIRATION RATE: 12 BRPM | HEIGHT: 56 IN | DIASTOLIC BLOOD PRESSURE: 61 MMHG | OXYGEN SATURATION: 99 %

## 2020-11-20 VITALS
OXYGEN SATURATION: 100 % | HEART RATE: 70 BPM | RESPIRATION RATE: 16 BRPM | DIASTOLIC BLOOD PRESSURE: 57 MMHG | SYSTOLIC BLOOD PRESSURE: 105 MMHG

## 2020-11-20 DIAGNOSIS — Z98.890 OTHER SPECIFIED POSTPROCEDURAL STATES: Chronic | ICD-10-CM

## 2020-11-20 DIAGNOSIS — Z01.818 ENCOUNTER FOR OTHER PREPROCEDURAL EXAMINATION: ICD-10-CM

## 2020-11-20 DIAGNOSIS — M25.9 JOINT DISORDER, UNSPECIFIED: Chronic | ICD-10-CM

## 2020-11-20 DIAGNOSIS — M77.11 LATERAL EPICONDYLITIS, RIGHT ELBOW: ICD-10-CM

## 2020-11-20 DIAGNOSIS — J38.1 POLYP OF VOCAL CORD AND LARYNX: Chronic | ICD-10-CM

## 2020-11-20 DIAGNOSIS — S69.91XD UNSPECIFIED INJURY OF RIGHT WRIST, HAND AND FINGER(S), SUBSEQUENT ENCOUNTER: Chronic | ICD-10-CM

## 2020-11-20 DIAGNOSIS — Z95.818 PRESENCE OF OTHER CARDIAC IMPLANTS AND GRAFTS: Chronic | ICD-10-CM

## 2020-11-20 DIAGNOSIS — K82.9 DISEASE OF GALLBLADDER, UNSPECIFIED: Chronic | ICD-10-CM

## 2020-11-20 LAB
ALBUMIN SERPL ELPH-MCNC: 3.8 G/DL — SIGNIFICANT CHANGE UP (ref 3.3–5)
ALP SERPL-CCNC: 83 U/L — SIGNIFICANT CHANGE UP (ref 30–120)
ALT FLD-CCNC: 31 U/L DA — SIGNIFICANT CHANGE UP (ref 10–60)
ANION GAP SERPL CALC-SCNC: 9 MMOL/L — SIGNIFICANT CHANGE UP (ref 5–17)
AST SERPL-CCNC: 22 U/L — SIGNIFICANT CHANGE UP (ref 10–40)
BILIRUB SERPL-MCNC: 0.3 MG/DL — SIGNIFICANT CHANGE UP (ref 0.2–1.2)
BUN SERPL-MCNC: 10 MG/DL — SIGNIFICANT CHANGE UP (ref 7–23)
CALCIUM SERPL-MCNC: 9 MG/DL — SIGNIFICANT CHANGE UP (ref 8.4–10.5)
CHLORIDE SERPL-SCNC: 98 MMOL/L — SIGNIFICANT CHANGE UP (ref 96–108)
CO2 SERPL-SCNC: 24 MMOL/L — SIGNIFICANT CHANGE UP (ref 22–31)
CREAT SERPL-MCNC: 0.93 MG/DL — SIGNIFICANT CHANGE UP (ref 0.5–1.3)
GLUCOSE SERPL-MCNC: 72 MG/DL — SIGNIFICANT CHANGE UP (ref 70–99)
HCT VFR BLD CALC: 33.8 % — LOW (ref 34.5–45)
HGB BLD-MCNC: 11.5 G/DL — SIGNIFICANT CHANGE UP (ref 11.5–15.5)
MCHC RBC-ENTMCNC: 32.2 PG — SIGNIFICANT CHANGE UP (ref 27–34)
MCHC RBC-ENTMCNC: 34 GM/DL — SIGNIFICANT CHANGE UP (ref 32–36)
MCV RBC AUTO: 94.7 FL — SIGNIFICANT CHANGE UP (ref 80–100)
NRBC # BLD: 0 /100 WBCS — SIGNIFICANT CHANGE UP (ref 0–0)
PLATELET # BLD AUTO: 316 K/UL — SIGNIFICANT CHANGE UP (ref 150–400)
POTASSIUM SERPL-MCNC: 4.6 MMOL/L — SIGNIFICANT CHANGE UP (ref 3.5–5.3)
POTASSIUM SERPL-SCNC: 4.6 MMOL/L — SIGNIFICANT CHANGE UP (ref 3.5–5.3)
PROT SERPL-MCNC: 6.5 G/DL — SIGNIFICANT CHANGE UP (ref 6–8.3)
RBC # BLD: 3.57 M/UL — LOW (ref 3.8–5.2)
RBC # FLD: 12.6 % — SIGNIFICANT CHANGE UP (ref 10.3–14.5)
SODIUM SERPL-SCNC: 131 MMOL/L — LOW (ref 135–145)
WBC # BLD: 9.35 K/UL — SIGNIFICANT CHANGE UP (ref 3.8–10.5)
WBC # FLD AUTO: 9.35 K/UL — SIGNIFICANT CHANGE UP (ref 3.8–10.5)

## 2020-11-20 PROCEDURE — 80053 COMPREHEN METABOLIC PANEL: CPT

## 2020-11-20 PROCEDURE — 85027 COMPLETE CBC AUTOMATED: CPT

## 2020-11-20 PROCEDURE — 36415 COLL VENOUS BLD VENIPUNCTURE: CPT

## 2020-11-20 PROCEDURE — 24359 REPAIR ELBOW DEB/ATTCH OPEN: CPT | Mod: RT

## 2020-11-20 PROCEDURE — C1713: CPT

## 2020-11-20 RX ORDER — SODIUM CHLORIDE 9 MG/ML
1000 INJECTION, SOLUTION INTRAVENOUS
Refills: 0 | Status: DISCONTINUED | OUTPATIENT
Start: 2020-11-20 | End: 2020-11-20

## 2020-11-20 RX ORDER — MORPHINE SULFATE 50 MG/1
5 CAPSULE, EXTENDED RELEASE ORAL ONCE
Refills: 0 | Status: DISCONTINUED | OUTPATIENT
Start: 2020-11-20 | End: 2020-11-20

## 2020-11-20 RX ORDER — ONDANSETRON 8 MG/1
4 TABLET, FILM COATED ORAL ONCE
Refills: 0 | Status: COMPLETED | OUTPATIENT
Start: 2020-11-20 | End: 2020-11-20

## 2020-11-20 RX ORDER — APREPITANT 80 MG/1
40 CAPSULE ORAL ONCE
Refills: 0 | Status: COMPLETED | OUTPATIENT
Start: 2020-11-20 | End: 2020-11-20

## 2020-11-20 RX ORDER — CHLORHEXIDINE GLUCONATE 213 G/1000ML
1 SOLUTION TOPICAL ONCE
Refills: 0 | Status: COMPLETED | OUTPATIENT
Start: 2020-11-20 | End: 2020-11-20

## 2020-11-20 RX ORDER — CEFAZOLIN SODIUM 1 G
2000 VIAL (EA) INJECTION ONCE
Refills: 0 | Status: COMPLETED | OUTPATIENT
Start: 2020-11-20 | End: 2020-11-20

## 2020-11-20 RX ORDER — FENTANYL CITRATE 50 UG/ML
50 INJECTION INTRAVENOUS
Refills: 0 | Status: DISCONTINUED | OUTPATIENT
Start: 2020-11-20 | End: 2020-11-20

## 2020-11-20 RX ADMIN — SODIUM CHLORIDE 75 MILLILITER(S): 9 INJECTION, SOLUTION INTRAVENOUS at 16:45

## 2020-11-20 RX ADMIN — FENTANYL CITRATE 50 MICROGRAM(S): 50 INJECTION INTRAVENOUS at 16:57

## 2020-11-20 RX ADMIN — FENTANYL CITRATE 50 MICROGRAM(S): 50 INJECTION INTRAVENOUS at 16:46

## 2020-11-20 RX ADMIN — CHLORHEXIDINE GLUCONATE 1 APPLICATION(S): 213 SOLUTION TOPICAL at 15:05

## 2020-11-20 RX ADMIN — APREPITANT 40 MILLIGRAM(S): 80 CAPSULE ORAL at 15:05

## 2020-11-20 RX ADMIN — ONDANSETRON 4 MILLIGRAM(S): 8 TABLET, FILM COATED ORAL at 16:46

## 2020-11-20 NOTE — ASU DISCHARGE PLAN (ADULT/PEDIATRIC) - ACTIVITY LEVEL
Weight bearing as tolerated/Elevate extremity Weight bearing as tolerated/No heavy lifting/No sports/gym/Elevate extremity

## 2020-11-20 NOTE — ASU DISCHARGE PLAN (ADULT/PEDIATRIC) - CARE PROVIDER_API CALL
Micheal Durand  ORTHOPAEDIC SURGERY  [address] [place] [address]  Phone: [phone]  Fax: [phone]  Follow Up Time:

## 2020-11-30 NOTE — PROGRESS NOTE ADULT - ASSESSMENT
53 y/o F with PMH of HTN, COPD, SLE, Sjogren's Syndrome, Glossopharyngeal Neuropathy pending Gamma Knife procedure, Hep-C, Hypothyroidism, GERD, Migraine, and anxiety, who is admitted last night with rectal bleeding.
53 y/o F with PMH of HTN, COPD, SLE, Sjogren's Syndrome, Glossopharyngeal Neuropathy pending Gamma Knife procedure, Hep-C, Hypothyroidism, GERD, Migraine, and anxiety, who was admitted with rectal bleeding and possible ischemic colitis.
oriented to person, place, time and situation

## 2020-12-27 ENCOUNTER — TRANSCRIPTION ENCOUNTER (OUTPATIENT)
Age: 55
End: 2020-12-27

## 2020-12-27 ENCOUNTER — INPATIENT (INPATIENT)
Facility: HOSPITAL | Age: 55
LOS: 3 days | Discharge: HOME | DRG: 857 | End: 2020-12-31
Attending: ORTHOPAEDIC SURGERY | Admitting: ORTHOPAEDIC SURGERY
Payer: MEDICARE

## 2020-12-27 VITALS
HEIGHT: 56 IN | RESPIRATION RATE: 20 BRPM | SYSTOLIC BLOOD PRESSURE: 90 MMHG | TEMPERATURE: 98 F | DIASTOLIC BLOOD PRESSURE: 50 MMHG | OXYGEN SATURATION: 98 % | WEIGHT: 80.03 LBS | HEART RATE: 80 BPM

## 2020-12-27 DIAGNOSIS — Z95.818 PRESENCE OF OTHER CARDIAC IMPLANTS AND GRAFTS: Chronic | ICD-10-CM

## 2020-12-27 DIAGNOSIS — Z98.890 OTHER SPECIFIED POSTPROCEDURAL STATES: Chronic | ICD-10-CM

## 2020-12-27 DIAGNOSIS — J38.1 POLYP OF VOCAL CORD AND LARYNX: Chronic | ICD-10-CM

## 2020-12-27 DIAGNOSIS — M25.9 JOINT DISORDER, UNSPECIFIED: Chronic | ICD-10-CM

## 2020-12-27 DIAGNOSIS — S69.91XD UNSPECIFIED INJURY OF RIGHT WRIST, HAND AND FINGER(S), SUBSEQUENT ENCOUNTER: Chronic | ICD-10-CM

## 2020-12-27 DIAGNOSIS — K82.9 DISEASE OF GALLBLADDER, UNSPECIFIED: Chronic | ICD-10-CM

## 2020-12-27 DIAGNOSIS — L02.419 CUTANEOUS ABSCESS OF LIMB, UNSPECIFIED: ICD-10-CM

## 2020-12-27 LAB
ALBUMIN SERPL ELPH-MCNC: 3.4 G/DL — SIGNIFICANT CHANGE UP (ref 3.3–5)
ALP SERPL-CCNC: 90 U/L — SIGNIFICANT CHANGE UP (ref 30–120)
ALT FLD-CCNC: 27 U/L DA — SIGNIFICANT CHANGE UP (ref 10–60)
ANION GAP SERPL CALC-SCNC: 10 MMOL/L — SIGNIFICANT CHANGE UP (ref 5–17)
APTT BLD: 25.7 SEC — LOW (ref 27.5–35.5)
AST SERPL-CCNC: 16 U/L — SIGNIFICANT CHANGE UP (ref 10–40)
BASOPHILS # BLD AUTO: 0.08 K/UL — SIGNIFICANT CHANGE UP (ref 0–0.2)
BASOPHILS NFR BLD AUTO: 0.6 % — SIGNIFICANT CHANGE UP (ref 0–2)
BILIRUB SERPL-MCNC: 0.1 MG/DL — LOW (ref 0.2–1.2)
BLD GP AB SCN SERPL QL: SIGNIFICANT CHANGE UP
BUN SERPL-MCNC: 17 MG/DL — SIGNIFICANT CHANGE UP (ref 7–23)
CALCIUM SERPL-MCNC: 8.9 MG/DL — SIGNIFICANT CHANGE UP (ref 8.4–10.5)
CHLORIDE SERPL-SCNC: 97 MMOL/L — SIGNIFICANT CHANGE UP (ref 96–108)
CO2 SERPL-SCNC: 24 MMOL/L — SIGNIFICANT CHANGE UP (ref 22–31)
CREAT SERPL-MCNC: 0.84 MG/DL — SIGNIFICANT CHANGE UP (ref 0.5–1.3)
EOSINOPHIL # BLD AUTO: 0.41 K/UL — SIGNIFICANT CHANGE UP (ref 0–0.5)
EOSINOPHIL NFR BLD AUTO: 3.2 % — SIGNIFICANT CHANGE UP (ref 0–6)
ERYTHROCYTE [SEDIMENTATION RATE] IN BLOOD: 36 MM/HR — HIGH (ref 0–20)
GLUCOSE SERPL-MCNC: 96 MG/DL — SIGNIFICANT CHANGE UP (ref 70–99)
HCT VFR BLD CALC: 33.9 % — LOW (ref 34.5–45)
HGB BLD-MCNC: 11.5 G/DL — SIGNIFICANT CHANGE UP (ref 11.5–15.5)
IMM GRANULOCYTES NFR BLD AUTO: 0.5 % — SIGNIFICANT CHANGE UP (ref 0–1.5)
INR BLD: 0.95 RATIO — SIGNIFICANT CHANGE UP (ref 0.88–1.16)
LACTATE SERPL-SCNC: 1.2 MMOL/L — SIGNIFICANT CHANGE UP (ref 0.7–2)
LYMPHOCYTES # BLD AUTO: 24.9 % — SIGNIFICANT CHANGE UP (ref 13–44)
LYMPHOCYTES # BLD AUTO: 3.16 K/UL — SIGNIFICANT CHANGE UP (ref 1–3.3)
MCHC RBC-ENTMCNC: 33 PG — SIGNIFICANT CHANGE UP (ref 27–34)
MCHC RBC-ENTMCNC: 33.9 GM/DL — SIGNIFICANT CHANGE UP (ref 32–36)
MCV RBC AUTO: 97.4 FL — SIGNIFICANT CHANGE UP (ref 80–100)
MONOCYTES # BLD AUTO: 1 K/UL — HIGH (ref 0–0.9)
MONOCYTES NFR BLD AUTO: 7.9 % — SIGNIFICANT CHANGE UP (ref 2–14)
NEUTROPHILS # BLD AUTO: 7.97 K/UL — HIGH (ref 1.8–7.4)
NEUTROPHILS NFR BLD AUTO: 62.9 % — SIGNIFICANT CHANGE UP (ref 43–77)
NRBC # BLD: 0 /100 WBCS — SIGNIFICANT CHANGE UP (ref 0–0)
PLATELET # BLD AUTO: 393 K/UL — SIGNIFICANT CHANGE UP (ref 150–400)
POTASSIUM SERPL-MCNC: 4.1 MMOL/L — SIGNIFICANT CHANGE UP (ref 3.5–5.3)
POTASSIUM SERPL-SCNC: 4.1 MMOL/L — SIGNIFICANT CHANGE UP (ref 3.5–5.3)
PROT SERPL-MCNC: 6.8 G/DL — SIGNIFICANT CHANGE UP (ref 6–8.3)
PROTHROM AB SERPL-ACNC: 11.5 SEC — SIGNIFICANT CHANGE UP (ref 10.6–13.6)
RBC # BLD: 3.48 M/UL — LOW (ref 3.8–5.2)
RBC # FLD: 12.6 % — SIGNIFICANT CHANGE UP (ref 10.3–14.5)
SARS-COV-2 RNA SPEC QL NAA+PROBE: SIGNIFICANT CHANGE UP
SODIUM SERPL-SCNC: 131 MMOL/L — LOW (ref 135–145)
WBC # BLD: 12.68 K/UL — HIGH (ref 3.8–10.5)
WBC # FLD AUTO: 12.68 K/UL — HIGH (ref 3.8–10.5)

## 2020-12-27 PROCEDURE — 99223 1ST HOSP IP/OBS HIGH 75: CPT | Mod: AI,25

## 2020-12-27 PROCEDURE — 71045 X-RAY EXAM CHEST 1 VIEW: CPT | Mod: 26

## 2020-12-27 PROCEDURE — 99406 BEHAV CHNG SMOKING 3-10 MIN: CPT

## 2020-12-27 PROCEDURE — 99285 EMERGENCY DEPT VISIT HI MDM: CPT

## 2020-12-27 PROCEDURE — 93010 ELECTROCARDIOGRAM REPORT: CPT

## 2020-12-27 RX ORDER — ONDANSETRON 8 MG/1
4 TABLET, FILM COATED ORAL ONCE
Refills: 0 | Status: COMPLETED | OUTPATIENT
Start: 2020-12-27 | End: 2020-12-27

## 2020-12-27 RX ORDER — VANCOMYCIN HCL 1 G
750 VIAL (EA) INTRAVENOUS ONCE
Refills: 0 | Status: COMPLETED | OUTPATIENT
Start: 2020-12-27 | End: 2020-12-27

## 2020-12-27 RX ORDER — MORPHINE SULFATE 50 MG/1
4 CAPSULE, EXTENDED RELEASE ORAL ONCE
Refills: 0 | Status: DISCONTINUED | OUTPATIENT
Start: 2020-12-27 | End: 2020-12-27

## 2020-12-27 RX ORDER — VANCOMYCIN HCL 1 G
1000 VIAL (EA) INTRAVENOUS ONCE
Refills: 0 | Status: DISCONTINUED | OUTPATIENT
Start: 2020-12-27 | End: 2020-12-27

## 2020-12-27 RX ADMIN — MORPHINE SULFATE 4 MILLIGRAM(S): 50 CAPSULE, EXTENDED RELEASE ORAL at 21:08

## 2020-12-27 RX ADMIN — Medication 250 MILLIGRAM(S): at 20:52

## 2020-12-27 RX ADMIN — ONDANSETRON 4 MILLIGRAM(S): 8 TABLET, FILM COATED ORAL at 20:52

## 2020-12-27 RX ADMIN — MORPHINE SULFATE 4 MILLIGRAM(S): 50 CAPSULE, EXTENDED RELEASE ORAL at 20:51

## 2020-12-27 RX ADMIN — Medication 750 MILLIGRAM(S): at 21:52

## 2020-12-27 NOTE — CONSULT NOTE ADULT - SUBJECTIVE AND OBJECTIVE BOX
HPI:    56 y/o female with a PMHx of Paroxysmal SVT, Chronic neck pain and low back pain with sciatica, Raynaud's syndrome, IBS, Seizure disorder, DVT, Neuropathy, Emphysema/COPD, Migraine, Anxiety, Hepatitis C, Hypothyroidism, GERD, Sjogren's disease, Lupus, s/p Nirschl repair of L elbow for lateral epicondylitis on 10/02/20, s/p Nirschl repair of R elbow for lateral epicondylitis on 11/20/20 by Dr. Durand, presents to the ED c/o R elbow pain with associated swelling and redness x 1 week. States swelling was drained twice recently but sx has been progressively worsening. Last drainage was 2 days ago. Denies fever, chills, or URI-like sx. Started on Amoxicillin 2-3 days ago. Pt is R hand dominant.    In the ED, WBC 12.68, Sodium 131, Covid 19 negative.     Dr. Durand was consulted, recommended to give vancomycin because it was susceptible.  Vancomycin 750mg x 1 was given.      During ED course, morphine and zofran x 1 given.     REVIEW OF SYSTEMS:  CONSTITUTIONAL: No fever, weight loss, or fatigue  RESPIRATORY: No cough, wheezing, chills or hemoptysis; No shortness of breath  CARDIOVASCULAR: No chest pain, palpitations, dizziness, or leg swelling  GASTROINTESTINAL: No abdominal or epigastric pain. No nausea, vomiting, or hematemesis; No diarrhea or constipation. No melena or hematochezia.  GENITOURINARY: No dysuria, frequency, hematuria, or incontinence  NEUROLOGICAL: No headaches, memory loss, loss of strength, numbness, or tremors  MUSCULOSKELETAL: right elbow pain as above  PSYCHIATRIC: No depression, anxiety, mood swings, or difficulty sleeping      PAST MEDICAL & SURGICAL HISTORY:  H/O paroxysmal supraventricular tachycardia    Chronic neck pain  cervical bone fused throught disc    Chronic low back pain with sciatica    Right shoulder pain    Pain, wrist, right  collapse    H/O Raynaud&#x27;s syndrome    History of IBS  had blockage about 6 months ago-resolved with NG tube, colitis    Osteochondritis dissecans  both ankles    Personal history of skin cancer  toe    History of seizure disorder  last seizure 8 yrs ago    History of weight loss  lost 20 pounds within the last year-unknown cause    H/O osteoporosis  severe, was on forteo    Burning mouth syndrome    Emphysema/COPD  on oxygen at night 3L [place]    Dvt femoral (deep venous thrombosis)  right arm, past    Glossopharyngeal neuralgia syndrome  s/p brain sx in 12/2018    Nicotine addiction    Neuropathy    Migraine    Anxiety    Hepatitis C  body cleared    Hypothyroidism    Hypotension  in past had syncope related to brain problem-better recently    GERD (gastroesophageal reflux disease)  gastritis, vomits often    Vertigo    Sjogren&#x27;s disease    Lupus    H/O squamous cell carcinoma excision  toe    Status post placement of implantable loop recorder    H/O elbow surgery  10/2020 - LEFT elbow    Ankle problem  right ankle surgery    S/P cryoablation of arrhythmia    S/P brain surgery  2018, also had gamma knife procedure-microvascular decompression- titanium plate in skull    H/O lumpectomy  right shouler, axillary, both breasts head, right back    Injury of right wrist, subsequent encounter  no surgery    Gall bladder disease  REMOVAL    Vocal cord polyp  REMOVAL        SOCIAL HISTORY:  Deniest Tobacco  Denies Etoh  Denies Drugs      Allergies    Vibramycin (Unknown)    Intolerances    aspirin (Stomach Upset)  Zithromax (Stomach Upset)        FAMILY HISTORY:  FH: arrhythmia  mother-dementia    Family history of heart disease  htn-father    Family history of multiple sclerosis  sister    Family history of diabetes mellitus (Sibling)  Sister    Family history of psoriatic arthritis (Sibling)  Sister    Family history of systemic lupus erythematosus (SLE) in mother (Sibling)  niece        Vital Signs Last 24 Hrs  T(C): 36.6 (27 Dec 2020 22:01), Max: 36.8 (27 Dec 2020 19:36)  T(F): 97.8 (27 Dec 2020 22:01), Max: 98.3 (27 Dec 2020 19:36)  HR: 72 (27 Dec 2020 22:01) (72 - 80)  BP: 91/54 (27 Dec 2020 22:01) (90/50 - 91/54)  BP(mean): --  RR: 18 (27 Dec 2020 22:01) (18 - 20)  SpO2: 95% (27 Dec 2020 22:01) (95% - 98%)    PHYSICAL EXAM:    Gen: Alert, NAD  Head/eyes: NC/AT, PERRL, EOMI, normal lids/conjunctiva, no scleral icterus  ENT: Bilateral TM WNL, normal hearing, patent oropharynx without erythema/exudate, uvula midline, no peritonsillar abscess, no tongue/uvula swelling  Neck: supple, no tenderness/meningismus/JVD, Trachea midline  Pulm/lung: Bilateral clear BS, normal resp effort, no wheeze/stridor/retractions  CV/heart: RRR, no M/R/G, +2 dist pulses (radial, pedal DP/PT, popliteal)  GI/Abd: soft, NT/ND, +BS, no guarding/rebound tenderness  Musculoskeletal: no cyanosis, FROM in all extremities, no C/T/L spine ttp  Skin: +6x6cm fluctuant mass of R lateral epicondyle, no drainage with mild erythema and mild warmth. no vesicles, no petechiae, no ecchymosis,  Neuro: AAOx3, CN 2-12 intact, normal sensation, 5/5 motor strength in all extremities, normal gait, no dysmetria    LABS:                        11.5   12.68 )-----------( 393      ( 27 Dec 2020 20:57 )             33.9     12-27    131<L>  |  97  |  17  ----------------------------<  96  4.1   |  24  |  0.84    Ca    8.9      27 Dec 2020 20:57    TPro  6.8  /  Alb  3.4  /  TBili  0.1<L>  /  DBili  x   /  AST  16  /  ALT  27  /  AlkPhos  90  12-27    PT/INR - ( 27 Dec 2020 20:57 )   PT: 11.5 sec;   INR: 0.95 ratio         PTT - ( 27 Dec 2020 20:57 )  PTT:25.7 sec    CAPILLARY BLOOD GLUCOSE          RADIOLOGY & ADDITIONAL STUDIES:    EKG:                   HPI:    54 y/o female with a PMHx of Paroxysmal SVT, Chronic neck pain and low back pain with sciatica, Raynaud's syndrome, Emphysema/COPD(home o2 prn), Migraine, Anxiety, Hepatitis C, Hypothyroidism, GERD, Sjogren's disease, Lupus, s/p Nirschl repair of L elbow for lateral epicondylitis on 10/02/20, s/p Nirschl repair of R elbow for lateral epicondylitis on 11/20/20 by Dr. Durand, presents to the ED c/o R elbow pain with associated swelling and redness x 1 week. States swelling was drained twice recently but sx has been progressively worsening. Last drainage was 2 days ago. Denies fever, chills, or URI-like sx. Started on Amoxicillin 2-3 days ago. Pt is R hand dominant.  Per patient, she states that she saw Dr. Durand before christmas and he advised her to come to the ER to be admitted for OR drainage.  She asked if she can come after christmas, so she came tonight sunday to be admitted.   Pt is requesting fentanly because her current medications aren't helping much.     In the ED, WBC 12.68, Sodium 131, Covid 19 negative.     Dr. Durand was consulted, recommended to give vancomycin because it was susceptible.  Vancomycin 750mg x 1 was given.      During ED course, morphine and zofran x 1 given.     REVIEW OF SYSTEMS:  CONSTITUTIONAL: No fever, weight loss, or fatigue  RESPIRATORY: No cough, wheezing, chills or hemoptysis; No shortness of breath  CARDIOVASCULAR: No chest pain, palpitations, dizziness, or leg swelling  GASTROINTESTINAL: No abdominal or epigastric pain. No nausea, vomiting, or hematemesis; No diarrhea or constipation. No melena or hematochezia.  GENITOURINARY: No dysuria, frequency, hematuria, or incontinence  NEUROLOGICAL: No headaches, memory loss, loss of strength, numbness, or tremors  MUSCULOSKELETAL: right elbow pain as above  PSYCHIATRIC: No depression, anxiety, mood swings, or difficulty sleeping      PAST MEDICAL & SURGICAL HISTORY:  H/O paroxysmal supraventricular tachycardia    Chronic neck pain  cervical bone fused throught disc    Chronic low back pain with sciatica    Right shoulder pain    Pain, wrist, right  collapse    H/O Raynaud&#x27;s syndrome    History of IBS  had blockage about 6 months ago-resolved with NG tube, colitis    Osteochondritis dissecans  both ankles    Personal history of skin cancer  toe    History of seizure disorder  last seizure 8 yrs ago    History of weight loss  lost 20 pounds within the last year-unknown cause    H/O osteoporosis  severe, was on forteo    Burning mouth syndrome    Emphysema/COPD  on oxygen at night 3L [place]    Dvt femoral (deep venous thrombosis)  right arm, past    Glossopharyngeal neuralgia syndrome  s/p brain sx in 12/2018    Nicotine addiction    Neuropathy    Migraine    Anxiety    Hepatitis C  body cleared    Hypothyroidism    Hypotension  in past had syncope related to brain problem-better recently    GERD (gastroesophageal reflux disease)  gastritis, vomits often    Vertigo    Sjogren&#x27;s disease    Lupus    H/O squamous cell carcinoma excision  toe    Status post placement of implantable loop recorder    H/O elbow surgery  10/2020 - LEFT elbow    Ankle problem  right ankle surgery    S/P cryoablation of arrhythmia    S/P brain surgery  2018, also had gamma knife procedure-microvascular decompression- titanium plate in skull    H/O lumpectomy  right shouler, axillary, both breasts head, right back    Injury of right wrist, subsequent encounter  no surgery    Gall bladder disease  REMOVAL    Vocal cord polyp  REMOVAL        SOCIAL HISTORY:  2PPD smoker, refused nicotone patch  Denies Etoh  Denies Drugs      Allergies    Vibramycin (Unknown)    Intolerances    aspirin (Stomach Upset)  Zithromax (Stomach Upset)        FAMILY HISTORY:  FH: arrhythmia  mother-dementia    Family history of heart disease  htn-father    Family history of multiple sclerosis  sister    Family history of diabetes mellitus (Sibling)  Sister    Family history of psoriatic arthritis (Sibling)  Sister    Family history of systemic lupus erythematosus (SLE) in mother (Sibling)  niece        Vital Signs Last 24 Hrs  T(C): 36.6 (27 Dec 2020 22:01), Max: 36.8 (27 Dec 2020 19:36)  T(F): 97.8 (27 Dec 2020 22:01), Max: 98.3 (27 Dec 2020 19:36)  HR: 72 (27 Dec 2020 22:01) (72 - 80)  BP: 91/54 (27 Dec 2020 22:01) (90/50 - 91/54)  BP(mean): --  RR: 18 (27 Dec 2020 22:01) (18 - 20)  SpO2: 95% (27 Dec 2020 22:01) (95% - 98%)    PHYSICAL EXAM:    Gen: Alert, NAD  Head/eyes: NC/AT, PERRL, EOMI, normal lids/conjunctiva, no scleral icterus  ENT: Bilateral TM WNL, normal hearing, patent oropharynx without erythema/exudate, uvula midline, no peritonsillar abscess, no tongue/uvula swelling  Neck: supple, no tenderness/meningismus/JVD, Trachea midline  Pulm/lung: Bilateral clear BS, normal resp effort, no wheeze/stridor/retractions  CV/heart: RRR, no M/R/G, +2 dist pulses (radial, pedal DP/PT, popliteal)  GI/Abd: soft, NT/ND, +BS, no guarding/rebound tenderness  Musculoskeletal: no cyanosis, FROM in all extremities, no C/T/L spine ttp  Skin: +6x6cm fluctuant mass of R lateral epicondyle, no drainage with mild erythema and mild warmth. no vesicles, no petechiae, no ecchymosis,  Neuro: AAOx3, CN 2-12 intact, normal sensation, 5/5 motor strength in all extremities, normal gait, no dysmetria    LABS:                        11.5   12.68 )-----------( 393      ( 27 Dec 2020 20:57 )             33.9     12-27    131<L>  |  97  |  17  ----------------------------<  96  4.1   |  24  |  0.84    Ca    8.9      27 Dec 2020 20:57    TPro  6.8  /  Alb  3.4  /  TBili  0.1<L>  /  DBili  x   /  AST  16  /  ALT  27  /  AlkPhos  90  12-27    PT/INR - ( 27 Dec 2020 20:57 )   PT: 11.5 sec;   INR: 0.95 ratio         PTT - ( 27 Dec 2020 20:57 )  PTT:25.7 sec    CAPILLARY BLOOD GLUCOSE          RADIOLOGY & ADDITIONAL STUDIES:    EKG:

## 2020-12-27 NOTE — ED PROVIDER NOTE - CLINICAL SUMMARY MEDICAL DECISION MAKING FREE TEXT BOX
Pt with R elbow infection/ abscess, will admit for OR drainage by Dr. Durand. Will obtain labs, culture, IV abx. Dr. Durand states pt is susceptible to Vancomycin, will give 1 dose here

## 2020-12-27 NOTE — ED ADULT TRIAGE NOTE - CHIEF COMPLAINT QUOTE
I had tendon repair on my right elbow 4 weeks ago with DR Durand. Today it is red, swollen and painful.

## 2020-12-27 NOTE — ED PROVIDER NOTE - OBJECTIVE STATEMENT
54 y/o female with a PMHx of Paroxysmal SVT, Chronic neck pain and low back pain with sciatica, Raynaud's syndrome, IBS, Seizure disorder, DVT, Neuropathy, Emphysema/COPD, Migraine, Anxiety, Hepatitis C, Hypothyroidism, GERD, Sjogren's disease, Lupus, s/p Nirschl repair of L elbow for lateral epicondylitis on 10/02/20, s/p Nirschl repair of R elbow for lateral epicondylitis on 11/20/20 by Dr. Durand, presents to the ED c/o R elbow pain with associated swelling and redness x 1 week. States swelling was "drained" x2 but sx has been progressively worsening. Last drainage was 2 days ago. Denies fever, chills, or URI-like sx. Started on Amoxicillin 2-3 days ago. Pt is R hand dominant.  PMD: Dr. Lorne Pablo 56 y/o female with a PMHx of Paroxysmal SVT, Chronic neck pain and low back pain with sciatica, Raynaud's syndrome, IBS, Seizure disorder, DVT, Neuropathy, Emphysema/COPD, Migraine, Anxiety, Hepatitis C, Hypothyroidism, GERD, Sjogren's disease, Lupus, s/p Nirschl repair of L elbow for lateral epicondylitis on 10/02/20, s/p Nirschl repair of R elbow for lateral epicondylitis on 11/20/20 by Dr. Durand, presents to the ED c/o R elbow pain with associated swelling and redness x 1 week. States swelling was drained twice recently but sx has been progressively worsening. Last drainage was 2 days ago. Denies fever, chills, or URI-like sx. Started on Amoxicillin 2-3 days ago. Pt is R hand dominant.  PMD: Dr. Lorne Pablo

## 2020-12-27 NOTE — ED PROVIDER NOTE - PROGRESS NOTE DETAILS
Kaylee GLEZ for Dr. Díaz: discussed case with Dr. Durand pending COVID test, if negative will admit to [facility]. Kaylee Díaz: pt's surgeon Dr. Durand contact #: [phone] Dr. Durand made aware of negative covid test, Dr. Damon called for medical clearance

## 2020-12-27 NOTE — ED PROVIDER NOTE - DURATION
Cesar from [facility] called on behalf on [facility] requesting a copy of patient's referral to orthopedics from Dr. Clemens to be faxed over to them. Their fax is: [phone]   week(s)

## 2020-12-27 NOTE — ED PROVIDER NOTE - NS ED ROS FT
Constitutional: - Fever, - Chills, - Anorexia, - Fatigue, - Night sweats  Eyes: - Discharge, - Irritation, - Redness, - Visual changes, - Light sensitivity, - Pain  EARS: - Ear Pain, - Tinnitus, - Decreased hearing  NOSE: - Congestion, - Bloody nose  MOUTH/THROAT: - Vocal Changes, - Drooling, - Sore throat  NECK: - Lumps, - Stiffness, - Pain  CV: - Palpitations, - Chest Pain, - Edema, - Syncope  RESP:  - Cough, - Shortness of Breath, - Dyspnea on Exertion, - Trouble speaking, - Pleuritic pain - Wheezing  GI: - Diarrhea, - Constipation, - Bloody stools, - Nausea, - Vomiting, - Abdominal Pain  : - Dysuria, -Frequency, - Hematuria, - Hesitancy, - Incontinence, - Saddle Anesthesia, - Abnormal discharge  MSK: - Myalgias, - Arthralgias, - Weakness, - Deformities, - Injuries, +R elbow pain  SKIN: +Erythema, - Rash, +Swelling, - Ecchymosis, - Abrasion, - laceration  NEURO: - Change in behavior, - Dec. Alertness, - Headache, - Dizziness, - Change in speech, - Weakness, - Seizure-like activity, - Difficulty ambulating

## 2020-12-27 NOTE — CONSULT NOTE ADULT - ASSESSMENT
56 y/o female with a PMHx of Paroxysmal SVT, Chronic neck pain and low back pain with sciatica, Raynaud's syndrome, IBS, Seizure disorder, Neuropathy, Emphysema/COPD, Migraine, Anxiety, Hepatitis C, Hypothyroidism, GERD, Sjogren's disease, Lupus    #Right elbow abscess/cellulitis  Orthopedic team managing  NPO after midnight, plan for OR drainage in the AM?  Continue vancomycin  f/u cultures  pain management  Medically optimized pending Cardiac clearance    #Hyponatremia  Unclear etiology.  Repeat in AM after gentle hydration.      #hx of paraxoysmal SVT  telemetry  cardiac clearance    #Chronic neck and low back pain  continue morphine ER and IR, and carisoprodal    #Raynaud's syndrome/Sjogren's disease/Lupus  continue hydroxychloroquine    #COPD/Emphysema  ventoline    #Migraine/anxiety  fioricet  valium    #Hypothyroidism  continue levothyroxine    #GERD  Protonix    #DVT proph  scds   54 y/o female with a PMHx of Paroxysmal SVT, Chronic neck pain and low back pain with sciatica, Raynaud's syndrome, IBS, Seizure disorder, Neuropathy, Emphysema/COPD, Migraine, Anxiety, Hepatitis C, Hypothyroidism, GERD, Sjogren's disease, Lupus    #Right elbow abscess/cellulitis  Orthopedic team managing  NPO after midnight, plan for OR drainage in the AM?  Continue vancomycin  f/u cultures  pain management  Medically optimized pending Cardiac clearance    #hypotension  IVF started  monitor vitals routinely.  Because of low BP, will not give fentanyl.     #Hyponatremia  Unclear etiology.  Repeat in AM after gentle hydration.      #hx of paraxoysmal SVT  telemetry  cardiac clearance    #Chronic neck and low back pain  continue morphine ER and IR  will not give fentanyl secondary to low BP.     #Raynaud's syndrome/Sjogren's disease/Lupus  continue hydroxychloroquine    #COPD/Emphysema  ventolin and symbicort  continous pulse ox    #Migraine/anxiety  fioricet PRN  valium    #Hypothyroidism  continue levothyroxine    #GERD  Protonix    #DVT proph  scds

## 2020-12-28 LAB
ALBUMIN SERPL ELPH-MCNC: 2.7 G/DL — LOW (ref 3.3–5)
ALP SERPL-CCNC: 79 U/L — SIGNIFICANT CHANGE UP (ref 30–120)
ALT FLD-CCNC: 25 U/L DA — SIGNIFICANT CHANGE UP (ref 10–60)
ANION GAP SERPL CALC-SCNC: 5 MMOL/L — SIGNIFICANT CHANGE UP (ref 5–17)
AST SERPL-CCNC: 15 U/L — SIGNIFICANT CHANGE UP (ref 10–40)
BASOPHILS # BLD AUTO: 0.05 K/UL — SIGNIFICANT CHANGE UP (ref 0–0.2)
BASOPHILS NFR BLD AUTO: 0.7 % — SIGNIFICANT CHANGE UP (ref 0–2)
BILIRUB SERPL-MCNC: 0.1 MG/DL — LOW (ref 0.2–1.2)
BUN SERPL-MCNC: 13 MG/DL — SIGNIFICANT CHANGE UP (ref 7–23)
CALCIUM SERPL-MCNC: 8.5 MG/DL — SIGNIFICANT CHANGE UP (ref 8.4–10.5)
CHLORIDE SERPL-SCNC: 102 MMOL/L — SIGNIFICANT CHANGE UP (ref 96–108)
CO2 SERPL-SCNC: 28 MMOL/L — SIGNIFICANT CHANGE UP (ref 22–31)
CREAT SERPL-MCNC: 0.76 MG/DL — SIGNIFICANT CHANGE UP (ref 0.5–1.3)
CRP SERPL-MCNC: 5.13 MG/DL — HIGH (ref 0–0.4)
EOSINOPHIL # BLD AUTO: 0.4 K/UL — SIGNIFICANT CHANGE UP (ref 0–0.5)
EOSINOPHIL NFR BLD AUTO: 5.6 % — SIGNIFICANT CHANGE UP (ref 0–6)
GLUCOSE SERPL-MCNC: 98 MG/DL — SIGNIFICANT CHANGE UP (ref 70–99)
HCT VFR BLD CALC: 30.9 % — LOW (ref 34.5–45)
HGB BLD-MCNC: 10.3 G/DL — LOW (ref 11.5–15.5)
IMM GRANULOCYTES NFR BLD AUTO: 0.4 % — SIGNIFICANT CHANGE UP (ref 0–1.5)
LYMPHOCYTES # BLD AUTO: 2.39 K/UL — SIGNIFICANT CHANGE UP (ref 1–3.3)
LYMPHOCYTES # BLD AUTO: 33.4 % — SIGNIFICANT CHANGE UP (ref 13–44)
MCHC RBC-ENTMCNC: 32.6 PG — SIGNIFICANT CHANGE UP (ref 27–34)
MCHC RBC-ENTMCNC: 33.3 GM/DL — SIGNIFICANT CHANGE UP (ref 32–36)
MCV RBC AUTO: 97.8 FL — SIGNIFICANT CHANGE UP (ref 80–100)
MONOCYTES # BLD AUTO: 0.63 K/UL — SIGNIFICANT CHANGE UP (ref 0–0.9)
MONOCYTES NFR BLD AUTO: 8.8 % — SIGNIFICANT CHANGE UP (ref 2–14)
NEUTROPHILS # BLD AUTO: 3.65 K/UL — SIGNIFICANT CHANGE UP (ref 1.8–7.4)
NEUTROPHILS NFR BLD AUTO: 51.1 % — SIGNIFICANT CHANGE UP (ref 43–77)
NRBC # BLD: 0 /100 WBCS — SIGNIFICANT CHANGE UP (ref 0–0)
PLATELET # BLD AUTO: 362 K/UL — SIGNIFICANT CHANGE UP (ref 150–400)
POTASSIUM SERPL-MCNC: 4.3 MMOL/L — SIGNIFICANT CHANGE UP (ref 3.5–5.3)
POTASSIUM SERPL-SCNC: 4.3 MMOL/L — SIGNIFICANT CHANGE UP (ref 3.5–5.3)
PROT SERPL-MCNC: 5.7 G/DL — LOW (ref 6–8.3)
RBC # BLD: 3.16 M/UL — LOW (ref 3.8–5.2)
RBC # FLD: 12.7 % — SIGNIFICANT CHANGE UP (ref 10.3–14.5)
SODIUM SERPL-SCNC: 135 MMOL/L — SIGNIFICANT CHANGE UP (ref 135–145)
WBC # BLD: 7.15 K/UL — SIGNIFICANT CHANGE UP (ref 3.8–10.5)
WBC # FLD AUTO: 7.15 K/UL — SIGNIFICANT CHANGE UP (ref 3.8–10.5)

## 2020-12-28 PROCEDURE — 99233 SBSQ HOSP IP/OBS HIGH 50: CPT

## 2020-12-28 RX ORDER — FENTANYL CITRATE 50 UG/ML
25 INJECTION INTRAVENOUS
Refills: 0 | Status: DISCONTINUED | OUTPATIENT
Start: 2020-12-28 | End: 2020-12-28

## 2020-12-28 RX ORDER — SENNA PLUS 8.6 MG/1
2 TABLET ORAL AT BEDTIME
Refills: 0 | Status: DISCONTINUED | OUTPATIENT
Start: 2020-12-28 | End: 2020-12-31

## 2020-12-28 RX ORDER — ONDANSETRON 8 MG/1
4 TABLET, FILM COATED ORAL ONCE
Refills: 0 | Status: COMPLETED | OUTPATIENT
Start: 2020-12-28 | End: 2020-12-28

## 2020-12-28 RX ORDER — IPRATROPIUM/ALBUTEROL SULFATE 18-103MCG
3 AEROSOL WITH ADAPTER (GRAM) INHALATION EVERY 6 HOURS
Refills: 0 | Status: DISCONTINUED | OUTPATIENT
Start: 2020-12-28 | End: 2020-12-31

## 2020-12-28 RX ORDER — HYDROXYCHLOROQUINE SULFATE 200 MG
200 TABLET ORAL
Refills: 0 | Status: DISCONTINUED | OUTPATIENT
Start: 2020-12-28 | End: 2020-12-28

## 2020-12-28 RX ORDER — LIDOCAINE 4 G/100G
5 CREAM TOPICAL EVERY 4 HOURS
Refills: 0 | Status: DISCONTINUED | OUTPATIENT
Start: 2020-12-28 | End: 2020-12-31

## 2020-12-28 RX ORDER — PANTOPRAZOLE SODIUM 20 MG/1
40 TABLET, DELAYED RELEASE ORAL DAILY
Refills: 0 | Status: DISCONTINUED | OUTPATIENT
Start: 2020-12-28 | End: 2020-12-28

## 2020-12-28 RX ORDER — VANCOMYCIN HCL 1 G
750 VIAL (EA) INTRAVENOUS EVERY 12 HOURS
Refills: 0 | Status: DISCONTINUED | OUTPATIENT
Start: 2020-12-29 | End: 2020-12-29

## 2020-12-28 RX ORDER — POLYETHYLENE GLYCOL 3350 17 G/17G
17 POWDER, FOR SOLUTION ORAL DAILY
Refills: 0 | Status: DISCONTINUED | OUTPATIENT
Start: 2020-12-28 | End: 2020-12-31

## 2020-12-28 RX ORDER — CHLORHEXIDINE GLUCONATE 213 G/1000ML
1 SOLUTION TOPICAL ONCE
Refills: 0 | Status: COMPLETED | OUTPATIENT
Start: 2020-12-28 | End: 2020-12-31

## 2020-12-28 RX ORDER — ALBUTEROL 90 UG/1
2 AEROSOL, METERED ORAL EVERY 6 HOURS
Refills: 0 | Status: DISCONTINUED | OUTPATIENT
Start: 2020-12-28 | End: 2020-12-28

## 2020-12-28 RX ORDER — FUROSEMIDE 40 MG
40 TABLET ORAL
Refills: 0 | Status: DISCONTINUED | OUTPATIENT
Start: 2020-12-28 | End: 2020-12-28

## 2020-12-28 RX ORDER — MORPHINE SULFATE 50 MG/1
15 CAPSULE, EXTENDED RELEASE ORAL THREE TIMES A DAY
Refills: 0 | Status: DISCONTINUED | OUTPATIENT
Start: 2020-12-28 | End: 2020-12-28

## 2020-12-28 RX ORDER — MORPHINE SULFATE 50 MG/1
15 CAPSULE, EXTENDED RELEASE ORAL EVERY 12 HOURS
Refills: 0 | Status: DISCONTINUED | OUTPATIENT
Start: 2020-12-28 | End: 2020-12-28

## 2020-12-28 RX ORDER — LIDOCAINE 4 G/100G
5 CREAM TOPICAL
Refills: 0 | Status: DISCONTINUED | OUTPATIENT
Start: 2020-12-28 | End: 2020-12-28

## 2020-12-28 RX ORDER — LEVOTHYROXINE SODIUM 125 MCG
25 TABLET ORAL DAILY
Refills: 0 | Status: DISCONTINUED | OUTPATIENT
Start: 2020-12-28 | End: 2020-12-28

## 2020-12-28 RX ORDER — MORPHINE SULFATE 50 MG/1
4 CAPSULE, EXTENDED RELEASE ORAL
Refills: 0 | Status: DISCONTINUED | OUTPATIENT
Start: 2020-12-28 | End: 2020-12-31

## 2020-12-28 RX ORDER — VANCOMYCIN HCL 1 G
750 VIAL (EA) INTRAVENOUS EVERY 12 HOURS
Refills: 0 | Status: DISCONTINUED | OUTPATIENT
Start: 2020-12-28 | End: 2020-12-28

## 2020-12-28 RX ORDER — METOCLOPRAMIDE HCL 10 MG
10 TABLET ORAL ONCE
Refills: 0 | Status: COMPLETED | OUTPATIENT
Start: 2020-12-28 | End: 2020-12-28

## 2020-12-28 RX ORDER — PANTOPRAZOLE SODIUM 20 MG/1
40 TABLET, DELAYED RELEASE ORAL DAILY
Refills: 0 | Status: DISCONTINUED | OUTPATIENT
Start: 2020-12-28 | End: 2020-12-31

## 2020-12-28 RX ORDER — MORPHINE SULFATE 50 MG/1
15 CAPSULE, EXTENDED RELEASE ORAL
Refills: 0 | Status: DISCONTINUED | OUTPATIENT
Start: 2020-12-28 | End: 2020-12-28

## 2020-12-28 RX ORDER — OXYCODONE AND ACETAMINOPHEN 5; 325 MG/1; MG/1
1 TABLET ORAL EVERY 4 HOURS
Refills: 0 | Status: DISCONTINUED | OUTPATIENT
Start: 2020-12-28 | End: 2020-12-28

## 2020-12-28 RX ORDER — MORPHINE SULFATE 50 MG/1
15 CAPSULE, EXTENDED RELEASE ORAL EVERY 12 HOURS
Refills: 0 | Status: DISCONTINUED | OUTPATIENT
Start: 2020-12-28 | End: 2020-12-31

## 2020-12-28 RX ORDER — ESCITALOPRAM OXALATE 10 MG/1
1 TABLET, FILM COATED ORAL
Qty: 0 | Refills: 0 | DISCHARGE

## 2020-12-28 RX ORDER — HYDROMORPHONE HYDROCHLORIDE 2 MG/ML
0.5 INJECTION INTRAMUSCULAR; INTRAVENOUS; SUBCUTANEOUS EVERY 6 HOURS
Refills: 0 | Status: DISCONTINUED | OUTPATIENT
Start: 2020-12-28 | End: 2020-12-28

## 2020-12-28 RX ORDER — OXYCODONE HYDROCHLORIDE 5 MG/1
10 TABLET ORAL
Refills: 0 | Status: DISCONTINUED | OUTPATIENT
Start: 2020-12-28 | End: 2020-12-28

## 2020-12-28 RX ORDER — BUDESONIDE AND FORMOTEROL FUMARATE DIHYDRATE 160; 4.5 UG/1; UG/1
2 AEROSOL RESPIRATORY (INHALATION)
Refills: 0 | Status: DISCONTINUED | OUTPATIENT
Start: 2020-12-28 | End: 2020-12-28

## 2020-12-28 RX ORDER — BUDESONIDE AND FORMOTEROL FUMARATE DIHYDRATE 160; 4.5 UG/1; UG/1
2 AEROSOL RESPIRATORY (INHALATION)
Refills: 0 | Status: DISCONTINUED | OUTPATIENT
Start: 2020-12-28 | End: 2020-12-31

## 2020-12-28 RX ORDER — ONDANSETRON 8 MG/1
4 TABLET, FILM COATED ORAL EVERY 6 HOURS
Refills: 0 | Status: DISCONTINUED | OUTPATIENT
Start: 2020-12-28 | End: 2020-12-28

## 2020-12-28 RX ORDER — PANTOPRAZOLE SODIUM 20 MG/1
40 TABLET, DELAYED RELEASE ORAL ONCE
Refills: 0 | Status: COMPLETED | OUTPATIENT
Start: 2020-12-28 | End: 2020-12-28

## 2020-12-28 RX ORDER — ONDANSETRON 8 MG/1
4 TABLET, FILM COATED ORAL EVERY 6 HOURS
Refills: 0 | Status: DISCONTINUED | OUTPATIENT
Start: 2020-12-28 | End: 2020-12-31

## 2020-12-28 RX ORDER — MORPHINE SULFATE 50 MG/1
4 CAPSULE, EXTENDED RELEASE ORAL EVERY 4 HOURS
Refills: 0 | Status: DISCONTINUED | OUTPATIENT
Start: 2020-12-28 | End: 2020-12-28

## 2020-12-28 RX ORDER — FENTANYL CITRATE 50 UG/ML
1 INJECTION INTRAVENOUS
Refills: 0 | Status: DISCONTINUED | OUTPATIENT
Start: 2020-12-28 | End: 2020-12-28

## 2020-12-28 RX ORDER — ENOXAPARIN SODIUM 100 MG/ML
30 INJECTION SUBCUTANEOUS DAILY
Refills: 0 | Status: DISCONTINUED | OUTPATIENT
Start: 2020-12-28 | End: 2020-12-29

## 2020-12-28 RX ORDER — SODIUM CHLORIDE 9 MG/ML
1000 INJECTION INTRAMUSCULAR; INTRAVENOUS; SUBCUTANEOUS
Refills: 0 | Status: DISCONTINUED | OUTPATIENT
Start: 2020-12-28 | End: 2020-12-28

## 2020-12-28 RX ORDER — APREPITANT 80 MG/1
40 CAPSULE ORAL ONCE
Refills: 0 | Status: DISCONTINUED | OUTPATIENT
Start: 2020-12-28 | End: 2020-12-31

## 2020-12-28 RX ORDER — DIAZEPAM 5 MG
5 TABLET ORAL
Refills: 0 | Status: DISCONTINUED | OUTPATIENT
Start: 2020-12-28 | End: 2020-12-28

## 2020-12-28 RX ORDER — SODIUM CHLORIDE 9 MG/ML
1000 INJECTION, SOLUTION INTRAVENOUS
Refills: 0 | Status: DISCONTINUED | OUTPATIENT
Start: 2020-12-28 | End: 2020-12-28

## 2020-12-28 RX ORDER — ACETAMINOPHEN 500 MG
650 TABLET ORAL EVERY 8 HOURS
Refills: 0 | Status: DISCONTINUED | OUTPATIENT
Start: 2020-12-29 | End: 2020-12-31

## 2020-12-28 RX ORDER — DIAZEPAM 5 MG
5 TABLET ORAL
Refills: 0 | Status: DISCONTINUED | OUTPATIENT
Start: 2020-12-28 | End: 2020-12-31

## 2020-12-28 RX ORDER — ACETAMINOPHEN 500 MG
550 TABLET ORAL EVERY 6 HOURS
Refills: 0 | Status: COMPLETED | OUTPATIENT
Start: 2020-12-28 | End: 2020-12-29

## 2020-12-28 RX ORDER — IPRATROPIUM/ALBUTEROL SULFATE 18-103MCG
3 AEROSOL WITH ADAPTER (GRAM) INHALATION EVERY 6 HOURS
Refills: 0 | Status: DISCONTINUED | OUTPATIENT
Start: 2020-12-28 | End: 2020-12-28

## 2020-12-28 RX ORDER — LEVOTHYROXINE SODIUM 125 MCG
25 TABLET ORAL DAILY
Refills: 0 | Status: DISCONTINUED | OUTPATIENT
Start: 2020-12-28 | End: 2020-12-31

## 2020-12-28 RX ORDER — OXYCODONE HYDROCHLORIDE 5 MG/1
5 TABLET ORAL EVERY 6 HOURS
Refills: 0 | Status: DISCONTINUED | OUTPATIENT
Start: 2020-12-28 | End: 2020-12-28

## 2020-12-28 RX ORDER — HYDROXYCHLOROQUINE SULFATE 200 MG
200 TABLET ORAL
Refills: 0 | Status: DISCONTINUED | OUTPATIENT
Start: 2020-12-28 | End: 2020-12-31

## 2020-12-28 RX ORDER — ACETAMINOPHEN 500 MG
650 TABLET ORAL EVERY 6 HOURS
Refills: 0 | Status: DISCONTINUED | OUTPATIENT
Start: 2020-12-28 | End: 2020-12-28

## 2020-12-28 RX ORDER — CELECOXIB 200 MG/1
100 CAPSULE ORAL
Refills: 0 | Status: DISCONTINUED | OUTPATIENT
Start: 2020-12-28 | End: 2020-12-31

## 2020-12-28 RX ORDER — MORPHINE SULFATE 50 MG/1
15 CAPSULE, EXTENDED RELEASE ORAL
Refills: 0 | Status: DISCONTINUED | OUTPATIENT
Start: 2020-12-28 | End: 2020-12-31

## 2020-12-28 RX ORDER — MORPHINE SULFATE 50 MG/1
4 CAPSULE, EXTENDED RELEASE ORAL ONCE
Refills: 0 | Status: DISCONTINUED | OUTPATIENT
Start: 2020-12-28 | End: 2020-12-28

## 2020-12-28 RX ADMIN — MORPHINE SULFATE 4 MILLIGRAM(S): 50 CAPSULE, EXTENDED RELEASE ORAL at 17:32

## 2020-12-28 RX ADMIN — Medication 25 MICROGRAM(S): at 05:43

## 2020-12-28 RX ADMIN — MORPHINE SULFATE 15 MILLIGRAM(S): 50 CAPSULE, EXTENDED RELEASE ORAL at 15:25

## 2020-12-28 RX ADMIN — BUDESONIDE AND FORMOTEROL FUMARATE DIHYDRATE 2 PUFF(S): 160; 4.5 AEROSOL RESPIRATORY (INHALATION) at 19:38

## 2020-12-28 RX ADMIN — MORPHINE SULFATE 4 MILLIGRAM(S): 50 CAPSULE, EXTENDED RELEASE ORAL at 01:45

## 2020-12-28 RX ADMIN — ONDANSETRON 4 MILLIGRAM(S): 8 TABLET, FILM COATED ORAL at 05:29

## 2020-12-28 RX ADMIN — SODIUM CHLORIDE 75 MILLILITER(S): 9 INJECTION, SOLUTION INTRAVENOUS at 13:10

## 2020-12-28 RX ADMIN — MORPHINE SULFATE 4 MILLIGRAM(S): 50 CAPSULE, EXTENDED RELEASE ORAL at 05:29

## 2020-12-28 RX ADMIN — ALBUTEROL 2 PUFF(S): 90 AEROSOL, METERED ORAL at 05:29

## 2020-12-28 RX ADMIN — Medication 10 MILLIGRAM(S): at 21:31

## 2020-12-28 RX ADMIN — MORPHINE SULFATE 4 MILLIGRAM(S): 50 CAPSULE, EXTENDED RELEASE ORAL at 18:25

## 2020-12-28 RX ADMIN — ONDANSETRON 4 MILLIGRAM(S): 8 TABLET, FILM COATED ORAL at 01:45

## 2020-12-28 RX ADMIN — SODIUM CHLORIDE 75 MILLILITER(S): 9 INJECTION INTRAMUSCULAR; INTRAVENOUS; SUBCUTANEOUS at 01:30

## 2020-12-28 RX ADMIN — MORPHINE SULFATE 4 MILLIGRAM(S): 50 CAPSULE, EXTENDED RELEASE ORAL at 05:59

## 2020-12-28 RX ADMIN — PANTOPRAZOLE SODIUM 40 MILLIGRAM(S): 20 TABLET, DELAYED RELEASE ORAL at 01:30

## 2020-12-28 RX ADMIN — ONDANSETRON 4 MILLIGRAM(S): 8 TABLET, FILM COATED ORAL at 13:00

## 2020-12-28 RX ADMIN — FENTANYL CITRATE 25 MICROGRAM(S): 50 INJECTION INTRAVENOUS at 13:30

## 2020-12-28 RX ADMIN — Medication 5 MILLIGRAM(S): at 17:39

## 2020-12-28 RX ADMIN — MORPHINE SULFATE 4 MILLIGRAM(S): 50 CAPSULE, EXTENDED RELEASE ORAL at 21:25

## 2020-12-28 RX ADMIN — SENNA PLUS 2 TABLET(S): 8.6 TABLET ORAL at 21:03

## 2020-12-28 RX ADMIN — CELECOXIB 100 MILLIGRAM(S): 200 CAPSULE ORAL at 17:33

## 2020-12-28 RX ADMIN — FENTANYL CITRATE 25 MICROGRAM(S): 50 INJECTION INTRAVENOUS at 13:00

## 2020-12-28 RX ADMIN — Medication 5 MILLIGRAM(S): at 08:23

## 2020-12-28 RX ADMIN — Medication 200 MILLIGRAM(S): at 08:23

## 2020-12-28 RX ADMIN — MORPHINE SULFATE 4 MILLIGRAM(S): 50 CAPSULE, EXTENDED RELEASE ORAL at 21:01

## 2020-12-28 RX ADMIN — BUDESONIDE AND FORMOTEROL FUMARATE DIHYDRATE 2 PUFF(S): 160; 4.5 AEROSOL RESPIRATORY (INHALATION) at 05:28

## 2020-12-28 RX ADMIN — MORPHINE SULFATE 15 MILLIGRAM(S): 50 CAPSULE, EXTENDED RELEASE ORAL at 16:25

## 2020-12-28 RX ADMIN — MORPHINE SULFATE 4 MILLIGRAM(S): 50 CAPSULE, EXTENDED RELEASE ORAL at 02:16

## 2020-12-28 RX ADMIN — ONDANSETRON 4 MILLIGRAM(S): 8 TABLET, FILM COATED ORAL at 17:39

## 2020-12-28 RX ADMIN — CELECOXIB 100 MILLIGRAM(S): 200 CAPSULE ORAL at 18:34

## 2020-12-28 RX ADMIN — Medication 220 MILLIGRAM(S): at 18:32

## 2020-12-28 RX ADMIN — Medication 550 MILLIGRAM(S): at 18:55

## 2020-12-28 RX ADMIN — Medication 250 MILLIGRAM(S): at 08:24

## 2020-12-28 RX ADMIN — Medication 3 MILLILITER(S): at 21:54

## 2020-12-28 RX ADMIN — Medication 200 MILLIGRAM(S): at 17:33

## 2020-12-28 RX ADMIN — LIDOCAINE 5 MILLILITER(S): 4 CREAM TOPICAL at 16:47

## 2020-12-28 NOTE — CONSULT NOTE ADULT - SUBJECTIVE AND OBJECTIVE BOX
History of Present Illness: The patient is a 55 year old female with a history of hypotension, hypothyroidism, SLE, Sjogren's, COPD, HCV, SVT s/p ablation who presents for I&D of arm. She notes stable shortness of breath due to her COPD. No chest pain. She is able to walk about a block before becoming short of breath due to her COPD. She had an ablation about a year ago at [facility] after an episode of SVT.    Past Medical/Surgical History:  Hypotension, hypothyroidism, SLE, Sjogren's, COPD, HCV, SVT s/p ablation    Medications:  Home Medications:  carisoprodol 350 mg oral tablet: 1 tab(s) orally 2 times a day (28 Dec 2020 00:43)  diclofenac sodium 1% topical cream: Apply topically to affected area once a day (28 Dec 2020 00:43)  Fioricet oral capsule: 1 cap(s) orally 2 times a day (28 Dec 2020 00:43)  hydroxychloroquine 200 mg oral tablet: 1 tab(s) orally 2 times a day (28 Dec 2020 00:43)  ipratropium-albuterol 0.5 mg-2.5 mg/3 mLinhalation solution: 3 milliliter(s) inhaled 8 times a day (28 Dec 2020 00:43)  levothyroxine 25 mcg (0.025 mg) oral capsule: 1 cap(s) orally once a day (28 Dec 2020 00:43)  lidocaine hydrochloride: 1 dose(s) orally 1 to 4 times a day, As Needed- oral rinse (28 Dec 2020 00:43)  Morphine IR 15 mg oral tablet: 1 tab(s) orally 3 times a day (28 Dec 2020 00:43)  morphine sulfate ER: 15 milligram(s) orally every 12 hours (28 Dec 2020 00:43)  omeprazole 40 mg oral delayed release capsule: 1 cap(s) orally 2 times a day (28 Dec 2020 00:43)  ondansetron 8 mg oral tablet: 1 tab(s) orally 3 times a day (28 Dec 2020 00:43)  Symbicort 80 mcg-4.5 mcg/inh inhalation aerosol: 2 puff(s) inhaled 2 times a day (28 Dec 2020 00:43)  Valium 5 mg oral tablet: 1 tab(s) orally 2 times a day (28 Dec 2020 00:43)  Ventolin HFA 90 mcg/inh inhalation aerosol: 2 puff(s) inhaled every 6 hours, As Needed (28 Dec 2020 00:43)      Family History: Non-contributory family history of premature cardiovascular atherosclerotic disease    Social History: Prior tobacco use    Review of Systems:  General: No fevers, chills, weight loss or gain  Skin: No rashes, color changes  Cardiovascular: No chest pain, orthopnea  Respiratory: No shortness of breath, cough  Gastrointestinal: No nausea, abdominal pain  Genitourinary: No incontinence, pain with urination  Musculoskeletal: No pain, swelling, decreased range of motion  Neurological: No headache, weakness  Psychiatric: No depression, anxiety  Endocrine: No weight loss or gain, increased thirst  All other systems are comprehensively negative.    Physical Exam:  Vitals:        Vital Signs Last 24 Hrs  T(C): 36.7 (28 Dec 2020 05:36), Max: 36.8 (27 Dec 2020 19:36)  T(F): 98.1 (28 Dec 2020 05:36), Max: 98.3 (27 Dec 2020 19:36)  HR: 67 (28 Dec 2020 05:36) (67 - 80)  BP: 96/60 (28 Dec 2020 05:20) (90/50 - 98/60)  BP(mean): --  RR: 20 (28 Dec 2020 05:36) (18 - 20)  SpO2: 99% (28 Dec 2020 05:36) (95% - 100%)  General: NAD  HEENT: MMM  Neck: No JVD, no carotid bruit  Lungs: CTAB  CV: RRR, nl S1/S2, no M/R/G  Abdomen: S/NT/ND, +BS  Extremities: No LE edema, no cyanosis  Neuro: AAOx3, non-focal  Skin: No rash    Labs:                        10.3   7.15  )-----------( 362      ( 28 Dec 2020 06:30 )             30.9     12-28    135  |  102  |  13  ----------------------------<  98  4.3   |  28  |  0.76    Ca    8.5      28 Dec 2020 06:30    TPro  5.7<L>  /  Alb  2.7<L>  /  TBili  0.1<L>  /  DBili  x   /  AST  15  /  ALT  25  /  AlkPhos  79  12-28        PT/INR - ( 27 Dec 2020 20:57 )   PT: 11.5 sec;   INR: 0.95 ratio         PTT - ( 27 Dec 2020 20:57 )  PTT:25.7 sec    ECG: NSR, normal axis, no ST abnormality

## 2020-12-28 NOTE — H&P ADULT - NSHPPHYSICALEXAM_GEN_ALL_CORE
PHYSICAL EXAM:  Vital Signs Last 24 Hrs  T(C): 36.6 (25 Sep 2019 19:30), Max: 37.1 (25 Sep 2019 16:48)  T(F): 97.8 (25 Sep 2019 19:30), Max: 98.8 (25 Sep 2019 16:48)  HR: 60 (25 Sep 2019 19:30) (60 - 68)  BP: 125/71 (25 Sep 2019 19:30) (125/71 - 131/76)  BP(mean): --  RR: 14 (25 Sep 2019 19:30) (14 - 16)  SpO2: 99% (25 Sep 2019 19:30) (97% - 99%)    GENERAL:     extremely thin female in NAD, tired appearing  HEAD:     atraumatic, normocephalic  EYES:     EOMI, conjunctiva and sclera clear  ENMT:     no tonsillar erythema or exudates or enlargement, no oral lesions, moist mucous membranes, good dentition  NECK:     supple, no JVD  RESPIRATORY:     slightly coarse but otherwise clear to auscultation bilaterally, no rales or rhonchi or wheezing or rubs  CARDIOVASCULAR:     regular rate and rhythm, no murmurs or rubs or gallops, 2+ peripheral pulses  GASTROINTESTINAL:   wnl  EXTREMITIES:     no clubbing or cyanosis or edema  MUSCULOSKELETAL:     right elbow abscess  NERVOUS SYSTEM:     motor strength intact with 5/5 B/L upper and lower extremities, no gross sensory deficits  SKIN:     no rashes or lesions  PSYCH:     appropriate, alert and orientated x3, good concentration

## 2020-12-28 NOTE — CONSULT NOTE ADULT - ATTENDING COMMENTS
Infectious Diseases will continue to follow. Please call with any questions.   Tricia Nunez M.D.  [facility], Division of Infectious Diseases [phone]  For over the weekend and after hours, please call [phone]

## 2020-12-28 NOTE — PRE-OP CHECKLIST - SELECT TESTS ORDERED
CBC/CMP/Type and Cross/Type and Screen/Urinalysis/COVID
BMP/CBC/CMP/PT/PTT/INR/Type and Screen/EKG/CXR/COVID

## 2020-12-28 NOTE — DIETITIAN NUTRITION RISK NOTIFICATION - ADDITIONAL COMMENTS/DIETITIAN RECOMMENDATIONS
Nutrition consult ordered for unintentional wt loss PTA.  Pt made NPO on admission for OR (drainage of elbow abscess).  Post-procedure diet initiated to dysphagia 2 mechanical soft diet with nectar thick fluids, which pt expressed dissatisfaction for; diet upgraded to regular. Pt does endorses coughing throughout the day (hx emphysema/copd), however, reports coughing has worsened over the last few months.  Given hx sjogrens syndrome, lupus dx, pt reports softer, moist foods are best tolerated as she usually has very dry mouth; meal preferences to be obtained daily to optimize po intake/tolerance.  Pt reports she lives at home with parents and endorses consuming 1-2 meals/day (dinner is largest meal, occasionally eats breakfast; reports consuming Ensure supplement a few times per week).  Pt attributes weight loss in part to worsening health conditions.  Pt reports she has had significant weight loss over the last year; reports she weighed ~90-95# for most of her adult life, states she lost ~20# over the last year (CBW ~70#), however, does not weigh herself regularly.  Admission weight 80#, indicating ~15.7% weight loss x 1 year.  Pt agreeable to nutrition focused physical assessment - subcutaneous fat loss: orbitals (moderate), triceps (moderate); muscle wasting: temples (moderate), clavicles (moderate), shoulders (moderate), calf (moderate).  Given <75% of estimated energy requirement for > 1 month, weight loss (~15% x 1 year), increased energy/protein needs, physical assessment findings, BMI <19 (current BMI 17.3 per stated ht/admission wt), pt meets moderate protein-calorie malnutrition in context of chronic illness.  Encouraged po intake, discussed dietary preferences to optimize po intake; pt agreeable to receive Ensure Enlive supplement BID while admitted, will also provide magic cup fortified ice cream for addtl kcal/protein, to supplement < po intake.  Recommend SLP evaluation.

## 2020-12-28 NOTE — CHART NOTE - NSCHARTNOTEFT_GEN_A_CORE
Patient Name: Katie Meza Date: 1965  Address: [address] [place] [id number]: Female  Rx Written	Rx Dispensed	Drug	Quantity	Days Supply	Prescriber Name	Payment Method	Dispenser  12/03/2020	12/26/2020	morphine sulf er 15 mg tablet	60	30	Raza, Mubushar	Medicare	[id number]  12/03/2020	12/18/2020	morphine sulfate ir 15 mg tab	120	30	Raza, Mubushar	Medicare	[facility] #[id number]  12/03/2020	12/03/2020	diazepam 5 mg tablet	60	30	Raza, Mubushar	Medicare	[facility] #[id number]  12/03/2020	12/03/2020	carisoprodol 350 mg tablet	60	30	Raza, Mubushar	Medicare	[facility][id number]  11/21/2020	11/21/2020	fentanyl 12 mcg/hr patch	5	15	Raza, Mubushar	Medicare	[facility] #[id number]  11/20/2020	11/20/2020	hydrocodone-acetaminophen 5-325 mg tablet	20	6	Kim Gunderson	Medicare	[facility][id number]  10/29/2020	11/20/2020	morphine sulf er 15 mg tablet	60	30	Raffya, Mubushar	Medicare	[facility] #[id number]  10/29/2020	11/20/2020	morphine sulfate ir 15 mg tab	90	30	Raza, Mubushar	Medicare	[facility][id number]  10/29/2020	11/05/2020	carisoprodol 350 mg tablet	60	30	Raffya, Mubushar	Medicare	[facility][id number]  10/29/2020	11/05/2020	diazepam 5 mg tablet	60	30	Raffya, Mubushar	Medicare	[facility] #[id number]  10/01/2020	10/21/2020	morphine sulf er 15 mg tablet	60	30	Raza, Mubushar	Medicare	[facility] #[id number]  10/01/2020	10/21/2020	morphine sulfate ir 15 mg tab	120	30	Raza, Mubushar	Medicare	[facility] #[id number]  10/01/2020	10/09/2020	carisoprodol 350 mg tablet	60	30	Raffya, Mubushar	Medicare	[facility] #[id number]  10/01/2020	10/07/2020	diazepam 5 mg tablet	60	30	Raffya, Mubushar	Medicare	[facility] #[id number]  10/03/2020	10/03/2020	pregabalin 75 mg capsule	30	15	Norris Champagne	Medicare	[facility] #[id number]  09/03/2020	09/15/2020	morphine sulf er 15 mg tablet	60	30	Raza, Mubushar	Medicare	[facility] #[id number]  09/03/2020	09/15/2020	morphine sulfate ir 15 mg tab	90	30	Raza, Mubushar	Medicare	[facility] #[id number]  09/03/2020	09/09/2020	carisoprodol 350 mg tablet	60	30	Raza, Mubushar	Medicare	[facility] #[id number]  09/03/2020	09/08/2020	diazepam 5 mg tablet	60	30	Raza, Mubushar	Medicare	[facility] #[id number]  08/06/2020	08/18/2020	morphine sulf er 15 mg tablet	60	30	Raffya, Mubushar	Medicare	[facility] #[id number]  08/06/2020	08/18/2020	morphine sulfate ir 15 mg tab	90	30	Raza, Mubushar	Medicare	[facility] #[id number]  08/06/2020	08/11/2020	diazepam 5 mg tablet	60	30	Raza, Mubushar	Medicare	[facility] #[id number]  08/06/2020	08/10/2020	carisoprodol 350 mg tablet	60	30	Raffya, Mubushar	Medicare	[facility] #[id number]  07/10/2020	07/19/2020	morphine sulf er 15 mg tablet	60	30	Loomis, Tricia	Medicare	[facility] #[id number]  07/10/2020	07/19/2020	morphine sulfate ir 15 mg tab	90	30	Loomisers, Tricia	Medicare	[facility] #[id number]  07/10/2020	07/13/2020	diazepam 5 mg tablet	60	30	Loomisers, Tricia	Medicare	[facility] #[id number]  07/10/2020	07/12/2020	carisoprodol 350 mg tablet	60	30	Loomis, Tricia	Medicare	[facility][id number]  06/26/2020	07/03/2020	morphine sulf er 15 mg tablet	30	15	Raffya, Mubushar	Medicare	[facility] #[id number]  06/26/2020	07/03/2020	morphine sulfate ir 15 mg tab	45	15	Raza, Mubushar	Medicare	[facility] #[id number]  06/26/2020	06/28/2020	diazepam 5 mg tablet	30	15	Raffya, Mubushar	Medicare	[facility] #[id number]  06/26/2020	06/27/2020	carisoprodol 350 mg tablet	30	15	Raza, Mubushar	Medicare	[facility] #[id number]  06/10/2020	06/23/2020	morphine sulf er 15 mg tablet	16	8	Raffya, Mubushar	Medicare	[facility] #[id number]  06/23/2020	06/23/2020	morphine sulfate ir 15 mg tab	24	8	Raza, Mubushar	Medicare	[facility] #[id number]  06/10/2020	06/13/2020	diazepam 5 mg tablet	30	15	Raza, Mubushar	Medicare	[facility] #[id number]  06/10/2020	06/12/2020	carisoprodol 350 mg tablet	30	15	Raza, Mubushar	Medicare	[facility] #[id number]  05/28/2020	06/05/2020	morphine sulf er 15 mg tablet	30	15	Raffya, Mubushar	Medicare	[facility] #[id number]  05/28/2020	06/05/2020	morphine sulfate ir 15 mg tab	45	15	Raza, Mubushar	Medicare	[facility] #[id number]  05/28/2020	05/29/2020	carisoprodol 350 mg tablet	30	15	Raza, Mubushar	Medicare	[facility] #[id number]  05/28/2020	05/29/2020	diazepam 5 mg tablet	30	15	Raza, Mubushar	Medicare	[facility] #[id number]  05/12/2020	05/19/2020	morphine sulf er 15 mg tablet	30	15	Raza, Mubushar	Medicare	[facility] #[id number]  05/12/2020	05/19/2020	morphine sulfate ir 15 mg tab	45	15	Raza, Mubushar	Medicare	[facility] #[id number]  04/13/2020	05/14/2020	carisoprodol 350 mg tablet	30	15	Raza, Mubushar	Medicare	[facility] #[id number]  05/12/2020	05/14/2020	diazepam 5 mg tablet	30	15	Raza, Mubushar	Medicare	[facility] #[id number]  04/13/2020	05/01/2020	morphine sulf er 15 mg tablet	30	15	Raza, Mubushar	Medicare	[facility] #[id number]  04/28/2020	05/01/2020	morphine sulfate ir 15 mg tab	45	15	Raza, Mubushar	Medicare	[facility] #[id number]  04/28/2020	05/01/2020	diazepam 5 mg tablet	30	15	Raza, Mubushar	Medicare	[facility] #[id number]  03/16/2020	04/13/2020	carisoprodol 350 mg tablet	60	30	Raza, Mubushar	Medicare	[facility] #[id number]  04/13/2020	04/13/2020	morphine sulfate ir 15 mg tab	45	15	Raza, Mubushar	Medicare	[facility] #[id number]  04/13/2020	04/13/2020	diazepam 5 mg tablet	30	15	Raza, Mubushar	Medicare	[facility] #[id number]  03/16/2020	03/22/2020	morphine sulf er 15 mg tablet	60	30	Raza, Mubushar	Medicare	[facility] #[id number]  03/16/2020	03/17/2020	carisoprodol 350 mg tablet	60	30	Raza, Mubushar	Medicare	[facility] #[id number]  03/17/2020	03/17/2020	morphine sulfate ir 15 mg tab	90	30	Raza, Mubushar	Medicare	[facility] #[id number]  03/16/2020	03/16/2020	diazepam 5 mg tablet	60	30	Raza, Mubushar	Medicare	[facility] #[id number]  02/14/2020	02/18/2020	diazepam 5 mg tablet	60	30	Raza, Mubushar	Medicare	[facility] #[id number]  02/14/2020	02/17/2020	carisoprodol 350 mg tablet	60	30	Raza, Mubushar	Medicare	[facility] #[id number]  02/15/2020	02/15/2020	morphine sulfate ir 15 mg tab	90	30	Raza, Mubushar	Medicare	[facility] #[id number]  01/17/2020	01/21/2020	morphine sulf er 15 mg tablet	60	30	Raza, Mubushar	Medicare	[facility] #[id number]  01/17/2020	01/21/2020	diazepam 5 mg tablet	60	30	Raffya, Mubushar	Medicare	[facility] #[id number]  01/17/2020	01/17/2020	carisoprodol 350 mg tablet	60	30	Raza, Mubushar	Medicare	[facility] #[id number]  01/17/2020	01/17/2020	morphine sulfate ir 15 mg tab	30	30	Raza, Mubushar	Medicare	[facility] #[id number]

## 2020-12-28 NOTE — H&P ADULT - ASSESSMENT
a/p right elbow abscess    - Incision and Drainage 12/28/20 am   - NPO   medically and cardiology cleared  - Vanco per Orthopaedist  - Cultures obtained in OR  - ID consult  called  Dr. Regis Patton covering for Dr. Champagne  - JENNIFER stockings  - Follow  cultures intra op  Cultures taken in office results in front of paper chart on floor

## 2020-12-28 NOTE — CONSULT NOTE ADULT - SUBJECTIVE AND OBJECTIVE BOX
[facility], Division of Infectious Diseases  YOUNG Living[place] S. Shah, Y. Patel  [phone]    DISPIRITO, DOUGLAS  55y, Female  [id number]    HPI--  HPI:  56 y/o female with a PMHx of Paroxysmal SVT, Chronic neck pain and low back pain with sciatica, Raynaud's syndrome, Emphysema/COPD(home o2 prn), Migraine, Anxiety, Hepatitis C, Hypothyroidism, GERD, Sjogren's disease, Lupus, s/p Nirschl repair of L elbow for lateral epicondylitis on 10/02/20, s/p Nirschl repair of R elbow for lateral epicondylitis on 11/20/20 by Dr. Durand, presents to the ED c/o R elbow pain with associated swelling and redness x 1 week. States swelling was drained twice recently but sx has been progressively worsening. Last drainage was 2 days ago. Denies fever, chills, or URI-like sx. Started on Amoxicillin 2-3 days ago. Pt is R hand dominant.  Per patient, she states that she saw Dr. Durand before christmas and he advised her to come to the ER to be admitted for OR drainage.  She asked if she can come after christmas, so she came tonight sunday to be admitted.       In the ED, WBC 12.68, Sodium 131, Covid 19 negative.   Dr. Durand was consulted, recommended to give vancomycin because it was susceptible.  Vancomycin 750mg x 1 was given.    During ED course, morphine and zofran x 1 given.   S/p I&D this AM. Pt seen and examined at bedside. Feeling much better, getting settled in her room.      Allergic to aspirin, vibramycin, zithromax    Active Medications--  ALBUTerol    90 MICROgram(s) HFA Inhaler 2 Puff(s) Inhalation every 6 hours PRN  aprepitant 40 milliGRAM(s) Oral once  budesonide  80 MICROgram(s)/formoterol 4.5 MICROgram(s) Inhaler 2 Puff(s) Inhalation two times a day  chlorhexidine 2% Cloths 1 Application(s) Topical once  diazepam    Tablet 5 milliGRAM(s) Oral two times a day  fentaNYL    Injectable 25 MICROGram(s) IV Push every 5 minutes PRN  hydroxychloroquine 200 milliGRAM(s) Oral two times a day  lactated ringers. 1000 milliLiter(s) IV Continuous <Continuous>  levothyroxine 25 MICROGram(s) Oral daily  morphine  IR 15 milliGRAM(s) Oral three times a day  morphine ER Tablet 15 milliGRAM(s) Oral two times a day  pantoprazole  Injectable 40 milliGRAM(s) IV Push daily  senna 2 Tablet(s) Oral at bedtime    Antimicrobials:   hydroxychloroquine 200 milliGRAM(s) Oral two times a day    Immunologic:     ROS:  CONSTITUTIONAL: No fevers or chills. No weakness or headache. No weight changes.  EYES/ENT: No visual or hearing changes. No sore throat or throat pain .  NECK: No pain or stiffness  RESPIRATORY: No cough, wheezing, or hemoptysis. No shortness of breath  CARDIOVASCULAR: No chest pain or palpitations  GASTROINTESTINAL: No abdominal pain. No nausea or vomiting. No diarrhea or constipation.  GENITOURINARY: No dysuria, frequency or hematuria  NEUROLOGICAL: No numbness or weakness  SKIN: No itching or rashes  PSYCHIATRIC: Pleasant. Appropriate affect    Allergies: Vibramycin (Unknown)    PMH -- H/O paroxysmal supraventricular tachycardia    Chronic neck pain    Chronic low back pain with sciatica    Right shoulder pain    Pain, wrist, right    H/O Raynaud&#x27;s syndrome    History of IBS    Osteochondritis dissecans    Personal history of skin cancer    History of seizure disorder    History of weight loss    H/O osteoporosis    Burning mouth syndrome    Emphysema/COPD    Lupus    Dvt femoral (deep venous thrombosis)    Glossopharyngeal neuralgia syndrome    Nicotine addiction    COPD (chronic obstructive pulmonary disease)    Neuropathy    Migraine    Anxiety    Hepatitis C    Gallstones    Hypothyroidism    Hypotension    GERD (gastroesophageal reflux disease)    UTI (urinary tract infection)    Vertigo    Sjogren&#x27;s disease    Lupus      PSH -- H/O squamous cell carcinoma excision    Status post placement of implantable loop recorder    H/O elbow surgery    Ankle problem    S/P cryoablation of arrhythmia    S/P brain surgery    H/O lumpectomy    Injury of right wrist, subsequent encounter    Gall bladder disease    Vocal cord polyp      FH -- FH: arrhythmia    Family history of heart disease    Family history of multiple sclerosis    Family history of diabetes mellitus (Sibling)    Family history of psoriatic arthritis (Sibling)    Family history of systemic lupus erythematosus (SLE) in mother (Sibling)    No pertinent family history in first degree relatives    No significant family history      Social History --  EtOH: denies   Tobacco: denies   Drug Use: denies     Travel/Environmental/Occupational History:    Physical Exam--  Vital Signs Last 24 Hrs  T(F): 98.4 (28 Dec 2020 11:28), Max: 98.4 (28 Dec 2020 09:27)  HR: 69 (28 Dec 2020 13:30) (65 - 80)  BP: 114/74 (28 Dec 2020 13:30) (85/47 - 116/62)  RR: 18 (28 Dec 2020 13:30) (16 - 22)  SpO2: 100% (28 Dec 2020 13:30) (95% - 100%)  General: nontoxic-appearing, no acute distress  HEENT: NC/AT, EOMI, anicteric, conjunctiva pink and moist, oropharynx clear, dentition fair  Neck: Not rigid. No sense of mass. No LAD  Lungs: Clear bilaterally without rales, wheezing or rhonchi  Heart: Regular rate and rhythm. No murmur, rub or gallop.  Abdomen: Soft. Nondistended. Nontender. Bowel sounds present. No organomegaly.  Back: No spinal tenderness. No costovertebral angle tenderness.  Extremities: No cyanosis or clubbing. No edema.   Skin: Warm. Dry. Good turgor. No rash. No vasculitic stigmata    Laboratory & Imaging Data--  CBC:                       10.3   7.15  )-----------( 362      ( 28 Dec 2020 06:30 )             30.9     CMP: 12-28    135  |  102  |  13  ----------------------------<  98  4.3   |  28  |  0.76    Ca    8.5      28 Dec 2020 06:30    TPro  5.7<L>  /  Alb  2.7<L>  /  TBili  0.1<L>  /  DBili  x   /  AST  15  /  ALT  25  /  AlkPhos  79  12-28    LIVER FUNCTIONS - ( 28 Dec 2020 06:30 )  Alb: 2.7 g/dL / Pro: 5.7 g/dL / ALK PHOS: 79 U/L / ALT: 25 U/L DA / AST: 15 U/L / GGT: x               Microbiology: reviewed      Radiology: reviewed  < from: Xray Chest 1 View AP/PA (12.27.20 @ 20:21) >    EXAM:  XR CHEST AP OR PA 1V                                  PROCEDURE DATE:  12/27/2020          INTERPRETATION:  AP chest on December 27, 2020 at 8:02 PM. Patient has a right elbow infection.    Heart is normal for projection.    Presently there is a loop recorder over the left chest new since January 14, 2019. Lungs remain clear.    Clips in right axilla again noted.    IMPRESSION: No acute finding.              MIKHAIL BURCH M.D., ATTENDING RADIOLOGIST  This document has been electronicallysigned. Dec 28 2020 11:01AM    < end of copied text >

## 2020-12-28 NOTE — H&P ADULT - HISTORY OF PRESENT ILLNESS
Patient was seen  and evaluate  by Medicine. Please see medicine consult for History and Physical. Discussed. Patient with recent history of right elbow surgery  presents with swelling and redness  right elbow requiring incision and drainage.

## 2020-12-28 NOTE — DIETITIAN INITIAL EVALUATION ADULT. - OTHER INFO
54 y/o female with a PMHx of Paroxysmal SVT, Chronic neck pain and low back pain with sciatica, Raynaud's syndrome, Emphysema/COPD(home o2 prn), Migraine, Anxiety, Hepatitis C, Hypothyroidism, GERD, Sjogren's disease, Lupus, s/p Nirschl repair of L elbow for lateral epicondylitis on 10/02/20, s/p Nirschl repair of R elbow for lateral epicondylitis on 11/20/20 by Dr. Durand, presents to the ED c/o R elbow pain with associated swelling and redness x 1 week. States swelling was drained twice recently but sx has been progressively worsening. Pt admitted for OR drainage.     Nutrition consult ordered for unintentional wt loss PTA.  Pt made NPO on admission for OR (drainage of elbow abscess).  Post-procedure diet initiated to dysphagia 2 mechanical soft diet with nectar thick fluids, which pt has express dissatisfaction for; diet upgraded to regular. Pt does endorses coughing throughout the day (hx emphysema/copd), however, reports coughing has worsened over the last few months.  Given hx sjogrens syndrome, lupus dx, pt reports softer, moist foods are best tolerated as she usually has very dry mouth; meal preferences to be obtained daily to optimize po intake/tolerance.  Pt reports she lives at home with parents and endorses consuming 1-2 meals/day (dinner is largest meal, occasionally eats breakfast; reports consuming Ensure supplement a few times per week).  Pt attributes weight loss in part to worsening health conditions.  Pt reports she has had significant weight loss over the last year; reports she weighed ~90-95# for most of her adult life, states she lost ~20# over the last year (CBW ~70#), however, does not weigh herself regularly.  Admission weight 80#, indicating ~15.7% weight loss x 1 year.  Pt agreeable to nutrition focused physical assessment - subcutaneous fat loss: orbitals (moderate), triceps (moderate); muscle wasting: temples (moderate), clavicles (moderate), shoulders (moderate), calf (moderate).  Given <75% of estimated energy requirement for > 1 month, weight loss (~15% x 1 year), increased energy/protein needs, physical assessment findings, BMI <19 (current BMI 17.3 per stated ht/admission wt), pt meets moderate protein-calorie malnutrition in context of chronic illness.  Encouraged po intake, discussed dietary preferences to optimize po intake; pt agreeable to receive Ensure Enlive supplement BID while admitted, will also provide magic cup fortified ice cream for addtl kcal/protein, to supplement < po intake.

## 2020-12-28 NOTE — PROGRESS NOTE ADULT - SUBJECTIVE AND OBJECTIVE BOX
Patient is a 55y old  Female who presents with a chief complaint of abscess right elbow (29 Dec 2020 09:04)      INTERVAL HPI/OVERNIGHT EVENTS:  Pt is seen and examined.  Pain is controlled.  Dino any cough, cold, sob, neck problem or swallowing problem. Dino any cough while swallowing.     Pain Location & Control:     MEDICATIONS  (STANDING):  acetaminophen   Tablet .. 650 milliGRAM(s) Oral every 8 hours  aprepitant 40 milliGRAM(s) Oral once  budesonide  80 MICROgram(s)/formoterol 4.5 MICROgram(s) Inhaler 2 Puff(s) Inhalation two times a day  celecoxib 100 milliGRAM(s) Oral two times a day  chlorhexidine 2% Cloths 1 Application(s) Topical once  diazepam    Tablet 5 milliGRAM(s) Oral two times a day  hydroxychloroquine 200 milliGRAM(s) Oral two times a day  levothyroxine 25 MICROGram(s) Oral daily  morphine ER Tablet 15 milliGRAM(s) Oral every 12 hours  pantoprazole  Injectable 40 milliGRAM(s) IV Push daily  polyethylene glycol 3350 17 Gram(s) Oral daily  senna 2 Tablet(s) Oral at bedtime  vancomycin  IVPB 750 milliGRAM(s) IV Intermittent every 12 hours    MEDICATIONS  (PRN):  albuterol/ipratropium for Nebulization 3 milliLiter(s) Nebulizer every 6 hours PRN Shortness of Breath and/or Wheezing  lidocaine 2% Viscous 5 milliLiter(s) Swish and Spit every 4 hours PRN mouth pain  morphine  - Injectable 4 milliGRAM(s) IV Push every 3 hours PRN Severe Pain (7 - 10)  morphine  IR 15 milliGRAM(s) Oral every 3 hours PRN Moderate Pain (4 - 6)  ondansetron Injectable 4 milliGRAM(s) IV Push every 6 hours PRN Nausea and/or Vomiting      Allergies    Vibramycin (Unknown)    Intolerances    aspirin (Stomach Upset)  Zithromax (Stomach Upset)                Cultures  Culture Results:   No growth to date. (12-28 @ 00:38)  Culture Results:   No growth to date. (12-28 @ 00:38)        Culture - Blood (collected 12-28-20 @ 00:38)  Source: .Blood Blood  Preliminary Report (12-29-20 @ 01:02):    No growth to date.    Culture - Blood (collected 12-28-20 @ 00:38)  Source: .Blood Blood  Preliminary Report (12-29-20 @ 01:02):    No growth to date.        RADIOLOGY & ADDITIONAL TESTS:    Imaging Personally Reviewed:  [ ] YES  [ ] NO    Consultant(s) Notes Reviewed:  [x ] YES  [ ] NO    Care Discussed with Consultants/Other Providers [x ] YES  [ ] NO

## 2020-12-28 NOTE — CONSULT NOTE ADULT - ASSESSMENT
Pt is a 55W w/ PMHx of Paroxysmal SVT, Chronic neck pain and low back pain with sciatica, Raynaud's syndrome, Emphysema/COPD(home o2 prn), Migraine, Anxiety, Hepatitis C, Hypothyroidism, GERD, Sjogren's disease, Lupus, s/p Nirschl repair of L elbow for lateral epicondylitis on 10/02/20, s/p Nirschl repair of R elbow for lateral epicondylitis on 11/20/20 by Dr. Durand, p/w R elbow pain with associated swelling and redness x 1 week, s/p 2 drainages on amoxicillin. Admitted for repeat I&D.     R elbow abscess s/p I&D in the OR  -s/p I&D 12/28  -pending surgical site cx  -pending BCx to r/o systemic infection  Previous outpatient cx+ MRSA  -will c/w vancomycin while inpatient, obtain vanc level prior to 4th dose, goal trough 15-20 (dosing per pharmacy protocol)  Will likely be the same organism; however will f/u cx prior to determining final regimen

## 2020-12-28 NOTE — ED ADULT NURSE NOTE - NSIMPLEMENTINTERV_GEN_ALL_ED
Implemented All Universal Safety Interventions:  [place] to call system. Call bell, personal items and telephone within reach. Instruct patient to call for assistance. Room bathroom lighting operational. Non-slip footwear when patient is off stretcher. Physically safe environment: no spills, clutter or unnecessary equipment. Stretcher in lowest position, wheels locked, appropriate side rails in place.

## 2020-12-28 NOTE — CONSULT NOTE ADULT - ASSESSMENT
The patient is a 55 year old female with a history of hypotension, hypothyroidism, SLE, Sjogren's, COPD on home O2, HCV, SVT s/p ablation who presents for I&D of arm.    Plan:  - ECG with no evidence of ischemia or infarction  - Blood pressure chronically runs on the low side - at baseline  - No evidence of decompensated heart failure on exam  - CXR with hyperinflated lungs but otherwise clear  - SVT - underwent ablation. On no rate controlling agents due to low BPs.  - There are no active cardiac issues. The patient is optimized to proceed from a cardiac perspective.

## 2020-12-29 ENCOUNTER — TRANSCRIPTION ENCOUNTER (OUTPATIENT)
Age: 55
End: 2020-12-29

## 2020-12-29 LAB
ALBUMIN SERPL ELPH-MCNC: 2.9 G/DL — LOW (ref 3.3–5)
ALP SERPL-CCNC: 86 U/L — SIGNIFICANT CHANGE UP (ref 30–120)
ALT FLD-CCNC: 25 U/L DA — SIGNIFICANT CHANGE UP (ref 10–60)
ANION GAP SERPL CALC-SCNC: 7 MMOL/L — SIGNIFICANT CHANGE UP (ref 5–17)
AST SERPL-CCNC: 18 U/L — SIGNIFICANT CHANGE UP (ref 10–40)
BASOPHILS # BLD AUTO: 0.07 K/UL — SIGNIFICANT CHANGE UP (ref 0–0.2)
BASOPHILS NFR BLD AUTO: 0.6 % — SIGNIFICANT CHANGE UP (ref 0–2)
BILIRUB DIRECT SERPL-MCNC: 0 MG/DL — SIGNIFICANT CHANGE UP (ref 0–0.2)
BILIRUB INDIRECT FLD-MCNC: 0.1 MG/DL — LOW (ref 0.2–1)
BILIRUB SERPL-MCNC: 0.1 MG/DL — LOW (ref 0.2–1.2)
BUN SERPL-MCNC: 15 MG/DL — SIGNIFICANT CHANGE UP (ref 7–23)
CALCIUM SERPL-MCNC: 8.9 MG/DL — SIGNIFICANT CHANGE UP (ref 8.4–10.5)
CHLORIDE SERPL-SCNC: 101 MMOL/L — SIGNIFICANT CHANGE UP (ref 96–108)
CO2 SERPL-SCNC: 28 MMOL/L — SIGNIFICANT CHANGE UP (ref 22–31)
CREAT SERPL-MCNC: 0.76 MG/DL — SIGNIFICANT CHANGE UP (ref 0.5–1.3)
EOSINOPHIL # BLD AUTO: 0.38 K/UL — SIGNIFICANT CHANGE UP (ref 0–0.5)
EOSINOPHIL NFR BLD AUTO: 3.3 % — SIGNIFICANT CHANGE UP (ref 0–6)
GLUCOSE SERPL-MCNC: 103 MG/DL — HIGH (ref 70–99)
HCT VFR BLD CALC: 31.3 % — LOW (ref 34.5–45)
HGB BLD-MCNC: 10.3 G/DL — LOW (ref 11.5–15.5)
IMM GRANULOCYTES NFR BLD AUTO: 0.4 % — SIGNIFICANT CHANGE UP (ref 0–1.5)
LYMPHOCYTES # BLD AUTO: 2.78 K/UL — SIGNIFICANT CHANGE UP (ref 1–3.3)
LYMPHOCYTES # BLD AUTO: 24.4 % — SIGNIFICANT CHANGE UP (ref 13–44)
MCHC RBC-ENTMCNC: 32.4 PG — SIGNIFICANT CHANGE UP (ref 27–34)
MCHC RBC-ENTMCNC: 32.9 GM/DL — SIGNIFICANT CHANGE UP (ref 32–36)
MCV RBC AUTO: 98.4 FL — SIGNIFICANT CHANGE UP (ref 80–100)
MONOCYTES # BLD AUTO: 0.97 K/UL — HIGH (ref 0–0.9)
MONOCYTES NFR BLD AUTO: 8.5 % — SIGNIFICANT CHANGE UP (ref 2–14)
NEUTROPHILS # BLD AUTO: 7.17 K/UL — SIGNIFICANT CHANGE UP (ref 1.8–7.4)
NEUTROPHILS NFR BLD AUTO: 62.8 % — SIGNIFICANT CHANGE UP (ref 43–77)
NRBC # BLD: 0 /100 WBCS — SIGNIFICANT CHANGE UP (ref 0–0)
PLATELET # BLD AUTO: 346 K/UL — SIGNIFICANT CHANGE UP (ref 150–400)
POTASSIUM SERPL-MCNC: 4.5 MMOL/L — SIGNIFICANT CHANGE UP (ref 3.5–5.3)
POTASSIUM SERPL-SCNC: 4.5 MMOL/L — SIGNIFICANT CHANGE UP (ref 3.5–5.3)
PROT SERPL-MCNC: 5.7 G/DL — LOW (ref 6–8.3)
RBC # BLD: 3.18 M/UL — LOW (ref 3.8–5.2)
RBC # FLD: 12.5 % — SIGNIFICANT CHANGE UP (ref 10.3–14.5)
SARS-COV-2 IGG SERPL QL IA: NEGATIVE — SIGNIFICANT CHANGE UP
SARS-COV-2 IGM SERPL IA-ACNC: 0.06 INDEX — SIGNIFICANT CHANGE UP
SODIUM SERPL-SCNC: 136 MMOL/L — SIGNIFICANT CHANGE UP (ref 135–145)
VANCOMYCIN TROUGH SERPL-MCNC: 8.8 UG/ML — LOW (ref 10–20)
WBC # BLD: 11.41 K/UL — HIGH (ref 3.8–10.5)
WBC # FLD AUTO: 11.41 K/UL — HIGH (ref 3.8–10.5)

## 2020-12-29 PROCEDURE — 99233 SBSQ HOSP IP/OBS HIGH 50: CPT

## 2020-12-29 RX ORDER — NICOTINE POLACRILEX 2 MG
1 GUM BUCCAL DAILY
Refills: 0 | Status: DISCONTINUED | OUTPATIENT
Start: 2020-12-29 | End: 2020-12-31

## 2020-12-29 RX ORDER — VANCOMYCIN HCL 1 G
750 VIAL (EA) INTRAVENOUS EVERY 12 HOURS
Refills: 0 | Status: DISCONTINUED | OUTPATIENT
Start: 2020-12-29 | End: 2020-12-29

## 2020-12-29 RX ORDER — SODIUM CHLORIDE 9 MG/ML
1000 INJECTION, SOLUTION INTRAVENOUS
Refills: 0 | Status: DISCONTINUED | OUTPATIENT
Start: 2020-12-29 | End: 2020-12-31

## 2020-12-29 RX ORDER — VANCOMYCIN HCL 1 G
1000 VIAL (EA) INTRAVENOUS EVERY 12 HOURS
Refills: 0 | Status: DISCONTINUED | OUTPATIENT
Start: 2020-12-29 | End: 2020-12-31

## 2020-12-29 RX ORDER — ACETAMINOPHEN 500 MG
2 TABLET ORAL
Qty: 0 | Refills: 0 | DISCHARGE
Start: 2020-12-29

## 2020-12-29 RX ORDER — CELECOXIB 200 MG/1
1 CAPSULE ORAL
Qty: 0 | Refills: 0 | DISCHARGE
Start: 2020-12-29

## 2020-12-29 RX ADMIN — ONDANSETRON 4 MILLIGRAM(S): 8 TABLET, FILM COATED ORAL at 00:12

## 2020-12-29 RX ADMIN — Medication 250 MILLIGRAM(S): at 22:32

## 2020-12-29 RX ADMIN — MORPHINE SULFATE 15 MILLIGRAM(S): 50 CAPSULE, EXTENDED RELEASE ORAL at 17:24

## 2020-12-29 RX ADMIN — CELECOXIB 100 MILLIGRAM(S): 200 CAPSULE ORAL at 06:44

## 2020-12-29 RX ADMIN — SODIUM CHLORIDE 100 MILLILITER(S): 9 INJECTION, SOLUTION INTRAVENOUS at 10:22

## 2020-12-29 RX ADMIN — CELECOXIB 100 MILLIGRAM(S): 200 CAPSULE ORAL at 18:20

## 2020-12-29 RX ADMIN — Medication 650 MILLIGRAM(S): at 11:55

## 2020-12-29 RX ADMIN — ONDANSETRON 4 MILLIGRAM(S): 8 TABLET, FILM COATED ORAL at 06:42

## 2020-12-29 RX ADMIN — CELECOXIB 100 MILLIGRAM(S): 200 CAPSULE ORAL at 06:41

## 2020-12-29 RX ADMIN — PANTOPRAZOLE SODIUM 40 MILLIGRAM(S): 20 TABLET, DELAYED RELEASE ORAL at 12:03

## 2020-12-29 RX ADMIN — MORPHINE SULFATE 15 MILLIGRAM(S): 50 CAPSULE, EXTENDED RELEASE ORAL at 18:20

## 2020-12-29 RX ADMIN — ONDANSETRON 4 MILLIGRAM(S): 8 TABLET, FILM COATED ORAL at 14:32

## 2020-12-29 RX ADMIN — Medication 250 MILLIGRAM(S): at 09:26

## 2020-12-29 RX ADMIN — MORPHINE SULFATE 4 MILLIGRAM(S): 50 CAPSULE, EXTENDED RELEASE ORAL at 10:25

## 2020-12-29 RX ADMIN — MORPHINE SULFATE 4 MILLIGRAM(S): 50 CAPSULE, EXTENDED RELEASE ORAL at 10:45

## 2020-12-29 RX ADMIN — Medication 220 MILLIGRAM(S): at 06:42

## 2020-12-29 RX ADMIN — LIDOCAINE 5 MILLILITER(S): 4 CREAM TOPICAL at 18:43

## 2020-12-29 RX ADMIN — MORPHINE SULFATE 15 MILLIGRAM(S): 50 CAPSULE, EXTENDED RELEASE ORAL at 13:00

## 2020-12-29 RX ADMIN — MORPHINE SULFATE 4 MILLIGRAM(S): 50 CAPSULE, EXTENDED RELEASE ORAL at 18:48

## 2020-12-29 RX ADMIN — CELECOXIB 100 MILLIGRAM(S): 200 CAPSULE ORAL at 17:23

## 2020-12-29 RX ADMIN — Medication 200 MILLIGRAM(S): at 06:41

## 2020-12-29 RX ADMIN — Medication 650 MILLIGRAM(S): at 10:25

## 2020-12-29 RX ADMIN — Medication 3 MILLILITER(S): at 22:59

## 2020-12-29 RX ADMIN — MORPHINE SULFATE 4 MILLIGRAM(S): 50 CAPSULE, EXTENDED RELEASE ORAL at 00:11

## 2020-12-29 RX ADMIN — Medication 25 MICROGRAM(S): at 06:42

## 2020-12-29 RX ADMIN — MORPHINE SULFATE 4 MILLIGRAM(S): 50 CAPSULE, EXTENDED RELEASE ORAL at 22:11

## 2020-12-29 RX ADMIN — MORPHINE SULFATE 4 MILLIGRAM(S): 50 CAPSULE, EXTENDED RELEASE ORAL at 21:56

## 2020-12-29 RX ADMIN — Medication 550 MILLIGRAM(S): at 06:43

## 2020-12-29 RX ADMIN — BUDESONIDE AND FORMOTEROL FUMARATE DIHYDRATE 2 PUFF(S): 160; 4.5 AEROSOL RESPIRATORY (INHALATION) at 06:42

## 2020-12-29 RX ADMIN — Medication 550 MILLIGRAM(S): at 00:15

## 2020-12-29 RX ADMIN — Medication 5 MILLIGRAM(S): at 17:24

## 2020-12-29 RX ADMIN — Medication 1 PATCH: at 19:31

## 2020-12-29 RX ADMIN — SODIUM CHLORIDE 100 MILLILITER(S): 9 INJECTION, SOLUTION INTRAVENOUS at 21:56

## 2020-12-29 RX ADMIN — MORPHINE SULFATE 4 MILLIGRAM(S): 50 CAPSULE, EXTENDED RELEASE ORAL at 06:42

## 2020-12-29 RX ADMIN — ONDANSETRON 4 MILLIGRAM(S): 8 TABLET, FILM COATED ORAL at 21:55

## 2020-12-29 RX ADMIN — Medication 650 MILLIGRAM(S): at 19:26

## 2020-12-29 RX ADMIN — MORPHINE SULFATE 15 MILLIGRAM(S): 50 CAPSULE, EXTENDED RELEASE ORAL at 12:15

## 2020-12-29 RX ADMIN — MORPHINE SULFATE 4 MILLIGRAM(S): 50 CAPSULE, EXTENDED RELEASE ORAL at 19:20

## 2020-12-29 RX ADMIN — MORPHINE SULFATE 4 MILLIGRAM(S): 50 CAPSULE, EXTENDED RELEASE ORAL at 07:10

## 2020-12-29 RX ADMIN — Medication 1 PATCH: at 13:36

## 2020-12-29 RX ADMIN — MORPHINE SULFATE 4 MILLIGRAM(S): 50 CAPSULE, EXTENDED RELEASE ORAL at 00:35

## 2020-12-29 RX ADMIN — Medication 200 MILLIGRAM(S): at 17:23

## 2020-12-29 RX ADMIN — LIDOCAINE 5 MILLILITER(S): 4 CREAM TOPICAL at 09:27

## 2020-12-29 RX ADMIN — Medication 5 MILLIGRAM(S): at 06:41

## 2020-12-29 RX ADMIN — Medication 650 MILLIGRAM(S): at 18:43

## 2020-12-29 RX ADMIN — Medication 220 MILLIGRAM(S): at 00:13

## 2020-12-29 RX ADMIN — MORPHINE SULFATE 4 MILLIGRAM(S): 50 CAPSULE, EXTENDED RELEASE ORAL at 14:33

## 2020-12-29 RX ADMIN — MORPHINE SULFATE 4 MILLIGRAM(S): 50 CAPSULE, EXTENDED RELEASE ORAL at 15:01

## 2020-12-29 NOTE — PROGRESS NOTE ADULT - SUBJECTIVE AND OBJECTIVE BOX
Chief Complaint: Right elbow abscess    Interval Events: Underwent surgery yesterday.    Review of Systems:  General: No fevers, chills, weight loss or gain  Skin: No rashes, color changes  Cardiovascular: No chest pain, orthopnea  Respiratory: No shortness of breath, cough  Gastrointestinal: No nausea, abdominal pain  Genitourinary: No incontinence, pain with urination  Musculoskeletal: No pain, swelling, decreased range of motion  Neurological: No headache, weakness  Psychiatric: No depression, anxiety  Endocrine: No weight loss or gain, increased thirst  All other systems are comprehensively negative.    Physical Exam:  Vitals:        Vital Signs Last 24 Hrs  T(C): 36.7 (29 Dec 2020 09:23), Max: 36.9 (28 Dec 2020 11:28)  T(F): 98 (29 Dec 2020 09:23), Max: 98.5 (29 Dec 2020 05:25)  HR: 79 (29 Dec 2020 09:23) (62 - 81)  BP: 88/82 (29 Dec 2020 09:25) (85/47 - 119/75)  BP(mean): --  RR: 20 (29 Dec 2020 09:23) (12 - 22)  SpO2: 97% (29 Dec 2020 09:23) (94% - 100%)  General: NAD  HEENT: MMM  Neck: No JVD, no carotid bruit  Lungs: CTAB  CV: RRR, nl S1/S2, no M/R/G  Abdomen: S/NT/ND, +BS  Extremities: No LE edema, no cyanosis  Neuro: AAOx3, non-focal  Skin: No rash    Labs:                        10.3   11.41 )-----------( 346      ( 29 Dec 2020 06:30 )             31.3     12-29    136  |  101  |  15  ----------------------------<  103<H>  4.5   |  28  |  0.76    Ca    8.9      29 Dec 2020 06:30    TPro  5.7<L>  /  Alb  2.9<L>  /  TBili  0.1<L>  /  DBili  0.0  /  AST  18  /  ALT  25  /  AlkPhos  86  12-29        PT/INR - ( 27 Dec 2020 20:57 )   PT: 11.5 sec;   INR: 0.95 ratio         PTT - ( 27 Dec 2020 20:57 )  PTT:25.7 sec    Telemetry: Sinus rhythm

## 2020-12-29 NOTE — PROGRESS NOTE ADULT - SUBJECTIVE AND OBJECTIVE BOX
She is better from I&D yesterday  She needs IV ABx now and until ID gives recs  She needs ID consult- D/C plan once ID gives recommendations  Pain control  OOB, DVT prophylaxis

## 2020-12-29 NOTE — DISCHARGE NOTE NURSING/CASE MANAGEMENT/SOCIAL WORK - NSSCNAMETXT_GEN_ALL_CORE
[place] [phone] [facility] and [facility]  [address] [phone]  [facility]   [address]   [place] [address]

## 2020-12-29 NOTE — PROGRESS NOTE ADULT - ASSESSMENT
56 y/o female with a PMHx of Paroxysmal SVT, Chronic neck pain and low back pain with sciatica, Raynaud's syndrome, IBS, Seizure disorder, Neuropathy, Emphysema/COPD, Migraine, Anxiety, Hepatitis C, Hypothyroidism, GERD, Sjogren's disease, Lupus    #Right elbow abscess/cellulitis  s/p I7D 12/28.  Continue vancomycin  f/u vanco trough.  f/u cultures    #hypotension  asymptomatics. Patint states her BP is always on low side.    #Hyponatremia  Resolved.    #hx of paraxoysmal SVT  telemetry  cardiac clearance    #Chronic neck and low back pain  continue morphine ER and IR  will not give fentanyl secondary to low BP.     #Raynaud's syndrome/Sjogren's disease/Lupus  continue hydroxychloroquine    #COPD/Emphysema  ventolin and symbicort  continous pulse ox    #Migraine/anxiety  fioricet PRN  valium    #Hypothyroidism  continue levothyroxine    #GERD  Protonix    #DVT proph  scds    Patient had abnormal whole near to epiglottis, as per anesthesia, pic was sent to  (ENT) and he will scope her in his office. Patient is advise to make an appointment as soon as she is discharged.

## 2020-12-29 NOTE — DISCHARGE NOTE NURSING/CASE MANAGEMENT/SOCIAL WORK - NSSCCONTNUM_GEN_ALL_CORE
[phone] [facility] - [phone]  Nurse to visit the day after hospital discharge. Please contact the home care agency at the above phone number if you have not heard from them by 12 noon on the day after your hospital discharge.    [phone]   [phone]

## 2020-12-29 NOTE — DISCHARGE NOTE PROVIDER - NSDCCPCAREPLAN_GEN_ALL_CORE_FT
PRINCIPAL DISCHARGE DIAGNOSIS  Diagnosis: Abscess of arm  Assessment and Plan of Treatment: s/p right elbow I&D

## 2020-12-29 NOTE — DISCHARGE NOTE PROVIDER - NSDCFUADDINST_GEN_ALL_CORE_FT
- Call your doctor if you experience:  • An increase in pain not controlled by pain medication or change in activity or  position.  • Temperature greater than 101° F.  • Redness, increased swelling or foul smelling drainage from or around the  incision.  • Numbness, tingling or a change in color or temperature of the operative extremity.  • Call your doctor immediately if you experience chest pain, shortness of breath or calf pain.

## 2020-12-29 NOTE — PROGRESS NOTE ADULT - SUBJECTIVE AND OBJECTIVE BOX
[facility], Division of Infectious Diseases  YOUNG [place] Y. Patel, S. Shah  [phone]    Name: DOUGLAS AKINS  Age: 55y  Gender: Female  MRN: [id number]  Note Date: 12-29-20    Interval History:    Antibiotics:  hydroxychloroquine 200 milliGRAM(s) Oral two times a day  vancomycin  IVPB 750 milliGRAM(s) IV Intermittent every 12 hours      Medications:  acetaminophen   Tablet .. 650 milliGRAM(s) Oral every 8 hours  albuterol/ipratropium for Nebulization 3 milliLiter(s) Nebulizer every 6 hours PRN  aprepitant 40 milliGRAM(s) Oral once  budesonide  80 MICROgram(s)/formoterol 4.5 MICROgram(s) Inhaler 2 Puff(s) Inhalation two times a day  celecoxib 100 milliGRAM(s) Oral two times a day  chlorhexidine 2% Cloths 1 Application(s) Topical once  diazepam    Tablet 5 milliGRAM(s) Oral two times a day  hydroxychloroquine 200 milliGRAM(s) Oral two times a day  levothyroxine 25 MICROGram(s) Oral daily  lidocaine 2% Viscous 5 milliLiter(s) Swish and Spit every 4 hours PRN  morphine  - Injectable 4 milliGRAM(s) IV Push every 3 hours PRN  morphine  IR 15 milliGRAM(s) Oral every 3 hours PRN  morphine ER Tablet 15 milliGRAM(s) Oral every 12 hours  ondansetron Injectable 4 milliGRAM(s) IV Push every 6 hours PRN  pantoprazole  Injectable 40 milliGRAM(s) IV Push daily  polyethylene glycol 3350 17 Gram(s) Oral daily  senna 2 Tablet(s) Oral at bedtime  sodium chloride 0.45%. 1000 milliLiter(s) IV Continuous <Continuous>  vancomycin  IVPB 750 milliGRAM(s) IV Intermittent every 12 hours      Review of Systems:  A 10-point review of systems was obtained.     Pertinent positives and negatives--  Constitutional: No fevers. No Chills. No Rigors.   Cardiovascular: No chest pain. No palpitations.  Respiratory: No shortness of breath. No cough.  Gastrointestinal: No nausea or vomiting. No diarrhea or constipation.   Psychiatric: Pleasant. Appropriate affect.    Review of systems otherwise negative except as previously noted.    Allergies: Vibramycin (Unknown)    For details regarding the patient's past medical history, social history, family history, and other miscellaneous elements, please refer the initial infectious diseases consultation and/or the admitting history and physical examination for this admission.    Objective:  Vitals:   T(C): 36.7 (12-29-20 @ 09:23), Max: 36.9 (12-28-20 @ 11:28)  HR: 79 (12-29-20 @ 09:23) (62 - 81)  BP: 88/82 (12-29-20 @ 09:25) (85/47 - 119/75)  RR: 20 (12-29-20 @ 09:23) (12 - 22)  SpO2: 97% (12-29-20 @ 09:23) (94% - 100%)    Physical Examination:  General: no acute distress  HEENT: NC/AT, EOMI, anicteric, no oral lesions  Neck: supple, no palpable LAD  Cardio: S1, S2 heard, RRR, no murmurs  Resp: breath sounds heard bilaterally, no rales, wheezes or rhonchi  Abd: soft, NT, ND, + bowel sounds  Neuro: AAOx3, no obvious focal deficits  Ext: no edema or cyanosis  Skin: warm, dry, no visible rash    Laboratory Studies:  CBC:                       10.3   11.41 )-----------( 346      ( 29 Dec 2020 06:30 )             31.3     CMP: 12-29    136  |  101  |  15  ----------------------------<  103<H>  4.5   |  28  |  0.76    Ca    8.9      29 Dec 2020 06:30    TPro  5.7<L>  /  Alb  2.9<L>  /  TBili  0.1<L>  /  DBili  0.0  /  AST  18  /  ALT  25  /  AlkPhos  86  12-29    LIVER FUNCTIONS - ( 29 Dec 2020 06:30 )  Alb: 2.9 g/dL / Pro: 5.7 g/dL / ALK PHOS: 86 U/L / ALT: 25 U/L DA / AST: 18 U/L / GGT: x             Microbiology: reviewed    Culture - Blood (collected 12-28-20 @ 00:38)  Source: .Blood Blood  Preliminary Report (12-29-20 @ 01:02):    No growth to date.    Culture - Blood (collected 12-28-20 @ 00:38)  Source: .Blood Blood  Preliminary Report (12-29-20 @ 01:02):    No growth to date.        Radiology: reviewed

## 2020-12-29 NOTE — DISCHARGE NOTE PROVIDER - NSDCMRMEDTOKEN_GEN_ALL_CORE_FT
acetaminophen 325 mg oral tablet: 2 tab(s) orally every 8 hours  carisoprodol 350 mg oral tablet: 1 tab(s) orally 2 times a day  celecoxib 100 mg oral capsule: 1 cap(s) orally 2 times a day  diclofenac sodium 1% topical cream: Apply topically to affected area once a day  Fioricet oral capsule: 1 cap(s) orally 2 times a day  hydroxychloroquine 200 mg oral tablet: 1 tab(s) orally 2 times a day  ipratropium-albuterol 0.5 mg-2.5 mg/3 mLinhalation solution: 3 milliliter(s) inhaled 8 times a day  levothyroxine 25 mcg (0.025 mg) oral capsule: 1 cap(s) orally once a day  lidocaine hydrochloride: 1 dose(s) orally 1 to 4 times a day, As Needed- oral rinse  Morphine IR 15 mg oral tablet: 1 tab(s) orally 3 times a day  morphine sulfate ER: 15 milligram(s) orally every 12 hours  omeprazole 40 mg oral delayed release capsule: 1 cap(s) orally 2 times a day  ondansetron 8 mg oral tablet: 1 tab(s) orally 3 times a day  Symbicort 80 mcg-4.5 mcg/inh inhalation aerosol: 2 puff(s) inhaled 2 times a day  Valium 5 mg oral tablet: 1 tab(s) orally 2 times a day  Ventolin HFA 90 mcg/inh inhalation aerosol: 2 puff(s) inhaled every 6 hours, As Needed   acetaminophen 325 mg oral tablet: 2 tab(s) orally every 8 hours  carisoprodol 350 mg oral tablet: 1 tab(s) orally 2 times a day  celecoxib 100 mg oral capsule: 1 cap(s) orally 2 times a day  diclofenac sodium 1% topical cream: Apply topically to affected area once a day  Fioricet oral capsule: 1 cap(s) orally 2 times a day  hydroxychloroquine 200 mg oral tablet: 1 tab(s) orally 2 times a day  ipratropium-albuterol 0.5 mg-2.5 mg/3 mLinhalation solution: 3 milliliter(s) inhaled 8 times a day  levothyroxine 25 mcg (0.025 mg) oral capsule: 1 cap(s) orally once a day  lidocaine hydrochloride: 1 dose(s) orally 1 to 4 times a day, As Needed- oral rinse  Morphine IR 15 mg oral tablet: 1 tab(s) orally 3 times a day  morphine sulfate ER: 15 milligram(s) orally every 12 hours  omeprazole 40 mg oral delayed release capsule: 1 cap(s) orally 2 times a day  ondansetron 8 mg oral tablet: 1 tab(s) orally 3 times a day  Symbicort 80 mcg-4.5 mcg/inh inhalation aerosol: 2 puff(s) inhaled 2 times a day  Valium 5 mg oral tablet: 1 tab(s) orally 2 times a day  vancomycin 1 g intravenous injection: 1 gram(s) intravenously every 12 hours MDD:2 gm via PICC line until 2/8/2021  Please fax weekly CMP, CBC, vanc level, ESR, CRP to [phone].   Please remove PICC line at completion of antibiotics  Ventolin HFA 90 mcg/inh inhalation aerosol: 2 puff(s) inhaled every 6 hours, As Needed   carisoprodol 350 mg oral tablet: 1 tab(s) orally 2 times a day  diclofenac sodium 1% topical cream: Apply topically to affected area once a day  Fioricet oral capsule: 1 cap(s) orally 2 times a day  hydroxychloroquine 200 mg oral tablet: 1 tab(s) orally 2 times a day  ipratropium-albuterol 0.5 mg-2.5 mg/3 mLinhalation solution: 3 milliliter(s) inhaled 8 times a day  levothyroxine 25 mcg (0.025 mg) oral capsule: 1 cap(s) orally once a day  lidocaine hydrochloride: 1 dose(s) orally 1 to 4 times a day, As Needed- oral rinse  Morphine IR 15 mg oral tablet: 1 tab(s) orally 3 times a day  omeprazole 40 mg oral delayed release capsule: 1 cap(s) orally 2 times a day  ondansetron 8 mg oral tablet: 1 tab(s) orally 3 times a day  Symbicort 80 mcg-4.5 mcg/inh inhalation aerosol: 2 puff(s) inhaled 2 times a day  Valium 5 mg oral tablet: 1 tab(s) orally 2 times a day  vancomycin 1 g intravenous injection: 1 gram(s) intravenously every 12 hours MDD:2 gm via PICC line until 2/8/2021  Please fax weekly CMP, CBC, vanc level, ESR, CRP to [phone].   Please remove PICC line at completion of antibiotics  Ventolin HFA 90 mcg/inh inhalation aerosol: 2 puff(s) inhaled every 6 hours, As Needed   carisoprodol 350 mg oral tablet: 1 tab(s) orally 2 times a day  diclofenac sodium 1% topical cream: Apply topically to affected area once a day  Fioricet oral capsule: 1 cap(s) orally 2 times a day  hydroxychloroquine 200 mg oral tablet: 1 tab(s) orally 2 times a day  ipratropium-albuterol 0.5 mg-2.5 mg/3 mLinhalation solution: 3 milliliter(s) inhaled 8 times a day  levothyroxine 25 mcg (0.025 mg) oral capsule: 1 cap(s) orally once a day  lidocaine hydrochloride: 1 dose(s) orally 1 to 4 times a day, As Needed- oral rinse  morphine 12 hour extended release: 15 milligram(s) orally every 12 hours  omeprazole 40 mg oral delayed release capsule: 1 cap(s) orally 2 times a day  ondansetron 8 mg oral tablet: 1 tab(s) orally 3 times a day  Symbicort 80 mcg-4.5 mcg/inh inhalation aerosol: 2 puff(s) inhaled 2 times a day  Valium 5 mg oral tablet: 1 tab(s) orally 2 times a day  vancomycin 1 g intravenous injection: 1 gram(s) intravenously every 12 hours MDD:2 gm via PICC line until 2/8/2021  Please fax weekly CMP, CBC, vanc level, ESR, CRP to [phone].   Please remove PICC line at completion of antibiotics  Ventolin HFA 90 mcg/inh inhalation aerosol: 2 puff(s) inhaled every 6 hours, As Needed

## 2020-12-29 NOTE — DISCHARGE NOTE NURSING/CASE MANAGEMENT/SOCIAL WORK - NSDCVIVACCINE_GEN_ALL_CORE_FT
Influenza , 2018/10/17 11:53 , Abbey Martinez (RN)  Pneumococcal , 2014/6/4 10:54 , Yani Jain (RN)

## 2020-12-29 NOTE — PROGRESS NOTE ADULT - ASSESSMENT
The patient is a 55 year old female with a history of hypotension, hypothyroidism, SLE, Sjogren's, COPD on home O2, HCV, SVT s/p ablation who presents for I&D of arm.    Plan:  - ECG with no evidence of ischemia or infarction  - Blood pressure chronically runs on the low side  - If remains low, can consider addition of midodrine  - CXR with hyperinflated lungs but otherwise clear  - SVT - underwent ablation. On no rate controlling agents due to low BPs.  - On vancomycin  - ID and ortho follow-up

## 2020-12-29 NOTE — DISCHARGE NOTE PROVIDER - NSDCCPTREATMENT_GEN_ALL_CORE_FT
PRINCIPAL PROCEDURE  Procedure: Incision and drainage of right elbow  Findings and Treatment: Keep dressing clean and dry.   Suture removal to take place post-op day 10-14.  Continue IV antibiotics via PICC line as instructed.   Follow up with Dr. Durand as directed.       PRINCIPAL PROCEDURE  Procedure: Incision and drainage of right elbow  Findings and Treatment: Keep right elbow ace bandage and dressing clean, dry and intact.   Suture removal from elbowe to take place in Dr. Durand's office.   Please make appointment for Monday 1/4/21 in the [place] office of Dr. Durand.  Continue IV antibiotics via PICC line in left arm as instructed and assited by the Home care nurse. until 2/8/21 and get weekly labs to be followed up by Dr. Tricia Nunez, Infectious Disease doctor. Keep intravenous line/PICC dry and intact left arm!  Follow up with your pain management care provider as soon as you can..

## 2020-12-29 NOTE — DISCHARGE NOTE PROVIDER - HOSPITAL COURSE
This patient was admitted to [facility] with a history of right elbow surgery for lateral epicondylitis in October 2020, now with right elbow abscess. Cultures were taken in the office which were positive for MRSA.  Patient presented to [facility] ED and was admitted for further management. She was medically cleared for surgical intervention. Patient underwent right elbow I&D by Dr. Durand on 12/28/2020. Procedure was well tolerated.  No operative or ethan-operative complications arose during patients hospital course.  Patient received Vancomycin per recommendations by the infectious disease team. She will require 6 weeks of antibiotics post-op via PICC line. SCDs were utilized for DVT prophylaxis.  Anesthesia, Medical Hospitalist, Physical Therapy and Occupational Therapy, Cardiologist, and ID were consulted. Patient is stable for discharge with a good prognosis.  Appropriate discharge instructions and medications are provided in this document. This 56yo female patient was admitted to [facility] with a history of right elbow surgery for lateral epicondylitis in October 2020, now with right elbow abscess. Cultures were taken in the office which were positive for MRSA.  Patient presented to [place] ED and was admitted for further management. She was medically cleared for surgical intervention. Patient underwent right elbow I&D by Dr. Micheal Durand, Orthopedic surgeon, on 12/28/2020. Procedure was well tolerated.  No operative or ethan-operative complications arose during patient's hospital course.  Patient received Vancomycin per recommendations by the infectious disease team. She will require 6 weeks of antibiotics post-op via PICC line, which was placed on 12/30.  SCDs were utilized for DVT prophylaxis.  Anesthesia, Medical Hospitalist, Physical Therapy and Occupational Therapy, Cardiologist, and ID were consulted. Patient is stable for discharge home with home care nurse to give long term antibiotics and patient to have weekly labs to be sent to the I.D. physician.  Appropriate discharge instructions and medications are provided in this document.

## 2020-12-29 NOTE — DISCHARGE NOTE PROVIDER - INSTRUCTIONS
regular diet  Pain medicine has been prescribed for you, as needed, and it often causes constipation.    For Constipation :   • Increase your water intake. Drink at least 8 glasses of water daily.  • Try adding fiber to your diet by eating fruits, vegetables and foods that are rich in grains.  • If you do experience constipation, you may take an over-the-counter stool softener/laxative such as Colace, Senokot or  Milk of Magnesia.  Regular diet  Pain medicine has been prescribed for you, as needed, and it often causes constipation.    For Constipation :   • Increase your water intake. Drink at least 8 glasses of water daily.  • Try adding fiber to your diet by eating fruits, vegetables and foods that are rich in grains.  • If you do experience constipation, you may take an over-the-counter stool softener/laxative such as Colace, Senokot or  Milk of Magnesia.

## 2020-12-29 NOTE — DISCHARGE NOTE PROVIDER - NSDCFUADDAPPT_GEN_ALL_CORE_FT
It is advisable to follow up with your primary care provider within the next 2-3 weeks to ensure your medications are appropriate and there are no underlying problems after your procedure.  It is advisable to follow up with your primary care provider as soon as possible to ensure your medications are appropriate and there are no underlying problems after your surgical procedure.     CALL Dr. Durand to set up time for appointment for Monday 1/4/21 at his [place] office.

## 2020-12-29 NOTE — DISCHARGE NOTE PROVIDER - CARE PROVIDER_API CALL
Micheal Durand  ORTHOPAEDIC SURGERY  [address] [place] [id number]  Phone: [phone]  Fax: [phone]  Follow Up Time: Routine   Micheal Durand, Orthopedic surgeon  [id number] [place], [place] [address]  Phone: [phone]  Fax: (   )    -  Scheduled Appointment: 01/04/2021

## 2020-12-29 NOTE — DISCHARGE NOTE NURSING/CASE MANAGEMENT/SOCIAL WORK - NSSCTYPOFSERV_GEN_ALL_CORE
IV antibiotic infusion/ [facility] IV antibiotic infusion and [facility] for medical management/wound/medication management  Medical management/wound/medication management

## 2020-12-29 NOTE — DISCHARGE NOTE PROVIDER - DETAILS OF MALNUTRITION DIAGNOSIS/DIAGNOSES
This patient has been assessed with a concern for Malnutrition and was treated during this hospitalization for the following Nutrition diagnosis/diagnoses:     -  12/28/2020: Moderate protein-calorie malnutrition   -  12/28/2020: Underweight (BMI < 19)   This patient has been assessed with a concern for Malnutrition and was treated during this hospitalization for the following Nutrition diagnosis/diagnoses:     -  12/28/2020: Moderate protein-calorie malnutrition   -  12/28/2020: Underweight (BMI < 19)    This patient has been assessed with a concern for Malnutrition and was treated during this hospitalization for the following Nutrition diagnosis/diagnoses:     -  12/28/2020: Moderate protein-calorie malnutrition   -  12/28/2020: Underweight (BMI < 19)   This patient has been assessed with a concern for Malnutrition and was treated during this hospitalization for the following Nutrition diagnosis/diagnoses:     -  12/28/2020: Moderate protein-calorie malnutrition   -  12/28/2020: Underweight (BMI < 19)    This patient has been assessed with a concern for Malnutrition and was treated during this hospitalization for the following Nutrition diagnosis/diagnoses:     -  12/28/2020: Moderate protein-calorie malnutrition   -  12/28/2020: Underweight (BMI < 19)    This patient has been assessed with a concern for Malnutrition and was treated during this hospitalization for the following Nutrition diagnosis/diagnoses:     -  12/28/2020: Moderate protein-calorie malnutrition   -  12/28/2020: Underweight (BMI < 19)   This patient has been assessed with a concern for Malnutrition and was treated during this hospitalization for the following Nutrition diagnosis/diagnoses:     -  12/28/2020: Moderate protein-calorie malnutrition   -  12/28/2020: Underweight (BMI < 19)    This patient has been assessed with a concern for Malnutrition and was treated during this hospitalization for the following Nutrition diagnosis/diagnoses:     -  12/28/2020: Moderate protein-calorie malnutrition   -  12/28/2020: Underweight (BMI < 19)    This patient has been assessed with a concern for Malnutrition and was treated during this hospitalization for the following Nutrition diagnosis/diagnoses:     -  12/28/2020: Moderate protein-calorie malnutrition   -  12/28/2020: Underweight (BMI < 19)    This patient has been assessed with a concern for Malnutrition and was treated during this hospitalization for the following Nutrition diagnosis/diagnoses:     -  12/28/2020: Moderate protein-calorie malnutrition   -  12/28/2020: Underweight (BMI < 19)   This patient has been assessed with a concern for Malnutrition and was treated during this hospitalization for the following Nutrition diagnosis/diagnoses:     -  12/28/2020: Moderate protein-calorie malnutrition   -  12/28/2020: Underweight (BMI < 19)    This patient has been assessed with a concern for Malnutrition and was treated during this hospitalization for the following Nutrition diagnosis/diagnoses:     -  12/28/2020: Moderate protein-calorie malnutrition   -  12/28/2020: Underweight (BMI < 19)    This patient has been assessed with a concern for Malnutrition and was treated during this hospitalization for the following Nutrition diagnosis/diagnoses:     -  12/28/2020: Moderate protein-calorie malnutrition   -  12/28/2020: Underweight (BMI < 19)    This patient has been assessed with a concern for Malnutrition and was treated during this hospitalization for the following Nutrition diagnosis/diagnoses:     -  12/28/2020: Moderate protein-calorie malnutrition   -  12/28/2020: Underweight (BMI < 19)    This patient has been assessed with a concern for Malnutrition and was treated during this hospitalization for the following Nutrition diagnosis/diagnoses:     -  12/28/2020: Moderate protein-calorie malnutrition   -  12/28/2020: Underweight (BMI < 19)

## 2020-12-29 NOTE — PROGRESS NOTE ADULT - SUBJECTIVE AND OBJECTIVE BOX
Patient is a 55y old  Female who presents with a chief complaint of abscess right elbow (29 Dec 2020 08:01)      INTERVAL HPI/OVERNIGHT EVENTS:  Pt is seen and examined.  Pain is controlled with meds.    Pain Location & Control:     MEDICATIONS  (STANDING):  acetaminophen   Tablet .. 650 milliGRAM(s) Oral every 8 hours  aprepitant 40 milliGRAM(s) Oral once  budesonide  80 MICROgram(s)/formoterol 4.5 MICROgram(s) Inhaler 2 Puff(s) Inhalation two times a day  celecoxib 100 milliGRAM(s) Oral two times a day  chlorhexidine 2% Cloths 1 Application(s) Topical once  diazepam    Tablet 5 milliGRAM(s) Oral two times a day  hydroxychloroquine 200 milliGRAM(s) Oral two times a day  levothyroxine 25 MICROGram(s) Oral daily  morphine ER Tablet 15 milliGRAM(s) Oral every 12 hours  pantoprazole  Injectable 40 milliGRAM(s) IV Push daily  polyethylene glycol 3350 17 Gram(s) Oral daily  senna 2 Tablet(s) Oral at bedtime  vancomycin  IVPB 750 milliGRAM(s) IV Intermittent every 12 hours    MEDICATIONS  (PRN):  albuterol/ipratropium for Nebulization 3 milliLiter(s) Nebulizer every 6 hours PRN Shortness of Breath and/or Wheezing  lidocaine 2% Viscous 5 milliLiter(s) Swish and Spit every 4 hours PRN mouth pain  morphine  - Injectable 4 milliGRAM(s) IV Push every 3 hours PRN Severe Pain (7 - 10)  morphine  IR 15 milliGRAM(s) Oral every 3 hours PRN Moderate Pain (4 - 6)  ondansetron Injectable 4 milliGRAM(s) IV Push every 6 hours PRN Nausea and/or Vomiting      Allergies    Vibramycin (Unknown)    Intolerances    aspirin (Stomach Upset)  Zithromax (Stomach Upset)          Vital Signs Last 24 Hrs  T(C): 36.9 (29 Dec 2020 05:25), Max: 36.9 (28 Dec 2020 09:27)  T(F): 98.5 (29 Dec 2020 05:25), Max: 98.5 (29 Dec 2020 05:25)  HR: 70 (29 Dec 2020 05:25) (62 - 81)  BP: 88/51 (29 Dec 2020 05:25) (85/47 - 119/75)  BP(mean): --  RR: 18 (29 Dec 2020 05:25) (12 - 22)  SpO2: 95% (29 Dec 2020 05:25) (94% - 100%)        LABS:                        10.3   11.41 )-----------( 346      ( 29 Dec 2020 06:30 )             31.3     29 Dec 2020 06:30    136    |  101    |  15     ----------------------------<  103    4.5     |  28     |  0.76     Ca    8.9        29 Dec 2020 06:30    TPro  5.7    /  Alb  2.9    /  TBili  0.1    /  DBili  0.0    /  AST  18     /  ALT  25     /  AlkPhos  86     29 Dec 2020 06:30    PT/INR - ( 27 Dec 2020 20:57 )   PT: 11.5 sec;   INR: 0.95 ratio         PTT - ( 27 Dec 2020 20:57 )  PTT:25.7 sec    CAPILLARY BLOOD GLUCOSE            Cultures  Culture Results:   No growth to date. (12-28 @ 00:38)  Culture Results:   No growth to date. (12-28 @ 00:38)        Culture - Blood (collected 12-28-20 @ 00:38)  Source: .Blood Blood  Preliminary Report (12-29-20 @ 01:02):    No growth to date.    Culture - Blood (collected 12-28-20 @ 00:38)  Source: .Blood Blood  Preliminary Report (12-29-20 @ 01:02):    No growth to date.        RADIOLOGY & ADDITIONAL TESTS:    Imaging Personally Reviewed:  [ ] YES  [ ] NO    Consultant(s) Notes Reviewed:  [ ] YES  [ ] NO    Care Discussed with Consultants/Other Providers [x ] YES  [ ] NO

## 2020-12-29 NOTE — PROGRESS NOTE ADULT - ASSESSMENT
Pt is a 55W w/ PMHx of Paroxysmal SVT, Chronic neck pain and low back pain with sciatica, Raynaud's syndrome, Emphysema/COPD(home o2 prn), Migraine, Anxiety, Hepatitis C, Hypothyroidism, GERD, Sjogren's disease, Lupus, s/p Nirschl repair of L elbow for lateral epicondylitis on 10/02/20, s/p Nirschl repair of R elbow for lateral epicondylitis on 11/20/20 by Dr. Durand, p/w R elbow pain with associated swelling and redness x 1 week, s/p 2 drainages on amoxicillin. Admitted for repeat I&D.     R elbow abscess s/p I&D in the OR  Possible bone/joint involvement  -s/p I&D 12/28  -pending surgical site cx  -pending BCx to r/o systemic infection  Previous outpatient cx+ MRSA  -will c/w vancomycin while inpatient, obtain vanc level prior to 4th dose, goal trough 15-20 (dosing per pharmacy protocol)  Will likely be the same organism; however will f/u cx prior to determining final regimen  I discussed intra-op findings w/ Dr. Durand; given extension of infection into the joint space, will have to treat w/ prolonged therapy  Please plan for PICC line, pt will need 6 wks IV therapy until 2/8/2020  Final Abx choice to be determined pending above cx

## 2020-12-29 NOTE — DISCHARGE NOTE NURSING/CASE MANAGEMENT/SOCIAL WORK - PATIENT PORTAL LINK FT
You can access the FollowMyHealth Patient Portal offered by [facility] by registering at the following website: [email or web address]. By joining [facility]’s FollowMyHealth portal, you will also be able to view your health information using other applications (apps) compatible with our system.

## 2020-12-29 NOTE — DISCHARGE NOTE NURSING/CASE MANAGEMENT/SOCIAL WORK - NSDCCRNAME_GEN_ALL_CORE_FT
[facility] [place], [place] and Surrounding Areas  [facility] [place], [place] and Surrounding Areas   [address] [place] [address]

## 2020-12-29 NOTE — DISCHARGE NOTE PROVIDER - PROVIDER TOKENS
PROVIDER:[TOKEN:[[id number]],FOLLOWUP:[Routine]] FREE:[LAST:[Ladarius],FIRST:[Micheal, Orthopedic surgeon],PHONE:[phone]],FAX:[(   )    -],ADDRESS:[[id number] [place] [address]],SCHEDULEDAPPT:[01/04/2021]]

## 2020-12-30 LAB
ANION GAP SERPL CALC-SCNC: 7 MMOL/L — SIGNIFICANT CHANGE UP (ref 5–17)
BASOPHILS # BLD AUTO: 0.09 K/UL — SIGNIFICANT CHANGE UP (ref 0–0.2)
BASOPHILS NFR BLD AUTO: 0.9 % — SIGNIFICANT CHANGE UP (ref 0–2)
BUN SERPL-MCNC: 18 MG/DL — SIGNIFICANT CHANGE UP (ref 7–23)
CALCIUM SERPL-MCNC: 8.5 MG/DL — SIGNIFICANT CHANGE UP (ref 8.4–10.5)
CHLORIDE SERPL-SCNC: 95 MMOL/L — LOW (ref 96–108)
CO2 SERPL-SCNC: 29 MMOL/L — SIGNIFICANT CHANGE UP (ref 22–31)
CREAT SERPL-MCNC: 0.86 MG/DL — SIGNIFICANT CHANGE UP (ref 0.5–1.3)
EOSINOPHIL # BLD AUTO: 0.39 K/UL — SIGNIFICANT CHANGE UP (ref 0–0.5)
EOSINOPHIL NFR BLD AUTO: 3.9 % — SIGNIFICANT CHANGE UP (ref 0–6)
GLUCOSE SERPL-MCNC: 97 MG/DL — SIGNIFICANT CHANGE UP (ref 70–99)
HCT VFR BLD CALC: 34.6 % — SIGNIFICANT CHANGE UP (ref 34.5–45)
HGB BLD-MCNC: 11.2 G/DL — LOW (ref 11.5–15.5)
IMM GRANULOCYTES NFR BLD AUTO: 0.5 % — SIGNIFICANT CHANGE UP (ref 0–1.5)
LYMPHOCYTES # BLD AUTO: 2.54 K/UL — SIGNIFICANT CHANGE UP (ref 1–3.3)
LYMPHOCYTES # BLD AUTO: 25.4 % — SIGNIFICANT CHANGE UP (ref 13–44)
MAGNESIUM SERPL-MCNC: 1.8 MG/DL — SIGNIFICANT CHANGE UP (ref 1.6–2.6)
MCHC RBC-ENTMCNC: 32.1 PG — SIGNIFICANT CHANGE UP (ref 27–34)
MCHC RBC-ENTMCNC: 32.4 GM/DL — SIGNIFICANT CHANGE UP (ref 32–36)
MCV RBC AUTO: 99.1 FL — SIGNIFICANT CHANGE UP (ref 80–100)
MONOCYTES # BLD AUTO: 0.77 K/UL — SIGNIFICANT CHANGE UP (ref 0–0.9)
MONOCYTES NFR BLD AUTO: 7.7 % — SIGNIFICANT CHANGE UP (ref 2–14)
NEUTROPHILS # BLD AUTO: 6.16 K/UL — SIGNIFICANT CHANGE UP (ref 1.8–7.4)
NEUTROPHILS NFR BLD AUTO: 61.6 % — SIGNIFICANT CHANGE UP (ref 43–77)
NRBC # BLD: 0 /100 WBCS — SIGNIFICANT CHANGE UP (ref 0–0)
PLATELET # BLD AUTO: 438 K/UL — HIGH (ref 150–400)
POTASSIUM SERPL-MCNC: 4.7 MMOL/L — SIGNIFICANT CHANGE UP (ref 3.5–5.3)
POTASSIUM SERPL-SCNC: 4.7 MMOL/L — SIGNIFICANT CHANGE UP (ref 3.5–5.3)
RBC # BLD: 3.49 M/UL — LOW (ref 3.8–5.2)
RBC # FLD: 12.8 % — SIGNIFICANT CHANGE UP (ref 10.3–14.5)
SODIUM SERPL-SCNC: 131 MMOL/L — LOW (ref 135–145)
WBC # BLD: 10 K/UL — SIGNIFICANT CHANGE UP (ref 3.8–10.5)
WBC # FLD AUTO: 10 K/UL — SIGNIFICANT CHANGE UP (ref 3.8–10.5)

## 2020-12-30 PROCEDURE — 99233 SBSQ HOSP IP/OBS HIGH 50: CPT

## 2020-12-30 PROCEDURE — 36573 INSJ PICC RS&I 5 YR+: CPT

## 2020-12-30 PROCEDURE — ZZZZZ: CPT

## 2020-12-30 RX ORDER — MAGNESIUM SULFATE 500 MG/ML
2 VIAL (ML) INJECTION ONCE
Refills: 0 | Status: COMPLETED | OUTPATIENT
Start: 2020-12-30 | End: 2020-12-30

## 2020-12-30 RX ORDER — ALBUTEROL 90 UG/1
1 AEROSOL, METERED ORAL EVERY 4 HOURS
Refills: 0 | Status: COMPLETED | OUTPATIENT
Start: 2020-12-30 | End: 2021-11-28

## 2020-12-30 RX ORDER — CHLORHEXIDINE GLUCONATE 213 G/1000ML
1 SOLUTION TOPICAL
Refills: 0 | Status: DISCONTINUED | OUTPATIENT
Start: 2020-12-30 | End: 2020-12-31

## 2020-12-30 RX ORDER — ALBUTEROL 90 UG/1
2 AEROSOL, METERED ORAL EVERY 6 HOURS
Refills: 0 | Status: DISCONTINUED | OUTPATIENT
Start: 2020-12-30 | End: 2020-12-31

## 2020-12-30 RX ORDER — SODIUM CHLORIDE 9 MG/ML
10 INJECTION INTRAMUSCULAR; INTRAVENOUS; SUBCUTANEOUS
Refills: 0 | Status: DISCONTINUED | OUTPATIENT
Start: 2020-12-30 | End: 2020-12-31

## 2020-12-30 RX ORDER — ALBUTEROL 90 UG/1
1 AEROSOL, METERED ORAL EVERY 4 HOURS
Refills: 0 | Status: DISCONTINUED | OUTPATIENT
Start: 2020-12-30 | End: 2020-12-31

## 2020-12-30 RX ADMIN — MORPHINE SULFATE 4 MILLIGRAM(S): 50 CAPSULE, EXTENDED RELEASE ORAL at 19:57

## 2020-12-30 RX ADMIN — BUDESONIDE AND FORMOTEROL FUMARATE DIHYDRATE 2 PUFF(S): 160; 4.5 AEROSOL RESPIRATORY (INHALATION) at 19:12

## 2020-12-30 RX ADMIN — MORPHINE SULFATE 4 MILLIGRAM(S): 50 CAPSULE, EXTENDED RELEASE ORAL at 14:00

## 2020-12-30 RX ADMIN — Medication 650 MILLIGRAM(S): at 04:46

## 2020-12-30 RX ADMIN — PANTOPRAZOLE SODIUM 40 MILLIGRAM(S): 20 TABLET, DELAYED RELEASE ORAL at 13:03

## 2020-12-30 RX ADMIN — CELECOXIB 100 MILLIGRAM(S): 200 CAPSULE ORAL at 05:38

## 2020-12-30 RX ADMIN — Medication 1 PATCH: at 19:40

## 2020-12-30 RX ADMIN — ONDANSETRON 4 MILLIGRAM(S): 8 TABLET, FILM COATED ORAL at 04:50

## 2020-12-30 RX ADMIN — MORPHINE SULFATE 4 MILLIGRAM(S): 50 CAPSULE, EXTENDED RELEASE ORAL at 01:40

## 2020-12-30 RX ADMIN — Medication 3 MILLILITER(S): at 14:18

## 2020-12-30 RX ADMIN — MORPHINE SULFATE 4 MILLIGRAM(S): 50 CAPSULE, EXTENDED RELEASE ORAL at 20:15

## 2020-12-30 RX ADMIN — CELECOXIB 100 MILLIGRAM(S): 200 CAPSULE ORAL at 17:17

## 2020-12-30 RX ADMIN — LIDOCAINE 5 MILLILITER(S): 4 CREAM TOPICAL at 01:44

## 2020-12-30 RX ADMIN — Medication 650 MILLIGRAM(S): at 13:16

## 2020-12-30 RX ADMIN — SODIUM CHLORIDE 10 MILLILITER(S): 9 INJECTION INTRAMUSCULAR; INTRAVENOUS; SUBCUTANEOUS at 17:30

## 2020-12-30 RX ADMIN — Medication 5 MILLIGRAM(S): at 05:38

## 2020-12-30 RX ADMIN — Medication 250 MILLIGRAM(S): at 05:44

## 2020-12-30 RX ADMIN — LIDOCAINE 5 MILLILITER(S): 4 CREAM TOPICAL at 22:39

## 2020-12-30 RX ADMIN — Medication 650 MILLIGRAM(S): at 13:45

## 2020-12-30 RX ADMIN — MORPHINE SULFATE 4 MILLIGRAM(S): 50 CAPSULE, EXTENDED RELEASE ORAL at 05:05

## 2020-12-30 RX ADMIN — Medication 650 MILLIGRAM(S): at 19:11

## 2020-12-30 RX ADMIN — MORPHINE SULFATE 15 MILLIGRAM(S): 50 CAPSULE, EXTENDED RELEASE ORAL at 18:43

## 2020-12-30 RX ADMIN — SODIUM CHLORIDE 100 MILLILITER(S): 9 INJECTION, SOLUTION INTRAVENOUS at 13:22

## 2020-12-30 RX ADMIN — Medication 3 MILLILITER(S): at 05:53

## 2020-12-30 RX ADMIN — Medication 200 MILLIGRAM(S): at 05:39

## 2020-12-30 RX ADMIN — ALBUTEROL 1 PUFF(S): 90 AEROSOL, METERED ORAL at 23:00

## 2020-12-30 RX ADMIN — MORPHINE SULFATE 15 MILLIGRAM(S): 50 CAPSULE, EXTENDED RELEASE ORAL at 18:13

## 2020-12-30 RX ADMIN — Medication 650 MILLIGRAM(S): at 04:45

## 2020-12-30 RX ADMIN — MORPHINE SULFATE 4 MILLIGRAM(S): 50 CAPSULE, EXTENDED RELEASE ORAL at 04:50

## 2020-12-30 RX ADMIN — SODIUM CHLORIDE 10 MILLILITER(S): 9 INJECTION INTRAMUSCULAR; INTRAVENOUS; SUBCUTANEOUS at 13:17

## 2020-12-30 RX ADMIN — Medication 200 MILLIGRAM(S): at 17:17

## 2020-12-30 RX ADMIN — SENNA PLUS 2 TABLET(S): 8.6 TABLET ORAL at 21:24

## 2020-12-30 RX ADMIN — Medication 5 MILLIGRAM(S): at 18:13

## 2020-12-30 RX ADMIN — BUDESONIDE AND FORMOTEROL FUMARATE DIHYDRATE 2 PUFF(S): 160; 4.5 AEROSOL RESPIRATORY (INHALATION) at 07:33

## 2020-12-30 RX ADMIN — ONDANSETRON 4 MILLIGRAM(S): 8 TABLET, FILM COATED ORAL at 19:57

## 2020-12-30 RX ADMIN — Medication 1 PATCH: at 08:44

## 2020-12-30 RX ADMIN — MORPHINE SULFATE 15 MILLIGRAM(S): 50 CAPSULE, EXTENDED RELEASE ORAL at 05:38

## 2020-12-30 RX ADMIN — Medication 25 MICROGRAM(S): at 06:58

## 2020-12-30 RX ADMIN — MORPHINE SULFATE 4 MILLIGRAM(S): 50 CAPSULE, EXTENDED RELEASE ORAL at 13:17

## 2020-12-30 RX ADMIN — MORPHINE SULFATE 4 MILLIGRAM(S): 50 CAPSULE, EXTENDED RELEASE ORAL at 01:55

## 2020-12-30 RX ADMIN — Medication 50 GRAM(S): at 21:24

## 2020-12-30 RX ADMIN — Medication 250 MILLIGRAM(S): at 17:30

## 2020-12-30 RX ADMIN — Medication 1 PATCH: at 13:03

## 2020-12-30 RX ADMIN — ALBUTEROL 2 PUFF(S): 90 AEROSOL, METERED ORAL at 23:00

## 2020-12-30 RX ADMIN — ALBUTEROL 1 PUFF(S): 90 AEROSOL, METERED ORAL at 19:24

## 2020-12-30 RX ADMIN — LIDOCAINE 5 MILLILITER(S): 4 CREAM TOPICAL at 19:15

## 2020-12-30 RX ADMIN — Medication 1 PATCH: at 13:04

## 2020-12-30 NOTE — PROGRESS NOTE ADULT - SUBJECTIVE AND OBJECTIVE BOX
Cultures growing Staph Aureu  She is doing well- minimal pain (much improved)  Dressing changed today  No erythema, no tenderness  She has PICC  I spoke with ID and have plan to discharge once Vanco levels therapeutic  She will follow up with me as out pt next week

## 2020-12-30 NOTE — PROCEDURE NOTE - NSPOSTCAREGUIDE_GEN_A_CORE
Verbal/written post procedure instructions were given to patient/caregiver
Wound care and assessment/Medication teaching and assessment/Observation and assessment/Rehabilitation services/Teaching and training

## 2020-12-30 NOTE — PROVIDER CONTACT NOTE (OTHER) - SITUATION
Patient's Blood sugar 51 and 53 D 50% 12.5 gram IV given and blood sugar increased 232
vanco trough pre-4th dose 8.8
Patient had 9 beats of Vtach nonsustained at 19:25 hr, pt was asymptomatic, denies palpitations, chest pain, not chest tightness.

## 2020-12-30 NOTE — PROGRESS NOTE ADULT - SUBJECTIVE AND OBJECTIVE BOX
Subjective: Doing well and just had her PICC line inserted. No complaints at this time.     MEDICATIONS  (STANDING):  acetaminophen   Tablet .. 650 milliGRAM(s) Oral every 8 hours  aprepitant 40 milliGRAM(s) Oral once  budesonide  80 MICROgram(s)/formoterol 4.5 MICROgram(s) Inhaler 2 Puff(s) Inhalation two times a day  celecoxib 100 milliGRAM(s) Oral two times a day  chlorhexidine 2% Cloths 1 Application(s) Topical once  diazepam    Tablet 5 milliGRAM(s) Oral two times a day  hydroxychloroquine 200 milliGRAM(s) Oral two times a day  levothyroxine 25 MICROGram(s) Oral daily  morphine ER Tablet 15 milliGRAM(s) Oral every 12 hours  nicotine -   7 mG/24Hr(s) Patch 1 patch Transdermal daily  pantoprazole  Injectable 40 milliGRAM(s) IV Push daily  polyethylene glycol 3350 17 Gram(s) Oral daily  senna 2 Tablet(s) Oral at bedtime  sodium chloride 0.45%. 1000 milliLiter(s) (100 mL/Hr) IV Continuous <Continuous>  vancomycin  IVPB 1000 milliGRAM(s) IV Intermittent every 12 hours    MEDICATIONS  (PRN):  ALBUTerol    90 MICROgram(s) HFA Inhaler 2 Puff(s) Inhalation every 6 hours PRN Shortness of Breath and/or Wheezing  albuterol/ipratropium for Nebulization 3 milliLiter(s) Nebulizer every 6 hours PRN Shortness of Breath and/or Wheezing  lidocaine 2% Viscous 5 milliLiter(s) Swish and Spit every 4 hours PRN mouth pain  morphine  - Injectable 4 milliGRAM(s) IV Push every 3 hours PRN Severe Pain (7 - 10)  morphine  IR 15 milliGRAM(s) Oral every 3 hours PRN Moderate Pain (4 - 6)  ondansetron Injectable 4 milliGRAM(s) IV Push every 6 hours PRN Nausea and/or Vomiting      Allergies    Vibramycin (Unknown)    Intolerances    aspirin (Stomach Upset)  Zithromax (Stomach Upset)      Vital Signs Last 24 Hrs  T(C): 37 (30 Dec 2020 05:11), Max: 37 (30 Dec 2020 05:11)  T(F): 98.6 (30 Dec 2020 05:11), Max: 98.6 (30 Dec 2020 05:11)  HR: 65 (30 Dec 2020 05:54) (64 - 80)  BP: 111/68 (30 Dec 2020 05:11) (101/55 - 111/68)  BP(mean): --  RR: 16 (30 Dec 2020 05:11) (16 - 20)  SpO2: 92% (30 Dec 2020 05:54) (86% - 100%)    PHYSICAL EXAM:  GENERAL: NAD, well-groomed, well-developed  HEAD:  Atraumatic, Normocephalic  ENMT: Moist mucous membranes,   NECK: Supple, No JVD, Normal thyroid  NERVOUS SYSTEM:  All 4 extremities mobile, no gross sensory deficits.   CHEST/LUNG: Clear to auscultation bilaterally; No rales, rhonchi, wheezing, or rubs  HEART: Regular rate and rhythm; No murmurs, rubs, or gallops  ABDOMEN: Soft, Nontender, Nondistended; Bowel sounds present  EXTREMITIES:  2+ Peripheral Pulses, No clubbing, cyanosis, or edema      LABS:                        11.2   10.00 )-----------( 438      ( 30 Dec 2020 07:42 )             34.6     30 Dec 2020 07:42    131    |  95     |  18     ----------------------------<  97     4.7     |  29     |  0.86     Ca    8.5        30 Dec 2020 07:42          CAPILLARY BLOOD GLUCOSE          RADIOLOGY & ADDITIONAL TESTS:    Imaging Personally Reviewed:  [ ] YES     Consultant(s) Notes Reviewed:      Care Discussed with Consultants/Other Providers:    Advanced Directives: [ ] DNR  [ ] No feeding tube  [ ] MOLST in chart  [ ] MOLST completed today  [ ] Unknown

## 2020-12-30 NOTE — PROGRESS NOTE ADULT - SUBJECTIVE AND OBJECTIVE BOX
Chief Complaint: Right elbow abscess    Interval Events: Underwent surgery yesterday.    Review of Systems:  General: No fevers, chills, weight loss or gain  Skin: No rashes, color changes  Cardiovascular: No chest pain, orthopnea  Respiratory: No shortness of breath, cough  Gastrointestinal: No nausea, abdominal pain  Genitourinary: No incontinence, pain with urination  Musculoskeletal: No pain, swelling, decreased range of motion  Neurological: No headache, weakness  Psychiatric: No depression, anxiety  Endocrine: No weight loss or gain, increased thirst  All other systems are comprehensively negative.    Physical Exam:  Vital Signs Last 24 Hrs  T(C): 37 (30 Dec 2020 05:11), Max: 37 (30 Dec 2020 05:11)  T(F): 98.6 (30 Dec 2020 05:11), Max: 98.6 (30 Dec 2020 05:11)  HR: 65 (30 Dec 2020 05:54) (64 - 80)  BP: 111/68 (30 Dec 2020 05:11) (101/55 - 111/68)  BP(mean): --  RR: 16 (30 Dec 2020 05:11) (16 - 20)  SpO2: 92% (30 Dec 2020 05:54) (86% - 100%)  General: NAD  HEENT: MMM  Neck: No JVD, no carotid bruit  Lungs: CTAB  CV: RRR, nl S1/S2, no M/R/G  Abdomen: S/NT/ND, +BS  Extremities: No LE edema, no cyanosis  Neuro: AAOx3, non-focal  Skin: No rash    Labs:             12-30    131<L>  |  95<L>  |  18  ----------------------------<  97  4.7   |  29  |  0.86    Ca    8.5      30 Dec 2020 07:42    TPro  5.7<L>  /  Alb  2.9<L>  /  TBili  0.1<L>  /  DBili  0.0  /  AST  18  /  ALT  25  /  AlkPhos  86  12-29                        11.2   10.00 )-----------( 438      ( 30 Dec 2020 07:42 )             34.6       Telemetry: Sinus rhythm, atrial runs

## 2020-12-30 NOTE — PROGRESS NOTE ADULT - SUBJECTIVE AND OBJECTIVE BOX
Post Op     DOUGLAS AKINS      55y        Female                                                                                                                 T(C): 37 (12-30-20 @ 05:11), Max: 37 (12-30-20 @ 05:11)  HR: 65 (12-30-20 @ 05:54) (64 - 80)  BP: 111/68 (12-30-20 @ 05:11) (101/55 - 111/68)  RR: 16 (12-30-20 @ 05:11) (16 - 20)  SpO2: 92% (12-30-20 @ 05:54) (86% - 100%)  Wt(kg): --    S/P  incision and drainage right elbow pod #2    Patient denies shortness of breath, chest pain, dyspnea, No complaints  Pain is3/10    Physical Exam    Extremity: Bilaterally:  No holmon                                           No Cord                                          PAS on                                          Neurovascular intact                                          Motor intact EHL/FHL                                          Sensation intact                                          Pulses intact DP/PT                                         Calves Soft                                         Dressing Clean / Dry / Intact changed , nylon sutures right elbow , no drainage expressed, slight redness, no pain , no heat , no pus , sterile gauze and ace placed per surgeon request                                         Capillary refill with 5 seconds                          11.2   10.00 )-----------( 438      ( 30 Dec 2020 07:42 )             34.6       12-30    131<L>  |  95<L>  |  18  ----------------------------<  97  4.7   |  29  |  0.86    Ca    8.5      30 Dec 2020 07:42    TPro  5.7<L>  /  Alb  2.9<L>  /  TBili  0.1<L>  /  DBili  0.0  /  AST  18  /  ALT  25  /  AlkPhos  86  12-29      A/P  -- S/P   incision and drainage right elbow pod #2    -  Medicine To Follow   - DVT prophylaxis PAS  - PT & OT   - Analagesia  - Incentive Spirometry  - Discharge Planning  - Safety Precautions  -  CBC , BMP daily   Discussed with attending and Infectious Disease  awaiting  appropriate vanco level  per ID  - PICC line placed this AM    - Patient advised to follow up pain management and ENT as advised by Medical doctor/ Anaesthesia

## 2020-12-30 NOTE — PROGRESS NOTE ADULT - ASSESSMENT
Pt is a 55W w/ PMHx of Paroxysmal SVT, Chronic neck pain and low back pain with sciatica, Raynaud's syndrome, Emphysema/COPD(home o2 prn), Migraine, Anxiety, Hepatitis C, Hypothyroidism, GERD, Sjogren's disease, Lupus, s/p Nirschl repair of L elbow for lateral epicondylitis on 10/02/20, s/p Nirschl repair of R elbow for lateral epicondylitis on 11/20/20 by Dr. Durand, p/w R elbow pain with associated swelling and redness x 1 week, s/p 2 drainages on amoxicillin. Admitted for repeat I&D.     R elbow abscess s/p I&D in the OR  Possible bone/joint involvement  -s/p I&D 12/28  -surgical site cx NGTD  -BCx NGTD  Previous outpatient cx+ MRSA--will use this to guide therapy  I discussed intra-op findings w/ Dr. Durand; given extension of infection into the joint space, will have to treat w/ prolonged therapy  -will c/w vancomycin while inpatient, obtain vanc level prior to 4th dose, goal trough 15-20 (dosing per pharmacy protocol)  Pt will need therapeutic level prior to discharge  Pt will need PICC line and 6 wks IV therapy until 2/8/2020  Pt will need weekly labs -- BMP, CBC, LFTs, ESR, CRP, vanc level -- to be faxed to me Dr. Nunez, [phone].    Pt is a 55W w/ PMHx of Paroxysmal SVT, Chronic neck pain and low back pain with sciatica, Raynaud's syndrome, Emphysema/COPD(home o2 prn), Migraine, Anxiety, Hepatitis C, Hypothyroidism, GERD, Sjogren's disease, Lupus, s/p Nirschl repair of L elbow for lateral epicondylitis on 10/02/20, s/p Nirschl repair of R elbow for lateral epicondylitis on 11/20/20 by Dr. Durand, p/w R elbow pain with associated swelling and redness x 1 week, s/p 2 drainages on amoxicillin. Admitted for repeat I&D.     R elbow abscess s/p I&D in the OR  Possible bone/joint involvement  -s/p I&D 12/28  -surgical site cx FEW S. Aureus, will likely be MRSA  -BCx NGTD  Previous outpatient cx+ MRSA--will use this to guide therapy  I discussed intra-op findings w/ Dr. Durand; given extension of infection into the joint space, will have to treat w/ prolonged therapy  -will c/w vancomycin while inpatient, obtain vanc level prior to 4th dose, goal trough 15-20 (dosing per pharmacy protocol)  Pt will need therapeutic level prior to discharge--PLEASE OBTAIN LEVEL PRIOR TO 3RD DOSE TOMORROW AM. I HAVE ALREADY ORDERED THE LAB, PLEASE ENSURE LAB IS DRAWN PRIOR TO VANCOMYCIN ADMINISTRATION  Pt will need PICC line and 6 wks IV therapy until 2/8/2020  Pt will need weekly labs -- BMP, CBC, LFTs, ESR, CRP, vanc level -- to be faxed to me Dr. Nunez, [phone].

## 2020-12-30 NOTE — PROVIDER CONTACT NOTE (OTHER) - ACTION/TREATMENT ORDERED:
MD will review and place new order for increased dose
NP will Review the chart
Stat Magnesium was ordered, Dr. Layne will see patient tonight

## 2020-12-30 NOTE — PROGRESS NOTE ADULT - ASSESSMENT
55F with Sjogren's disease, Lupus on Plaquenil admitted for Right Elbow Abscess/Cellulitis requiring I&D.     Right elbow abscess/cellulitis S/P I&D POD 2  Continue vancomycin and follow levels  Intraoperative Cultures NTD but outpatient cultures showing MRSA  PICC line inserted and will need long term Abx therapy   ID (Dr. Nunez)    COPD/Emphysema  Ventolin and Symbicort  Continues to be hypoxic and refuses to wear NC even though O2 Saturations in 70's  Was told and counseled     Parosysmal SVT  Continue Telemetry monitoring  BP is soft  Cardiology (Dr. Newman)    Opioid Dependance  Chronic neck and low back pain  Morphine ER and IR    Raynaud's syndrome/Sjogren's disease/Lupus  Hydroxychloroquine    Migraine/Anxiety  Fioricet PRN  Valium    Hypothyroidism  Levothyroxine    GERD  Protonix    DVT Prophylaxis   SCD    Patient had abnormal hole near to epiglottis, as per anesthesia, pic was sent to  (ENT) and he will scope her in his office. Patient is advise to make an appointment as soon as she is discharged.

## 2020-12-30 NOTE — PROVIDER CONTACT NOTE (OTHER) - BACKGROUND
Admitted for Right Elbow Abscess  s/p I and D 12/28  PMHx: SVT, Hyothyroidism, Lupus, Sjogren's disease, Seizure, Skin Cancer, GERD  , Emphysema, COPD, IBS, Migraine, Anxiety

## 2020-12-30 NOTE — PROGRESS NOTE ADULT - SUBJECTIVE AND OBJECTIVE BOX
[facility], Division of Infectious Diseases  YOUNG Living[place] Y. Patel, S. Shah  [phone]    Name: DOUGLAS AKINS  Age: 55y  Gender: Female  MRN: [id number]  Note Date: 12-30-20    Interval History:  Patient seen and examined at bedside this morning  No acute overnight events.   Notes reviewed    Antibiotics:  hydroxychloroquine 200 milliGRAM(s) Oral two times a day  vancomycin  IVPB 1000 milliGRAM(s) IV Intermittent every 12 hours      Medications:  acetaminophen   Tablet .. 650 milliGRAM(s) Oral every 8 hours  albuterol/ipratropium for Nebulization 3 milliLiter(s) Nebulizer every 6 hours PRN  aprepitant 40 milliGRAM(s) Oral once  budesonide  80 MICROgram(s)/formoterol 4.5 MICROgram(s) Inhaler 2 Puff(s) Inhalation two times a day  celecoxib 100 milliGRAM(s) Oral two times a day  chlorhexidine 2% Cloths 1 Application(s) Topical once  diazepam    Tablet 5 milliGRAM(s) Oral two times a day  hydroxychloroquine 200 milliGRAM(s) Oral two times a day  levothyroxine 25 MICROGram(s) Oral daily  lidocaine 2% Viscous 5 milliLiter(s) Swish and Spit every 4 hours PRN  morphine  - Injectable 4 milliGRAM(s) IV Push every 3 hours PRN  morphine  IR 15 milliGRAM(s) Oral every 3 hours PRN  morphine ER Tablet 15 milliGRAM(s) Oral every 12 hours  nicotine -   7 mG/24Hr(s) Patch 1 patch Transdermal daily  ondansetron Injectable 4 milliGRAM(s) IV Push every 6 hours PRN  pantoprazole  Injectable 40 milliGRAM(s) IV Push daily  polyethylene glycol 3350 17 Gram(s) Oral daily  senna 2 Tablet(s) Oral at bedtime  sodium chloride 0.45%. 1000 milliLiter(s) IV Continuous <Continuous>  vancomycin  IVPB 1000 milliGRAM(s) IV Intermittent every 12 hours      Review of Systems:  A 10-point review of systems was obtained.     Pertinent positives and negatives--  Constitutional: No fevers. No Chills. No Rigors.   Cardiovascular: No chest pain. No palpitations.  Respiratory: No shortness of breath. No cough.  Gastrointestinal: No nausea or vomiting. No diarrhea or constipation.   Psychiatric: Pleasant. Appropriate affect.    Review of systems otherwise negative except as previously noted.    Allergies: Vibramycin (Unknown)    For details regarding the patient's past medical history, social history, family history, and other miscellaneous elements, please refer the initial infectious diseases consultation and/or the admitting history and physical examination for this admission.    Objective:  Vitals:   T(C): 37 (12-30-20 @ 05:11), Max: 37 (12-30-20 @ 05:11)  HR: 65 (12-30-20 @ 05:54) (64 - 80)  BP: 111/68 (12-30-20 @ 05:11) (85/54 - 111/68)  RR: 16 (12-30-20 @ 05:11) (16 - 20)  SpO2: 92% (12-30-20 @ 05:54) (86% - 100%)    Physical Examination:  General: no acute distress  HEENT: NC/AT, EOMI, anicteric, no oral lesions  Neck: supple, no palpable LAD  Cardio: S1, S2 heard, RRR, no murmurs  Resp: breath sounds heard bilaterally, no rales, wheezes or rhonchi  Abd: soft, NT, ND, + bowel sounds  Neuro: AAOx3, no obvious focal deficits  Ext: no edema or cyanosis  Skin: warm, dry, no visible rash    Laboratory Studies:  CBC:                       11.2   10.00 )-----------( 438      ( 30 Dec 2020 07:42 )             34.6     CMP: 12-30    131<L>  |  95<L>  |  18  ----------------------------<  97  4.7   |  29  |  0.86    Ca    8.5      30 Dec 2020 07:42    TPro  5.7<L>  /  Alb  2.9<L>  /  TBili  0.1<L>  /  DBili  0.0  /  AST  18  /  ALT  25  /  AlkPhos  86  12-29    LIVER FUNCTIONS - ( 29 Dec 2020 06:30 )  Alb: 2.9 g/dL / Pro: 5.7 g/dL / ALK PHOS: 86 U/L / ALT: 25 U/L DA / AST: 18 U/L / GGT: x             Microbiology: reviewed    Culture - Surgical Swab (collected 12-28-20 @ 16:40)  Source: .Surgical Swab Right Elbow Surgical Swab  Preliminary Report (12-29-20 @ 11:58):    No growth    Culture - Surgical Swab (collected 12-28-20 @ 16:40)  Source: .Surgical Swab Right Elbow Surgical Swab  Preliminary Report (12-29-20 @ 11:56):    No growth    Culture - Surgical Swab (collected 12-28-20 @ 16:39)  Source: .Surgical Swab Right Elbow Surgical Swab  Preliminary Report (12-29-20 @ 12:00):    No growth    Culture - Blood (collected 12-28-20 @ 00:38)  Source: .Blood Blood  Preliminary Report (12-29-20 @ 01:02):    No growth to date.    Culture - Blood (collected 12-28-20 @ 00:38)  Source: .Blood Blood  Preliminary Report (12-29-20 @ 01:02):    No growth to date.        Radiology: reviewed       [facility], Division of Infectious Diseases  YOUNG [place] Y. Patel, S. Shah  [phone]    Name: DOUGLAS AKINS  Age: 55y  Gender: Female  MRN: [id number]  Note Date: 12-30-20    Interval History:  Patient to be seen and examined, at PICC line this AM  No acute overnight events.   Notes reviewed    Antibiotics:  hydroxychloroquine 200 milliGRAM(s) Oral two times a day  vancomycin  IVPB 1000 milliGRAM(s) IV Intermittent every 12 hours      Medications:  acetaminophen   Tablet .. 650 milliGRAM(s) Oral every 8 hours  albuterol/ipratropium for Nebulization 3 milliLiter(s) Nebulizer every 6 hours PRN  aprepitant 40 milliGRAM(s) Oral once  budesonide  80 MICROgram(s)/formoterol 4.5 MICROgram(s) Inhaler 2 Puff(s) Inhalation two times a day  celecoxib 100 milliGRAM(s) Oral two times a day  chlorhexidine 2% Cloths 1 Application(s) Topical once  diazepam    Tablet 5 milliGRAM(s) Oral two times a day  hydroxychloroquine 200 milliGRAM(s) Oral two times a day  levothyroxine 25 MICROGram(s) Oral daily  lidocaine 2% Viscous 5 milliLiter(s) Swish and Spit every 4 hours PRN  morphine  - Injectable 4 milliGRAM(s) IV Push every 3 hours PRN  morphine  IR 15 milliGRAM(s) Oral every 3 hours PRN  morphine ER Tablet 15 milliGRAM(s) Oral every 12 hours  nicotine -   7 mG/24Hr(s) Patch 1 patch Transdermal daily  ondansetron Injectable 4 milliGRAM(s) IV Push every 6 hours PRN  pantoprazole  Injectable 40 milliGRAM(s) IV Push daily  polyethylene glycol 3350 17 Gram(s) Oral daily  senna 2 Tablet(s) Oral at bedtime  sodium chloride 0.45%. 1000 milliLiter(s) IV Continuous <Continuous>  vancomycin  IVPB 1000 milliGRAM(s) IV Intermittent every 12 hours      Review of Systems:  A 10-point review of systems was obtained.     Pertinent positives and negatives--  Constitutional: No fevers. No Chills. No Rigors.   Cardiovascular: No chest pain. No palpitations.  Respiratory: No shortness of breath. No cough.  Gastrointestinal: No nausea or vomiting. No diarrhea or constipation.   Psychiatric: Pleasant. Appropriate affect.    Review of systems otherwise negative except as previously noted.    Allergies: Vibramycin (Unknown)    For details regarding the patient's past medical history, social history, family history, and other miscellaneous elements, please refer the initial infectious diseases consultation and/or the admitting history and physical examination for this admission.    Objective:  Vitals:   T(C): 37 (12-30-20 @ 05:11), Max: 37 (12-30-20 @ 05:11)  HR: 65 (12-30-20 @ 05:54) (64 - 80)  BP: 111/68 (12-30-20 @ 05:11) (85/54 - 111/68)  RR: 16 (12-30-20 @ 05:11) (16 - 20)  SpO2: 92% (12-30-20 @ 05:54) (86% - 100%)    Physical Examination:  General: no acute distress  HEENT: NC/AT, EOMI, anicteric, no oral lesions  Neck: supple, no palpable LAD  Cardio: S1, S2 heard, RRR, no murmurs  Resp: breath sounds heard bilaterally, no rales, wheezes or rhonchi  Abd: soft, NT, ND, + bowel sounds  Neuro: AAOx3, no obvious focal deficits  Ext: no edema or cyanosis  Skin: warm, dry, no visible rash    Laboratory Studies:  CBC:                       11.2   10.00 )-----------( 438      ( 30 Dec 2020 07:42 )             34.6     CMP: 12-30    131<L>  |  95<L>  |  18  ----------------------------<  97  4.7   |  29  |  0.86    Ca    8.5      30 Dec 2020 07:42    TPro  5.7<L>  /  Alb  2.9<L>  /  TBili  0.1<L>  /  DBili  0.0  /  AST  18  /  ALT  25  /  AlkPhos  86  12-29    LIVER FUNCTIONS - ( 29 Dec 2020 06:30 )  Alb: 2.9 g/dL / Pro: 5.7 g/dL / ALK PHOS: 86 U/L / ALT: 25 U/L DA / AST: 18 U/L / GGT: x             Microbiology: reviewed    Culture - Surgical Swab (collected 12-28-20 @ 16:40)  Source: .Surgical Swab Right Elbow Surgical Swab  Preliminary Report (12-29-20 @ 11:58):    No growth    Culture - Surgical Swab (collected 12-28-20 @ 16:40)  Source: .Surgical Swab Right Elbow Surgical Swab  Preliminary Report (12-29-20 @ 11:56):    No growth    Culture - Surgical Swab (collected 12-28-20 @ 16:39)  Source: .Surgical Swab Right Elbow Surgical Swab  Preliminary Report (12-29-20 @ 12:00):    No growth    Culture - Blood (collected 12-28-20 @ 00:38)  Source: .Blood Blood  Preliminary Report (12-29-20 @ 01:02):    No growth to date.    Culture - Blood (collected 12-28-20 @ 00:38)  Source: .Blood Blood  Preliminary Report (12-29-20 @ 01:02):    No growth to date.        Radiology: reviewed       ***UPDATED NOTE, SEE BELOW***  [facility], Division of Infectious Diseases  YOU[place] Y. Patel, S. Shah  [phone]    Name: DOUGLAS AKINS  Age: 55y  Gender: Female  MRN: [id number]  Note Date: 12-30-20    Interval History:  Patient seen and examined at bedside.   Afebrile  Anxious to head home  D/w pt Abx plan and importance of therapeutic vanc levels  Got PICC this AM  No acute overnight events.   Notes reviewed    Antibiotics:  hydroxychloroquine 200 milliGRAM(s) Oral two times a day  vancomycin  IVPB 1000 milliGRAM(s) IV Intermittent every 12 hours      Medications:  acetaminophen   Tablet .. 650 milliGRAM(s) Oral every 8 hours  albuterol/ipratropium for Nebulization 3 milliLiter(s) Nebulizer every 6 hours PRN  aprepitant 40 milliGRAM(s) Oral once  budesonide  80 MICROgram(s)/formoterol 4.5 MICROgram(s) Inhaler 2 Puff(s) Inhalation two times a day  celecoxib 100 milliGRAM(s) Oral two times a day  chlorhexidine 2% Cloths 1 Application(s) Topical once  diazepam    Tablet 5 milliGRAM(s) Oral two times a day  hydroxychloroquine 200 milliGRAM(s) Oral two times a day  levothyroxine 25 MICROGram(s) Oral daily  lidocaine 2% Viscous 5 milliLiter(s) Swish and Spit every 4 hours PRN  morphine  - Injectable 4 milliGRAM(s) IV Push every 3 hours PRN  morphine  IR 15 milliGRAM(s) Oral every 3 hours PRN  morphine ER Tablet 15 milliGRAM(s) Oral every 12 hours  nicotine -   7 mG/24Hr(s) Patch 1 patch Transdermal daily  ondansetron Injectable 4 milliGRAM(s) IV Push every 6 hours PRN  pantoprazole  Injectable 40 milliGRAM(s) IV Push daily  polyethylene glycol 3350 17 Gram(s) Oral daily  senna 2 Tablet(s) Oral at bedtime  sodium chloride 0.45%. 1000 milliLiter(s) IV Continuous <Continuous>  vancomycin  IVPB 1000 milliGRAM(s) IV Intermittent every 12 hours      Review of Systems:  A 10-point review of systems was obtained.     Pertinent positives and negatives--  Constitutional: No fevers. No Chills. No Rigors.   Cardiovascular: No chest pain. No palpitations.  Respiratory: No shortness of breath. No cough.  Gastrointestinal: No nausea or vomiting. No diarrhea or constipation.   Psychiatric: Pleasant. Appropriate affect.    Review of systems otherwise negative except as previously noted.    Allergies: Vibramycin (Unknown)    For details regarding the patient's past medical history, social history, family history, and other miscellaneous elements, please refer the initial infectious diseases consultation and/or the admitting history and physical examination for this admission.    Objective:  Vitals:   T(C): 37 (12-30-20 @ 05:11), Max: 37 (12-30-20 @ 05:11)  HR: 65 (12-30-20 @ 05:54) (64 - 80)  BP: 111/68 (12-30-20 @ 05:11) (85/54 - 111/68)  RR: 16 (12-30-20 @ 05:11) (16 - 20)  SpO2: 92% (12-30-20 @ 05:54) (86% - 100%)    Physical Examination:  General: no acute distress  HEENT: NC/AT, EOMI, anicteric, no oral lesions  Neck: supple, no palpable LAD  Cardio: S1, S2 heard, RRR, no murmurs  Resp: breath sounds heard bilaterally, no rales, wheezes or rhonchi  Abd: soft, NT, ND, + bowel sounds  Neuro: AAOx3, no obvious focal deficits  Ext: no edema or cyanosis  Skin: warm, dry, no visible rash    Laboratory Studies:  CBC:                       11.2   10.00 )-----------( 438      ( 30 Dec 2020 07:42 )             34.6     CMP: 12-30    131<L>  |  95<L>  |  18  ----------------------------<  97  4.7   |  29  |  0.86    Ca    8.5      30 Dec 2020 07:42    TPro  5.7<L>  /  Alb  2.9<L>  /  TBili  0.1<L>  /  DBili  0.0  /  AST  18  /  ALT  25  /  AlkPhos  86  12-29    LIVER FUNCTIONS - ( 29 Dec 2020 06:30 )  Alb: 2.9 g/dL / Pro: 5.7 g/dL / ALK PHOS: 86 U/L / ALT: 25 U/L DA / AST: 18 U/L / GGT: x             Microbiology: reviewed    Culture - Surgical Swab (collected 12-28-20 @ 16:40)  Source: .Surgical Swab Right Elbow Surgical Swab  Preliminary Report (12-29-20 @ 11:58):    No growth    Culture - Surgical Swab (collected 12-28-20 @ 16:40)  Source: .Surgical Swab Right Elbow Surgical Swab  Preliminary Report (12-29-20 @ 11:56):    No growth    Culture - Surgical Swab (collected 12-28-20 @ 16:39)  Source: .Surgical Swab Right Elbow Surgical Swab  Preliminary Report (12-29-20 @ 12:00):    No growth    Culture - Blood (collected 12-28-20 @ 00:38)  Source: .Blood Blood  Preliminary Report (12-29-20 @ 01:02):    No growth to date.    Culture - Blood (collected 12-28-20 @ 00:38)  Source: .Blood Blood  Preliminary Report (12-29-20 @ 01:02):    No growth to date.        Radiology: reviewed

## 2020-12-31 VITALS
HEART RATE: 87 BPM | TEMPERATURE: 98 F | OXYGEN SATURATION: 97 % | DIASTOLIC BLOOD PRESSURE: 65 MMHG | RESPIRATION RATE: 17 BRPM | SYSTOLIC BLOOD PRESSURE: 106 MMHG

## 2020-12-31 LAB
-  AMPICILLIN/SULBACTAM: SIGNIFICANT CHANGE UP
-  CEFAZOLIN: SIGNIFICANT CHANGE UP
-  CLINDAMYCIN: SIGNIFICANT CHANGE UP
-  DAPTOMYCIN: SIGNIFICANT CHANGE UP
-  ERYTHROMYCIN: SIGNIFICANT CHANGE UP
-  GENTAMICIN: SIGNIFICANT CHANGE UP
-  LINEZOLID: SIGNIFICANT CHANGE UP
-  OXACILLIN: SIGNIFICANT CHANGE UP
-  PENICILLIN: SIGNIFICANT CHANGE UP
-  RIFAMPIN: SIGNIFICANT CHANGE UP
-  TETRACYCLINE: SIGNIFICANT CHANGE UP
-  TRIMETHOPRIM/SULFAMETHOXAZOLE: SIGNIFICANT CHANGE UP
-  VANCOMYCIN: SIGNIFICANT CHANGE UP
ANION GAP SERPL CALC-SCNC: 8 MMOL/L — SIGNIFICANT CHANGE UP (ref 5–17)
BASOPHILS # BLD AUTO: 0.06 K/UL — SIGNIFICANT CHANGE UP (ref 0–0.2)
BASOPHILS NFR BLD AUTO: 0.5 % — SIGNIFICANT CHANGE UP (ref 0–2)
BUN SERPL-MCNC: 16 MG/DL — SIGNIFICANT CHANGE UP (ref 7–23)
CALCIUM SERPL-MCNC: 8.5 MG/DL — SIGNIFICANT CHANGE UP (ref 8.4–10.5)
CHLORIDE SERPL-SCNC: 100 MMOL/L — SIGNIFICANT CHANGE UP (ref 96–108)
CO2 SERPL-SCNC: 26 MMOL/L — SIGNIFICANT CHANGE UP (ref 22–31)
CREAT SERPL-MCNC: 0.63 MG/DL — SIGNIFICANT CHANGE UP (ref 0.5–1.3)
EOSINOPHIL # BLD AUTO: 0.43 K/UL — SIGNIFICANT CHANGE UP (ref 0–0.5)
EOSINOPHIL NFR BLD AUTO: 3.6 % — SIGNIFICANT CHANGE UP (ref 0–6)
GLUCOSE SERPL-MCNC: 107 MG/DL — HIGH (ref 70–99)
HCT VFR BLD CALC: 32.3 % — LOW (ref 34.5–45)
HGB BLD-MCNC: 10.8 G/DL — LOW (ref 11.5–15.5)
IMM GRANULOCYTES NFR BLD AUTO: 0.6 % — SIGNIFICANT CHANGE UP (ref 0–1.5)
LYMPHOCYTES # BLD AUTO: 1.79 K/UL — SIGNIFICANT CHANGE UP (ref 1–3.3)
LYMPHOCYTES # BLD AUTO: 15.2 % — SIGNIFICANT CHANGE UP (ref 13–44)
MAGNESIUM SERPL-MCNC: 2.5 MG/DL — SIGNIFICANT CHANGE UP (ref 1.6–2.6)
MCHC RBC-ENTMCNC: 32.4 PG — SIGNIFICANT CHANGE UP (ref 27–34)
MCHC RBC-ENTMCNC: 33.4 GM/DL — SIGNIFICANT CHANGE UP (ref 32–36)
MCV RBC AUTO: 97 FL — SIGNIFICANT CHANGE UP (ref 80–100)
METHOD TYPE: SIGNIFICANT CHANGE UP
MONOCYTES # BLD AUTO: 0.94 K/UL — HIGH (ref 0–0.9)
MONOCYTES NFR BLD AUTO: 8 % — SIGNIFICANT CHANGE UP (ref 2–14)
NEUTROPHILS # BLD AUTO: 8.5 K/UL — HIGH (ref 1.8–7.4)
NEUTROPHILS NFR BLD AUTO: 72.1 % — SIGNIFICANT CHANGE UP (ref 43–77)
NRBC # BLD: 0 /100 WBCS — SIGNIFICANT CHANGE UP (ref 0–0)
PLATELET # BLD AUTO: 434 K/UL — HIGH (ref 150–400)
POTASSIUM SERPL-MCNC: 4.4 MMOL/L — SIGNIFICANT CHANGE UP (ref 3.5–5.3)
POTASSIUM SERPL-SCNC: 4.4 MMOL/L — SIGNIFICANT CHANGE UP (ref 3.5–5.3)
RBC # BLD: 3.33 M/UL — LOW (ref 3.8–5.2)
RBC # FLD: 12.4 % — SIGNIFICANT CHANGE UP (ref 10.3–14.5)
SARS-COV-2 RNA SPEC QL NAA+PROBE: SIGNIFICANT CHANGE UP
SODIUM SERPL-SCNC: 134 MMOL/L — LOW (ref 135–145)
VANCOMYCIN TROUGH SERPL-MCNC: 20.7 UG/ML — HIGH (ref 10–20)
WBC # BLD: 11.79 K/UL — HIGH (ref 3.8–10.5)
WBC # FLD AUTO: 11.79 K/UL — HIGH (ref 3.8–10.5)

## 2020-12-31 PROCEDURE — 99233 SBSQ HOSP IP/OBS HIGH 50: CPT

## 2020-12-31 RX ORDER — ONDANSETRON 8 MG/1
4 TABLET, FILM COATED ORAL ONCE
Refills: 0 | Status: COMPLETED | OUTPATIENT
Start: 2020-12-31 | End: 2020-12-31

## 2020-12-31 RX ORDER — TIOTROPIUM BROMIDE 18 UG/1
1 CAPSULE ORAL; RESPIRATORY (INHALATION) DAILY
Refills: 0 | Status: DISCONTINUED | OUTPATIENT
Start: 2020-12-31 | End: 2020-12-31

## 2020-12-31 RX ORDER — VANCOMYCIN HCL 1 G
1 VIAL (EA) INTRAVENOUS
Qty: 80 | Refills: 0
Start: 2020-12-31 | End: 2021-02-08

## 2020-12-31 RX ORDER — MORPHINE SULFATE 50 MG/1
1 CAPSULE, EXTENDED RELEASE ORAL
Qty: 0 | Refills: 0 | DISCHARGE

## 2020-12-31 RX ORDER — MORPHINE SULFATE 50 MG/1
15 CAPSULE, EXTENDED RELEASE ORAL
Qty: 0 | Refills: 0 | DISCHARGE

## 2020-12-31 RX ADMIN — CHLORHEXIDINE GLUCONATE 1 APPLICATION(S): 213 SOLUTION TOPICAL at 11:49

## 2020-12-31 RX ADMIN — ONDANSETRON 4 MILLIGRAM(S): 8 TABLET, FILM COATED ORAL at 04:37

## 2020-12-31 RX ADMIN — Medication 5 MILLIGRAM(S): at 05:55

## 2020-12-31 RX ADMIN — Medication 200 MILLIGRAM(S): at 05:55

## 2020-12-31 RX ADMIN — Medication 650 MILLIGRAM(S): at 11:41

## 2020-12-31 RX ADMIN — Medication 1 PATCH: at 11:41

## 2020-12-31 RX ADMIN — MORPHINE SULFATE 4 MILLIGRAM(S): 50 CAPSULE, EXTENDED RELEASE ORAL at 01:36

## 2020-12-31 RX ADMIN — SODIUM CHLORIDE 100 MILLILITER(S): 9 INJECTION, SOLUTION INTRAVENOUS at 01:32

## 2020-12-31 RX ADMIN — BUDESONIDE AND FORMOTEROL FUMARATE DIHYDRATE 2 PUFF(S): 160; 4.5 AEROSOL RESPIRATORY (INHALATION) at 07:46

## 2020-12-31 RX ADMIN — Medication 250 MILLIGRAM(S): at 08:55

## 2020-12-31 RX ADMIN — MORPHINE SULFATE 15 MILLIGRAM(S): 50 CAPSULE, EXTENDED RELEASE ORAL at 05:55

## 2020-12-31 RX ADMIN — CELECOXIB 100 MILLIGRAM(S): 200 CAPSULE ORAL at 05:55

## 2020-12-31 RX ADMIN — ONDANSETRON 4 MILLIGRAM(S): 8 TABLET, FILM COATED ORAL at 01:21

## 2020-12-31 RX ADMIN — LIDOCAINE 5 MILLILITER(S): 4 CREAM TOPICAL at 04:09

## 2020-12-31 RX ADMIN — MORPHINE SULFATE 4 MILLIGRAM(S): 50 CAPSULE, EXTENDED RELEASE ORAL at 04:55

## 2020-12-31 RX ADMIN — PANTOPRAZOLE SODIUM 40 MILLIGRAM(S): 20 TABLET, DELAYED RELEASE ORAL at 11:41

## 2020-12-31 RX ADMIN — ALBUTEROL 2 PUFF(S): 90 AEROSOL, METERED ORAL at 07:41

## 2020-12-31 RX ADMIN — Medication 25 MICROGRAM(S): at 05:55

## 2020-12-31 RX ADMIN — CELECOXIB 100 MILLIGRAM(S): 200 CAPSULE ORAL at 06:55

## 2020-12-31 RX ADMIN — MORPHINE SULFATE 4 MILLIGRAM(S): 50 CAPSULE, EXTENDED RELEASE ORAL at 01:21

## 2020-12-31 RX ADMIN — MORPHINE SULFATE 15 MILLIGRAM(S): 50 CAPSULE, EXTENDED RELEASE ORAL at 06:55

## 2020-12-31 RX ADMIN — MORPHINE SULFATE 4 MILLIGRAM(S): 50 CAPSULE, EXTENDED RELEASE ORAL at 04:36

## 2020-12-31 NOTE — PROGRESS NOTE ADULT - SUBJECTIVE AND OBJECTIVE BOX
Subjective: Patient waiting to be discharged and wants to go to her Pain Management Visit.     MEDICATIONS  (STANDING):  acetaminophen   Tablet .. 650 milliGRAM(s) Oral every 8 hours  ALBUTerol    90 MICROgram(s) HFA Inhaler 1 Puff(s) Inhalation every 4 hours  aprepitant 40 milliGRAM(s) Oral once  budesonide  80 MICROgram(s)/formoterol 4.5 MICROgram(s) Inhaler 2 Puff(s) Inhalation two times a day  celecoxib 100 milliGRAM(s) Oral two times a day  chlorhexidine 2% Cloths 1 Application(s) Topical once  chlorhexidine 4% Liquid 1 Application(s) Topical <User Schedule>  diazepam    Tablet 5 milliGRAM(s) Oral two times a day  hydroxychloroquine 200 milliGRAM(s) Oral two times a day  levothyroxine 25 MICROGram(s) Oral daily  morphine ER Tablet 15 milliGRAM(s) Oral every 12 hours  nicotine -   7 mG/24Hr(s) Patch 1 patch Transdermal daily  pantoprazole  Injectable 40 milliGRAM(s) IV Push daily  polyethylene glycol 3350 17 Gram(s) Oral daily  senna 2 Tablet(s) Oral at bedtime  sodium chloride 0.45%. 1000 milliLiter(s) (100 mL/Hr) IV Continuous <Continuous>  tiotropium 18 MICROgram(s) Capsule 1 Capsule(s) Inhalation daily  vancomycin  IVPB 1000 milliGRAM(s) IV Intermittent every 12 hours    MEDICATIONS  (PRN):  ALBUTerol    90 MICROgram(s) HFA Inhaler 2 Puff(s) Inhalation every 6 hours PRN Shortness of Breath and/or Wheezing  albuterol/ipratropium for Nebulization 3 milliLiter(s) Nebulizer every 6 hours PRN Shortness of Breath and/or Wheezing  lidocaine 2% Viscous 5 milliLiter(s) Swish and Spit every 4 hours PRN mouth pain  morphine  - Injectable 4 milliGRAM(s) IV Push every 3 hours PRN Severe Pain (7 - 10)  morphine  IR 15 milliGRAM(s) Oral every 3 hours PRN Moderate Pain (4 - 6)  ondansetron Injectable 4 milliGRAM(s) IV Push every 6 hours PRN Nausea and/or Vomiting  sodium chloride 0.9% lock flush 10 milliLiter(s) IV Push every 1 hour PRN Pre/post blood products, medications, blood draw, and to maintain line patency      Allergies    Vibramycin (Unknown)    Intolerances    aspirin (Stomach Upset)  Zithromax (Stomach Upset)      Vital Signs Last 24 Hrs  T(C): 36.8 (31 Dec 2020 08:02), Max: 36.9 (30 Dec 2020 18:02)  T(F): 98.3 (31 Dec 2020 08:02), Max: 98.4 (30 Dec 2020 18:02)  HR: 68 (31 Dec 2020 08:02) (62 - 87)  BP: 100/59 (31 Dec 2020 08:02) (80/45 - 124/63)  BP(mean): --  RR: 16 (31 Dec 2020 08:02) (16 - 18)  SpO2: 98% (31 Dec 2020 08:02) (95% - 100%)    PHYSICAL EXAM:  GENERAL: NAD, well-groomed, well-developed  HEAD:  Atraumatic, Normocephalic  ENMT: Moist mucous membranes,   NECK: Supple, No JVD, Normal thyroid  NERVOUS SYSTEM:  All 4 extremities mobile, no gross sensory deficits.   CHEST/LUNG: Clear to auscultation bilaterally; No rales, rhonchi, wheezing, or rubs  HEART: Regular rate and rhythm; No murmurs, rubs, or gallops  ABDOMEN: Soft, Nontender, Nondistended; Bowel sounds present  EXTREMITIES:  2+ Peripheral Pulses, No clubbing, cyanosis, or edema      LABS:                        10.8   11.79 )-----------( 434      ( 31 Dec 2020 06:43 )             32.3     31 Dec 2020 06:43    134    |  100    |  16     ----------------------------<  107    4.4     |  26     |  0.63     Ca    8.5        31 Dec 2020 06:43  Mg     2.5       31 Dec 2020 06:43          CAPILLARY BLOOD GLUCOSE          RADIOLOGY & ADDITIONAL TESTS:    Imaging Personally Reviewed:  [ ] YES     Consultant(s) Notes Reviewed:      Care Discussed with Consultants/Other Providers:    Advanced Directives: [ ] DNR  [ ] No feeding tube  [ ] MOLST in chart  [ ] MOLST completed today  [ ] Unknown

## 2020-12-31 NOTE — PROGRESS NOTE ADULT - NUTRITIONAL ASSESSMENT
This patient has been assessed with a concern for Malnutrition and has been determined to have a diagnosis/diagnoses of Moderate protein-calorie malnutrition and Underweight (BMI < 19).    This patient is being managed with:   Diet Regular-  Supplement Feeding Modality:  Oral  Ensure Enlive Cans or Servings Per Day:  1       Frequency:  Two Times a day  Entered: Dec 28 2020  4:48PM    Diet Regular-  Entered: Dec 28 2020  4:28PM    The following pending diet order is being considered for treatment of Moderate protein-calorie malnutrition and Underweight (BMI < 19):null

## 2020-12-31 NOTE — PROGRESS NOTE ADULT - ASSESSMENT
Pt is a 55W w/ PMHx of Paroxysmal SVT, Chronic neck pain and low back pain with sciatica, Raynaud's syndrome, Emphysema/COPD(home o2 prn), Migraine, Anxiety, Hepatitis C, Hypothyroidism, GERD, Sjogren's disease, Lupus, s/p Nirschl repair of L elbow for lateral epicondylitis on 10/02/20, s/p Nirschl repair of R elbow for lateral epicondylitis on 11/20/20 by Dr. Durand, p/w R elbow pain with associated swelling and redness x 1 week, s/p 2 drainages on amoxicillin. Admitted for repeat I&D.     R elbow abscess s/p I&D in the OR  Possible bone/joint involvement  -s/p I&D 12/28  -surgical site cx FEW S. Aureus, will likely be MRSA  -BCx NGTD  Previous outpatient cx+ MRSA--will use this to guide therapy  I discussed intra-op findings w/ Dr. Durand; given extension of infection into the joint space, will have to treat w/ prolonged therapy  -will c/w vancomycin 1gm q12h  Level 20.7, therapeutic. Pt can be discharged on above dose.   Script left in patient's chart  Pt will need 6 wks IV therapy until 2/8/2020  Pt will need weekly labs -- BMP, CBC, LFTs, ESR, CRP, vanc level -- to be faxed to me Dr. Nunez, [phone].

## 2020-12-31 NOTE — PROGRESS NOTE ADULT - REASON FOR ADMISSION
abscess right elbow

## 2020-12-31 NOTE — PROGRESS NOTE ADULT - ASSESSMENT
55F with Sjogren's disease, Lupus on Plaquenil admitted for Right Elbow Abscess/Cellulitis requiring I&D.     Right elbow abscess/cellulitis S/P I&D POD 3  Continue vancomycin BID at home; Levels therapeutic   Intraoperative Cultures NTD but outpatient cultures showing MRSA  IV Abx until 2/8/2020 via PICC Line  ID (Dr. Nunez)    COPD/Emphysema  Ventolin and Symbicort  Continues to be hypoxic and refuses to wear NC even though O2 Saturations at times in 70's  Was told and counseled     Parosysmal SVT  Continue Telemetry monitoring  BP is soft  Cardiology (Dr. Newman)    Opioid Dependance  Chronic neck and low back pain  Morphine ER and IR    Raynaud's syndrome/Sjogren's disease/Lupus  Hydroxychloroquine    Migraine/Anxiety  Fioricet PRN  Valium    Hypothyroidism  Levothyroxine    GERD  Protonix    DVT Prophylaxis   SCD    Patient had abnormal hole near to epiglottis, as per anesthesia, pic was sent to  (ENT) and he will scope her in his office. Patient is advise to make an appointment as soon as she is discharged.  Patient to be discharged home today

## 2020-12-31 NOTE — PROGRESS NOTE ADULT - SUBJECTIVE AND OBJECTIVE BOX
[facility]thopedic P.A.- POD# 3 - s/p I & D right elbow and s/p PICC insertion 12/30    Patient alert and comfortable and OOB walking around anxious to go home.  Denies elbow pain or nausea.    Vital Signs Last 24 Hrs  T(C): 36.8 (31 Dec 2020 08:02), Max: 36.9 (30 Dec 2020 18:02)  T(F): 98.3 (31 Dec 2020 08:02), Max: 98.4 (30 Dec 2020 18:02)  HR: 68 (31 Dec 2020 08:02) (62 - 87)  BP: 100/59 (31 Dec 2020 08:02) (80/45 - 124/63)  BP(mean): --  RR: 16 (31 Dec 2020 08:02) (16 - 18)  SpO2: 98% (31 Dec 2020 08:02) (95% - 100%)         I&O's Detail    30 Dec 2020 07:01  -  31 Dec 2020 07:00  --------------------------------------------------------  IN:    IV PiggyBack: 300 mL    Oral Fluid: 1080 mL    sodium chloride 0.45%: 1900 mL  Total IN: 3280 mL    OUT:    Estimated Blood Loss (mL): 2 mL in OR  Total OUT: 2 mL    Total NET: 3278 mL      31 Dec 2020 07:01  -  31 Dec 2020 10:36  --------------------------------------------------------  IN:    Oral Fluid: 200 mL  Total IN: 200 mL    OUT:  Total OUT: 0 mL    Total NET: 200 mL                     Labs:                                                                   10.8<L>  11.79<H> )-----------( 434<H>    ( 31 Dec 2020 06:43 )             32.3<L>  31 Dec 2020 06:43                                31 Dec 2020 06:43    134<L>  |  100    |  16     ----------------------------<  107<H>  4.4     |  26     |  0.63     Ca    8.5        31 Dec 2020 06:43  Mg     2.5       31 Dec 2020 06:43        MEDICATIONS:acetaminophen   Tablet .. 650 milliGRAM(s) Oral every 8 hours  ALBUTerol    90 MICROgram(s) HFA Inhaler 1 Puff(s) Inhalation every 4 hours  ALBUTerol    90 MICROgram(s) HFA Inhaler 2 Puff(s) Inhalation every 6 hours PRN  albuterol/ipratropium for Nebulization 3 milliLiter(s) Nebulizer every 6 hours PRN  aprepitant 40 milliGRAM(s) Oral once  budesonide  80 MICROgram(s)/formoterol 4.5 MICROgram(s) Inhaler 2 Puff(s) Inhalation two times a day  celecoxib 100 milliGRAM(s) Oral two times a day  chlorhexidine 2% Cloths 1 Application(s) Topical once  chlorhexidine 4% Liquid 1 Application(s) Topical <User Schedule>  diazepam    Tablet 5 milliGRAM(s) Oral two times a day  hydroxychloroquine 200 milliGRAM(s) Oral two times a day  levothyroxine 25 MICROGram(s) Oral daily  lidocaine 2% Viscous 5 milliLiter(s) Swish and Spit every 4 hours PRN  morphine  - Injectable 4 milliGRAM(s) IV Push every 3 hours PRN  morphine  IR 15 milliGRAM(s) Oral every 3 hours PRN  morphine ER Tablet 15 milliGRAM(s) Oral every 12 hours  nicotine -   7 mG/24Hr(s) Patch 1 patch Transdermal daily  ondansetron Injectable 4 milliGRAM(s) IV Push every 6 hours PRN  pantoprazole  Injectable 40 milliGRAM(s) IV Push daily  polyethylene glycol 3350 17 Gram(s) Oral daily  senna 2 Tablet(s) Oral at bedtime  sodium chloride 0.45%. 1000 milliLiter(s) IV Continuous <Continuous>  sodium chloride 0.9% lock flush 10 milliLiter(s) IV Push every 1 hour PRN  tiotropium 18 MICROgram(s) Capsule 1 Capsule(s) Inhalation daily  vancomycin  IVPB 1000 milliGRAM(s) IV Intermittent every 12 hours    Anticoagulation: OOB, PAS when in bed      Antibiotics:   hydroxychloroquine 200 milliGRAM(s) Oral two times a day  vancomycin  IVPB 1000 milliGRAM(s) IV Intermittent every 12 hours      Pain medications:   acetaminophen   Tablet .. 650 milliGRAM(s) Oral every 8 hours  aprepitant 40 milliGRAM(s) Oral once  celecoxib 100 milliGRAM(s) Oral two times a day  diazepam    Tablet 5 milliGRAM(s) Oral two times a day  morphine  - Injectable 4 milliGRAM(s) IV Push every 3 hours PRN  morphine  IR 15 milliGRAM(s) Oral every 3 hours PRN  morphine ER Tablet 15 milliGRAM(s) Oral every 12 hours  ondansetron Injectable 4 milliGRAM(s) IV Push every 6 hours PRN                                      Physical Exam: Right UE- right elbow ace bandage and soft dressing dry and intact.   Neurovascular grossly intact throughout.  PAS on LE's.  Calves soft and non-tender.                                                                                                                                                          A/P:  Orthopedically stable.  -Discharge home today with 6 weeks of vancomycin 1g every 12 hours until 2/8/21 with weekly labs as per ID (Dr. Tricia Nunez).  -Continue pain management with above plan; patient under care of pain management physician and has appointment this afternoon.  -Dr. Clay for medical clearance for dischage home today.  -Discharge planning for Home today with Home care nurse for long term antibiotics. Keep elbow dressing dry and intact as per surgeon  -Follow up with Dr. Micheal Durand, orthopedic surgeon on Monday 1/4/21 in his [place] office.  -Further plan as per attendings.

## 2020-12-31 NOTE — PROGRESS NOTE ADULT - ATTENDING COMMENTS
Infectious Diseases will continue to follow. Please call with any questions.   Tricia Nunez M.D.  [facility], Division of Infectious Diseases [phone]  For over the weekend and after hours, please call [phone]
Infectious Diseases will sign off at this time, please call with any questions  Tricia Nunez M.D.  [facility], Division of Infectious Diseases [phone]  For over the weekend and after hours, please call [phone]
Infectious Diseases will continue to follow. Please call with any questions.   Tricia Nunez M.D.  [facility], Division of Infectious Diseases [phone]  For over the weekend and after hours, please call [phone]

## 2020-12-31 NOTE — PROGRESS NOTE ADULT - SUBJECTIVE AND OBJECTIVE BOX
Chief Complaint: Right elbow abscess    Interval Events: No events overnight.    Review of Systems:  General: No fevers, chills, weight loss or gain  Skin: No rashes, color changes  Cardiovascular: No chest pain, orthopnea  Respiratory: No shortness of breath, cough  Gastrointestinal: No nausea, abdominal pain  Genitourinary: No incontinence, pain with urination  Musculoskeletal: No pain, swelling, decreased range of motion  Neurological: No headache, weakness  Psychiatric: No depression, anxiety  Endocrine: No weight loss or gain, increased thirst  All other systems are comprehensively negative.    Physical Exam:  Vital Signs Last 24 Hrs  T(C): 36.8 (31 Dec 2020 08:02), Max: 36.9 (30 Dec 2020 18:02)  T(F): 98.3 (31 Dec 2020 08:02), Max: 98.4 (30 Dec 2020 18:02)  HR: 68 (31 Dec 2020 08:02) (62 - 87)  BP: 100/59 (31 Dec 2020 08:02) (80/45 - 124/63)  BP(mean): --  RR: 16 (31 Dec 2020 08:02) (16 - 18)  SpO2: 98% (31 Dec 2020 08:02) (95% - 100%)  General: NAD  HEENT: MMM  Neck: No JVD, no carotid bruit  Lungs: CTAB  CV: RRR, nl S1/S2, no M/R/G  Abdomen: S/NT/ND, +BS  Extremities: No LE edema, no cyanosis  Neuro: AAOx3, non-focal  Skin: No rash    Labs:             12-31    134<L>  |  100  |  16  ----------------------------<  107<H>  4.4   |  26  |  0.63    Ca    8.5      31 Dec 2020 06:43  Mg     2.5     12-31                          10.8   11.79 )-----------( 434      ( 31 Dec 2020 06:43 )             32.3         Telemetry: Sinus rhythm, atrial runs

## 2020-12-31 NOTE — PROGRESS NOTE ADULT - SUBJECTIVE AND OBJECTIVE BOX
[facility], Division of Infectious Diseases  YOUNG [place] Y. Patel, S. Shah  [phone]    Name: DOUGLAS AKINS  Age: 55y  Gender: Female  MRN: [id number]  Note Date: 12-31-20    Interval History:  Patient seen and examined at bedside this morning  No acute overnight events. Ready to head home. No complaints  Notes reviewed    Antibiotics:  hydroxychloroquine 200 milliGRAM(s) Oral two times a day  vancomycin  IVPB 1000 milliGRAM(s) IV Intermittent every 12 hours      Medications:  acetaminophen   Tablet .. 650 milliGRAM(s) Oral every 8 hours  ALBUTerol    90 MICROgram(s) HFA Inhaler 2 Puff(s) Inhalation every 6 hours PRN  ALBUTerol    90 MICROgram(s) HFA Inhaler 1 Puff(s) Inhalation every 4 hours  albuterol/ipratropium for Nebulization 3 milliLiter(s) Nebulizer every 6 hours PRN  aprepitant 40 milliGRAM(s) Oral once  budesonide  80 MICROgram(s)/formoterol 4.5 MICROgram(s) Inhaler 2 Puff(s) Inhalation two times a day  celecoxib 100 milliGRAM(s) Oral two times a day  chlorhexidine 2% Cloths 1 Application(s) Topical once  chlorhexidine 4% Liquid 1 Application(s) Topical <User Schedule>  diazepam    Tablet 5 milliGRAM(s) Oral two times a day  hydroxychloroquine 200 milliGRAM(s) Oral two times a day  levothyroxine 25 MICROGram(s) Oral daily  lidocaine 2% Viscous 5 milliLiter(s) Swish and Spit every 4 hours PRN  morphine  - Injectable 4 milliGRAM(s) IV Push every 3 hours PRN  morphine  IR 15 milliGRAM(s) Oral every 3 hours PRN  morphine ER Tablet 15 milliGRAM(s) Oral every 12 hours  nicotine -   7 mG/24Hr(s) Patch 1 patch Transdermal daily  ondansetron Injectable 4 milliGRAM(s) IV Push every 6 hours PRN  pantoprazole  Injectable 40 milliGRAM(s) IV Push daily  polyethylene glycol 3350 17 Gram(s) Oral daily  senna 2 Tablet(s) Oral at bedtime  sodium chloride 0.45%. 1000 milliLiter(s) IV Continuous <Continuous>  sodium chloride 0.9% lock flush 10 milliLiter(s) IV Push every 1 hour PRN  tiotropium 18 MICROgram(s) Capsule 1 Capsule(s) Inhalation daily  vancomycin  IVPB 1000 milliGRAM(s) IV Intermittent every 12 hours      Review of Systems:  A 10-point review of systems was obtained.     Pertinent positives and negatives--  Constitutional: No fevers. No Chills. No Rigors.   Cardiovascular: No chest pain. No palpitations.  Respiratory: No shortness of breath. No cough.  Gastrointestinal: No nausea or vomiting. No diarrhea or constipation.   Psychiatric: Pleasant. Appropriate affect.    Review of systems otherwise negative except as previously noted.    Allergies: Vibramycin (Unknown)    For details regarding the patient's past medical history, social history, family history, and other miscellaneous elements, please refer the initial infectious diseases consultation and/or the admitting history and physical examination for this admission.    Objective:  Vitals:   T(C): 36.8 (12-31-20 @ 08:02), Max: 36.9 (12-30-20 @ 18:02)  HR: 68 (12-31-20 @ 08:02) (62 - 87)  BP: 100/59 (12-31-20 @ 08:02) (80/45 - 124/63)  RR: 16 (12-31-20 @ 08:02) (16 - 18)  SpO2: 98% (12-31-20 @ 08:02) (95% - 100%)    Physical Examination:  General: no acute distress  HEENT: NC/AT, EOMI, anicteric, no oral lesions  Neck: supple, no palpable LAD  Cardio: S1, S2 heard, RRR, no murmurs  Resp: breath sounds heard bilaterally, no rales, wheezes or rhonchi  Abd: soft, NT, ND, + bowel sounds  Neuro: AAOx3, no obvious focal deficits  Ext: no edema or cyanosis  Skin: warm, dry, no visible rash  Lines: PICC    Laboratory Studies:  CBC:                       10.8   11.79 )-----------( 434      ( 31 Dec 2020 06:43 )             32.3     CMP: 12-31    134<L>  |  100  |  16  ----------------------------<  107<H>  4.4   |  26  |  0.63    Ca    8.5      31 Dec 2020 06:43  Mg     2.5     12-31          Microbiology: reviewed    Culture - Surgical Swab (collected 12-28-20 @ 16:40)  Source: .Surgical Swab Right Elbow Surgical Swab  Preliminary Report (12-29-20 @ 11:58):    No growth    Culture - Surgical Swab (collected 12-28-20 @ 16:40)  Source: .Surgical Swab Right Elbow Surgical Swab  Preliminary Report (12-29-20 @ 11:56):    No growth    Culture - Surgical Swab (collected 12-28-20 @ 16:39)  Source: .Surgical Swab Right Elbow Surgical Swab  Preliminary Report (12-30-20 @ 12:33):    Rare Staphylococcus aureus Susceptibility to follow.    Culture - Blood (collected 12-28-20 @ 00:38)  Source: .Blood Blood  Preliminary Report (12-29-20 @ 01:02):    No growth to date.    Culture - Blood (collected 12-28-20 @ 00:38)  Source: .Blood Blood  Preliminary Report (12-29-20 @ 01:02):    No growth to date.        Radiology: reviewed

## 2020-12-31 NOTE — PROGRESS NOTE ADULT - ASSESSMENT
The patient is a 55 year old female with a history of hypotension, hypothyroidism, SLE, Sjogren's, COPD on home O2, HCV, SVT s/p ablation who presents for I&D of arm.    Plan:  - Blood pressure chronically runs on the low side  - CXR with hyperinflated lungs but otherwise clear  - SVT - underwent ablation. On no rate controlling agents due to low BPs.  - Telemetry with brief atrial runs - these are not NSVT  - Discontinue telemetry if pulse ox not needed  - On vancomycin  - ID and ortho follow-up

## 2021-01-02 LAB
CULTURE RESULTS: SIGNIFICANT CHANGE UP
ORGANISM # SPEC MICROSCOPIC CNT: SIGNIFICANT CHANGE UP
ORGANISM # SPEC MICROSCOPIC CNT: SIGNIFICANT CHANGE UP
SPECIMEN SOURCE: SIGNIFICANT CHANGE UP

## 2021-01-10 NOTE — ASU PREOP CHECKLIST - LAST TOOK
CONSTITUTIONAL: Well-appearing; well-nourished; in no apparent distress.   	HEAD: Normocephalic; atraumatic.   	EYES:  conjunctiva and sclera clear  	ENT: normal nose; no rhinorrhea; normal pharynx with no erythema or lesions.   	NECK: Supple; non-tender;   	CARDIOVASCULAR: Normal S1, S2; no murmurs, rubs, or gallops. Regular rate and rhythm.   	RESPIRATORY: Breathing easily; breath sounds clear and equal bilaterally; no wheezes, rhonchi, or rales.  	GI: Soft; non-distended; non-tender  Rectal: Paddy PCT present  no visible hemorrhoids/masses. no active bleeding  no rectal vault tenderness. +dried blood on glove.   	EXT: No cyanosis or edema; N/V intact  	SKIN: Normal for age and race; warm; dry; good turgor; no apparent lesions or rash.   	NEURO: A & O x 3; face symmetric; grossly unremarkable.   PSYCHOLOGICAL: The patient’s mood and manner are appropriate. solids

## 2021-01-20 PROCEDURE — 83605 ASSAY OF LACTIC ACID: CPT

## 2021-01-20 PROCEDURE — 86140 C-REACTIVE PROTEIN: CPT

## 2021-01-20 PROCEDURE — 93005 ELECTROCARDIOGRAM TRACING: CPT

## 2021-01-20 PROCEDURE — C1751: CPT

## 2021-01-20 PROCEDURE — 36415 COLL VENOUS BLD VENIPUNCTURE: CPT

## 2021-01-20 PROCEDURE — 80076 HEPATIC FUNCTION PANEL: CPT

## 2021-01-20 PROCEDURE — 86900 BLOOD TYPING SEROLOGIC ABO: CPT

## 2021-01-20 PROCEDURE — 87070 CULTURE OTHR SPECIMN AEROBIC: CPT

## 2021-01-20 PROCEDURE — 96365 THER/PROPH/DIAG IV INF INIT: CPT

## 2021-01-20 PROCEDURE — 36573 INSJ PICC RS&I 5 YR+: CPT

## 2021-01-20 PROCEDURE — 80048 BASIC METABOLIC PNL TOTAL CA: CPT

## 2021-01-20 PROCEDURE — 80053 COMPREHEN METABOLIC PANEL: CPT

## 2021-01-20 PROCEDURE — 85730 THROMBOPLASTIN TIME PARTIAL: CPT

## 2021-01-20 PROCEDURE — 83735 ASSAY OF MAGNESIUM: CPT

## 2021-01-20 PROCEDURE — 85610 PROTHROMBIN TIME: CPT

## 2021-01-20 PROCEDURE — 94664 DEMO&/EVAL PT USE INHALER: CPT

## 2021-01-20 PROCEDURE — 86850 RBC ANTIBODY SCREEN: CPT

## 2021-01-20 PROCEDURE — 99285 EMERGENCY DEPT VISIT HI MDM: CPT

## 2021-01-20 PROCEDURE — 80202 ASSAY OF VANCOMYCIN: CPT

## 2021-01-20 PROCEDURE — 87040 BLOOD CULTURE FOR BACTERIA: CPT

## 2021-01-20 PROCEDURE — 85025 COMPLETE CBC W/AUTO DIFF WBC: CPT

## 2021-01-20 PROCEDURE — 86901 BLOOD TYPING SEROLOGIC RH(D): CPT

## 2021-01-20 PROCEDURE — 94640 AIRWAY INHALATION TREATMENT: CPT

## 2021-01-20 PROCEDURE — 96375 TX/PRO/DX INJ NEW DRUG ADDON: CPT

## 2021-01-20 PROCEDURE — 86769 SARS-COV-2 COVID-19 ANTIBODY: CPT

## 2021-01-20 PROCEDURE — 85652 RBC SED RATE AUTOMATED: CPT

## 2021-01-20 PROCEDURE — 76937 US GUIDE VASCULAR ACCESS: CPT

## 2021-01-20 PROCEDURE — U0003: CPT

## 2021-01-20 PROCEDURE — 71045 X-RAY EXAM CHEST 1 VIEW: CPT

## 2021-01-20 PROCEDURE — 87186 SC STD MICRODIL/AGAR DIL: CPT

## 2021-01-20 PROCEDURE — 76000 FLUOROSCOPY <1 HR PHYS/QHP: CPT

## 2021-01-21 NOTE — DISCHARGE NOTE ADULT - NS AS ACTIVITY OBS
Other Specify
No Heavy lifting/straining/Walking-Indoors allowed/Walking-Outdoors allowed/Stairs allowed/Do not drive or operate machinery/Do not make important decisions/Bathing allowed/Showering allowed

## 2021-03-08 ENCOUNTER — OUTPATIENT (OUTPATIENT)
Dept: OUTPATIENT SERVICES | Facility: HOSPITAL | Age: 56
LOS: 1 days | End: 2021-03-08
Payer: MEDICARE

## 2021-03-08 DIAGNOSIS — K82.9 DISEASE OF GALLBLADDER, UNSPECIFIED: Chronic | ICD-10-CM

## 2021-03-08 DIAGNOSIS — Z20.828 CONTACT WITH AND (SUSPECTED) EXPOSURE TO OTHER VIRAL COMMUNICABLE DISEASES: ICD-10-CM

## 2021-03-08 DIAGNOSIS — Z98.890 OTHER SPECIFIED POSTPROCEDURAL STATES: Chronic | ICD-10-CM

## 2021-03-08 DIAGNOSIS — J38.1 POLYP OF VOCAL CORD AND LARYNX: Chronic | ICD-10-CM

## 2021-03-08 DIAGNOSIS — S69.91XD UNSPECIFIED INJURY OF RIGHT WRIST, HAND AND FINGER(S), SUBSEQUENT ENCOUNTER: Chronic | ICD-10-CM

## 2021-03-08 DIAGNOSIS — M25.9 JOINT DISORDER, UNSPECIFIED: Chronic | ICD-10-CM

## 2021-03-08 DIAGNOSIS — Z95.818 PRESENCE OF OTHER CARDIAC IMPLANTS AND GRAFTS: Chronic | ICD-10-CM

## 2021-03-08 LAB — SARS-COV-2 RNA SPEC QL NAA+PROBE: SIGNIFICANT CHANGE UP

## 2021-03-08 PROCEDURE — U0005: CPT

## 2021-03-08 PROCEDURE — U0003: CPT

## 2021-03-11 ENCOUNTER — OUTPATIENT (OUTPATIENT)
Dept: OUTPATIENT SERVICES | Facility: HOSPITAL | Age: 56
LOS: 1 days | End: 2021-03-11
Payer: MEDICARE

## 2021-03-11 DIAGNOSIS — J38.1 POLYP OF VOCAL CORD AND LARYNX: Chronic | ICD-10-CM

## 2021-03-11 DIAGNOSIS — Z98.890 OTHER SPECIFIED POSTPROCEDURAL STATES: Chronic | ICD-10-CM

## 2021-03-11 DIAGNOSIS — K82.9 DISEASE OF GALLBLADDER, UNSPECIFIED: Chronic | ICD-10-CM

## 2021-03-11 DIAGNOSIS — M25.9 JOINT DISORDER, UNSPECIFIED: Chronic | ICD-10-CM

## 2021-03-11 DIAGNOSIS — M54.16 RADICULOPATHY, LUMBAR REGION: ICD-10-CM

## 2021-03-11 DIAGNOSIS — S69.91XD UNSPECIFIED INJURY OF RIGHT WRIST, HAND AND FINGER(S), SUBSEQUENT ENCOUNTER: Chronic | ICD-10-CM

## 2021-03-11 DIAGNOSIS — Z95.818 PRESENCE OF OTHER CARDIAC IMPLANTS AND GRAFTS: Chronic | ICD-10-CM

## 2021-03-11 PROCEDURE — 62323 NJX INTERLAMINAR LMBR/SAC: CPT

## 2021-03-20 ENCOUNTER — OUTPATIENT (OUTPATIENT)
Dept: OUTPATIENT SERVICES | Facility: HOSPITAL | Age: 56
LOS: 1 days | End: 2021-03-20
Payer: MEDICARE

## 2021-03-20 DIAGNOSIS — S69.91XD UNSPECIFIED INJURY OF RIGHT WRIST, HAND AND FINGER(S), SUBSEQUENT ENCOUNTER: Chronic | ICD-10-CM

## 2021-03-20 DIAGNOSIS — J38.1 POLYP OF VOCAL CORD AND LARYNX: Chronic | ICD-10-CM

## 2021-03-20 DIAGNOSIS — Z98.890 OTHER SPECIFIED POSTPROCEDURAL STATES: Chronic | ICD-10-CM

## 2021-03-20 DIAGNOSIS — K82.9 DISEASE OF GALLBLADDER, UNSPECIFIED: Chronic | ICD-10-CM

## 2021-03-20 DIAGNOSIS — M25.9 JOINT DISORDER, UNSPECIFIED: Chronic | ICD-10-CM

## 2021-03-20 DIAGNOSIS — Z20.828 CONTACT WITH AND (SUSPECTED) EXPOSURE TO OTHER VIRAL COMMUNICABLE DISEASES: ICD-10-CM

## 2021-03-20 DIAGNOSIS — Z95.818 PRESENCE OF OTHER CARDIAC IMPLANTS AND GRAFTS: Chronic | ICD-10-CM

## 2021-03-20 LAB — SARS-COV-2 RNA SPEC QL NAA+PROBE: SIGNIFICANT CHANGE UP

## 2021-03-20 PROCEDURE — U0005: CPT

## 2021-03-20 PROCEDURE — U0003: CPT

## 2021-03-22 ENCOUNTER — OUTPATIENT (OUTPATIENT)
Dept: OUTPATIENT SERVICES | Facility: HOSPITAL | Age: 56
LOS: 1 days | End: 2021-03-22
Payer: MEDICARE

## 2021-03-22 DIAGNOSIS — Z98.890 OTHER SPECIFIED POSTPROCEDURAL STATES: Chronic | ICD-10-CM

## 2021-03-22 DIAGNOSIS — M54.16 RADICULOPATHY, LUMBAR REGION: ICD-10-CM

## 2021-03-22 DIAGNOSIS — J38.1 POLYP OF VOCAL CORD AND LARYNX: Chronic | ICD-10-CM

## 2021-03-22 DIAGNOSIS — M25.9 JOINT DISORDER, UNSPECIFIED: Chronic | ICD-10-CM

## 2021-03-22 DIAGNOSIS — K82.9 DISEASE OF GALLBLADDER, UNSPECIFIED: Chronic | ICD-10-CM

## 2021-03-22 DIAGNOSIS — S69.91XD UNSPECIFIED INJURY OF RIGHT WRIST, HAND AND FINGER(S), SUBSEQUENT ENCOUNTER: Chronic | ICD-10-CM

## 2021-03-22 DIAGNOSIS — Z95.818 PRESENCE OF OTHER CARDIAC IMPLANTS AND GRAFTS: Chronic | ICD-10-CM

## 2021-03-22 PROCEDURE — 62323 NJX INTERLAMINAR LMBR/SAC: CPT

## 2021-04-14 ENCOUNTER — OUTPATIENT (OUTPATIENT)
Dept: OUTPATIENT SERVICES | Facility: HOSPITAL | Age: 56
LOS: 1 days | End: 2021-04-14
Payer: MEDICARE

## 2021-04-14 VITALS
TEMPERATURE: 98 F | OXYGEN SATURATION: 98 % | HEART RATE: 75 BPM | WEIGHT: 78.04 LBS | HEIGHT: 56 IN | SYSTOLIC BLOOD PRESSURE: 99 MMHG | DIASTOLIC BLOOD PRESSURE: 59 MMHG | RESPIRATION RATE: 20 BRPM

## 2021-04-14 DIAGNOSIS — Z98.890 OTHER SPECIFIED POSTPROCEDURAL STATES: Chronic | ICD-10-CM

## 2021-04-14 DIAGNOSIS — M25.9 JOINT DISORDER, UNSPECIFIED: Chronic | ICD-10-CM

## 2021-04-14 DIAGNOSIS — K14.0 GLOSSITIS: ICD-10-CM

## 2021-04-14 DIAGNOSIS — R49.0 DYSPHONIA: ICD-10-CM

## 2021-04-14 DIAGNOSIS — K82.9 DISEASE OF GALLBLADDER, UNSPECIFIED: Chronic | ICD-10-CM

## 2021-04-14 DIAGNOSIS — S69.91XD UNSPECIFIED INJURY OF RIGHT WRIST, HAND AND FINGER(S), SUBSEQUENT ENCOUNTER: Chronic | ICD-10-CM

## 2021-04-14 DIAGNOSIS — Z01.818 ENCOUNTER FOR OTHER PREPROCEDURAL EXAMINATION: ICD-10-CM

## 2021-04-14 DIAGNOSIS — F17.200 NICOTINE DEPENDENCE, UNSPECIFIED, UNCOMPLICATED: ICD-10-CM

## 2021-04-14 DIAGNOSIS — Z87.19 PERSONAL HISTORY OF OTHER DISEASES OF THE DIGESTIVE SYSTEM: ICD-10-CM

## 2021-04-14 DIAGNOSIS — Z95.818 PRESENCE OF OTHER CARDIAC IMPLANTS AND GRAFTS: Chronic | ICD-10-CM

## 2021-04-14 DIAGNOSIS — J38.1 POLYP OF VOCAL CORD AND LARYNX: Chronic | ICD-10-CM

## 2021-04-14 DIAGNOSIS — K14.3 HYPERTROPHY OF TONGUE PAPILLAE: ICD-10-CM

## 2021-04-14 LAB
ALBUMIN SERPL ELPH-MCNC: 3.6 G/DL — SIGNIFICANT CHANGE UP (ref 3.3–5)
ALP SERPL-CCNC: 108 U/L — SIGNIFICANT CHANGE UP (ref 40–120)
ALT FLD-CCNC: 27 U/L — SIGNIFICANT CHANGE UP (ref 12–78)
ANION GAP SERPL CALC-SCNC: 5 MMOL/L — SIGNIFICANT CHANGE UP (ref 5–17)
APTT BLD: 29.2 SEC — SIGNIFICANT CHANGE UP (ref 27.5–35.5)
AST SERPL-CCNC: 24 U/L — SIGNIFICANT CHANGE UP (ref 15–37)
BILIRUB SERPL-MCNC: 0.2 MG/DL — SIGNIFICANT CHANGE UP (ref 0.2–1.2)
BUN SERPL-MCNC: 12 MG/DL — SIGNIFICANT CHANGE UP (ref 7–23)
CALCIUM SERPL-MCNC: 8.3 MG/DL — LOW (ref 8.5–10.1)
CHLORIDE SERPL-SCNC: 98 MMOL/L — SIGNIFICANT CHANGE UP (ref 96–108)
CO2 SERPL-SCNC: 28 MMOL/L — SIGNIFICANT CHANGE UP (ref 22–31)
CREAT SERPL-MCNC: 0.7 MG/DL — SIGNIFICANT CHANGE UP (ref 0.5–1.3)
GLUCOSE SERPL-MCNC: 87 MG/DL — SIGNIFICANT CHANGE UP (ref 70–99)
HCT VFR BLD CALC: 35.4 % — SIGNIFICANT CHANGE UP (ref 34.5–45)
HGB BLD-MCNC: 12 G/DL — SIGNIFICANT CHANGE UP (ref 11.5–15.5)
INR BLD: 1 RATIO — SIGNIFICANT CHANGE UP (ref 0.88–1.16)
MCHC RBC-ENTMCNC: 32.2 PG — SIGNIFICANT CHANGE UP (ref 27–34)
MCHC RBC-ENTMCNC: 33.9 GM/DL — SIGNIFICANT CHANGE UP (ref 32–36)
MCV RBC AUTO: 94.9 FL — SIGNIFICANT CHANGE UP (ref 80–100)
NRBC # BLD: 0 /100 WBCS — SIGNIFICANT CHANGE UP (ref 0–0)
PLATELET # BLD AUTO: 296 K/UL — SIGNIFICANT CHANGE UP (ref 150–400)
POTASSIUM SERPL-MCNC: 4.1 MMOL/L — SIGNIFICANT CHANGE UP (ref 3.5–5.3)
POTASSIUM SERPL-SCNC: 4.1 MMOL/L — SIGNIFICANT CHANGE UP (ref 3.5–5.3)
PROT SERPL-MCNC: 6.5 G/DL — SIGNIFICANT CHANGE UP (ref 6–8.3)
PROTHROM AB SERPL-ACNC: 11.7 SEC — SIGNIFICANT CHANGE UP (ref 10.6–13.6)
RBC # BLD: 3.73 M/UL — LOW (ref 3.8–5.2)
RBC # FLD: 13.3 % — SIGNIFICANT CHANGE UP (ref 10.3–14.5)
SODIUM SERPL-SCNC: 131 MMOL/L — LOW (ref 135–145)
WBC # BLD: 11.76 K/UL — HIGH (ref 3.8–10.5)
WBC # FLD AUTO: 11.76 K/UL — HIGH (ref 3.8–10.5)

## 2021-04-14 PROCEDURE — 85730 THROMBOPLASTIN TIME PARTIAL: CPT

## 2021-04-14 PROCEDURE — 80053 COMPREHEN METABOLIC PANEL: CPT

## 2021-04-14 PROCEDURE — 85027 COMPLETE CBC AUTOMATED: CPT

## 2021-04-14 PROCEDURE — G0463: CPT

## 2021-04-14 PROCEDURE — 85610 PROTHROMBIN TIME: CPT

## 2021-04-14 PROCEDURE — 36415 COLL VENOUS BLD VENIPUNCTURE: CPT

## 2021-04-14 RX ORDER — BUDESONIDE AND FORMOTEROL FUMARATE DIHYDRATE 160; 4.5 UG/1; UG/1
2 AEROSOL RESPIRATORY (INHALATION)
Qty: 0 | Refills: 0 | DISCHARGE

## 2021-04-14 NOTE — H&P PST ADULT - NSICDXPASTSURGICALHX_GEN_ALL_CORE_FT
PAST SURGICAL HISTORY:  Ankle problem right ankle surgery    Gall bladder disease REMOVAL    H/O elbow surgery 10/2020 - LEFT elbow    H/O lumpectomy right shouler, axillary, both breasts head, right back    H/O squamous cell carcinoma excision toe    Injury of right wrist, subsequent encounter no surgery    S/P brain surgery 2018, also had gamma knife procedure-microvascular decompression- titanium plate in skull    S/P cryoablation of arrhythmia     Status post placement of implantable loop recorder     Vocal cord polyp REMOVAL

## 2021-04-14 NOTE — H&P PST ADULT - VENOUS THROMBOEMBOLISM CURRENT STATUS
(1) serious lung disease, including pneumonia (within 1 month)/(1) varicose veins/(1) other risk factor (includes escalating BMI, pack-years of smoking, diabetes requiring insulin, chemotherapy, female gender and length of surgery)/(2) malignancy (present or previous)

## 2021-04-14 NOTE — H&P PST ADULT - NSICDXPROBLEM_GEN_ALL_CORE_FT
PROBLEM DIAGNOSES  Problem: Glossitis  Assessment and Plan: PST: CBC,CMP,PT/PTT,.INR  Pulmonary clearance Dr. Silver  Cardiology clearance Dr. Machado  All preoperative instructions reviewed with patient who verbalized understanding.   Avoid all NSAID/ASA containing products as well as all herbal/vitamin supplements. Tylenol only for pain/headache.   Covid swab at [place] date TBD. Pt verbalized understanding.     Problem: Hypertrophy of tongue papillae  Assessment and Plan: see above    Problem: Dysphonia  Assessment and Plan: seen above    Problem: Nicotine dependence  Assessment and Plan: pt offered Tobacco sessation program. She is not willing to quit. Discussed the dangers of smoking and anesthia. pt verbalized understanding.

## 2021-04-14 NOTE — H&P PST ADULT - ASSESSMENT
This 57 yo female with a PMHX of Anxiety, Burning mouth syndrome ,Chronic low back pain with sciatica ,Chronic neck pain cervical bone fused through the disc, Dvt femoral (deep venous thrombosis) right arm, Emphysema/COPD on oxygen at night 3L NC, GERD (gastroesophageal reflux disease) gastritis, vomits often, Glossopharyngeal neuralgia syndrome s/p brain sx in 12/2018, H/O osteoporosis severe, H/O paroxysmal supraventricular tachycardia H/O Raynaud's syndrome Hepatitis C body cleared, History of IBS had blockage about 6 months ago-resolved with NG tube, colitis, History of seizure disorder last seizure 8 yrs ago, History of weight loss lost 20 pounds within the last year-unknown cause, Hypotension in past had syncope related to brain problem-better recently, Hypothyroidism ,Lupus Migraine, Neuropathy, Nicotine addiction Osteochondritis dissecans both ankles, Pain, wrist, right collapse, Personal history of skin cancer toe, Right shoulder pain   Sjogren's disease, Vertigo   presents to PST for a scheduled Microlaryngoscopy with vocal chord biopsy  with Dr Gay  on  4/21/21.

## 2021-04-14 NOTE — H&P PST ADULT - HISTORY OF PRESENT ILLNESS
Right ear pain, that comes and goes, sore throat on and off. Difficulty swallowing.  This 55 yo female with a PMHX of Anxiety, Burning mouth syndrome ,Chronic low back pain with sciatica ,Chronic neck pain cervical bone fused through the disc, Dvt femoral (deep venous thrombosis) right arm, Emphysema/COPD on oxygen at night 3L NC, GERD (gastroesophageal reflux disease) gastritis, vomits often, Glossopharyngeal neuralgia syndrome s/p brain sx in 12/2018, H/O osteoporosis severe, H/O paroxysmal supraventricular tachycardia H/O Raynaud's syndrome Hepatitis C body cleared, History of IBS had blockage about 6 months ago-resolved with NG tube, colitis, History of seizure disorder last seizure 8 yrs ago, History of weight loss lost 20 pounds within the last year-unknown cause, Hypotension in past had syncope related to brain problem-better recently, Hypothyroidism ,Lupus Migraine, Neuropathy, Nicotine addiction Osteochondritis dissecans both ankles, Pain, wrist, right collapse, Personal history of skin cancer toe, Right shoulder pain   Sjogren's disease, Vertigo   presents to [facility] for a scheduled Microlaryngoscopy with vocal chord biopsy  with Dr Gay  on  4/21/21. She has a history of heavy smoking and vocal chord polyps, Right ear pain, that comes and goes, sore throat on and off, difficulty swallowing.

## 2021-04-14 NOTE — H&P PST ADULT - NSICDXPASTMEDICALHX_GEN_ALL_CORE_FT
PAST MEDICAL HISTORY:  Anxiety     Burning mouth syndrome     Chronic low back pain with sciatica     Chronic neck pain cervical bone fused throught disc    Dvt femoral (deep venous thrombosis) right arm, past    Emphysema/COPD on oxygen at night 3L NC    GERD (gastroesophageal reflux disease) gastritis, vomits often    Glossopharyngeal neuralgia syndrome s/p brain sx in 12/2018    H/O osteoporosis severe, was on forteo    H/O paroxysmal supraventricular tachycardia     H/O Raynaud's syndrome     Hepatitis C body cleared    History of IBS had blockage about 6 months ago-resolved with NG tube, colitis    History of seizure disorder last seizure 8 yrs ago    History of weight loss lost 20 pounds within the last year-unknown cause    Hypotension in past had syncope related to brain problem-better recently    Hypothyroidism     Lupus     Migraine     Neuropathy     Nicotine addiction     Osteochondritis dissecans both ankles    Pain, wrist, right collapse    Personal history of skin cancer toe    Right shoulder pain     Sjogren's disease     Vertigo

## 2021-04-19 ENCOUNTER — OUTPATIENT (OUTPATIENT)
Dept: OUTPATIENT SERVICES | Facility: HOSPITAL | Age: 56
LOS: 1 days | End: 2021-04-19
Payer: MEDICARE

## 2021-04-19 DIAGNOSIS — K82.9 DISEASE OF GALLBLADDER, UNSPECIFIED: Chronic | ICD-10-CM

## 2021-04-19 DIAGNOSIS — Z20.828 CONTACT WITH AND (SUSPECTED) EXPOSURE TO OTHER VIRAL COMMUNICABLE DISEASES: ICD-10-CM

## 2021-04-19 DIAGNOSIS — Z98.890 OTHER SPECIFIED POSTPROCEDURAL STATES: Chronic | ICD-10-CM

## 2021-04-19 DIAGNOSIS — Z95.818 PRESENCE OF OTHER CARDIAC IMPLANTS AND GRAFTS: Chronic | ICD-10-CM

## 2021-04-19 DIAGNOSIS — S69.91XD UNSPECIFIED INJURY OF RIGHT WRIST, HAND AND FINGER(S), SUBSEQUENT ENCOUNTER: Chronic | ICD-10-CM

## 2021-04-19 DIAGNOSIS — J38.1 POLYP OF VOCAL CORD AND LARYNX: Chronic | ICD-10-CM

## 2021-04-19 DIAGNOSIS — M25.9 JOINT DISORDER, UNSPECIFIED: Chronic | ICD-10-CM

## 2021-04-19 LAB — SARS-COV-2 RNA SPEC QL NAA+PROBE: SIGNIFICANT CHANGE UP

## 2021-04-19 PROCEDURE — U0003: CPT

## 2021-04-19 PROCEDURE — U0005: CPT

## 2021-04-20 ENCOUNTER — TRANSCRIPTION ENCOUNTER (OUTPATIENT)
Age: 56
End: 2021-04-20

## 2021-04-21 ENCOUNTER — RESULT REVIEW (OUTPATIENT)
Age: 56
End: 2021-04-21

## 2021-04-21 ENCOUNTER — OUTPATIENT (OUTPATIENT)
Dept: OUTPATIENT SERVICES | Facility: HOSPITAL | Age: 56
LOS: 1 days | End: 2021-04-21
Payer: MEDICARE

## 2021-04-21 VITALS
OXYGEN SATURATION: 97 % | RESPIRATION RATE: 12 BRPM | HEART RATE: 70 BPM | DIASTOLIC BLOOD PRESSURE: 54 MMHG | SYSTOLIC BLOOD PRESSURE: 96 MMHG

## 2021-04-21 VITALS
RESPIRATION RATE: 14 BRPM | HEART RATE: 78 BPM | SYSTOLIC BLOOD PRESSURE: 98 MMHG | OXYGEN SATURATION: 96 % | DIASTOLIC BLOOD PRESSURE: 47 MMHG | HEIGHT: 56 IN | TEMPERATURE: 99 F | WEIGHT: 78.04 LBS

## 2021-04-21 DIAGNOSIS — Z98.890 OTHER SPECIFIED POSTPROCEDURAL STATES: Chronic | ICD-10-CM

## 2021-04-21 DIAGNOSIS — J38.1 POLYP OF VOCAL CORD AND LARYNX: Chronic | ICD-10-CM

## 2021-04-21 DIAGNOSIS — K82.9 DISEASE OF GALLBLADDER, UNSPECIFIED: Chronic | ICD-10-CM

## 2021-04-21 DIAGNOSIS — M25.9 JOINT DISORDER, UNSPECIFIED: Chronic | ICD-10-CM

## 2021-04-21 DIAGNOSIS — R49.0 DYSPHONIA: ICD-10-CM

## 2021-04-21 DIAGNOSIS — K14.3 HYPERTROPHY OF TONGUE PAPILLAE: ICD-10-CM

## 2021-04-21 DIAGNOSIS — K14.0 GLOSSITIS: ICD-10-CM

## 2021-04-21 DIAGNOSIS — S69.91XD UNSPECIFIED INJURY OF RIGHT WRIST, HAND AND FINGER(S), SUBSEQUENT ENCOUNTER: Chronic | ICD-10-CM

## 2021-04-21 DIAGNOSIS — Z95.818 PRESENCE OF OTHER CARDIAC IMPLANTS AND GRAFTS: Chronic | ICD-10-CM

## 2021-04-21 PROCEDURE — 31536 LARYNGOSCOPY W/BX & OP SCOPE: CPT

## 2021-04-21 PROCEDURE — 88305 TISSUE EXAM BY PATHOLOGIST: CPT

## 2021-04-21 PROCEDURE — 88305 TISSUE EXAM BY PATHOLOGIST: CPT | Mod: 26

## 2021-04-21 RX ORDER — CARISOPRODOL 250 MG
1 TABLET ORAL
Qty: 0 | Refills: 0 | DISCHARGE

## 2021-04-21 RX ORDER — LEVOTHYROXINE SODIUM 125 MCG
100 TABLET ORAL DAILY
Refills: 0 | Status: DISCONTINUED | OUTPATIENT
Start: 2021-04-21 | End: 2021-05-05

## 2021-04-21 RX ORDER — FENTANYL CITRATE 50 UG/ML
1 INJECTION INTRAVENOUS
Refills: 0 | Status: COMPLETED | OUTPATIENT
Start: 2021-04-21 | End: 2021-04-28

## 2021-04-21 RX ORDER — ACETAMINOPHEN 500 MG
1000 TABLET ORAL ONCE
Refills: 0 | Status: DISCONTINUED | OUTPATIENT
Start: 2021-04-21 | End: 2021-05-05

## 2021-04-21 RX ORDER — ONDANSETRON 8 MG/1
4 TABLET, FILM COATED ORAL ONCE
Refills: 0 | Status: DISCONTINUED | OUTPATIENT
Start: 2021-04-21 | End: 2021-04-21

## 2021-04-21 RX ORDER — MORPHINE SULFATE 50 MG/1
15 CAPSULE, EXTENDED RELEASE ORAL EVERY 12 HOURS
Refills: 0 | Status: COMPLETED | OUTPATIENT
Start: 2021-04-21 | End: 2021-04-28

## 2021-04-21 RX ORDER — SODIUM CHLORIDE 9 MG/ML
1000 INJECTION, SOLUTION INTRAVENOUS
Refills: 0 | Status: DISCONTINUED | OUTPATIENT
Start: 2021-04-21 | End: 2021-04-21

## 2021-04-21 RX ORDER — HYDROXYCHLOROQUINE SULFATE 200 MG
200 TABLET ORAL
Refills: 0 | Status: DISCONTINUED | OUTPATIENT
Start: 2021-04-21 | End: 2021-05-05

## 2021-04-21 RX ORDER — ALBUTEROL 90 UG/1
2 AEROSOL, METERED ORAL EVERY 6 HOURS
Refills: 0 | Status: DISCONTINUED | OUTPATIENT
Start: 2021-04-21 | End: 2021-05-05

## 2021-04-21 RX ORDER — DIAZEPAM 5 MG
5 TABLET ORAL
Refills: 0 | Status: DISCONTINUED | OUTPATIENT
Start: 2021-04-21 | End: 2021-04-21

## 2021-04-21 RX ORDER — ONDANSETRON 8 MG/1
1 TABLET, FILM COATED ORAL
Qty: 0 | Refills: 0 | DISCHARGE

## 2021-04-21 RX ORDER — ALBUTEROL 90 UG/1
2 AEROSOL, METERED ORAL EVERY 6 HOURS
Refills: 0 | Status: DISCONTINUED | OUTPATIENT
Start: 2021-04-21 | End: 2021-04-21

## 2021-04-21 RX ORDER — PANTOPRAZOLE SODIUM 20 MG/1
1 TABLET, DELAYED RELEASE ORAL
Qty: 0 | Refills: 0 | DISCHARGE
Start: 2021-04-21

## 2021-04-21 RX ORDER — CLONAZEPAM 1 MG
1 TABLET ORAL
Refills: 0 | Status: COMPLETED | OUTPATIENT
Start: 2021-04-21 | End: 2021-04-28

## 2021-04-21 RX ORDER — FENTANYL CITRATE 50 UG/ML
1 INJECTION INTRAVENOUS
Refills: 0 | Status: DISCONTINUED | OUTPATIENT
Start: 2021-04-21 | End: 2021-04-21

## 2021-04-21 RX ORDER — PANTOPRAZOLE SODIUM 20 MG/1
40 TABLET, DELAYED RELEASE ORAL EVERY 12 HOURS
Refills: 0 | Status: DISCONTINUED | OUTPATIENT
Start: 2021-04-21 | End: 2021-05-05

## 2021-04-21 RX ORDER — MORPHINE SULFATE 50 MG/1
15 CAPSULE, EXTENDED RELEASE ORAL EVERY 12 HOURS
Refills: 0 | Status: DISCONTINUED | OUTPATIENT
Start: 2021-04-21 | End: 2021-04-21

## 2021-04-21 RX ORDER — HYDROMORPHONE HYDROCHLORIDE 2 MG/ML
0.5 INJECTION INTRAMUSCULAR; INTRAVENOUS; SUBCUTANEOUS
Refills: 0 | Status: DISCONTINUED | OUTPATIENT
Start: 2021-04-21 | End: 2021-04-21

## 2021-04-21 RX ORDER — OXYCODONE HYDROCHLORIDE 5 MG/1
5 TABLET ORAL ONCE
Refills: 0 | Status: DISCONTINUED | OUTPATIENT
Start: 2021-04-21 | End: 2021-04-21

## 2021-04-21 RX ORDER — HYDROXYCHLOROQUINE SULFATE 200 MG
200 TABLET ORAL
Refills: 0 | Status: DISCONTINUED | OUTPATIENT
Start: 2021-04-21 | End: 2021-04-21

## 2021-04-21 RX ADMIN — SODIUM CHLORIDE 75 MILLILITER(S): 9 INJECTION, SOLUTION INTRAVENOUS at 06:37

## 2021-04-21 RX ADMIN — SODIUM CHLORIDE 75 MILLILITER(S): 9 INJECTION, SOLUTION INTRAVENOUS at 09:08

## 2021-04-21 NOTE — ASU PREOP CHECKLIST - BOWEL PREP
n/a Skin Substitute Text: The defect edges were debeveled with a #15 scalpel blade.  Given the location of the defect, shape of the defect and the proximity to free margins a skin substitute graft was deemed most appropriate.  The graft material was trimmed to fit the size of the defect. The graft was then placed in the primary defect and oriented appropriately.

## 2021-04-21 NOTE — BRIEF OPERATIVE NOTE - NSICDXBRIEFPOSTOP_GEN_ALL_CORE_FT
POST-OP DIAGNOSIS:  Vocal cord polyps 21-Apr-2021 08:59:36  Harry Velasquez  Vocal cord polyp 21-Apr-2021 09:01:23  Harry Velasquez

## 2021-04-21 NOTE — ASU DISCHARGE PLAN (ADULT/PEDIATRIC) - CARE PROVIDER_API CALL
Harry Velasquez)  Otolaryngology  [address] [place] [address]  Phone: [phone]  Fax: [phone]  Follow Up Time:

## 2021-04-22 LAB — SURGICAL PATHOLOGY STUDY: SIGNIFICANT CHANGE UP

## 2021-05-11 ENCOUNTER — OUTPATIENT (OUTPATIENT)
Dept: OUTPATIENT SERVICES | Facility: HOSPITAL | Age: 56
LOS: 1 days | End: 2021-05-11
Payer: MEDICARE

## 2021-05-11 DIAGNOSIS — J38.1 POLYP OF VOCAL CORD AND LARYNX: Chronic | ICD-10-CM

## 2021-05-11 DIAGNOSIS — Z20.828 CONTACT WITH AND (SUSPECTED) EXPOSURE TO OTHER VIRAL COMMUNICABLE DISEASES: ICD-10-CM

## 2021-05-11 DIAGNOSIS — Z98.890 OTHER SPECIFIED POSTPROCEDURAL STATES: Chronic | ICD-10-CM

## 2021-05-11 DIAGNOSIS — S69.91XD UNSPECIFIED INJURY OF RIGHT WRIST, HAND AND FINGER(S), SUBSEQUENT ENCOUNTER: Chronic | ICD-10-CM

## 2021-05-11 DIAGNOSIS — M25.9 JOINT DISORDER, UNSPECIFIED: Chronic | ICD-10-CM

## 2021-05-11 DIAGNOSIS — K82.9 DISEASE OF GALLBLADDER, UNSPECIFIED: Chronic | ICD-10-CM

## 2021-05-11 DIAGNOSIS — Z95.818 PRESENCE OF OTHER CARDIAC IMPLANTS AND GRAFTS: Chronic | ICD-10-CM

## 2021-05-11 LAB — SARS-COV-2 RNA SPEC QL NAA+PROBE: SIGNIFICANT CHANGE UP

## 2021-05-11 PROCEDURE — U0003: CPT

## 2021-05-11 PROCEDURE — U0005: CPT

## 2021-05-13 ENCOUNTER — OUTPATIENT (OUTPATIENT)
Dept: OUTPATIENT SERVICES | Facility: HOSPITAL | Age: 56
LOS: 1 days | End: 2021-05-13
Payer: MEDICARE

## 2021-05-13 DIAGNOSIS — J38.1 POLYP OF VOCAL CORD AND LARYNX: Chronic | ICD-10-CM

## 2021-05-13 DIAGNOSIS — Z98.890 OTHER SPECIFIED POSTPROCEDURAL STATES: Chronic | ICD-10-CM

## 2021-05-13 DIAGNOSIS — Z95.818 PRESENCE OF OTHER CARDIAC IMPLANTS AND GRAFTS: Chronic | ICD-10-CM

## 2021-05-13 DIAGNOSIS — K82.9 DISEASE OF GALLBLADDER, UNSPECIFIED: Chronic | ICD-10-CM

## 2021-05-13 DIAGNOSIS — S69.91XD UNSPECIFIED INJURY OF RIGHT WRIST, HAND AND FINGER(S), SUBSEQUENT ENCOUNTER: Chronic | ICD-10-CM

## 2021-05-13 DIAGNOSIS — M54.16 RADICULOPATHY, LUMBAR REGION: ICD-10-CM

## 2021-05-13 DIAGNOSIS — M25.9 JOINT DISORDER, UNSPECIFIED: Chronic | ICD-10-CM

## 2021-05-13 PROCEDURE — 62323 NJX INTERLAMINAR LMBR/SAC: CPT

## 2021-08-15 NOTE — H&P ADULT - REASON FOR ADMISSION
[facility] Emergency Department  [address] [facility], [address]             August 15, 2021    Patient: Tia Triana   YOB: 2001   Date of Visit: 8/15/2021       To Whom It May Concern:    Tia Triana was seen and treated in our emergency department on 8/15/2021. She may return to work on 8/17/2021.       Sincerely,         [facility] Emergency Department
Bloody diarrhea.

## 2021-09-07 NOTE — ED PROVIDER NOTE - ATTENDING CONTRIBUTION TO CARE
I performed a history and physical exam of the patient and discussed their management with the advanced care provider. I reviewed the advanced care provider's note and agree with the documented findings and plan of care. My medical decision making and objective findings are found above. 07-Sep-2021

## 2021-10-22 ENCOUNTER — OUTPATIENT (OUTPATIENT)
Dept: OUTPATIENT SERVICES | Facility: HOSPITAL | Age: 56
LOS: 1 days | End: 2021-10-22
Payer: MEDICARE

## 2021-10-22 DIAGNOSIS — K82.9 DISEASE OF GALLBLADDER, UNSPECIFIED: Chronic | ICD-10-CM

## 2021-10-22 DIAGNOSIS — Z20.828 CONTACT WITH AND (SUSPECTED) EXPOSURE TO OTHER VIRAL COMMUNICABLE DISEASES: ICD-10-CM

## 2021-10-22 DIAGNOSIS — Z98.890 OTHER SPECIFIED POSTPROCEDURAL STATES: Chronic | ICD-10-CM

## 2021-10-22 DIAGNOSIS — J38.1 POLYP OF VOCAL CORD AND LARYNX: Chronic | ICD-10-CM

## 2021-10-22 DIAGNOSIS — M25.9 JOINT DISORDER, UNSPECIFIED: Chronic | ICD-10-CM

## 2021-10-22 DIAGNOSIS — Z95.818 PRESENCE OF OTHER CARDIAC IMPLANTS AND GRAFTS: Chronic | ICD-10-CM

## 2021-10-22 DIAGNOSIS — S69.91XD UNSPECIFIED INJURY OF RIGHT WRIST, HAND AND FINGER(S), SUBSEQUENT ENCOUNTER: Chronic | ICD-10-CM

## 2021-10-22 LAB — SARS-COV-2 RNA SPEC QL NAA+PROBE: SIGNIFICANT CHANGE UP

## 2021-10-22 PROCEDURE — U0003: CPT

## 2021-10-22 PROCEDURE — U0005: CPT

## 2021-10-25 ENCOUNTER — OUTPATIENT (OUTPATIENT)
Dept: OUTPATIENT SERVICES | Facility: HOSPITAL | Age: 56
LOS: 1 days | End: 2021-10-25
Payer: MEDICARE

## 2021-10-25 DIAGNOSIS — K82.9 DISEASE OF GALLBLADDER, UNSPECIFIED: Chronic | ICD-10-CM

## 2021-10-25 DIAGNOSIS — Z95.818 PRESENCE OF OTHER CARDIAC IMPLANTS AND GRAFTS: Chronic | ICD-10-CM

## 2021-10-25 DIAGNOSIS — Z98.890 OTHER SPECIFIED POSTPROCEDURAL STATES: Chronic | ICD-10-CM

## 2021-10-25 DIAGNOSIS — M25.9 JOINT DISORDER, UNSPECIFIED: Chronic | ICD-10-CM

## 2021-10-25 DIAGNOSIS — M54.12 RADICULOPATHY, CERVICAL REGION: ICD-10-CM

## 2021-10-25 DIAGNOSIS — J38.1 POLYP OF VOCAL CORD AND LARYNX: Chronic | ICD-10-CM

## 2021-10-25 DIAGNOSIS — S69.91XD UNSPECIFIED INJURY OF RIGHT WRIST, HAND AND FINGER(S), SUBSEQUENT ENCOUNTER: Chronic | ICD-10-CM

## 2021-10-25 PROCEDURE — 62321 NJX INTERLAMINAR CRV/THRC: CPT

## 2021-11-15 NOTE — ED ADULT TRIAGE NOTE - DIRECT TO ROOM CARE INITIATED:
14-Jan-2019 06:55 Niacinamide Counseling: I recommended taking niacin or niacinamide, also know as vitamin B3, twice daily. Recent evidence suggests that taking vitamin B3 (500 mg twice daily) can reduce the risk of actinic keratoses and non-melanoma skin cancers. Side effects of vitamin B3 include flushing and headache.

## 2021-12-06 ENCOUNTER — INPATIENT (INPATIENT)
Facility: HOSPITAL | Age: 56
LOS: 2 days | Discharge: HOME | DRG: 643 | End: 2021-12-09
Attending: STUDENT IN AN ORGANIZED HEALTH CARE EDUCATION/TRAINING PROGRAM | Admitting: STUDENT IN AN ORGANIZED HEALTH CARE EDUCATION/TRAINING PROGRAM
Payer: MEDICARE

## 2021-12-06 VITALS
OXYGEN SATURATION: 98 % | WEIGHT: 78.04 LBS | SYSTOLIC BLOOD PRESSURE: 105 MMHG | HEIGHT: 56 IN | HEART RATE: 16 BPM | RESPIRATION RATE: 16 BRPM | TEMPERATURE: 99 F | DIASTOLIC BLOOD PRESSURE: 73 MMHG

## 2021-12-06 DIAGNOSIS — J43.9 EMPHYSEMA, UNSPECIFIED: ICD-10-CM

## 2021-12-06 DIAGNOSIS — R30.0 DYSURIA: ICD-10-CM

## 2021-12-06 DIAGNOSIS — R53.1 WEAKNESS: ICD-10-CM

## 2021-12-06 DIAGNOSIS — J38.1 POLYP OF VOCAL CORD AND LARYNX: Chronic | ICD-10-CM

## 2021-12-06 DIAGNOSIS — Z86.69 PERSONAL HISTORY OF OTHER DISEASES OF THE NERVOUS SYSTEM AND SENSE ORGANS: ICD-10-CM

## 2021-12-06 DIAGNOSIS — K52.9 NONINFECTIVE GASTROENTERITIS AND COLITIS, UNSPECIFIED: ICD-10-CM

## 2021-12-06 DIAGNOSIS — Z98.890 OTHER SPECIFIED POSTPROCEDURAL STATES: Chronic | ICD-10-CM

## 2021-12-06 DIAGNOSIS — G43.909 MIGRAINE, UNSPECIFIED, NOT INTRACTABLE, WITHOUT STATUS MIGRAINOSUS: ICD-10-CM

## 2021-12-06 DIAGNOSIS — E87.1 HYPO-OSMOLALITY AND HYPONATREMIA: ICD-10-CM

## 2021-12-06 DIAGNOSIS — M25.9 JOINT DISORDER, UNSPECIFIED: Chronic | ICD-10-CM

## 2021-12-06 DIAGNOSIS — Z95.818 PRESENCE OF OTHER CARDIAC IMPLANTS AND GRAFTS: Chronic | ICD-10-CM

## 2021-12-06 DIAGNOSIS — S69.91XD UNSPECIFIED INJURY OF RIGHT WRIST, HAND AND FINGER(S), SUBSEQUENT ENCOUNTER: Chronic | ICD-10-CM

## 2021-12-06 DIAGNOSIS — E03.9 HYPOTHYROIDISM, UNSPECIFIED: ICD-10-CM

## 2021-12-06 DIAGNOSIS — M32.9 SYSTEMIC LUPUS ERYTHEMATOSUS, UNSPECIFIED: ICD-10-CM

## 2021-12-06 DIAGNOSIS — K82.9 DISEASE OF GALLBLADDER, UNSPECIFIED: Chronic | ICD-10-CM

## 2021-12-06 DIAGNOSIS — M54.2 CERVICALGIA: ICD-10-CM

## 2021-12-06 DIAGNOSIS — Z29.9 ENCOUNTER FOR PROPHYLACTIC MEASURES, UNSPECIFIED: ICD-10-CM

## 2021-12-06 LAB
ALBUMIN SERPL ELPH-MCNC: 3.7 G/DL — SIGNIFICANT CHANGE UP (ref 3.3–5)
ALP SERPL-CCNC: 132 U/L — HIGH (ref 40–120)
ALT FLD-CCNC: 38 U/L — SIGNIFICANT CHANGE UP (ref 12–78)
ANION GAP SERPL CALC-SCNC: 11 MMOL/L — SIGNIFICANT CHANGE UP (ref 5–17)
ANION GAP SERPL CALC-SCNC: 14 MMOL/L — SIGNIFICANT CHANGE UP (ref 5–17)
APPEARANCE UR: CLEAR — SIGNIFICANT CHANGE UP
AST SERPL-CCNC: 25 U/L — SIGNIFICANT CHANGE UP (ref 15–37)
BASOPHILS # BLD AUTO: 0.02 K/UL — SIGNIFICANT CHANGE UP (ref 0–0.2)
BASOPHILS NFR BLD AUTO: 0.1 % — SIGNIFICANT CHANGE UP (ref 0–2)
BILIRUB SERPL-MCNC: 0.6 MG/DL — SIGNIFICANT CHANGE UP (ref 0.2–1.2)
BILIRUB UR-MCNC: NEGATIVE — SIGNIFICANT CHANGE UP
BUN SERPL-MCNC: 16 MG/DL — SIGNIFICANT CHANGE UP (ref 7–23)
BUN SERPL-MCNC: 22 MG/DL — SIGNIFICANT CHANGE UP (ref 7–23)
CALCIUM SERPL-MCNC: 7.6 MG/DL — LOW (ref 8.5–10.1)
CALCIUM SERPL-MCNC: 9.3 MG/DL — SIGNIFICANT CHANGE UP (ref 8.5–10.1)
CARBAMAZEPINE SERPL-MCNC: <0.5 UG/ML — LOW (ref 4–12)
CHLORIDE SERPL-SCNC: 104 MMOL/L — SIGNIFICANT CHANGE UP (ref 96–108)
CHLORIDE SERPL-SCNC: 88 MMOL/L — LOW (ref 96–108)
CO2 SERPL-SCNC: 19 MMOL/L — LOW (ref 22–31)
CO2 SERPL-SCNC: 20 MMOL/L — LOW (ref 22–31)
COLOR SPEC: YELLOW — SIGNIFICANT CHANGE UP
CREAT SERPL-MCNC: 0.7 MG/DL — SIGNIFICANT CHANGE UP (ref 0.5–1.3)
CREAT SERPL-MCNC: 0.82 MG/DL — SIGNIFICANT CHANGE UP (ref 0.5–1.3)
DIFF PNL FLD: NEGATIVE — SIGNIFICANT CHANGE UP
EOSINOPHIL # BLD AUTO: 0.03 K/UL — SIGNIFICANT CHANGE UP (ref 0–0.5)
EOSINOPHIL NFR BLD AUTO: 0.2 % — SIGNIFICANT CHANGE UP (ref 0–6)
GLUCOSE SERPL-MCNC: 54 MG/DL — CRITICAL LOW (ref 70–99)
GLUCOSE SERPL-MCNC: 62 MG/DL — LOW (ref 70–99)
GLUCOSE UR QL: NEGATIVE — SIGNIFICANT CHANGE UP
HCT VFR BLD CALC: 38.7 % — SIGNIFICANT CHANGE UP (ref 34.5–45)
HGB BLD-MCNC: 14.1 G/DL — SIGNIFICANT CHANGE UP (ref 11.5–15.5)
IMM GRANULOCYTES NFR BLD AUTO: 0.7 % — SIGNIFICANT CHANGE UP (ref 0–1.5)
KETONES UR-MCNC: ABNORMAL
LACTATE SERPL-SCNC: 0.6 MMOL/L — LOW (ref 0.7–2)
LEUKOCYTE ESTERASE UR-ACNC: NEGATIVE — SIGNIFICANT CHANGE UP
LIDOCAIN IGE QN: 43 U/L — LOW (ref 73–393)
LYMPHOCYTES # BLD AUTO: 1.86 K/UL — SIGNIFICANT CHANGE UP (ref 1–3.3)
LYMPHOCYTES # BLD AUTO: 13.7 % — SIGNIFICANT CHANGE UP (ref 13–44)
MCHC RBC-ENTMCNC: 32.3 PG — SIGNIFICANT CHANGE UP (ref 27–34)
MCHC RBC-ENTMCNC: 36.4 GM/DL — HIGH (ref 32–36)
MCV RBC AUTO: 88.8 FL — SIGNIFICANT CHANGE UP (ref 80–100)
MONOCYTES # BLD AUTO: 0.89 K/UL — SIGNIFICANT CHANGE UP (ref 0–0.9)
MONOCYTES NFR BLD AUTO: 6.6 % — SIGNIFICANT CHANGE UP (ref 2–14)
NEUTROPHILS # BLD AUTO: 10.63 K/UL — HIGH (ref 1.8–7.4)
NEUTROPHILS NFR BLD AUTO: 78.7 % — HIGH (ref 43–77)
NITRITE UR-MCNC: NEGATIVE — SIGNIFICANT CHANGE UP
NRBC # BLD: 0 /100 WBCS — SIGNIFICANT CHANGE UP (ref 0–0)
PH UR: 5 — SIGNIFICANT CHANGE UP (ref 5–8)
PLATELET # BLD AUTO: 509 K/UL — HIGH (ref 150–400)
POTASSIUM SERPL-MCNC: 3.9 MMOL/L — SIGNIFICANT CHANGE UP (ref 3.5–5.3)
POTASSIUM SERPL-MCNC: 4.3 MMOL/L — SIGNIFICANT CHANGE UP (ref 3.5–5.3)
POTASSIUM SERPL-SCNC: 3.9 MMOL/L — SIGNIFICANT CHANGE UP (ref 3.5–5.3)
POTASSIUM SERPL-SCNC: 4.3 MMOL/L — SIGNIFICANT CHANGE UP (ref 3.5–5.3)
PROT SERPL-MCNC: 7.1 G/DL — SIGNIFICANT CHANGE UP (ref 6–8.3)
PROT UR-MCNC: NEGATIVE — SIGNIFICANT CHANGE UP
RAPID RVP RESULT: SIGNIFICANT CHANGE UP
RBC # BLD: 4.36 M/UL — SIGNIFICANT CHANGE UP (ref 3.8–5.2)
RBC # FLD: 12.9 % — SIGNIFICANT CHANGE UP (ref 10.3–14.5)
SARS-COV-2 RNA SPEC QL NAA+PROBE: SIGNIFICANT CHANGE UP
SODIUM SERPL-SCNC: 122 MMOL/L — LOW (ref 135–145)
SODIUM SERPL-SCNC: 134 MMOL/L — LOW (ref 135–145)
SP GR SPEC: 1.01 — SIGNIFICANT CHANGE UP (ref 1.01–1.02)
TSH SERPL-MCNC: 0.17 UIU/ML — LOW (ref 0.36–3.74)
UROBILINOGEN FLD QL: NEGATIVE — SIGNIFICANT CHANGE UP
WBC # BLD: 13.53 K/UL — HIGH (ref 3.8–10.5)
WBC # FLD AUTO: 13.53 K/UL — HIGH (ref 3.8–10.5)

## 2021-12-06 PROCEDURE — 99223 1ST HOSP IP/OBS HIGH 75: CPT | Mod: GC

## 2021-12-06 PROCEDURE — 74177 CT ABD & PELVIS W/CONTRAST: CPT | Mod: 26,MA

## 2021-12-06 PROCEDURE — 93010 ELECTROCARDIOGRAM REPORT: CPT

## 2021-12-06 PROCEDURE — 71260 CT THORAX DX C+: CPT | Mod: 26,MA

## 2021-12-06 PROCEDURE — 71045 X-RAY EXAM CHEST 1 VIEW: CPT | Mod: 26

## 2021-12-06 PROCEDURE — 99285 EMERGENCY DEPT VISIT HI MDM: CPT

## 2021-12-06 RX ORDER — MORPHINE SULFATE 50 MG/1
4 CAPSULE, EXTENDED RELEASE ORAL
Refills: 0 | Status: DISCONTINUED | OUTPATIENT
Start: 2021-12-06 | End: 2021-12-06

## 2021-12-06 RX ORDER — DEXTROSE 50 % IN WATER 50 %
25 SYRINGE (ML) INTRAVENOUS ONCE
Refills: 0 | Status: DISCONTINUED | OUTPATIENT
Start: 2021-12-06 | End: 2021-12-09

## 2021-12-06 RX ORDER — MORPHINE SULFATE 50 MG/1
15 CAPSULE, EXTENDED RELEASE ORAL
Qty: 0 | Refills: 0 | DISCHARGE

## 2021-12-06 RX ORDER — DIAZEPAM 5 MG
5 TABLET ORAL
Refills: 0 | Status: DISCONTINUED | OUTPATIENT
Start: 2021-12-06 | End: 2021-12-09

## 2021-12-06 RX ORDER — FENTANYL CITRATE 50 UG/ML
1 INJECTION INTRAVENOUS
Refills: 0 | Status: DISCONTINUED | OUTPATIENT
Start: 2021-12-06 | End: 2021-12-09

## 2021-12-06 RX ORDER — PIPERACILLIN AND TAZOBACTAM 4; .5 G/20ML; G/20ML
3.38 INJECTION, POWDER, LYOPHILIZED, FOR SOLUTION INTRAVENOUS EVERY 8 HOURS
Refills: 0 | Status: DISCONTINUED | OUTPATIENT
Start: 2021-12-07 | End: 2021-12-09

## 2021-12-06 RX ORDER — CARBAMAZEPINE 200 MG
200 TABLET ORAL THREE TIMES A DAY
Refills: 0 | Status: DISCONTINUED | OUTPATIENT
Start: 2021-12-06 | End: 2021-12-06

## 2021-12-06 RX ORDER — BUTALBITAL/ASPIRIN/CAFFEINE 50-325-40
1 TABLET ORAL
Refills: 0 | Status: DISCONTINUED | OUTPATIENT
Start: 2021-12-06 | End: 2021-12-06

## 2021-12-06 RX ORDER — MORPHINE SULFATE 50 MG/1
4 CAPSULE, EXTENDED RELEASE ORAL
Refills: 0 | Status: DISCONTINUED | OUTPATIENT
Start: 2021-12-06 | End: 2021-12-07

## 2021-12-06 RX ORDER — ALBUTEROL 90 UG/1
2 AEROSOL, METERED ORAL EVERY 6 HOURS
Refills: 0 | Status: DISCONTINUED | OUTPATIENT
Start: 2021-12-06 | End: 2021-12-09

## 2021-12-06 RX ORDER — OMEPRAZOLE 10 MG/1
1 CAPSULE, DELAYED RELEASE ORAL
Qty: 0 | Refills: 0 | DISCHARGE

## 2021-12-06 RX ORDER — LEVOTHYROXINE SODIUM 125 MCG
12.5 TABLET ORAL AT BEDTIME
Refills: 0 | Status: DISCONTINUED | OUTPATIENT
Start: 2021-12-06 | End: 2021-12-06

## 2021-12-06 RX ORDER — ENOXAPARIN SODIUM 100 MG/ML
40 INJECTION SUBCUTANEOUS DAILY
Refills: 0 | Status: DISCONTINUED | OUTPATIENT
Start: 2021-12-06 | End: 2021-12-09

## 2021-12-06 RX ORDER — SODIUM CHLORIDE 9 MG/ML
1100 INJECTION INTRAMUSCULAR; INTRAVENOUS; SUBCUTANEOUS ONCE
Refills: 0 | Status: COMPLETED | OUTPATIENT
Start: 2021-12-06 | End: 2021-12-06

## 2021-12-06 RX ORDER — HYDROXYCHLOROQUINE SULFATE 200 MG
1 TABLET ORAL
Qty: 0 | Refills: 0 | DISCHARGE

## 2021-12-06 RX ORDER — DIAZEPAM 5 MG
5 TABLET ORAL
Refills: 0 | Status: DISCONTINUED | OUTPATIENT
Start: 2021-12-06 | End: 2021-12-06

## 2021-12-06 RX ORDER — DEXTROSE 50 % IN WATER 50 %
15 SYRINGE (ML) INTRAVENOUS ONCE
Refills: 0 | Status: DISCONTINUED | OUTPATIENT
Start: 2021-12-06 | End: 2021-12-09

## 2021-12-06 RX ORDER — CYCLOBENZAPRINE HYDROCHLORIDE 10 MG/1
10 TABLET, FILM COATED ORAL
Refills: 0 | Status: DISCONTINUED | OUTPATIENT
Start: 2021-12-06 | End: 2021-12-09

## 2021-12-06 RX ORDER — PANTOPRAZOLE SODIUM 20 MG/1
40 TABLET, DELAYED RELEASE ORAL
Refills: 0 | Status: DISCONTINUED | OUTPATIENT
Start: 2021-12-06 | End: 2021-12-07

## 2021-12-06 RX ORDER — PIPERACILLIN AND TAZOBACTAM 4; .5 G/20ML; G/20ML
3.38 INJECTION, POWDER, LYOPHILIZED, FOR SOLUTION INTRAVENOUS ONCE
Refills: 0 | Status: COMPLETED | OUTPATIENT
Start: 2021-12-06 | End: 2021-12-06

## 2021-12-06 RX ORDER — TIOTROPIUM BROMIDE 18 UG/1
1 CAPSULE ORAL; RESPIRATORY (INHALATION) DAILY
Refills: 0 | Status: DISCONTINUED | OUTPATIENT
Start: 2021-12-06 | End: 2021-12-09

## 2021-12-06 RX ORDER — SODIUM CHLORIDE 9 MG/ML
1000 INJECTION, SOLUTION INTRAVENOUS
Refills: 0 | Status: DISCONTINUED | OUTPATIENT
Start: 2021-12-06 | End: 2021-12-08

## 2021-12-06 RX ORDER — GLUCAGON INJECTION, SOLUTION 0.5 MG/.1ML
1 INJECTION, SOLUTION SUBCUTANEOUS ONCE
Refills: 0 | Status: DISCONTINUED | OUTPATIENT
Start: 2021-12-06 | End: 2021-12-09

## 2021-12-06 RX ORDER — CLONAZEPAM 1 MG
0 TABLET ORAL
Qty: 0 | Refills: 0 | DISCHARGE

## 2021-12-06 RX ORDER — CARBAMAZEPINE 200 MG
200 TABLET ORAL THREE TIMES A DAY
Refills: 0 | Status: DISCONTINUED | OUTPATIENT
Start: 2021-12-06 | End: 2021-12-09

## 2021-12-06 RX ORDER — SODIUM CHLORIDE 9 MG/ML
1000 INJECTION INTRAMUSCULAR; INTRAVENOUS; SUBCUTANEOUS
Refills: 0 | Status: DISCONTINUED | OUTPATIENT
Start: 2021-12-06 | End: 2021-12-06

## 2021-12-06 RX ORDER — LEVOTHYROXINE SODIUM 125 MCG
25 TABLET ORAL DAILY
Refills: 0 | Status: DISCONTINUED | OUTPATIENT
Start: 2021-12-06 | End: 2021-12-09

## 2021-12-06 RX ORDER — ONDANSETRON 8 MG/1
4 TABLET, FILM COATED ORAL ONCE
Refills: 0 | Status: COMPLETED | OUTPATIENT
Start: 2021-12-06 | End: 2021-12-06

## 2021-12-06 RX ORDER — DICLOFENAC SODIUM 30 MG/G
1 GEL TOPICAL
Qty: 0 | Refills: 0 | DISCHARGE

## 2021-12-06 RX ORDER — MORPHINE SULFATE 50 MG/1
4 CAPSULE, EXTENDED RELEASE ORAL ONCE
Refills: 0 | Status: DISCONTINUED | OUTPATIENT
Start: 2021-12-06 | End: 2021-12-06

## 2021-12-06 RX ORDER — ONDANSETRON 8 MG/1
4 TABLET, FILM COATED ORAL EVERY 6 HOURS
Refills: 0 | Status: DISCONTINUED | OUTPATIENT
Start: 2021-12-06 | End: 2021-12-09

## 2021-12-06 RX ORDER — DEXTROSE 50 % IN WATER 50 %
12.5 SYRINGE (ML) INTRAVENOUS ONCE
Refills: 0 | Status: DISCONTINUED | OUTPATIENT
Start: 2021-12-06 | End: 2021-12-09

## 2021-12-06 RX ORDER — NICOTINE POLACRILEX 2 MG
1 GUM BUCCAL DAILY
Refills: 0 | Status: DISCONTINUED | OUTPATIENT
Start: 2021-12-06 | End: 2021-12-09

## 2021-12-06 RX ADMIN — SODIUM CHLORIDE 1100 MILLILITER(S): 9 INJECTION INTRAMUSCULAR; INTRAVENOUS; SUBCUTANEOUS at 14:56

## 2021-12-06 RX ADMIN — MORPHINE SULFATE 4 MILLIGRAM(S): 50 CAPSULE, EXTENDED RELEASE ORAL at 14:57

## 2021-12-06 RX ADMIN — ONDANSETRON 4 MILLIGRAM(S): 8 TABLET, FILM COATED ORAL at 14:57

## 2021-12-06 RX ADMIN — SODIUM CHLORIDE 50 MILLILITER(S): 9 INJECTION INTRAMUSCULAR; INTRAVENOUS; SUBCUTANEOUS at 22:03

## 2021-12-06 RX ADMIN — FENTANYL CITRATE 1 PATCH: 50 INJECTION INTRAVENOUS at 21:30

## 2021-12-06 RX ADMIN — PIPERACILLIN AND TAZOBACTAM 200 GRAM(S): 4; .5 INJECTION, POWDER, LYOPHILIZED, FOR SOLUTION INTRAVENOUS at 18:45

## 2021-12-06 RX ADMIN — MORPHINE SULFATE 4 MILLIGRAM(S): 50 CAPSULE, EXTENDED RELEASE ORAL at 22:03

## 2021-12-06 RX ADMIN — Medication 200 MILLIGRAM(S): at 22:57

## 2021-12-06 RX ADMIN — MORPHINE SULFATE 4 MILLIGRAM(S): 50 CAPSULE, EXTENDED RELEASE ORAL at 16:55

## 2021-12-06 RX ADMIN — SODIUM CHLORIDE 50 MILLILITER(S): 9 INJECTION INTRAMUSCULAR; INTRAVENOUS; SUBCUTANEOUS at 18:37

## 2021-12-06 NOTE — H&P ADULT - PROBLEM SELECTOR PLAN 7
Patient reports hx of Lupus and many autoimmune disorders including Sjogren's syndrome  -Patient not compliant with hydroxychloroquine, states she has not taken since Feb 2021  -F/u outpatient with PCP or rheum Patient reports few months with dysuria and pelvic pressure, reports recent abx completion of unknown abx a few weeks ago without improvement in symptoms  -UA unremarkable on admission  -F/u urine culture

## 2021-12-06 NOTE — ED ADULT NURSE NOTE - OBJECTIVE STATEMENT
Quality 130: Documentation Of Current Medications In The Medical Record: Current Medications Documented Detail Level: Detailed Quality 47: Advance Care Plan: Advance care planning not documented, reason not otherwise specified. Quality 226: Preventive Care And Screening: Tobacco Use: Screening And Cessation Intervention: Tobacco Screening not Performed for Unknown Reasons pt to er c/o headache body aches nausea is alert oriented speech clear resp even unlabored iv started 20 angio right arm

## 2021-12-06 NOTE — ED PROVIDER NOTE - ATTENDING CONTRIBUTION TO CARE
57 yo F p/w gen weakness, nausea and vomiting x past ~ 3 days. Pos chills, body aches as well. No cough/uri. Pt fully vaccinated for covid x 3. No cp/sob. some lower abd discomfort as well. Pt states also had increased in home dose of pregabalin and carbamazepine last week - states some sx started after that. No fever known. no agg/allev factors. no other acute co.  exam: MM Moist. neck supple. no meningeal signs. abd soft NT. no hsm. no cvat. Nl resp effort. no w/r/r. No acc muscle use. CTA bl, no w/r/r. No acc muscle use. Nl non-focal neuro exam. no other acute findings.  Check labs, XR, IVF, reeval

## 2021-12-06 NOTE — H&P ADULT - PROBLEM SELECTOR PLAN 9
Chronic  -Continue Fioricet BID Patient reports recent increase in Lyrica from 25mg BID to 50mg TID  -Patient has not taken medication for past 3 days, states she does not agree with taking the higher dose  -Continue previous dosing of 25mg BID

## 2021-12-06 NOTE — H&P ADULT - PROBLEM SELECTOR PLAN 6
Patient reports few months with dysuria and pelvic pressure, reports recent abx completion of unknown abx a few weeks ago without improvement in symptoms  -UA unremarkable on admission  -F/u urine culture Reports taking 25mcg synthroid daily  -Transition to 12.5mg IVP Levothyroxine daily until patient able to tolerate PO  -F/u thyroid panel -Continue home 25mcg levothyroxine daily  -F/u thyroid panel

## 2021-12-06 NOTE — H&P ADULT - ATTENDING COMMENTS
57 yo female PMH chronic neck/back pain, sciatica, lupus, Sjogren's syndrome, COPD, glossopharyngeal neuralgia s/p brain surgery, COPD on 2L NC, hypothyroidism, Neuropathy, migraines, GERD presents to ED with 3 days of weakness, nausea, vomiting, diarrhea, patient admitted for hyponatremia and colitis found on CT abd/pelvis. Admit to medicine. Hyponatremia is multifactorial but corrected too quickly after IV NS. PO fluid restriction and IV NS discontinued. Started on d5W. Nephrology recs appreciated. Patient also hypoglycemic, will continue to monitor. Pt unable to tolerate PO well at home and was started on CLD. Requesting turkey sandwich in the ED, so will advance her diet and monitor. GI Dr. Trinidad consulted. Discussed with patient at bedside.    Agree with H&P as outlined above, edited where appropriate.

## 2021-12-06 NOTE — CONSULT NOTE ADULT - SUBJECTIVE AND OBJECTIVE BOX
Patient is a 56y old  Female who presents with a chief complaint of weakness.    HPI:  Pt is a 55 yo female with PMHX of Anxiety, Chronic low back pain with sciatica ,Chronic neck pain cervical bone fused through the disc, Dvt femoral (deep venous thrombosis) right arm, Emphysema/COPD on oxygen at night 2L NC, GERD, Glossopharyngeal neuralgia syndrome s/p brain sx in 2018, H/O osteoporosis severe, H/O paroxysmal supraventricular tachycardia H/O Raynaud's syndrome Hepatitis C body cleared, History of IBS colitis, History of seizure disorder last seizure 8 yrs ago, Hypotension Hypothyroidism, Lupus Migraine, Neuropathy, Osteochondritis dissecans Sjogren's disease, Vertigo BIBEMS for weakness nausea and vomiting for 3 days. Pt also having chills and body aches. pt is vaccinated for covid. Pt states she has been having uti on and off for one month and currently having dysuria. Pt states she took abx by urgent care. Pt states neurologist also increased dose of pregabalin and Carbamazepine last week and not sure if related.    Renal consult called for hyponatremia. History obtained from chart and patient.     PAST MEDICAL HISTORY:  Lupus    Sjogrens disease    Vertigo    UTI (urinary tract infection)    GERD (gastroesophageal reflux disease)    Hypotension    Hypothyroidism    Gallstones    Hepatitis C    Anxiety    Migraine    Neuropathy    COPD (chronic obstructive pulmonary disease)    Nicotine addiction    Glossopharyngeal neuralgia syndrome    Dvt femoral (deep venous thrombosis)    Lupus    Emphysema/COPD    Burning mouth syndrome    H/O osteoporosis    History of weight loss    History of seizure disorder    Personal history of skin cancer    Osteochondritis dissecans    History of IBS    H/O Raynaud&#x27;s syndrome    Pain, wrist, right    Right shoulder pain    Chronic low back pain with sciatica    Chronic neck pain    H/O paroxysmal supraventricular tachycardia        PAST SURGICAL HISTORY:  Vocal cord polyp    Gall bladder disease    Injury of right wrist, subsequent encounter    H/O lumpectomy    S/P brain surgery    S/P cryoablation of arrhythmia    Ankle problem    H/O elbow surgery    Status post placement of implantable loop recorder    H/O squamous cell carcinoma excision        FAMILY HISTORY:  Family history of systemic lupus erythematosus (SLE) in mother (Sibling)  niece    Family history of psoriatic arthritis (Sibling)  Sister    Family history of diabetes mellitus (Sibling)  Sister    Family history of multiple sclerosis  sister    Family history of heart disease  htn-father    FH: arrhythmia  mother-dementia        SOCIAL HISTORY: No smoking or alcohol use     Allergies    Vibramycin (Unknown)    Intolerances    aspirin (Stomach Upset)  Zithromax (Stomach Upset)    Home Medications:  clonazePAM 1 mg oral tablet: one po bid (:)  diclofenac sodium 1% topical cream: Apply topically to affected area once a day ()  fentaNYL 12 mcg/hr transdermal film, extended release: 1 film(s) transdermal every 72 hours ()  hydroxychloroquine 200 mg oral tablet: 1 tab(s) orally 2 times a day ()  ipratropium-albuterol 0.5 mg-2.5 mg/3 mLinhalation solution: 3 milliliter(s) inhaled 8 times a day ()  levothyroxine 25 mcg (0.025 mg) oral capsule: 1 cap(s) orally once a day ()  morphine 12 hour extended release: 15 milligram(s) orally every 12 hours ()  omeprazole 40 mg oral delayed release capsule: 1 cap(s) orally 2 times a day ()  pantoprazole 40 mg oral delayed release tablet: 1 tab(s) orally every 12 hours (:)  Valium 5 mg oral tablet: 1 tab(s) orally 2 times a day ()  Ventolin HFA 90 mcg/inh inhalation aerosol: 2 puff(s) inhaled every 6 hours, As Needed ()    MEDICATIONS  (STANDING):    MEDICATIONS  (PRN):      REVIEW OF SYSTEMS:  General: weak  Respiratory: No cough, SOB  Cardiovascular: No CP or Palpitations	  Gastrointestinal: No nausea, Vomiting. No diarrhea  Genitourinary: No urinary complaints	  Musculoskeletal: No new rash or lesions	  all other systems negative    T(F): 99 (21 @ 13:45), Max: 99 (21 @ 13:45)  HR: 16 (21 @ 13:45) ( - 16)  BP: 105/73 (21 @ 13:45) (105/73 - 105/73)  RR: 16 (21 @ 13:45) (16 - 16)  SpO2: 98% (21 @ 13:45) (98% - 98%)  Wt(kg): --    PHYSICAL EXAM:  General: NAD  Respiratory: b/l air entry  Cardiovascular: S1 S2  Gastrointestinal: soft  Extremities: no edema            122<L>  |  88<L>  |  22  ----------------------------<  62<L>  4.3   |  20<L>  |  0.82    Ca    9.3      06 Dec 2021 14:49    TPro  7.1  /  Alb  3.7  /  TBili  0.6  /  DBili  x   /  AST  25  /  ALT  38  /  AlkPhos  132<H>                            14.1   13.53 )-----------( 509      ( 06 Dec 2021 14:49 )             38.7       Hemoglobin: 14.1 g/dL ( @ 14:49)  Hematocrit: 38.7 % ( @ 14:49)  Potassium, Serum: 4.3 mmol/L ( @ 14:49)  Blood Urea Nitrogen, Serum: 22 mg/dL ( @ 14:49)      Creatinine, Serum: 0.82 ( @ 14:49)      Urinalysis Basic - ( 06 Dec 2021 16:36 )    Color: Yellow / Appearance: Clear / S.010 / pH: x  Gluc: x / Ketone: Moderate  / Bili: Negative / Urobili: Negative   Blood: x / Protein: Negative / Nitrite: Negative   Leuk Esterase: Negative / RBC: x / WBC x   Sq Epi: x / Non Sq Epi: x / Bacteria: x      LIVER FUNCTIONS - ( 06 Dec 2021 14:49 )  Alb: 3.7 g/dL / Pro: 7.1 g/dL / ALK PHOS: 132 U/L / ALT: 38 U/L / AST: 25 U/L / GGT: x                 < from: Xray Chest 1 View- PORTABLE-Urgent (21 @ 14:59) >    EXAM:  XR CHEST PORTABLE URGENT 1V                            PROCEDURE DATE:  2021          INTERPRETATION:  INDICATION: cough    PRIORS: 2020.    VIEWS: Portable AP radiography of the chest performed.    FINDINGS: Heart size appears within normal limits. Note made of an indwelling cardiac loop monitor. No hilar or superior mediastinal abnormalities are identified. There is no evidence for focal infiltrate, lobar consolidation or pulmonary edema. No pleural effusion or pneumothoraxis demonstrated. No mediastinal shift is noted. The visualized osseous structures appear unremarkable. Surgical clips right axillary region.    IMPRESSION: No evidence for focal infiltrate or lobar consolidation.    --- End of Report ---            AVANI SUAREZ MD; Attending Radiologist  This document has been electronically signed. Dec  6 2021  4:01PM    < end of copied text >           Patient is a 56y old  Female who presents with a chief complaint of weakness.    HPI:  Pt is a 57 yo female with PMHX of Anxiety, Chronic low back pain with sciatica ,Chronic neck pain cervical bone fused through the disc, Dvt femoral (deep venous thrombosis) right arm, Emphysema/COPD on oxygen at night 2L NC, GERD, Glossopharyngeal neuralgia syndrome s/p brain sx in 2018, H/O osteoporosis severe, H/O paroxysmal supraventricular tachycardia H/O Raynaud's syndrome Hepatitis C body cleared, History of IBS colitis, History of seizure disorder last seizure 8 yrs ago, Hypotension Hypothyroidism, Lupus Migraine, Neuropathy, Osteochondritis dissecans Sjogren's disease, Vertigo BIBEMS for weakness nausea and vomiting for 3 days. Pt also having chills and body aches. pt is vaccinated for covid. Pt states she has been having uti on and off for one month and currently having dysuria. Pt states she took abx by urgent care. Pt states neurologist also increased dose of pregabalin and Carbamazepine last week and not sure if related.    Renal consult called for hyponatremia. History obtained from chart and patient.     PAST MEDICAL HISTORY:  Lupus    Sjogrens disease    Vertigo    UTI (urinary tract infection)    GERD (gastroesophageal reflux disease)    Hypotension    Hypothyroidism    Gallstones    Hepatitis C    Anxiety    Migraine    Neuropathy    COPD (chronic obstructive pulmonary disease)    Nicotine addiction    Glossopharyngeal neuralgia syndrome    Dvt femoral (deep venous thrombosis)    Lupus    Emphysema/COPD    Burning mouth syndrome    H/O osteoporosis    History of weight loss    History of seizure disorder    Personal history of skin cancer    Osteochondritis dissecans    History of IBS    H/O Raynaud&#x27;s syndrome    Pain, wrist, right    Right shoulder pain    Chronic low back pain with sciatica    Chronic neck pain    H/O paroxysmal supraventricular tachycardia        PAST SURGICAL HISTORY:  Vocal cord polyp    Gall bladder disease    Injury of right wrist, subsequent encounter    H/O lumpectomy    S/P brain surgery    S/P cryoablation of arrhythmia    Ankle problem    H/O elbow surgery    Status post placement of implantable loop recorder    H/O squamous cell carcinoma excision        FAMILY HISTORY:  Family history of systemic lupus erythematosus (SLE) in mother (Sibling)  niece    Family history of psoriatic arthritis (Sibling)  Sister    Family history of diabetes mellitus (Sibling)  Sister    Family history of multiple sclerosis  sister    Family history of heart disease  htn-father    FH: arrhythmia  mother-dementia        SOCIAL HISTORY: + smoking, no alcohol use     Allergies    Vibramycin (Unknown)    Intolerances    aspirin (Stomach Upset)  Zithromax (Stomach Upset)    Home Medications:  clonazePAM 1 mg oral tablet: one po bid (:)  diclofenac sodium 1% topical cream: Apply topically to affected area once a day ()  fentaNYL 12 mcg/hr transdermal film, extended release: 1 film(s) transdermal every 72 hours (:)  hydroxychloroquine 200 mg oral tablet: 1 tab(s) orally 2 times a day ()  ipratropium-albuterol 0.5 mg-2.5 mg/3 mLinhalation solution: 3 milliliter(s) inhaled 8 times a day ()  levothyroxine 25 mcg (0.025 mg) oral capsule: 1 cap(s) orally once a day ()  morphine 12 hour extended release: 15 milligram(s) orally every 12 hours ()  omeprazole 40 mg oral delayed release capsule: 1 cap(s) orally 2 times a day ()  pantoprazole 40 mg oral delayed release tablet: 1 tab(s) orally every 12 hours (:)  Valium 5 mg oral tablet: 1 tab(s) orally 2 times a day ()  Ventolin HFA 90 mcg/inh inhalation aerosol: 2 puff(s) inhaled every 6 hours, As Needed (:)    MEDICATIONS  (STANDING):    MEDICATIONS  (PRN):      REVIEW OF SYSTEMS:  General: weak  Respiratory: No cough, SOB  Cardiovascular: No CP or Palpitations	  Gastrointestinal: + nausea, Vomiting. No diarrhea  Genitourinary: No urinary complaints	  Musculoskeletal: No new rash or lesions	  all other systems negative    T(F): 99 (21 @ 13:45), Max: 99 (21 @ 13:45)  HR: 16 (21 @ 13:45) ( - 16)  BP: 105/73 (21 @ 13:45) (105/73 - 105/73)  RR: 16 (21 @ 13:45) ( - 16)  SpO2: 98% (21 @ 13:45) (98% - 98%)  Wt(kg): --    PHYSICAL EXAM:  General: NAD  Respiratory: b/l air entry  Cardiovascular: S1 S2  Gastrointestinal: soft  Extremities: no edema            122<L>  |  88<L>  |  22  ----------------------------<  62<L>  4.3   |  20<L>  |  0.82    Ca    9.3      06 Dec 2021 14:49    TPro  7.1  /  Alb  3.7  /  TBili  0.6  /  DBili  x   /  AST  25  /  ALT  38  /  AlkPhos  132<H>                            14.1   13.53 )-----------( 509      ( 06 Dec 2021 14:49 )             38.7       Hemoglobin: 14.1 g/dL ( @ 14:49)  Hematocrit: 38.7 % ( @ 14:49)  Potassium, Serum: 4.3 mmol/L ( @ 14:49)  Blood Urea Nitrogen, Serum: 22 mg/dL ( @ 14:49)      Creatinine, Serum: 0.82 ( @ 14:49)      Urinalysis Basic - ( 06 Dec 2021 16:36 )    Color: Yellow / Appearance: Clear / S.010 / pH: x  Gluc: x / Ketone: Moderate  / Bili: Negative / Urobili: Negative   Blood: x / Protein: Negative / Nitrite: Negative   Leuk Esterase: Negative / RBC: x / WBC x   Sq Epi: x / Non Sq Epi: x / Bacteria: x      LIVER FUNCTIONS - ( 06 Dec 2021 14:49 )  Alb: 3.7 g/dL / Pro: 7.1 g/dL / ALK PHOS: 132 U/L / ALT: 38 U/L / AST: 25 U/L / GGT: x                 < from: Xray Chest 1 View- PORTABLE-Urgent (21 @ 14:59) >    EXAM:  XR CHEST PORTABLE URGENT 1V                            PROCEDURE DATE:  2021          INTERPRETATION:  INDICATION: cough    PRIORS: 2020.    VIEWS: Portable AP radiography of the chest performed.    FINDINGS: Heart size appears within normal limits. Note made of an indwelling cardiac loop monitor. No hilar or superior mediastinal abnormalities are identified. There is no evidence for focal infiltrate, lobar consolidation or pulmonary edema. No pleural effusion or pneumothoraxis demonstrated. No mediastinal shift is noted. The visualized osseous structures appear unremarkable. Surgical clips right axillary region.    IMPRESSION: No evidence for focal infiltrate or lobar consolidation.    --- End of Report ---            AVANI SUAREZ MD; Attending Radiologist  This document has been electronically signed. Dec  6 2021  4:01PM    < end of copied text >

## 2021-12-06 NOTE — H&P ADULT - PROBLEM SELECTOR PLAN 1
Patient with 3 days of N/V/D with recent increase in Carbamazepine prescription  -Na 122 on admission  -Admit to general medical floor  -Etiology likely 2/2 low solute intake and SIADH 2/2 carbamazepine   -S/p 1.1L NS bolus in ED, continue on 50cc/hr NS per nephro  -PO fluid restriction  -F/u urine lytes, uric acid level  -Increased her Carbamazepine from 100mg TID to 200mg TID for hx of glossopharyngeal neuralgia, continue for now pending nephro recs  -F/u serum sodium levels q6h, avoid overcorrection, goal correction approx 6mmol/L over 24 hour period  -Nephro Dr. Murillo consulted, recs appreciated Patient with 3 days of N/V/D with recent increase in Carbamazepine prescription  -Na 122 on admission  -Admit to general medical floor  -Etiology likely 2/2 low solute intake and SIADH 2/2 carbamazepine   -S/p 1.1L NS bolus in ED, started on 50cc/hr NS per nephro  -PO fluid restriction stopped as Na corrected too quickly and started on IV D5W  -F/u urine lytes, uric acid level  -Increased her Carbamazepine from 100mg TID to 200mg TID for hx of glossopharyngeal neuralgia, continue for now pending nephro recs  -F/u serum sodium levels q6h, avoid overcorrection, goal correction approx 6mmol/L over 24 hour period  -Nephro Dr. Murillo consulted, recs appreciated

## 2021-12-06 NOTE — H&P ADULT - NSHPSOCIALHISTORY_GEN_ALL_CORE
Tobacco: 2-3 packs per day for >40 years, on 2L NC supplemental O2  EtOH: denies  Recreational drug use: occasional marijuana  Lives with: parents  Ambulates: independent  ADLs: independent  Vaccinations: Covid 3/3, Influenza vaccinated

## 2021-12-06 NOTE — ED PROVIDER NOTE - OBJECTIVE STATEMENT
Pt is a 55 yo female with PMHX of Anxiety, Chronic low back pain with sciatica ,Chronic neck pain cervical bone fused through the disc, Dvt femoral (deep venous thrombosis) right arm, Emphysema/COPD on oxygen at night 2L NC, GERD, Glossopharyngeal neuralgia syndrome s/p brain sx in 12/2018, H/O osteoporosis severe, H/O paroxysmal supraventricular tachycardia H/O Raynaud's syndrome Hepatitis C body cleared, History of IBS colitis, History of seizure disorder last seizure 8 yrs ago, Hypotension Hypothyroidism ,Lupus Migraine, Neuropathy, Osteochondritis dissecans Sjogren's disease, Vertigo BIBEMS for weakness nausea and vomiting for 3 days. Pt also having chills and body aches. pt is vaccinated for covid. Pt states she has been having uti on and off for one month and currently having dysuria. Pt states she took abx by urgent care. Pt states neurologist also increased dose of pregabalin and Carbamazepine last week and not sure if related.

## 2021-12-06 NOTE — ED ADULT NURSE NOTE - NSIMPLEMENTINTERV_GEN_ALL_ED
Implemented All Fall Risk Interventions:  [place] to call system. Call bell, personal items and telephone within reach. Instruct patient to call for assistance. Room bathroom lighting operational. Non-slip footwear when patient is off stretcher. Physically safe environment: no spills, clutter or unnecessary equipment. Stretcher in lowest position, wheels locked, appropriate side rails in place. Provide visual cue, wrist band, yellow gown, etc. Monitor gait and stability. Monitor for mental status changes and reorient to person, place, and time. Review medications for side effects contributing to fall risk. Reinforce activity limits and safety measures with patient and family.

## 2021-12-06 NOTE — H&P ADULT - PROBLEM SELECTOR PLAN 5
Chronic  -Patient reports smoking 2-3 ppd, on 2L supplemental O2 at home  -Continue supplemental O2 as needed  -Continue Albuterol q6h PRN, therapeutic interchange for ipratropium   -Nicotine patch supplied  -Patient counseled on effects of dangers of tobacco use including lung cancer, death, patient declines cessation tools Chronic  -Patient reports smoking 2-3 ppd >40 years, on 2L supplemental O2 at home  -Continue supplemental O2 as needed  -Continue Albuterol q6h PRN, therapeutic interchange for ipratropium   -Nicotine patch supplied  -Patient counseled on effects of dangers of tobacco use including lung cancer, death, patient declines cessation tools

## 2021-12-06 NOTE — CONSULT NOTE ADULT - ASSESSMENT
Hyponatremia: Low solute intake, SIADH secondary to carbamazepine rx/ Nausea and vomitting  Sciatica  h/o Lupus   Hyponatremia: Low solute intake, SIADH secondary to carbamazepine rx/ Nausea and vomitting  Sciatica  h/o Lupus  h/o COPD    Maintain PO fluid restriction. Check urine sodium, urine osm and serum uric acid level. Avoid hypotonic fluids. Monitor BP closely.   Gentle hydration with saline. Check thyroid function tests if not recently checked. Check serial sodium levels. Pt advised on compliance with PO fluid restriction.   Further recommendations pending clinical course. Thank you for the courtesy of this referral.

## 2021-12-06 NOTE — H&P ADULT - NSHPPHYSICALEXAM_GEN_ALL_CORE
T(C): 37.2 (12-06-21 @ 13:45), Max: 37.2 (12-06-21 @ 13:45)  HR: 16 (12-06-21 @ 13:45) (16 - 16)  BP: 105/73 (12-06-21 @ 13:45) (105/73 - 105/73)  RR: 16 (12-06-21 @ 13:45) (16 - 16)  SpO2: 98% (12-06-21 @ 13:45) (98% - 98%)  Wt(kg): --    Physical Exam:   GENERAL: well-groomed, well-developed, NAD  HEENT: head NC/AT; EOM intact, PERRLA, conjunctiva & sclera clear; hearing grossly intact, moist mucous membranes  NECK: supple, no JVD  RESPIRATORY: CTA B/L, no wheezing, rales, rhonchi or rubs  CARDIOVASCULAR: S1&S2, RRR, no murmurs or gallops  ABDOMEN: soft, non-tender, non-distended, + Bowel sounds x4 quadrants, no guarding, rebound or rigidity  MUSCULOSKELETAL:  no clubbing, cyanosis or edema of all 4 extremities  VASCULAR: Dorsalis pedis pulses 2+ bilaterally  SKIN: warm and dry, color normal  NEUROLOGIC: AA&O X3, no sensory loss, motor Strength 5/5 in UE & LE B/L  Psych: Normal mood and affect, normal behavior T(C): 37.2 (12-06-21 @ 13:45), Max: 37.2 (12-06-21 @ 13:45)  HR: 16 (12-06-21 @ 13:45) (16 - 16)  BP: 105/73 (12-06-21 @ 13:45) (105/73 - 105/73)  RR: 16 (12-06-21 @ 13:45) (16 - 16)  SpO2: 98% (12-06-21 @ 13:45) (98% - 98%)    Physical Exam:   GENERAL: well-groomed, well-developed, NAD  HEENT: head NC/AT; EOM intact, PERRLA, conjunctiva & sclera clear; hearing grossly intact, moist mucous membranes  NECK: supple, no JVD  RESPIRATORY: CTA B/L, no wheezing, rales, rhonchi or rubs  CARDIOVASCULAR: S1&S2, RRR, no murmurs or gallops  ABDOMEN: soft, non-tender, non-distended, + Bowel sounds x4 quadrants, no guarding, rebound or rigidity  MUSCULOSKELETAL: no clubbing, cyanosis or edema of all 4 extremities  VASCULAR: Dorsalis pedis pulses 2+ bilaterally  SKIN: warm and dry, color normal  NEUROLOGIC: AA&O X3, no sensory loss, motor Strength 5/5 in UE & LE B/L  Psych: Normal mood and affect, normal behavior

## 2021-12-06 NOTE — H&P ADULT - PROBLEM SELECTOR PLAN 3
Patient reports extensive chronic neck and back pain  -Continue fentanyl patch q72h  -Patient on Morphine IR 15mg 4x daily, given persistent nausea/vomiting will hold and start 4mg IVP Morphine q6h until patient can tolerate PO  -Patient also taking Valium 5mg PO BID, continue  -Patient taking Carisoprodol 350mg PO BID, not available in house, can start Flexeril 10mg BID PRN  -Monitor respiratory and mental status carefully Patient reports extensive chronic neck and back pain  -Continue home dose fentanyl patch q72h  -Patient on Morphine IR 15mg 4x daily, given persistent nausea/vomiting will hold and start 4mg IVP Morphine q6h until patient can tolerate PO  -Patient also taking Valium 5mg PO BID, continue  -Patient taking Carisoprodol 350mg PO BID, not available in house, can start Flexeril 10mg BID PRN  -Monitor respiratory and mental status carefully  -Istop in chart Patient reports extensive chronic neck and back pain  -Continue home dose fentanyl patch q72h  -Patient on Morphine IR 15mg 4x daily, given persistent nausea/vomiting will hold and start 4mg IVP Morphine q6h until patient can tolerate PO  -Patient also taking Valium 5mg PO BID, due to risk of withdrawal, transition to IV 5mg BID  -Patient taking Carisoprodol 350mg PO BID, not available in house, can start Flexeril 10mg BID PRN  -Monitor respiratory and mental status carefully  -Istop in chart Patient reports extensive chronic neck and back pain  -Continue home dose fentanyl patch q72h  -Patient on PO Morphine IR 15mg 4x daily, given persistent nausea/vomiting will hold and start 4mg IVP Morphine q6h until patient can tolerate PO  -Continue home Valium 5mg PO BID  -Patient taking Carisoprodol 350mg PO BID, not available in house, can start Flexeril 10mg BID PRN  -Monitor respiratory and mental status carefully  -Istop in chart

## 2021-12-06 NOTE — H&P ADULT - HISTORY OF PRESENT ILLNESS
57 yo female PMH **********            In ED:  Vitals: T 99, HR , /73, RR 16, SpO2 98%  Labs significant for: WBC 13.53, plt 509, neutrophil 10.63, Na 122, Cl 88, Co2 20, alk phos 132  UA: mod ketone  CT chest/abd/p: new mild intrahepatic and extrahepatic biliary dilation, consider MRCP to exclude choledocholithiasis, findings may represent gastritis, terminal ileitis, and nonspecific colitis, emphysema, no acute pulmonary disease  CXR: no evidence for focal infiltrate or lobar consolidation  EKG:  Given: 4mg IVP Morphine x1, 4mg IVP Zofran x1, 1.1 L NS bolus x1,  57 yo female PMH chronic neck/back pain, sciatica, lupus, Sjogren's syndrome, COPD, glossopharyngeal neuralgia s/p brain surgery, COPD on 2L NC, hypothyroidism, Neuropathy, migraines, GERD presents to ED weakness, nausea, vomiting, diarrhea. Onset 3 days ago. Patient states on Saturday she started feeling unwell, states she vomited >30x and was unable to eat/drink/keep anything down since then. Patient also reports 5 episodes of NB diarrhea this AM. Patient c/o chills, body aches, subjective fever, abd pain. Patient reports on Wednesday, 6 day ago, her doctor increased her Carbamazepine from 100mg TID to 200mg TID, and her Lyrica was increased from 25mg BID to 50mg TID. Patient thinks this contributed to her feeling unwell. Patient has not taken any medication since Saturday when she started vomiting. Patient also reports history of dysuria and pelvic pressure. States she has been experiencing constant pelvic burning for a few months, was treated with an antibiotic from urgent care 2 weeks ago, still experiencing dysuria. Denies frequency, hematuria. Patient reports chronic cough, denies worsening of cough or SOB. Denies CP, palpitations.     In ED:  Vitals: T 99, HR , /73, RR 16, SpO2 98%  Labs significant for: WBC 13.53, plt 509, neutrophil 10.63, Na 122, Cl 88, Co2 20, alk phos 132  UA: mod ketone  CT chest/abd/p: new mild intrahepatic and extrahepatic biliary dilation, consider MRCP to exclude choledocholithiasis, findings may represent gastritis, terminal ileitis, and nonspecific colitis, emphysema, no acute pulmonary disease  CXR: no evidence for focal infiltrate or lobar consolidation  EKG: NSR with 1st degree AV block,   Given: 4mg IVP Morphine x1, 4mg IVP Zofran x1, 1.1 L NS bolus x1, IV Zosyn x1 57 yo female PMH chronic neck/back pain, sciatica, lupus, Sjogren's syndrome, COPD, glossopharyngeal neuralgia s/p brain surgery, COPD on 2L NC, hypothyroidism, Neuropathy, migraines, GERD presents to ED weakness, nausea, vomiting, diarrhea. Onset 3 days ago. Patient states on Saturday she started feeling unwell, states she vomited >30x and has unable to eat/drink/keep anything down since then. Patient also reports 5 episodes of NB diarrhea this AM. Patient c/o chills, body aches, subjective fever, abd pain. Patient reports on Wednesday, 6 days ago, her doctor increased her Carbamazepine from 100mg TID to 200mg TID, and her Lyrica was increased from 25mg BID to 50mg TID. Patient thinks this contributed to her feeling unwell. Patient has not taken any medication since Saturday when she started vomiting. Patient also reports history of dysuria and pelvic pressure. States she has been experiencing constant pelvic burning for a few months, was treated with an antibiotic from urgent care 2 weeks ago, still experiencing dysuria. Denies frequency, hematuria. Patient reports chronic cough, denies worsening of cough or SOB. Denies CP, palpitations.     In ED:  Vitals: T 99, HR , /73, RR 16, SpO2 98%  Labs significant for: WBC 13.53, plt 509, neutrophil 10.63, Na 122, Cl 88, Co2 20, alk phos 132  UA: mod ketone  CT chest/abd/p: new mild intrahepatic and extrahepatic biliary dilation, consider MRCP to exclude choledocholithiasis, findings may represent gastritis, terminal ileitis, and nonspecific colitis, emphysema, no acute pulmonary disease  CXR: no evidence for focal infiltrate or lobar consolidation  EKG: NSR with 1st degree AV block - this is also noted on EKG from Sept 2019  Given: 4mg IVP Morphine x1, 4mg IVP Zofran x1, 1.1 L NS bolus x1, IV Zosyn x1 57 yo female with PMHx of chronic neck/back pain, sciatica, lupus, Sjogren's syndrome, COPD, glossopharyngeal neuralgia s/p brain surgery, COPD on 2L NC, hypothyroidism, Neuropathy, migraines, GERD presents to ED weakness, nausea, vomiting, diarrhea. Onset 3 days ago. Patient states on Saturday she started feeling unwell, states she vomited >30x and has unable to eat/drink/keep anything down since then. Patient also reports 5 episodes of NB diarrhea this AM. Patient c/o chills, body aches, subjective fever, abd pain. Patient reports on Wednesday, 6 days ago, her doctor increased her Carbamazepine from 100mg TID to 200mg TID, and her Lyrica was increased from 25mg BID to 50mg TID. Patient thinks this contributed to her feeling unwell. Patient has not taken any medication since Saturday when she started vomiting. Patient also reports history of dysuria and pelvic pressure. States she has been experiencing constant pelvic burning for a few months, was treated with an antibiotic from urgent care 2 weeks ago, still experiencing dysuria. Denies frequency, hematuria. Patient reports chronic cough, denies worsening of cough or SOB. Denies CP, palpitations.     In ED:  Vitals: T 99, HR , /73, RR 16, SpO2 98%  Labs significant for: WBC 13.53, plt 509, neutrophil 10.63, Na 122, Cl 88, Co2 20, alk phos 132  UA: mod ketone  CT chest/abd/p: new mild intrahepatic and extrahepatic biliary dilation, consider MRCP to exclude choledocholithiasis, findings may represent gastritis, terminal ileitis, and nonspecific colitis, emphysema, no acute pulmonary disease  CXR: no evidence for focal infiltrate or lobar consolidation  EKG: NSR with 1st degree AV block - this is also noted on EKG from Sept 2019  Given: 4mg IVP Morphine x1, 4mg IVP Zofran x1, 1.1 L NS bolus x1, IV Zosyn x1

## 2021-12-06 NOTE — H&P ADULT - PROBLEM SELECTOR PLAN 2
Patient with 3 days of N/V/D  -CT chest/abd/p showing new mild intrahepatic and extrahepatic biliary dilation, consider MRCP to exclude choledocholithiasis, findings may represent gastritis, terminal ileitis, and nonspecific colitis  -Patient not septic on admission, lactate low  -Patient reports hx of cholecystectomy, LFTs and Tbili WNL  -S/p IV Zosyn in ED, continue IV Zosyn  -F/u Blood cultures x2, urine culture  -Clear liquid diet  -GI Dr. Reyes consulted Patient with 3 days of N/V/D  -CT chest/abd/p showing new mild intrahepatic and extrahepatic biliary dilation, consider MRCP to exclude choledocholithiasis, findings may represent gastritis, terminal ileitis, and nonspecific colitis  -Patient not septic on admission, lactate low  -Patient reports hx of cholecystectomy, LFTs and Tbili WNL  -S/p IV Zosyn in ED, continue IV Zosyn  -F/u Blood cultures x2, urine culture  -Clear liquid diet  -GI Dr. Reyes consulted  -PT consulted

## 2021-12-06 NOTE — H&P ADULT - PROBLEM SELECTOR PLAN 4
Hx of glossopharyngeal neuralgia s/p brain surgery 3 years ago in which she takes Carbamazepine  -Patient reports recent increase in Carbamazepine 6 days ago from 100mg TID to 200mg TID  -Patient reports noncompliance for past 3 days due to N/V/D, carbamazepine level <0.5 on admission  -Patient states she does not take this medication for seizures, although she does have a history of seizure she states her last seizure was "many, many years ago"  -Seizure precautions, aspiration precautions  -Adjust carbamazepine pending nephro recommendations and hyponatremia course, consider neuro consult Hx of glossopharyngeal neuralgia s/p brain surgery 3 years ago in which she takes Carbamazepine  -Patient reports recent increase in Carbamazepine 6 days ago from 100mg TID to 200mg TID, continue increased dose  -Patient reports noncompliance for past 3 days due to N/V/D, carbamazepine level <0.5 on admission  -Patient states she does not take this medication for seizures, although she does have a history of seizure she states her last seizure was "many, many years ago"  -Seizure precautions, aspiration precautions  -Adjust carbamazepine pending nephro recommendations and hyponatremia course, consider neuro consult

## 2021-12-06 NOTE — CHART NOTE - NSCHARTNOTEFT_GEN_A_CORE
Patient Name: Katie Meza Date: 1965  Address: [address] [place] [id number]: Female  Rx Written	Rx Dispensed	Drug	Quantity	Days Supply	Prescriber Name	Prescriber Sammie #	Payment Method	Dispenser  11/10/2021	11/30/2021	fentanyl 12 mcg/hr patch	10	30	June Akbar	[id number]	Medicare	[id number]  11/11/2021	11/16/2021	pregabalin 25 mg capsule	180	30	Lorne Sepulveda MD	[id number]	Medicare	[id number]  11/10/2021	11/12/2021	carisoprodol 350 mg tablet	60	30	June Akbar	[id number]	Medicare	[id number]  11/10/2021	11/12/2021	morphine sulfate ir 15 mg tab	120	30	June Akbar	[id number]	Medicare	[id number]  11/10/2021	11/12/2021	diazepam 5 mg tablet	60	30	June Akbar	[id number]	Medicare	[id number]  10/15/2021	10/22/2021	fentanyl 12 mcg/hr patch	10	30	Danika Cervantes	[id number]	Medicare	[id number]  10/15/2021	10/15/2021	carisoprodol 350 mg tablet	60	30	Danika Cervantes	[id number]	Medicare	[id number]  10/15/2021	10/15/2021	diazepam 5 mg tablet	60	30	Danika Cervantes	[id number]	Medicare	[id number]  10/15/2021	10/15/2021	pregabalin 25 mg capsule	60	30	Danika Cervantes	[id number]	Medicare	[id number]  09/18/2021	10/12/2021	morphine sulfate ir 15 mg tab	120	30	Danika Cervantes	[id number]	Medicare	[id number]  09/07/2021	09/20/2021	fentanyl 12 mcg/hr patch	10	30	Danika Cervantes	[id number]	Medicare	[id number]  09/18/2021	09/18/2021	diazepam 5 mg tablet	60	30	Danika Cervantes	[id number]	Medicare	[id number]  09/18/2021	09/18/2021	carisoprodol 350 mg tablet	60	30	Danika Cervantes	[id number]	Medicare	[id number]  08/19/2021	09/06/2021	morphine sulfate ir 15 mg tab	120	30	Danika Cervantes	[id number]	Medicare	[id number]  08/24/2021	08/28/2021	pregabalin 25 mg capsule	90	30	Lorne Sepulveda MD	[id number]	Medicare	[id number]

## 2021-12-06 NOTE — H&P ADULT - NSHPREVIEWOFSYSTEMS_GEN_ALL_CORE
CONSTITUTIONAL: +weakness, +fevers, +chills  HEENT:  +headache, no visual changes, no sore throat  RESPIRATORY: +chronic cough, no wheezing, no hemoptysis; +chronic shortness of breath  CARDIOVASCULAR: No chest pain or palpitations  GASTROINTESTINAL: +abd pressure/pain, + nausea, + vomiting, no hematemesis; + diarrhea, No melena or hematochezia.  GENITOURINARY: + dysuria, no frequency or hematuria  NEUROLOGICAL: No focal weakness or dizziness   MSK: + myalgias  SKIN: No itching, burning, rashes, or lesions

## 2021-12-06 NOTE — H&P ADULT - ASSESSMENT
57 yo female PMH chronic neck/back pain, sciatica, lupus, Sjogren's syndrome, COPD, glossopharyngeal neuralgia s/p brain surgery, COPD on 2L NC, hypothyroidism, Neuropathy, migraines, GERD presents to ED with 3 days of weakness, nausea, vomiting, diarrhea, patient admitted for hyponatremia and colitis found on CT abd/p. 57 yo female PMH chronic neck/back pain, sciatica, lupus, Sjogren's syndrome, COPD, glossopharyngeal neuralgia s/p brain surgery, COPD on 2L NC, hypothyroidism, Neuropathy, migraines, GERD presents to ED with 3 days of weakness, nausea, vomiting, diarrhea, patient admitted for hyponatremia and colitis found on CT abd/pelvis.

## 2021-12-06 NOTE — ED PROVIDER NOTE - CARE PLAN
1 Principal Discharge DX:	Weakness  Secondary Diagnosis:	Hyponatremia   Principal Discharge DX:	Weakness  Secondary Diagnosis:	Hyponatremia  Secondary Diagnosis:	Colitis

## 2021-12-06 NOTE — ED ADULT TRIAGE NOTE - PATIENT ON (OXYGEN DELIVERY METHOD)
Patient is requesting to talk with the nurse on call in regard to her lichen sclerosis.  Patient has a appointment scheduled for 3-4-19 but stated she can not wait that long and not sure what to do or go to ED.  Please call patient back to discuss.   room air

## 2021-12-06 NOTE — H&P ADULT - PROBLEM SELECTOR PLAN 8
Patient reports recent increase in Lyrica from 25mg BID to 50mg TID  -Patient has not taken medication for past 3 days, states she does not agree with taking the higher dose  -Continue previous dosing of 25mg BID Patient reports hx of Lupus and many autoimmune disorders including Sjogren's syndrome  -Patient not compliant with hydroxychloroquine, states she has not taken since Feb 2021  -F/u outpatient with PCP or rheum

## 2021-12-07 ENCOUNTER — TRANSCRIPTION ENCOUNTER (OUTPATIENT)
Age: 56
End: 2021-12-07

## 2021-12-07 LAB
ALBUMIN SERPL ELPH-MCNC: 2.7 G/DL — LOW (ref 3.3–5)
ALP SERPL-CCNC: 92 U/L — SIGNIFICANT CHANGE UP (ref 40–120)
ALT FLD-CCNC: 27 U/L — SIGNIFICANT CHANGE UP (ref 12–78)
ANION GAP SERPL CALC-SCNC: 6 MMOL/L — SIGNIFICANT CHANGE UP (ref 5–17)
ANION GAP SERPL CALC-SCNC: 6 MMOL/L — SIGNIFICANT CHANGE UP (ref 5–17)
AST SERPL-CCNC: 17 U/L — SIGNIFICANT CHANGE UP (ref 15–37)
BASOPHILS # BLD AUTO: 0.04 K/UL — SIGNIFICANT CHANGE UP (ref 0–0.2)
BASOPHILS NFR BLD AUTO: 0.4 % — SIGNIFICANT CHANGE UP (ref 0–2)
BILIRUB SERPL-MCNC: 0.2 MG/DL — SIGNIFICANT CHANGE UP (ref 0.2–1.2)
BUN SERPL-MCNC: 15 MG/DL — SIGNIFICANT CHANGE UP (ref 7–23)
BUN SERPL-MCNC: 15 MG/DL — SIGNIFICANT CHANGE UP (ref 7–23)
CALCIUM SERPL-MCNC: 7.5 MG/DL — LOW (ref 8.5–10.1)
CALCIUM SERPL-MCNC: 7.8 MG/DL — LOW (ref 8.5–10.1)
CHLORIDE SERPL-SCNC: 103 MMOL/L — SIGNIFICANT CHANGE UP (ref 96–108)
CHLORIDE SERPL-SCNC: 103 MMOL/L — SIGNIFICANT CHANGE UP (ref 96–108)
CO2 SERPL-SCNC: 24 MMOL/L — SIGNIFICANT CHANGE UP (ref 22–31)
CO2 SERPL-SCNC: 24 MMOL/L — SIGNIFICANT CHANGE UP (ref 22–31)
CREAT SERPL-MCNC: 0.85 MG/DL — SIGNIFICANT CHANGE UP (ref 0.5–1.3)
CREAT SERPL-MCNC: 0.89 MG/DL — SIGNIFICANT CHANGE UP (ref 0.5–1.3)
CULTURE RESULTS: SIGNIFICANT CHANGE UP
EOSINOPHIL # BLD AUTO: 0.07 K/UL — SIGNIFICANT CHANGE UP (ref 0–0.5)
EOSINOPHIL NFR BLD AUTO: 0.8 % — SIGNIFICANT CHANGE UP (ref 0–6)
GLUCOSE SERPL-MCNC: 201 MG/DL — HIGH (ref 70–99)
GLUCOSE SERPL-MCNC: 203 MG/DL — HIGH (ref 70–99)
HCT VFR BLD CALC: 31.6 % — LOW (ref 34.5–45)
HGB BLD-MCNC: 11.6 G/DL — SIGNIFICANT CHANGE UP (ref 11.5–15.5)
IMM GRANULOCYTES NFR BLD AUTO: 0.4 % — SIGNIFICANT CHANGE UP (ref 0–1.5)
LYMPHOCYTES # BLD AUTO: 2.12 K/UL — SIGNIFICANT CHANGE UP (ref 1–3.3)
LYMPHOCYTES # BLD AUTO: 23.8 % — SIGNIFICANT CHANGE UP (ref 13–44)
MAGNESIUM SERPL-MCNC: 2 MG/DL — SIGNIFICANT CHANGE UP (ref 1.6–2.6)
MCHC RBC-ENTMCNC: 32.7 PG — SIGNIFICANT CHANGE UP (ref 27–34)
MCHC RBC-ENTMCNC: 36.7 GM/DL — HIGH (ref 32–36)
MCV RBC AUTO: 89 FL — SIGNIFICANT CHANGE UP (ref 80–100)
MONOCYTES # BLD AUTO: 1.27 K/UL — HIGH (ref 0–0.9)
MONOCYTES NFR BLD AUTO: 14.2 % — HIGH (ref 2–14)
NEUTROPHILS # BLD AUTO: 5.38 K/UL — SIGNIFICANT CHANGE UP (ref 1.8–7.4)
NEUTROPHILS NFR BLD AUTO: 60.4 % — SIGNIFICANT CHANGE UP (ref 43–77)
NRBC # BLD: 0 /100 WBCS — SIGNIFICANT CHANGE UP (ref 0–0)
OSMOLALITY UR: 173 MOSM/KG — SIGNIFICANT CHANGE UP (ref 50–1200)
PHOSPHATE SERPL-MCNC: 1 MG/DL — CRITICAL LOW (ref 2.5–4.5)
PLATELET # BLD AUTO: 414 K/UL — HIGH (ref 150–400)
POTASSIUM SERPL-MCNC: 3.4 MMOL/L — LOW (ref 3.5–5.3)
POTASSIUM SERPL-MCNC: 3.5 MMOL/L — SIGNIFICANT CHANGE UP (ref 3.5–5.3)
POTASSIUM SERPL-SCNC: 3.4 MMOL/L — LOW (ref 3.5–5.3)
POTASSIUM SERPL-SCNC: 3.5 MMOL/L — SIGNIFICANT CHANGE UP (ref 3.5–5.3)
PROT SERPL-MCNC: 5.5 G/DL — LOW (ref 6–8.3)
RBC # BLD: 3.55 M/UL — LOW (ref 3.8–5.2)
RBC # FLD: 13.2 % — SIGNIFICANT CHANGE UP (ref 10.3–14.5)
SODIUM SERPL-SCNC: 133 MMOL/L — LOW (ref 135–145)
SODIUM SERPL-SCNC: 133 MMOL/L — LOW (ref 135–145)
SODIUM UR-SCNC: <20 MMOL/L — SIGNIFICANT CHANGE UP
SPECIMEN SOURCE: SIGNIFICANT CHANGE UP
T3 SERPL-MCNC: 44 NG/DL — LOW (ref 80–200)
T4 AB SER-ACNC: 4.3 UG/DL — LOW (ref 4.6–12)
WBC # BLD: 8.92 K/UL — SIGNIFICANT CHANGE UP (ref 3.8–10.5)
WBC # FLD AUTO: 8.92 K/UL — SIGNIFICANT CHANGE UP (ref 3.8–10.5)

## 2021-12-07 PROCEDURE — 99233 SBSQ HOSP IP/OBS HIGH 50: CPT | Mod: GC

## 2021-12-07 RX ORDER — SODIUM,POTASSIUM PHOSPHATES 278-250MG
1 POWDER IN PACKET (EA) ORAL EVERY 8 HOURS
Refills: 0 | Status: COMPLETED | OUTPATIENT
Start: 2021-12-07 | End: 2021-12-07

## 2021-12-07 RX ORDER — MORPHINE SULFATE 50 MG/1
4 CAPSULE, EXTENDED RELEASE ORAL
Refills: 0 | Status: DISCONTINUED | OUTPATIENT
Start: 2021-12-07 | End: 2021-12-09

## 2021-12-07 RX ORDER — MORPHINE SULFATE 50 MG/1
15 CAPSULE, EXTENDED RELEASE ORAL EVERY 6 HOURS
Refills: 0 | Status: DISCONTINUED | OUTPATIENT
Start: 2021-12-07 | End: 2021-12-07

## 2021-12-07 RX ORDER — POTASSIUM PHOSPHATE, MONOBASIC POTASSIUM PHOSPHATE, DIBASIC 236; 224 MG/ML; MG/ML
15 INJECTION, SOLUTION INTRAVENOUS ONCE
Refills: 0 | Status: COMPLETED | OUTPATIENT
Start: 2021-12-07 | End: 2021-12-07

## 2021-12-07 RX ORDER — POTASSIUM CHLORIDE 20 MEQ
40 PACKET (EA) ORAL ONCE
Refills: 0 | Status: COMPLETED | OUTPATIENT
Start: 2021-12-07 | End: 2021-12-07

## 2021-12-07 RX ORDER — PANTOPRAZOLE SODIUM 20 MG/1
40 TABLET, DELAYED RELEASE ORAL
Refills: 0 | Status: DISCONTINUED | OUTPATIENT
Start: 2021-12-07 | End: 2021-12-09

## 2021-12-07 RX ADMIN — Medication 5 MILLIGRAM(S): at 17:11

## 2021-12-07 RX ADMIN — PIPERACILLIN AND TAZOBACTAM 25 GRAM(S): 4; .5 INJECTION, POWDER, LYOPHILIZED, FOR SOLUTION INTRAVENOUS at 17:13

## 2021-12-07 RX ADMIN — Medication 200 MILLIGRAM(S): at 06:28

## 2021-12-07 RX ADMIN — Medication 1 PACKET(S): at 14:11

## 2021-12-07 RX ADMIN — Medication 25 MICROGRAM(S): at 06:28

## 2021-12-07 RX ADMIN — PIPERACILLIN AND TAZOBACTAM 25 GRAM(S): 4; .5 INJECTION, POWDER, LYOPHILIZED, FOR SOLUTION INTRAVENOUS at 14:10

## 2021-12-07 RX ADMIN — Medication 1 CAPSULE(S): at 17:11

## 2021-12-07 RX ADMIN — ONDANSETRON 4 MILLIGRAM(S): 8 TABLET, FILM COATED ORAL at 22:13

## 2021-12-07 RX ADMIN — TIOTROPIUM BROMIDE 1 CAPSULE(S): 18 CAPSULE ORAL; RESPIRATORY (INHALATION) at 06:28

## 2021-12-07 RX ADMIN — MORPHINE SULFATE 4 MILLIGRAM(S): 50 CAPSULE, EXTENDED RELEASE ORAL at 22:11

## 2021-12-07 RX ADMIN — Medication 5 MILLIGRAM(S): at 06:28

## 2021-12-07 RX ADMIN — MORPHINE SULFATE 4 MILLIGRAM(S): 50 CAPSULE, EXTENDED RELEASE ORAL at 22:41

## 2021-12-07 RX ADMIN — FENTANYL CITRATE 1 PATCH: 50 INJECTION INTRAVENOUS at 19:28

## 2021-12-07 RX ADMIN — Medication 200 MILLIGRAM(S): at 14:11

## 2021-12-07 RX ADMIN — MORPHINE SULFATE 4 MILLIGRAM(S): 50 CAPSULE, EXTENDED RELEASE ORAL at 05:55

## 2021-12-07 RX ADMIN — MORPHINE SULFATE 4 MILLIGRAM(S): 50 CAPSULE, EXTENDED RELEASE ORAL at 14:17

## 2021-12-07 RX ADMIN — Medication 200 MILLIGRAM(S): at 21:20

## 2021-12-07 RX ADMIN — Medication 1 CAPSULE(S): at 06:28

## 2021-12-07 RX ADMIN — MORPHINE SULFATE 4 MILLIGRAM(S): 50 CAPSULE, EXTENDED RELEASE ORAL at 05:33

## 2021-12-07 RX ADMIN — MORPHINE SULFATE 4 MILLIGRAM(S): 50 CAPSULE, EXTENDED RELEASE ORAL at 15:10

## 2021-12-07 RX ADMIN — Medication 40 MILLIEQUIVALENT(S): at 21:26

## 2021-12-07 RX ADMIN — ENOXAPARIN SODIUM 40 MILLIGRAM(S): 100 INJECTION SUBCUTANEOUS at 14:11

## 2021-12-07 RX ADMIN — Medication 1 PACKET(S): at 08:24

## 2021-12-07 RX ADMIN — Medication 25 MILLIGRAM(S): at 06:28

## 2021-12-07 RX ADMIN — POTASSIUM PHOSPHATE, MONOBASIC POTASSIUM PHOSPHATE, DIBASIC 62.5 MILLIMOLE(S): 236; 224 INJECTION, SOLUTION INTRAVENOUS at 22:09

## 2021-12-07 RX ADMIN — ONDANSETRON 4 MILLIGRAM(S): 8 TABLET, FILM COATED ORAL at 05:35

## 2021-12-07 RX ADMIN — PIPERACILLIN AND TAZOBACTAM 25 GRAM(S): 4; .5 INJECTION, POWDER, LYOPHILIZED, FOR SOLUTION INTRAVENOUS at 03:09

## 2021-12-07 RX ADMIN — PANTOPRAZOLE SODIUM 40 MILLIGRAM(S): 20 TABLET, DELAYED RELEASE ORAL at 06:28

## 2021-12-07 RX ADMIN — Medication 1 PATCH: at 14:11

## 2021-12-07 RX ADMIN — Medication 25 MILLIGRAM(S): at 17:11

## 2021-12-07 NOTE — PROGRESS NOTE ADULT - PROBLEM SELECTOR PLAN 1
Patient with 3 days of N/V/D with recent increase in Carbamazepine prescription  -Etiology likely 2/2 low solute intake and SIADH 2/2 carbamazepine   -Na 122 on admission, now 134  -PO fluid restriction stopped as Na corrected too quickly, continue on IV D5W  -Continue Carbamazepine 200mg TID for hx of glossopharyngeal neuralgia  -Potassium, phos repleted  -Monitor daily CMP  -Nephro Dr. Murillo consulted, recs appreciated

## 2021-12-07 NOTE — DIETITIAN INITIAL EVALUATION ADULT. - PROBLEM SELECTOR PLAN 2
Patient with 3 days of N/V/D  -CT chest/abd/p showing new mild intrahepatic and extrahepatic biliary dilation, consider MRCP to exclude choledocholithiasis, findings may represent gastritis, terminal ileitis, and nonspecific colitis  -Patient not septic on admission, lactate low  -Patient reports hx of cholecystectomy, LFTs and Tbili WNL  -S/p IV Zosyn in ED, continue IV Zosyn  -F/u Blood cultures x2, urine culture  -Clear liquid diet  -GI Dr. Reyes consulted  -PT consulted

## 2021-12-07 NOTE — DIETITIAN INITIAL EVALUATION ADULT. - PROBLEM SELECTOR PLAN 9
Patient reports recent increase in Lyrica from 25mg BID to 50mg TID  -Patient has not taken medication for past 3 days, states she does not agree with taking the higher dose  -Continue previous dosing of 25mg BID

## 2021-12-07 NOTE — DIETITIAN INITIAL EVALUATION ADULT. - PROBLEM SELECTOR PLAN 7
Patient reports few months with dysuria and pelvic pressure, reports recent abx completion of unknown abx a few weeks ago without improvement in symptoms  -UA unremarkable on admission  -F/u urine culture

## 2021-12-07 NOTE — DIETITIAN INITIAL EVALUATION ADULT. - ORAL INTAKE PTA/DIET HISTORY
regular diet   usually eats only lunch and dinner  not a breakfast eater  sometimes drinks her friend's Ensure at times.

## 2021-12-07 NOTE — PHYSICAL THERAPY INITIAL EVALUATION ADULT - PERTINENT HX OF CURRENT PROBLEM, REHAB EVAL
57 yo female with PMHX of Anxiety, low back pain,Chronic neck pain, Dvt femoral (DVT) right arm, Emphysema/COPD, GERD, Glossopharyngeal neuralgia syndrome s/p brain sx, H/O osteoporosis severe, H/O paroxysmal supraventricular tachycardia H/O Raynaud's, Hepatitis C,IBS colitis, seizure disorder, Hypotension Hypothyroidism ,Lupus Migraine, Neuropathy, Osteochondritis dissecans Sjogren's disease, Vertigo BIBEMS for weakness nausea and vomiting for 3 days. Pt also having chills and body aches.

## 2021-12-07 NOTE — PATIENT PROFILE ADULT - FALL HARM RISK - HARM RISK INTERVENTIONS

## 2021-12-07 NOTE — PROGRESS NOTE ADULT - PROBLEM SELECTOR PLAN 2
Patient with 3 days of N/V/D, now improved  -CT chest/abd/p showing new mild intrahepatic and extrahepatic biliary dilation, consider MRCP to exclude choledocholithiasis, findings may represent gastritis, terminal ileitis, and nonspecific colitis  -Patient reports hx of cholecystectomy, LFTs and Tbili WNL  -Patinet will need MRCP vs. EUS prior to d/c  -Continue IV Zosyn  -Continue PPI BID  -F/u Blood cultures x2, urine culture  -Clear liquid diet  -GI Dr. Reyes consulted  -No PT needs Patient with 3 days of N/V/D, now improved  -CT chest/abd/p showing new mild intrahepatic and extrahepatic biliary dilation, consider MRCP to exclude choledocholithiasis, findings may represent gastritis, terminal ileitis, and nonspecific colitis  -Patient reports hx of cholecystectomy, LFTs and Tbili WNL  -Patinet will need MRCP vs. EUS prior to d/c  -Continue IV Zosyn  -Continue PPI BID  -Clear liquid diet  -F/u Blood cultures x2, urine culture  -GI Dr. Reeys consulted Patient with 3 days of N/V/D, now improved  -CT chest/abd/p showing new mild intrahepatic and extrahepatic biliary dilation, consider MRCP to exclude choledocholithiasis, findings may represent gastritis, terminal ileitis, and nonspecific colitis  -Patient reports hx of cholecystectomy, LFTs and Tbili WNL  -Patient will need MRCP vs. EUS, and EGD prior to d/c   -Continue IV Zosyn  -Continue PPI BID  -Clear liquid diet  -F/u Blood cultures x2, urine culture  -GI Dr. 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  Patient with 3 days of N/V/D, now improved  -CT chest/abd/p showing new mild intrahepatic and extrahepatic biliary dilation, consider MRCP to exclude choledocholithiasis, findings may represent gastritis, terminal ileitis, and nonspecific colitis  -Patient reports hx of cholecystectomy, LFTs and Tbili WNL  -Patient will need MRCP vs. EUS, and EGD prior to d/c   -Continue IV Zosyn  -Continue PPI BID  -Clear liquid diet  -F/u Blood cultures x2, urine culture  -GI Dr. Reyes consulted

## 2021-12-07 NOTE — CONSULT NOTE ADULT - ASSESSMENT
Double duct sign/ ?SMA syndrome/ colitis/duodenitis  liquid diet  will need mrcp (pt has loop recorder) vs EUS  ppi bid  correct e-lytes  egd prior to dc  will fu

## 2021-12-07 NOTE — DIETITIAN INITIAL EVALUATION ADULT. - OTHER INFO
55 y/o female adm with N/V/D colitis, weakness. PMH lupus, vertigo, sjogren's hypotension, hypothyroidism, COPD, seizure d/o, wt loss, anxiety, s/p brain surgery, GERD. Pt visited at bedside this am. Pt states that N/V/D have all resolved. No BM yet today. Menu system reviewed with pt and changes to meals made as per pt's request. Pt's requesting french fries and mark. Explained to pt that those are not the best choices given dx. Pt's UBW a few yrs ago was 90-95#. 12/2020 pt's wt had decreased to 80#. Pt currently weighing 78#. Na 133 fluid restriction d/c'd Kphos ordered. Provided pt with verbal and written information re: low fiber diet with pt. Compliance encouraged.

## 2021-12-07 NOTE — PROGRESS NOTE ADULT - ASSESSMENT
55 yo female PMH chronic neck/back pain, sciatica, lupus, Sjogren's syndrome, COPD, glossopharyngeal neuralgia s/p brain surgery, COPD on 2L NC, hypothyroidism, Neuropathy, migraines, GERD presents to ED with 3 days of weakness, nausea, vomiting, diarrhea, patient admitted for hyponatremia and colitis found on CT abd/pelvis.

## 2021-12-07 NOTE — DIETITIAN INITIAL EVALUATION ADULT. - PERTINENT MEDS FT
MEDICATIONS  (STANDING):  acetaminophen 300 mG/butalbital 50 mG/ caffeine 40 mG 1 Capsule(s) Oral <User Schedule>  carBAMazepine Chewable 200 milliGRAM(s) Chew three times a day  dextrose 40% Gel 15 Gram(s) Oral once  dextrose 5%. 1000 milliLiter(s) (75 mL/Hr) IV Continuous <Continuous>  dextrose 50% Injectable 25 Gram(s) IV Push once  dextrose 50% Injectable 12.5 Gram(s) IV Push once  dextrose 50% Injectable 25 Gram(s) IV Push once  diazepam    Tablet 5 milliGRAM(s) Oral two times a day  enoxaparin Injectable 40 milliGRAM(s) SubCutaneous daily  fentaNYL   Patch  12 MICROgram(s)/Hr 1 Patch Transdermal every 72 hours  glucagon  Injectable 1 milliGRAM(s) IntraMuscular once  levothyroxine 25 MICROGram(s) Oral daily  nicotine - 21 mG/24Hr(s) Patch 1 patch Transdermal daily  pantoprazole    Tablet 40 milliGRAM(s) Oral before breakfast  piperacillin/tazobactam IVPB.. 3.375 Gram(s) IV Intermittent every 8 hours  potassium phosphate / sodium phosphate Powder (PHOS-NaK) 1 Packet(s) Oral every 8 hours  pregabalin 25 milliGRAM(s) Oral two times a day  tiotropium 18 MICROgram(s) Capsule 1 Capsule(s) Inhalation daily    MEDICATIONS  (PRN):  ALBUTerol    90 MICROgram(s) HFA Inhaler 2 Puff(s) Inhalation every 6 hours PRN Shortness of Breath and/or Wheezing  cyclobenzaprine 10 milliGRAM(s) Oral two times a day PRN Muscle Spasm  morphine  - Injectable 4 milliGRAM(s) IV Push four times a day PRN Severe Pain (7 - 10)  ondansetron Injectable 4 milliGRAM(s) IV Push every 6 hours PRN Nausea and/or Vomiting

## 2021-12-07 NOTE — DIETITIAN INITIAL EVALUATION ADULT. - PERSON TAUGHT/METHOD
low fiber diet/verbal instruction/written material/patient instructed/teach back - (Patient repeats in own words)

## 2021-12-07 NOTE — PROGRESS NOTE ADULT - PROBLEM SELECTOR PLAN 11
DVT PPx: Lovenox 40mg qd DVT PPx: Lovenox 40mg qd  Nutrition follow up for severe protein calorie malnutrition - diet de-escalated to liquids as per GI - will follow up to assess for nutritional supplementation

## 2021-12-07 NOTE — DIETITIAN INITIAL EVALUATION ADULT. - PERTINENT LABORATORY DATA
12-07 Na 133 mmol/L<L> Glu 201 mg/dL<H> K+ 3.4 mmol/L<L> Cr  0.85 mg/dL BUN 15 mg/dL Phos 1.0 mg/dL<LL> Alb 2.7 g/dL<L> PAB n/a   Hgb 11.6 g/dL Hct 31.6 %<L>

## 2021-12-07 NOTE — DIETITIAN INITIAL EVALUATION ADULT. - PROBLEM SELECTOR PLAN 4
Hx of glossopharyngeal neuralgia s/p brain surgery 3 years ago in which she takes Carbamazepine  -Patient reports recent increase in Carbamazepine 6 days ago from 100mg TID to 200mg TID, continue increased dose  -Patient reports noncompliance for past 3 days due to N/V/D, carbamazepine level <0.5 on admission  -Patient states she does not take this medication for seizures, although she does have a history of seizure she states her last seizure was "many, many years ago"  -Seizure precautions, aspiration precautions  -Adjust carbamazepine pending nephro recommendations and hyponatremia course, consider neuro consult

## 2021-12-07 NOTE — PROGRESS NOTE ADULT - SUBJECTIVE AND OBJECTIVE BOX
Patient is a 56y old  Female who presents with a chief complaint of hyponatremia, colitis (07 Dec 2021 10:01)    Patient seen in follow up for hyponatremia.        PAST MEDICAL HISTORY:  Lupus    Sjogren&#x27;s disease    Vertigo    UTI (urinary tract infection)    GERD (gastroesophageal reflux disease)    Hypotension    Hypothyroidism    Gallstones    Hepatitis C    Anxiety    Migraine    Neuropathy    COPD (chronic obstructive pulmonary disease)    Nicotine addiction    Glossopharyngeal neuralgia syndrome    Dvt femoral (deep venous thrombosis)    Lupus    Emphysema/COPD    Burning mouth syndrome    H/O osteoporosis    History of weight loss    History of seizure disorder    Personal history of skin cancer    Osteochondritis dissecans    History of IBS    H/O Raynaud&#x27;s syndrome    Pain, wrist, right    Right shoulder pain    Chronic low back pain with sciatica    Chronic neck pain    H/O paroxysmal supraventricular tachycardia      MEDICATIONS  (STANDING):  acetaminophen 300 mG/butalbital 50 mG/ caffeine 40 mG 1 Capsule(s) Oral <User Schedule>  carBAMazepine Chewable 200 milliGRAM(s) Chew three times a day  dextrose 40% Gel 15 Gram(s) Oral once  dextrose 5%. 1000 milliLiter(s) (75 mL/Hr) IV Continuous <Continuous>  dextrose 50% Injectable 25 Gram(s) IV Push once  dextrose 50% Injectable 12.5 Gram(s) IV Push once  dextrose 50% Injectable 25 Gram(s) IV Push once  diazepam    Tablet 5 milliGRAM(s) Oral two times a day  enoxaparin Injectable 40 milliGRAM(s) SubCutaneous daily  fentaNYL   Patch  12 MICROgram(s)/Hr 1 Patch Transdermal every 72 hours  glucagon  Injectable 1 milliGRAM(s) IntraMuscular once  levothyroxine 25 MICROGram(s) Oral daily  nicotine - 21 mG/24Hr(s) Patch 1 patch Transdermal daily  pantoprazole    Tablet 40 milliGRAM(s) Oral before breakfast  piperacillin/tazobactam IVPB.. 3.375 Gram(s) IV Intermittent every 8 hours  potassium phosphate / sodium phosphate Powder (PHOS-NaK) 1 Packet(s) Oral every 8 hours  pregabalin 25 milliGRAM(s) Oral two times a day  tiotropium 18 MICROgram(s) Capsule 1 Capsule(s) Inhalation daily    MEDICATIONS  (PRN):  ALBUTerol    90 MICROgram(s) HFA Inhaler 2 Puff(s) Inhalation every 6 hours PRN Shortness of Breath and/or Wheezing  cyclobenzaprine 10 milliGRAM(s) Oral two times a day PRN Muscle Spasm  morphine  - Injectable 4 milliGRAM(s) IV Push four times a day PRN Severe Pain (7 - 10)  ondansetron Injectable 4 milliGRAM(s) IV Push every 6 hours PRN Nausea and/or Vomiting    T(C): 36.8 (21 @ 03:04), Max: 37.2 (21 @ 13:45)  HR: 66 (21 @ 05:32) (16 - 78)  BP: 108/66 (21 @ 05:32) (95/63 - 116/75)  RR: 16 (21 @ 03:04) (16 - 17)  SpO2: 98% (21 @ 03:04) (96% - 98%)  Wt(kg): --  I&O's Detail    06 Dec 2021 07:01  -  07 Dec 2021 07:00  --------------------------------------------------------  IN:    dextrose 5%: 375 mL    IV PiggyBack: 100 mL  Total IN: 475 mL    OUT:  Total OUT: 0 mL    Total NET: 475 mL          PHYSICAL EXAM:  General: No distress  Respiratory: b/l air entry  Cardiovascular: S1 S2  Gastrointestinal: soft  Extremities:  no edema                              11.6   8.92  )-----------( 414      ( 07 Dec 2021 05:27 )             31.6     12    133<L>  |  103  |  15  ----------------------------<  201<H>  3.4<L>   |  24  |  0.85    Ca    7.8<L>      07 Dec 2021 05:27  Phos  1.0       Mg     2.0         TPro  5.5<L>  /  Alb  2.7<L>  /  TBili  0.2  /  DBili  x   /  AST  17  /  ALT  27  /  AlkPhos  92  12-07        LIVER FUNCTIONS - ( 07 Dec 2021 05:27 )  Alb: 2.7 g/dL / Pro: 5.5 g/dL / ALK PHOS: 92 U/L / ALT: 27 U/L / AST: 17 U/L / GGT: x           Urinalysis Basic - ( 06 Dec 2021 16:36 )    Color: Yellow / Appearance: Clear / S.010 / pH: x  Gluc: x / Ketone: Moderate  / Bili: Negative / Urobili: Negative   Blood: x / Protein: Negative / Nitrite: Negative   Leuk Esterase: Negative / RBC: x / WBC x   Sq Epi: x / Non Sq Epi: x / Bacteria: x        Sodium, Serum: 133 ( @ 05:27)  Sodium, Serum: 134 ( @ 21:46)  Sodium, Serum: 122 ( @ 14:49)    Creatinine, Serum: 0.85 ( @ 05:27)  Creatinine, Serum: 0.70 ( @ 21:46)  Creatinine, Serum: 0.82 ( @ 14:49)    Potassium, Serum: 3.4 ( @ 05:27)  Potassium, Serum: 3.9 ( @ 21:46)  Potassium, Serum: 4.3 ( @ 14:49)    Hemoglobin: 11.6 ( @ 05:27)  Hemoglobin: 14.1 ( @ 14:49)

## 2021-12-07 NOTE — PROGRESS NOTE ADULT - PROBLEM SELECTOR PLAN 3
Patient reports extensive chronic neck and back pain  -Continue home dose fentanyl patch q72h  -Continue home Morphine IR 15mg q6h PRN severe pain  -Continue home Valium 5mg PO BID  -Patient taking Carisoprodol 350mg PO BID, not available in house, continue Flexeril 10mg BID PRN  -Monitor respiratory and mental status carefully  -Istop in chart Patient reports extensive chronic neck and back pain  -Continue home dose fentanyl patch q72h  -Continue home Morphine IR 15mg q6h PRN severe pain  -Continue home Valium 5mg PO BID  -Patient taking Carisoprodol 350mg PO BID, not available in house, continue Flexeril 10mg BID PRN  -Monitor respiratory and mental status carefully  -No PT needs  -Istop in chart

## 2021-12-07 NOTE — DIETITIAN INITIAL EVALUATION ADULT. - PROBLEM SELECTOR PLAN 1
Patient with 3 days of N/V/D with recent increase in Carbamazepine prescription  -Na 122 on admission  -Admit to general medical floor  -Etiology likely 2/2 low solute intake and SIADH 2/2 carbamazepine   -S/p 1.1L NS bolus in ED, started on 50cc/hr NS per nephro  -PO fluid restriction stopped as Na corrected too quickly and started on IV D5W  -F/u urine lytes, uric acid level  -Increased her Carbamazepine from 100mg TID to 200mg TID for hx of glossopharyngeal neuralgia, continue for now pending nephro recs  -F/u serum sodium levels q6h, avoid overcorrection, goal correction approx 6mmol/L over 24 hour period  -Nephro Dr. Murillo consulted, recs appreciated

## 2021-12-07 NOTE — DIETITIAN INITIAL EVALUATION ADULT. - PROBLEM SELECTOR PLAN 8
Patient reports hx of Lupus and many autoimmune disorders including Sjogren's syndrome  -Patient not compliant with hydroxychloroquine, states she has not taken since Feb 2021  -F/u outpatient with PCP or rheum

## 2021-12-07 NOTE — PROGRESS NOTE ADULT - ASSESSMENT
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 IVF D5W started for rapid rise in sodium levels. D/c PO fluid restriction. Monitor BP closely. GI evaluation in progress.   Potassium supplementation. Will follow electrolytes and renal function trend.   Hyponatremia: Low solute intake, SIADH secondary to carbamazepine rx/ Nausea and vomitting  Sciatica  h/o Lupus  h/o COPD  Hypokalemia  Hypophosphatemia    Events noted. IVF D5W started for rapid rise in sodium levels. D/c PO fluid restriction. Monitor BP closely. GI evaluation in progress.   Potassium & phos supplementation. Will follow electrolytes and renal function trend.

## 2021-12-07 NOTE — DIETITIAN INITIAL EVALUATION ADULT. - PROBLEM SELECTOR PLAN 3
Patient reports extensive chronic neck and back pain  -Continue home dose fentanyl patch q72h  -Patient on PO Morphine IR 15mg 4x daily, given persistent nausea/vomiting will hold and start 4mg IVP Morphine q6h until patient can tolerate PO  -Continue home Valium 5mg PO BID  -Patient taking Carisoprodol 350mg PO BID, not available in house, can start Flexeril 10mg BID PRN  -Monitor respiratory and mental status carefully  -Istop in chart

## 2021-12-07 NOTE — DIETITIAN INITIAL EVALUATION ADULT. - ADD RECOMMEND
consider adding Ensure clear TID; will honor pt's food preferences; provide snacks on trays to help improve po intake.

## 2021-12-07 NOTE — PROGRESS NOTE ADULT - SUBJECTIVE AND OBJECTIVE BOX
Patient is a 56y old  Female who presents with a chief complaint of hyponatremia, colitis (07 Dec 2021 13:05)      INTERVAL HPI/OVERNIGHT EVENTS: Patient admitted yesterday night for hyponatremia and colitis. No acute events overnight. Patient seen and examined at bedside. Patient states she feels much better, weakness has improved. Has not had any episodes of diarrhea or vomiting today. Denies nausea, states she is able to tolerate PO diet. Patient c/o migraine earlier in the day, now resolved after taking Fioricet. Patient still c/o pelvic pressure, states dysuria has improved since admission. Denies hematuria. Denies any other complaints.     MEDICATIONS  (STANDING):  carBAMazepine Chewable 200 milliGRAM(s) Chew three times a day  dextrose 40% Gel 15 Gram(s) Oral once  dextrose 5%. 1000 milliLiter(s) (75 mL/Hr) IV Continuous <Continuous>  dextrose 50% Injectable 25 Gram(s) IV Push once  dextrose 50% Injectable 12.5 Gram(s) IV Push once  dextrose 50% Injectable 25 Gram(s) IV Push once  diazepam    Tablet 5 milliGRAM(s) Oral two times a day  enoxaparin Injectable 40 milliGRAM(s) SubCutaneous daily  fentaNYL   Patch  12 MICROgram(s)/Hr 1 Patch Transdermal every 72 hours  glucagon  Injectable 1 milliGRAM(s) IntraMuscular once  levothyroxine 25 MICROGram(s) Oral daily  nicotine - 21 mG/24Hr(s) Patch 1 patch Transdermal daily  pantoprazole    Tablet 40 milliGRAM(s) Oral two times a day  piperacillin/tazobactam IVPB.. 3.375 Gram(s) IV Intermittent every 8 hours  potassium chloride    Tablet ER 40 milliEquivalent(s) Oral once  potassium phosphate IVPB 15 milliMole(s) IV Intermittent once  pregabalin 25 milliGRAM(s) Oral two times a day  tiotropium 18 MICROgram(s) Capsule 1 Capsule(s) Inhalation daily    MEDICATIONS  (PRN):  ALBUTerol    90 MICROgram(s) HFA Inhaler 2 Puff(s) Inhalation every 6 hours PRN Shortness of Breath and/or Wheezing  cyclobenzaprine 10 milliGRAM(s) Oral two times a day PRN Muscle Spasm  morphine  - Injectable 4 milliGRAM(s) IV Push four times a day PRN Severe Pain (7 - 10)  ondansetron Injectable 4 milliGRAM(s) IV Push every 6 hours PRN Nausea and/or Vomiting      Allergies    Vibramycin (Unknown)    Intolerances    aspirin (Stomach Upset)  Zithromax (Stomach Upset)      REVIEW OF SYSTEMS:  CONSTITUTIONAL: No fever or chills  HEENT: +migraine resolved, no sore throat  RESPIRATORY: +chronic cough, no wheezing, or shortness of breath  CARDIOVASCULAR: No chest pain, palpitations  GASTROINTESTINAL: No abd pain, nausea, vomiting, or diarrhea  GENITOURINARY: +pelvic pressure, +dysuria, denies frequency, or hematuria  NEUROLOGICAL: no focal weakness or dizziness  MUSCULOSKELETAL: +chronic neck/back pain    Vital Signs Last 24 Hrs  T(C): 36.7 (07 Dec 2021 19:16), Max: 37.2 (07 Dec 2021 02:09)  T(F): 98.1 (07 Dec 2021 19:16), Max: 99 (07 Dec 2021 02:09)  HR: 65 (07 Dec 2021 19:16) (65 - 78)  BP: 123/72 (07 Dec 2021 19:16) (95/63 - 123/72)  BP(mean): --  RR: 17 (07 Dec 2021 19:16) (16 - 17)  SpO2: 99% (07 Dec 2021 19:16) (96% - 99%)    PHYSICAL EXAM:  GENERAL: NAD  HEENT:  anicteric, moist mucous membranes  CHEST/LUNG:  CTA b/l, no rales, wheezes, or rhonchi  HEART:  RRR, S1, S2  ABDOMEN:  BS+, soft, nontender, nondistended, no guarding/rebound/rigidity  EXTREMITIES: no edema, cyanosis, or calf tenderness  NERVOUS SYSTEM: answers questions and follows commands appropriately    LABS:                        11.6   8.92  )-----------( 414      ( 07 Dec 2021 05:27 )             31.6     CBC Full  -  ( 07 Dec 2021 05:27 )  WBC Count : 8.92 K/uL  Hemoglobin : 11.6 g/dL  Hematocrit : 31.6 %  Platelet Count - Automated : 414 K/uL  Mean Cell Volume : 89.0 fl  Mean Cell Hemoglobin : 32.7 pg  Mean Cell Hemoglobin Concentration : 36.7 gm/dL  Auto Neutrophil # : 5.38 K/uL  Auto Lymphocyte # : 2.12 K/uL  Auto Monocyte # : 1.27 K/uL  Auto Eosinophil # : 0.07 K/uL  Auto Basophil # : 0.04 K/uL  Auto Neutrophil % : 60.4 %  Auto Lymphocyte % : 23.8 %  Auto Monocyte % : 14.2 %  Auto Eosinophil % : 0.8 %  Auto Basophil % : 0.4 %    07 Dec 2021 05:27    133    |  103    |  15     ----------------------------<  201    3.4     |  24     |  0.85     Ca    7.8        07 Dec 2021 05:27  Phos  1.0       07 Dec 2021 05:27  Mg     2.0       07 Dec 2021 05:27    TPro  5.5    /  Alb  2.7    /  TBili  0.2    /  DBili  x      /  AST  17     /  ALT  27     /  AlkPhos  92     07 Dec 2021 05:27      Urinalysis Basic - ( 06 Dec 2021 16:36 )    Color: Yellow / Appearance: Clear / S.010 / pH: x  Gluc: x / Ketone: Moderate  / Bili: Negative / Urobili: Negative   Blood: x / Protein: Negative / Nitrite: Negative   Leuk Esterase: Negative / RBC: x / WBC x   Sq Epi: x / Non Sq Epi: x / Bacteria: x      CAPILLARY BLOOD GLUCOSE      POCT Blood Glucose.: 214 mg/dL (07 Dec 2021 02:07)          RADIOLOGY & ADDITIONAL TESTS: NA    Personally reviewed.     Consultant(s) Notes Reviewed:  [x] YES  [ ] NO     Patient is a 56y old  Female who presents with a chief complaint of hyponatremia, colitis (07 Dec 2021 13:05)    INTERVAL HPI/OVERNIGHT EVENTS: Patient admitted yesterday night for hyponatremia and colitis. No acute events overnight. Patient seen and examined at bedside. Patient states she feels much better, weakness has improved. Has not had any episodes of diarrhea or vomiting today. Denies nausea, states she is able to tolerate PO diet. Patient c/o migraine earlier in the day, now resolved after taking Fioricet. Patient still c/o pelvic pressure, states dysuria has improved since admission. Denies hematuria. Denies any other complaints.     MEDICATIONS  (STANDING):  carBAMazepine Chewable 200 milliGRAM(s) Chew three times a day  dextrose 40% Gel 15 Gram(s) Oral once  dextrose 5%. 1000 milliLiter(s) (75 mL/Hr) IV Continuous <Continuous>  dextrose 50% Injectable 25 Gram(s) IV Push once  dextrose 50% Injectable 12.5 Gram(s) IV Push once  dextrose 50% Injectable 25 Gram(s) IV Push once  diazepam    Tablet 5 milliGRAM(s) Oral two times a day  enoxaparin Injectable 40 milliGRAM(s) SubCutaneous daily  fentaNYL   Patch  12 MICROgram(s)/Hr 1 Patch Transdermal every 72 hours  glucagon  Injectable 1 milliGRAM(s) IntraMuscular once  levothyroxine 25 MICROGram(s) Oral daily  nicotine - 21 mG/24Hr(s) Patch 1 patch Transdermal daily  pantoprazole    Tablet 40 milliGRAM(s) Oral two times a day  piperacillin/tazobactam IVPB.. 3.375 Gram(s) IV Intermittent every 8 hours  potassium chloride    Tablet ER 40 milliEquivalent(s) Oral once  potassium phosphate IVPB 15 milliMole(s) IV Intermittent once  pregabalin 25 milliGRAM(s) Oral two times a day  tiotropium 18 MICROgram(s) Capsule 1 Capsule(s) Inhalation daily    MEDICATIONS  (PRN):  ALBUTerol    90 MICROgram(s) HFA Inhaler 2 Puff(s) Inhalation every 6 hours PRN Shortness of Breath and/or Wheezing  cyclobenzaprine 10 milliGRAM(s) Oral two times a day PRN Muscle Spasm  morphine  - Injectable 4 milliGRAM(s) IV Push four times a day PRN Severe Pain (7 - 10)  ondansetron Injectable 4 milliGRAM(s) IV Push every 6 hours PRN Nausea and/or Vomiting    Allergies    Vibramycin (Unknown)    Intolerances    aspirin (Stomach Upset)  Zithromax (Stomach Upset)      REVIEW OF SYSTEMS:  CONSTITUTIONAL: No fever or chills  HEENT: +migraine resolved, no sore throat  RESPIRATORY: +chronic cough, no wheezing, or shortness of breath  CARDIOVASCULAR: No chest pain, palpitations  GASTROINTESTINAL: No abd pain, nausea, vomiting, or diarrhea  GENITOURINARY: +pelvic pressure, +dysuria, denies frequency, or hematuria  NEUROLOGICAL: no focal weakness or dizziness  MUSCULOSKELETAL: +chronic neck/back pain      PHYSICAL EXAM:  T(C): 36.7 (07 Dec 2021 19:16), Max: 37.2 (07 Dec 2021 02:09)  T(F): 98.1 (07 Dec 2021 19:16), Max: 99 (07 Dec 2021 02:09)  HR: 65 (07 Dec 2021 19:16) (65 - 78)  BP: 123/72 (07 Dec 2021 19:16) (95/63 - 123/72)  RR: 17 (07 Dec 2021 19:16) (16 - 17)  SpO2: 99% (07 Dec 2021 19:16) (96% - 99%)    GENERAL: NAD  HEENT:  anicteric, moist mucous membranes  CHEST/LUNG:  CTA b/l, no rales, wheezes, or rhonchi  HEART:  RRR, S1, S2  ABDOMEN:  BS+, soft, nontender, nondistended, no guarding/rebound/rigidity  EXTREMITIES: no edema, cyanosis, or calf tenderness  NERVOUS SYSTEM: answers questions and follows commands appropriately  PSYCH: normal affect    LABS:                        11.6   8.92  )-----------( 414      ( 07 Dec 2021 05:27 )             31.6     CBC Full  -  ( 07 Dec 2021 05:27 )  WBC Count : 8.92 K/uL  Hemoglobin : 11.6 g/dL  Hematocrit : 31.6 %  Platelet Count - Automated : 414 K/uL  Mean Cell Volume : 89.0 fl  Mean Cell Hemoglobin : 32.7 pg  Mean Cell Hemoglobin Concentration : 36.7 gm/dL  Auto Neutrophil # : 5.38 K/uL  Auto Lymphocyte # : 2.12 K/uL  Auto Monocyte # : 1.27 K/uL  Auto Eosinophil # : 0.07 K/uL  Auto Basophil # : 0.04 K/uL  Auto Neutrophil % : 60.4 %  Auto Lymphocyte % : 23.8 %  Auto Monocyte % : 14.2 %  Auto Eosinophil % : 0.8 %  Auto Basophil % : 0.4 %    07 Dec 2021 05:27    133    |  103    |  15     ----------------------------<  201    3.4     |  24     |  0.85     Ca    7.8        07 Dec 2021 05:27  Phos  1.0       07 Dec 2021 05:27  Mg     2.0       07 Dec 2021 05:27    TPro  5.5    /  Alb  2.7    /  TBili  0.2    /  DBili  x      /  AST  17     /  ALT  27     /  AlkPhos  92     07 Dec 2021 05:27      Urinalysis Basic - ( 06 Dec 2021 16:36 )    Color: Yellow / Appearance: Clear / S.010 / pH: x  Gluc: x / Ketone: Moderate  / Bili: Negative / Urobili: Negative   Blood: x / Protein: Negative / Nitrite: Negative   Leuk Esterase: Negative / RBC: x / WBC x   Sq Epi: x / Non Sq Epi: x / Bacteria: x      CAPILLARY BLOOD GLUCOSE      POCT Blood Glucose.: 214 mg/dL (07 Dec 2021 02:07)      RADIOLOGY & ADDITIONAL TESTS: NA    Personally reviewed.     Consultant(s) Notes Reviewed:  [x] YES  [ ] NO

## 2021-12-07 NOTE — CONSULT NOTE ADULT - SUBJECTIVE AND OBJECTIVE BOX
Chief Complaint:  Patient is a 56y old  Female who presents with a chief complaint of hyponatremia, colitis (06 Dec 2021 19:14)    Lupus    Sjogren&#x27;s disease    Vertigo    UTI (urinary tract infection)    GERD (gastroesophageal reflux disease)    Hypotension    Hypothyroidism    Gallstones    Hepatitis C    Anxiety    Migraine    Neuropathy    COPD (chronic obstructive pulmonary disease)    Nicotine addiction    Glossopharyngeal neuralgia syndrome    Dvt femoral (deep venous thrombosis)    Lupus    Emphysema/COPD    Burning mouth syndrome    H/O osteoporosis    History of weight loss    History of seizure disorder    Personal history of skin cancer    Osteochondritis dissecans    History of IBS    H/O Raynaud&#x27;s syndrome    Pain, wrist, right    Right shoulder pain    Chronic low back pain with sciatica    Chronic neck pain    H/O paroxysmal supraventricular tachycardia    Vocal cord polyp    Gall bladder disease    Injury of right wrist, subsequent encounter    H/O lumpectomy    S/P brain surgery    S/P cryoablation of arrhythmia    Ankle problem    H/O elbow surgery    Status post placement of implantable loop recorder    H/O squamous cell carcinoma excision       HPI:  57 yo female with PMHx of chronic neck/back pain, sciatica, lupus, Sjogren's syndrome, COPD, glossopharyngeal neuralgia s/p brain surgery, COPD on 2L NC, hypothyroidism, Neuropathy, migraines, GERD presents to ED weakness, nausea, vomiting, diarrhea. Onset 3 days ago. Patient states on Saturday she started feeling unwell, states she vomited >30x and has unable to eat/drink/keep anything down since then. Patient also reports 5 episodes of NB diarrhea this AM. Patient c/o chills, body aches, subjective fever, abd pain. Patient reports on Wednesday, 6 days ago, her doctor increased her Carbamazepine from 100mg TID to 200mg TID, and her Lyrica was increased from 25mg BID to 50mg TID. Patient thinks this contributed to her feeling unwell. Patient has not taken any medication since Saturday when she started vomiting. Patient also reports history of dysuria and pelvic pressure. States she has been experiencing constant pelvic burning for a few months, was treated with an antibiotic from urgent care 2 weeks ago, still experiencing dysuria. Denies frequency, hematuria. Patient reports chronic cough, denies worsening of cough or SOB. Denies CP, palpitations.     In ED:  Vitals: T 99, HR , /73, RR 16, SpO2 98%  Labs significant for: WBC 13.53, plt 509, neutrophil 10.63, Na 122, Cl 88, Co2 20, alk phos 132  UA: mod ketone  CT chest/abd/p: new mild intrahepatic and extrahepatic biliary dilation, consider MRCP to exclude choledocholithiasis, findings may represent gastritis, terminal ileitis, and nonspecific colitis, emphysema, no acute pulmonary disease  CXR: no evidence for focal infiltrate or lobar consolidation  EKG: NSR with 1st degree AV block - this is also noted on EKG from 2019  Given: 4mg IVP Morphine x1, 4mg IVP Zofran x1, 1.1 L NS bolus x1, IV Zosyn x1 (06 Dec 2021 19:14)      aspirin (Stomach Upset)  Vibramycin (Unknown)  Zithromax (Stomach Upset)      acetaminophen 300 mG/butalbital 50 mG/ caffeine 40 mG 1 Capsule(s) Oral <User Schedule>  ALBUTerol    90 MICROgram(s) HFA Inhaler 2 Puff(s) Inhalation every 6 hours PRN  carBAMazepine Chewable 200 milliGRAM(s) Chew three times a day  cyclobenzaprine 10 milliGRAM(s) Oral two times a day PRN  dextrose 40% Gel 15 Gram(s) Oral once  dextrose 5%. 1000 milliLiter(s) IV Continuous <Continuous>  dextrose 50% Injectable 25 Gram(s) IV Push once  dextrose 50% Injectable 12.5 Gram(s) IV Push once  dextrose 50% Injectable 25 Gram(s) IV Push once  diazepam    Tablet 5 milliGRAM(s) Oral two times a day  enoxaparin Injectable 40 milliGRAM(s) SubCutaneous daily  fentaNYL   Patch  12 MICROgram(s)/Hr 1 Patch Transdermal every 72 hours  glucagon  Injectable 1 milliGRAM(s) IntraMuscular once  levothyroxine 25 MICROGram(s) Oral daily  morphine  - Injectable 4 milliGRAM(s) IV Push four times a day PRN  nicotine - 21 mG/24Hr(s) Patch 1 patch Transdermal daily  ondansetron Injectable 4 milliGRAM(s) IV Push every 6 hours PRN  pantoprazole    Tablet 40 milliGRAM(s) Oral before breakfast  piperacillin/tazobactam IVPB.. 3.375 Gram(s) IV Intermittent every 8 hours  potassium phosphate / sodium phosphate Powder (PHOS-NaK) 1 Packet(s) Oral every 8 hours  pregabalin 25 milliGRAM(s) Oral two times a day  tiotropium 18 MICROgram(s) Capsule 1 Capsule(s) Inhalation daily        FAMILY HISTORY:  Family history of systemic lupus erythematosus (SLE) in mother (Sibling)  niece    Family history of psoriatic arthritis (Sibling)  Sister    Family history of diabetes mellitus (Sibling)  Sister    Family history of multiple sclerosis  sister    Family history of heart disease  htn-father    FH: arrhythmia  mother-dementia          Review of Systems:    General:  No wt loss, fevers, chills, night sweats,fatigue,   Eyes:  Good vision, no reported pain  ENT:  No sore throat, pain, runny nose, dysphagia  CV:  No pain, palpitatioins, hypo/hypertension  Resp:  No dyspnea, cough, tachypnea, wheezing  :  No pain, bleeding, incontinence, nocturia  Muscle:  No pain, weakness  Neuro:  No weakness, tingling, memory problems  Psych:  No fatigue, insomnia, mood problems, depression  Endocrine:  No polyuria, polydypsia, cold/heat intolerance  Heme:  No petechiae, ecchymosis, easy bruisability  Skin:  No rash, tattoos, scars, edema    Relevant Family History:       Relevant Social History:       Physical Exam:    Vital Signs:  Vital Signs Last 24 Hrs  T(C): 36.8 (07 Dec 2021 03:04), Max: 37.2 (06 Dec 2021 13:45)  T(F): 98.3 (07 Dec 2021 03:04), Max: 99 (06 Dec 2021 13:45)  HR: 66 (07 Dec 2021 05:32) (16 - 78)  BP: 108/66 (07 Dec 2021 05:32) (95/63 - 116/75)  BP(mean): --  RR: 16 (07 Dec 2021 03:04) (16 - 17)  SpO2: 98% (07 Dec 2021 03:04) (96% - 98%)  Daily Height in cm: 142.24 (06 Dec 2021 13:45)    Daily Weight in k.7 (07 Dec 2021 03:04)    General:  Appears stated age, well-groomed, well-nourished, no distress  HEENT:  NC/AT,  conjunctivae clear and pink, no thyromegaly, nodules, adenopathy, no JVD  Chest:  Full & symmetric excursion, no increased effort, breath sounds clear  Cardiovascular:  Regular rhythm, S1, S2, no murmur/rub/S3/S4, no abdominal bruit, no edema  Abdomen:  Soft, non-tender, non-distended, normoactive bowel sounds,  no masses ,no hepatosplenomeagaly, no signs of chronic liver disease  Extremities:  no cyanosis,clubbing or edema  Skin:  No rash/erythema/ecchymoses/petechiae/wounds/abscess/warm/dry  Neuro/Psych:  Alert, oriented, no asterixis, no tremor, no encephalopathy    Laboratory:                            11.6   8.92  )-----------( 414      ( 07 Dec 2021 05:27 )             31.6     12-07    133<L>  |  103  |  15  ----------------------------<  201<H>  3.4<L>   |  24  |  0.85    Ca    7.8<L>      07 Dec 2021 05:27  Phos  1.0     12-  Mg     2.0     12-    TPro  5.5<L>  /  Alb  2.7<L>  /  TBili  0.2  /  DBili  x   /  AST  17  /  ALT  27  /  AlkPhos  92  12-07    LIVER FUNCTIONS - ( 07 Dec 2021 05:27 )  Alb: 2.7 g/dL / Pro: 5.5 g/dL / ALK PHOS: 92 U/L / ALT: 27 U/L / AST: 17 U/L / GGT: x             Urinalysis Basic - ( 06 Dec 2021 16:36 )    Color: Yellow / Appearance: Clear / S.010 / pH: x  Gluc: x / Ketone: Moderate  / Bili: Negative / Urobili: Negative   Blood: x / Protein: Negative / Nitrite: Negative   Leuk Esterase: Negative / RBC: x / WBC x   Sq Epi: x / Non Sq Epi: x / Bacteria: x      Amylase Serum--      Lipase serum43       Ammonia--    Imaging:

## 2021-12-07 NOTE — DIETITIAN INITIAL EVALUATION ADULT. - PROBLEM SELECTOR PLAN 5
Chronic  -Patient reports smoking 2-3 ppd >40 years, on 2L supplemental O2 at home  -Continue supplemental O2 as needed  -Continue Albuterol q6h PRN, therapeutic interchange for ipratropium   -Nicotine patch supplied  -Patient counseled on effects of dangers of tobacco use including lung cancer, death, patient declines cessation tools

## 2021-12-08 ENCOUNTER — TRANSCRIPTION ENCOUNTER (OUTPATIENT)
Age: 56
End: 2021-12-08

## 2021-12-08 ENCOUNTER — RESULT REVIEW (OUTPATIENT)
Age: 56
End: 2021-12-08

## 2021-12-08 LAB
ALBUMIN SERPL ELPH-MCNC: 3 G/DL — LOW (ref 3.3–5)
ALP SERPL-CCNC: 88 U/L — SIGNIFICANT CHANGE UP (ref 40–120)
ALT FLD-CCNC: 28 U/L — SIGNIFICANT CHANGE UP (ref 12–78)
ANION GAP SERPL CALC-SCNC: 4 MMOL/L — LOW (ref 5–17)
AST SERPL-CCNC: 15 U/L — SIGNIFICANT CHANGE UP (ref 15–37)
BASOPHILS # BLD AUTO: 0.06 K/UL — SIGNIFICANT CHANGE UP (ref 0–0.2)
BASOPHILS NFR BLD AUTO: 0.7 % — SIGNIFICANT CHANGE UP (ref 0–2)
BILIRUB SERPL-MCNC: 0.5 MG/DL — SIGNIFICANT CHANGE UP (ref 0.2–1.2)
BUN SERPL-MCNC: 7 MG/DL — SIGNIFICANT CHANGE UP (ref 7–23)
CALCIUM SERPL-MCNC: 8.6 MG/DL — SIGNIFICANT CHANGE UP (ref 8.5–10.1)
CHLORIDE SERPL-SCNC: 101 MMOL/L — SIGNIFICANT CHANGE UP (ref 96–108)
CO2 SERPL-SCNC: 30 MMOL/L — SIGNIFICANT CHANGE UP (ref 22–31)
CREAT SERPL-MCNC: 0.65 MG/DL — SIGNIFICANT CHANGE UP (ref 0.5–1.3)
EOSINOPHIL # BLD AUTO: 0.26 K/UL — SIGNIFICANT CHANGE UP (ref 0–0.5)
EOSINOPHIL NFR BLD AUTO: 3.2 % — SIGNIFICANT CHANGE UP (ref 0–6)
GLUCOSE SERPL-MCNC: 90 MG/DL — SIGNIFICANT CHANGE UP (ref 70–99)
HCT VFR BLD CALC: 33.6 % — LOW (ref 34.5–45)
HGB BLD-MCNC: 12.1 G/DL — SIGNIFICANT CHANGE UP (ref 11.5–15.5)
IMM GRANULOCYTES NFR BLD AUTO: 0.5 % — SIGNIFICANT CHANGE UP (ref 0–1.5)
LYMPHOCYTES # BLD AUTO: 3.09 K/UL — SIGNIFICANT CHANGE UP (ref 1–3.3)
LYMPHOCYTES # BLD AUTO: 38.3 % — SIGNIFICANT CHANGE UP (ref 13–44)
MAGNESIUM SERPL-MCNC: 2.1 MG/DL — SIGNIFICANT CHANGE UP (ref 1.6–2.6)
MCHC RBC-ENTMCNC: 33.1 PG — SIGNIFICANT CHANGE UP (ref 27–34)
MCHC RBC-ENTMCNC: 36 GM/DL — SIGNIFICANT CHANGE UP (ref 32–36)
MCV RBC AUTO: 91.8 FL — SIGNIFICANT CHANGE UP (ref 80–100)
MONOCYTES # BLD AUTO: 0.96 K/UL — HIGH (ref 0–0.9)
MONOCYTES NFR BLD AUTO: 11.9 % — SIGNIFICANT CHANGE UP (ref 2–14)
NEUTROPHILS # BLD AUTO: 3.66 K/UL — SIGNIFICANT CHANGE UP (ref 1.8–7.4)
NEUTROPHILS NFR BLD AUTO: 45.4 % — SIGNIFICANT CHANGE UP (ref 43–77)
NRBC # BLD: 0 /100 WBCS — SIGNIFICANT CHANGE UP (ref 0–0)
PHOSPHATE SERPL-MCNC: 2.3 MG/DL — LOW (ref 2.5–4.5)
PLATELET # BLD AUTO: 397 K/UL — SIGNIFICANT CHANGE UP (ref 150–400)
POTASSIUM SERPL-MCNC: 4.7 MMOL/L — SIGNIFICANT CHANGE UP (ref 3.5–5.3)
POTASSIUM SERPL-SCNC: 4.7 MMOL/L — SIGNIFICANT CHANGE UP (ref 3.5–5.3)
PROT SERPL-MCNC: 5.7 G/DL — LOW (ref 6–8.3)
RBC # BLD: 3.66 M/UL — LOW (ref 3.8–5.2)
RBC # FLD: 13.7 % — SIGNIFICANT CHANGE UP (ref 10.3–14.5)
SODIUM SERPL-SCNC: 135 MMOL/L — SIGNIFICANT CHANGE UP (ref 135–145)
WBC # BLD: 8.07 K/UL — SIGNIFICANT CHANGE UP (ref 3.8–10.5)
WBC # FLD AUTO: 8.07 K/UL — SIGNIFICANT CHANGE UP (ref 3.8–10.5)

## 2021-12-08 PROCEDURE — 88305 TISSUE EXAM BY PATHOLOGIST: CPT | Mod: 26

## 2021-12-08 PROCEDURE — 99233 SBSQ HOSP IP/OBS HIGH 50: CPT | Mod: GC

## 2021-12-08 PROCEDURE — 88312 SPECIAL STAINS GROUP 1: CPT | Mod: 26

## 2021-12-08 PROCEDURE — 88313 SPECIAL STAINS GROUP 2: CPT | Mod: 26

## 2021-12-08 RX ORDER — FLUCONAZOLE 150 MG/1
150 TABLET ORAL ONCE
Refills: 0 | Status: COMPLETED | OUTPATIENT
Start: 2021-12-08 | End: 2021-12-08

## 2021-12-08 RX ORDER — NICOTINE POLACRILEX 2 MG
1 GUM BUCCAL
Qty: 0 | Refills: 0 | DISCHARGE
Start: 2021-12-08

## 2021-12-08 RX ORDER — IPRATROPIUM/ALBUTEROL SULFATE 18-103MCG
3 AEROSOL WITH ADAPTER (GRAM) INHALATION EVERY 6 HOURS
Refills: 0 | Status: DISCONTINUED | OUTPATIENT
Start: 2021-12-08 | End: 2021-12-09

## 2021-12-08 RX ORDER — SIMETHICONE 80 MG/1
80 TABLET, CHEWABLE ORAL DAILY
Refills: 0 | Status: DISCONTINUED | OUTPATIENT
Start: 2021-12-08 | End: 2021-12-08

## 2021-12-08 RX ORDER — ACETAMINOPHEN 500 MG
650 TABLET ORAL EVERY 6 HOURS
Refills: 0 | Status: DISCONTINUED | OUTPATIENT
Start: 2021-12-08 | End: 2021-12-09

## 2021-12-08 RX ADMIN — Medication 25 MILLIGRAM(S): at 17:40

## 2021-12-08 RX ADMIN — PIPERACILLIN AND TAZOBACTAM 25 GRAM(S): 4; .5 INJECTION, POWDER, LYOPHILIZED, FOR SOLUTION INTRAVENOUS at 17:34

## 2021-12-08 RX ADMIN — PANTOPRAZOLE SODIUM 40 MILLIGRAM(S): 20 TABLET, DELAYED RELEASE ORAL at 05:51

## 2021-12-08 RX ADMIN — Medication 200 MILLIGRAM(S): at 05:51

## 2021-12-08 RX ADMIN — Medication 1 CAPSULE(S): at 18:09

## 2021-12-08 RX ADMIN — Medication 200 MILLIGRAM(S): at 21:17

## 2021-12-08 RX ADMIN — Medication 1 PATCH: at 19:00

## 2021-12-08 RX ADMIN — Medication 1 PATCH: at 11:21

## 2021-12-08 RX ADMIN — FLUCONAZOLE 150 MILLIGRAM(S): 150 TABLET ORAL at 21:17

## 2021-12-08 RX ADMIN — Medication 5 MILLIGRAM(S): at 05:51

## 2021-12-08 RX ADMIN — FENTANYL CITRATE 1 PATCH: 50 INJECTION INTRAVENOUS at 07:14

## 2021-12-08 RX ADMIN — MORPHINE SULFATE 4 MILLIGRAM(S): 50 CAPSULE, EXTENDED RELEASE ORAL at 22:30

## 2021-12-08 RX ADMIN — Medication 25 MICROGRAM(S): at 05:51

## 2021-12-08 RX ADMIN — PIPERACILLIN AND TAZOBACTAM 25 GRAM(S): 4; .5 INJECTION, POWDER, LYOPHILIZED, FOR SOLUTION INTRAVENOUS at 10:12

## 2021-12-08 RX ADMIN — PIPERACILLIN AND TAZOBACTAM 25 GRAM(S): 4; .5 INJECTION, POWDER, LYOPHILIZED, FOR SOLUTION INTRAVENOUS at 02:13

## 2021-12-08 RX ADMIN — Medication 1 PATCH: at 07:15

## 2021-12-08 RX ADMIN — SIMETHICONE 80 MILLIGRAM(S): 80 TABLET, CHEWABLE ORAL at 23:04

## 2021-12-08 RX ADMIN — FENTANYL CITRATE 1 PATCH: 50 INJECTION INTRAVENOUS at 20:00

## 2021-12-08 RX ADMIN — ONDANSETRON 4 MILLIGRAM(S): 8 TABLET, FILM COATED ORAL at 10:12

## 2021-12-08 RX ADMIN — Medication 1 CAPSULE(S): at 05:51

## 2021-12-08 RX ADMIN — MORPHINE SULFATE 4 MILLIGRAM(S): 50 CAPSULE, EXTENDED RELEASE ORAL at 21:10

## 2021-12-08 RX ADMIN — Medication 1 CAPSULE(S): at 07:27

## 2021-12-08 RX ADMIN — TIOTROPIUM BROMIDE 1 CAPSULE(S): 18 CAPSULE ORAL; RESPIRATORY (INHALATION) at 05:51

## 2021-12-08 RX ADMIN — Medication 1 CAPSULE(S): at 17:40

## 2021-12-08 RX ADMIN — ONDANSETRON 4 MILLIGRAM(S): 8 TABLET, FILM COATED ORAL at 21:10

## 2021-12-08 RX ADMIN — MORPHINE SULFATE 4 MILLIGRAM(S): 50 CAPSULE, EXTENDED RELEASE ORAL at 11:21

## 2021-12-08 RX ADMIN — MORPHINE SULFATE 4 MILLIGRAM(S): 50 CAPSULE, EXTENDED RELEASE ORAL at 10:12

## 2021-12-08 RX ADMIN — Medication 1 PATCH: at 11:20

## 2021-12-08 RX ADMIN — PANTOPRAZOLE SODIUM 40 MILLIGRAM(S): 20 TABLET, DELAYED RELEASE ORAL at 17:33

## 2021-12-08 RX ADMIN — Medication 25 MILLIGRAM(S): at 06:25

## 2021-12-08 RX ADMIN — Medication 5 MILLIGRAM(S): at 17:33

## 2021-12-08 NOTE — PROGRESS NOTE ADULT - PROBLEM SELECTOR PLAN 2
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  Patient with 3 days of N/V/D, now improved  -CT chest/abd/p showing new mild intrahepatic and extrahepatic biliary dilation, consider MRCP to exclude choledocholithiasis, findings may represent gastritis, terminal ileitis, and nonspecific colitis  -Patient reports hx of cholecystectomy, LFTs and Tbili WNL  -Patient will need MRCP vs. EUS, and EGD prior to d/c   -Continue IV Zosyn  -Continue PPI BID  -Clear liquid diet  -F/u Blood cultures x2, urine culture  -GI Dr. 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  Patient with 3 days of N/V/D, now improved  -CT chest/abd/p showing new mild intrahepatic and extrahepatic biliary dilation, consider MRCP to exclude choledocholithiasis, findings may represent gastritis, terminal ileitis, and nonspecific colitis  -Patient reports hx of cholecystectomy, LFTs and Tbili WNL  -Patient will need endoscopy (today) and MRCP outpatient   -Continue IV Zosyn  -Continue PPI BID  -Clear liquid diet  -F/u Blood cultures x2, urine culture  -GI Dr. 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  Patient with 3 days of N/V/D, now improved  -CT chest/abd/p showing new mild intrahepatic and extrahepatic biliary dilation, consider MRCP to exclude choledocholithiasis, findings may represent gastritis, terminal ileitis, and nonspecific colitis  -Patient reports hx of cholecystectomy, LFTs and Tbili WNL  -Patient will plan for endoscopy inpatient (today) and then will plan for MRCP as outpatient   -Continue IV Zosyn for now but plan for short course, probably 3 days total of abx  -Continue PPI BID   -F/u Blood cultures x2, urine culture  -GI Dr. Reyes consulted

## 2021-12-08 NOTE — PROGRESS NOTE ADULT - PROBLEM SELECTOR PLAN 11
DVT PPx: Lovenox 40mg qd  Nutrition follow up for severe protein calorie malnutrition - diet de-escalated to liquids as per GI - will follow up to assess for nutritional supplementation

## 2021-12-08 NOTE — DISCHARGE NOTE PROVIDER - DETAILS OF MALNUTRITION DIAGNOSIS/DIAGNOSES
This patient has been assessed with a concern for Malnutrition and was treated during this hospitalization for the following Nutrition diagnosis/diagnoses:     -  12/07/2021: Severe protein-calorie malnutrition   -  12/07/2021: Underweight (BMI < 19)

## 2021-12-08 NOTE — PROGRESS NOTE ADULT - SUBJECTIVE AND OBJECTIVE BOX
Patient is a 56y old  Female who presents with a chief complaint of hyponatremia, colitis (07 Dec 2021 10:01)    Patient seen in follow up for hyponatremia.        PAST MEDICAL HISTORY:  Lupus    Sjogren&#x27;s disease    Vertigo    UTI (urinary tract infection)    GERD (gastroesophageal reflux disease)    Hypotension    Hypothyroidism    Gallstones    Hepatitis C    Anxiety    Migraine    Neuropathy    COPD (chronic obstructive pulmonary disease)    Nicotine addiction    Glossopharyngeal neuralgia syndrome    Dvt femoral (deep venous thrombosis)    Lupus    Emphysema/COPD    Burning mouth syndrome    H/O osteoporosis    History of weight loss    History of seizure disorder    Personal history of skin cancer    Osteochondritis dissecans    History of IBS    H/O Raynaud&#x27;s syndrome    Pain, wrist, right    Right shoulder pain    Chronic low back pain with sciatica    Chronic neck pain    H/O paroxysmal supraventricular tachycardia      MEDICATIONS  (STANDING):  acetaminophen 300 mG/butalbital 50 mG/ caffeine 40 mG 1 Capsule(s) Oral <User Schedule>  carBAMazepine Chewable 200 milliGRAM(s) Chew three times a day  dextrose 40% Gel 15 Gram(s) Oral once  dextrose 50% Injectable 25 Gram(s) IV Push once  dextrose 50% Injectable 12.5 Gram(s) IV Push once  dextrose 50% Injectable 25 Gram(s) IV Push once  diazepam    Tablet 5 milliGRAM(s) Oral two times a day  enoxaparin Injectable 40 milliGRAM(s) SubCutaneous daily  fentaNYL   Patch  12 MICROgram(s)/Hr 1 Patch Transdermal every 72 hours  glucagon  Injectable 1 milliGRAM(s) IntraMuscular once  levothyroxine 25 MICROGram(s) Oral daily  nicotine - 21 mG/24Hr(s) Patch 1 patch Transdermal daily  pantoprazole    Tablet 40 milliGRAM(s) Oral two times a day  piperacillin/tazobactam IVPB.. 3.375 Gram(s) IV Intermittent every 8 hours  pregabalin 25 milliGRAM(s) Oral two times a day  tiotropium 18 MICROgram(s) Capsule 1 Capsule(s) Inhalation daily    MEDICATIONS  (PRN):  acetaminophen     Tablet .. 650 milliGRAM(s) Oral every 6 hours PRN Mild Pain (1 - 3), Moderate Pain (4 - 6), Severe Pain (7 - 10)  ALBUTerol    90 MICROgram(s) HFA Inhaler 2 Puff(s) Inhalation every 6 hours PRN Shortness of Breath and/or Wheezing  albuterol/ipratropium for Nebulization 3 milliLiter(s) Nebulizer every 6 hours PRN Shortness of Breath  cyclobenzaprine 10 milliGRAM(s) Oral two times a day PRN Muscle Spasm  morphine  - Injectable 4 milliGRAM(s) IV Push four times a day PRN Severe Pain (7 - 10)  ondansetron Injectable 4 milliGRAM(s) IV Push every 6 hours PRN Nausea and/or Vomiting    T(C): 36.8 (21 @ 13:50), Max: 37.2 (21 @ 02:09)  HR: 72 (21 @ 13:50) (62 - 78)  BP: 144/59 (21 @ 13:50) (95/63 - 144/59)  RR: 14 (21 @ 13:50)  SpO2: 95% (21 @ 13:50)  Wt(kg): --  I&O's Detail    07 Dec 2021 07:01  -  08 Dec 2021 07:00  --------------------------------------------------------  IN:    dextrose 5%: 75 mL  Total IN: 75 mL    OUT:  Total OUT: 0 mL    Total NET: 75 mL                  PHYSICAL EXAM:  General: No distress  Respiratory: b/l air entry  Cardiovascular: S1 S2  Gastrointestinal: soft  Extremities:  no edema                             LABORATORY:                        12.1   8.07  )-----------( 397      ( 08 Dec 2021 08:31 )             33.6         135  |  101  |  7   ----------------------------<  90  4.7   |  30  |  0.65    Ca    8.6      08 Dec 2021 08:31  Phos  2.3       Mg     2.1         TPro  5.7<L>  /  Alb  3.0<L>  /  TBili  0.5  /  DBili  x   /  AST  15  /  ALT  28  /  AlkPhos  88      Sodium, Serum: 135 mmol/L ( @ 08:31)  Sodium, Serum: 133 mmol/L ( @ 05:27)  Sodium, Serum: 133 mmol/L ( @ 05:27)  Sodium, Serum: 134 mmol/L ( @ 21:46)    Potassium, Serum: 4.7 mmol/L (:31)  Potassium, Serum: 3.4 mmol/L ( @ 05:27)  Potassium, Serum: 3.5 mmol/L ( 05:27)  Potassium, Serum: 3.9 mmol/L ( @ 21:46)    Hemoglobin: 12.1 g/dL ( @ :31)  Hemoglobin: 11.6 g/dL ( @ 05:27)  Hemoglobin: 14.1 g/dL ( @ 14:49)    Creatinine, Serum 0.65 (:)  Creatinine, Serum 0.85 ( @ 05:27)  Creatinine, Serum 0.70 ( @ 21:46)  Creatinine, Serum 0.82 ( @ 14:49)        LIVER FUNCTIONS - ( 08 Dec 2021 08:31 )  Alb: 3.0 g/dL / Pro: 5.7 g/dL / ALK PHOS: 88 U/L / ALT: 28 U/L / AST: 15 U/L / GGT: x           Urinalysis Basic - ( 06 Dec 2021 16:36 )    Color: Yellow / Appearance: Clear / S.010 / pH: x  Gluc: x / Ketone: Moderate  / Bili: Negative / Urobili: Negative   Blood: x / Protein: Negative / Nitrite: Negative   Leuk Esterase: Negative / RBC: x / WBC x   Sq Epi: x / Non Sq Epi: x / Bacteria: x

## 2021-12-08 NOTE — PROGRESS NOTE ADULT - PROBLEM SELECTOR PLAN 1
Patient with 3 days of N/V/D with recent increase in Carbamazepine prescription  -Etiology likely 2/2 low solute intake and SIADH 2/2 carbamazepine   -Na 122 on admission, now 134  -PO fluid restriction stopped as Na corrected too quickly, continue on IV D5W  -Continue Carbamazepine 200mg TID for hx of glossopharyngeal neuralgia  -Potassium, phos repleted  -Monitor daily CMP  -Nephro Dr. Murillo consulted, recs appreciated Patient with 3 days of N/V/D with recent increase in Carbamazepine prescription  -Etiology likely 2/2 low solute intake and SIADH 2/2 carbamazepine   -Na 122 on admission, now normalized  -PO fluid restriction stopped as Na corrected too quickly, continue on IV D5W  -Continue Carbamazepine 200mg TID for hx of glossopharyngeal neuralgia  -Potassium, phos repleted  -Monitor daily CMP  -Nephro Dr. Murillo consulted, recs appreciated

## 2021-12-08 NOTE — DISCHARGE NOTE PROVIDER - PROVIDER TOKENS
PROVIDER:[TOKEN:[[id number]],FOLLOWUP:[2 weeks],ESTABLISHEDPATIENT:[T]],PROVIDER:[TOKEN:[[id number]],FOLLOWUP:[2 weeks],ESTABLISHEDPATIENT:[T]]

## 2021-12-08 NOTE — DISCHARGE NOTE PROVIDER - NSDCCPCAREPLAN_GEN_ALL_CORE_FT
PRINCIPAL DISCHARGE DIAGNOSIS  Diagnosis: Hyponatremia  Assessment and Plan of Treatment: You were admitted to the hospital for low sodium levels. Your water intake was restricted and then your sodium levels returned to normal.      SECONDARY DISCHARGE DIAGNOSES  Diagnosis: Colitis  Assessment and Plan of Treatment: A CT scan of your abdomen showed inflammation of your colon. You were treated with antibiotics and ant-acid medications. You had an endoscopy done that showed ___. You must follow up with your GI doctor within 1 week of discharge regarding this. You will need an MRCP scan done outside the hospital.    Diagnosis: Hypothyroidism  Assessment and Plan of Treatment: You have a history of hypothyroidism. Your thyroid levels were checked and they showed that your Synthroid medication dose was too high. We lowered your dose of Synthroid to 25mcg daily. Please continue taking this dose on discharge and follow up with your primary doctor within 1 week of discharge for this.    Diagnosis: Lupus  Assessment and Plan of Treatment: You have a history of auto immune disorders including Lupus and Sjogrens syndrome.  Please follow up with your primary care doctor or rheumatologist within 1 week of discharge for these conditions.     PRINCIPAL DISCHARGE DIAGNOSIS  Diagnosis: Hyponatremia  Assessment and Plan of Treatment: You were admitted to the hospital for low sodium levels. Your water intake was restricted and then your sodium levels returned to normal.      SECONDARY DISCHARGE DIAGNOSES  Diagnosis: Colitis  Assessment and Plan of Treatment: A CT scan of your abdomen showed inflammation of your colon. You were treated with antibiotics and ant-acid medications. You had an endoscopy done that showed gastritis and a hiatal hernia. You must follow up with your GI doctor within 1 week of discharge regarding this. You will need an MRCP scan done outside the hospital. You may have a lesion in your biliary tree that needs to be further classified with an MRI. Recommend following this up within 2 weeks after seeing your gastroenterologist for further imaging.    Diagnosis: Hypothyroidism  Assessment and Plan of Treatment: You have a history of hypothyroidism. Your thyroid levels were checked and they showed that your Synthroid medication dose was too high. We lowered your dose of Synthroid to 25mcg daily. Please continue taking this dose on discharge and follow up with your primary doctor within 1 week of discharge for this.    Diagnosis: Lupus  Assessment and Plan of Treatment: You have a history of auto immune disorders including Lupus and Sjogrens syndrome.  Please follow up with your primary care doctor or rheumatologist within 1 week of discharge for these conditions.

## 2021-12-08 NOTE — PROGRESS NOTE ADULT - PROBLEM SELECTOR PLAN 3
Patient reports extensive chronic neck and back pain  -Continue home dose fentanyl patch q72h  -Continue home Morphine IR 15mg q6h PRN severe pain  -Continue home Valium 5mg PO BID  -Patient taking Carisoprodol 350mg PO BID, not available in house, continue Flexeril 10mg BID PRN  -Monitor respiratory and mental status carefully  -No PT needs  -Istop in chart

## 2021-12-08 NOTE — PROGRESS NOTE ADULT - PROBLEM SELECTOR PLAN 7
Patient reports few months with dysuria and pelvic pressure, reports recent abx completion of unknown abx a few weeks ago without improvement in symptoms  -UA unremarkable on admission  -F/u urine culture Patient reports few months with dysuria and pelvic pressure, reports recent abx completion of unknown abx a few weeks ago without improvement in symptoms  -UA unremarkable on admission, urine cx normal francis

## 2021-12-08 NOTE — PROGRESS NOTE ADULT - TIME BILLING
activities including direct patient care, counseling and/or coordinating care, reviewing notes/lab data/imaging, discussion with multidisciplinary team & discussion with patient
as above

## 2021-12-08 NOTE — DISCHARGE NOTE PROVIDER - DISCHARGE SERVICE FOR PATIENT
on the discharge service for the patient. I have reviewed and made amendments to the documentation where necessary.

## 2021-12-08 NOTE — DISCHARGE NOTE PROVIDER - CARE PROVIDER_API CALL
Lorne Pablo  FAMILY MEDICINE  Internal Medicine, [address], [address]  [place] [address]  Phone: [phone]  Fax: [phone]  Established Patient  Follow Up Time: 2 weeks    Oni Reyes (DO)  Internal Medicine  [address] [place] [address]  Phone: [phone]  Fax: [phone]  Established Patient  Follow Up Time: 2 weeks

## 2021-12-08 NOTE — PROGRESS NOTE ADULT - SUBJECTIVE AND OBJECTIVE BOX
Patient is a 56y old  Female who presents with a chief complaint of hyponatremia, colitis (07 Dec 2021 20:24)      INTERVAL HPI/OVERNIGHT EVENTS:    MEDICATIONS  (STANDING):  acetaminophen 300 mG/butalbital 50 mG/ caffeine 40 mG 1 Capsule(s) Oral <User Schedule>  carBAMazepine Chewable 200 milliGRAM(s) Chew three times a day  dextrose 40% Gel 15 Gram(s) Oral once  dextrose 50% Injectable 25 Gram(s) IV Push once  dextrose 50% Injectable 12.5 Gram(s) IV Push once  dextrose 50% Injectable 25 Gram(s) IV Push once  diazepam    Tablet 5 milliGRAM(s) Oral two times a day  enoxaparin Injectable 40 milliGRAM(s) SubCutaneous daily  fentaNYL   Patch  12 MICROgram(s)/Hr 1 Patch Transdermal every 72 hours  glucagon  Injectable 1 milliGRAM(s) IntraMuscular once  levothyroxine 25 MICROGram(s) Oral daily  nicotine - 21 mG/24Hr(s) Patch 1 patch Transdermal daily  pantoprazole    Tablet 40 milliGRAM(s) Oral two times a day  piperacillin/tazobactam IVPB.. 3.375 Gram(s) IV Intermittent every 8 hours  pregabalin 25 milliGRAM(s) Oral two times a day  tiotropium 18 MICROgram(s) Capsule 1 Capsule(s) Inhalation daily    MEDICATIONS  (PRN):  acetaminophen     Tablet .. 650 milliGRAM(s) Oral every 6 hours PRN Mild Pain (1 - 3), Moderate Pain (4 - 6), Severe Pain (7 - 10)  ALBUTerol    90 MICROgram(s) HFA Inhaler 2 Puff(s) Inhalation every 6 hours PRN Shortness of Breath and/or Wheezing  albuterol/ipratropium for Nebulization 3 milliLiter(s) Nebulizer every 6 hours PRN Shortness of Breath  cyclobenzaprine 10 milliGRAM(s) Oral two times a day PRN Muscle Spasm  morphine  - Injectable 4 milliGRAM(s) IV Push four times a day PRN Severe Pain (7 - 10)  ondansetron Injectable 4 milliGRAM(s) IV Push every 6 hours PRN Nausea and/or Vomiting      Allergies    Vibramycin (Unknown)    Intolerances    aspirin (Stomach Upset)  Zithromax (Stomach Upset)      REVIEW OF SYSTEMS:  CONSTITUTIONAL: No fever or chills  HEENT:  No headache, no sore throat  RESPIRATORY: No cough, wheezing, or shortness of breath  CARDIOVASCULAR: No chest pain, palpitations  GASTROINTESTINAL: No abd pain, nausea, vomiting, or diarrhea  GENITOURINARY: No dysuria, frequency, or hematuria  NEUROLOGICAL: no focal weakness or dizziness  MUSCULOSKELETAL: no myalgias     Vital Signs Last 24 Hrs  T(C): 36.4 (08 Dec 2021 04:32), Max: 36.7 (07 Dec 2021 19:16)  T(F): 97.6 (08 Dec 2021 04:32), Max: 98.1 (07 Dec 2021 19:16)  HR: 62 (08 Dec 2021 04:32) (62 - 65)  BP: 106/69 (08 Dec 2021 04:32) (106/69 - 123/72)  BP(mean): --  RR: 17 (08 Dec 2021 04:32) (17 - 17)  SpO2: 93% (08 Dec 2021 04:32) (93% - 99%)    PHYSICAL EXAM:  GENERAL: NAD  HEENT:  anicteric, moist mucous membranes  CHEST/LUNG:  CTA b/l, no rales, wheezes, or rhonchi  HEART:  RRR, S1, S2  ABDOMEN:  BS+, soft, nontender, nondistended  EXTREMITIES: no edema, cyanosis, or calf tenderness  NERVOUS SYSTEM: answers questions and follows commands appropriately    LABS:                        12.1   8.07  )-----------( 397      ( 08 Dec 2021 08:31 )             33.6     CBC Full  -  ( 08 Dec 2021 08:31 )  WBC Count : 8.07 K/uL  Hemoglobin : 12.1 g/dL  Hematocrit : 33.6 %  Platelet Count - Automated : 397 K/uL  Mean Cell Volume : 91.8 fl  Mean Cell Hemoglobin : 33.1 pg  Mean Cell Hemoglobin Concentration : 36.0 gm/dL  Auto Neutrophil # : 3.66 K/uL  Auto Lymphocyte # : 3.09 K/uL  Auto Monocyte # : 0.96 K/uL  Auto Eosinophil # : 0.26 K/uL  Auto Basophil # : 0.06 K/uL  Auto Neutrophil % : 45.4 %  Auto Lymphocyte % : 38.3 %  Auto Monocyte % : 11.9 %  Auto Eosinophil % : 3.2 %  Auto Basophil % : 0.7 %    08 Dec 2021 08:31    135    |  101    |  7      ----------------------------<  90     4.7     |  30     |  0.65     Ca    8.6        08 Dec 2021 08:31  Phos  2.3       08 Dec 2021 08:31  Mg     2.1       08 Dec 2021 08:31    TPro  5.7    /  Alb  3.0    /  TBili  0.5    /  DBili  x      /  AST  15     /  ALT  28     /  AlkPhos  88     08 Dec 2021 08:31      Urinalysis Basic - ( 06 Dec 2021 16:36 )    Color: Yellow / Appearance: Clear / S.010 / pH: x  Gluc: x / Ketone: Moderate  / Bili: Negative / Urobili: Negative   Blood: x / Protein: Negative / Nitrite: Negative   Leuk Esterase: Negative / RBC: x / WBC x   Sq Epi: x / Non Sq Epi: x / Bacteria: x      CAPILLARY BLOOD GLUCOSE            Culture - Urine (collected 21 @ 22:11)  Source: Clean Catch Clean Catch (Midstream)  Final Report (21 @ 20:44):    <10,000 CFU/mL Normal Urogenital Jaja    Culture - Blood (collected 21 @ 22:08)  Source: .Blood Blood-Peripheral  Preliminary Report (21 @ 23:01):    No growth to date.    Culture - Blood (collected 21 @ 22:08)  Source: .Blood Blood-Peripheral  Preliminary Report (21 @ 23:01):    No growth to date.        RADIOLOGY & ADDITIONAL TESTS:    Personally reviewed.     Consultant(s) Notes Reviewed:  [x] YES  [ ] NO     Patient is a 56y old  Female who presents with a chief complaint of hyponatremia, colitis (07 Dec 2021 20:24)      INTERVAL HPI/OVERNIGHT EVENTS:    MEDICATIONS  (STANDING):  acetaminophen 300 mG/butalbital 50 mG/ caffeine 40 mG 1 Capsule(s) Oral <User Schedule>  carBAMazepine Chewable 200 milliGRAM(s) Chew three times a day  dextrose 40% Gel 15 Gram(s) Oral once  dextrose 50% Injectable 25 Gram(s) IV Push once  dextrose 50% Injectable 12.5 Gram(s) IV Push once  dextrose 50% Injectable 25 Gram(s) IV Push once  diazepam    Tablet 5 milliGRAM(s) Oral two times a day  enoxaparin Injectable 40 milliGRAM(s) SubCutaneous daily  fentaNYL   Patch  12 MICROgram(s)/Hr 1 Patch Transdermal every 72 hours  glucagon  Injectable 1 milliGRAM(s) IntraMuscular once  levothyroxine 25 MICROGram(s) Oral daily  nicotine - 21 mG/24Hr(s) Patch 1 patch Transdermal daily  pantoprazole    Tablet 40 milliGRAM(s) Oral two times a day  piperacillin/tazobactam IVPB.. 3.375 Gram(s) IV Intermittent every 8 hours  pregabalin 25 milliGRAM(s) Oral two times a day  tiotropium 18 MICROgram(s) Capsule 1 Capsule(s) Inhalation daily    MEDICATIONS  (PRN):  acetaminophen     Tablet .. 650 milliGRAM(s) Oral every 6 hours PRN Mild Pain (1 - 3), Moderate Pain (4 - 6), Severe Pain (7 - 10)  ALBUTerol    90 MICROgram(s) HFA Inhaler 2 Puff(s) Inhalation every 6 hours PRN Shortness of Breath and/or Wheezing  albuterol/ipratropium for Nebulization 3 milliLiter(s) Nebulizer every 6 hours PRN Shortness of Breath  cyclobenzaprine 10 milliGRAM(s) Oral two times a day PRN Muscle Spasm  morphine  - Injectable 4 milliGRAM(s) IV Push four times a day PRN Severe Pain (7 - 10)  ondansetron Injectable 4 milliGRAM(s) IV Push every 6 hours PRN Nausea and/or Vomiting      Allergies    Vibramycin (Unknown)    Intolerances    aspirin (Stomach Upset)  Zithromax (Stomach Upset)      REVIEW OF SYSTEMS:  CONSTITUTIONAL: Affirms: loss of appetite, does not feel well rested, Denies: fever or chills  HEENT: Affirms: Headache, visual changes (seeing spots and flashes of light), Denies: sore throat  RESPIRATORY: Denies cough or shortness of breath  CARDIOVASCULAR: Denies chest pain, palpitations  GASTROINTESTINAL: Denies abd pain  GENITOURINARY: Affirms dysuria (burning), Denies: frequency or hematuria  NEUROLOGICAL: Affirms: dizziness, Denies: focal weakness   MUSCULOSKELETAL: Denies: myalgias     Vital Signs Last 24 Hrs  T(C): 36.4 (08 Dec 2021 04:32), Max: 36.7 (07 Dec 2021 19:16)  T(F): 97.6 (08 Dec 2021 04:32), Max: 98.1 (07 Dec 2021 19:16)  HR: 62 (08 Dec 2021 04:32) (62 - 65)  BP: 106/69 (08 Dec 2021 04:32) (106/69 - 123/72)  BP(mean): --  RR: 17 (08 Dec 2021 04:32) (17 - 17)  SpO2: 93% (08 Dec 2021 04:32) (93% - 99%)    PHYSICAL EXAM:  GENERAL: NAD  HEENT: normocephalic, atraumatic, no scleral icterus observed, EOMI, PERRL  CHEST/LUNG:  CTA b/l, no rales, wheezes, or rhonchi  HEART: S1/S2, RRR, No murmurs/gallops/rubs  ABDOMEN:  soft, nontender, nondistended, + epigastric pain with inhalation, hyperactive bowel sounds in lower quadrants B/L (L>R), normoactive bowel sounds in upper quadrants B/L   EXTREMITIES: 2+ radial and dorsalis pedis pulses B/L, no edema, cyanosis, or calf tenderness, no jaundice  NERVOUS SYSTEM: AO x 3, mentating well    LABS:                        12.1   8.07  )-----------( 397      ( 08 Dec 2021 08:31 )             33.6     CBC Full  -  ( 08 Dec 2021 08:31 )  WBC Count : 8.07 K/uL  Hemoglobin : 12.1 g/dL  Hematocrit : 33.6 %  Platelet Count - Automated : 397 K/uL  Mean Cell Volume : 91.8 fl  Mean Cell Hemoglobin : 33.1 pg  Mean Cell Hemoglobin Concentration : 36.0 gm/dL  Auto Neutrophil # : 3.66 K/uL  Auto Lymphocyte # : 3.09 K/uL  Auto Monocyte # : 0.96 K/uL  Auto Eosinophil # : 0.26 K/uL  Auto Basophil # : 0.06 K/uL  Auto Neutrophil % : 45.4 %  Auto Lymphocyte % : 38.3 %  Auto Monocyte % : 11.9 %  Auto Eosinophil % : 3.2 %  Auto Basophil % : 0.7 %    08 Dec 2021 08:31    135    |  101    |  7      ----------------------------<  90     4.7     |  30     |  0.65     Ca    8.6        08 Dec 2021 08:31  Phos  2.3       08 Dec 2021 08:31  Mg     2.1       08 Dec 2021 08:31    TPro  5.7    /  Alb  3.0    /  TBili  0.5    /  DBili  x      /  AST  15     /  ALT  28     /  AlkPhos  88     08 Dec 2021 08:31      Urinalysis Basic - ( 06 Dec 2021 16:36 )    Color: Yellow / Appearance: Clear / S.010 / pH: x  Gluc: x / Ketone: Moderate  / Bili: Negative / Urobili: Negative   Blood: x / Protein: Negative / Nitrite: Negative   Leuk Esterase: Negative / RBC: x / WBC x   Sq Epi: x / Non Sq Epi: x / Bacteria: x      CAPILLARY BLOOD GLUCOSE            Culture - Urine (collected 21 @ 22:11)  Source: Clean Catch Clean Catch (Midstream)  Final Report (21 @ 20:44):    <10,000 CFU/mL Normal Urogenital Jaja    Culture - Blood (collected 21 @ 22:08)  Source: .Blood Blood-Peripheral  Preliminary Report (21 @ 23:01):    No growth to date.    Culture - Blood (collected 21 @ 22:08)  Source: .Blood Blood-Peripheral  Preliminary Report (21 @ 23:01):    No growth to date.        RADIOLOGY & ADDITIONAL TESTS:    Personally reviewed.     Consultant(s) Notes Reviewed:  [x] YES  [ ] NO     Patient is a 56y old  Female who presents with a chief complaint of hyponatremia, colitis (07 Dec 2021 20:24)      INTERVAL HPI/OVERNIGHT EVENTS: no acute events overnight. pt seen and examined at bedside this am. Reports headache and difficulty sleeping in hospital and would like to be discharged as soon as possible.     MEDICATIONS  (STANDING):  acetaminophen 300 mG/butalbital 50 mG/ caffeine 40 mG 1 Capsule(s) Oral <User Schedule>  carBAMazepine Chewable 200 milliGRAM(s) Chew three times a day  dextrose 40% Gel 15 Gram(s) Oral once  dextrose 50% Injectable 25 Gram(s) IV Push once  dextrose 50% Injectable 12.5 Gram(s) IV Push once  dextrose 50% Injectable 25 Gram(s) IV Push once  diazepam    Tablet 5 milliGRAM(s) Oral two times a day  enoxaparin Injectable 40 milliGRAM(s) SubCutaneous daily  fentaNYL   Patch  12 MICROgram(s)/Hr 1 Patch Transdermal every 72 hours  glucagon  Injectable 1 milliGRAM(s) IntraMuscular once  levothyroxine 25 MICROGram(s) Oral daily  nicotine - 21 mG/24Hr(s) Patch 1 patch Transdermal daily  pantoprazole    Tablet 40 milliGRAM(s) Oral two times a day  piperacillin/tazobactam IVPB.. 3.375 Gram(s) IV Intermittent every 8 hours  pregabalin 25 milliGRAM(s) Oral two times a day  tiotropium 18 MICROgram(s) Capsule 1 Capsule(s) Inhalation daily    MEDICATIONS  (PRN):  acetaminophen     Tablet .. 650 milliGRAM(s) Oral every 6 hours PRN Mild Pain (1 - 3), Moderate Pain (4 - 6), Severe Pain (7 - 10)  ALBUTerol    90 MICROgram(s) HFA Inhaler 2 Puff(s) Inhalation every 6 hours PRN Shortness of Breath and/or Wheezing  albuterol/ipratropium for Nebulization 3 milliLiter(s) Nebulizer every 6 hours PRN Shortness of Breath  cyclobenzaprine 10 milliGRAM(s) Oral two times a day PRN Muscle Spasm  morphine  - Injectable 4 milliGRAM(s) IV Push four times a day PRN Severe Pain (7 - 10)  ondansetron Injectable 4 milliGRAM(s) IV Push every 6 hours PRN Nausea and/or Vomiting      Allergies    Vibramycin (Unknown)    Intolerances    aspirin (Stomach Upset)  Zithromax (Stomach Upset)      REVIEW OF SYSTEMS:  CONSTITUTIONAL: Affirms: loss of appetite, does not feel well rested, Denies: fever or chills  HEENT: Affirms: Headache, visual changes (seeing spots and flashes of light), Denies: sore throat  RESPIRATORY: Denies cough or shortness of breath  CARDIOVASCULAR: Denies chest pain, palpitations  GASTROINTESTINAL: Denies abd pain  GENITOURINARY: Affirms dysuria (burning), Denies: frequency or hematuria  NEUROLOGICAL: Affirms: dizziness, Denies: focal weakness   MUSCULOSKELETAL: Denies: myalgias     Vital Signs Last 24 Hrs  T(C): 36.4 (08 Dec 2021 04:32), Max: 36.7 (07 Dec 2021 19:16)  T(F): 97.6 (08 Dec 2021 04:32), Max: 98.1 (07 Dec 2021 19:16)  HR: 62 (08 Dec 2021 04:32) (62 - 65)  BP: 106/69 (08 Dec 2021 04:32) (106/69 - 123/72)  BP(mean): --  RR: 17 (08 Dec 2021 04:32) (17 - 17)  SpO2: 93% (08 Dec 2021 04:32) (93% - 99%)    PHYSICAL EXAM:  GENERAL: NAD  HEENT: normocephalic, atraumatic, no scleral icterus observed, EOMI, PERRL  CHEST/LUNG:  CTA b/l, no rales, wheezes, or rhonchi  HEART: S1/S2, RRR, No murmurs/gallops/rubs  ABDOMEN:  soft, nontender, nondistended, + epigastric pain with inhalation, hyperactive bowel sounds in lower quadrants B/L (L>R), normoactive bowel sounds in upper quadrants B/L   EXTREMITIES: 2+ radial and dorsalis pedis pulses B/L, no edema, cyanosis, or calf tenderness, no jaundice  NERVOUS SYSTEM: AO x 3, mentating well    LABS:                        12.1   8.07  )-----------( 397      ( 08 Dec 2021 08:31 )             33.6     CBC Full  -  ( 08 Dec 2021 08:31 )  WBC Count : 8.07 K/uL  Hemoglobin : 12.1 g/dL  Hematocrit : 33.6 %  Platelet Count - Automated : 397 K/uL  Mean Cell Volume : 91.8 fl  Mean Cell Hemoglobin : 33.1 pg  Mean Cell Hemoglobin Concentration : 36.0 gm/dL  Auto Neutrophil # : 3.66 K/uL  Auto Lymphocyte # : 3.09 K/uL  Auto Monocyte # : 0.96 K/uL  Auto Eosinophil # : 0.26 K/uL  Auto Basophil # : 0.06 K/uL  Auto Neutrophil % : 45.4 %  Auto Lymphocyte % : 38.3 %  Auto Monocyte % : 11.9 %  Auto Eosinophil % : 3.2 %  Auto Basophil % : 0.7 %    08 Dec 2021 08:31    135    |  101    |  7      ----------------------------<  90     4.7     |  30     |  0.65     Ca    8.6        08 Dec 2021 08:31  Phos  2.3       08 Dec 2021 08:31  Mg     2.1       08 Dec 2021 08:31    TPro  5.7    /  Alb  3.0    /  TBili  0.5    /  DBili  x      /  AST  15     /  ALT  28     /  AlkPhos  88     08 Dec 2021 08:31      Urinalysis Basic - ( 06 Dec 2021 16:36 )    Color: Yellow / Appearance: Clear / S.010 / pH: x  Gluc: x / Ketone: Moderate  / Bili: Negative / Urobili: Negative   Blood: x / Protein: Negative / Nitrite: Negative   Leuk Esterase: Negative / RBC: x / WBC x   Sq Epi: x / Non Sq Epi: x / Bacteria: x      CAPILLARY BLOOD GLUCOSE            Culture - Urine (collected 21 @ 22:11)  Source: Clean Catch Clean Catch (Midstream)  Final Report (21 @ 20:44):    <10,000 CFU/mL Normal Urogenital Jaja    Culture - Blood (collected 21 @ 22:08)  Source: .Blood Blood-Peripheral  Preliminary Report (21 @ 23:01):    No growth to date.    Culture - Blood (collected 21 @ 22:08)  Source: .Blood Blood-Peripheral  Preliminary Report (21 @ 23:01):    No growth to date.        RADIOLOGY & ADDITIONAL TESTS:    Personally reviewed.     Consultant(s) Notes Reviewed:  [x] YES  [ ] NO     Patient is a 56y old  Female who presents with a chief complaint of hyponatremia, colitis (07 Dec 2021 20:24)      INTERVAL HPI/OVERNIGHT EVENTS: no acute events overnight. pt seen and examined at bedside this am. Reports headache and difficulty sleeping in hospital and would like to be discharged as soon as possible. HA is dull in quality, located throughout entire head, associated w/ visual disturbance and rated as severe. No ohter new complaints. Eager for dc.     MEDICATIONS  (STANDING):  acetaminophen 300 mG/butalbital 50 mG/ caffeine 40 mG 1 Capsule(s) Oral <User Schedule>  carBAMazepine Chewable 200 milliGRAM(s) Chew three times a day  dextrose 40% Gel 15 Gram(s) Oral once  dextrose 50% Injectable 25 Gram(s) IV Push once  dextrose 50% Injectable 12.5 Gram(s) IV Push once  dextrose 50% Injectable 25 Gram(s) IV Push once  diazepam    Tablet 5 milliGRAM(s) Oral two times a day  enoxaparin Injectable 40 milliGRAM(s) SubCutaneous daily  fentaNYL   Patch  12 MICROgram(s)/Hr 1 Patch Transdermal every 72 hours  glucagon  Injectable 1 milliGRAM(s) IntraMuscular once  levothyroxine 25 MICROGram(s) Oral daily  nicotine - 21 mG/24Hr(s) Patch 1 patch Transdermal daily  pantoprazole    Tablet 40 milliGRAM(s) Oral two times a day  piperacillin/tazobactam IVPB.. 3.375 Gram(s) IV Intermittent every 8 hours  pregabalin 25 milliGRAM(s) Oral two times a day  tiotropium 18 MICROgram(s) Capsule 1 Capsule(s) Inhalation daily    MEDICATIONS  (PRN):  acetaminophen     Tablet .. 650 milliGRAM(s) Oral every 6 hours PRN Mild Pain (1 - 3), Moderate Pain (4 - 6), Severe Pain (7 - 10)  ALBUTerol    90 MICROgram(s) HFA Inhaler 2 Puff(s) Inhalation every 6 hours PRN Shortness of Breath and/or Wheezing  albuterol/ipratropium for Nebulization 3 milliLiter(s) Nebulizer every 6 hours PRN Shortness of Breath  cyclobenzaprine 10 milliGRAM(s) Oral two times a day PRN Muscle Spasm  morphine  - Injectable 4 milliGRAM(s) IV Push four times a day PRN Severe Pain (7 - 10)  ondansetron Injectable 4 milliGRAM(s) IV Push every 6 hours PRN Nausea and/or Vomiting      Allergies    Vibramycin (Unknown)    Intolerances    aspirin (Stomach Upset)  Zithromax (Stomach Upset)      REVIEW OF SYSTEMS:  CONSTITUTIONAL: Affirms: loss of appetite, does not feel well rested, Denies: fever or chills  HEENT: Affirms: Headache, visual changes (seeing spots and flashes of light), Denies: sore throat  RESPIRATORY: Denies cough or shortness of breath  CARDIOVASCULAR: Denies chest pain, palpitations  GASTROINTESTINAL: Denies abd pain  GENITOURINARY: Affirms dysuria (burning), Denies: frequency or hematuria  NEUROLOGICAL: Affirms: dizziness, Denies: focal weakness   MUSCULOSKELETAL: Denies: myalgias     Vital Signs Last 24 Hrs  T(C): 36.4 (08 Dec 2021 04:32), Max: 36.7 (07 Dec 2021 19:16)  T(F): 97.6 (08 Dec 2021 04:32), Max: 98.1 (07 Dec 2021 19:16)  HR: 62 (08 Dec 2021 04:32) (62 - 65)  BP: 106/69 (08 Dec 2021 04:32) (106/69 - 123/72)  BP(mean): --  RR: 17 (08 Dec 2021 04:32) (17 - 17)  SpO2: 93% (08 Dec 2021 04:32) (93% - 99%)    PHYSICAL EXAM:  GENERAL: NAD, nontoxic appearing, pt is chronically ill appearing and appears malnourished, thin, prominent jose structures  HEENT: normocephalic, atraumatic, no scleral icterus observed, EOMI, PERRL  CHEST/LUNG:  CTA b/l, no rales, wheezes, or rhonchi  HEART: S1/S2, RRR, No murmurs/gallops/rubs  ABDOMEN:  soft, nontender, nondistended, epigastric pain with inhalation, hyperactive bowel sounds in lower quadrants B/L (L>R), normoactive bowel sounds in upper quadrants B/L   EXTREMITIES: 2+ radial and dorsalis pedis pulses B/L, no edema, cyanosis, or calf tenderness, no jaundice  NERVOUS SYSTEM: AO x 3, mentating well    LABS:                        12.1   8.07  )-----------( 397      ( 08 Dec 2021 08:31 )             33.6     CBC Full  -  ( 08 Dec 2021 08:31 )  WBC Count : 8.07 K/uL  Hemoglobin : 12.1 g/dL  Hematocrit : 33.6 %  Platelet Count - Automated : 397 K/uL  Mean Cell Volume : 91.8 fl  Mean Cell Hemoglobin : 33.1 pg  Mean Cell Hemoglobin Concentration : 36.0 gm/dL  Auto Neutrophil # : 3.66 K/uL  Auto Lymphocyte # : 3.09 K/uL  Auto Monocyte # : 0.96 K/uL  Auto Eosinophil # : 0.26 K/uL  Auto Basophil # : 0.06 K/uL  Auto Neutrophil % : 45.4 %  Auto Lymphocyte % : 38.3 %  Auto Monocyte % : 11.9 %  Auto Eosinophil % : 3.2 %  Auto Basophil % : 0.7 %    08 Dec 2021 08:31    135    |  101    |  7      ----------------------------<  90     4.7     |  30     |  0.65     Ca    8.6        08 Dec 2021 08:31  Phos  2.3       08 Dec 2021 08:31  Mg     2.1       08 Dec 2021 08:31    TPro  5.7    /  Alb  3.0    /  TBili  0.5    /  DBili  x      /  AST  15     /  ALT  28     /  AlkPhos  88     08 Dec 2021 08:31      Urinalysis Basic - ( 06 Dec 2021 16:36 )    Color: Yellow / Appearance: Clear / S.010 / pH: x  Gluc: x / Ketone: Moderate  / Bili: Negative / Urobili: Negative   Blood: x / Protein: Negative / Nitrite: Negative   Leuk Esterase: Negative / RBC: x / WBC x   Sq Epi: x / Non Sq Epi: x / Bacteria: x      CAPILLARY BLOOD GLUCOSE            Culture - Urine (collected 21 @ 22:11)  Source: Clean Catch Clean Catch (Midstream)  Final Report (21 @ 20:44):    <10,000 CFU/mL Normal Urogenital Jaja    Culture - Blood (collected 21 @ 22:08)  Source: .Blood Blood-Peripheral  Preliminary Report (21 @ 23:01):    No growth to date.    Culture - Blood (collected 21 @ 22:08)  Source: .Blood Blood-Peripheral  Preliminary Report (21 @ 23:01):    No growth to date.        RADIOLOGY & ADDITIONAL TESTS:    Personally reviewed.     Consultant(s) Notes Reviewed:  [x] YES  [ ] NO

## 2021-12-08 NOTE — DISCHARGE NOTE PROVIDER - HOSPITAL COURSE
HPI:  57 yo female with PMHx of chronic neck/back pain, sciatica, lupus, Sjogren's syndrome, COPD, glossopharyngeal neuralgia s/p brain surgery, COPD on 2L NC, hypothyroidism, Neuropathy, migraines, GERD presents to ED weakness, nausea, vomiting, diarrhea. Onset 3 days ago. Patient states on Saturday she started feeling unwell, states she vomited >30x and has unable to eat/drink/keep anything down since then. Patient also reports 5 episodes of NB diarrhea this AM. Patient c/o chills, body aches, subjective fever, abd pain. Patient reports on Wednesday, 6 days ago, her doctor increased her Carbamazepine from 100mg TID to 200mg TID, and her Lyrica was increased from 25mg BID to 50mg TID. Patient thinks this contributed to her feeling unwell. Patient has not taken any medication since Saturday when she started vomiting. Patient also reports history of dysuria and pelvic pressure. States she has been experiencing constant pelvic burning for a few months, was treated with an antibiotic from urgent care 2 weeks ago, still experiencing dysuria. Denies frequency, hematuria. Patient reports chronic cough, denies worsening of cough or SOB. Denies CP, palpitations.     In ED:  Vitals: T 99, HR , /73, RR 16, SpO2 98%  Labs significant for: WBC 13.53, plt 509, neutrophil 10.63, Na 122, Cl 88, Co2 20, alk phos 132  UA: mod ketone  CT chest/abd/p: new mild intrahepatic and extrahepatic biliary dilation, consider MRCP to exclude choledocholithiasis, findings may represent gastritis, terminal ileitis, and nonspecific colitis, emphysema, no acute pulmonary disease  CXR: no evidence for focal infiltrate or lobar consolidation  EKG: NSR with 1st degree AV block - this is also noted on EKG from Sept 2019  Given: 4mg IVP Morphine x1, 4mg IVP Zofran x1, 1.1 L NS bolus x1, IV Zosyn x1 (06 Dec 2021 19:14)      ---  HOSPITAL COURSE: She as admitted for hyponatremia and colitis found on CT scan. 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 For her abnormal CT findings including colitis, paired with elevated alk phos and total bilirubin, she was started on IV Zosyn, Protonix, placed on clear liquid diet, and blood cultures were drawn that showed no growth. An EGD was performed inpatient that resulted ____. An MRCP was planned but unable to be performed given patients loop recorder. Patient must follow up for outpatient MRCP. For her chronic neck pain and chronic glossopharyngeal neuralgia, her home medications were continued.     ---  CONSULTANTS:   Nephrology: Dr. Murillo  GI: Dr. Garcia     ---  TIME SPENT:  I, the attending physician, was physically present for the key portions of the evaluation and management (E/M) service provided. The total amount of time spent reviewing the hospital notes, laboratory values, imaging findings, assessing/counseling the patient, discussing with consultant physicians, social work, nursing staff was -- minutes    ---  Primary care provider was made aware of plan for discharge:      [  ] NO     [  ] YES   HPI:  55 yo female with PMHx of chronic neck/back pain, sciatica, lupus, Sjogren's syndrome, COPD, glossopharyngeal neuralgia s/p brain surgery, COPD on 2L NC, hypothyroidism, Neuropathy, migraines, GERD presents to ED weakness, nausea, vomiting, diarrhea. Onset 3 days ago. Patient states on Saturday she started feeling unwell, states she vomited >30x and has unable to eat/drink/keep anything down since then. Patient also reports 5 episodes of NB diarrhea this AM. Patient c/o chills, body aches, subjective fever, abd pain. Patient reports on Wednesday, 6 days ago, her doctor increased her Carbamazepine from 100mg TID to 200mg TID, and her Lyrica was increased from 25mg BID to 50mg TID. Patient thinks this contributed to her feeling unwell. Patient has not taken any medication since Saturday when she started vomiting. Patient also reports history of dysuria and pelvic pressure. States she has been experiencing constant pelvic burning for a few months, was treated with an antibiotic from urgent care 2 weeks ago, still experiencing dysuria. Denies frequency, hematuria. Patient reports chronic cough, denies worsening of cough or SOB. Denies CP, palpitations.     In ED:  Vitals: T 99, HR , /73, RR 16, SpO2 98%  Labs significant for: WBC 13.53, plt 509, neutrophil 10.63, Na 122, Cl 88, Co2 20, alk phos 132  UA: mod ketone  CT chest/abd/p: new mild intrahepatic and extrahepatic biliary dilation, consider MRCP to exclude choledocholithiasis, findings may represent gastritis, terminal ileitis, and nonspecific colitis, emphysema, no acute pulmonary disease  CXR: no evidence for focal infiltrate or lobar consolidation  EKG: NSR with 1st degree AV block - this is also noted on EKG from Sept 2019  Given: 4mg IVP Morphine x1, 4mg IVP Zofran x1, 1.1 L NS bolus x1, IV Zosyn x1 (06 Dec 2021 19:14)      ---  HOSPITAL COURSE: She as admitted for hyponatremia and colitis found on CT scan. 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 For her abnormal CT findings including colitis, paired with elevated alk phos and total bilirubin, she was started on IV Zosyn, Protonix, placed on clear liquid diet, and blood cultures were drawn that showed no growth. 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 GI was consulted and recommended EGD and MRCP. An EGD was performed inpatient that resulted ____. An MRCP was planned but unable to be performed given patients loop recorder. Patient must follow up for outpatient MRCP. For her chronic neck pain and chronic glossopharyngeal neuralgia, her home medications were continued.     ---  CONSULTANTS:   Nephrology: Dr. Murillo  GI: Dr. Garcia     ---  TIME SPENT:  I, the attending physician, was physically present for the key portions of the evaluation and management (E/M) service provided. The total amount of time spent reviewing the hospital notes, laboratory values, imaging findings, assessing/counseling the patient, discussing with consultant physicians, social work, nursing staff was -- minutes    ---  Primary care provider was made aware of plan for discharge:      [  ] NO     [  ] YES   HPI:  57 yo female with PMHx of chronic neck/back pain, sciatica, lupus, Sjogren's syndrome, COPD, glossopharyngeal neuralgia s/p brain surgery, COPD on 2L NC, hypothyroidism, Neuropathy, migraines, GERD presents to ED weakness, nausea, vomiting, diarrhea. Onset 3 days ago. Patient states on Saturday she started feeling unwell, states she vomited >30x and has unable to eat/drink/keep anything down since then. Patient also reports 5 episodes of NB diarrhea this AM. Patient c/o chills, body aches, subjective fever, abd pain. Patient reports on Wednesday, 6 days ago, her doctor increased her Carbamazepine from 100mg TID to 200mg TID, and her Lyrica was increased from 25mg BID to 50mg TID. Patient thinks this contributed to her feeling unwell. Patient has not taken any medication since Saturday when she started vomiting. Patient also reports history of dysuria and pelvic pressure. States she has been experiencing constant pelvic burning for a few months, was treated with an antibiotic from urgent care 2 weeks ago, still experiencing dysuria.      ---  HOSPITAL COURSE:   Pt admitted for hyponatremia and colitis found on CT scan. 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 For her abnormal CT findings including colitis, paired with elevated alk phos and total bilirubin, she was started on IV Zosyn, Protonix, placed on clear liquid diet, and blood cultures were drawn that showed no growth. 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 GI was consulted and recommended EGD and MRCP. An EGD was performed inpatient that resulted gastritis, hiatal hernia. An MRCP was planned but unable to be performed given patients loop recorder and not compatible w/ our MRI. Its possible that the patient has a potentially obstructing lesion in her biliary tree and is recommended for short interval (days) MRCP and further f/u. Pt has a dificult time maintaining body weight.  Patient must follow up for outpatient MRCP. For her chronic neck pain and chronic glossopharyngeal neuralgia, her home medications were continued.     Counseling provided regarding importance of close follow and further recommended workup for biliary lesion seen on imaging.     ---  CONSULTANTS:   Nephrology: Dr. Murillo  GI: Dr. Garcia     ---  TIME SPENT:  I, the attending physician, was physically present for the key portions of the evaluation and management (E/M) service provided. The total amount of time spent reviewing the hospital notes, laboratory values, imaging findings, assessing/counseling the patient, discussing with consultant physicians, social work, nursing staff was 50 minutes HPI:  57 yo female with PMHx of chronic neck/back pain, sciatica, lupus, Sjogren's syndrome, COPD, glossopharyngeal neuralgia s/p brain surgery, COPD on 2L NC, hypothyroidism, Neuropathy, migraines, GERD presents to ED weakness, nausea, vomiting, diarrhea. Onset 3 days ago. Patient states on Saturday she started feeling unwell, states she vomited >30x and has unable to eat/drink/keep anything down since then. Patient also reports 5 episodes of NB diarrhea this AM. Patient c/o chills, body aches, subjective fever, abd pain. Patient reports on Wednesday, 6 days ago, her doctor increased her Carbamazepine from 100mg TID to 200mg TID, and her Lyrica was increased from 25mg BID to 50mg TID. Patient thinks this contributed to her feeling unwell. Patient has not taken any medication since Saturday when she started vomiting. Patient also reports history of dysuria and pelvic pressure. States she has been experiencing constant pelvic burning for a few months, was treated with an antibiotic from urgent care 2 weeks ago, still experiencing dysuria.      ---  HOSPITAL COURSE:   Pt admitted for hyponatremia and colitis found on CT scan. 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 For her abnormal CT findings including colitis, paired with elevated alk phos and total bilirubin, she was started on IV Zosyn, Protonix, placed on clear liquid diet, and blood cultures were drawn that showed no growth. 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 GI was consulted and recommended EGD and MRCP. An EGD was performed inpatient that resulted gastritis, hiatal hernia. An MRCP was planned but unable to be performed given patients loop recorder and not compatible w/ our MRI. Its possible that the patient has a potentially obstructing lesion in her biliary tree and is recommended for short interval (days) MRCP and further f/u. Pt has a dificult time maintaining body weight.  Patient must follow up for an outpatient MRCP. For her chronic neck pain and chronic glossopharyngeal neuralgia, her home medications were continued.     Patient seen and examined on day of discharge.  States she feels stressed and just wants to be discharged ASAP.  Patient's only current complaint is worsening of her chronic neck and back pain, which she attributes to being in bed for the past few days.    T(C): 36.6 (12-09-21 @ 04:17), Max: 36.9 (12-08-21 @ 20:08)  HR: 60 (12-09-21 @ 04:17) (60 - 75)  BP: 103/62 (12-09-21 @ 04:17) (103/62 - 144/59)  RR: 16 (12-09-21 @ 04:17) (14 - 17)  SpO2: 98% (12-09-21 @ 04:17) (92% - 98%)    GENERAL: patient appears well, no acute distress, appropriate, pleasant  EYES: sclera clear, no exudates, PERRL  ENMT: oropharynx clear without erythema, no exudates, dry mucous membranes  NECK: supple, soft, no thyromegaly noted  LUNGS: good air entry bilaterally, clear to auscultation, symmetric breath sounds, no wheezing or rhonchi appreciated  HEART: soft S1/S2, regular rate and rhythm, no murmurs noted, no lower extremity edema, 2+ radial and dorsalis pedis pulses B/L  GASTROINTESTINAL: abdomen is soft, nontender, nondistended, normoactive bowel sounds, no palpable masses  INTEGUMENT: good skin turgor, warm skin, appears well perfused  MUSCULOSKELETAL: no clubbing or cyanosis, no obvious deformity, 5/5 muscle strength in all extremities  NEUROLOGIC: awake, alert, oriented x3, good muscle tone in 4 extremities, no obvious sensory deficits  PSYCHIATRIC: mood is good, affect is congruent, linear and logical thought process  HEME/LYMPH: no palpable supraclavicular nodules, no obvious ecchymosis or petechiae     Counseling provided regarding importance of close follow and further recommended workup for biliary lesion seen on imaging.     ---  CONSULTANTS:   Nephrology: Dr. Murillo  GI: Dr. Garcia     ---  TIME SPENT:  I, the attending physician, was physically present for the key portions of the evaluation and management (E/M) service provided. The total amount of time spent reviewing the hospital notes, laboratory values, imaging findings, assessing/counseling the patient, discussing with consultant physicians, social work, nursing staff was 50 minutes HPI:  55 yo female with PMHx of chronic neck/back pain, sciatica, lupus, Sjogren's syndrome, COPD, glossopharyngeal neuralgia s/p brain surgery, COPD on 2L NC, hypothyroidism, Neuropathy, migraines, GERD presents to ED weakness, nausea, vomiting, diarrhea. Onset 3 days ago. Patient states on Saturday she started feeling unwell, states she vomited >30x and has unable to eat/drink/keep anything down since then. Patient also reports 5 episodes of NB diarrhea this AM. Patient c/o chills, body aches, subjective fever, abd pain. Patient reports on Wednesday, 6 days ago, her doctor increased her Carbamazepine from 100mg TID to 200mg TID, and her Lyrica was increased from 25mg BID to 50mg TID. Patient thinks this contributed to her feeling unwell. Patient has not taken any medication since Saturday when she started vomiting. Patient also reports history of dysuria and pelvic pressure. States she has been experiencing constant pelvic burning for a few months, was treated with an antibiotic from urgent care 2 weeks ago, still experiencing dysuria.      ---  HOSPITAL COURSE:   Pt admitted for hyponatremia and colitis found on CT scan. 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 For her abnormal CT findings including colitis, paired with elevated alk phos and total bilirubin, she was started on IV Zosyn, Protonix, placed on clear liquid diet, and blood cultures were drawn that showed no growth. 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 GI was consulted and recommended EGD and MRCP. An EGD was performed inpatient that resulted gastritis, hiatal hernia. An MRCP was planned but unable to be performed given patients loop recorder and not compatible w/ our MRI. Its possible that the patient has a potentially obstructing lesion in her biliary tree and is recommended for short interval (days) MRCP and further f/u. Pt has a dificult time maintaining body weight.  Patient must follow up for an outpatient MRCP. For her chronic neck pain and chronic glossopharyngeal neuralgia, her home medications were continued.     Patient seen and examined on day of discharge.  States she feels stressed and just wants to be discharged ASAP.  Patient's only current complaint is worsening of her chronic neck and back pain, which she attributes to being in bed for the past few days.    T(C): 36.6 (12-09-21 @ 04:17), Max: 36.9 (12-08-21 @ 20:08)  HR: 60 (12-09-21 @ 04:17) (60 - 75)  BP: 103/62 (12-09-21 @ 04:17) (103/62 - 144/59)  RR: 16 (12-09-21 @ 04:17) (14 - 17)  SpO2: 98% (12-09-21 @ 04:17) (92% - 98%)    PHYSICAL EXAM:  GENERAL: NAD, nontoxic appearing, pt is chronically ill appearing and appears malnourished, thin, prominent jose structures  HEENT: normocephalic, atraumatic, no scleral icterus observed  CHEST/LUNG:  CTA b/l, no rales, wheezes, or rhonchi  HEART: S1/S2, RRR, No murmurs/gallops/rubs  ABDOMEN:  soft, nontender, nondistended, epigastric pain with inhalation, hyperactive bowel sounds in lower quadrants B/L (L>R), normoactive bowel sounds in upper quadrants B/L   EXTREMITIES: 2+ radial and dorsalis pedis pulses B/L, no edema, cyanosis, or calf tenderness, no jaundice  NERVOUS SYSTEM: AO x 3, mentating well    Counseling provided regarding importance of close follow and further recommended workup for biliary lesion seen on imaging.     ---  CONSULTANTS:   Nephrology: Dr. Murillo  GI: Dr. Garcia     ---  TIME SPENT:  I, the attending physician, was physically present for the key portions of the evaluation and management (E/M) service provided. The total amount of time spent reviewing the hospital notes, laboratory values, imaging findings, assessing/counseling the patient, discussing with consultant physicians, social work, nursing staff was 50 minutes

## 2021-12-08 NOTE — PROGRESS NOTE ADULT - ASSESSMENT
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 Off IVF. To continue current meds. Will follow electrolytes and renal function trend.    GI follow up. D/c planning.

## 2021-12-08 NOTE — DISCHARGE NOTE PROVIDER - NSDCMRMEDTOKEN_GEN_ALL_CORE_FT
carBAMazepine 100 mg oral tablet, chewable: 2 tab(s) orally 3 times a day  DuoNeb 0.5 mg-2.5 mg/3 mL inhalation solution: 3 milliliter(s) inhaled 4 times a day, As Needed  fentaNYL 12 mcg/hr transdermal film, extended release: 1 film(s) transdermal every 72 hours  Fioricet oral tablet: 1 tab(s) orally 2 times a day  ipratropium-albuterol 0.5 mg-2.5 mg/3 mLinhalation solution: 3 milliliter(s) inhaled 8 times a day  levothyroxine 25 mcg (0.025 mg) oral capsule: 1 cap(s) orally once a day  Lyrica 50 mg oral capsule: 1 cap(s) orally 3 times a day  Morphine IR 15 mg oral tablet: 1 tab(s) orally every 6 hours  omeprazole 40 mg oral delayed release capsule: 1 cap(s) orally once a day  Soma 350 mg oral tablet: 1 tab(s) orally 2 times a day  Valium 5 mg oral tablet: 1 tab(s) orally 2 times a day  Ventolin HFA 90 mcg/inh inhalation aerosol: 2 puff(s) inhaled every 6 hours, As Needed   carBAMazepine 100 mg oral tablet, chewable: 2 tab(s) orally 3 times a day  DuoNeb 0.5 mg-2.5 mg/3 mL inhalation solution: 3 milliliter(s) inhaled 4 times a day, As Needed  fentaNYL 12 mcg/hr transdermal film, extended release: 1 film(s) transdermal every 72 hours  Fioricet oral tablet: 1 tab(s) orally 2 times a day  ipratropium-albuterol 0.5 mg-2.5 mg/3 mLinhalation solution: 3 milliliter(s) inhaled 8 times a day  levothyroxine 25 mcg (0.025 mg) oral capsule: 1 cap(s) orally once a day  Lyrica 50 mg oral capsule: 1 cap(s) orally 3 times a day  Morphine IR 15 mg oral tablet: 1 tab(s) orally every 6 hours  nicotine 21 mg/24 hr transdermal film, extended release: 1 patch transdermal once a day  omeprazole 40 mg oral delayed release capsule: 1 cap(s) orally once a day  Soma 350 mg oral tablet: 1 tab(s) orally 2 times a day  Valium 5 mg oral tablet: 1 tab(s) orally 2 times a day  Ventolin HFA 90 mcg/inh inhalation aerosol: 2 puff(s) inhaled every 6 hours, As Needed

## 2021-12-09 ENCOUNTER — TRANSCRIPTION ENCOUNTER (OUTPATIENT)
Age: 56
End: 2021-12-09

## 2021-12-09 VITALS
HEART RATE: 70 BPM | OXYGEN SATURATION: 98 % | SYSTOLIC BLOOD PRESSURE: 125 MMHG | DIASTOLIC BLOOD PRESSURE: 71 MMHG | RESPIRATION RATE: 18 BRPM | TEMPERATURE: 98 F

## 2021-12-09 LAB
ALBUMIN SERPL ELPH-MCNC: 2.9 G/DL — LOW (ref 3.3–5)
ALP SERPL-CCNC: 88 U/L — SIGNIFICANT CHANGE UP (ref 40–120)
ALT FLD-CCNC: 25 U/L — SIGNIFICANT CHANGE UP (ref 12–78)
ANION GAP SERPL CALC-SCNC: 4 MMOL/L — LOW (ref 5–17)
AST SERPL-CCNC: 16 U/L — SIGNIFICANT CHANGE UP (ref 15–37)
BILIRUB SERPL-MCNC: 0.5 MG/DL — SIGNIFICANT CHANGE UP (ref 0.2–1.2)
BUN SERPL-MCNC: 7 MG/DL — SIGNIFICANT CHANGE UP (ref 7–23)
CALCIUM SERPL-MCNC: 9.1 MG/DL — SIGNIFICANT CHANGE UP (ref 8.5–10.1)
CHLORIDE SERPL-SCNC: 98 MMOL/L — SIGNIFICANT CHANGE UP (ref 96–108)
CO2 SERPL-SCNC: 32 MMOL/L — HIGH (ref 22–31)
CREAT SERPL-MCNC: 0.72 MG/DL — SIGNIFICANT CHANGE UP (ref 0.5–1.3)
GLUCOSE SERPL-MCNC: 81 MG/DL — SIGNIFICANT CHANGE UP (ref 70–99)
HCT VFR BLD CALC: 33.8 % — LOW (ref 34.5–45)
HGB BLD-MCNC: 12 G/DL — SIGNIFICANT CHANGE UP (ref 11.5–15.5)
MAGNESIUM SERPL-MCNC: 2.2 MG/DL — SIGNIFICANT CHANGE UP (ref 1.6–2.6)
MCHC RBC-ENTMCNC: 32.6 PG — SIGNIFICANT CHANGE UP (ref 27–34)
MCHC RBC-ENTMCNC: 35.5 GM/DL — SIGNIFICANT CHANGE UP (ref 32–36)
MCV RBC AUTO: 91.8 FL — SIGNIFICANT CHANGE UP (ref 80–100)
NRBC # BLD: 0 /100 WBCS — SIGNIFICANT CHANGE UP (ref 0–0)
PLATELET # BLD AUTO: 356 K/UL — SIGNIFICANT CHANGE UP (ref 150–400)
POTASSIUM SERPL-MCNC: 4.6 MMOL/L — SIGNIFICANT CHANGE UP (ref 3.5–5.3)
POTASSIUM SERPL-SCNC: 4.6 MMOL/L — SIGNIFICANT CHANGE UP (ref 3.5–5.3)
PROT SERPL-MCNC: 5.7 G/DL — LOW (ref 6–8.3)
RBC # BLD: 3.68 M/UL — LOW (ref 3.8–5.2)
RBC # FLD: 13.4 % — SIGNIFICANT CHANGE UP (ref 10.3–14.5)
SODIUM SERPL-SCNC: 134 MMOL/L — LOW (ref 135–145)
WBC # BLD: 8.24 K/UL — SIGNIFICANT CHANGE UP (ref 3.8–10.5)
WBC # FLD AUTO: 8.24 K/UL — SIGNIFICANT CHANGE UP (ref 3.8–10.5)

## 2021-12-09 PROCEDURE — 99239 HOSP IP/OBS DSCHRG MGMT >30: CPT

## 2021-12-09 RX ADMIN — PANTOPRAZOLE SODIUM 40 MILLIGRAM(S): 20 TABLET, DELAYED RELEASE ORAL at 05:09

## 2021-12-09 RX ADMIN — Medication 1 PATCH: at 12:57

## 2021-12-09 RX ADMIN — Medication 5 MILLIGRAM(S): at 05:09

## 2021-12-09 RX ADMIN — ONDANSETRON 4 MILLIGRAM(S): 8 TABLET, FILM COATED ORAL at 06:34

## 2021-12-09 RX ADMIN — TIOTROPIUM BROMIDE 1 CAPSULE(S): 18 CAPSULE ORAL; RESPIRATORY (INHALATION) at 06:35

## 2021-12-09 RX ADMIN — Medication 1 CAPSULE(S): at 05:09

## 2021-12-09 RX ADMIN — Medication 1 CAPSULE(S): at 06:30

## 2021-12-09 RX ADMIN — MORPHINE SULFATE 4 MILLIGRAM(S): 50 CAPSULE, EXTENDED RELEASE ORAL at 07:00

## 2021-12-09 RX ADMIN — Medication 25 MICROGRAM(S): at 05:09

## 2021-12-09 RX ADMIN — CYCLOBENZAPRINE HYDROCHLORIDE 10 MILLIGRAM(S): 10 TABLET, FILM COATED ORAL at 00:17

## 2021-12-09 RX ADMIN — Medication 200 MILLIGRAM(S): at 05:09

## 2021-12-09 RX ADMIN — Medication 1 PATCH: at 09:15

## 2021-12-09 RX ADMIN — Medication 25 MILLIGRAM(S): at 05:09

## 2021-12-09 RX ADMIN — PIPERACILLIN AND TAZOBACTAM 25 GRAM(S): 4; .5 INJECTION, POWDER, LYOPHILIZED, FOR SOLUTION INTRAVENOUS at 02:25

## 2021-12-09 RX ADMIN — MORPHINE SULFATE 4 MILLIGRAM(S): 50 CAPSULE, EXTENDED RELEASE ORAL at 06:34

## 2021-12-09 RX ADMIN — FENTANYL CITRATE 1 PATCH: 50 INJECTION INTRAVENOUS at 09:16

## 2021-12-09 NOTE — PROGRESS NOTE ADULT - PROBLEM SELECTOR PLAN 1
Patient with 3 days of N/V/D with recent increase in Carbamazepine prescription  -Etiology likely 2/2 low solute intake and SIADH 2/2 carbamazepine   -Na 122 on admission, now normalized  -PO fluid restriction stopped as Na corrected too quickly, continue on IV D5W  -Continue Carbamazepine 200mg TID for hx of glossopharyngeal neuralgia  -Potassium, phos repleted  -Monitor daily CMP  -Nephro Dr. Murillo consulted, recs appreciated

## 2021-12-09 NOTE — DISCHARGE NOTE NURSING/CASE MANAGEMENT/SOCIAL WORK - NSDCPEEMAIL_GEN_ALL_CORE
[facility] for Tobacco Control email [email or web address]

## 2021-12-09 NOTE — PROGRESS NOTE ADULT - SUBJECTIVE AND OBJECTIVE BOX
[facility]  Bert Mejía PA-C  [facility] [place] [address]  [phone]      INTERVAL HPI/OVERNIGHT EVENTS:  Pt s/e s/p egd on   Pt states she feels well and has no GI complaints    MEDICATIONS  (STANDING):  acetaminophen 300 mG/butalbital 50 mG/ caffeine 40 mG 1 Capsule(s) Oral <User Schedule>  carBAMazepine Chewable 200 milliGRAM(s) Chew three times a day  dextrose 40% Gel 15 Gram(s) Oral once  dextrose 50% Injectable 25 Gram(s) IV Push once  dextrose 50% Injectable 12.5 Gram(s) IV Push once  dextrose 50% Injectable 25 Gram(s) IV Push once  diazepam    Tablet 5 milliGRAM(s) Oral two times a day  enoxaparin Injectable 40 milliGRAM(s) SubCutaneous daily  fentaNYL   Patch  12 MICROgram(s)/Hr 1 Patch Transdermal every 72 hours  glucagon  Injectable 1 milliGRAM(s) IntraMuscular once  levothyroxine 25 MICROGram(s) Oral daily  nicotine - 21 mG/24Hr(s) Patch 1 patch Transdermal daily  pantoprazole    Tablet 40 milliGRAM(s) Oral two times a day  piperacillin/tazobactam IVPB.. 3.375 Gram(s) IV Intermittent every 8 hours  pregabalin 25 milliGRAM(s) Oral two times a day  tiotropium 18 MICROgram(s) Capsule 1 Capsule(s) Inhalation daily    MEDICATIONS  (PRN):  acetaminophen     Tablet .. 650 milliGRAM(s) Oral every 6 hours PRN Mild Pain (1 - 3), Moderate Pain (4 - 6), Severe Pain (7 - 10)  ALBUTerol    90 MICROgram(s) HFA Inhaler 2 Puff(s) Inhalation every 6 hours PRN Shortness of Breath and/or Wheezing  albuterol/ipratropium for Nebulization 3 milliLiter(s) Nebulizer every 6 hours PRN Shortness of Breath  cyclobenzaprine 10 milliGRAM(s) Oral two times a day PRN Muscle Spasm  morphine  - Injectable 4 milliGRAM(s) IV Push four times a day PRN Severe Pain (7 - 10)  ondansetron Injectable 4 milliGRAM(s) IV Push every 6 hours PRN Nausea and/or Vomiting      Allergies    Vibramycin (Unknown)    Intolerances    aspirin (Stomach Upset)  Zithromax (Stomach Upset)    PHYSICAL EXAM:   Vital Signs:  Vital Signs Last 24 Hrs  T(C): 36.6 (09 Dec 2021 04:17), Max: 36.9 (08 Dec 2021 20:08)  T(F): 97.8 (09 Dec 2021 04:17), Max: 98.4 (08 Dec 2021 20:08)  HR: 60 (09 Dec 2021 04:17) (60 - 75)  BP: 103/62 (09 Dec 2021 04:17) (103/62 - 144/59)  BP(mean): --  RR: 16 (09 Dec 2021 04:17) (14 - 17)  SpO2: 98% (09 Dec 2021 04:17) (92% - 98%)  Daily     Daily Weight in k.1 (09 Dec 2021 04:17)    GENERAL:  Appears stated age,   HEENT:  NC/AT,    CHEST:  Full & symmetric excursion,   HEART:  Regular rhythm,  ABDOMEN:  Soft, non-tender, non-distended,  EXTEREMITIES:  no cyanosis  SKIN:  No rash  NEURO:  Alert      LABS:                        12.0   8.24  )-----------( 356      ( 09 Dec 2021 08:27 )             33.8         134<L>  |  98  |  7   ----------------------------<  81  4.6   |  32<H>  |  0.72    Ca    9.1      09 Dec 2021 08:27  Phos  2.3       Mg     2.2         TPro  5.7<L>  /  Alb  2.9<L>  /  TBili  0.5  /  DBili  x   /  AST  16  /  ALT  25  /  AlkPhos  88

## 2021-12-09 NOTE — PROGRESS NOTE ADULT - SUBJECTIVE AND OBJECTIVE BOX
Patient is a 56y old  Female who presents with a chief complaint of hyponatremia, colitis (07 Dec 2021 10:01)    Patient seen in follow up for hyponatremia.        PAST MEDICAL HISTORY:  Lupus    Sjogren&#x27;s disease    Vertigo    UTI (urinary tract infection)    GERD (gastroesophageal reflux disease)    Hypotension    Hypothyroidism    Gallstones    Hepatitis C    Anxiety    Migraine    Neuropathy    COPD (chronic obstructive pulmonary disease)    Nicotine addiction    Glossopharyngeal neuralgia syndrome    Dvt femoral (deep venous thrombosis)    Lupus    Emphysema/COPD    Burning mouth syndrome    H/O osteoporosis    History of weight loss    History of seizure disorder    Personal history of skin cancer    Osteochondritis dissecans    History of IBS    H/O Raynaud&#x27;s syndrome    Pain, wrist, right    Right shoulder pain    Chronic low back pain with sciatica    Chronic neck pain    H/O paroxysmal supraventricular tachycardia      MEDICATIONS  (STANDING):  acetaminophen 300 mG/butalbital 50 mG/ caffeine 40 mG 1 Capsule(s) Oral <User Schedule>  carBAMazepine Chewable 200 milliGRAM(s) Chew three times a day  dextrose 40% Gel 15 Gram(s) Oral once  dextrose 50% Injectable 25 Gram(s) IV Push once  dextrose 50% Injectable 12.5 Gram(s) IV Push once  dextrose 50% Injectable 25 Gram(s) IV Push once  diazepam    Tablet 5 milliGRAM(s) Oral two times a day  enoxaparin Injectable 40 milliGRAM(s) SubCutaneous daily  fentaNYL   Patch  12 MICROgram(s)/Hr 1 Patch Transdermal every 72 hours  glucagon  Injectable 1 milliGRAM(s) IntraMuscular once  levothyroxine 25 MICROGram(s) Oral daily  nicotine - 21 mG/24Hr(s) Patch 1 patch Transdermal daily  pantoprazole    Tablet 40 milliGRAM(s) Oral two times a day  piperacillin/tazobactam IVPB.. 3.375 Gram(s) IV Intermittent every 8 hours  pregabalin 25 milliGRAM(s) Oral two times a day  tiotropium 18 MICROgram(s) Capsule 1 Capsule(s) Inhalation daily    MEDICATIONS  (PRN):  acetaminophen     Tablet .. 650 milliGRAM(s) Oral every 6 hours PRN Mild Pain (1 - 3), Moderate Pain (4 - 6), Severe Pain (7 - 10)  ALBUTerol    90 MICROgram(s) HFA Inhaler 2 Puff(s) Inhalation every 6 hours PRN Shortness of Breath and/or Wheezing  albuterol/ipratropium for Nebulization 3 milliLiter(s) Nebulizer every 6 hours PRN Shortness of Breath  cyclobenzaprine 10 milliGRAM(s) Oral two times a day PRN Muscle Spasm  morphine  - Injectable 4 milliGRAM(s) IV Push four times a day PRN Severe Pain (7 - 10)  ondansetron Injectable 4 milliGRAM(s) IV Push every 6 hours PRN Nausea and/or Vomiting    T(C): 36.5 (12-09-21 @ 12:25), Max: 36.9 (12-08-21 @ 20:08)  HR: 70 (12-09-21 @ 12:25) (60 - 75)  BP: 125/71 (12-09-21 @ 12:25) (103/62 - 144/59)  RR: 18 (12-09-21 @ 12:25)  SpO2: 98% (12-09-21 @ 12:25)  Wt(kg): --  I&O's Detail    09 Dec 2021 07:01  -  09 Dec 2021 15:15  --------------------------------------------------------  IN:    Oral Fluid: 420 mL  Total IN: 420 mL    OUT:  Total OUT: 0 mL    Total NET: 420 mL                      PHYSICAL EXAM:  General: No distress  Respiratory: b/l air entry  Cardiovascular: S1 S2  Gastrointestinal: soft  Extremities:  no edema                             LABORATORY:                        12.0   8.24  )-----------( 356      ( 09 Dec 2021 08:27 )             33.8     12-09    134<L>  |  98  |  7   ----------------------------<  81  4.6   |  32<H>  |  0.72    Ca    9.1      09 Dec 2021 08:27  Phos  2.3     12-08  Mg     2.2     12-09    TPro  5.7<L>  /  Alb  2.9<L>  /  TBili  0.5  /  DBili  x   /  AST  16  /  ALT  25  /  AlkPhos  88  12-09    Sodium, Serum: 134 mmol/L (12-09 @ 08:27)  Sodium, Serum: 135 mmol/L (12-08 @ 08:31)    Potassium, Serum: 4.6 mmol/L (12-09 @ 08:27)  Potassium, Serum: 4.7 mmol/L (12-08 @ 08:31)    Hemoglobin: 12.0 g/dL (12-09 @ 08:27)  Hemoglobin: 12.1 g/dL (12-08 @ 08:31)  Hemoglobin: 11.6 g/dL (12-07 @ 05:27)    Creatinine, Serum 0.72 (12-09 @ 08:27)  Creatinine, Serum 0.65 (12-08 @ 08:31)  Creatinine, Serum 0.85 (12-07 @ 05:27)  Creatinine, Serum 0.70 (12-06 @ 21:46)        LIVER FUNCTIONS - ( 09 Dec 2021 08:27 )  Alb: 2.9 g/dL / Pro: 5.7 g/dL / ALK PHOS: 88 U/L / ALT: 25 U/L / AST: 16 U/L / GGT: x

## 2021-12-09 NOTE — PROGRESS NOTE ADULT - PROBLEM SELECTOR PLAN 4
Hx of glossopharyngeal neuralgia s/p brain surgery 3 years ago in which she takes Carbamazepine  -Patient reports noncompliance for past 3 days due to N/V/D, carbamazepine level <0.5 on admission  -Patient reports recent increase in Carbamazepine on 12/1 from 100mg TID to 200mg TID, likely contributing to hyponatremia which is now improved, continue increased dose  -Patient states she does not take this medication for seizures, although she does have a history of seizure she states her last seizure was "many, many years ago"  -Seizure precautions, aspiration precautions

## 2021-12-09 NOTE — PROGRESS NOTE ADULT - PROBLEM SELECTOR PLAN 6
-Low TSH and T3, patient reports not taking levothyroxine "for a few weeks"  -Continue home 25mcg levothyroxine daily

## 2021-12-09 NOTE — PROVIDER CONTACT NOTE (OTHER) - SITUATION
Pt has oxygen ordered, and at home. Pt to be discharged, did not have portable oxygen charged at home to be discharge with.

## 2021-12-09 NOTE — PROVIDER CONTACT NOTE (OTHER) - ASSESSMENT
MD Henry informed, and spoke to patient. Patient to have family charge oxygen so that she can be safely discharged.

## 2021-12-09 NOTE — DISCHARGE NOTE NURSING/CASE MANAGEMENT/SOCIAL WORK - NSDCPEWEB_GEN_ALL_CORE
[facility] for Tobacco Control website --- [email or web address] website --- [email or web address]

## 2021-12-09 NOTE — PROGRESS NOTE ADULT - NUTRITIONAL ASSESSMENT
This patient has been assessed with a concern for Malnutrition and has been determined to have a diagnosis/diagnoses of Severe protein-calorie malnutrition and Underweight (BMI < 19).    This patient is being managed with:   Diet Clear Liquid-  Entered: Dec  7 2021  7:55PM    
This patient has been assessed with a concern for Malnutrition and has been determined to have a diagnosis/diagnoses of Severe protein-calorie malnutrition and Underweight (BMI < 19).    This patient is being managed with:   Diet Regular-  Fiber/Residue Restricted  Entered: Dec  8 2021  5:11PM    
This patient has been assessed with a concern for Malnutrition and has been determined to have a diagnosis/diagnoses of Severe protein-calorie malnutrition and Underweight (BMI < 19).    This patient is being managed with:   Diet NPO-  Entered: Dec  8 2021 10:09AM

## 2021-12-09 NOTE — DISCHARGE NOTE NURSING/CASE MANAGEMENT/SOCIAL WORK - PATIENT PORTAL LINK FT
You can access the FollowMyHealth Patient Portal offered by [facility] [place] by registering at the following website: [email or web address]. By joining [employer]’s FollowMyHealth portal, you will also be able to view your health information using other applications (apps) compatible with our system.

## 2021-12-09 NOTE — PROGRESS NOTE ADULT - PROBLEM SELECTOR PLAN 9
Patient reports recent increase in Lyrica from 25mg BID to 50mg TID  -Patient states she does not agree with taking the higher dose  -Continue previous dosing of 25mg BID

## 2021-12-09 NOTE — DISCHARGE NOTE NURSING/CASE MANAGEMENT/SOCIAL WORK - NSDCPEFALRISK_GEN_ALL_CORE
For information on Fall & Injury Prevention, visit: [email or web address] OR  [email or web address] OR  https://www.cdc.gov/steadi/patient.html

## 2021-12-09 NOTE — PROGRESS NOTE ADULT - PROBLEM SELECTOR PLAN 7
Patient reports few months with dysuria and pelvic pressure, reports recent abx completion of unknown abx a few weeks ago without improvement in symptoms  -UA unremarkable on admission, urine cx normal francis

## 2021-12-09 NOTE — PROGRESS NOTE ADULT - PROBLEM SELECTOR PLAN 5
Chronic  -Patient reports smoking 2-3 ppd >40 years, on 2L supplemental O2 at home  -Continue supplemental O2 as needed  -Continue Albuterol q6h PRN, therapeutic interchange for ipratropium   -Nicotine patch supplied  -Patient counseled on effects of dangers of tobacco use including lung cancer, death, patient declines cessation tools
Chronic  -Patient reports smoking 2-3 ppd >40 years, on 2L supplemental O2 at home  -Continue supplemental O2 as needed  -Continue Albuterol q6h PRN, therapeutic interchange for ipratropium   -Nicotine patch supplied  -Patient counseled on effects of dangers of tobacco use including lung cancer, death, patient declines cessation tools
Chronic  -Patient reports smoking 2-3 ppd >40 years, on 2L supplemental O2 at home  -Continue supplemental O2 as needed  -Continue Albuterol q6h PRN, therapeutic interchange for ipratropium   -Nicotine patch supplied  -Patient counseled on effects of dangers of tobacco use including lung cancer, death, patient declines cessation tools  -patient unable to be discharged until can prove home O2 safely works, plans to have family member bring home oxygen to hospital.

## 2021-12-09 NOTE — PROGRESS NOTE ADULT - ATTENDING COMMENTS
The patient was seen and evaluated independently by the attending physician.  - for dc home today  - see dc note for further details
The patient was seen and evaluated independently by the attending physician.  - pt is medically optimized for endoscopy today  - further clinical decision making based on findings. continue zosyn for now.
57 yo female PMH chronic neck/back pain, sciatica, lupus, Sjogren's syndrome, COPD, glossopharyngeal neuralgia s/p brain surgery, COPD on 2L NC, hypothyroidism, Neuropathy, migraines, GERD presents to ED with 3 days of weakness, nausea, vomiting, diarrhea, patient admitted for hyponatremia and colitis found on CT abd/pelvis. Na stable on d5w and glucose improved. CT findings with Double duct sign/?SMA syndrome/colitis/duodenitis. Remains on zosyn for now. Patient has loop recorder. Plan for MRCP vs EUS. Will try for MRCP tomorrow if feasible. De-escalated diet to liquids as per GI. Increased protonix to twice a day. Patient will need endoscopy prior to discharge. GI Radha following, recs appreciated. D/w patient.    Agree with resident notation as outlined above, edited where appropriate.

## 2021-12-09 NOTE — PROGRESS NOTE ADULT - ASSESSMENT
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  To continue current meds. Will follow electrolytes and renal function trend.    GI follow up. D/c planning.

## 2021-12-09 NOTE — PROGRESS NOTE ADULT - ASSESSMENT
Double duct sign/ ?SMA syndrome/ colitis/duodenitis    s/p egd on 12/8, negative for SMA syndrome, denuded mucosa, query celiac, gastritis, hiatal hernia  will need mrcp (pt has loop recorder) vs EUS, pt is currently refusing inpatient workup  discussed importance of MRCP/possible EUS given double duct sign, risk of occult pancreatic malignancy  pt is still refusing inpatient workup, expressed understanding of risks, agreed to follow up outpatient  card given for outpatient f/u  ppi bid  correct electrolytes  given pt's symptoms have resolved and pt does not wish to have further workup as an inpatient, no GI objection to d/c with close outpatient follow up  will f/u

## 2021-12-09 NOTE — PROGRESS NOTE ADULT - REASON FOR ADMISSION
hyponatremia, colitis

## 2021-12-09 NOTE — DISCHARGE NOTE NURSING/CASE MANAGEMENT/SOCIAL WORK - NSDCVIVACCINE_GEN_ALL_CORE_FT
influenza, injectable, quadrivalent, preservative free; 17-Oct-2018 11:53; Abbey Martinez (RN); [employer]; [id number] (Exp. Date: 30-Jun-2019); IntraMuscular; Deltoid Left.; 0.5 milliLiter(s); VIS (VIS Published: 07-Aug-2015, VIS Presented: 17-Oct-2018);   pneumococcal polysaccharide PPV23; 04-Jun-2014 10:54; Yani Jain (HERMILO); [id number]; IntraMuscular; Deltoid Left.; 0.5 milliLiter(s);

## 2021-12-09 NOTE — PROGRESS NOTE ADULT - SUBJECTIVE AND OBJECTIVE BOX
Patient is a 56y old  Female who presents with a chief complaint of hyponatremia, colitis (09 Dec 2021 11:56)      INTERVAL HPI/OVERNIGHT EVENTS: no acute events overnight. Patient seen and examined at bedside this morning..  States she feels stressed and just wants to be discharged ASA.  Patient's only current complaint is worsening of her chronic neck and back pain, which she attributes to being in bed for the past few days.      MEDICATIONS  (STANDING):  acetaminophen 300 mG/butalbital 50 mG/ caffeine 40 mG 1 Capsule(s) Oral <User Schedule>  carBAMazepine Chewable 200 milliGRAM(s) Chew three times a day  dextrose 40% Gel 15 Gram(s) Oral once  dextrose 50% Injectable 25 Gram(s) IV Push once  dextrose 50% Injectable 12.5 Gram(s) IV Push once  dextrose 50% Injectable 25 Gram(s) IV Push once  diazepam    Tablet 5 milliGRAM(s) Oral two times a day  enoxaparin Injectable 40 milliGRAM(s) SubCutaneous daily  fentaNYL   Patch  12 MICROgram(s)/Hr 1 Patch Transdermal every 72 hours  glucagon  Injectable 1 milliGRAM(s) IntraMuscular once  levothyroxine 25 MICROGram(s) Oral daily  nicotine - 21 mG/24Hr(s) Patch 1 patch Transdermal daily  pantoprazole    Tablet 40 milliGRAM(s) Oral two times a day  piperacillin/tazobactam IVPB.. 3.375 Gram(s) IV Intermittent every 8 hours  pregabalin 25 milliGRAM(s) Oral two times a day  tiotropium 18 MICROgram(s) Capsule 1 Capsule(s) Inhalation daily    MEDICATIONS  (PRN):  acetaminophen     Tablet .. 650 milliGRAM(s) Oral every 6 hours PRN Mild Pain (1 - 3), Moderate Pain (4 - 6), Severe Pain (7 - 10)  ALBUTerol    90 MICROgram(s) HFA Inhaler 2 Puff(s) Inhalation every 6 hours PRN Shortness of Breath and/or Wheezing  albuterol/ipratropium for Nebulization 3 milliLiter(s) Nebulizer every 6 hours PRN Shortness of Breath  cyclobenzaprine 10 milliGRAM(s) Oral two times a day PRN Muscle Spasm  morphine  - Injectable 4 milliGRAM(s) IV Push four times a day PRN Severe Pain (7 - 10)  ondansetron Injectable 4 milliGRAM(s) IV Push every 6 hours PRN Nausea and/or Vomiting      Allergies    Vibramycin (Unknown)    Intolerances    aspirin (Stomach Upset)  Zithromax (Stomach Upset)      REVIEW OF SYSTEMS:  CONSTITUTIONAL: No fever or chills  HEENT:  No headache, no sore throat  RESPIRATORY: No cough, wheezing, or shortness of breath  CARDIOVASCULAR: No chest pain, palpitations  GASTROINTESTINAL: No abd pain, nausea, vomiting, or diarrhea  GENITOURINARY: No dysuria, frequency, or hematuria  NEUROLOGICAL: no focal weakness or dizziness  MUSCULOSKELETAL: no myalgias,  + neck pain, + back pain      Vital Signs Last 24 Hrs  T(C): 36.5 (09 Dec 2021 12:25), Max: 36.9 (08 Dec 2021 20:08)  T(F): 97.7 (09 Dec 2021 12:25), Max: 98.4 (08 Dec 2021 20:08)  HR: 70 (09 Dec 2021 12:25) (60 - 75)  BP: 125/71 (09 Dec 2021 12:25) (103/62 - 125/71)  BP(mean): --  RR: 18 (09 Dec 2021 12:25) (14 - 18)  SpO2: 98% (09 Dec 2021 12:25) (92% - 98%)    GENERAL: NAD, nontoxic appearing, pt is chronically ill appearing and appears malnourished, thin, prominent jose structures  HEENT: normocephalic, atraumatic, no scleral icterus observed  CHEST/LUNG:  CTA b/l, no rales, wheezes, or rhonchi  HEART: S1/S2, RRR, No murmurs/gallops/rubs  ABDOMEN:  soft, nontender, nondistended, epigastric pain with inhalation, hyperactive bowel sounds in lower quadrants B/L (L>R), normoactive bowel sounds in upper quadrants B/L   EXTREMITIES: 2+ radial and dorsalis pedis pulses B/L, no edema, cyanosis, or calf tenderness, no jaundice  NERVOUS SYSTEM: AO x 3, mentating well    LABS:                        12.0   8.24  )-----------( 356      ( 09 Dec 2021 08:27 )             33.8     CBC Full  -  ( 09 Dec 2021 08:27 )  WBC Count : 8.24 K/uL  Hemoglobin : 12.0 g/dL  Hematocrit : 33.8 %  Platelet Count - Automated : 356 K/uL  Mean Cell Volume : 91.8 fl  Mean Cell Hemoglobin : 32.6 pg  Mean Cell Hemoglobin Concentration : 35.5 gm/dL  Auto Neutrophil # : x  Auto Lymphocyte # : x  Auto Monocyte # : x  Auto Eosinophil # : x  Auto Basophil # : x  Auto Neutrophil % : x  Auto Lymphocyte % : x  Auto Monocyte % : x  Auto Eosinophil % : x  Auto Basophil % : x    09 Dec 2021 08:27    134    |  98     |  7      ----------------------------<  81     4.6     |  32     |  0.72     Ca    9.1        09 Dec 2021 08:27  Mg     2.2       09 Dec 2021 08:27    TPro  5.7    /  Alb  2.9    /  TBili  0.5    /  DBili  x      /  AST  16     /  ALT  25     /  AlkPhos  88     09 Dec 2021 08:27        CAPILLARY BLOOD GLUCOSE            Culture - Urine (collected 12-06-21 @ 22:11)  Source: Clean Catch Clean Catch (Midstream)  Final Report (12-07-21 @ 20:44):    <10,000 CFU/mL Normal Urogenital Jaja    Culture - Blood (collected 12-06-21 @ 22:08)  Source: .Blood Blood-Peripheral  Preliminary Report (12-07-21 @ 23:01):    No growth to date.    Culture - Blood (collected 12-06-21 @ 22:08)  Source: .Blood Blood-Peripheral  Preliminary Report (12-07-21 @ 23:01):    No growth to date.        RADIOLOGY & ADDITIONAL TESTS:    Personally reviewed.     Consultant(s) Notes Reviewed:  [x] YES  [ ] NO     Patient is a 56y old  Female who presents with a chief complaint of hyponatremia, colitis (09 Dec 2021 11:56)      INTERVAL HPI/OVERNIGHT EVENTS: no acute events overnight. Patient seen and examined at bedside this morning. States she feels stressed and just wants to be discharged ASA. Patient's only current complaint is worsening of her chronic neck and back pain, which she attributes to being in bed for the past few days.       MEDICATIONS  (STANDING):  acetaminophen 300 mG/butalbital 50 mG/ caffeine 40 mG 1 Capsule(s) Oral <User Schedule>  carBAMazepine Chewable 200 milliGRAM(s) Chew three times a day  dextrose 40% Gel 15 Gram(s) Oral once  dextrose 50% Injectable 25 Gram(s) IV Push once  dextrose 50% Injectable 12.5 Gram(s) IV Push once  dextrose 50% Injectable 25 Gram(s) IV Push once  diazepam    Tablet 5 milliGRAM(s) Oral two times a day  enoxaparin Injectable 40 milliGRAM(s) SubCutaneous daily  fentaNYL   Patch  12 MICROgram(s)/Hr 1 Patch Transdermal every 72 hours  glucagon  Injectable 1 milliGRAM(s) IntraMuscular once  levothyroxine 25 MICROGram(s) Oral daily  nicotine - 21 mG/24Hr(s) Patch 1 patch Transdermal daily  pantoprazole    Tablet 40 milliGRAM(s) Oral two times a day  piperacillin/tazobactam IVPB.. 3.375 Gram(s) IV Intermittent every 8 hours  pregabalin 25 milliGRAM(s) Oral two times a day  tiotropium 18 MICROgram(s) Capsule 1 Capsule(s) Inhalation daily    MEDICATIONS  (PRN):  acetaminophen     Tablet .. 650 milliGRAM(s) Oral every 6 hours PRN Mild Pain (1 - 3), Moderate Pain (4 - 6), Severe Pain (7 - 10)  ALBUTerol    90 MICROgram(s) HFA Inhaler 2 Puff(s) Inhalation every 6 hours PRN Shortness of Breath and/or Wheezing  albuterol/ipratropium for Nebulization 3 milliLiter(s) Nebulizer every 6 hours PRN Shortness of Breath  cyclobenzaprine 10 milliGRAM(s) Oral two times a day PRN Muscle Spasm  morphine  - Injectable 4 milliGRAM(s) IV Push four times a day PRN Severe Pain (7 - 10)  ondansetron Injectable 4 milliGRAM(s) IV Push every 6 hours PRN Nausea and/or Vomiting      Allergies    Vibramycin (Unknown)    Intolerances    aspirin (Stomach Upset)  Zithromax (Stomach Upset)      REVIEW OF SYSTEMS:  CONSTITUTIONAL: No fever or chills  HEENT:  No headache, no sore throat  RESPIRATORY: No cough, wheezing, or shortness of breath  CARDIOVASCULAR: No chest pain, palpitations  GASTROINTESTINAL: No abd pain, nausea, vomiting, or diarrhea  GENITOURINARY: No dysuria, frequency, or hematuria  NEUROLOGICAL: no focal weakness or dizziness  MUSCULOSKELETAL: no myalgias,  + neck pain, + back pain      Vital Signs Last 24 Hrs  T(C): 36.5 (09 Dec 2021 12:25), Max: 36.9 (08 Dec 2021 20:08)  T(F): 97.7 (09 Dec 2021 12:25), Max: 98.4 (08 Dec 2021 20:08)  HR: 70 (09 Dec 2021 12:25) (60 - 75)  BP: 125/71 (09 Dec 2021 12:25) (103/62 - 125/71)  BP(mean): --  RR: 18 (09 Dec 2021 12:25) (14 - 18)  SpO2: 98% (09 Dec 2021 12:25) (92% - 98%)    GENERAL: NAD, nontoxic appearing, pt is chronically ill appearing and appears malnourished, thin, prominent jose structures  HEENT: normocephalic, atraumatic, no scleral icterus observed  CHEST/LUNG:  CTA b/l, no rales, wheezes, or rhonchi  HEART: S1/S2, RRR, No murmurs/gallops/rubs  ABDOMEN:  soft, nontender, nondistended, epigastric pain with inhalation, hyperactive bowel sounds in lower quadrants B/L (L>R), normoactive bowel sounds in upper quadrants B/L   EXTREMITIES: 2+ radial and dorsalis pedis pulses B/L, no edema, cyanosis, or calf tenderness, no jaundice  NERVOUS SYSTEM: AO x 3, mentating well    LABS:                        12.0   8.24  )-----------( 356      ( 09 Dec 2021 08:27 )             33.8     CBC Full  -  ( 09 Dec 2021 08:27 )  WBC Count : 8.24 K/uL  Hemoglobin : 12.0 g/dL  Hematocrit : 33.8 %  Platelet Count - Automated : 356 K/uL  Mean Cell Volume : 91.8 fl  Mean Cell Hemoglobin : 32.6 pg  Mean Cell Hemoglobin Concentration : 35.5 gm/dL  Auto Neutrophil # : x  Auto Lymphocyte # : x  Auto Monocyte # : x  Auto Eosinophil # : x  Auto Basophil # : x  Auto Neutrophil % : x  Auto Lymphocyte % : x  Auto Monocyte % : x  Auto Eosinophil % : x  Auto Basophil % : x    09 Dec 2021 08:27    134    |  98     |  7      ----------------------------<  81     4.6     |  32     |  0.72     Ca    9.1        09 Dec 2021 08:27  Mg     2.2       09 Dec 2021 08:27    TPro  5.7    /  Alb  2.9    /  TBili  0.5    /  DBili  x      /  AST  16     /  ALT  25     /  AlkPhos  88     09 Dec 2021 08:27        CAPILLARY BLOOD GLUCOSE            Culture - Urine (collected 12-06-21 @ 22:11)  Source: Clean Catch Clean Catch (Midstream)  Final Report (12-07-21 @ 20:44):    <10,000 CFU/mL Normal Urogenital Jaja    Culture - Blood (collected 12-06-21 @ 22:08)  Source: .Blood Blood-Peripheral  Preliminary Report (12-07-21 @ 23:01):    No growth to date.    Culture - Blood (collected 12-06-21 @ 22:08)  Source: .Blood Blood-Peripheral  Preliminary Report (12-07-21 @ 23:01):    No growth to date.        RADIOLOGY & ADDITIONAL TESTS:    Personally reviewed.     Consultant(s) Notes Reviewed:  [x] YES  [ ] NO

## 2021-12-09 NOTE — PROGRESS NOTE ADULT - PROBLEM SELECTOR PROBLEM 4
History of glossopharyngeal neuralgia

## 2021-12-09 NOTE — PROGRESS NOTE ADULT - PROBLEM SELECTOR PLAN 2
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  Patient with 3 days of N/V/D, now improved  -CT chest/abd/p showing new mild intrahepatic and extrahepatic biliary dilation, consider MRCP to exclude choledocholithiasis, findings may represent gastritis, terminal ileitis, and nonspecific colitis  -Patient reports hx of cholecystectomy, LFTs and Tbili WNL  -EGD  resulted gastritis, hiatal hernia. will need outpatient MCP   -Continue IV Zosyn for now but plan for short course, probably 3 days total of abx  -Continue PPI BID   -blood cx, urine cx, NG   -GI Dr. Reyes consulted

## 2021-12-30 PROCEDURE — 97162 PT EVAL MOD COMPLEX 30 MIN: CPT

## 2021-12-30 PROCEDURE — 93005 ELECTROCARDIOGRAM TRACING: CPT

## 2021-12-30 PROCEDURE — 80048 BASIC METABOLIC PNL TOTAL CA: CPT

## 2021-12-30 PROCEDURE — 83036 HEMOGLOBIN GLYCOSYLATED A1C: CPT

## 2021-12-30 PROCEDURE — 80053 COMPREHEN METABOLIC PANEL: CPT

## 2021-12-30 PROCEDURE — 71260 CT THORAX DX C+: CPT | Mod: MA

## 2021-12-30 PROCEDURE — 84443 ASSAY THYROID STIM HORMONE: CPT

## 2021-12-30 PROCEDURE — 0225U NFCT DS DNA&RNA 21 SARSCOV2: CPT

## 2021-12-30 PROCEDURE — 83735 ASSAY OF MAGNESIUM: CPT

## 2021-12-30 PROCEDURE — 99285 EMERGENCY DEPT VISIT HI MDM: CPT | Mod: 25

## 2021-12-30 PROCEDURE — 88312 SPECIAL STAINS GROUP 1: CPT

## 2021-12-30 PROCEDURE — 82962 GLUCOSE BLOOD TEST: CPT

## 2021-12-30 PROCEDURE — 84100 ASSAY OF PHOSPHORUS: CPT

## 2021-12-30 PROCEDURE — 80156 ASSAY CARBAMAZEPINE TOTAL: CPT

## 2021-12-30 PROCEDURE — 71045 X-RAY EXAM CHEST 1 VIEW: CPT

## 2021-12-30 PROCEDURE — 96375 TX/PRO/DX INJ NEW DRUG ADDON: CPT

## 2021-12-30 PROCEDURE — 96374 THER/PROPH/DIAG INJ IV PUSH: CPT

## 2021-12-30 PROCEDURE — 83690 ASSAY OF LIPASE: CPT

## 2021-12-30 PROCEDURE — 81003 URINALYSIS AUTO W/O SCOPE: CPT

## 2021-12-30 PROCEDURE — 85025 COMPLETE CBC W/AUTO DIFF WBC: CPT

## 2021-12-30 PROCEDURE — 36415 COLL VENOUS BLD VENIPUNCTURE: CPT

## 2021-12-30 PROCEDURE — 83935 ASSAY OF URINE OSMOLALITY: CPT

## 2021-12-30 PROCEDURE — 87040 BLOOD CULTURE FOR BACTERIA: CPT

## 2021-12-30 PROCEDURE — 83605 ASSAY OF LACTIC ACID: CPT

## 2021-12-30 PROCEDURE — 87086 URINE CULTURE/COLONY COUNT: CPT

## 2021-12-30 PROCEDURE — 88313 SPECIAL STAINS GROUP 2: CPT

## 2021-12-30 PROCEDURE — 94640 AIRWAY INHALATION TREATMENT: CPT

## 2021-12-30 PROCEDURE — 74177 CT ABD & PELVIS W/CONTRAST: CPT | Mod: MA

## 2021-12-30 PROCEDURE — 88305 TISSUE EXAM BY PATHOLOGIST: CPT

## 2021-12-30 PROCEDURE — 84300 ASSAY OF URINE SODIUM: CPT

## 2021-12-30 PROCEDURE — 84436 ASSAY OF TOTAL THYROXINE: CPT

## 2021-12-30 PROCEDURE — 85027 COMPLETE CBC AUTOMATED: CPT

## 2021-12-30 PROCEDURE — 84480 ASSAY TRIIODOTHYRONINE (T3): CPT

## 2022-01-04 ENCOUNTER — OUTPATIENT (OUTPATIENT)
Dept: OUTPATIENT SERVICES | Facility: HOSPITAL | Age: 57
LOS: 1 days | End: 2022-01-04
Payer: MEDICARE

## 2022-01-04 DIAGNOSIS — Z98.890 OTHER SPECIFIED POSTPROCEDURAL STATES: Chronic | ICD-10-CM

## 2022-01-04 DIAGNOSIS — Z95.818 PRESENCE OF OTHER CARDIAC IMPLANTS AND GRAFTS: Chronic | ICD-10-CM

## 2022-01-04 DIAGNOSIS — S69.91XD UNSPECIFIED INJURY OF RIGHT WRIST, HAND AND FINGER(S), SUBSEQUENT ENCOUNTER: Chronic | ICD-10-CM

## 2022-01-04 DIAGNOSIS — K82.9 DISEASE OF GALLBLADDER, UNSPECIFIED: Chronic | ICD-10-CM

## 2022-01-04 DIAGNOSIS — Z20.828 CONTACT WITH AND (SUSPECTED) EXPOSURE TO OTHER VIRAL COMMUNICABLE DISEASES: ICD-10-CM

## 2022-01-04 DIAGNOSIS — M25.9 JOINT DISORDER, UNSPECIFIED: Chronic | ICD-10-CM

## 2022-01-04 DIAGNOSIS — J38.1 POLYP OF VOCAL CORD AND LARYNX: Chronic | ICD-10-CM

## 2022-01-04 LAB — SARS-COV-2 RNA SPEC QL NAA+PROBE: SIGNIFICANT CHANGE UP

## 2022-01-04 PROCEDURE — U0003: CPT

## 2022-01-04 PROCEDURE — U0005: CPT

## 2022-01-06 ENCOUNTER — OUTPATIENT (OUTPATIENT)
Dept: OUTPATIENT SERVICES | Facility: HOSPITAL | Age: 57
LOS: 1 days | End: 2022-01-06
Payer: MEDICARE

## 2022-01-06 DIAGNOSIS — Z98.890 OTHER SPECIFIED POSTPROCEDURAL STATES: Chronic | ICD-10-CM

## 2022-01-06 DIAGNOSIS — K82.9 DISEASE OF GALLBLADDER, UNSPECIFIED: Chronic | ICD-10-CM

## 2022-01-06 DIAGNOSIS — M54.16 RADICULOPATHY, LUMBAR REGION: ICD-10-CM

## 2022-01-06 DIAGNOSIS — Z95.818 PRESENCE OF OTHER CARDIAC IMPLANTS AND GRAFTS: Chronic | ICD-10-CM

## 2022-01-06 DIAGNOSIS — M25.9 JOINT DISORDER, UNSPECIFIED: Chronic | ICD-10-CM

## 2022-01-06 DIAGNOSIS — J38.1 POLYP OF VOCAL CORD AND LARYNX: Chronic | ICD-10-CM

## 2022-01-06 DIAGNOSIS — S69.91XD UNSPECIFIED INJURY OF RIGHT WRIST, HAND AND FINGER(S), SUBSEQUENT ENCOUNTER: Chronic | ICD-10-CM

## 2022-01-06 PROCEDURE — 62323 NJX INTERLAMINAR LMBR/SAC: CPT

## 2022-03-29 NOTE — ED PROVIDER NOTE - NEURO NEGATIVE STATEMENT, MLM
stated no loss of consciousness, no gait abnormality, no headache, no sensory deficits, and no weakness.

## 2022-04-06 NOTE — ED ADULT TRIAGE NOTE - CADM TRG TX PRIOR TO ARRIVAL
[address], SLEEP, AND CRITICAL CARE   Dougie Melgoza (:  1944) is a 76 y.o. female,Established patient, here for evaluation of the following chief complaint(s):  COPD, Follow-up, and Discuss Medications (daliresp)          ASSESSMENT/PLAN:  1. COPD, severe ([address]  Assessment & Plan:   -FEV1 43%  -Tolerating Breztri, albuterol as needed  -Daliresp 500  -Oxygen dependent, start low-dose prednisone chronic  -Is interested in palliative care, we have already referred  Orders:  -     Roflumilast (DALIRESP) 500 MCG tablet; Take 1 tablet by mouth daily, Disp-30 tablet, R-11Normal  -     predniSONE (DELTASONE) 20 MG tablet; Take 0.5 tablets by mouth daily, Disp-30 tablet, R-3Normal  -     albuterol sulfate  (90 Base) MCG/ACT inhaler; Inhale 2 puffs into the lungs every 4 hours as needed for Wheezing or Shortness of Breath, Disp-18 g, R-3Normal  2. Chronic respiratory failure with hypoxia [facility])  Assessment & Plan:  Dougie Melgoza  continues to use and benefit from supplemental oxygen      No follow-ups on file. Future Appointments   Date Time Provider Ines Ramirez   2022  2:20 PM Rosa Khan MD [facility] PULM [facility]         Subjective   SUBJECTIVE/OBJECTIVE:  Continues to have issues with frequent exacerbations and feeling short of breath. Patient called our office last week to inquire about palliative care which we referred her. She has completed her months of [employer] and is ready to be up titrated to 500 mcg dose. She is also interested in chronic steroids    COPD        Review of Systems   Constitutional: Negative. Cardiovascular: Negative. Genitourinary: Negative. Musculoskeletal: Negative. Neurological: Negative. Psychiatric/Behavioral: Negative. Objective   Physical Exam    Gen:  No acute distress. Thin  Eyes: PERRL. EOMI. Anicteric sclera. No conjunctival injection. ENT: No discharge. oropharynx clear.  External appearance of ears and nose normal.  Neck: Trachea midline. No mass   Resp:  No crackles. No wheezes. No rhonchi. No dullness on percussion. CV: Regular rate. Regular rhythm. No murmur or rub. No edema. GI: Soft, Non-tender. Non-distended. +BS  Skin: Warm, dry, w/o erythema. Lymph: No cervical or supraclavicular LAD. M/S: No cyanosis. No clubbing. Neuro:  no focal neurologic deficit. Moves all extremities  Psych: Awake and alert, Oriented x 3. Judgement and insight appropriate. Mood stable. I spent 31 minutes on this case today, >50% was face-to-face in discussion of the diagnosis and the coordination of care in regards to the treatment plan. All questions answered. An electronic signature was used to authenticate this note.     --Nando Pantoja MD medications/aleve

## 2022-04-30 NOTE — H&P ADULT - PROBLEM/PLAN-3
DISPLAY PLAN FREE TEXT

## 2022-05-05 NOTE — ED PROVIDER NOTE - DISCUSSED CLINICAL AND RADIOLOGICAL FINDINGS WITH, MDM
76yo m  PMH PAF, psoriatic arthritis, seizure disorder presents to ED with concern for possible shaking movements, hx of seizure, pt poor historian aox1, pt states his blood pressure was high at home but is not sure why he is in the emergency department. patient

## 2022-06-16 ENCOUNTER — EMERGENCY (EMERGENCY)
Facility: HOSPITAL | Age: 57
LOS: 1 days | Discharge: HOME | End: 2022-06-16
Attending: EMERGENCY MEDICINE | Admitting: EMERGENCY MEDICINE
Payer: MEDICARE

## 2022-06-16 VITALS
OXYGEN SATURATION: 100 % | TEMPERATURE: 99 F | SYSTOLIC BLOOD PRESSURE: 112 MMHG | DIASTOLIC BLOOD PRESSURE: 76 MMHG | RESPIRATION RATE: 18 BRPM | HEIGHT: 56 IN | HEART RATE: 74 BPM | WEIGHT: 79.59 LBS

## 2022-06-16 VITALS
OXYGEN SATURATION: 100 % | DIASTOLIC BLOOD PRESSURE: 61 MMHG | HEART RATE: 69 BPM | TEMPERATURE: 98 F | SYSTOLIC BLOOD PRESSURE: 103 MMHG | RESPIRATION RATE: 14 BRPM

## 2022-06-16 DIAGNOSIS — Z98.890 OTHER SPECIFIED POSTPROCEDURAL STATES: Chronic | ICD-10-CM

## 2022-06-16 DIAGNOSIS — Z95.818 PRESENCE OF OTHER CARDIAC IMPLANTS AND GRAFTS: Chronic | ICD-10-CM

## 2022-06-16 DIAGNOSIS — K82.9 DISEASE OF GALLBLADDER, UNSPECIFIED: Chronic | ICD-10-CM

## 2022-06-16 DIAGNOSIS — J38.1 POLYP OF VOCAL CORD AND LARYNX: Chronic | ICD-10-CM

## 2022-06-16 DIAGNOSIS — S69.91XD UNSPECIFIED INJURY OF RIGHT WRIST, HAND AND FINGER(S), SUBSEQUENT ENCOUNTER: Chronic | ICD-10-CM

## 2022-06-16 DIAGNOSIS — M25.9 JOINT DISORDER, UNSPECIFIED: Chronic | ICD-10-CM

## 2022-06-16 LAB
ALBUMIN SERPL ELPH-MCNC: 3.8 G/DL — SIGNIFICANT CHANGE UP (ref 3.3–5)
ALP SERPL-CCNC: 86 U/L — SIGNIFICANT CHANGE UP (ref 30–120)
ALT FLD-CCNC: 26 U/L DA — SIGNIFICANT CHANGE UP (ref 10–60)
ANION GAP SERPL CALC-SCNC: 7 MMOL/L — SIGNIFICANT CHANGE UP (ref 5–17)
APPEARANCE UR: CLEAR — SIGNIFICANT CHANGE UP
AST SERPL-CCNC: 17 U/L — SIGNIFICANT CHANGE UP (ref 10–40)
BASOPHILS # BLD AUTO: 0.11 K/UL — SIGNIFICANT CHANGE UP (ref 0–0.2)
BASOPHILS NFR BLD AUTO: 0.8 % — SIGNIFICANT CHANGE UP (ref 0–2)
BILIRUB SERPL-MCNC: 0.2 MG/DL — SIGNIFICANT CHANGE UP (ref 0.2–1.2)
BILIRUB UR-MCNC: NEGATIVE — SIGNIFICANT CHANGE UP
BUN SERPL-MCNC: 18 MG/DL — SIGNIFICANT CHANGE UP (ref 7–23)
CALCIUM SERPL-MCNC: 9.1 MG/DL — SIGNIFICANT CHANGE UP (ref 8.4–10.5)
CHLORIDE SERPL-SCNC: 93 MMOL/L — LOW (ref 96–108)
CO2 SERPL-SCNC: 28 MMOL/L — SIGNIFICANT CHANGE UP (ref 22–31)
COLOR SPEC: YELLOW — SIGNIFICANT CHANGE UP
CREAT SERPL-MCNC: 0.84 MG/DL — SIGNIFICANT CHANGE UP (ref 0.5–1.3)
DIFF PNL FLD: NEGATIVE — SIGNIFICANT CHANGE UP
EGFR: 80 ML/MIN/1.73M2 — SIGNIFICANT CHANGE UP
EOSINOPHIL # BLD AUTO: 0.55 K/UL — HIGH (ref 0–0.5)
EOSINOPHIL NFR BLD AUTO: 4.1 % — SIGNIFICANT CHANGE UP (ref 0–6)
GLUCOSE SERPL-MCNC: 88 MG/DL — SIGNIFICANT CHANGE UP (ref 70–99)
GLUCOSE UR QL: NEGATIVE MG/DL — SIGNIFICANT CHANGE UP
HCT VFR BLD CALC: 33.1 % — LOW (ref 34.5–45)
HGB BLD-MCNC: 11.6 G/DL — SIGNIFICANT CHANGE UP (ref 11.5–15.5)
IMM GRANULOCYTES NFR BLD AUTO: 0.4 % — SIGNIFICANT CHANGE UP (ref 0–1.5)
KETONES UR-MCNC: NEGATIVE — SIGNIFICANT CHANGE UP
LEUKOCYTE ESTERASE UR-ACNC: ABNORMAL
LIDOCAIN IGE QN: 92 U/L — SIGNIFICANT CHANGE UP (ref 73–393)
LYMPHOCYTES # BLD AUTO: 28.6 % — SIGNIFICANT CHANGE UP (ref 13–44)
LYMPHOCYTES # BLD AUTO: 3.83 K/UL — HIGH (ref 1–3.3)
MAGNESIUM SERPL-MCNC: 2.2 MG/DL — SIGNIFICANT CHANGE UP (ref 1.6–2.6)
MCHC RBC-ENTMCNC: 33.3 PG — SIGNIFICANT CHANGE UP (ref 27–34)
MCHC RBC-ENTMCNC: 35 GM/DL — SIGNIFICANT CHANGE UP (ref 32–36)
MCV RBC AUTO: 95.1 FL — SIGNIFICANT CHANGE UP (ref 80–100)
MONOCYTES # BLD AUTO: 1.1 K/UL — HIGH (ref 0–0.9)
MONOCYTES NFR BLD AUTO: 8.2 % — SIGNIFICANT CHANGE UP (ref 2–14)
NEUTROPHILS # BLD AUTO: 7.75 K/UL — HIGH (ref 1.8–7.4)
NEUTROPHILS NFR BLD AUTO: 57.9 % — SIGNIFICANT CHANGE UP (ref 43–77)
NITRITE UR-MCNC: NEGATIVE — SIGNIFICANT CHANGE UP
NRBC # BLD: 0 /100 WBCS — SIGNIFICANT CHANGE UP (ref 0–0)
PH UR: 5 — SIGNIFICANT CHANGE UP (ref 5–8)
PLATELET # BLD AUTO: 325 K/UL — SIGNIFICANT CHANGE UP (ref 150–400)
POTASSIUM SERPL-MCNC: 4.6 MMOL/L — SIGNIFICANT CHANGE UP (ref 3.5–5.3)
POTASSIUM SERPL-SCNC: 4.6 MMOL/L — SIGNIFICANT CHANGE UP (ref 3.5–5.3)
PROT SERPL-MCNC: 6.5 G/DL — SIGNIFICANT CHANGE UP (ref 6–8.3)
PROT UR-MCNC: NEGATIVE MG/DL — SIGNIFICANT CHANGE UP
RBC # BLD: 3.48 M/UL — LOW (ref 3.8–5.2)
RBC # FLD: 12.6 % — SIGNIFICANT CHANGE UP (ref 10.3–14.5)
SODIUM SERPL-SCNC: 128 MMOL/L — LOW (ref 135–145)
SP GR SPEC: 1.01 — SIGNIFICANT CHANGE UP (ref 1.01–1.02)
UROBILINOGEN FLD QL: NEGATIVE MG/DL — SIGNIFICANT CHANGE UP
WBC # BLD: 13.4 K/UL — HIGH (ref 3.8–10.5)
WBC # FLD AUTO: 13.4 K/UL — HIGH (ref 3.8–10.5)

## 2022-06-16 PROCEDURE — 99284 EMERGENCY DEPT VISIT MOD MDM: CPT | Mod: 25

## 2022-06-16 PROCEDURE — 81001 URINALYSIS AUTO W/SCOPE: CPT

## 2022-06-16 PROCEDURE — 80053 COMPREHEN METABOLIC PANEL: CPT

## 2022-06-16 PROCEDURE — 96374 THER/PROPH/DIAG INJ IV PUSH: CPT

## 2022-06-16 PROCEDURE — 96361 HYDRATE IV INFUSION ADD-ON: CPT

## 2022-06-16 PROCEDURE — 83690 ASSAY OF LIPASE: CPT

## 2022-06-16 PROCEDURE — 83735 ASSAY OF MAGNESIUM: CPT

## 2022-06-16 PROCEDURE — 99284 EMERGENCY DEPT VISIT MOD MDM: CPT | Mod: FS

## 2022-06-16 PROCEDURE — 96375 TX/PRO/DX INJ NEW DRUG ADDON: CPT

## 2022-06-16 PROCEDURE — 36415 COLL VENOUS BLD VENIPUNCTURE: CPT

## 2022-06-16 PROCEDURE — 85025 COMPLETE CBC W/AUTO DIFF WBC: CPT

## 2022-06-16 RX ORDER — MORPHINE SULFATE 50 MG/1
2 CAPSULE, EXTENDED RELEASE ORAL ONCE
Refills: 0 | Status: DISCONTINUED | OUTPATIENT
Start: 2022-06-16 | End: 2022-06-16

## 2022-06-16 RX ORDER — SODIUM CHLORIDE 9 MG/ML
1000 INJECTION INTRAMUSCULAR; INTRAVENOUS; SUBCUTANEOUS ONCE
Refills: 0 | Status: COMPLETED | OUTPATIENT
Start: 2022-06-16 | End: 2022-06-16

## 2022-06-16 RX ORDER — ONDANSETRON 8 MG/1
4 TABLET, FILM COATED ORAL ONCE
Refills: 0 | Status: COMPLETED | OUTPATIENT
Start: 2022-06-16 | End: 2022-06-16

## 2022-06-16 RX ADMIN — SODIUM CHLORIDE 1000 MILLILITER(S): 9 INJECTION INTRAMUSCULAR; INTRAVENOUS; SUBCUTANEOUS at 22:01

## 2022-06-16 RX ADMIN — SODIUM CHLORIDE 1000 MILLILITER(S): 9 INJECTION INTRAMUSCULAR; INTRAVENOUS; SUBCUTANEOUS at 20:42

## 2022-06-16 RX ADMIN — ONDANSETRON 4 MILLIGRAM(S): 8 TABLET, FILM COATED ORAL at 20:49

## 2022-06-16 RX ADMIN — MORPHINE SULFATE 2 MILLIGRAM(S): 50 CAPSULE, EXTENDED RELEASE ORAL at 20:42

## 2022-06-16 RX ADMIN — MORPHINE SULFATE 2 MILLIGRAM(S): 50 CAPSULE, EXTENDED RELEASE ORAL at 20:57

## 2022-06-16 NOTE — ED PROVIDER NOTE - PATIENT PORTAL LINK FT
You can access the FollowMyHealth Patient Portal offered by [facility] by registering at the following website: [email or web address]. By joining [employer]’s FollowMyHealth portal, you will also be able to view your health information using other applications (apps) compatible with our system.

## 2022-06-16 NOTE — ED PROVIDER NOTE - NSFOLLOWUPINSTRUCTIONS_ED_ALL_ED_FT
Clostridioides Difficile Infection      Clostridioides difficile infection, or C. diff, is an infection that is caused by C. diff germs (bacteria).    This infection may happen after you take antibiotics that kill other germs and let C. diff germs grow. C. diff can be spread from person to person (is contagious).      What are the causes?    •Taking certain antibiotics.      •Coming in contact with people, food, or things that have C. diff.        What increases the risk?    •Taking certain antibiotics for a long time.      •Staying in a hospital or long-term care facility for a long time.      •Being age 65 or older.      •Having had C. diff before or been exposed to C. diff.      •Having a weak disease-fighting system (immune system).      •Taking medicines that treat stomach acid.     •Having serious health problems, including:  •Colon cancer.      •Inflammatory bowel disease (IBD).        •Having had a procedure or surgery on your digestive system.        What are the signs or symptoms?    •Watery poop (diarrhea).      •Fever.      •Not feeling hungry.      •Feeling like you may vomit.      •Swelling, pain, cramps, or a tender belly.        How is this treated?    Treatment may include:  •Stopping the antibiotics that caused the C. diff infection.       •Taking antibiotics that kill C. diff.      •Placing poop from a healthy person into your colon (fecal transplant).      •Doing surgery to take out the infected part of the colon.        Follow these instructions at home:    Medicines     •Take over-the-counter and prescription medicines only as told by your doctor.      •Take antibiotic medicine as told by your doctor. Do not stop taking it even if you start to feel better.      • Do not take medicines to treat watery poop unless your doctor tells you to.        Eating and drinking    •Follow instructions from your doctor about what to eat and drink. This may include eating bland foods in small amounts, such as:  •Bananas.      •Applesauce.      •Rice.      •Lean meats.      •Toast.      •Crackers.      •To prevent loss of fluid in your body (dehydration):  •Take in enough fluids to keep your pee pale yellow. This includes water, ice chips, clear fruit juice with water added to it, or low-calorie sports drinks.      •Take an ORS (oral rehydration solution). This drink is sold in pharmacies and retail stores.        •Avoid milk, caffeine, and alcohol.      General instructions     •Wash your hands often with soap and water. Do this for at least 20 seconds.      •Take a bath or shower every day.      •Return to your normal activities when your doctor says that it is safe.      •Keep all follow-up visits.        How is this prevented?      Personal hygiene      •Wash your hands often with soap and water. Do this for at least 20 seconds.      •Wash your hands before you cook and after you use the bathroom.    •Other people should wash their hands too, especially:   •People who live with you.      •People who visit you in a hospital or clinic.        Contact precautions     •If you get watery poop while you are in the hospital or a long-term care facility, tell your doctor right away.      •When you visit someone in the hospital or a long-term care facility, wear a gown, gloves, or other protection.    •If possible:   •Stay away from people who have diarrhea.      •Use a separate bathroom if you are sick and live with other people.        Clean environment   •Keep your home clean.  •Clean your home every day for at least a week after you leave the hospital.      •Clean surfaces that you touch every day. Use a product that has a 10% chlorine bleach solution. Be sure to:  •Read the label on your product to make sure that the product will kill the germs on your surfaces.      •Clean toilets and flush handles, bathtubs, sinks, doorknobs and handles, countertops, and work surfaces.        •If you are in the hospital, make sure the surfaces in your room are cleaned each day. Tell someone right away if body fluids have splashed or spilled.      Clothes and linens   •Wash clothes and linens using laundry soap that has chlorine bleach. Be sure to:  •Use powder soap instead of liquid.       •Clean your washing machine once a month. To do this, turn on the hot setting with only soap in it.          Contact a doctor if:    •Your symptoms do not get better or they get worse.      •Your symptoms go away and then come back.      •You have a fever.      •You have new symptoms.        Get help right away if:    •Your belly is more tender or you have more pain.      •Your poop is mostly bloody.      •Your poop looks black.      •You vomit after you eat or drink.    •You have signs of not having enough fluids in your body. These include:  •Dark yellow pee, very little pee, or no pee.      •Cracked lips or dry mouth.      •No tears when you cry.      •Sunken eyes.      •Feeling sleepy.      •Feeling weak or dizzy.          Summary    • C. diff infection is an infection that may happen after you take antibiotic medicines.      •Symptoms include watery poop, fever, not feeling hungry, or feeling like you may vomit.      •Treatment includes stopping the antibiotics that made you sick and taking antibiotics that kill the C. diff germs. Poop from a healthy person may also be placed into your colon.      •To prevent C. diff infectionfrom spreading, wash hands often with soap and water. Do this for at least 20 seconds. Keep your home clean.      This information is not intended to replace advice given to you by your health care provider. Make sure you discuss any questions you have with your health care provider.      Document Revised: 04/08/2021 Document Reviewed: 04/08/2021    [employer] Patient Education © 2022 [employer]

## 2022-06-16 NOTE — ED ADULT NURSE NOTE - OBJECTIVE STATEMENT
Pt received awake and alert on stretcher, is cachectic, reports cdiff infection x10 days. Pt c/o abdominal pain 10/10, pain in hands, wrist. Pt has brace on right wrist, complicated medical history including autoimmune diagnoses. Pt denies any N/V, states stool is no longer loose, last BM today.

## 2022-06-16 NOTE — ED PROVIDER NOTE - NONTENDER LOCATION
no rebound or guarding/left upper quadrant/right upper quadrant/left lower quadrant/right lower quadrant/periumbilical/umbilical/suprapubic/left costovertebral angle/right costovertebral angle

## 2022-06-16 NOTE — ED ADULT TRIAGE NOTE - MEANS OF ARRIVAL
For information on Fall & Injury Prevention, visit [email or web address]

## 2022-06-16 NOTE — ED ADULT NURSE NOTE - CHPI ED NUR SYMPTOMS NEG
Advancement-Rotation Flap Text: The defect edges were debeveled with a #15 scalpel blade.  Given the location of the defect, shape of the defect and the proximity to free margins an advancement-rotation flap was deemed most appropriate.  Using a sterile surgical marker, an appropriate flap was drawn incorporating the defect and placing the expected incisions within the relaxed skin tension lines where possible. The area thus outlined was incised deep to adipose tissue with a #15 scalpel blade.  The skin margins were undermined to an appropriate distance in all directions utilizing iris scissors. no abdominal distension/no diarrhea/no fever

## 2022-06-16 NOTE — ED PROVIDER NOTE - ATTENDING SHARED VISIT SELECTORS
ULISSES MOLINA  : 1981 09:01:33  ACCOUNT:  [id number]  HOME PHONE:  [phone]  WORK PHONE:  [phone]  After verifying w/ Dr. Alcantara, contacted pt. and informed  Lp(a) elevated 293 w/ normal BMP.  Instructed to CPM, lipid profile at target w/ LDL 34 and f/u as scheduled.  Pt. verbalized understanding and confirmed healthy low fat diet, regular exercise and compliance w/ medications.     Exam/Medical Decision Making

## 2022-06-16 NOTE — ED PROVIDER NOTE - ATTENDING APP SHARED VISIT CONTRIBUTION OF CARE
57yo female with c diff c/o diarrhea, no vomiting no fever, chills  exam: abd soft non tender no rebound or guadring  plan: labs, fluids,   agree with assessment and plan of PA

## 2022-06-16 NOTE — ED PROVIDER NOTE - NS ED ATTENDING STATEMENT MOD
This was a shared visit with the JACKIE. I reviewed and verified the documentation and independently performed the documented:

## 2022-06-16 NOTE — ED ADULT TRIAGE NOTE - CHIEF COMPLAINT QUOTE
Pt walked into ED and states she was diagnosed with Cdiff a month ago and is being followed by GI. Pt states she does not know if she is dehydrated and her knuckles on her left hand hurt; pt on O2 for emphysema

## 2022-06-16 NOTE — ED PROVIDER NOTE - OBJECTIVE STATEMENT
58 y/o F with hx of chronic neck/back pain, sciatica, lupus, Sjogren's syndrome, COPD, glossopharyngeal neuralgia s/p brain surgery, COPD on 2L NC, hypothyroidism, Neuropathy, migraines, GERD presents with c/o diarrhea and abdominal cramps x 1 month. 56 y/o F with hx of chronic neck/back pain, sciatica, lupus, Sjogren's syndrome, COPD, glossopharyngeal neuralgia s/p brain surgery, COPD on 2L NC, hypothyroidism, Neuropathy, migraines, GERD presents with c/o diarrhea and abdominal cramps x 1 month. Pt states that she started having abdominal cramps on 6/17, started having diarrhea a week later. States that she was seen by PCP over 2 weeks ago and then referred to GI, Dr. Theodore Perlman who started pt on vancomycin on 6/3 for C-diff. States that she misread the directions and has only been taking vancomycin 250mg tid instead of QID. States that her symptoms are improving but concerned if she is dehydrated. Denies fever, chills, CP, urinary symptoms, hx of abdominal surgeries, blood in stool. Pt is on morphine and fentanyl.

## 2022-06-16 NOTE — ED PROVIDER NOTE - PROGRESS NOTE DETAILS
pt feeling better, only 1 Bm today, no diarrhea since being the ER, follow up with pmd, continue abx and fluids, return if any symptoms worsen

## 2022-09-06 ENCOUNTER — APPOINTMENT (OUTPATIENT)
Dept: ORTHOPEDIC SURGERY | Facility: CLINIC | Age: 57
End: 2022-09-06

## 2022-09-13 NOTE — ED PROVIDER NOTE - PHYSICAL EXAMINATION
This is a recent snapshot of the patient's [facility] medical record.  For current drug dose and complete information and questions, call [phone] or In Basket pool, [facility] home infusion ([id number])  CSN Number:  [id number]   
Gen: Alert, NAD  Head/eyes: NC/AT, PERRL, EOMI, normal lids/conjunctiva, no scleral icterus  ENT: Bilateral TM WNL, normal hearing, patent oropharynx without erythema/exudate, uvula midline, no peritonsillar abscess, no tongue/uvula swelling  Neck: supple, no tenderness/meningismus/JVD, Trachea midline  Pulm/lung: Bilateral clear BS, normal resp effort, no wheeze/stridor/retractions  CV/heart: RRR, no M/R/G, +2 dist pulses (radial, pedal DP/PT, popliteal)  GI/Abd: soft, NT/ND, +BS, no guarding/rebound tenderness  Musculoskeletal: no cyanosis, FROM in all extremities, no C/T/L spine ttp  Skin: +6x6cm fluctuant mass of R lateral epicondyle, no drainage with mild erythema and mild warmth. no vesicles, no petechiae, no ecchymosis,  Neuro: AAOx3, CN 2-12 intact, normal sensation, 5/5 motor strength in all extremities, normal gait, no dysmetria

## 2022-09-21 NOTE — H&P PST ADULT - SPO2 (%)
Health Maintenance Due   Topic Date Due    COVID-19 Vaccine (3 - Booster) 03/06/2022     Chart review done. HM updated. Immunizations reviewed & updated. Care Everywhere updated.     
100

## 2022-10-03 NOTE — DISCHARGE NOTE ADULT - CAREGIVER NAME
micah rae  mom can also be included Rituxan Pregnancy And Lactation Text: This medication is Pregnancy Category C and it isn't know if it is safe during pregnancy. It is unknown if this medication is excreted in breast milk but similar antibodies are known to be excreted.

## 2022-10-19 ENCOUNTER — APPOINTMENT (OUTPATIENT)
Dept: ORTHOPEDIC SURGERY | Facility: CLINIC | Age: 57
End: 2022-10-19

## 2022-10-19 VITALS — WEIGHT: 78 LBS | HEIGHT: 58 IN | BODY MASS INDEX: 16.37 KG/M2

## 2022-10-19 DIAGNOSIS — M47.816 SPONDYLOSIS W/OUT MYELOPATHY OR RADICULOPATHY, LUMBAR REGION: ICD-10-CM

## 2022-10-19 DIAGNOSIS — M25.521 PAIN IN RIGHT ELBOW: ICD-10-CM

## 2022-10-19 DIAGNOSIS — M25.522 PAIN IN RIGHT ELBOW: ICD-10-CM

## 2022-10-19 DIAGNOSIS — M75.101 UNSPECIFIED ROTATOR CUFF TEAR OR RUPTURE OF RIGHT SHOULDER, NOT SPECIFIED AS TRAUMATIC: ICD-10-CM

## 2022-10-19 DIAGNOSIS — M53.3 SACROCOCCYGEAL DISORDERS, NOT ELSEWHERE CLASSIFIED: ICD-10-CM

## 2022-10-19 PROCEDURE — 72100 X-RAY EXAM L-S SPINE 2/3 VWS: CPT

## 2022-10-19 PROCEDURE — 72200 X-RAY EXAM SI JOINTS: CPT

## 2022-10-19 PROCEDURE — 73030 X-RAY EXAM OF SHOULDER: CPT | Mod: RT

## 2022-10-19 PROCEDURE — 99214 OFFICE O/P EST MOD 30 MIN: CPT

## 2022-10-19 PROCEDURE — 73080 X-RAY EXAM OF ELBOW: CPT | Mod: 50

## 2022-10-19 NOTE — ASSESSMENT
[FreeTextEntry1] : Pt informed that coccydynia may take several months to resolve.\par However if pain has not improved s/p conservative treatment will refer for MRI of the sacrum / coccyx. \par RTO in 2-3 weeks.\par Heat / cold compress qid.\par Celebrex 200 mg qd-bid prn pain.\par RTO in 3 weeks for f/u care. \par Formal PT declined (however Rx is provided in case pt changes her mind - For Right shoulder and bilateral elbow pain). \par \par

## 2022-10-19 NOTE — IMAGING
[Right] : right shoulder [Bilateral] : elbow bilaterally [de-identified] : Right shoulder with limited ROM in flexion and abduction to appx 100 degrees.\par NTTP.\par Weakness noted with flexion and abduction.\par There is no ligamentous laxity noted.\par Impingement signs are mildly positive.\par \par Bilateral elbow with no skin change/bony deformity.\par There is no ligamentous laxity to either sided.\par No significant TTP currently to either elbow.\par Cannot assess right wrist extension/flexion due to Kienboeck Dz/collapse.\par bilateral upper extremity strength is 5/5 (other than right wrist and shoulder which are not assessed).\par \par Lumber spine with limited ROM in all planes.\par There is no pain with percussion of the lumbar spine.\par +TTP over the lower sacrum and coccyx region.\par Lower extremity strength is 5/5 and lower extremities are nvi.\par SLR tests and DTRs are normal symmetrically.\par Gait is essentially normal and pt is able to heel/toe walk. \par No clonus is noted to either lower extremity.\par  [FreeTextEntry2] : There is no acute bony pathology noted to the sacrum or coccyx.  [FreeTextEntry1] : No acute bony pathology noted to the right or left elbow.

## 2022-10-19 NOTE — HISTORY OF PRESENT ILLNESS
[de-identified] : 10/19/2022: Pt here with complaint of sacral and lower back pain s/p trip and fall 1 month ago.\par pt denies radicular symptoms or b/b dysfunction.\par There is no hx of associated weakness.\par \par Ms. Hallman also complains of intermittent bilateral elbow pain unrelated to her fall.\par Pt has hx of previous bilateral elbow tendon repair with secondary MRSA infection which was treated with IV abx and I&D appx 2 years ago.\par Ms. 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 \par \par PMH: COPD, Lupus, Sjogren's, Burning Mouth Syndrome, Hypothyroid, GERD, Severe Osteoporosis, OCD bilateral ankles. glossopharyngeal neuralgia.\par Allergies: NKDA.  [] : no [FreeTextEntry1] : b/l elbows  [FreeTextEntry5] : patient has been dealing with pain in both elbows since having sx on both of them years ago. She also fell last month in her kitchen and is having pain by her tailbone

## 2022-10-20 NOTE — H&P ADULT - NSHPPHYSICALEXAM_GEN_ALL_CORE
Detail Level: Detailed Gentle Skin Care Counseling: Thoroughly discussed gentle skin care, including:\\n-Warm (not hot) showers\\n-Avoid scrubbing with loofah or washcloth in the shower\\n-Use plain white Dove bar soap, to dirty areas only\\n-Within one minute of exiting bath or shower, dry off and apply thick cream moisturizer such as Cetaphil cream or Cerave cream\\n-Moisturize as frequently as needed throughout the day\\n-Products with fragrances, preservatives and dyes should be avoided PHYSICAL EXAM:  Vital Signs Last 24 Hrs  T(C): 36.3 (16 Oct 2018 20:05), Max: 36.5 (16 Oct 2018 17:00)  T(F): 97.4 (16 Oct 2018 20:05), Max: 97.7 (16 Oct 2018 17:00)  HR: 65 (16 Oct 2018 20:05) (65 - 74)  BP: 110/57 (16 Oct 2018 20:05) (110/57 - 143/78)  BP(mean): --  RR: 16 (16 Oct 2018 20:05) (16 - 20)  SpO2: 100% (16 Oct 2018 20:05) (99% - 100%)    GENERAL:     thin, older appearing than state age female in NAD  HEAD:     atraumatic, normocephalic  EYES:     EOMI, conjunctiva and sclera clear  ENMT:     no tonsillar erythema or exudates or enlargement, no oral lesions, moist mucous membranes, good dentition  NECK:     supple, no JVD  RESPIRATORY:     clear to auscultation bilaterally, no rales or rhonchi or wheezing or rubs  CARDIOVASCULAR:     regular rate and rhythm, no murmurs or rubs or gallops, 2+ peripheral pulses  GASTROINTESTINAL:     soft, nontender, nondistended, no hepatosplenomegaly palpated, bowel sounds present  EXTREMITIES:     no clubbing or cyanosis or edema  MUSCULOSKELETAL:     no joint pain or swelling or deformities  NERVOUS SYSTEM:     motor strength intact with 5/5 B/L upper and lower extremities, no gross sensory deficits  SKIN:     no rashes or lesions  PSYCH:     appropriate, alert and orientated x3, good concentration

## 2022-10-24 ENCOUNTER — APPOINTMENT (OUTPATIENT)
Dept: ORTHOPEDIC SURGERY | Facility: CLINIC | Age: 57
End: 2022-10-24

## 2022-10-28 ENCOUNTER — APPOINTMENT (OUTPATIENT)
Dept: ORTHOPEDIC SURGERY | Facility: CLINIC | Age: 57
End: 2022-10-28

## 2022-11-09 ENCOUNTER — APPOINTMENT (OUTPATIENT)
Dept: ORTHOPEDIC SURGERY | Facility: CLINIC | Age: 57
End: 2022-11-09

## 2023-01-10 ENCOUNTER — INPATIENT (INPATIENT)
Facility: HOSPITAL | Age: 58
LOS: 2 days | Discharge: HOME | DRG: 191 | End: 2023-01-13
Attending: INTERNAL MEDICINE | Admitting: INTERNAL MEDICINE
Payer: MEDICARE

## 2023-01-10 VITALS
TEMPERATURE: 98 F | OXYGEN SATURATION: 97 % | SYSTOLIC BLOOD PRESSURE: 112 MMHG | WEIGHT: 84.66 LBS | DIASTOLIC BLOOD PRESSURE: 73 MMHG | HEART RATE: 85 BPM | HEIGHT: 57 IN | RESPIRATION RATE: 22 BRPM

## 2023-01-10 DIAGNOSIS — M25.9 JOINT DISORDER, UNSPECIFIED: Chronic | ICD-10-CM

## 2023-01-10 DIAGNOSIS — J38.1 POLYP OF VOCAL CORD AND LARYNX: Chronic | ICD-10-CM

## 2023-01-10 DIAGNOSIS — Z98.890 OTHER SPECIFIED POSTPROCEDURAL STATES: Chronic | ICD-10-CM

## 2023-01-10 DIAGNOSIS — Z95.818 PRESENCE OF OTHER CARDIAC IMPLANTS AND GRAFTS: Chronic | ICD-10-CM

## 2023-01-10 DIAGNOSIS — S69.91XD UNSPECIFIED INJURY OF RIGHT WRIST, HAND AND FINGER(S), SUBSEQUENT ENCOUNTER: Chronic | ICD-10-CM

## 2023-01-10 DIAGNOSIS — K82.9 DISEASE OF GALLBLADDER, UNSPECIFIED: Chronic | ICD-10-CM

## 2023-01-10 LAB
ALBUMIN SERPL ELPH-MCNC: 3.4 G/DL — SIGNIFICANT CHANGE UP (ref 3.3–5)
ALP SERPL-CCNC: 82 U/L — SIGNIFICANT CHANGE UP (ref 30–120)
ALT FLD-CCNC: 30 U/L DA — SIGNIFICANT CHANGE UP (ref 10–60)
ANION GAP SERPL CALC-SCNC: 7 MMOL/L — SIGNIFICANT CHANGE UP (ref 5–17)
APTT BLD: 29.8 SEC — SIGNIFICANT CHANGE UP (ref 27.5–35.5)
AST SERPL-CCNC: 18 U/L — SIGNIFICANT CHANGE UP (ref 10–40)
BASE EXCESS BLDA CALC-SCNC: 2.5 MMOL/L — SIGNIFICANT CHANGE UP (ref -2–3)
BASOPHILS # BLD AUTO: 0.08 K/UL — SIGNIFICANT CHANGE UP (ref 0–0.2)
BASOPHILS NFR BLD AUTO: 1.2 % — SIGNIFICANT CHANGE UP (ref 0–2)
BILIRUB SERPL-MCNC: 0.1 MG/DL — LOW (ref 0.2–1.2)
BUN SERPL-MCNC: 17 MG/DL — SIGNIFICANT CHANGE UP (ref 7–23)
CALCIUM SERPL-MCNC: 8.7 MG/DL — SIGNIFICANT CHANGE UP (ref 8.4–10.5)
CHLORIDE SERPL-SCNC: 91 MMOL/L — LOW (ref 96–108)
CO2 SERPL-SCNC: 30 MMOL/L — SIGNIFICANT CHANGE UP (ref 22–31)
CREAT SERPL-MCNC: 0.92 MG/DL — SIGNIFICANT CHANGE UP (ref 0.5–1.3)
D DIMER BLD IA.RAPID-MCNC: 162 NG/ML DDU — SIGNIFICANT CHANGE UP
EGFR: 72 ML/MIN/1.73M2 — SIGNIFICANT CHANGE UP
EOSINOPHIL # BLD AUTO: 0.2 K/UL — SIGNIFICANT CHANGE UP (ref 0–0.5)
EOSINOPHIL NFR BLD AUTO: 2.9 % — SIGNIFICANT CHANGE UP (ref 0–6)
FLUAV AG NPH QL: SIGNIFICANT CHANGE UP
FLUBV AG NPH QL: SIGNIFICANT CHANGE UP
GAS PNL BLDA: SIGNIFICANT CHANGE UP
GLUCOSE SERPL-MCNC: 94 MG/DL — SIGNIFICANT CHANGE UP (ref 70–99)
HCO3 BLDA-SCNC: 27 MMOL/L — SIGNIFICANT CHANGE UP (ref 21–28)
HCT VFR BLD CALC: 33.9 % — LOW (ref 34.5–45)
HGB BLD-MCNC: 11.9 G/DL — SIGNIFICANT CHANGE UP (ref 11.5–15.5)
HOROWITZ INDEX BLDA+IHG-RTO: 28 — SIGNIFICANT CHANGE UP
IMM GRANULOCYTES NFR BLD AUTO: 0.4 % — SIGNIFICANT CHANGE UP (ref 0–0.9)
INR BLD: 0.96 RATIO — SIGNIFICANT CHANGE UP (ref 0.88–1.16)
LYMPHOCYTES # BLD AUTO: 2.31 K/UL — SIGNIFICANT CHANGE UP (ref 1–3.3)
LYMPHOCYTES # BLD AUTO: 33.7 % — SIGNIFICANT CHANGE UP (ref 13–44)
MAGNESIUM SERPL-MCNC: 2.1 MG/DL — SIGNIFICANT CHANGE UP (ref 1.6–2.6)
MCHC RBC-ENTMCNC: 32.9 PG — SIGNIFICANT CHANGE UP (ref 27–34)
MCHC RBC-ENTMCNC: 35.1 GM/DL — SIGNIFICANT CHANGE UP (ref 32–36)
MCV RBC AUTO: 93.6 FL — SIGNIFICANT CHANGE UP (ref 80–100)
MONOCYTES # BLD AUTO: 1.1 K/UL — HIGH (ref 0–0.9)
MONOCYTES NFR BLD AUTO: 16 % — HIGH (ref 2–14)
NEUTROPHILS # BLD AUTO: 3.14 K/UL — SIGNIFICANT CHANGE UP (ref 1.8–7.4)
NEUTROPHILS NFR BLD AUTO: 45.8 % — SIGNIFICANT CHANGE UP (ref 43–77)
NRBC # BLD: 0 /100 WBCS — SIGNIFICANT CHANGE UP (ref 0–0)
NT-PROBNP SERPL-SCNC: 50 PG/ML — SIGNIFICANT CHANGE UP (ref 0–125)
PCO2 BLDA: 44 MMHG — HIGH (ref 32–35)
PH BLDA: 7.4 — SIGNIFICANT CHANGE UP (ref 7.35–7.45)
PLATELET # BLD AUTO: 352 K/UL — SIGNIFICANT CHANGE UP (ref 150–400)
PO2 BLDA: 75 MMHG — LOW (ref 83–108)
POTASSIUM SERPL-MCNC: 4.1 MMOL/L — SIGNIFICANT CHANGE UP (ref 3.5–5.3)
POTASSIUM SERPL-SCNC: 4.1 MMOL/L — SIGNIFICANT CHANGE UP (ref 3.5–5.3)
PROT SERPL-MCNC: 6 G/DL — SIGNIFICANT CHANGE UP (ref 6–8.3)
PROTHROM AB SERPL-ACNC: 11.3 SEC — SIGNIFICANT CHANGE UP (ref 10.5–13.4)
RBC # BLD: 3.62 M/UL — LOW (ref 3.8–5.2)
RBC # FLD: 14.3 % — SIGNIFICANT CHANGE UP (ref 10.3–14.5)
RSV RNA NPH QL NAA+NON-PROBE: SIGNIFICANT CHANGE UP
SAO2 % BLDA: 97 % — SIGNIFICANT CHANGE UP (ref 94–98)
SARS-COV-2 RNA SPEC QL NAA+PROBE: SIGNIFICANT CHANGE UP
SODIUM SERPL-SCNC: 128 MMOL/L — LOW (ref 135–145)
TROPONIN I, HIGH SENSITIVITY RESULT: 5.2 NG/L — SIGNIFICANT CHANGE UP
WBC # BLD: 6.86 K/UL — SIGNIFICANT CHANGE UP (ref 3.8–10.5)
WBC # FLD AUTO: 6.86 K/UL — SIGNIFICANT CHANGE UP (ref 3.8–10.5)

## 2023-01-10 PROCEDURE — 99285 EMERGENCY DEPT VISIT HI MDM: CPT | Mod: FS

## 2023-01-10 PROCEDURE — 93970 EXTREMITY STUDY: CPT | Mod: 26

## 2023-01-10 PROCEDURE — 71275 CT ANGIOGRAPHY CHEST: CPT | Mod: 26,MA

## 2023-01-10 PROCEDURE — 71045 X-RAY EXAM CHEST 1 VIEW: CPT | Mod: 26

## 2023-01-10 PROCEDURE — 93010 ELECTROCARDIOGRAM REPORT: CPT

## 2023-01-10 RX ORDER — IPRATROPIUM/ALBUTEROL SULFATE 18-103MCG
3 AEROSOL WITH ADAPTER (GRAM) INHALATION
Qty: 0 | Refills: 0 | DISCHARGE

## 2023-01-10 RX ORDER — IPRATROPIUM/ALBUTEROL SULFATE 18-103MCG
3 AEROSOL WITH ADAPTER (GRAM) INHALATION ONCE
Refills: 0 | Status: COMPLETED | OUTPATIENT
Start: 2023-01-10 | End: 2023-01-10

## 2023-01-10 RX ORDER — ACETAMINOPHEN 500 MG
575 TABLET ORAL ONCE
Refills: 0 | Status: COMPLETED | OUTPATIENT
Start: 2023-01-10 | End: 2023-01-10

## 2023-01-10 RX ADMIN — Medication 575 MILLIGRAM(S): at 22:00

## 2023-01-10 RX ADMIN — Medication 125 MILLIGRAM(S): at 20:39

## 2023-01-10 RX ADMIN — Medication 3 MILLILITER(S): at 21:00

## 2023-01-10 RX ADMIN — Medication 230 MILLIGRAM(S): at 21:32

## 2023-01-10 RX ADMIN — Medication 3 MILLILITER(S): at 20:50

## 2023-01-10 RX ADMIN — Medication 3 MILLILITER(S): at 22:48

## 2023-01-10 NOTE — ED PROVIDER NOTE - DIFFERENTIAL DIAGNOSIS
Differential including but not limited to MI PE DVT effusion pneumonia pneumothorax COPD exacerbation Differential Diagnosis

## 2023-01-10 NOTE — ED ADULT TRIAGE NOTE - CHIEF COMPLAINT QUOTE
I was sen in by Dr Silver for cough x 2 months but worse in past 2 weeks; pt current everyday smoker. pt usues O2 but left tank in friends car because she was smoking. I have pain in right lung.

## 2023-01-10 NOTE — ED ADULT NURSE NOTE - NSIMPLEMENTINTERV_GEN_ALL_ED
Implemented All Universal Safety Interventions:  [place] to call system. Call bell, personal items and telephone within reach. Instruct patient to call for assistance. Room bathroom lighting operational. Non-slip footwear when patient is off stretcher. Physically safe environment: no spills, clutter or unnecessary equipment. Stretcher in lowest position, wheels locked, appropriate side rails in place.

## 2023-01-10 NOTE — ED ADULT NURSE NOTE - OBJECTIVE STATEMENT
pt reports cough for months; worse in the past few weeks. pt also reports she is an every day smoker and uses O2 2l n/c at home

## 2023-01-10 NOTE — ED PROVIDER NOTE - CLINICAL SUMMARY MEDICAL DECISION MAKING FREE TEXT BOX
Patient referred by her pulmonologist for worsening productive cough and chest discomfort.  Patient relates she has been coughing for approximately 2 months but it has been worse in the past 2 weeks.  Patient relates she has had chest discomfort for the past week and a half.  Patient also reports bilateral leg pain anterior and posterior for the past week and a half as well.  Patient relates she is on O2 at home all the time except when she takes it off to smoke.  No fevers chills abdominal pain vomiting.  No history of DVT or PE.    Plan EKG lower extremity Dopplers CT chest labs including proBNP troponin ABG lactate cultures DuoNeb Solu-Medrol IV Tylenol  Differential including but not limited to MI PE DVT effusion pneumonia pneumothorax COPD exacerbation

## 2023-01-10 NOTE — ED PROVIDER NOTE - OBJECTIVE STATEMENT
57-year-old female with history of emphysema/COPD on 2L O2 for steroids, lupus, hypothyroidism, IBS, seizures, DVT, anxiety, GERD, Sjogren's presents with complaint of worsening cough and shortness of breath.  States that she has had cough for 2 months and worse over the past 1.5 weeks.  States that she has had substernal chest pressure and pain in her right rib cage x 1.5 weeks.  States that she was seen by her pulmonologist, Dr. Silver today and was sent to ED for CT chest and steroids.  States that she has been using her nebulizer at home without improvement recently.  States that she has also had pain in both her entire legs.  Patient is on chronic pain meds. Patient states that she is on oxygen at home all the time except when she takes it off to smoke. Denies fever, history of DVT/PE, abdominal pain, vomiting or other symptoms.

## 2023-01-10 NOTE — ED ADULT NURSE NOTE - WILL THE PATIENT ACCEPT THE PFIZER COVID-19 VACCINE IF ELIGIBLE AND IT IS AVAILABLE?
Spoke with patient regarding scheduling an HCP 30 with Sophie same day as other current appointments. Scheduled patient accordingly [facility]      No

## 2023-01-11 DIAGNOSIS — R56.9 UNSPECIFIED CONVULSIONS: ICD-10-CM

## 2023-01-11 DIAGNOSIS — J43.9 EMPHYSEMA, UNSPECIFIED: ICD-10-CM

## 2023-01-11 DIAGNOSIS — E87.1 HYPO-OSMOLALITY AND HYPONATREMIA: ICD-10-CM

## 2023-01-11 DIAGNOSIS — J44.1 CHRONIC OBSTRUCTIVE PULMONARY DISEASE WITH (ACUTE) EXACERBATION: ICD-10-CM

## 2023-01-11 DIAGNOSIS — E03.9 HYPOTHYROIDISM, UNSPECIFIED: ICD-10-CM

## 2023-01-11 DIAGNOSIS — Z29.9 ENCOUNTER FOR PROPHYLACTIC MEASURES, UNSPECIFIED: ICD-10-CM

## 2023-01-11 LAB
ANION GAP SERPL CALC-SCNC: 10 MMOL/L — SIGNIFICANT CHANGE UP (ref 5–17)
BUN SERPL-MCNC: 14 MG/DL — SIGNIFICANT CHANGE UP (ref 7–23)
CALCIUM SERPL-MCNC: 8.7 MG/DL — SIGNIFICANT CHANGE UP (ref 8.4–10.5)
CHLORIDE SERPL-SCNC: 97 MMOL/L — SIGNIFICANT CHANGE UP (ref 96–108)
CO2 SERPL-SCNC: 26 MMOL/L — SIGNIFICANT CHANGE UP (ref 22–31)
CREAT SERPL-MCNC: 0.83 MG/DL — SIGNIFICANT CHANGE UP (ref 0.5–1.3)
EGFR: 81 ML/MIN/1.73M2 — SIGNIFICANT CHANGE UP
GLUCOSE SERPL-MCNC: 193 MG/DL — HIGH (ref 70–99)
HCT VFR BLD CALC: 37.5 % — SIGNIFICANT CHANGE UP (ref 34.5–45)
HGB BLD-MCNC: 12.8 G/DL — SIGNIFICANT CHANGE UP (ref 11.5–15.5)
LACTATE SERPL-SCNC: 0.2 MMOL/L — LOW (ref 0.7–2)
MAGNESIUM SERPL-MCNC: 2.3 MG/DL — SIGNIFICANT CHANGE UP (ref 1.6–2.6)
MCHC RBC-ENTMCNC: 32.3 PG — SIGNIFICANT CHANGE UP (ref 27–34)
MCHC RBC-ENTMCNC: 34.1 GM/DL — SIGNIFICANT CHANGE UP (ref 32–36)
MCV RBC AUTO: 94.7 FL — SIGNIFICANT CHANGE UP (ref 80–100)
NRBC # BLD: 0 /100 WBCS — SIGNIFICANT CHANGE UP (ref 0–0)
PLATELET # BLD AUTO: 409 K/UL — HIGH (ref 150–400)
POTASSIUM SERPL-MCNC: 4.2 MMOL/L — SIGNIFICANT CHANGE UP (ref 3.5–5.3)
POTASSIUM SERPL-SCNC: 4.2 MMOL/L — SIGNIFICANT CHANGE UP (ref 3.5–5.3)
RBC # BLD: 3.96 M/UL — SIGNIFICANT CHANGE UP (ref 3.8–5.2)
RBC # FLD: 14.6 % — HIGH (ref 10.3–14.5)
SODIUM SERPL-SCNC: 132 MMOL/L — LOW (ref 135–145)
SODIUM SERPL-SCNC: 133 MMOL/L — LOW (ref 135–145)
TSH SERPL-MCNC: 0.33 UIU/ML — SIGNIFICANT CHANGE UP (ref 0.27–4.2)
WBC # BLD: 9.55 K/UL — SIGNIFICANT CHANGE UP (ref 3.8–10.5)
WBC # FLD AUTO: 9.55 K/UL — SIGNIFICANT CHANGE UP (ref 3.8–10.5)

## 2023-01-11 PROCEDURE — 99223 1ST HOSP IP/OBS HIGH 75: CPT | Mod: AI

## 2023-01-11 PROCEDURE — ZZZZZ: CPT

## 2023-01-11 RX ORDER — LEVOTHYROXINE SODIUM 125 MCG
25 TABLET ORAL DAILY
Refills: 0 | Status: DISCONTINUED | OUTPATIENT
Start: 2023-01-11 | End: 2023-01-13

## 2023-01-11 RX ORDER — TIOTROPIUM BROMIDE 18 UG/1
2 CAPSULE ORAL; RESPIRATORY (INHALATION) DAILY
Refills: 0 | Status: DISCONTINUED | OUTPATIENT
Start: 2023-01-11 | End: 2023-01-13

## 2023-01-11 RX ORDER — SODIUM CHLORIDE 9 MG/ML
1000 INJECTION INTRAMUSCULAR; INTRAVENOUS; SUBCUTANEOUS
Refills: 0 | Status: DISCONTINUED | OUTPATIENT
Start: 2023-01-11 | End: 2023-01-13

## 2023-01-11 RX ORDER — IPRATROPIUM/ALBUTEROL SULFATE 18-103MCG
3 AEROSOL WITH ADAPTER (GRAM) INHALATION EVERY 6 HOURS
Refills: 0 | Status: DISCONTINUED | OUTPATIENT
Start: 2023-01-11 | End: 2023-01-13

## 2023-01-11 RX ORDER — INFLUENZA VIRUS VACCINE 15; 15; 15; 15 UG/.5ML; UG/.5ML; UG/.5ML; UG/.5ML
0.5 SUSPENSION INTRAMUSCULAR ONCE
Refills: 0 | Status: DISCONTINUED | OUTPATIENT
Start: 2023-01-11 | End: 2023-01-13

## 2023-01-11 RX ORDER — DIAZEPAM 5 MG
5 TABLET ORAL
Refills: 0 | Status: DISCONTINUED | OUTPATIENT
Start: 2023-01-11 | End: 2023-01-13

## 2023-01-11 RX ORDER — NICOTINE POLACRILEX 2 MG
1 GUM BUCCAL DAILY
Refills: 0 | Status: DISCONTINUED | OUTPATIENT
Start: 2023-01-11 | End: 2023-01-13

## 2023-01-11 RX ORDER — PANTOPRAZOLE SODIUM 20 MG/1
40 TABLET, DELAYED RELEASE ORAL
Refills: 0 | Status: DISCONTINUED | OUTPATIENT
Start: 2023-01-11 | End: 2023-01-13

## 2023-01-11 RX ORDER — CARBAMAZEPINE 200 MG
200 TABLET ORAL THREE TIMES A DAY
Refills: 0 | Status: DISCONTINUED | OUTPATIENT
Start: 2023-01-11 | End: 2023-01-13

## 2023-01-11 RX ORDER — MORPHINE SULFATE 50 MG/1
15 CAPSULE, EXTENDED RELEASE ORAL EVERY 6 HOURS
Refills: 0 | Status: DISCONTINUED | OUTPATIENT
Start: 2023-01-11 | End: 2023-01-13

## 2023-01-11 RX ORDER — BUDESONIDE AND FORMOTEROL FUMARATE DIHYDRATE 160; 4.5 UG/1; UG/1
2 AEROSOL RESPIRATORY (INHALATION)
Refills: 0 | Status: DISCONTINUED | OUTPATIENT
Start: 2023-01-11 | End: 2023-01-13

## 2023-01-11 RX ORDER — HEPARIN SODIUM 5000 [USP'U]/ML
5000 INJECTION INTRAVENOUS; SUBCUTANEOUS EVERY 12 HOURS
Refills: 0 | Status: DISCONTINUED | OUTPATIENT
Start: 2023-01-11 | End: 2023-01-13

## 2023-01-11 RX ADMIN — Medication 40 MILLIGRAM(S): at 11:23

## 2023-01-11 RX ADMIN — MORPHINE SULFATE 15 MILLIGRAM(S): 50 CAPSULE, EXTENDED RELEASE ORAL at 21:22

## 2023-01-11 RX ADMIN — Medication 200 MILLIGRAM(S): at 13:53

## 2023-01-11 RX ADMIN — Medication 75 MILLIGRAM(S): at 17:36

## 2023-01-11 RX ADMIN — Medication 5 MILLIGRAM(S): at 21:21

## 2023-01-11 RX ADMIN — MORPHINE SULFATE 15 MILLIGRAM(S): 50 CAPSULE, EXTENDED RELEASE ORAL at 21:00

## 2023-01-11 RX ADMIN — Medication 600 MILLIGRAM(S): at 17:36

## 2023-01-11 RX ADMIN — HEPARIN SODIUM 5000 UNIT(S): 5000 INJECTION INTRAVENOUS; SUBCUTANEOUS at 17:37

## 2023-01-11 RX ADMIN — Medication 5 MILLIGRAM(S): at 14:26

## 2023-01-11 RX ADMIN — PANTOPRAZOLE SODIUM 40 MILLIGRAM(S): 20 TABLET, DELAYED RELEASE ORAL at 06:18

## 2023-01-11 RX ADMIN — Medication 75 MILLIGRAM(S): at 06:17

## 2023-01-11 RX ADMIN — Medication 200 MILLIGRAM(S): at 06:19

## 2023-01-11 RX ADMIN — Medication 40 MILLIGRAM(S): at 06:18

## 2023-01-11 RX ADMIN — HEPARIN SODIUM 5000 UNIT(S): 5000 INJECTION INTRAVENOUS; SUBCUTANEOUS at 06:18

## 2023-01-11 RX ADMIN — Medication 3 MILLILITER(S): at 10:18

## 2023-01-11 RX ADMIN — MORPHINE SULFATE 15 MILLIGRAM(S): 50 CAPSULE, EXTENDED RELEASE ORAL at 14:26

## 2023-01-11 RX ADMIN — Medication 25 MICROGRAM(S): at 06:18

## 2023-01-11 RX ADMIN — SODIUM CHLORIDE 75 MILLILITER(S): 9 INJECTION INTRAMUSCULAR; INTRAVENOUS; SUBCUTANEOUS at 04:29

## 2023-01-11 RX ADMIN — Medication 200 MILLIGRAM(S): at 21:22

## 2023-01-11 RX ADMIN — Medication 3 MILLILITER(S): at 20:16

## 2023-01-11 RX ADMIN — TIOTROPIUM BROMIDE 2 PUFF(S): 18 CAPSULE ORAL; RESPIRATORY (INHALATION) at 11:21

## 2023-01-11 RX ADMIN — Medication 1 PATCH: at 13:51

## 2023-01-11 RX ADMIN — Medication 1 PATCH: at 20:32

## 2023-01-11 NOTE — H&P ADULT - ASSESSMENT
56 y/o F with PMH of Hypotension, PSVT s/p ablation, COPD, Raynaud's Syndrome, SLE, Sjogren's Syndrome, Seizure Disorder, Hypothyroidism, PUD s/p GI Bleed, IBS, Hep-C, SCC s/p excision, Nicotine Dependence, and Anxiety sent for difficulty breathing & cough.

## 2023-01-11 NOTE — DIETITIAN INITIAL EVALUATION ADULT - REASON FOR ADMISSION
Per H&P "58 y/o F with PMH of Hypotension, PSVT s/p ablation, COPD, Raynaud's Syndrome, SLE, Sjogren's Syndrome, Seizure Disorder, Hypothyroidism, PUD s/p GI Bleed, IBS, Hep-C, SCC s/p excision, Nicotine Dependence, and Anxiety who was sent from her pulmonary office for difficulty breathing & cough. Patient stated that she was having bad cough productive of greenish non bloody sputum over about a month, was getting worse despite taking antibiotics (Azithromycin & Doxycycline), also reports recent worsening of her SOB, no fever or chills, no flu-like symptoms, saw her pulmonary again yesterday, who sent her to the ED, Her CTA ruled out PE, no PNA, and her B/L lower extremity venous duplex ruled out DVT, receievd 3 doses of Albuterol/Ipratropium via nebulizer, in addition to 125 mg of Methylprednisolone IVP X1,"

## 2023-01-11 NOTE — H&P ADULT - PROBLEM SELECTOR PLAN 2
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

## 2023-01-11 NOTE — H&P ADULT - NSHPREVIEWOFSYSTEMS_GEN_ALL_CORE
-    CONSTITUTIONAL: No fever or chills.  EYES: No eye pain, visual disturbances, or discharge.  ENMT:  No difficulty hearing, vertigo, sinus or throat pain.  NECK: No pain or stiffness.	  RESPIRATORY: (+) cough, wheezing, and shortness of breath, no hemoptysis.  CARDIOVASCULAR: No chest pain, palpitations, dizziness, or leg swelling.  GASTROINTESTINAL: No abdominal pain, no nausea, vomiting, or hematemesis; No diarrhea or Change in bowel habits. No melena or hematochezia.  GENITOURINARY: No dysuria, frequency, hematuria, or incontinence.  NEUROLOGICAL: No headaches, focal muscle weakness, numbness, or tremors.  SKIN: No itching, burning or rashes.  MUSCULOSKELETAL: no joint swelling or pain, (+) generalized pains, (+) lower back & right rib pain.  PSYCHIATRIC: No depression, anxiety, or agitation.  HEME/LYMPH: No easy bruising, bleeding gums, or nose bleed.  ALLERGY AND IMMUNOLOGIC: No hives or eczema.

## 2023-01-11 NOTE — DIETITIAN NUTRITION RISK NOTIFICATION - ADDITIONAL COMMENTS/DIETITIAN RECOMMENDATIONS
Provide fortify food options, will add Magic Cup 4oz (290 kcal, 9 g protein) qd, Ensure Pudding 4oz (170 kcal, 4 g protein) qd to optimize intake. Provide fortify food options, will add Magic Cup 4oz (290 kcal, 9 g protein) qd, Ensure Pudding 4oz (170 kcal, 4 g protein) qd, Ensure High Protein plus 8oz (350 kcal, 20g protein) qd to optimize intake.

## 2023-01-11 NOTE — H&P ADULT - HISTORY OF PRESENT ILLNESS
This is a 56 y/o F with PMH of Hypotension, PSVT s/p ablation, COPD, Raynaud's Syndrome, SLE, Sjogren's Syndrome, Seizure Disorder, Hypothyroidism, PUD s/p GI Bleed, IBS, Hep-C, SCC s/p excision,   This is a 56 y/o F with PMH of Hypotension, PSVT s/p ablation, COPD, Raynaud's Syndrome, SLE, Sjogren's Syndrome, Seizure Disorder, Hypothyroidism, PUD s/p GI Bleed, IBS, Hep-C, SCC s/p excision, Nicotine Dependence, and Anxiety who was sent from her pulmonary office for difficulty breathing & cough. Patient stated that she was having bad cough productive of greenish non bloody sputum over about a month, was getting worse despite taking antibiotics (Azithromycin & Doxycycline), also reports recent worsening of her SOB, no fever or chills, no flu-like symptoms, saw her pulmonary again yesterday, who sent her to the ED, Her CTA ruled out PE, no PNA, and her B/L lower extremity venous duplex ruled out DVT, receievd 3 doses of Albuterol/Ipratropium via nebulizer, in addition to 125 mg of Methylprednisolone IVP X1,

## 2023-01-11 NOTE — DIETITIAN INITIAL EVALUATION ADULT - PERTINENT LABORATORY DATA
01-11    132<L>  |  x   |  x   ----------------------------<  x   x    |  x   |  x     Ca    8.7      11 Jan 2023 07:00  Mg     2.3     01-11    TPro  6.0  /  Alb  3.4  /  TBili  0.1<L>  /  DBili  x   /  AST  18  /  ALT  30  /  AlkPhos  82  01-10

## 2023-01-11 NOTE — CONSULT NOTE ADULT - ASSESSMENT
Pt. well known to me with severe emphysema, nocturnal O2, admitted for exac COPD.  Nicotine addiction.  Advised no smoking.  Use Patch  Steroids.  Inhalers.  Dc in am if stable.

## 2023-01-11 NOTE — H&P ADULT - NSHPPHYSICALEXAM_GEN_ALL_CORE
-    Vital Signs Last 24 Hrs  T(C): 36.3 (11 Jan 2023 01:00), Max: 36.8 (10 Pedro Luis 2023 19:46)  T(F): 97.4 (11 Jan 2023 01:00), Max: 98.3 (10 Pedro Luis 2023 19:46)  HR: 79 (11 Jan 2023 01:00) (68 - 85)  BP: 91/50 (11 Jan 2023 01:00) (91/50 - 112/73)  BP(mean): --  RR: 17 (11 Jan 2023 01:00) (14 - 22)  SpO2: 97% (11 Jan 2023 01:00) (94% - 98%)      PHYSICAL EXAM:  		  GENERAL: NAD, well-groomed, well-developed.  HEAD:  Atraumatic, Norm cephalic.  EYES: PERRLA, conjunctiva clear.  ENMT: no nasal discharge, MMM.   NECK: Supple, No JVD.  NERVOUS SYSTEM:  Alert & oriented X3, neurologically intact grossly.  CHEST/LUNG: Diminished air entry B/L, (+) B/L basal coarse rales, no rhonchi, or wheezing.  HEART: Normal S1 & S2, no murmurs, or extra sounds.  ABDOMEN: Soft, non-tender, non-distended; bowel sounds present, no palpable masses or organomegaly.  EXTREMITIES:  No clubbing, cyanosis, or edema.  VASCULAR: 2+ radial, brachial pulses B/L.  SKIN: No rashes or lesions.  PSYCH: normal affect & behavior.        Parameters below as of 10 Pedro Luis 2023 22:48  Patient On (Oxygen Delivery Method): nasal cannula,2 LPM

## 2023-01-11 NOTE — PATIENT PROFILE ADULT - FALL HARM RISK - RISK INTERVENTIONS

## 2023-01-11 NOTE — DIETITIAN INITIAL EVALUATION ADULT - ORAL INTAKE PTA/DIET HISTORY
Reported not following any therapeutic diet at home, appetite/po intake was good. Lives with parents, does not cook at home, normally had quick made foods like toast, or friends will make food. No vitamin/mineral or other nutrition supplements reported. States 25# weight loss few years ago with unknown etiology, weight been stable at 90-95# for years.

## 2023-01-11 NOTE — H&P ADULT - NSHPLABSRESULTS_GEN_ALL_CORE
-                 11.9   6.86   )----------(  352       ( 10 Pedro Luis 2023 20:32 )               33.9      128    |  91     |  17     ----------------------------<  94         ( 10 Pedro Luis 2023 20:32 )  4.1     |  30     |  0.92     Ca    8.7        ( 10 Pedro Luis 2023 20:32 )  Mg     2.1       ( 10 Pedro Luis 2023 20:32 )    TPro  6.0    /  Alb  3.4    /  TBili  0.1    /  DBili  x      /  AST  18     /  ALT  30     /  AlkPhos  82     ( 10 Pedro Luis 2023 20:32 )    LIVER FUNCTIONS - ( 10 Pedro Luis 2023 20:32 )  Alb: 3.4 g/dL / Pro: 6.0 g/dL / ALK PHOS: 82 U/L / ALT: 30 U/L DA / AST: 18 U/L / GGT: x           PT/INR -  11.3 sec / 0.96 ratio   ( 10 Pedro Luis 2023 20:32 )  PTT -  29.8 sec   ( 10 Pedro Luis 2023 20:32 ) -                 11.9   6.86   )----------(  352       ( 10 Pedro Luis 2023 20:32 )               33.9      128    |  91     |  17     ----------------------------<  94         ( 10 Pedro Luis 2023 20:32 )  4.1     |  30     |  0.92     Ca    8.7        ( 10 Pedro Luis 2023 20:32 )  Mg     2.1       ( 10 Pedro Luis 2023 20:32 )    TPro  6.0    /  Alb  3.4    /  TBili  0.1    /  DBili  x      /  AST  18     /  ALT  30     /  AlkPhos  82     ( 10 Pedro Luis 2023 20:32 )    LIVER FUNCTIONS - ( 10 Pedro Luis 2023 20:32 )  Alb: 3.4 g/dL / Pro: 6.0 g/dL / ALK PHOS: 82 U/L / ALT: 30 U/L DA / AST: 18 U/L / GGT: x           PT/INR -  11.3 sec / 0.96 ratio   ( 10 Pedro Luis 2023 20:32 )  PTT -  29.8 sec   ( 10 Pedro Luis 2023 20:32 )  D-Dimer Assay, Quantitative (01.10.23 @ 20:32)   D-Dimer Assay, Quantitative: 162 ng/mL DDU     Lactate, Blood (01.11.23 @ 01:07)   Lactate, Blood: 0.2 mmol/L       Troponin I, High Sensitivity (01.10.23 @ 20:32)   Troponin I, High Sensitivity Result: 5.2  Serum Pro-Brain Natriuretic Peptide (01.10.23 @ 20:32)   Serum Pro-Brain Natriuretic Peptide: 50 pg/mL       Flu With COVID-19 By LANDY (01.10.23 @ 20:32)   SARS-CoV-2 Result: NotDetec:  Influenza A Result: NotDetec   Influenza B Result: NotDetec   Resp Syn Virus Result: NotDetec       ABG - ( 10 Pedro Luis 2023 21:36 )  pH, Arterial: 7.40  pH, Blood: x     /  pCO2: 44    /  pO2: 75    / HCO3: 27    / Base Excess: 2.5   /  SaO2: 97.0        US DPLX LWR EXT VEINS COMPL BI                        PROCEDURE DATE:  01/10/2023    FINDINGS:  Normal waveforms and compressibility are demonstrated in the common   femoral, superficial femoral, and popliteal veins of both lower   extremities.  No thrombus is demonstrated. Doppler examination shows   normal spontaneous and phasic flow.  Visualized calf veins are patent.  IMPRESSION:  No evidence of DVT in either lower extremity.      CT ANGIO CHEST PULM ART [facility]                        PROCEDURE DATE:  01/10/2023    COMPARISON: December 6, 2021  IV Contrast: Omnipaque 350  Oral Contrast: NONE  IMPRESSION:   Advanced emphysematous changes within the upper lobes.  Minimal subsegmental atelectasis within the upper lobe adjacent to the   fissure.  Mild infiltrate and/or subsegmental atelectasis right lung base. Minimal   subsegmental atelectasis  No evidence of pulmonary embolism.        CXR:    As per my review shows loop recorder overlying lower left chest wall, normal cardiac shadow size, clear lung fields B/L, no pulmonary infiltrates, pleural effusion, or pneumothorax. Pending official report.         EKG:    As per my review shows SR at 75/min, normal AK & QTc intervals, normal QRS voltage, duration, and axis (+60), with normal transition, no ST-T abnormality.    - -                 11.9   6.86   )----------(  352       ( 10 Pedro Luis 2023 20:32 )               33.9      128    |  91     |  17     ----------------------------<  94         ( 10 Pedro Luis 2023 20:32 )  4.1     |  30     |  0.92     Ca    8.7        ( 10 Pedro Luis 2023 20:32 )  Mg     2.1       ( 10 Pedro Luis 2023 20:32 )    TPro  6.0    /  Alb  3.4    /  TBili  0.1    /  DBili  x      /  AST  18     /  ALT  30     /  AlkPhos  82     ( 10 Pedro Luis 2023 20:32 )    LIVER FUNCTIONS - ( 10 Pedro Luis 2023 20:32 )  Alb: 3.4 g/dL / Pro: 6.0 g/dL / ALK PHOS: 82 U/L / ALT: 30 U/L DA / AST: 18 U/L / GGT: x           PT/INR -  11.3 sec / 0.96 ratio   ( 10 Pedro Luis 2023 20:32 )  PTT -  29.8 sec   ( 10 Pedro Luis 2023 20:32 )  D-Dimer Assay, Quantitative (01.10.23 @ 20:32)   D-Dimer Assay, Quantitative: 162 ng/mL DDU     Lactate, Blood (01.11.23 @ 01:07)   Lactate, Blood: 0.2 mmol/L       Troponin I, High Sensitivity (01.10.23 @ 20:32)   Troponin I, High Sensitivity Result: 5.2  Serum Pro-Brain Natriuretic Peptide (01.10.23 @ 20:32)   Serum Pro-Brain Natriuretic Peptide: 50 pg/mL       Flu With COVID-19 By LANDY (01.10.23 @ 20:32)   SARS-CoV-2 Result: NotDetec:  Influenza A Result: NotDetec   Influenza B Result: NotDetec   Resp Syn Virus Result: NotDetec       ABG - ( 10 Pedro Luis 2023 21:36 )  pH, Arterial: 7.40  pH, Blood: x     /  pCO2: 44    /  pO2: 75    / HCO3: 27    / Base Excess: 2.5   /  SaO2: 97.0        US DPLX LWR EXT VEINS COMPL BI                        PROCEDURE DATE:  01/10/2023    FINDINGS:  Normal waveforms and compressibility are demonstrated in the common   femoral, superficial femoral, and popliteal veins of both lower   extremities.  No thrombus is demonstrated. Doppler examination shows   normal spontaneous and phasic flow.  Visualized calf veins are patent.  IMPRESSION:  No evidence of DVT in either lower extremity.      CT ANGIO CHEST PULM ART [facility]                        PROCEDURE DATE:  01/10/2023    COMPARISON: December 6, 2021  IV Contrast: Omnipaque 350  Oral Contrast: NONE  IMPRESSION:   Advanced emphysematous changes within the upper lobes.  Minimal subsegmental atelectasis within the upper lobe adjacent to the   fissure.  Mild infiltrate and/or subsegmental atelectasis right lung base. Minimal   subsegmental atelectasis  No evidence of pulmonary embolism.        CXR:    As per my review shows loop recorder overlying lower left chest wall, right axillary surgical clips, normal cardiac shadow size, clear lung fields B/L, no pulmonary infiltrates, pleural effusion, or pneumothorax. Pending official report.         EKG:    As per my review shows SR at 75/min, normal MD & QTc intervals, normal QRS voltage, duration, and axis (+60), with normal transition, no ST-T abnormality.    -

## 2023-01-11 NOTE — DIETITIAN INITIAL EVALUATION ADULT - PERTINENT MEDS FT
MEDICATIONS  (STANDING):  budesonide  80 MICROgram(s)/formoterol 4.5 MICROgram(s) Inhaler 2 Puff(s) Inhalation two times a day  carBAMazepine 200 milliGRAM(s) Oral three times a day  guaiFENesin  milliGRAM(s) Oral every 12 hours  heparin   Injectable 5000 Unit(s) SubCutaneous every 12 hours  influenza   Vaccine 0.5 milliLiter(s) IntraMuscular once  levothyroxine 25 MICROGram(s) Oral daily  nicotine -  14 mG/24Hr(s) Patch 1 Patch Transdermal daily  pantoprazole    Tablet 40 milliGRAM(s) Oral before breakfast  predniSONE   Tablet 40 milliGRAM(s) Oral daily  pregabalin 75 milliGRAM(s) Oral two times a day  sodium chloride 0.9%. 1000 milliLiter(s) (75 mL/Hr) IV Continuous <Continuous>  tiotropium 2.5 MICROgram(s) Inhaler 2 Puff(s) Inhalation daily    MEDICATIONS  (PRN):  acetaminophen 325 mG/butalbital 50 mG/caffeine 40 mG 1 Tablet(s) Oral <User Schedule> PRN headech  albuterol/ipratropium for Nebulization 3 milliLiter(s) Nebulizer every 6 hours PRN Shortness of Breath and/or Wheezing  diazepam    Tablet 5 milliGRAM(s) Oral two times a day PRN Anxiety.  morphine  IR 15 milliGRAM(s) Oral every 6 hours PRN Moderate Pain (4 - 6)

## 2023-01-11 NOTE — DIETITIAN INITIAL EVALUATION ADULT - OTHER INFO
Visited patient in room, presents with fair-good appetite/po intake, consuming >50-75% of meals. Denies n/v/d/c, last BM today 1/11. No reported difficulty chewing or swallowing. NKFA. Reported UBW 90-95#, current adm weight 84.5#, 6% weight loss in ~1 month is clinically significant, will continue to monitor weight trends as able.     Pertinent medications/nutrition labs reviewed; noted glucose 193, receiving IVF, steroids in house.     Educated on adequate protein/energy intake to prevent weight loss, prioritize protein at each meal. Food preferences obtained, will honor as able to encourage intake. Provide fortify food options, will add Magic Cup 4oz (290 kcal, 9 g protein) qd, Ensure Pudding 4oz (170 kcal, 4 g protein) qd to optimize intake. Pt receptive, no nutrition related questions at this time. RD to continue to monitor nutrition status per protocol.  Visited patient in room, presents with fair-good appetite/po intake, consuming >50-75% of meals. Denies n/v/d/c, last BM today 1/11. No reported difficulty chewing or swallowing. NKFA. Reported UBW 90-95#, current adm weight 84.5#, 6% weight loss in ~1 month is clinically significant, will continue to monitor weight trends as able.     Pertinent medications/nutrition labs reviewed; noted glucose 193, receiving IVF, steroids in house.     Educated on adequate protein/energy intake to prevent weight loss, prioritize protein at each meal. Food preferences obtained, will honor as able to encourage intake. Provide fortify food options, will add Magic Cup 4oz (290 kcal, 9 g protein) qd, Ensure Pudding 4oz (170 kcal, 4 g protein) qd, Ensure High Protein plus 8oz (350 kcal, 20g protein) qd to optimize intake. Pt receptive, no nutrition related questions at this time. RD to continue to monitor nutrition status per protocol.

## 2023-01-11 NOTE — H&P ADULT - PROBLEM SELECTOR PLAN 1
----- Message from Henna Hutchinson sent at 1/5/2022  8:11 AM CST -----  Regarding: Appointment Inquiry  Regarding:Pt r/s her appt for today due to not being able to come for 9am but asked if she could be seen at 10 am if possible? Appt was r/s for 1/7        Call back number:[phone]       patient is currently an everyday cigarette smoker, on home oxygen but non compliant, had persistent cough not responding to outpatient treatment including antibiotics, CTA ruled out PE or PNA, admitted to medicine, started her on Albuterol/Ipratropium nebulized Q 6h PRN, in addition to systemic steroids, Mucolytic, and oxygen via NC, will be seen & followed by pulmonary Dr. Silver.

## 2023-01-12 LAB
ANION GAP SERPL CALC-SCNC: 8 MMOL/L — SIGNIFICANT CHANGE UP (ref 5–17)
ANION GAP SERPL CALC-SCNC: 8 MMOL/L — SIGNIFICANT CHANGE UP (ref 5–17)
ANION GAP SERPL CALC-SCNC: 9 MMOL/L — SIGNIFICANT CHANGE UP (ref 5–17)
BUN SERPL-MCNC: 12 MG/DL — SIGNIFICANT CHANGE UP (ref 7–23)
BUN SERPL-MCNC: 12 MG/DL — SIGNIFICANT CHANGE UP (ref 7–23)
BUN SERPL-MCNC: 14 MG/DL — SIGNIFICANT CHANGE UP (ref 7–23)
CALCIUM SERPL-MCNC: 7.5 MG/DL — LOW (ref 8.4–10.5)
CALCIUM SERPL-MCNC: 7.8 MG/DL — LOW (ref 8.4–10.5)
CALCIUM SERPL-MCNC: 8.2 MG/DL — LOW (ref 8.4–10.5)
CHLORIDE SERPL-SCNC: 88 MMOL/L — LOW (ref 96–108)
CHLORIDE SERPL-SCNC: 89 MMOL/L — LOW (ref 96–108)
CHLORIDE SERPL-SCNC: 91 MMOL/L — LOW (ref 96–108)
CO2 SERPL-SCNC: 24 MMOL/L — SIGNIFICANT CHANGE UP (ref 22–31)
CO2 SERPL-SCNC: 24 MMOL/L — SIGNIFICANT CHANGE UP (ref 22–31)
CO2 SERPL-SCNC: 25 MMOL/L — SIGNIFICANT CHANGE UP (ref 22–31)
CREAT SERPL-MCNC: 0.63 MG/DL — SIGNIFICANT CHANGE UP (ref 0.5–1.3)
CREAT SERPL-MCNC: 0.77 MG/DL — SIGNIFICANT CHANGE UP (ref 0.5–1.3)
CREAT SERPL-MCNC: 0.82 MG/DL — SIGNIFICANT CHANGE UP (ref 0.5–1.3)
EGFR: 102 ML/MIN/1.73M2 — SIGNIFICANT CHANGE UP
EGFR: 82 ML/MIN/1.73M2 — SIGNIFICANT CHANGE UP
EGFR: 89 ML/MIN/1.73M2 — SIGNIFICANT CHANGE UP
GLUCOSE SERPL-MCNC: 100 MG/DL — HIGH (ref 70–99)
GLUCOSE SERPL-MCNC: 189 MG/DL — HIGH (ref 70–99)
GLUCOSE SERPL-MCNC: 86 MG/DL — SIGNIFICANT CHANGE UP (ref 70–99)
HCT VFR BLD CALC: 30.4 % — LOW (ref 34.5–45)
HGB BLD-MCNC: 10.5 G/DL — LOW (ref 11.5–15.5)
MCHC RBC-ENTMCNC: 32.7 PG — SIGNIFICANT CHANGE UP (ref 27–34)
MCHC RBC-ENTMCNC: 34.5 GM/DL — SIGNIFICANT CHANGE UP (ref 32–36)
MCV RBC AUTO: 94.7 FL — SIGNIFICANT CHANGE UP (ref 80–100)
NRBC # BLD: 0 /100 WBCS — SIGNIFICANT CHANGE UP (ref 0–0)
NT-PROBNP SERPL-SCNC: 209 PG/ML — HIGH (ref 0–125)
PLATELET # BLD AUTO: 325 K/UL — SIGNIFICANT CHANGE UP (ref 150–400)
POTASSIUM SERPL-MCNC: 4.6 MMOL/L — SIGNIFICANT CHANGE UP (ref 3.5–5.3)
POTASSIUM SERPL-MCNC: 4.8 MMOL/L — SIGNIFICANT CHANGE UP (ref 3.5–5.3)
POTASSIUM SERPL-MCNC: 4.9 MMOL/L — SIGNIFICANT CHANGE UP (ref 3.5–5.3)
POTASSIUM SERPL-SCNC: 4.6 MMOL/L — SIGNIFICANT CHANGE UP (ref 3.5–5.3)
POTASSIUM SERPL-SCNC: 4.8 MMOL/L — SIGNIFICANT CHANGE UP (ref 3.5–5.3)
POTASSIUM SERPL-SCNC: 4.9 MMOL/L — SIGNIFICANT CHANGE UP (ref 3.5–5.3)
RBC # BLD: 3.21 M/UL — LOW (ref 3.8–5.2)
RBC # FLD: 14.3 % — SIGNIFICANT CHANGE UP (ref 10.3–14.5)
SODIUM SERPL-SCNC: 120 MMOL/L — CRITICAL LOW (ref 135–145)
SODIUM SERPL-SCNC: 122 MMOL/L — LOW (ref 135–145)
SODIUM SERPL-SCNC: 124 MMOL/L — LOW (ref 135–145)
WBC # BLD: 9.96 K/UL — SIGNIFICANT CHANGE UP (ref 3.8–10.5)
WBC # FLD AUTO: 9.96 K/UL — SIGNIFICANT CHANGE UP (ref 3.8–10.5)

## 2023-01-12 PROCEDURE — 99233 SBSQ HOSP IP/OBS HIGH 50: CPT

## 2023-01-12 RX ORDER — BUPROPION HYDROCHLORIDE 150 MG/1
150 TABLET, EXTENDED RELEASE ORAL DAILY
Refills: 0 | Status: DISCONTINUED | OUTPATIENT
Start: 2023-01-12 | End: 2023-01-13

## 2023-01-12 RX ORDER — MORPHINE SULFATE 50 MG/1
4 CAPSULE, EXTENDED RELEASE ORAL ONCE
Refills: 0 | Status: DISCONTINUED | OUTPATIENT
Start: 2023-01-12 | End: 2023-01-12

## 2023-01-12 RX ORDER — ONDANSETRON 8 MG/1
4 TABLET, FILM COATED ORAL ONCE
Refills: 0 | Status: COMPLETED | OUTPATIENT
Start: 2023-01-12 | End: 2023-01-12

## 2023-01-12 RX ADMIN — Medication 3 MILLILITER(S): at 21:16

## 2023-01-12 RX ADMIN — Medication 3 MILLILITER(S): at 07:12

## 2023-01-12 RX ADMIN — MORPHINE SULFATE 4 MILLIGRAM(S): 50 CAPSULE, EXTENDED RELEASE ORAL at 05:00

## 2023-01-12 RX ADMIN — Medication 1 PATCH: at 11:28

## 2023-01-12 RX ADMIN — Medication 3 MILLILITER(S): at 13:31

## 2023-01-12 RX ADMIN — HEPARIN SODIUM 5000 UNIT(S): 5000 INJECTION INTRAVENOUS; SUBCUTANEOUS at 17:54

## 2023-01-12 RX ADMIN — MORPHINE SULFATE 4 MILLIGRAM(S): 50 CAPSULE, EXTENDED RELEASE ORAL at 05:19

## 2023-01-12 RX ADMIN — ONDANSETRON 4 MILLIGRAM(S): 8 TABLET, FILM COATED ORAL at 05:17

## 2023-01-12 RX ADMIN — Medication 200 MILLIGRAM(S): at 05:21

## 2023-01-12 RX ADMIN — Medication 1 PATCH: at 07:35

## 2023-01-12 RX ADMIN — Medication 75 MILLIGRAM(S): at 17:53

## 2023-01-12 RX ADMIN — HEPARIN SODIUM 5000 UNIT(S): 5000 INJECTION INTRAVENOUS; SUBCUTANEOUS at 05:22

## 2023-01-12 RX ADMIN — TIOTROPIUM BROMIDE 2 PUFF(S): 18 CAPSULE ORAL; RESPIRATORY (INHALATION) at 07:40

## 2023-01-12 RX ADMIN — BUDESONIDE AND FORMOTEROL FUMARATE DIHYDRATE 2 PUFF(S): 160; 4.5 AEROSOL RESPIRATORY (INHALATION) at 07:40

## 2023-01-12 RX ADMIN — Medication 1 TABLET(S): at 00:50

## 2023-01-12 RX ADMIN — Medication 3 MILLILITER(S): at 01:06

## 2023-01-12 RX ADMIN — Medication 5 MILLIGRAM(S): at 05:22

## 2023-01-12 RX ADMIN — Medication 25 MICROGRAM(S): at 05:21

## 2023-01-12 RX ADMIN — Medication 75 MILLIGRAM(S): at 05:21

## 2023-01-12 RX ADMIN — Medication 1 PATCH: at 19:00

## 2023-01-12 RX ADMIN — SODIUM CHLORIDE 75 MILLILITER(S): 9 INJECTION INTRAMUSCULAR; INTRAVENOUS; SUBCUTANEOUS at 01:03

## 2023-01-12 RX ADMIN — BUDESONIDE AND FORMOTEROL FUMARATE DIHYDRATE 2 PUFF(S): 160; 4.5 AEROSOL RESPIRATORY (INHALATION) at 20:35

## 2023-01-12 RX ADMIN — Medication 200 MILLIGRAM(S): at 22:12

## 2023-01-12 RX ADMIN — Medication 40 MILLIGRAM(S): at 05:22

## 2023-01-12 RX ADMIN — Medication 1 TABLET(S): at 00:19

## 2023-01-13 ENCOUNTER — TRANSCRIPTION ENCOUNTER (OUTPATIENT)
Age: 58
End: 2023-01-13

## 2023-01-13 VITALS
OXYGEN SATURATION: 96 % | DIASTOLIC BLOOD PRESSURE: 73 MMHG | TEMPERATURE: 98 F | RESPIRATION RATE: 16 BRPM | SYSTOLIC BLOOD PRESSURE: 132 MMHG | HEART RATE: 61 BPM

## 2023-01-13 LAB
ANION GAP SERPL CALC-SCNC: 7 MMOL/L — SIGNIFICANT CHANGE UP (ref 5–17)
BUN SERPL-MCNC: 13 MG/DL — SIGNIFICANT CHANGE UP (ref 7–23)
CALCIUM SERPL-MCNC: 7.6 MG/DL — LOW (ref 8.4–10.5)
CHLORIDE SERPL-SCNC: 95 MMOL/L — LOW (ref 96–108)
CO2 SERPL-SCNC: 25 MMOL/L — SIGNIFICANT CHANGE UP (ref 22–31)
CREAT SERPL-MCNC: 0.59 MG/DL — SIGNIFICANT CHANGE UP (ref 0.5–1.3)
EGFR: 104 ML/MIN/1.73M2 — SIGNIFICANT CHANGE UP
FOLATE SERPL-MCNC: 6.6 NG/ML — SIGNIFICANT CHANGE UP
GLUCOSE SERPL-MCNC: 93 MG/DL — SIGNIFICANT CHANGE UP (ref 70–99)
IRON SATN MFR SERPL: 147 UG/DL — SIGNIFICANT CHANGE UP (ref 30–160)
POTASSIUM SERPL-MCNC: 4.3 MMOL/L — SIGNIFICANT CHANGE UP (ref 3.5–5.3)
POTASSIUM SERPL-SCNC: 4.3 MMOL/L — SIGNIFICANT CHANGE UP (ref 3.5–5.3)
SODIUM SERPL-SCNC: 127 MMOL/L — LOW (ref 135–145)
TSH SERPL-MCNC: 0.13 UIU/ML — LOW (ref 0.27–4.2)
VIT B12 SERPL-MCNC: 560 PG/ML — SIGNIFICANT CHANGE UP (ref 232–1245)

## 2023-01-13 PROCEDURE — 82746 ASSAY OF FOLIC ACID SERUM: CPT

## 2023-01-13 PROCEDURE — 84443 ASSAY THYROID STIM HORMONE: CPT

## 2023-01-13 PROCEDURE — 83540 ASSAY OF IRON: CPT

## 2023-01-13 PROCEDURE — 99239 HOSP IP/OBS DSCHRG MGMT >30: CPT

## 2023-01-13 PROCEDURE — 83605 ASSAY OF LACTIC ACID: CPT

## 2023-01-13 PROCEDURE — 93970 EXTREMITY STUDY: CPT

## 2023-01-13 PROCEDURE — 93005 ELECTROCARDIOGRAM TRACING: CPT

## 2023-01-13 PROCEDURE — 99285 EMERGENCY DEPT VISIT HI MDM: CPT

## 2023-01-13 PROCEDURE — 94640 AIRWAY INHALATION TREATMENT: CPT

## 2023-01-13 PROCEDURE — 96375 TX/PRO/DX INJ NEW DRUG ADDON: CPT

## 2023-01-13 PROCEDURE — 83735 ASSAY OF MAGNESIUM: CPT

## 2023-01-13 PROCEDURE — 83880 ASSAY OF NATRIURETIC PEPTIDE: CPT

## 2023-01-13 PROCEDURE — 80048 BASIC METABOLIC PNL TOTAL CA: CPT

## 2023-01-13 PROCEDURE — 85610 PROTHROMBIN TIME: CPT

## 2023-01-13 PROCEDURE — 71045 X-RAY EXAM CHEST 1 VIEW: CPT

## 2023-01-13 PROCEDURE — 80053 COMPREHEN METABOLIC PANEL: CPT

## 2023-01-13 PROCEDURE — 85379 FIBRIN DEGRADATION QUANT: CPT

## 2023-01-13 PROCEDURE — 82607 VITAMIN B-12: CPT

## 2023-01-13 PROCEDURE — 82803 BLOOD GASES ANY COMBINATION: CPT

## 2023-01-13 PROCEDURE — 36415 COLL VENOUS BLD VENIPUNCTURE: CPT

## 2023-01-13 PROCEDURE — 87040 BLOOD CULTURE FOR BACTERIA: CPT

## 2023-01-13 PROCEDURE — 84295 ASSAY OF SERUM SODIUM: CPT

## 2023-01-13 PROCEDURE — 85027 COMPLETE CBC AUTOMATED: CPT

## 2023-01-13 PROCEDURE — 87637 SARSCOV2&INF A&B&RSV AMP PRB: CPT

## 2023-01-13 PROCEDURE — 96365 THER/PROPH/DIAG IV INF INIT: CPT

## 2023-01-13 PROCEDURE — 85025 COMPLETE CBC W/AUTO DIFF WBC: CPT

## 2023-01-13 PROCEDURE — 71275 CT ANGIOGRAPHY CHEST: CPT | Mod: MA

## 2023-01-13 PROCEDURE — 85730 THROMBOPLASTIN TIME PARTIAL: CPT

## 2023-01-13 PROCEDURE — 84484 ASSAY OF TROPONIN QUANT: CPT

## 2023-01-13 RX ORDER — IPRATROPIUM/ALBUTEROL SULFATE 18-103MCG
3 AEROSOL WITH ADAPTER (GRAM) INHALATION
Qty: 0 | Refills: 0 | DISCHARGE

## 2023-01-13 RX ORDER — IPRATROPIUM/ALBUTEROL SULFATE 18-103MCG
3 AEROSOL WITH ADAPTER (GRAM) INHALATION EVERY 6 HOURS
Refills: 0 | Status: DISCONTINUED | OUTPATIENT
Start: 2023-01-13 | End: 2023-01-13

## 2023-01-13 RX ORDER — CARBAMAZEPINE 200 MG
2 TABLET ORAL
Qty: 0 | Refills: 0 | DISCHARGE

## 2023-01-13 RX ADMIN — Medication 3 MILLILITER(S): at 07:54

## 2023-01-13 RX ADMIN — TIOTROPIUM BROMIDE 2 PUFF(S): 18 CAPSULE ORAL; RESPIRATORY (INHALATION) at 06:09

## 2023-01-13 RX ADMIN — Medication 200 MILLIGRAM(S): at 06:09

## 2023-01-13 RX ADMIN — Medication 600 MILLIGRAM(S): at 06:09

## 2023-01-13 RX ADMIN — Medication 40 MILLIGRAM(S): at 06:08

## 2023-01-13 RX ADMIN — HEPARIN SODIUM 5000 UNIT(S): 5000 INJECTION INTRAVENOUS; SUBCUTANEOUS at 06:12

## 2023-01-13 RX ADMIN — Medication 25 MICROGRAM(S): at 06:09

## 2023-01-13 RX ADMIN — BUDESONIDE AND FORMOTEROL FUMARATE DIHYDRATE 2 PUFF(S): 160; 4.5 AEROSOL RESPIRATORY (INHALATION) at 06:09

## 2023-01-13 RX ADMIN — BUPROPION HYDROCHLORIDE 150 MILLIGRAM(S): 150 TABLET, EXTENDED RELEASE ORAL at 12:06

## 2023-01-13 RX ADMIN — Medication 1 PATCH: at 12:40

## 2023-01-13 RX ADMIN — Medication 1 PATCH: at 12:06

## 2023-01-13 RX ADMIN — PANTOPRAZOLE SODIUM 40 MILLIGRAM(S): 20 TABLET, DELAYED RELEASE ORAL at 06:09

## 2023-01-13 RX ADMIN — Medication 5 MILLIGRAM(S): at 09:04

## 2023-01-13 RX ADMIN — Medication 3 MILLILITER(S): at 03:33

## 2023-01-13 RX ADMIN — Medication 75 MILLIGRAM(S): at 06:12

## 2023-01-13 RX ADMIN — Medication 3 MILLILITER(S): at 14:05

## 2023-01-13 NOTE — DISCHARGE NOTE NURSING/CASE MANAGEMENT/SOCIAL WORK - PATIENT PORTAL LINK FT
You can access the FollowMyHealth Patient Portal offered by [facility] by registering at the following website: [email or web address]. By joining [facility]’s FollowMyHealth portal, you will also be able to view your health information using other applications (apps) compatible with our system.

## 2023-01-13 NOTE — PROGRESS NOTE ADULT - REASON FOR ADMISSION
Difficulty breathing &cough.

## 2023-01-13 NOTE — PROGRESS NOTE ADULT - SUBJECTIVE AND OBJECTIVE BOX
Patient is a 57y old  Female who presents with a chief complaint of Difficulty breathing &cough. (11 Jan 2023 02:29)      INTERVAL HPI/OVERNIGHT EVENTS:  Patient seen and examined in am, feels improved, still has cough, no fever, chills, nausea, vomiting. wants to go home    ROS reviewed and is otherwise negative        Vital Signs Last 24 Hrs  T(C): 36.4 (11 Jan 2023 05:26), Max: 36.8 (10 Pedro Luis 2023 19:46)  T(F): 97.6 (11 Jan 2023 05:26), Max: 98.3 (10 Pedro Luis 2023 19:46)  HR: 74 (11 Jan 2023 05:26) (68 - 85)  BP: 90/50 (11 Jan 2023 05:26) (90/50 - 112/73)  BP(mean): --  RR: 16 (11 Jan 2023 05:26) (14 - 22)  SpO2: 98% (11 Jan 2023 05:26) (94% - 98%)    Parameters below as of 11 Jan 2023 05:26  Patient On (Oxygen Delivery Method): nasal cannula  O2 Flow (L/min): 2      PHYSICAL EXAM:  GENERAL: NAD, older female  HEAD:  Atraumatic, Normocephalic  EYES: EOMI, PERRLA  ENMT: Moist mucous membranes,  No lesions;   NECK: Supple, No JVD  NERVOUS SYSTEM:  Alert & Oriented X3, Good concentration; All 4 extremities mobile, no gross sensory deficits.   CHEST/LUNG: Clear to auscultation bilaterally; diffuse bilateral wheezing and rhonchi  HEART: Regular rate and rhythm; No murmurs, rubs, or gallops  ABDOMEN: Soft, Nontender, Nondistended; Bowel sounds present  EXTREMITIES:  + Pulses, No clubbing, cyanosis, or edema  SKIN: No rashes or lesions    MEDICATIONS  (STANDING):  budesonide  80 MICROgram(s)/formoterol 4.5 MICROgram(s) Inhaler 2 Puff(s) Inhalation two times a day  carBAMazepine 200 milliGRAM(s) Oral three times a day  guaiFENesin  milliGRAM(s) Oral every 12 hours  heparin   Injectable 5000 Unit(s) SubCutaneous every 12 hours  influenza   Vaccine 0.5 milliLiter(s) IntraMuscular once  levothyroxine 25 MICROGram(s) Oral daily  nicotine -  14 mG/24Hr(s) Patch 1 Patch Transdermal daily  pantoprazole    Tablet 40 milliGRAM(s) Oral before breakfast  predniSONE   Tablet 40 milliGRAM(s) Oral daily  pregabalin 75 milliGRAM(s) Oral two times a day  sodium chloride 0.9%. 1000 milliLiter(s) (75 mL/Hr) IV Continuous <Continuous>  tiotropium 2.5 MICROgram(s) Inhaler 2 Puff(s) Inhalation daily    MEDICATIONS  (PRN):  albuterol/ipratropium for Nebulization 3 milliLiter(s) Nebulizer every 6 hours PRN Shortness of Breath and/or Wheezing  diazepam    Tablet 5 milliGRAM(s) Oral two times a day PRN Anxiety.  morphine  IR 15 milliGRAM(s) Oral every 6 hours PRN Moderate Pain (4 - 6)      Allergies    Vibramycin (Unknown)    Intolerances    aspirin (Stomach Upset)  Zithromax (Stomach Upset)        LABS:                        12.8   9.55  )-----------( 409      ( 11 Jan 2023 07:00 )             37.5     11 Jan 2023 07:00    133    |  97     |  14     ----------------------------<  193    4.2     |  26     |  0.83     Ca    8.7        11 Jan 2023 07:00  Mg     2.3       11 Jan 2023 07:00    TPro  6.0    /  Alb  3.4    /  TBili  0.1    /  DBili  x      /  AST  18     /  ALT  30     /  AlkPhos  82     10 Pedro Luis 2023 20:32    PT/INR - ( 10 Pedro Luis 2023 20:32 )   PT: 11.3 sec;   INR: 0.96 ratio         PTT - ( 10 Pedro Luis 2023 20:32 )  PTT:29.8 sec    CAPILLARY BLOOD GLUCOSE          RADIOLOGY & ADDITIONAL TESTS:        Care Discussed with Consultants/Other Providers:    Advanced Directives: [ ] DNR  [ ] No feeding tube  [ ] MOLST in chart  [ ] MOLST completed today  [ ] Unknown  
Subjective: Patient vomitting all morning and very nauseous.      MEDICATIONS  (STANDING):  albuterol/ipratropium for Nebulization 3 milliLiter(s) Nebulizer every 6 hours  budesonide  80 MICROgram(s)/formoterol 4.5 MICROgram(s) Inhaler 2 Puff(s) Inhalation two times a day  buPROPion XL (24-Hour) . 150 milliGRAM(s) Oral daily  carBAMazepine 200 milliGRAM(s) Oral three times a day  guaiFENesin  milliGRAM(s) Oral every 12 hours  heparin   Injectable 5000 Unit(s) SubCutaneous every 12 hours  influenza   Vaccine 0.5 milliLiter(s) IntraMuscular once  levothyroxine 25 MICROGram(s) Oral daily  nicotine -  14 mG/24Hr(s) Patch 1 Patch Transdermal daily  pantoprazole    Tablet 40 milliGRAM(s) Oral before breakfast  predniSONE   Tablet 40 milliGRAM(s) Oral daily  pregabalin 75 milliGRAM(s) Oral two times a day  sodium chloride 0.9%. 1000 milliLiter(s) (75 mL/Hr) IV Continuous <Continuous>  tiotropium 2.5 MICROgram(s) Inhaler 2 Puff(s) Inhalation daily    MEDICATIONS  (PRN):  acetaminophen 325 mG/butalbital 50 mG/caffeine 40 mG 1 Tablet(s) Oral <User Schedule> PRN headech  albuterol/ipratropium for Nebulization 3 milliLiter(s) Nebulizer every 6 hours PRN Shortness of Breath and/or Wheezing  diazepam    Tablet 5 milliGRAM(s) Oral two times a day PRN Anxiety.  morphine  IR 15 milliGRAM(s) Oral every 6 hours PRN Moderate Pain (4 - 6)      Allergies    Vibramycin (Unknown)    Intolerances    aspirin (Stomach Upset)  Zithromax (Stomach Upset)      Vital Signs Last 24 Hrs  T(C): 36.6 (13 Jan 2023 12:25), Max: 36.8 (13 Jan 2023 05:11)  T(F): 97.8 (13 Jan 2023 12:25), Max: 98.3 (13 Jan 2023 05:11)  HR: 71 (13 Jan 2023 12:25) (64 - 94)  BP: 127/73 (13 Jan 2023 12:25) (109/63 - 127/73)  BP(mean): --  RR: 19 (13 Jan 2023 12:25) (18 - 19)  SpO2: 94% (13 Jan 2023 12:25) (94% - 99%)    Parameters below as of 13 Jan 2023 07:57  Patient On (Oxygen Delivery Method): nasal cannula        PHYSICAL EXAM:  GENERAL: NAD, well-groomed, well-developed  HEAD:  Atraumatic, Normocephalic  ENMT: Moist mucous membranes,   NECK: Supple, No JVD, Normal thyroid  NERVOUS SYSTEM:  All 4 extremities mobile, no gross sensory deficits.   CHEST/LUNG: Clear to auscultation bilaterally; No rales, rhonchi, wheezing, or rubs  HEART: Regular rate and rhythm; No murmurs, rubs, or gallops  ABDOMEN: Soft, Nontender, Nondistended; Bowel sounds present  EXTREMITIES:  2+ Peripheral Pulses, No clubbing, cyanosis, or edema      LABS:    13 Jan 2023 08:09    127    |  95     |  13     ----------------------------<  93     4.3     |  25     |  0.59     Ca    7.6        13 Jan 2023 08:09          CAPILLARY BLOOD GLUCOSE          RADIOLOGY & ADDITIONAL TESTS:    Imaging Personally Reviewed:  [ ] YES     Consultant(s) Notes Reviewed:      Care Discussed with Consultants/Other Providers:    Advanced Directives: [ ] DNR  [ ] No feeding tube  [ ] MOLST in chart  [ ] MOLST completed today  [ ] Unknown  
Subjective: Patient seen and examined.  BMP reports Na is low and patient upset.  States she doesn't take her anti-seizure medications at home and now we started giving it.     MEDICATIONS  (STANDING):  budesonide  80 MICROgram(s)/formoterol 4.5 MICROgram(s) Inhaler 2 Puff(s) Inhalation two times a day  buPROPion XL (24-Hour) . 150 milliGRAM(s) Oral daily  carBAMazepine 200 milliGRAM(s) Oral three times a day  guaiFENesin  milliGRAM(s) Oral every 12 hours  heparin   Injectable 5000 Unit(s) SubCutaneous every 12 hours  influenza   Vaccine 0.5 milliLiter(s) IntraMuscular once  levothyroxine 25 MICROGram(s) Oral daily  nicotine -  14 mG/24Hr(s) Patch 1 Patch Transdermal daily  pantoprazole    Tablet 40 milliGRAM(s) Oral before breakfast  predniSONE   Tablet 40 milliGRAM(s) Oral daily  pregabalin 75 milliGRAM(s) Oral two times a day  sodium chloride 0.9%. 1000 milliLiter(s) (75 mL/Hr) IV Continuous <Continuous>  tiotropium 2.5 MICROgram(s) Inhaler 2 Puff(s) Inhalation daily    MEDICATIONS  (PRN):  acetaminophen 325 mG/butalbital 50 mG/caffeine 40 mG 1 Tablet(s) Oral <User Schedule> PRN headech  albuterol/ipratropium for Nebulization 3 milliLiter(s) Nebulizer every 6 hours PRN Shortness of Breath and/or Wheezing  diazepam    Tablet 5 milliGRAM(s) Oral two times a day PRN Anxiety.  morphine  IR 15 milliGRAM(s) Oral every 6 hours PRN Moderate Pain (4 - 6)      Allergies    Vibramycin (Unknown)    Intolerances    aspirin (Stomach Upset)  Zithromax (Stomach Upset)      Vital Signs Last 24 Hrs  T(C): 36.8 (12 Jan 2023 10:18), Max: 37 (12 Jan 2023 05:41)  T(F): 98.3 (12 Jan 2023 10:18), Max: 98.6 (12 Jan 2023 05:41)  HR: 79 (12 Jan 2023 10:18) (64 - 85)  BP: 132/77 (12 Jan 2023 10:18) (106/63 - 147/86)  BP(mean): --  RR: 16 (12 Jan 2023 10:18) (15 - 16)  SpO2: 96% (12 Jan 2023 10:18) (95% - 100%)    Parameters below as of 12 Jan 2023 10:18  Patient On (Oxygen Delivery Method): room air        PHYSICAL EXAM:  GENERAL: NAD, well-groomed, well-developed  HEAD:  Atraumatic, Normocephalic  ENMT: Moist mucous membranes,   NECK: Supple, No JVD, Normal thyroid  NERVOUS SYSTEM:  All 4 extremities mobile, no gross sensory deficits.   CHEST/LUNG: Clear to auscultation bilaterally; No rales, rhonchi, wheezing, or rubs  HEART: Regular rate and rhythm; No murmurs, rubs, or gallops  ABDOMEN: Soft, Nontender, Nondistended; Bowel sounds present  EXTREMITIES:  2+ Peripheral Pulses, No clubbing, cyanosis, or edema      LABS:                        10.5   9.96  )-----------( 325      ( 12 Jan 2023 06:00 )             30.4     12 Jan 2023 06:00    120    |  88     |  14     ----------------------------<  86     4.8     |  24     |  0.63     Ca    7.5        12 Jan 2023 06:00      PT/INR - ( 10 Pedro Luis 2023 20:32 )   PT: 11.3 sec;   INR: 0.96 ratio         PTT - ( 10 Pedro Luis 2023 20:32 )  PTT:29.8 sec    CAPILLARY BLOOD GLUCOSE          RADIOLOGY & ADDITIONAL TESTS:    Imaging Personally Reviewed:  [ ] YES     Consultant(s) Notes Reviewed:      Care Discussed with Consultants/Other Providers:    Advanced Directives: [ ] DNR  [ ] No feeding tube  [ ] MOLST in chart  [ ] MOLST completed today  [ ] Unknown  
PULMONARY/CRITICAL CARE  Pt. well known to me from office.  DOS 1/12/23  Had facial neuralgia pain. Ambulated. Less sob. On nicotine patch. Wants to try Wellbutrin.      Patient is a 57y old  Female who presents with a chief complaint of Per H&P "58 y/o F with PMH of Hypotension, PSVT s/p ablation, COPD, Raynaud's Syndrome, SLE, Sjogren's Syndrome, Seizure Disorder, Hypothyroidism, PUD s/p GI Bleed, IBS, Hep-C, SCC s/p excision, Nicotine Dependence, and Anxiety who was sent from her pulmonary office for difficulty breathing & cough. Patient stated that she was having bad cough productive of greenish non bloody sputum over about a month, was getting worse despite taking antibiotics (Azithromycin & Doxycycline), also reports recent worsening of her SOB, no fever or chills, no flu-like symptoms, saw her pulmonary again yesterday, who sent her to the ED, Her CTA ruled out PE, no PNA, and her B/L lower extremity venous duplex ruled out DVT, receievd 3 doses of Albuterol/Ipratropium via nebulizer, in addition to 125 mg of Methylprednisolone IVP X1,"      (11 Jan 2023 15:20)    BRIEF HOSPITAL COURSE: ***    Events last 24 hours: ***    PAST MEDICAL & SURGICAL HISTORY:  Lupus      Sjogren&#x27;s disease      Vertigo      GERD (gastroesophageal reflux disease)  gastritis, vomits often      Hypotension  in past had syncope related to brain problem-better recently      Hypothyroidism      Hepatitis C  body cleared      Anxiety      Migraine      Neuropathy      Nicotine addiction      Glossopharyngeal neuralgia syndrome  s/p brain sx in 12/2018      Dvt femoral (deep venous thrombosis)  right arm, past      Emphysema/COPD  on oxygen at night 3L NC      Burning mouth syndrome      H/O osteoporosis  severe, was on forteo      History of weight loss  lost 20 pounds within the last year-unknown cause      History of seizure disorder  last seizure 8 yrs ago      Personal history of skin cancer  toe      Osteochondritis dissecans  both ankles      History of IBS  had blockage about 6 months ago-resolved with NG tube, colitis      H/O Raynaud&#x27;s syndrome      Pain, wrist, right  collapse      Right shoulder pain      Chronic low back pain with sciatica      Chronic neck pain  cervical bone fused throught disc      H/O paroxysmal supraventricular tachycardia      Vocal cord polyp  REMOVAL      Gall bladder disease  REMOVAL      Injury of right wrist, subsequent encounter  no surgery      H/O lumpectomy  right shouler, axillary, both breasts head, right back      S/P brain surgery  2018, also had gamma knife procedure-microvascular decompression- titanium plate in skull      S/P cryoablation of arrhythmia      Ankle problem  right ankle surgery      H/O elbow surgery  10/2020 - LEFT elbow      Status post placement of implantable loop recorder      H/O squamous cell carcinoma excision  toe        Allergies    Vibramycin (Unknown)    Intolerances    aspirin (Stomach Upset)  Zithromax (Stomach Upset)    FAMILY HISTORY: smokes 1.5 ppd for more than 30 yrs  Family history of systemic lupus erythematosus (SLE) in mother (Sibling)  niece    Family history of psoriatic arthritis (Sibling)  Sister    Family history of diabetes mellitus (Sibling)  Sister    Family history of multiple sclerosis  sister    Family history of heart disease  htn-father    FH: arrhythmia  mother-dementia        Review of Systems:  CONSTITUTIONAL: No fever, chills, or fatigue  EYES: No eye pain, visual disturbances, or discharge  ENMT:  No difficulty hearing, tinnitus, vertigo; No sinus or throat pain  NECK: No pain or stiffness  RESPIRATORY: had intractable  cough, no  wheezing, chills or hemoptysis; had shortness of breath  CARDIOVASCULAR: No chest pain, palpitations, dizziness, or leg swelling  GASTROINTESTINAL: No abdominal or epigastric pain. No nausea, vomiting, or hematemesis; No diarrhea or constipation. No melena or hematochezia.  GENITOURINARY: No dysuria, frequency, hematuria, or incontinence  NEUROLOGICAL: No headaches, memory loss, loss of strength, numbness, or tremors  SKIN: No itching, burning, rashes, or lesions   MUSCULOSKELETAL: No joint pain or swelling; No muscle, back, or extremity pain  PSYCHIATRIC: No depression, anxiety, mood swings, or difficulty sleeping      Medications:      albuterol/ipratropium for Nebulization 3 milliLiter(s) Nebulizer every 6 hours PRN  budesonide  80 MICROgram(s)/formoterol 4.5 MICROgram(s) Inhaler 2 Puff(s) Inhalation two times a day  guaiFENesin  milliGRAM(s) Oral every 12 hours  tiotropium 2.5 MICROgram(s) Inhaler 2 Puff(s) Inhalation daily    acetaminophen 325 mG/butalbital 50 mG/caffeine 40 mG 1 Tablet(s) Oral <User Schedule> PRN  carBAMazepine 200 milliGRAM(s) Oral three times a day  diazepam    Tablet 5 milliGRAM(s) Oral two times a day PRN  morphine  IR 15 milliGRAM(s) Oral every 6 hours PRN  pregabalin 75 milliGRAM(s) Oral two times a day      heparin   Injectable 5000 Unit(s) SubCutaneous every 12 hours    pantoprazole    Tablet 40 milliGRAM(s) Oral before breakfast      levothyroxine 25 MICROGram(s) Oral daily  predniSONE   Tablet 40 milliGRAM(s) Oral daily    sodium chloride 0.9%. 1000 milliLiter(s) IV Continuous <Continuous>    influenza   Vaccine 0.5 milliLiter(s) IntraMuscular once      nicotine -  14 mG/24Hr(s) Patch 1 Patch Transdermal daily          ICU Vital Signs Last 24 Hrs  T(C): 36.7 (11 Jan 2023 12:45), Max: 36.8 (10 Pedro Luis 2023 19:46)  T(F): 98 (11 Jan 2023 12:45), Max: 98.3 (10 Pedro Luis 2023 19:46)  HR: 84 (11 Jan 2023 12:45) (68 - 85)  BP: 106/63 (11 Jan 2023 12:45) (90/50 - 112/73)  BP(mean): --  ABP: --  ABP(mean): --  RR: 16 (11 Jan 2023 12:45) (14 - 22)  SpO2: 100% (11 Jan 2023 12:45) (94% - 100%)    O2 Parameters below as of 11 Jan 2023 12:45  Patient On (Oxygen Delivery Method): room air          Vital Signs Last 24 Hrs  T(C): 36.7 (11 Jan 2023 12:45), Max: 36.8 (10 Pedro Luis 2023 19:46)  T(F): 98 (11 Jan 2023 12:45), Max: 98.3 (10 Pedro Luis 2023 19:46)  HR: 84 (11 Jan 2023 12:45) (68 - 85)  BP: 106/63 (11 Jan 2023 12:45) (90/50 - 112/73)  BP(mean): --  RR: 16 (11 Jan 2023 12:45) (14 - 22)  SpO2: 100% (11 Jan 2023 12:45) (94% - 100%)    Parameters below as of 11 Jan 2023 12:45  Patient On (Oxygen Delivery Method): room air        ABG - ( 10 Pedro Luis 2023 21:36 )  pH, Arterial: 7.40  pH, Blood: x     /  pCO2: 44    /  pO2: 75    / HCO3: 27    / Base Excess: 2.5   /  SaO2: 97.0                I&O's Detail    10 Pedro Luis 2023 07:01  -  11 Jan 2023 07:00  --------------------------------------------------------  IN:    Oral Fluid: 360 mL    sodium chloride 0.9%: 150 mL  Total IN: 510 mL    OUT:    Voided (mL): 1 mL  Total OUT: 1 mL    Total NET: 509 mL            LABS:                        12.8   9.55  )-----------( 409      ( 11 Jan 2023 07:00 )             37.5     01-11    132<L>  |  x   |  x   ----------------------------<  x   x    |  x   |  x     Ca    8.7      11 Jan 2023 07:00  Mg     2.3     01-11    TPro  6.0  /  Alb  3.4  /  TBili  0.1<L>  /  DBili  x   /  AST  18  /  ALT  30  /  AlkPhos  82  01-10          CAPILLARY BLOOD GLUCOSE        PT/INR - ( 10 Pedro Luis 2023 20:32 )   PT: 11.3 sec;   INR: 0.96 ratio         PTT - ( 10 Pedro Luis 2023 20:32 )  PTT:29.8 sec    CULTURES:      Physical Examination:    General: No acute distress.  thin femalle    HEENT: Pupils equal, reactive to light.  Symmetric.    PULM: Clear to auscultation bilaterally, no wheeze rhonchi rales no change percussion; decreased bs.     CVS: Regular rate and rhythm, no murmurs, rubs, or gallops    ABD: Soft, nondistended, nontender, normoactive bowel sounds, no masses    EXT: No edema, nontender calves    SKIN: Warm and well perfused, no rashes noted.    NEURO: Alert, oriented, interactive, nonfocal    RADIOLOGY: ***rd< from: CT Angio Chest PE Protocol w/ IV Cont (01.10.23 @ 22:21) >  ACC: [id number] EXAM:  CT ANGIO CHEST PULM ART [facility]                          PROCEDURE DATE:  01/10/2023          INTERPRETATION:  CLINICAL INFORMATION: shortness of breath, chest pain,   cough r/o PE r/o pneumonia      COMPARISON: December 6, 2021    CONTRAST/COMPLICATIONS:  IV Contrast: Omnipaque 350  Oral Contrast: NONE  Complications: None reported at time of study completion    PROCEDURE:  CT Angiography of the Chest.  Sagittal and coronal reformats were performed as well as 3D (MIP)   reconstructions.    FINDINGS:  LUNGS AND AIRWAYS: Patent central airways.   Advanced emphysematous changes within the upper lobes.  Minimal subsegmental atelectasis within the upper lobe adjacent to   fissure. Mild infiltrate and/or subsegmental atelectasis right lung base.   Minimal subsegmental atelectasis  PLEURA: No pleural effusion.  MEDIASTINUM AND KATALINA: No lymphadenopathy.  VESSELS: No thoracic aortic aneurysm or dissection. No evidence of acute   aortic syndrome. No evidence of embolism..  HEART: Heart size is normal. No pericardial effusion.  CHEST WALL AND LOWER NECK: Loop recorder overlying the lower left side of   the chest. Surgical clips right axilla.  VISUALIZED UPPER ABDOMEN: Prior cholecystectomy  BONES: Within normal limits.    IMPRESSION:     Advanced emphysematous changes within the upper lobes.    Minimal subsegmental atelectasis within the upper lobe adjacent to the   fissure.  Mild infiltrate and/or subsegmental atelectasis right lung base. Minimal   subsegmental atelectasis    No evidence of pulmonary embolism.      --- End of Report ---             ROBERT GOMES MD; Attending Radiologist  This document has been electronically signed. Pedro Luis 10 2023 10:56PM    < end of copied text >      CRITICAL CARE TIME SPENT: ***  
PULMONARY/CRITICAL CARE  Pt. well known to me from office.  DOS 1/13/23  Feels much better, less sob. Ambulated.. On nicotine patch.   Had low sodium.      Patient is a 57y old  Female who presents with a chief complaint of Per H&P "56 y/o F with PMH of Hypotension, PSVT s/p ablation, COPD, Raynaud's Syndrome, SLE, Sjogren's Syndrome, Seizure Disorder, Hypothyroidism, PUD s/p GI Bleed, IBS, Hep-C, SCC s/p excision, Nicotine Dependence, and Anxiety who was sent from her pulmonary office for difficulty breathing & cough. Patient stated that she was having bad cough productive of greenish non bloody sputum over about a month, was getting worse despite taking antibiotics (Azithromycin & Doxycycline), also reports recent worsening of her SOB, no fever or chills, no flu-like symptoms, saw her pulmonary again yesterday, who sent her to the ED, Her CTA ruled out PE, no PNA, and her B/L lower extremity venous duplex ruled out DVT, receievd 3 doses of Albuterol/Ipratropium via nebulizer, in addition to 125 mg of Methylprednisolone IVP X1,"      (11 Jan 2023 15:20)    BRIEF HOSPITAL COURSE: ***    Events last 24 hours: ***    PAST MEDICAL & SURGICAL HISTORY:  Lupus      Sjogren&#x27;s disease      Vertigo      GERD (gastroesophageal reflux disease)  gastritis, vomits often      Hypotension  in past had syncope related to brain problem-better recently      Hypothyroidism      Hepatitis C  body cleared      Anxiety      Migraine      Neuropathy      Nicotine addiction      Glossopharyngeal neuralgia syndrome  s/p brain sx in 12/2018      Dvt femoral (deep venous thrombosis)  right arm, past      Emphysema/COPD  on oxygen at night 3L NC      Burning mouth syndrome      H/O osteoporosis  severe, was on forteo      History of weight loss  lost 20 pounds within the last year-unknown cause      History of seizure disorder  last seizure 8 yrs ago      Personal history of skin cancer  toe      Osteochondritis dissecans  both ankles      History of IBS  had blockage about 6 months ago-resolved with NG tube, colitis      H/O Raynaud&#x27;s syndrome      Pain, wrist, right  collapse      Right shoulder pain      Chronic low back pain with sciatica      Chronic neck pain  cervical bone fused throught disc      H/O paroxysmal supraventricular tachycardia      Vocal cord polyp  REMOVAL      Gall bladder disease  REMOVAL      Injury of right wrist, subsequent encounter  no surgery      H/O lumpectomy  right shouler, axillary, both breasts head, right back      S/P brain surgery  2018, also had gamma knife procedure-microvascular decompression- titanium plate in skull      S/P cryoablation of arrhythmia      Ankle problem  right ankle surgery      H/O elbow surgery  10/2020 - LEFT elbow      Status post placement of implantable loop recorder      H/O squamous cell carcinoma excision  toe        Allergies    Vibramycin (Unknown)    Intolerances    aspirin (Stomach Upset)  Zithromax (Stomach Upset)    FAMILY HISTORY: smokes 1.5 ppd for more than 30 yrs  Family history of systemic lupus erythematosus (SLE) in mother (Sibling)  niece    Family history of psoriatic arthritis (Sibling)  Sister    Family history of diabetes mellitus (Sibling)  Sister    Family history of multiple sclerosis  sister    Family history of heart disease  htn-father    FH: arrhythmia  mother-dementia        Review of Systems:  CONSTITUTIONAL: No fever, chills, or fatigue  EYES: No eye pain, visual disturbances, or discharge  ENMT:  No difficulty hearing, tinnitus, vertigo; No sinus or throat pain  NECK: No pain or stiffness  RESPIRATORY: had intractable  cough, no  wheezing, chills or hemoptysis; had shortness of breath  CARDIOVASCULAR: No chest pain, palpitations, dizziness, or leg swelling  GASTROINTESTINAL: No abdominal or epigastric pain. No nausea, vomiting, or hematemesis; No diarrhea or constipation. No melena or hematochezia.  GENITOURINARY: No dysuria, frequency, hematuria, or incontinence  NEUROLOGICAL: No headaches, memory loss, loss of strength, numbness, or tremors  SKIN: No itching, burning, rashes, or lesions   MUSCULOSKELETAL: No joint pain or swelling; No muscle, back, or extremity pain  PSYCHIATRIC: No depression, anxiety, mood swings, or difficulty sleeping      Medications:      albuterol/ipratropium for Nebulization 3 milliLiter(s) Nebulizer every 6 hours PRN  budesonide  80 MICROgram(s)/formoterol 4.5 MICROgram(s) Inhaler 2 Puff(s) Inhalation two times a day  guaiFENesin  milliGRAM(s) Oral every 12 hours  tiotropium 2.5 MICROgram(s) Inhaler 2 Puff(s) Inhalation daily    acetaminophen 325 mG/butalbital 50 mG/caffeine 40 mG 1 Tablet(s) Oral <User Schedule> PRN  carBAMazepine 200 milliGRAM(s) Oral three times a day  diazepam    Tablet 5 milliGRAM(s) Oral two times a day PRN  morphine  IR 15 milliGRAM(s) Oral every 6 hours PRN  pregabalin 75 milliGRAM(s) Oral two times a day      heparin   Injectable 5000 Unit(s) SubCutaneous every 12 hours    pantoprazole    Tablet 40 milliGRAM(s) Oral before breakfast      levothyroxine 25 MICROGram(s) Oral daily  predniSONE   Tablet 40 milliGRAM(s) Oral daily    sodium chloride 0.9%. 1000 milliLiter(s) IV Continuous <Continuous>    influenza   Vaccine 0.5 milliLiter(s) IntraMuscular once      nicotine -  14 mG/24Hr(s) Patch 1 Patch Transdermal daily          ICU Vital Signs Last 24 Hrs  T(C): 36.7 (11 Jan 2023 12:45), Max: 36.8 (10 Pedro Luis 2023 19:46)  T(F): 98 (11 Jan 2023 12:45), Max: 98.3 (10 Pedro Luis 2023 19:46)  HR: 84 (11 Jan 2023 12:45) (68 - 85)  BP: 106/63 (11 Jan 2023 12:45) (90/50 - 112/73)  BP(mean): --  ABP: --  ABP(mean): --  RR: 16 (11 Jan 2023 12:45) (14 - 22)  SpO2: 100% (11 Jan 2023 12:45) (94% - 100%)    O2 Parameters below as of 11 Jan 2023 12:45  Patient On (Oxygen Delivery Method): room air          Vital Signs Last 24 Hrs  T(C): 36.7 (11 Jan 2023 12:45), Max: 36.8 (10 Pedro Luis 2023 19:46)  T(F): 98 (11 Jan 2023 12:45), Max: 98.3 (10 Pedro Luis 2023 19:46)  HR: 84 (11 Jan 2023 12:45) (68 - 85)  BP: 106/63 (11 Jan 2023 12:45) (90/50 - 112/73)  BP(mean): --  RR: 16 (11 Jan 2023 12:45) (14 - 22)  SpO2: 100% (11 Jan 2023 12:45) (94% - 100%)    Parameters below as of 11 Jan 2023 12:45  Patient On (Oxygen Delivery Method): room air        ABG - ( 10 Pedro Luis 2023 21:36 )  pH, Arterial: 7.40  pH, Blood: x     /  pCO2: 44    /  pO2: 75    / HCO3: 27    / Base Excess: 2.5   /  SaO2: 97.0                I&O's Detail    10 Pedro Luis 2023 07:01  -  11 Jan 2023 07:00  --------------------------------------------------------  IN:    Oral Fluid: 360 mL    sodium chloride 0.9%: 150 mL  Total IN: 510 mL    OUT:    Voided (mL): 1 mL  Total OUT: 1 mL    Total NET: 509 mL            LABS:                        12.8   9.55  )-----------( 409      ( 11 Jan 2023 07:00 )             37.5     01-11    132<L>  |  x   |  x   ----------------------------<  x   x    |  x   |  x     Ca    8.7      11 Jan 2023 07:00  Mg     2.3     01-11    TPro  6.0  /  Alb  3.4  /  TBili  0.1<L>  /  DBili  x   /  AST  18  /  ALT  30  /  AlkPhos  82  01-10          CAPILLARY BLOOD GLUCOSE        PT/INR - ( 10 Pedro Luis 2023 20:32 )   PT: 11.3 sec;   INR: 0.96 ratio         PTT - ( 10 Pedro Luis 2023 20:32 )  PTT:29.8 sec    CULTURES:      Physical Examination:    General: No acute distress.  thin femalle    HEENT: Pupils equal, reactive to light.  Symmetric.    PULM: Clear to auscultation bilaterally, no wheeze rhonchi rales no change percussion; decreased bs.     CVS: Regular rate and rhythm, no murmurs, rubs, or gallops    ABD: Soft, nondistended, nontender, normoactive bowel sounds, no masses    EXT: No edema, nontender calves    SKIN: Warm and well perfused, no rashes noted.    NEURO: Alert, oriented, interactive, nonfocal    RADIOLOGY: ***rd< from: CT Angio Chest PE Protocol w/ IV Cont (01.10.23 @ 22:21) >  ACC: [id number] EXAM:  CT ANGIO CHEST PULM ART [facility]                          PROCEDURE DATE:  01/10/2023          INTERPRETATION:  CLINICAL INFORMATION: shortness of breath, chest pain,   cough r/o PE r/o pneumonia      COMPARISON: December 6, 2021    CONTRAST/COMPLICATIONS:  IV Contrast: Omnipaque 350  Oral Contrast: NONE  Complications: None reported at time of study completion    PROCEDURE:  CT Angiography of the Chest.  Sagittal and coronal reformats were performed as well as 3D (MIP)   reconstructions.    FINDINGS:  LUNGS AND AIRWAYS: Patent central airways.   Advanced emphysematous changes within the upper lobes.  Minimal subsegmental atelectasis within the upper lobe adjacent to   fissure. Mild infiltrate and/or subsegmental atelectasis right lung base.   Minimal subsegmental atelectasis  PLEURA: No pleural effusion.  MEDIASTINUM AND KATALINA: No lymphadenopathy.  VESSELS: No thoracic aortic aneurysm or dissection. No evidence of acute   aortic syndrome. No evidence of embolism..  HEART: Heart size is normal. No pericardial effusion.  CHEST WALL AND LOWER NECK: Loop recorder overlying the lower left side of   the chest. Surgical clips right axilla.  VISUALIZED UPPER ABDOMEN: Prior cholecystectomy  BONES: Within normal limits.    IMPRESSION:     Advanced emphysematous changes within the upper lobes.    Minimal subsegmental atelectasis within the upper lobe adjacent to the   fissure.  Mild infiltrate and/or subsegmental atelectasis right lung base. Minimal   subsegmental atelectasis    No evidence of pulmonary embolism.      --- End of Report ---             ROBERT GOMES MD; Attending Radiologist  This document has been electronically signed. Pedro Luis 10 2023 10:56PM    < end of copied text >      CRITICAL CARE TIME SPENT: ***

## 2023-01-13 NOTE — DISCHARGE NOTE PROVIDER - DETAILS OF MALNUTRITION DIAGNOSIS/DIAGNOSES
This patient has been assessed with a concern for Malnutrition and was treated during this hospitalization for the following Nutrition diagnosis/diagnoses:     -  01/11/2023: Moderate protein-calorie malnutrition   -  01/11/2023: Underweight (BMI < 19)

## 2023-01-13 NOTE — DISCHARGE NOTE PROVIDER - CARE PROVIDER_API CALL
Jonathan Ocasio  NEPHROLOGY  [address], [address]  [place], [place] [id number]  Phone: [phone]  Fax: [phone]  Follow Up Time:     Dilip Silver)  Critical Care Medicine; Internal Medicine; Pulmonary Disease  [address] [place] [address]  Phone: [phone]  Fax: [phone]  Follow Up Time:

## 2023-01-13 NOTE — DISCHARGE NOTE NURSING/CASE MANAGEMENT/SOCIAL WORK - NSDCVIVACCINE_GEN_ALL_CORE_FT
influenza, injectable, quadrivalent, preservative free; 17-Oct-2018 11:53; Abbey Martinez (RN); [employer]; [id number] (Exp. Date: 30-Jun-2019); IntraMuscular; Deltoid Left.; 0.5 milliLiter(s); VIS (VIS Published: 07-Aug-2015, VIS Presented: 17-Oct-2018);   pneumococcal polysaccharide PPV23; 04-Jun-2014 10:54; Yani Jain (HERMILO); [id number]; IntraMuscular; Deltoid Left.; 0.5 milliLiter(s);

## 2023-01-13 NOTE — PROGRESS NOTE ADULT - NUTRITIONAL ASSESSMENT
This patient has been assessed with a concern for Malnutrition and has been determined to have a diagnosis/diagnoses of Moderate protein-calorie malnutrition and Underweight (BMI < 19).    This patient is being managed with:   Diet Regular-  Supplement Feeding Modality:  Oral  Ensure Pudding Cans or Servings Per Day:  1       Frequency:  Daily  Ensure Plus High Protein Cans or Servings Per Day:  1       Frequency:  Daily  Entered: Jan 11 2023  3:54PM    Diet Regular-  Entered: Jan 11 2023  2:17AM    The following pending diet order is being considered for treatment of Moderate protein-calorie malnutrition and Underweight (BMI < 19):null
This patient has been assessed with a concern for Malnutrition and has been determined to have a diagnosis/diagnoses of Moderate protein-calorie malnutrition and Underweight (BMI < 19).    This patient is being managed with:   Diet Regular-  Supplement Feeding Modality:  Oral  Ensure Pudding Cans or Servings Per Day:  1       Frequency:  Daily  Ensure Plus High Protein Cans or Servings Per Day:  1       Frequency:  Daily  Entered: Jan 11 2023  3:54PM    Diet Regular-  Entered: Jan 11 2023  2:17AM    The following pending diet order is being considered for treatment of Moderate protein-calorie malnutrition and Underweight (BMI < 19):null

## 2023-01-13 NOTE — DISCHARGE NOTE PROVIDER - NSDCFUSCHEDAPPT_GEN_ALL_CORE_FT
Austin Cheng  [facility]  [address] [facility]  Scheduled Appointment: 01/18/2023

## 2023-01-13 NOTE — CONSULT NOTE ADULT - SUBJECTIVE AND OBJECTIVE BOX
NEPHROLOGY CONSULTATION    CHIEF COMPLAINT:  Hyponatremia    HPI:  Presents 3 days ago with cough/sob and dx with COPD flare and feeling better.  Her Na runs chronically low for many years.  It dropped acutely yesterday after she admits to drinking alot of tea in the hospital.  She rarely takes carbamazepine (juts PRN for facial pain).        ROS:  she has nausea/vomiting today      PAST MEDICAL & SURGICAL HISTORY:  Lupus  Sjogren's syndrome  Vertigo  GERD (gastroesophageal reflux disease)  gastritis, vomits often  Hypotension  in past had syncope related to brain problem-better recently  Hypothyroidism  Hepatitis C  body cleared  Anxiety  Migraine  Neuropathy  Nicotine addiction  Glossopharyngeal neuralgia syndrome  s/p brain sx in 12/2018  Dvt femoral (deep venous thrombosis)  right arm, past  Emphysema/COPD  on oxygen at night 3L NC  Burning mouth syndrome  H/O osteoporosis  severe, was on forteo  History of weight loss  lost 20 pounds within the last year-unknown cause  History of seizure disorder  last seizure 8 yrs ago  Personal history of skin cancer  toe  Osteochondritis dissecans  both ankles  History of IBS  had blockage about 6 months ago-resolved with NG tube, colitis  H/O Raynauds syndrome  Pain, wrist, right  collapse      Right shoulder pain      Chronic low back pain with sciatica      Chronic neck pain  cervical bone fused throught disc      H/O paroxysmal supraventricular tachycardia      Vocal cord polyp  REMOVAL      Gall bladder disease  REMOVAL      Injury of right wrist, subsequent encounter  no surgery      H/O lumpectomy  right shouler, axillary, both breasts head, right back      S/P brain surgery  2018, also had gamma knife procedure-microvascular decompression- titanium plate in skull      S/P cryoablation of arrhythmia      Ankle problem  right ankle surgery      H/O elbow surgery  10/2020 - LEFT elbow      Status post placement of implantable loop recorder      H/O squamous cell carcinoma excision  toe      SOCIAL HISTORY:  NA    FAMILY HISTORY:  NA    MEDICATIONS  (STANDING):  albuterol/ipratropium for Nebulization 3 milliLiter(s) Nebulizer every 6 hours  budesonide  80 MICROgram(s)/formoterol 4.5 MICROgram(s) Inhaler 2 Puff(s) Inhalation two times a day  buPROPion XL (24-Hour) . 150 milliGRAM(s) Oral daily  carBAMazepine 200 milliGRAM(s) Oral three times a day  guaiFENesin  milliGRAM(s) Oral every 12 hours  heparin   Injectable 5000 Unit(s) SubCutaneous every 12 hours  influenza   Vaccine 0.5 milliLiter(s) IntraMuscular once  levothyroxine 25 MICROGram(s) Oral daily  nicotine -  14 mG/24Hr(s) Patch 1 Patch Transdermal daily  pantoprazole    Tablet 40 milliGRAM(s) Oral before breakfast  predniSONE   Tablet 40 milliGRAM(s) Oral daily  pregabalin 75 milliGRAM(s) Oral two times a day  sodium chloride 0.9%. 1000 milliLiter(s) (75 mL/Hr) IV Continuous <Continuous>  tiotropium 2.5 MICROgram(s) Inhaler 2 Puff(s) Inhalation daily      PHYSICAL EXAMINATION:  T(F): 97.8 (01-13-23 @ 12:25)  HR: 74 (01-13-23 @ 14:08)  BP: 127/73 (01-13-23 @ 12:25)  RR: 19 (01-13-23 @ 12:25)  SpO2: 94% (01-13-23 @ 14:08)  Conversant, no apparent distress  PERRLA, pink conjunctivae, no ptosis  Good dentition, no pharyngeal erythema  Neck non tender, no mass, no thyromegaly or nodules  Normal respiratory effort, lungs clear to auscultation  Heart with RRR, no murmurs or rubs, no peripheral edema  Abdomen soft, no masses, no organomegaly  Skin no rashes, ulcers or lesions, normal turgor and temperature  Appropriate affect, AO x 3    LABS:                        10.5   9.96  )-----------( 325      ( 12 Jan 2023 06:00 )             30.4     01-13    127<L>  |  95<L>  |  13  ----------------------------<  93  4.3   |  25  |  0.59    Ca    7.6<L>      13 Jan 2023 08:09        RADIOLOGY:  CT scan shows advanced emphysema with mild right base infiltrate      ASSESSMENT:  1.  Acute on chronic hyponatremia almost certainly due to inappropriate ADH - exact stimulus may be medication (wellbutrin and/or carbamazepine) versus idiopathic;  acute drop in Na yesterday coincided with an acute increase in fluid consumption    PLAN:  Explained importance of fluid restriction with increase in calories/salt to mitigate tendency to chronic hyponatremia  No nephrologic contraindications to dc home                
PULMONARY/CRITICAL CARE  Pt. well known to me from office.  DOS 1/11/23      Patient is a 57y old  Female who presents with a chief complaint of Per H&P "56 y/o F with PMH of Hypotension, PSVT s/p ablation, COPD, Raynaud's Syndrome, SLE, Sjogren's Syndrome, Seizure Disorder, Hypothyroidism, PUD s/p GI Bleed, IBS, Hep-C, SCC s/p excision, Nicotine Dependence, and Anxiety who was sent from her pulmonary office for difficulty breathing & cough. Patient stated that she was having bad cough productive of greenish non bloody sputum over about a month, was getting worse despite taking antibiotics (Azithromycin & Doxycycline), also reports recent worsening of her SOB, no fever or chills, no flu-like symptoms, saw her pulmonary again yesterday, who sent her to the ED, Her CTA ruled out PE, no PNA, and her B/L lower extremity venous duplex ruled out DVT, receievd 3 doses of Albuterol/Ipratropium via nebulizer, in addition to 125 mg of Methylprednisolone IVP X1,"      (11 Jan 2023 15:20)    BRIEF HOSPITAL COURSE: ***    Events last 24 hours: ***    PAST MEDICAL & SURGICAL HISTORY:  Lupus      Sjogren&#x27;s disease      Vertigo      GERD (gastroesophageal reflux disease)  gastritis, vomits often      Hypotension  in past had syncope related to brain problem-better recently      Hypothyroidism      Hepatitis C  body cleared      Anxiety      Migraine      Neuropathy      Nicotine addiction      Glossopharyngeal neuralgia syndrome  s/p brain sx in 12/2018      Dvt femoral (deep venous thrombosis)  right arm, past      Emphysema/COPD  on oxygen at night 3L NC      Burning mouth syndrome      H/O osteoporosis  severe, was on forteo      History of weight loss  lost 20 pounds within the last year-unknown cause      History of seizure disorder  last seizure 8 yrs ago      Personal history of skin cancer  toe      Osteochondritis dissecans  both ankles      History of IBS  had blockage about 6 months ago-resolved with NG tube, colitis      H/O Raynaud&#x27;s syndrome      Pain, wrist, right  collapse      Right shoulder pain      Chronic low back pain with sciatica      Chronic neck pain  cervical bone fused throught disc      H/O paroxysmal supraventricular tachycardia      Vocal cord polyp  REMOVAL      Gall bladder disease  REMOVAL      Injury of right wrist, subsequent encounter  no surgery      H/O lumpectomy  right shouler, axillary, both breasts head, right back      S/P brain surgery  2018, also had gamma knife procedure-microvascular decompression- titanium plate in skull      S/P cryoablation of arrhythmia      Ankle problem  right ankle surgery      H/O elbow surgery  10/2020 - LEFT elbow      Status post placement of implantable loop recorder      H/O squamous cell carcinoma excision  toe        Allergies    Vibramycin (Unknown)    Intolerances    aspirin (Stomach Upset)  Zithromax (Stomach Upset)    FAMILY HISTORY: smokes 1.5 ppd for more than 30 yrs  Family history of systemic lupus erythematosus (SLE) in mother (Sibling)  niece    Family history of psoriatic arthritis (Sibling)  Sister    Family history of diabetes mellitus (Sibling)  Sister    Family history of multiple sclerosis  sister    Family history of heart disease  htn-father    FH: arrhythmia  mother-dementia        Review of Systems:  CONSTITUTIONAL: No fever, chills, or fatigue  EYES: No eye pain, visual disturbances, or discharge  ENMT:  No difficulty hearing, tinnitus, vertigo; No sinus or throat pain  NECK: No pain or stiffness  RESPIRATORY: had intractable  cough, no  wheezing, chills or hemoptysis; had shortness of breath  CARDIOVASCULAR: No chest pain, palpitations, dizziness, or leg swelling  GASTROINTESTINAL: No abdominal or epigastric pain. No nausea, vomiting, or hematemesis; No diarrhea or constipation. No melena or hematochezia.  GENITOURINARY: No dysuria, frequency, hematuria, or incontinence  NEUROLOGICAL: No headaches, memory loss, loss of strength, numbness, or tremors  SKIN: No itching, burning, rashes, or lesions   MUSCULOSKELETAL: No joint pain or swelling; No muscle, back, or extremity pain  PSYCHIATRIC: No depression, anxiety, mood swings, or difficulty sleeping      Medications:      albuterol/ipratropium for Nebulization 3 milliLiter(s) Nebulizer every 6 hours PRN  budesonide  80 MICROgram(s)/formoterol 4.5 MICROgram(s) Inhaler 2 Puff(s) Inhalation two times a day  guaiFENesin  milliGRAM(s) Oral every 12 hours  tiotropium 2.5 MICROgram(s) Inhaler 2 Puff(s) Inhalation daily    acetaminophen 325 mG/butalbital 50 mG/caffeine 40 mG 1 Tablet(s) Oral <User Schedule> PRN  carBAMazepine 200 milliGRAM(s) Oral three times a day  diazepam    Tablet 5 milliGRAM(s) Oral two times a day PRN  morphine  IR 15 milliGRAM(s) Oral every 6 hours PRN  pregabalin 75 milliGRAM(s) Oral two times a day      heparin   Injectable 5000 Unit(s) SubCutaneous every 12 hours    pantoprazole    Tablet 40 milliGRAM(s) Oral before breakfast      levothyroxine 25 MICROGram(s) Oral daily  predniSONE   Tablet 40 milliGRAM(s) Oral daily    sodium chloride 0.9%. 1000 milliLiter(s) IV Continuous <Continuous>    influenza   Vaccine 0.5 milliLiter(s) IntraMuscular once      nicotine -  14 mG/24Hr(s) Patch 1 Patch Transdermal daily          ICU Vital Signs Last 24 Hrs  T(C): 36.7 (11 Jan 2023 12:45), Max: 36.8 (10 Pedro Luis 2023 19:46)  T(F): 98 (11 Jan 2023 12:45), Max: 98.3 (10 Pedro Luis 2023 19:46)  HR: 84 (11 Jan 2023 12:45) (68 - 85)  BP: 106/63 (11 Jan 2023 12:45) (90/50 - 112/73)  BP(mean): --  ABP: --  ABP(mean): --  RR: 16 (11 Jan 2023 12:45) (14 - 22)  SpO2: 100% (11 Jan 2023 12:45) (94% - 100%)    O2 Parameters below as of 11 Jan 2023 12:45  Patient On (Oxygen Delivery Method): room air          Vital Signs Last 24 Hrs  T(C): 36.7 (11 Jan 2023 12:45), Max: 36.8 (10 Pedro Luis 2023 19:46)  T(F): 98 (11 Jan 2023 12:45), Max: 98.3 (10 Pedro Luis 2023 19:46)  HR: 84 (11 Jan 2023 12:45) (68 - 85)  BP: 106/63 (11 Jan 2023 12:45) (90/50 - 112/73)  BP(mean): --  RR: 16 (11 Jan 2023 12:45) (14 - 22)  SpO2: 100% (11 Jan 2023 12:45) (94% - 100%)    Parameters below as of 11 Jan 2023 12:45  Patient On (Oxygen Delivery Method): room air        ABG - ( 10 Pedro Luis 2023 21:36 )  pH, Arterial: 7.40  pH, Blood: x     /  pCO2: 44    /  pO2: 75    / HCO3: 27    / Base Excess: 2.5   /  SaO2: 97.0                I&O's Detail    10 Pedro Luis 2023 07:01  -  11 Jan 2023 07:00  --------------------------------------------------------  IN:    Oral Fluid: 360 mL    sodium chloride 0.9%: 150 mL  Total IN: 510 mL    OUT:    Voided (mL): 1 mL  Total OUT: 1 mL    Total NET: 509 mL            LABS:                        12.8   9.55  )-----------( 409      ( 11 Jan 2023 07:00 )             37.5     01-11    132<L>  |  x   |  x   ----------------------------<  x   x    |  x   |  x     Ca    8.7      11 Jan 2023 07:00  Mg     2.3     01-11    TPro  6.0  /  Alb  3.4  /  TBili  0.1<L>  /  DBili  x   /  AST  18  /  ALT  30  /  AlkPhos  82  01-10          CAPILLARY BLOOD GLUCOSE        PT/INR - ( 10 Pedro Luis 2023 20:32 )   PT: 11.3 sec;   INR: 0.96 ratio         PTT - ( 10 Pedro Luis 2023 20:32 )  PTT:29.8 sec    CULTURES:      Physical Examination:    General: No acute distress.  thin femalle    HEENT: Pupils equal, reactive to light.  Symmetric.    PULM: Clear to auscultation bilaterally, no wheeze rhonchi rales no change percussion; decreased bs.     CVS: Regular rate and rhythm, no murmurs, rubs, or gallops    ABD: Soft, nondistended, nontender, normoactive bowel sounds, no masses    EXT: No edema, nontender calves    SKIN: Warm and well perfused, no rashes noted.    NEURO: Alert, oriented, interactive, nonfocal    RADIOLOGY: ***rd< from: CT Angio Chest PE Protocol w/ IV Cont (01.10.23 @ 22:21) >  ACC: [id number] EXAM:  CT ANGIO CHEST PULM [facility]                          PROCEDURE DATE:  01/10/2023          INTERPRETATION:  CLINICAL INFORMATION: shortness of breath, chest pain,   cough r/o PE r/o pneumonia      COMPARISON: December 6, 2021    CONTRAST/COMPLICATIONS:  IV Contrast: Omnipaque 350  Oral Contrast: NONE  Complications: None reported at time of study completion    PROCEDURE:  CT Angiography of the Chest.  Sagittal and coronal reformats were performed as well as 3D (MIP)   reconstructions.    FINDINGS:  LUNGS AND AIRWAYS: Patent central airways.   Advanced emphysematous changes within the upper lobes.  Minimal subsegmental atelectasis within the upper lobe adjacent to   fissure. Mild infiltrate and/or subsegmental atelectasis right lung base.   Minimal subsegmental atelectasis  PLEURA: No pleural effusion.  MEDIASTINUM AND KATALINA: No lymphadenopathy.  VESSELS: No thoracic aortic aneurysm or dissection. No evidence of acute   aortic syndrome. No evidence of embolism..  HEART: Heart size is normal. No pericardial effusion.  CHEST WALL AND LOWER NECK: Loop recorder overlying the lower left side of   the chest. Surgical clips right axilla.  VISUALIZED UPPER ABDOMEN: Prior cholecystectomy  BONES: Within normal limits.    IMPRESSION:     Advanced emphysematous changes within the upper lobes.    Minimal subsegmental atelectasis within the upper lobe adjacent to the   fissure.  Mild infiltrate and/or subsegmental atelectasis right lung base. Minimal   subsegmental atelectasis    No evidence of pulmonary embolism.      --- End of Report ---             ROBERT GOMES MD; Attending Radiologist  This document has been electronically signed. Pedro Luis 10 2023 10:56PM    < end of copied text >      CRITICAL CARE TIME SPENT: ***

## 2023-01-13 NOTE — DISCHARGE NOTE PROVIDER - NSDCCPCAREPLAN_GEN_ALL_CORE_FT
PRINCIPAL DISCHARGE DIAGNOSIS  Diagnosis: COPD exacerbation  Assessment and Plan of Treatment: You were admitted for difficulty breathign and had evaluation by your pulmonologist. Found to have exacerbation of underlying COPD and was prescribed steroids and did better. Please finish your steroid course as prescribed and follow up with your pulmonolgist.      SECONDARY DISCHARGE DIAGNOSES  Diagnosis: Chronic hyponatremia  Assessment and Plan of Treatment: Found to have low sodium levels.  Thought to be medication related and will need to follow up with Nephrology and Neurology. Contact information has been provided.

## 2023-01-13 NOTE — PROGRESS NOTE ADULT - ASSESSMENT
58 y/o F with PMH of Hypotension, PSVT s/p ablation, COPD, Raynaud's Syndrome, SLE, Sjogren's Syndrome, Seizure Disorder, Hypothyroidism, PUD s/p GI Bleed, IBS, Hep-C, SCC s/p excision, Nicotine Dependence, and Anxiety sent for difficulty breathing & cough.    COPD exacerbation,.current smoker  on duonebs q6h prn, added symbicort, spiriva  - Continue Prednisone 40 daily for improved 24 hour coverage  - Breathing improved  - continue nicotine patch     Hyponatremia.    asymptomatic but very low; Most likely due SIADH induced by Carbamazepine, vs poor oral intake  - Worsened today and most likely from reinitiation of Carbamazepine as patient stopped taking it outpatient and will not take it outpatient  - Will discontinue for now and repeat BMP  - Check TSH     Hypothyroidism.   continue synthroid    DVT ppx  hep sq    Disposition  Will need to hold discharge until repeat labs 
56 y/o F with PMH of Hypotension, PSVT s/p ablation, COPD, Raynaud's Syndrome, SLE, Sjogren's Syndrome, Seizure Disorder, Hypothyroidism, PUD s/p GI Bleed, IBS, Hep-C, SCC s/p excision, Nicotine Dependence, and Anxiety sent for difficulty breathing & cough.    COPD exacerbation,.current smoker  on duonebs q6h prn, added symbicort, spiriva  - Continue Prednisone 40 daily for improved 24 hour coverage  - Breathing improved  - continue nicotine patch     Hyponatremia.    asymptomatic but very low; Most likely due SIADH induced by Carbamazepine, vs poor oral intake  - Worsened today and most likely from reinitiation of Carbamazepine as patient stopped taking it outpatient and will not take it outpatient  - Will discontinue for now and repeat BMP  - Check TSH     Hypothyroidism.   continue synthroid    DVT ppx  hep sq    Disposition  Will need to hold discharge until repeat labs 
56 y/o F with PMH of Hypotension, PSVT s/p ablation, COPD, Raynaud's Syndrome, SLE, Sjogren's Syndrome, Seizure Disorder, Hypothyroidism, PUD s/p GI Bleed, IBS, Hep-C, SCC s/p excision, Nicotine Dependence, and Anxiety sent for difficulty breathing & cough.    COPD exacerbation,.current smoker  on duonebs q6h prn, added symbicort, spiriva  - change solumedrol 40 daily to prednisone 40 daily for improved 24 hour coverage  pulm eval pending  - if breathing improved likely dc tomorrow  - continue nicotine patch     Hyponatremia.    asymptomatic, possibly due SIADH induced by Carbamazepine, vs poor oral intake  - improved with IVF so SIADH less likely  - monitor bmp     Hypothyroidism.   continue synthroid    dvt ppx  hep sq
Pt. well known to me with severe emphysema, nocturnal O2, admitted for exac COPD.  Nicotine addiction.  Advised no smoking.  Use Patch/wellbutrin  Steroid taper.  Inhalers.  Dc  if stable.   Fu with me in office. 
Pt. well known to me with severe emphysema, nocturnal O2, admitted for exac COPD.  Nicotine addiction.  Hyponatremia which appears to be chronic.  Pt. wants to go home.  Advised no smoking.  Use Patch/wellbutrin  Steroid taper.    Monitor Hgb, Na as outpt.   Inhalers.  Dc  if stable.   Fu with me in office.

## 2023-01-13 NOTE — DISCHARGE NOTE PROVIDER - HOSPITAL COURSE
58 y/o F with PMH of Hypotension, PSVT s/p ablation, COPD, Raynaud's Syndrome, SLE, Sjogren's Syndrome, Seizure Disorder, Hypothyroidism, PUD s/p GI Bleed, IBS, Hep-C, SCC s/p excision, Nicotine Dependence, and Anxiety sent for difficulty breathing & cough.    COPD exacerbation,.current smoker  on duonebs q6h prn, added symbicort, spiriva  - Continue Prednisone 40 daily for improved 24 hour coverage  - Breathing improved  - continue nicotine patch     Hyponatremia.    asymptomatic but very low; Most likely due SIADH induced by Carbamazepine, vs poor oral intake  - Worsened but then improved and most likely from reinitiation of Carbamazepine as patient stopped taking it outpatient and will not take it outpatient  - Will discontinue for now and repeat BMP  - Nephrology consulted     Hypothyroidism.   continue synthroid    DVT ppx  hep sq    Disposition

## 2023-01-13 NOTE — DISCHARGE NOTE PROVIDER - NSDCMRMEDTOKEN_GEN_ALL_CORE_FT
fentaNYL 12 mcg/hr transdermal film, extended release: 1 film(s) transdermal every 72 hours  Fioricet oral tablet: 1 tab(s) orally 2 times a day, As Needed  levothyroxine 25 mcg (0.025 mg) oral capsule: 1 cap(s) orally once a day  Lyrica 50 mg oral capsule: 1 cap(s) orally 3 times a day, As Needed  Morphine IR 15 mg oral tablet: 1 tab(s) orally every 6 hours, As Needed  omeprazole 40 mg oral delayed release capsule: 1 cap(s) orally once a day  predniSONE 20 mg oral tablet: 2 tab(s) orally once a day  Soma 350 mg oral tablet: 1 tab(s) orally 2 times a day  Valium 5 mg oral tablet: 1 tab(s) orally 2 times a day, As Needed  Ventolin HFA 90 mcg/inh inhalation aerosol: 2 puff(s) inhaled every 6 hours, As Needed

## 2023-01-18 ENCOUNTER — APPOINTMENT (OUTPATIENT)
Dept: ORTHOPEDIC SURGERY | Facility: CLINIC | Age: 58
End: 2023-01-18

## 2023-01-19 NOTE — ASU DISCHARGE PLAN (ADULT/PEDIATRIC) - DRIVING
Detail Level: Detailed
Consent: The patient's consent was obtained including but not limited to risks of crusting, scabbing, blistering, scarring, darker or lighter pigmentary change, recurrence, incomplete removal and infection.
No...
Show Aperture Variable?: Yes
Number Of Freeze-Thaw Cycles: 1 freeze-thaw cycle
Render Post-Care Instructions In Note?: no
Post-Care Instructions: I reviewed with the patient in detail post-care instructions. Patient is to wear sunprotection, and avoid picking at any of the treated lesions. Pt may apply Vaseline to crusted or scabbing areas.
Duration Of Freeze Thaw-Cycle (Seconds): 10
Application Tool (Optional): Liquid Nitrogen Sprayer

## 2023-02-14 ENCOUNTER — NON-APPOINTMENT (OUTPATIENT)
Age: 58
End: 2023-02-14

## 2023-02-14 DIAGNOSIS — G89.29 OTHER CHRONIC PAIN: ICD-10-CM

## 2023-02-14 DIAGNOSIS — M93.1 KIENBOCK'S DISEASE OF ADULTS: ICD-10-CM

## 2023-02-14 DIAGNOSIS — M79.2 NEURALGIA AND NEURITIS, UNSPECIFIED: ICD-10-CM

## 2023-02-14 DIAGNOSIS — Z87.19 PERSONAL HISTORY OF OTHER DISEASES OF THE DIGESTIVE SYSTEM: ICD-10-CM

## 2023-02-14 RX ORDER — UMECLIDINIUM 62.5 UG/1
62.5 AEROSOL, POWDER ORAL
Refills: 0 | Status: ACTIVE | COMMUNITY

## 2023-02-14 RX ORDER — ALBUTEROL SULFATE 90 UG/1
108 (90 BASE) AEROSOL, METERED RESPIRATORY (INHALATION)
Refills: 0 | Status: ACTIVE | COMMUNITY

## 2023-02-14 RX ORDER — HYDROXYCHLOROQUINE SULFATE 200 MG/1
200 TABLET ORAL DAILY
Refills: 0 | Status: ACTIVE | COMMUNITY

## 2023-02-14 RX ORDER — DIAZEPAM 5 MG/1
5 TABLET ORAL TWICE DAILY
Refills: 0 | Status: ACTIVE | COMMUNITY

## 2023-02-14 RX ORDER — BUTALBITAL, ACETAMINOPHEN AND CAFFEINE 325; 50; 40 MG/1; MG/1; MG/1
50-325-40 TABLET ORAL TWICE DAILY
Refills: 0 | Status: ACTIVE | COMMUNITY

## 2023-02-14 RX ORDER — PREGABALIN 75 MG/1
75 CAPSULE ORAL
Refills: 0 | Status: ACTIVE | COMMUNITY

## 2023-02-14 RX ORDER — ESCITALOPRAM OXALATE 5 MG/1
5 TABLET ORAL DAILY
Refills: 0 | Status: ACTIVE | COMMUNITY

## 2023-02-14 RX ORDER — FENTANYL 12 UG/H
12 PATCH, EXTENDED RELEASE TRANSDERMAL
Refills: 0 | Status: ACTIVE | COMMUNITY

## 2023-02-14 RX ORDER — LEVOTHYROXINE SODIUM 0.03 MG/1
25 TABLET ORAL DAILY
Refills: 0 | Status: ACTIVE | COMMUNITY

## 2023-02-14 RX ORDER — CLONAZEPAM 1 MG/1
1 TABLET ORAL TWICE DAILY
Refills: 0 | Status: ACTIVE | COMMUNITY

## 2023-02-14 RX ORDER — IPRATROPIUM BROMIDE AND ALBUTEROL SULFATE 2.5; .5 MG/3ML; MG/3ML
0.5-2.5 (3) SOLUTION RESPIRATORY (INHALATION)
Refills: 0 | Status: ACTIVE | COMMUNITY

## 2023-02-14 RX ORDER — OMEPRAZOLE 40 MG/1
40 CAPSULE, DELAYED RELEASE ORAL DAILY
Refills: 0 | Status: ACTIVE | COMMUNITY

## 2023-02-14 RX ORDER — CARISOPRODOL 350 MG/1
350 TABLET ORAL
Refills: 0 | Status: ACTIVE | COMMUNITY

## 2023-02-14 RX ORDER — MORPHINE SULFATE 15 MG/1
15 TABLET ORAL 3 TIMES DAILY
Refills: 0 | Status: ACTIVE | COMMUNITY

## 2023-02-15 ENCOUNTER — APPOINTMENT (OUTPATIENT)
Dept: PAIN MANAGEMENT | Facility: CLINIC | Age: 58
End: 2023-02-15
Payer: MEDICARE

## 2023-02-15 VITALS — WEIGHT: 78 LBS | HEIGHT: 57 IN | BODY MASS INDEX: 16.83 KG/M2

## 2023-02-15 PROCEDURE — 99213 OFFICE O/P EST LOW 20 MIN: CPT

## 2023-02-15 NOTE — HISTORY OF PRESENT ILLNESS
[Lower back] : lower back [10] : 10 [Burning] : burning [Radiating] : radiating [Shooting] : shooting [Household chores] : household chores [Sleep] : sleep [Rest] : rest [Sitting] : sitting [Standing] : standing [Walking] : walking [Disabled] : Work status: disabled [] : Post Surgical Visit: no [FreeTextEntry7] : [facility] LEG  [de-identified] : DAILY ACTIVITY

## 2023-02-15 NOTE — ASSESSMENT
[FreeTextEntry1] : Interim history\par he patient returns with pain that has been treated adequately in the past with epidural steroid injections.  The patient's complaints are consistent with radiculopathy. Patient's ADL's increase with prior AIDAN.   The last injection gave greater than 60% reduction of the pain but now there is a return of symptoms. The patient is requesting a subsequent epidural steroid injection to alleviate pain and improve functional ability and quality of life.  Patient is a candidate.\par Objective findings\par Since the last visit, there have been no new imaging studies. THe patient has no new symptoms except the return of the their pain complaints. Motor and sensory function is unchanged.\par Plan\par Spoke to patient about epidural steroid injections. Explained risks, benefits and alternatives including but not limited to the risk of infection, bleeding, headache, syncopal episode, failure to resolve issues, allergic reaction, symptom recurrence, allergic reaction, nerve injury, and increased pain. The patient understands the risks. All questions were answered. The patient is willing to proceed.\par

## 2023-02-21 ENCOUNTER — TRANSCRIPTION ENCOUNTER (OUTPATIENT)
Age: 58
End: 2023-02-21

## 2023-02-21 NOTE — ASU DISCHARGE PLAN (ADULT/PEDIATRIC) - NS MD DC FALL RISK RISK
For information on Fall & Injury Prevention, visit: [email or web address] OR  [email or web address] OR  https://www.cdc.gov/steadi/patient.html

## 2023-02-21 NOTE — ASU PATIENT PROFILE, ADULT - NSTOBACCO TYPE_GEN_A_CORE_RD
contacted by nurse care manager for worsening symptoms. Plan for follow-up call in 5-7 days based on severity of symptoms and risk factors.   DOING OKAY SOUNDS CONGESTED  TAKING OTC MED  DECLINED ASSISTANCE WITH FU APPT
Cigarettes

## 2023-02-21 NOTE — ASU PATIENT PROFILE, ADULT - FALL HARM RISK - UNIVERSAL INTERVENTIONS
Bed in lowest position, wheels locked, appropriate side rails in place/Call bell, personal items and telephone in reach/Instruct patient to call for assistance before getting out of bed or chair/Non-slip footwear when patient is out of bed/[place] to call system/Physically safe environment - no spills, clutter or unnecessary equipment/Purposeful Proactive Rounding/Room/bathroom lighting operational, light cord in reach

## 2023-02-22 ENCOUNTER — OUTPATIENT (OUTPATIENT)
Dept: OUTPATIENT SERVICES | Facility: HOSPITAL | Age: 58
LOS: 1 days | End: 2023-02-22
Payer: MEDICARE

## 2023-02-22 DIAGNOSIS — J38.1 POLYP OF VOCAL CORD AND LARYNX: Chronic | ICD-10-CM

## 2023-02-22 DIAGNOSIS — Z98.890 OTHER SPECIFIED POSTPROCEDURAL STATES: Chronic | ICD-10-CM

## 2023-02-22 DIAGNOSIS — Z20.828 CONTACT WITH AND (SUSPECTED) EXPOSURE TO OTHER VIRAL COMMUNICABLE DISEASES: ICD-10-CM

## 2023-02-22 DIAGNOSIS — S69.91XD UNSPECIFIED INJURY OF RIGHT WRIST, HAND AND FINGER(S), SUBSEQUENT ENCOUNTER: Chronic | ICD-10-CM

## 2023-02-22 DIAGNOSIS — K82.9 DISEASE OF GALLBLADDER, UNSPECIFIED: Chronic | ICD-10-CM

## 2023-02-22 DIAGNOSIS — Z95.818 PRESENCE OF OTHER CARDIAC IMPLANTS AND GRAFTS: Chronic | ICD-10-CM

## 2023-02-22 DIAGNOSIS — M25.9 JOINT DISORDER, UNSPECIFIED: Chronic | ICD-10-CM

## 2023-02-22 PROCEDURE — U0003: CPT

## 2023-02-22 PROCEDURE — U0005: CPT

## 2023-02-23 LAB — SARS-COV-2 RNA SPEC QL NAA+PROBE: SIGNIFICANT CHANGE UP

## 2023-02-24 ENCOUNTER — OUTPATIENT (OUTPATIENT)
Dept: OUTPATIENT SERVICES | Facility: HOSPITAL | Age: 58
LOS: 1 days | End: 2023-02-24
Payer: MEDICARE

## 2023-02-24 ENCOUNTER — APPOINTMENT (OUTPATIENT)
Dept: ORTHOPEDIC SURGERY | Facility: HOSPITAL | Age: 58
End: 2023-02-24
Payer: MEDICARE

## 2023-02-24 VITALS
RESPIRATION RATE: 16 BRPM | DIASTOLIC BLOOD PRESSURE: 70 MMHG | WEIGHT: 78.04 LBS | HEART RATE: 69 BPM | TEMPERATURE: 98 F | SYSTOLIC BLOOD PRESSURE: 100 MMHG | OXYGEN SATURATION: 99 % | HEIGHT: 57 IN

## 2023-02-24 VITALS
RESPIRATION RATE: 20 BRPM | OXYGEN SATURATION: 100 % | SYSTOLIC BLOOD PRESSURE: 96 MMHG | DIASTOLIC BLOOD PRESSURE: 49 MMHG | HEART RATE: 72 BPM

## 2023-02-24 DIAGNOSIS — Z98.890 OTHER SPECIFIED POSTPROCEDURAL STATES: Chronic | ICD-10-CM

## 2023-02-24 DIAGNOSIS — Z95.818 PRESENCE OF OTHER CARDIAC IMPLANTS AND GRAFTS: Chronic | ICD-10-CM

## 2023-02-24 DIAGNOSIS — M54.12 RADICULOPATHY, CERVICAL REGION: ICD-10-CM

## 2023-02-24 DIAGNOSIS — S69.91XD UNSPECIFIED INJURY OF RIGHT WRIST, HAND AND FINGER(S), SUBSEQUENT ENCOUNTER: Chronic | ICD-10-CM

## 2023-02-24 DIAGNOSIS — M25.9 JOINT DISORDER, UNSPECIFIED: Chronic | ICD-10-CM

## 2023-02-24 DIAGNOSIS — K82.9 DISEASE OF GALLBLADDER, UNSPECIFIED: Chronic | ICD-10-CM

## 2023-02-24 DIAGNOSIS — M54.16 RADICULOPATHY, LUMBAR REGION: ICD-10-CM

## 2023-02-24 DIAGNOSIS — M54.14 RADICULOPATHY, THORACIC REGION: ICD-10-CM

## 2023-02-24 DIAGNOSIS — J38.1 POLYP OF VOCAL CORD AND LARYNX: Chronic | ICD-10-CM

## 2023-02-24 PROCEDURE — 62321 NJX INTERLAMINAR CRV/THRC: CPT

## 2023-02-24 RX ORDER — LEVOTHYROXINE SODIUM 125 MCG
1 TABLET ORAL
Qty: 0 | Refills: 0 | DISCHARGE

## 2023-02-24 RX ORDER — FENTANYL CITRATE 50 UG/ML
1 INJECTION INTRAVENOUS
Qty: 0 | Refills: 0 | DISCHARGE

## 2023-02-24 RX ORDER — MORPHINE SULFATE 50 MG/1
0 CAPSULE, EXTENDED RELEASE ORAL
Qty: 0 | Refills: 0 | DISCHARGE

## 2023-02-24 NOTE — ED ADULT NURSE NOTE - SUICIDE SCREENING QUESTION 2
Keep a list of your medicines with you. List all of the prescription medicines, nonprescription medicines, supplements, natural remedies, and vitamins that you take. Tell your healthcare providers who treat you about all of the products you are taking. Your provider can provide you with a form to keep track of them. Just ask. Follow the directions that come with your medicine, including information about food or alcohol. Make sure you know how and when to take your medicine. Do not take more or less than you are supposed to take. Keep all medicines out of the reach of children. Store medicines according to the directions on the label. Monitor yourself. Learn to know how your body reacts to your new medicine and keep track of how it makes you feel before attempting (If your provider has allowed you to do so) to drive or go to work. Seek emergency medical attention if you think you have used too much of this medicine. An overdose of any prescription medicine can be fatal. Overdose symptoms may include extreme drowsiness, muscle weakness, confusion, cold and clammy skin, pinpoint pupils, shallow breathing, slow heart rate, fainting, or coma. Don't share prescription medicines with others, even when they seem to have the same symptoms. What may be good for you may be harmful to others. If you are no longer taking a prescribed medication and you have pills left please take your pills out of their original containers. Mix crushed pills with an undesirable substance, such as cat litter or used coffee grounds. Put the mixture into a disposable container with a lid, such as an empty margarine tub, or into a sealable bag. Cover up or remove any of your personal information on the empty containers by covering it with black permanent marker or duct tape. Place the sealed container with the mixture, and the empty drug containers, in the trash.    If you use a medication that is in the form of a patch, dispose of used patches by folding them in half so that the sticky sides meet, and then flushing them down a toilet. They should not be placed in the household trash where children or pets can find them. If you have any questions, ask your provider or pharmacist for more information. Be sure to keep all appointments for provider visits or tests. We are committed to providing you with the best care possible. In order to help us achieve these goals please remember to bring all medications, herbal products, and over the counter supplements with you to each visit. If your provider has ordered testing for you, please be sure to follow up with our office if you have not received results within 7 days after the testing took place. *If you receive a survey after visiting one of our offices, please take time to share your experience concerning your physician office visit. These surveys are confidential and no health information about you is shared. We are eager to improve for you and we are counting on your feedback to help make that happen. No

## 2023-03-09 NOTE — ASU PATIENT PROFILE, ADULT - NSICDXPASTSURGICALHX_GEN_ALL_CORE_FT
PAST SURGICAL HISTORY:  Ankle problem right ankle surgery    Gall bladder disease REMOVAL    H/O elbow surgery 10/2020 - LEFT elbow    H/O lumpectomy right shouler, axillary, both breasts head, right back    H/O squamous cell carcinoma excision toe    Injury of right wrist, subsequent encounter no surgery    S/P brain surgery 2018, also had gamma knife procedure-microvascular decompression- titanium plate in skull    S/P cryoablation of arrhythmia     Status post placement of implantable loop recorder     Vocal cord polyp REMOVAL    
Standing/Walking/Toileting/Moving from bed to chair

## 2023-03-10 ENCOUNTER — APPOINTMENT (OUTPATIENT)
Dept: PAIN MANAGEMENT | Facility: CLINIC | Age: 58
End: 2023-03-10
Payer: MEDICARE

## 2023-03-10 VITALS — BODY MASS INDEX: 10.35 KG/M2 | HEIGHT: 57 IN | WEIGHT: 48 LBS

## 2023-03-10 PROCEDURE — 99213 OFFICE O/P EST LOW 20 MIN: CPT

## 2023-03-10 NOTE — HISTORY OF PRESENT ILLNESS
[Lower back] : lower back [10] : 10 [Burning] : burning [Radiating] : radiating [Shooting] : shooting [Household chores] : household chores [Sleep] : sleep [Rest] : rest [Sitting] : sitting [Walking] : walking [Standing] : standing [Disabled] : Work status: disabled [de-identified] : \par 03/10/2023 PT HAS NOT DONE  PHYSICAL THERAPY  PT CURRENTLY DO NOT DO HOMES STRETCHED ONCE A DAY EVERY DAY \par  [] : Post Surgical Visit: no [FreeTextEntry7] : [facility] LEG  [de-identified] : DAILY ACTIVITY

## 2023-03-13 ENCOUNTER — APPOINTMENT (OUTPATIENT)
Dept: ORTHOPEDIC SURGERY | Facility: HOSPITAL | Age: 58
End: 2023-03-13

## 2023-03-17 NOTE — ASU PATIENT PROFILE, ADULT - FALL HARM RISK - HARM RISK INTERVENTIONS
Assistance with ambulation/Assistance OOB with selected safe patient handling equipment/Communicate Risk of Fall with Harm to all staff/Discuss with provider need for PT consult/Monitor gait and stability/Reinforce activity limits and safety measures with patient and family/Tailored Fall Risk Interventions/Visual Cue: Yellow wristband and red socks/Bed in lowest position, wheels locked, appropriate side rails in place/Call bell, personal items and telephone in reach/Instruct patient to call for assistance before getting out of bed or chair/Non-slip footwear when patient is out of bed/[place] to call system/Physically safe environment - no spills, clutter or unnecessary equipment/Purposeful Proactive Rounding/Room/bathroom lighting operational, light cord in reach

## 2023-03-20 ENCOUNTER — APPOINTMENT (OUTPATIENT)
Dept: ORTHOPEDIC SURGERY | Facility: HOSPITAL | Age: 58
End: 2023-03-20
Payer: MEDICARE

## 2023-03-20 ENCOUNTER — OUTPATIENT (OUTPATIENT)
Dept: OUTPATIENT SERVICES | Facility: HOSPITAL | Age: 58
LOS: 1 days | End: 2023-03-20
Payer: MEDICARE

## 2023-03-20 ENCOUNTER — TRANSCRIPTION ENCOUNTER (OUTPATIENT)
Age: 58
End: 2023-03-20

## 2023-03-20 VITALS
DIASTOLIC BLOOD PRESSURE: 72 MMHG | RESPIRATION RATE: 18 BRPM | OXYGEN SATURATION: 100 % | TEMPERATURE: 98 F | SYSTOLIC BLOOD PRESSURE: 107 MMHG | HEART RATE: 62 BPM

## 2023-03-20 VITALS
OXYGEN SATURATION: 99 % | HEART RATE: 62 BPM | SYSTOLIC BLOOD PRESSURE: 111 MMHG | TEMPERATURE: 98 F | RESPIRATION RATE: 12 BRPM | DIASTOLIC BLOOD PRESSURE: 78 MMHG | HEIGHT: 57 IN | WEIGHT: 78.04 LBS

## 2023-03-20 DIAGNOSIS — Z98.890 OTHER SPECIFIED POSTPROCEDURAL STATES: Chronic | ICD-10-CM

## 2023-03-20 DIAGNOSIS — J38.1 POLYP OF VOCAL CORD AND LARYNX: Chronic | ICD-10-CM

## 2023-03-20 DIAGNOSIS — K82.9 DISEASE OF GALLBLADDER, UNSPECIFIED: Chronic | ICD-10-CM

## 2023-03-20 DIAGNOSIS — Z95.818 PRESENCE OF OTHER CARDIAC IMPLANTS AND GRAFTS: Chronic | ICD-10-CM

## 2023-03-20 DIAGNOSIS — M54.16 RADICULOPATHY, LUMBAR REGION: ICD-10-CM

## 2023-03-20 DIAGNOSIS — M25.9 JOINT DISORDER, UNSPECIFIED: Chronic | ICD-10-CM

## 2023-03-20 DIAGNOSIS — S69.91XD UNSPECIFIED INJURY OF RIGHT WRIST, HAND AND FINGER(S), SUBSEQUENT ENCOUNTER: Chronic | ICD-10-CM

## 2023-03-20 PROCEDURE — 62323 NJX INTERLAMINAR LMBR/SAC: CPT

## 2023-04-02 ENCOUNTER — RX RENEWAL (OUTPATIENT)
Age: 58
End: 2023-04-02

## 2023-04-03 ENCOUNTER — OFFICE (OUTPATIENT)
Dept: URBAN - METROPOLITAN AREA CLINIC 109 | Facility: CLINIC | Age: 58
Setting detail: OPHTHALMOLOGY
End: 2023-04-03
Payer: MEDICARE

## 2023-04-03 DIAGNOSIS — H00.14: ICD-10-CM

## 2023-04-03 DIAGNOSIS — H16.222: ICD-10-CM

## 2023-04-03 DIAGNOSIS — H16.221: ICD-10-CM

## 2023-04-03 PROCEDURE — 83861 MICROFLUID ANALY TEARS: CPT | Performed by: OPHTHALMOLOGY

## 2023-04-03 PROCEDURE — 99213 OFFICE O/P EST LOW 20 MIN: CPT | Performed by: OPHTHALMOLOGY

## 2023-04-03 RX ORDER — CELECOXIB 200 MG/1
200 CAPSULE ORAL DAILY
Qty: 30 | Refills: 1 | Status: ACTIVE | COMMUNITY
Start: 2022-10-19 | End: 1900-01-01

## 2023-04-03 ASSESSMENT — TONOMETRY
OD_IOP_MMHG: 19
OS_IOP_MMHG: 19

## 2023-04-03 ASSESSMENT — REFRACTION_AUTOREFRACTION
OS_SPHERE: -0.25
OS_CYLINDER: -0.50
OS_AXIS: 082
OD_SPHERE: +0.25

## 2023-04-03 ASSESSMENT — REFRACTION_MANIFEST
OD_VA3: 20/50+
OD_SPHERE: +0.50
OD_VA2: 20/30
OD_VA2: 20/25(J1)
OD_ADD: +2.75
OD_HPRISM_DIRECTION: PH
OS_CYLINDER: -0.25
OS_ADD: +2.00
OD_CYLINDER: -0.50
OU_VA: 20/30
OS_AXIS: 110
OD_AXIS: 145
OS_SPHERE: -1.50
OD_AXIS: 80
OD_SPHERE: +2.75
OS_VA3: 20/30-
OS_SPHERE: -0.50
OS_VA2: 20/25(J1)
OD_VA1: 20/50+
OD_ADD: +2.00
OS_VA1: 20/40
OD_SPHERE: +0.50
OS_VA2: 20/30
OS_HPRISM_DIRECTION: PH
OS_SPHERE: +2.75
OS_VA1: 20/30-
OD_VA1: 20/30
OD_CYLINDER: -0.50
OS_ADD: +2.75

## 2023-04-03 ASSESSMENT — VISUAL ACUITY
OD_BCVA: 20/30-2
OS_BCVA: 20/40+2

## 2023-04-03 ASSESSMENT — SPHEQUIV_DERIVED
OD_SPHEQUIV: 0.25
OS_SPHEQUIV: -0.625
OS_SPHEQUIV: -0.5
OD_SPHEQUIV: 0.25

## 2023-04-03 ASSESSMENT — CONFRONTATIONAL VISUAL FIELD TEST (CVF)
OD_FINDINGS: FULL
OS_FINDINGS: FULL

## 2023-04-03 ASSESSMENT — KERATOMETRY
OD_K1POWER_DIOPTERS: 4512
OD_K2POWER_DIOPTERS: 4600
METHOD_AUTO_MANUAL: AUTO

## 2023-04-03 ASSESSMENT — SUPERFICIAL PUNCTATE KERATITIS (SPK)
OD_SPK: 2+
OS_SPK: 2+

## 2023-04-03 ASSESSMENT — PACHYMETRY
OD_CT_UM: 535
OD_CT_CORRECTION: 1

## 2023-04-03 ASSESSMENT — PUNCTA - ASSESSMENT
OS_PUNCTA: 3MON PLUG
OD_PUNCTA: 3MON PLUG

## 2023-04-03 ASSESSMENT — AXIALLENGTH_DERIVED
OD_AL: 0.33
OD_AL: 0.33

## 2023-04-03 ASSESSMENT — LACRIMAL DUCT - ASSESSMENT
OS_LACRIMAL_DUCT: XSORB
OD_LACRIMAL_DUCT: XSORB

## 2023-04-17 ENCOUNTER — APPOINTMENT (OUTPATIENT)
Dept: PAIN MANAGEMENT | Facility: CLINIC | Age: 58
End: 2023-04-17
Payer: MEDICARE

## 2023-04-17 VITALS — HEIGHT: 57 IN | BODY MASS INDEX: 15.75 KG/M2 | WEIGHT: 73 LBS

## 2023-04-17 PROCEDURE — 99213 OFFICE O/P EST LOW 20 MIN: CPT

## 2023-04-17 NOTE — REASON FOR VISIT
[FreeTextEntry2] : PT IS BEING SEEN FOR A FOLLOW-UP IN OFFICE  PAIN MANAGEMENT VISIT FOR NEW PLAN OF CARE FOR PAIN AS THE AIDAN HAS FAILED

## 2023-04-17 NOTE — HISTORY OF PRESENT ILLNESS
[Lower back] : lower back [10] : 10 [Burning] : burning [Radiating] : radiating [Shooting] : shooting [Household chores] : household chores [Sleep] : sleep [Rest] : rest [Sitting] : sitting [Standing] : standing [Walking] : walking [Disabled] : Work status: disabled [de-identified] : \par 03/10/2023 PT HAS NOT DONE  PHYSICAL THERAPY AND PT CURRENTLY  DO HOMES STRETCHED ONCE A DAY EVERY DAY \par  [] : Post Surgical Visit: no [FreeTextEntry7] : [facility] LEG  [de-identified] : DAILY ACTIVITY

## 2023-04-25 ENCOUNTER — FORM ENCOUNTER (OUTPATIENT)
Age: 58
End: 2023-04-25

## 2023-04-25 ENCOUNTER — APPOINTMENT (OUTPATIENT)
Dept: ORTHOPEDIC SURGERY | Facility: CLINIC | Age: 58
End: 2023-04-25
Payer: MEDICARE

## 2023-04-25 VITALS — HEIGHT: 57 IN | WEIGHT: 73 LBS | BODY MASS INDEX: 15.75 KG/M2

## 2023-04-25 DIAGNOSIS — M25.572 PAIN IN LEFT ANKLE AND JOINTS OF LEFT FOOT: ICD-10-CM

## 2023-04-25 DIAGNOSIS — F17.210 NICOTINE DEPENDENCE, CIGARETTES, UNCOMPLICATED: ICD-10-CM

## 2023-04-25 DIAGNOSIS — I73.9 PERIPHERAL VASCULAR DISEASE, UNSPECIFIED: ICD-10-CM

## 2023-04-25 PROCEDURE — 73610 X-RAY EXAM OF ANKLE: CPT | Mod: LT

## 2023-04-25 PROCEDURE — 99214 OFFICE O/P EST MOD 30 MIN: CPT

## 2023-04-25 NOTE — PHYSICAL EXAM
[Left] : left foot and ankle [NL (40)] : plantar flexion 40 degrees [NL 30)] : inversion 30 degrees [NL (20)] : eversion 20 degrees [5___] : eversion 5[unfilled]/5 [absent] : dorsalis pedis pulse: absent [1+] : posterior tibialis pulse: 1+ [] : patient ambulates without assistive device [FreeTextEntry8] : no sig poin ttp

## 2023-04-25 NOTE — ED ADULT NURSE NOTE - BREATH SOUNDS, RIGHT
Patient with a couple weeks of bloating  No change in bowel movements  She was instructed to monitor her food and fiber intake  Likely diet related  Follow up in a 2-4 weeks if symptoms persist or worsen 
wheezes

## 2023-04-25 NOTE — ASSESSMENT
[FreeTextEntry1] : concern for PVD and claudication as cause of pain \par rec vascular eval -- contact info given. patient will call today\par patient requesting mri\par further plan pending vascular eval and mri

## 2023-04-25 NOTE — HISTORY OF PRESENT ILLNESS
[10] : 10 [6] : 6 [Dull/Aching] : dull/aching [Sharp] : sharp [Stabbing] : stabbing [Tingling] : tingling [de-identified] : 04/25/2023:  3 months of ankle pain with no known trauma. no treatment to date. seeing dr griffin for lumbar dakota. may have had prior ankle surgery -- does not recall which ankle. denies 2ppd. COPD on o2  [FreeTextEntry1] : left ankle

## 2023-04-26 ENCOUNTER — APPOINTMENT (OUTPATIENT)
Dept: MRI IMAGING | Facility: CLINIC | Age: 58
End: 2023-04-26
Payer: MEDICARE

## 2023-04-26 PROCEDURE — 73721 MRI JNT OF LWR EXTRE W/O DYE: CPT | Mod: LT,MH

## 2023-05-03 ENCOUNTER — APPOINTMENT (OUTPATIENT)
Dept: VASCULAR SURGERY | Facility: CLINIC | Age: 58
End: 2023-05-03
Payer: MEDICARE

## 2023-05-03 VITALS
DIASTOLIC BLOOD PRESSURE: 73 MMHG | SYSTOLIC BLOOD PRESSURE: 111 MMHG | OXYGEN SATURATION: 95 % | BODY MASS INDEX: 16.83 KG/M2 | HEART RATE: 86 BPM | WEIGHT: 78 LBS | HEIGHT: 57 IN

## 2023-05-03 PROCEDURE — 99203 OFFICE O/P NEW LOW 30 MIN: CPT

## 2023-05-03 NOTE — PHYSICAL EXAM
[Wheezing] : wheezing was heard [Normal Heart Sounds] : normal heart sounds [2+] : right 2+ [Alert] : alert [Oriented to Person] : oriented to person [Oriented to Place] : oriented to place [Oriented to Time] : oriented to time [JVD] : no jugular venous distention  [Ankle Swelling (On Exam)] : not present [de-identified] : Thin F in NAD [de-identified] : PERRL [FreeTextEntry1] : DP signal [de-identified] : ND, NT, +BS

## 2023-05-03 NOTE — HISTORY OF PRESENT ILLNESS
[FreeTextEntry1] : 59 y/o active smoker with PMH COPD on home O2 as needed with h/o chronic back pain with c/o LLE pain and intermittent RLE pain. Seen by orthopaedics and wad told she may have PVD.\par \par Pt reports she has rest pain in LLE which also occurs with activity and endorses post calf cramping at night. Pain is severe at times and she has weakness and buckling knees with ambulation. She continues to smoke 2ppd and has had intermittent chest pain but has not seen cardiology as was advised in the past. She has not ha any recent follow up with her PMD

## 2023-05-03 NOTE — REVIEW OF SYSTEMS
[Feeling Poorly] : feeling poorly [Feeling Tired] : feeling tired [Earache] : earache [Chest Pain] : chest pain [Leg Claudication] : intermittent leg claudication [Shortness Of Breath] : shortness of breath [Wheezing] : wheezing [Cough] : cough [Difficulty Walking] : difficulty walking [As Noted in HPI] : as noted in HPI [Muscle Weakness] : muscle weakness [Negative] : Heme/Lymph

## 2023-05-09 ENCOUNTER — APPOINTMENT (OUTPATIENT)
Dept: ORTHOPEDIC SURGERY | Facility: CLINIC | Age: 58
End: 2023-05-09
Payer: MEDICARE

## 2023-05-09 VITALS — BODY MASS INDEX: 16.83 KG/M2 | HEIGHT: 57 IN | WEIGHT: 78 LBS

## 2023-05-09 DIAGNOSIS — M25.572 PAIN IN LEFT ANKLE AND JOINTS OF LEFT FOOT: ICD-10-CM

## 2023-05-09 DIAGNOSIS — G89.29 PAIN IN LEFT ANKLE AND JOINTS OF LEFT FOOT: ICD-10-CM

## 2023-05-09 PROCEDURE — 99213 OFFICE O/P EST LOW 20 MIN: CPT

## 2023-05-09 NOTE — ASSESSMENT
[FreeTextEntry1] : pain may be more radic or vasc related\par continue f/up w/ pain management\par ongoing vascular/cards workup\par f/up prn

## 2023-05-09 NOTE — PHYSICAL EXAM
[Left] : left foot and ankle [NL (40)] : plantar flexion 40 degrees [NL 30)] : inversion 30 degrees [NL (20)] : eversion 20 degrees [5___] : eversion 5[unfilled]/5 [absent] : dorsalis pedis pulse: absent [1+] : posterior tibialis pulse: 1+ [] : negative anterior drawer at ankle [FreeTextEntry8] : no sig poin ttp

## 2023-05-09 NOTE — DATA REVIEWED
[MRI] : MRI [Left] : left [Ankle] : ankle [I reviewed the films/CD and additionally noted] : I reviewed the films/CD and additionally noted [FreeTextEntry1] : no sig acute pathology; 1st tmt oa

## 2023-05-09 NOTE — HISTORY OF PRESENT ILLNESS
[10] : 10 [6] : 6 [Dull/Aching] : dull/aching [Sharp] : sharp [Stabbing] : stabbing [Tingling] : tingling [] : yes [de-identified] : 04/25/2023:  3 months of ankle pain with no known trauma. no treatment to date. seeing dr griffin for lumbar dakota. may have had prior ankle surgery -- does not recall which ankle. denies 2ppd. COPD on o2 \par \par 05/09/2023: MRI f/up. vascular workup on going.  [FreeTextEntry1] : left ankle

## 2023-05-31 ENCOUNTER — APPOINTMENT (OUTPATIENT)
Dept: VASCULAR SURGERY | Facility: CLINIC | Age: 58
End: 2023-05-31

## 2023-06-13 ENCOUNTER — APPOINTMENT (OUTPATIENT)
Dept: PAIN MANAGEMENT | Facility: CLINIC | Age: 58
End: 2023-06-13

## 2023-06-14 ENCOUNTER — APPOINTMENT (OUTPATIENT)
Dept: VASCULAR SURGERY | Facility: CLINIC | Age: 58
End: 2023-06-14

## 2023-06-30 ENCOUNTER — APPOINTMENT (OUTPATIENT)
Dept: PAIN MANAGEMENT | Facility: CLINIC | Age: 58
End: 2023-06-30
Payer: MEDICARE

## 2023-06-30 PROCEDURE — 99213 OFFICE O/P EST LOW 20 MIN: CPT

## 2023-06-30 NOTE — HISTORY OF PRESENT ILLNESS
[Lower back] : lower back [Left Leg] : left leg [Right Leg] : right leg [10] : 10 [4] : 4 [Shooting] : shooting [Constant] : constant [Household chores] : household chores [Leisure] : leisure [Sleep] : sleep [Nothing helps with pain getting better] : Nothing helps with pain getting better [Disabled] : Work status: disabled [FreeTextEntry1] : procedure discussion [] : no [FreeTextEntry7] : down legs [de-identified] : everything

## 2023-07-03 ENCOUNTER — APPOINTMENT (OUTPATIENT)
Dept: ORTHOPEDIC SURGERY | Facility: HOSPITAL | Age: 58
End: 2023-07-03
Payer: MEDICARE

## 2023-07-03 ENCOUNTER — TRANSCRIPTION ENCOUNTER (OUTPATIENT)
Age: 58
End: 2023-07-03

## 2023-07-03 ENCOUNTER — OUTPATIENT (OUTPATIENT)
Dept: OUTPATIENT SERVICES | Facility: HOSPITAL | Age: 58
LOS: 1 days | End: 2023-07-03
Payer: MEDICARE

## 2023-07-03 VITALS
HEART RATE: 74 BPM | RESPIRATION RATE: 20 BRPM | OXYGEN SATURATION: 94 % | SYSTOLIC BLOOD PRESSURE: 90 MMHG | DIASTOLIC BLOOD PRESSURE: 57 MMHG

## 2023-07-03 VITALS
TEMPERATURE: 99 F | OXYGEN SATURATION: 95 % | SYSTOLIC BLOOD PRESSURE: 109 MMHG | RESPIRATION RATE: 16 BRPM | WEIGHT: 80.03 LBS | HEIGHT: 57 IN | DIASTOLIC BLOOD PRESSURE: 64 MMHG | HEART RATE: 79 BPM

## 2023-07-03 DIAGNOSIS — Z98.890 OTHER SPECIFIED POSTPROCEDURAL STATES: Chronic | ICD-10-CM

## 2023-07-03 DIAGNOSIS — Z95.818 PRESENCE OF OTHER CARDIAC IMPLANTS AND GRAFTS: Chronic | ICD-10-CM

## 2023-07-03 DIAGNOSIS — S69.91XD UNSPECIFIED INJURY OF RIGHT WRIST, HAND AND FINGER(S), SUBSEQUENT ENCOUNTER: Chronic | ICD-10-CM

## 2023-07-03 DIAGNOSIS — M25.9 JOINT DISORDER, UNSPECIFIED: Chronic | ICD-10-CM

## 2023-07-03 DIAGNOSIS — M54.16 RADICULOPATHY, LUMBAR REGION: ICD-10-CM

## 2023-07-03 DIAGNOSIS — K82.9 DISEASE OF GALLBLADDER, UNSPECIFIED: Chronic | ICD-10-CM

## 2023-07-03 DIAGNOSIS — J38.1 POLYP OF VOCAL CORD AND LARYNX: Chronic | ICD-10-CM

## 2023-07-03 PROCEDURE — 62323 NJX INTERLAMINAR LMBR/SAC: CPT

## 2023-07-03 RX ORDER — CARISOPRODOL 250 MG
1 TABLET ORAL
Qty: 0 | Refills: 0 | DISCHARGE

## 2023-07-03 RX ORDER — MORPHINE SULFATE 50 MG/1
1 CAPSULE, EXTENDED RELEASE ORAL
Qty: 0 | Refills: 0 | DISCHARGE

## 2023-07-03 RX ORDER — LEVOTHYROXINE SODIUM 125 MCG
1 TABLET ORAL
Qty: 0 | Refills: 0 | DISCHARGE

## 2023-07-03 RX ORDER — ALBUTEROL 90 UG/1
2 AEROSOL, METERED ORAL
Qty: 0 | Refills: 0 | DISCHARGE

## 2023-07-03 RX ORDER — FENTANYL CITRATE 50 UG/ML
1 INJECTION INTRAVENOUS
Qty: 0 | Refills: 0 | DISCHARGE

## 2023-07-03 RX ORDER — FLUTICASONE FUROATE, UMECLIDINIUM BROMIDE AND VILANTEROL TRIFENATATE 200; 62.5; 25 UG/1; UG/1; UG/1
1 POWDER RESPIRATORY (INHALATION)
Qty: 0 | Refills: 0 | DISCHARGE

## 2023-07-03 RX ORDER — MECLIZINE HCL 12.5 MG
0 TABLET ORAL
Qty: 0 | Refills: 0 | DISCHARGE

## 2023-07-03 RX ORDER — CARBAMAZEPINE 200 MG
0 TABLET ORAL
Qty: 0 | Refills: 0 | DISCHARGE

## 2023-07-03 RX ORDER — CLONAZEPAM 1 MG
0 TABLET ORAL
Qty: 0 | Refills: 0 | DISCHARGE

## 2023-07-03 RX ORDER — CELECOXIB 200 MG/1
1 CAPSULE ORAL
Refills: 0 | DISCHARGE

## 2023-07-03 RX ORDER — OMEPRAZOLE 10 MG/1
1 CAPSULE, DELAYED RELEASE ORAL
Qty: 0 | Refills: 0 | DISCHARGE

## 2023-07-03 RX ORDER — IPRATROPIUM BROMIDE 0.2 MG/ML
2.5 SOLUTION, NON-ORAL INHALATION
Qty: 0 | Refills: 0 | DISCHARGE

## 2023-07-03 RX ORDER — CARISOPRODOL 250 MG
0 TABLET ORAL
Qty: 0 | Refills: 0 | DISCHARGE

## 2023-07-03 RX ORDER — DIAZEPAM 5 MG
1 TABLET ORAL
Qty: 0 | Refills: 0 | DISCHARGE

## 2023-07-03 NOTE — ASU DISCHARGE PLAN (ADULT/PEDIATRIC) - NS MD DC FALL RISK RISK
For information on Fall & Injury Prevention, visit: [email or web address] OR  [email or web address] OR  https://www.cdc.gov/steadi/patient.html

## 2023-07-09 ENCOUNTER — RX RENEWAL (OUTPATIENT)
Age: 58
End: 2023-07-09

## 2023-07-14 ENCOUNTER — APPOINTMENT (OUTPATIENT)
Dept: PAIN MANAGEMENT | Facility: CLINIC | Age: 58
End: 2023-07-14
Payer: MEDICARE

## 2023-07-14 VITALS — WEIGHT: 78 LBS | BODY MASS INDEX: 16.83 KG/M2 | HEIGHT: 57 IN

## 2023-07-14 DIAGNOSIS — M54.12 RADICULOPATHY, CERVICAL REGION: ICD-10-CM

## 2023-07-14 PROCEDURE — 99213 OFFICE O/P EST LOW 20 MIN: CPT

## 2023-07-14 NOTE — ASSESSMENT
[FreeTextEntry1] : Interim history\par he patient returns with pain that has been treated adequately in the past with epidural steroid injections.  The patient's complaints are consistent with radiculopathy. Patient's ADL's increase with prior AIDAN.   The last injection gave greater than 60% reduction of the pain but now there is a return of symptoms. The patient is requesting a subsequent epidural steroid injection to alleviate pain and improve functional ability and quality of life.  Patient is a candidate.\par Objective findings\par Since the last visit, there have been no new imaging studies. THe patient has no new symptoms except the return of the their pain complaints. Motor and sensory function is unchanged.\par Plan\par Prior to any intervention the patient will obtain an MRI of the cervical spine to elucidate the exact etiology of the pain.  Spoke to patient about epidural steroid injections. Explained risks, benefits and alternatives including but not limited to the risk of infection, bleeding, headache, syncopal episode, failure to resolve issues, allergic reaction, symptom recurrence, allergic reaction, nerve injury, and increased pain. The patient understands the risks. All questions were answered. The patient is willing to proceed.\par

## 2023-07-14 NOTE — HISTORY OF PRESENT ILLNESS
[Lower back] : lower back [Left Leg] : left leg [Right Leg] : right leg [4] : 4 [Shooting] : shooting [Constant] : constant [Household chores] : household chores [Leisure] : leisure [Sleep] : sleep [Nothing helps with pain getting better] : Nothing helps with pain getting better [Disabled] : Work status: disabled [Neck] : neck [9] : 9 [Meds] : meds [Sitting] : sitting [Standing] : standing [] : no [FreeTextEntry7] : down legs [de-identified] : ROM [de-identified] : 03/7/2023 LS  [de-identified] : PT DOES HOME STRETCHING 2-3X A WEEK WITH NO IMPROVEMENT IN PAIN BUT IN ROM \par PT IS UNABLE TO DO PTA DUE TO INCREASE AND WORSEN PAIN

## 2023-07-24 ENCOUNTER — RX RENEWAL (OUTPATIENT)
Age: 58
End: 2023-07-24

## 2023-08-01 ENCOUNTER — RX RENEWAL (OUTPATIENT)
Age: 58
End: 2023-08-01

## 2023-08-01 NOTE — ED ADULT TRIAGE NOTE - SOURCE OF INFORMATION
EMS Propranolol Pregnancy And Lactation Text: This medication is Pregnancy Category C and it isn't known if it is safe during pregnancy. It is excreted in breast milk.

## 2023-08-10 ENCOUNTER — RESULT CHARGE (OUTPATIENT)
Age: 58
End: 2023-08-10

## 2023-08-10 ENCOUNTER — RESULT REVIEW (OUTPATIENT)
Age: 58
End: 2023-08-10

## 2023-08-10 ENCOUNTER — APPOINTMENT (OUTPATIENT)
Dept: ORTHOPEDIC SURGERY | Facility: CLINIC | Age: 58
End: 2023-08-10
Payer: MEDICARE

## 2023-08-10 VITALS — WEIGHT: 78 LBS | BODY MASS INDEX: 16.83 KG/M2 | HEIGHT: 57 IN

## 2023-08-10 DIAGNOSIS — S90.31XA CONTUSION OF RIGHT FOOT, INITIAL ENCOUNTER: ICD-10-CM

## 2023-08-10 DIAGNOSIS — S22.42XA MULTIPLE FRACTURES OF RIBS, LEFT SIDE, INITIAL ENCOUNTER FOR CLOSED FRACTURE: ICD-10-CM

## 2023-08-10 PROCEDURE — 71100 X-RAY EXAM RIBS UNI 2 VIEWS: CPT | Mod: LT

## 2023-08-10 PROCEDURE — 73630 X-RAY EXAM OF FOOT: CPT | Mod: RT

## 2023-08-10 PROCEDURE — 99214 OFFICE O/P EST MOD 30 MIN: CPT

## 2023-08-10 NOTE — HISTORY OF PRESENT ILLNESS
[8] : 8 [5] : 5 [Dull/Aching] : dull/aching [Sharp] : sharp [Intermittent] : intermittent [Nothing helps with pain getting better] : Nothing helps with pain getting better [] : no [FreeTextEntry5] : Pt fell over last Friday injuring her foot and her LT ribs.

## 2023-08-10 NOTE — HISTORY OF PRESENT ILLNESS
[de-identified] : Had a fall in a parking lot last week, has pain left ribs. Has h/o COPD on oxygen. Also hurt right foot

## 2023-08-10 NOTE — PHYSICAL EXAM
[Left] : left rib [Fracture] : Fracture [FreeTextEntry8] : tender anterior axillary line approx 7-10 ribs. Fair excursion of chest to inspiration, no crepitus [FreeTextEntry5] : non displaced rib fractures [] : metatarsal tenderness [5th] : 5th [Right] : right foot [There are no fractures, subluxations or dislocations. No significant abnormalities are seen] : There are no fractures, subluxations or dislocations. No significant abnormalities are seen

## 2023-08-10 NOTE — ASSESSMENT
[FreeTextEntry1] : Ice, deep breathing hourly while awake. CT chest ordered to evalaute the rib fractures

## 2023-08-14 ENCOUNTER — APPOINTMENT (OUTPATIENT)
Dept: ORTHOPEDIC SURGERY | Facility: CLINIC | Age: 58
End: 2023-08-14
Payer: MEDICARE

## 2023-08-14 VITALS — BODY MASS INDEX: 16.83 KG/M2 | WEIGHT: 78 LBS | HEIGHT: 57 IN

## 2023-08-14 DIAGNOSIS — S29.9XXA UNSPECIFIED INJURY OF THORAX, INITIAL ENCOUNTER: ICD-10-CM

## 2023-08-14 PROCEDURE — 99213 OFFICE O/P EST LOW 20 MIN: CPT

## 2023-08-14 NOTE — ASSESSMENT
[FreeTextEntry1] : I reviewed the CT and don't see any rib fractures. I told her the rib information is contained on the chest CT, if there was a fracture would be picked up on that scan. She is unhappy, wants another CT of ribs. I told her to discuss with Tyler's office, but the information needed is on the present scan.

## 2023-08-14 NOTE — HISTORY OF PRESENT ILLNESS
[Lying in bed] : lying in bed [de-identified] : Had a fall in a parking lot last week, has pain left ribs. Has h/o COPD on oxygen. Also hurt right foot [FreeTextEntry1] : chest  [FreeTextEntry5] : still having trouble breathing

## 2023-08-14 NOTE — REASON FOR VISIT
[FreeTextEntry2] : 8/14/23- Had CT of chest done(left ribs were ordered)- no bone abnormality according to the Radiology report

## 2023-08-15 ENCOUNTER — APPOINTMENT (OUTPATIENT)
Dept: ORTHOPEDIC SURGERY | Facility: CLINIC | Age: 58
End: 2023-08-15

## 2023-08-17 ENCOUNTER — APPOINTMENT (OUTPATIENT)
Dept: ORTHOPEDIC SURGERY | Facility: CLINIC | Age: 58
End: 2023-08-17

## 2023-08-17 NOTE — PHYSICAL EXAM
[Left] : left rib [Fracture] : Fracture [] : no swelling [FreeTextEntry8] : tender anterior axillary line approx 7-10 ribs. Fair excursion of chest to inspiration, no crepitus [FreeTextEntry5] : non displaced rib fractures

## 2023-08-17 NOTE — HISTORY OF PRESENT ILLNESS
[Lying in bed] : lying in bed [de-identified] : 08/15/23:  Return visit for a 58 year old female   Had a fall in a parking lot last week, has pain left ribs. Has h/o COPD on oxygen. Also hurt right foot [FreeTextEntry1] : chest  [FreeTextEntry5] : still having trouble breathing

## 2023-08-30 ENCOUNTER — RX RENEWAL (OUTPATIENT)
Age: 58
End: 2023-08-30

## 2023-09-08 NOTE — ASU PATIENT PROFILE, ADULT - CAREGIVER
September 8, 2023       Cassie WINSTON David  [address]  [facility] [address]    Dear Ms. Hernandez,    Your procedure is scheduled with Ivy Iniguez MD on October 25, 2023, at [facility]. The start time of your procedure is tentatively scheduled for 10:15 AM. You can expect to be contacted 1 to 3 days prior to the surgery to confirm arrival and surgery time. Occasionally these times may change.    Please arrive to register for your procedure at 8:15 AM. The address of the facility is:    [address] [id number] [phone]      The following appointment(s) have been scheduled for you:     2 week post op with EMMA Donaldson at the [facility] on November 8, 2023 at 3:45 PM     Here are instructions for your surgery:     Please have labs and EKG completed 14 to 30 days prior to the surgery date.  Orders have been placed and you may go to any [facility] for these services.     If your surgeon has not given you a special skin cleanser with instructions on how to use at home before your surgery, please call that doctor's office.     Do not eat or drink 8 hours before your surgery.    You will need someone to drive you home and remain with you up to 24 hours after you have been discharged.     7 days prior to your appointment do not take aspirin or aspirin containing products. This includes products such as 81 mg baby aspirin, Ivana-Wilburn, Pepto Bismol,Aleve, Meloxicam, Naproxen, Motrin, Ibuprofen, Advil or Fish Oil.  If you need a pain reliever, Tylenol is OK - it does not contain aspirin.      If you take coumadin or other blood thinners contact your primary care physician regarding how long to hold prior to surgery.    If you take any diet pill or injection medication for weight loss, this must be stopped 7-10 days prior to your surgery date.          If you have any questions or need to reschedule,  please contact me at the telephone number and extension listed below.     Thank you,    Tia LATHAM [phone]  Surgery Scheduler for Ivy Iniguez MD   [facility] Pre-Admit Department           Declines

## 2023-09-25 NOTE — PROGRESS NOTE ADULT - PROBLEM/PLAN-3
65yo F PMHx GERD, HTN, smoker, depression presenting with bilateral lower extremity paresis and paresthesia that began after heavy lifting on 9/18/23. OSH MRI cervical and thoracic spine revealed stenosis and T9-L1 cord edema with mild enhancement suspicious for evolving cord infarct vs demyelinating process. Pt has had a barber since 9/18 and has not had any bowel movements since 9/18 despite aggressive bowel regimen at OSH.       Admit to NCC   Q1h neuro checks, I&O, and vitals    CBC, CMP, mag, phos daily    A1c, TSH, lipid panel, coags   Echo, EKG, CXR    NSGY consulted   MAP >85   MRI c/s contrast of brain, cervical, thoracic, and lumbar spine    Dex pending MRI results    Pan cx    RPR    Bladder scans q6h with I&O cath    PRN pain regimen    NPO pending NSGY plan   SCDs; hold chemical VTE ppx pending nsgy plan    PT/OT/SLP  
DISPLAY PLAN FREE TEXT

## 2023-10-03 ENCOUNTER — APPOINTMENT (OUTPATIENT)
Dept: PAIN MANAGEMENT | Facility: CLINIC | Age: 58
End: 2023-10-03
Payer: MEDICARE

## 2023-10-03 VITALS — BODY MASS INDEX: 16.18 KG/M2 | HEIGHT: 57 IN | WEIGHT: 75 LBS

## 2023-10-03 DIAGNOSIS — M54.16 RADICULOPATHY, LUMBAR REGION: ICD-10-CM

## 2023-10-03 PROCEDURE — 99214 OFFICE O/P EST MOD 30 MIN: CPT

## 2023-10-06 RX ORDER — TRAZODONE HYDROCHLORIDE 50 MG/1
50 TABLET ORAL
Qty: 60 | Refills: 0 | Status: ACTIVE | COMMUNITY
Start: 2023-04-17 | End: 1900-01-01

## 2023-10-13 ENCOUNTER — APPOINTMENT (OUTPATIENT)
Age: 58
End: 2023-10-13
Payer: MEDICARE

## 2023-10-13 PROCEDURE — 64483 NJX AA&/STRD TFRM EPI L/S 1: CPT | Mod: LT

## 2023-11-01 ENCOUNTER — APPOINTMENT (OUTPATIENT)
Dept: PAIN MANAGEMENT | Facility: CLINIC | Age: 58
End: 2023-11-01
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
## 2023-11-06 NOTE — CONSULT NOTE ADULT - CONSULT REQUESTED BY NAME
The patient's goals for the shift include     RN Goal 11/6 Patient will maintain and adequate pain level, vital signs will remain stable and afebrile.   patient maintained and adequate pain level, vital signs remained stable and afebrile.   PRINCESS Rubi RN      Dr. Sanchez

## 2024-01-01 NOTE — ED ADULT NURSE NOTE - CADM POA URETHRAL CATHETER
Thank you for choosing [facility]. Please follow up with your primary care provider in 2-3 days. If worsening symptoms please report back to the ED. Thank you.   
No

## 2024-01-04 ENCOUNTER — APPOINTMENT (OUTPATIENT)
Dept: PAIN MANAGEMENT | Facility: CLINIC | Age: 59
End: 2024-01-04
Payer: MEDICARE

## 2024-01-04 VITALS — BODY MASS INDEX: 17.26 KG/M2 | HEIGHT: 57 IN | WEIGHT: 80 LBS

## 2024-01-04 PROCEDURE — 99214 OFFICE O/P EST MOD 30 MIN: CPT | Mod: 25

## 2024-01-04 PROCEDURE — 96372 THER/PROPH/DIAG INJ SC/IM: CPT

## 2024-01-04 PROCEDURE — 72100 X-RAY EXAM L-S SPINE 2/3 VWS: CPT

## 2024-01-04 NOTE — ASSESSMENT
[FreeTextEntry1] : After discussing various treatment options with the patient including but not limited to oral medications, physical therapy, exercise, modalities as well as interventional spinal injections, we have decided with the following plan:  1. toradol shot today.   2) Patient is presenting with acute/sub-acute radicular pain with impairment in ADLs and functionality.  The pain has not responded sufficiently to  conservative care including nsaid therapy and/or physical therapy.  There is no bleeding tendency, unstable medical condition, or systemic infection. The purpose of the spinal injections is to facilitate active therapy by providing short term relief through reduction of pain and inflammation.   Injections, by themselves, are not likely to provide long-term relief. Rather, active rehabilitation with modified work achieves long-term relief by increasing active ROM, strength and stability. Patient is presenting with acute/sub-acute radicular pain with impairment in ADLs and functionality.  The pain has not responded sufficiently to  conservative care including nsaid therapy and/or physical therapy.  There is no bleeding tendency, unstable medical condition, or systemic infection. The purpose of the spinal injections is to facilitate active therapy by providing short term relief through reduction of pain and inflammation.   Injections, by themselves, are not likely to provide long-term relief. Rather, active rehabilitation with modified work achieves long-term relief by increasing active ROM, strength and stability. right L4/5 L5/s1 TFESI   3) A CT is indicated as there has been failure of numerous conservative therapies over the last 6-8 weeks. these include but are not limited to medication therapy and physical therapy. It is recognized that repeat imaging studies and other tests may be warranted by the clinical course and/or to follow the progress of treatment in some cases. It may be of value to repeat diagnostic procedures (ie imaging studies) during the course of care to reassess or stage the pathology when there is progression of symptoms or findings, prior to surgical interventions and/or therapeutic injections when clinically indicated.   r/o fracture

## 2024-01-04 NOTE — HISTORY OF PRESENT ILLNESS
[Lower back] : lower back [Left Leg] : left leg [Right Leg] : right leg [10] : 10 [Radiating] : radiating [Shooting] : shooting [Constant] : constant [Household chores] : household chores [Leisure] : leisure [Sleep] : sleep [Social interactions] : social interactions [Heat] : heat [Nothing helps with pain getting better] : Nothing helps with pain getting better [Standing] : standing [Walking] : walking [Disabled] : Work status: disabled [Injection therapy] : injection therapy [] : no [FreeTextEntry1] : ankle. groin [FreeTextEntry6] : weakness  [de-identified] : long period of time  [de-identified] : mri

## 2024-01-04 NOTE — PROCEDURE
[Therapeutic Injection] : therapeutic injection [___ cc    1%] : Lidocaine ~Vcc of 1%  [___ cc    30mg] : Ketorolac (Toradol) ~Vcc of 30 mg

## 2024-01-04 NOTE — PHYSICAL EXAM
[4___] : right hip flexion 4[unfilled]/5 [] : no ecchymosis [FreeTextEntry3] : significant spinal muscular atrophy [de-identified] : difficulty with any movement of the legs.

## 2024-01-05 NOTE — PATIENT PROFILE ADULT. - TEACHING/LEARNING RELIGIOUS CONSIDERATIONS
Msg placed to Dr. Alexander-seeking medical clearance.     0954-Dr. Alexander has medically cleared.  Case is under review by Dr. Ruiz.     1017-Dr. Ruiz has accepted.  Transport has been arranged via T between [id number].  Msg placed to Dr. Alexander, Amanda CM, Dilan CM & Shantel BSN. Michael, son is aware.   none

## 2024-01-08 ENCOUNTER — APPOINTMENT (OUTPATIENT)
Dept: CT IMAGING | Facility: CLINIC | Age: 59
End: 2024-01-08
Payer: MEDICARE

## 2024-01-08 PROCEDURE — 72131 CT LUMBAR SPINE W/O DYE: CPT | Mod: MH

## 2024-01-12 ENCOUNTER — APPOINTMENT (OUTPATIENT)
Dept: ORTHOPEDIC SURGERY | Facility: AMBULATORY SURGERY CENTER | Age: 59
End: 2024-01-12
Payer: MEDICARE

## 2024-01-12 PROCEDURE — 64483 NJX AA&/STRD TFRM EPI L/S 1: CPT | Mod: RT

## 2024-01-16 NOTE — PRE-OP CHECKLIST - WEIGHT IN LBS
Procedure:  COLONOSCOPY    Relevant Problems   CARDIO   (+) Atherosclerosis of abdominal aorta (HCC)   (+) Primary hypertension      /RENAL   (+) Benign prostatic hyperplasia without lower urinary tract symptoms        Physical Exam    Airway    Mallampati score: II  TM Distance: >3 FB  Neck ROM: full     Dental   No notable dental hx     Cardiovascular  Rhythm: regular, Rate: normal, Cardiovascular exam normal    Pulmonary  Pulmonary exam normal Breath sounds clear to auscultation    Other Findings        Anesthesia Plan  ASA Score- 2     Anesthesia Type- IV sedation with anesthesia with ASA Monitors.         Additional Monitors:     Airway Plan:     Comment: Discussed risks/benefits, including medication reactions, awareness, aspiration, and serious/life threatening complications.    Plan to maintain native airway with IVGA, monitored with EtCO2.       Plan Factors-Exercise tolerance (METS): >4 METS.    Chart reviewed.    Patient summary reviewed.      Patient instructed to abstain from smoking on day of procedure. Patient did not smoke on day of surgery.            Induction- intravenous.    Postoperative Plan-     Informed Consent- Anesthetic plan and risks discussed with patient.  I personally reviewed this patient with the CRNA. Discussed and agreed on the Anesthesia Plan with the CRNA..                
80
80

## 2024-01-17 ENCOUNTER — APPOINTMENT (OUTPATIENT)
Dept: PAIN MANAGEMENT | Facility: CLINIC | Age: 59
End: 2024-01-17

## 2024-01-17 NOTE — PHYSICAL EXAM
[] : no ecchymosis [FreeTextEntry3] : significant spinal muscular atrophy [de-identified] : difficulty with any movement of the legs.

## 2024-01-17 NOTE — HISTORY OF PRESENT ILLNESS
[] : no [FreeTextEntry1] : ankle. groin [FreeTextEntry6] : weakness  [de-identified] : long period of time  [de-identified] : mri

## 2024-01-31 ENCOUNTER — APPOINTMENT (OUTPATIENT)
Dept: PAIN MANAGEMENT | Facility: CLINIC | Age: 59
End: 2024-01-31
Payer: MEDICARE

## 2024-01-31 VITALS — WEIGHT: 70 LBS | HEIGHT: 57 IN | BODY MASS INDEX: 15.1 KG/M2

## 2024-01-31 DIAGNOSIS — M51.26 OTHER INTERVERTEBRAL DISC DISPLACEMENT, LUMBAR REGION: ICD-10-CM

## 2024-01-31 PROCEDURE — 99213 OFFICE O/P EST LOW 20 MIN: CPT

## 2024-01-31 NOTE — HISTORY OF PRESENT ILLNESS
[Lower back] : lower back [Left Leg] : left leg [Right Leg] : right leg [10] : 10 [Radiating] : radiating [Shooting] : shooting [Constant] : constant [Household chores] : household chores [Leisure] : leisure [Sleep] : sleep [Social interactions] : social interactions [Heat] : heat [Injection therapy] : injection therapy [Nothing helps with pain getting better] : Nothing helps with pain getting better [Walking] : walking [Standing] : standing [Disabled] : Work status: disabled [Dull/Aching] : dull/aching [] : no [FreeTextEntry1] : ankle. groin [FreeTextEntry6] : weakness.  [FreeTextEntry7] : ankle  [de-identified] : long period of time  [de-identified] : mri

## 2024-01-31 NOTE — PHYSICAL EXAM
[4___] : right hip flexion 4[unfilled]/5 [] : no ecchymosis [FreeTextEntry3] : significant spinal muscular atrophy [de-identified] : difficulty with any movement of the legs.

## 2024-02-01 ENCOUNTER — RX RENEWAL (OUTPATIENT)
Age: 59
End: 2024-02-01

## 2024-02-01 RX ORDER — DICLOFENAC SODIUM 75 MG/1
75 TABLET, DELAYED RELEASE ORAL TWICE DAILY
Qty: 60 | Refills: 2 | Status: ACTIVE | COMMUNITY
Start: 2023-10-26 | End: 1900-01-01

## 2024-02-19 ENCOUNTER — EMERGENCY (EMERGENCY)
Facility: HOSPITAL | Age: 59
LOS: 1 days | Discharge: HOME | End: 2024-02-19
Attending: STUDENT IN AN ORGANIZED HEALTH CARE EDUCATION/TRAINING PROGRAM | Admitting: STUDENT IN AN ORGANIZED HEALTH CARE EDUCATION/TRAINING PROGRAM
Payer: MEDICARE

## 2024-02-19 VITALS
SYSTOLIC BLOOD PRESSURE: 125 MMHG | WEIGHT: 110.01 LBS | HEART RATE: 72 BPM | OXYGEN SATURATION: 99 % | HEIGHT: 60 IN | TEMPERATURE: 98 F | RESPIRATION RATE: 16 BRPM | DIASTOLIC BLOOD PRESSURE: 78 MMHG

## 2024-02-19 DIAGNOSIS — Z98.890 OTHER SPECIFIED POSTPROCEDURAL STATES: Chronic | ICD-10-CM

## 2024-02-19 DIAGNOSIS — S69.91XD UNSPECIFIED INJURY OF RIGHT WRIST, HAND AND FINGER(S), SUBSEQUENT ENCOUNTER: Chronic | ICD-10-CM

## 2024-02-19 DIAGNOSIS — K82.9 DISEASE OF GALLBLADDER, UNSPECIFIED: Chronic | ICD-10-CM

## 2024-02-19 DIAGNOSIS — J38.1 POLYP OF VOCAL CORD AND LARYNX: Chronic | ICD-10-CM

## 2024-02-19 DIAGNOSIS — M25.9 JOINT DISORDER, UNSPECIFIED: Chronic | ICD-10-CM

## 2024-02-19 DIAGNOSIS — Z95.818 PRESENCE OF OTHER CARDIAC IMPLANTS AND GRAFTS: Chronic | ICD-10-CM

## 2024-02-19 LAB
ALBUMIN SERPL ELPH-MCNC: 4 G/DL — SIGNIFICANT CHANGE UP (ref 3.3–5)
ALP SERPL-CCNC: 460 U/L — HIGH (ref 40–120)
ALT FLD-CCNC: 419 U/L — HIGH (ref 12–78)
ANION GAP SERPL CALC-SCNC: 8 MMOL/L — SIGNIFICANT CHANGE UP (ref 5–17)
APPEARANCE UR: CLEAR — SIGNIFICANT CHANGE UP
AST SERPL-CCNC: 79 U/L — HIGH (ref 15–37)
BASOPHILS # BLD AUTO: 0.03 K/UL — SIGNIFICANT CHANGE UP (ref 0–0.2)
BASOPHILS NFR BLD AUTO: 0.4 % — SIGNIFICANT CHANGE UP (ref 0–2)
BILIRUB SERPL-MCNC: 0.5 MG/DL — SIGNIFICANT CHANGE UP (ref 0.2–1.2)
BILIRUB UR-MCNC: NEGATIVE — SIGNIFICANT CHANGE UP
BUN SERPL-MCNC: 15 MG/DL — SIGNIFICANT CHANGE UP (ref 7–23)
CALCIUM SERPL-MCNC: 9 MG/DL — SIGNIFICANT CHANGE UP (ref 8.5–10.1)
CHLORIDE SERPL-SCNC: 96 MMOL/L — SIGNIFICANT CHANGE UP (ref 96–108)
CO2 SERPL-SCNC: 26 MMOL/L — SIGNIFICANT CHANGE UP (ref 22–31)
COLOR SPEC: YELLOW — SIGNIFICANT CHANGE UP
CREAT SERPL-MCNC: 1 MG/DL — SIGNIFICANT CHANGE UP (ref 0.5–1.3)
DIFF PNL FLD: NEGATIVE — SIGNIFICANT CHANGE UP
EGFR: 64 ML/MIN/1.73M2 — SIGNIFICANT CHANGE UP
EOSINOPHIL # BLD AUTO: 0.01 K/UL — SIGNIFICANT CHANGE UP (ref 0–0.5)
EOSINOPHIL NFR BLD AUTO: 0.1 % — SIGNIFICANT CHANGE UP (ref 0–6)
GLUCOSE SERPL-MCNC: 156 MG/DL — HIGH (ref 70–99)
GLUCOSE UR QL: NEGATIVE MG/DL — SIGNIFICANT CHANGE UP
HCT VFR BLD CALC: 38.1 % — SIGNIFICANT CHANGE UP (ref 34.5–45)
HGB BLD-MCNC: 13.2 G/DL — SIGNIFICANT CHANGE UP (ref 11.5–15.5)
IMM GRANULOCYTES NFR BLD AUTO: 0.9 % — SIGNIFICANT CHANGE UP (ref 0–0.9)
KETONES UR-MCNC: NEGATIVE MG/DL — SIGNIFICANT CHANGE UP
LEUKOCYTE ESTERASE UR-ACNC: ABNORMAL
LYMPHOCYTES # BLD AUTO: 0.62 K/UL — LOW (ref 1–3.3)
LYMPHOCYTES # BLD AUTO: 8.2 % — LOW (ref 13–44)
MCHC RBC-ENTMCNC: 31.7 PG — SIGNIFICANT CHANGE UP (ref 27–34)
MCHC RBC-ENTMCNC: 34.6 GM/DL — SIGNIFICANT CHANGE UP (ref 32–36)
MCV RBC AUTO: 91.6 FL — SIGNIFICANT CHANGE UP (ref 80–100)
MONOCYTES # BLD AUTO: 0.15 K/UL — SIGNIFICANT CHANGE UP (ref 0–0.9)
MONOCYTES NFR BLD AUTO: 2 % — SIGNIFICANT CHANGE UP (ref 2–14)
NEUTROPHILS # BLD AUTO: 6.67 K/UL — SIGNIFICANT CHANGE UP (ref 1.8–7.4)
NEUTROPHILS NFR BLD AUTO: 88.4 % — HIGH (ref 43–77)
NITRITE UR-MCNC: NEGATIVE — SIGNIFICANT CHANGE UP
NRBC # BLD: 0 /100 WBCS — SIGNIFICANT CHANGE UP (ref 0–0)
PH UR: 6.5 — SIGNIFICANT CHANGE UP (ref 5–8)
PLATELET # BLD AUTO: 363 K/UL — SIGNIFICANT CHANGE UP (ref 150–400)
POTASSIUM SERPL-MCNC: 4.1 MMOL/L — SIGNIFICANT CHANGE UP (ref 3.5–5.3)
POTASSIUM SERPL-SCNC: 4.1 MMOL/L — SIGNIFICANT CHANGE UP (ref 3.5–5.3)
PROT SERPL-MCNC: 7.1 G/DL — SIGNIFICANT CHANGE UP (ref 6–8.3)
PROT UR-MCNC: NEGATIVE MG/DL — SIGNIFICANT CHANGE UP
RBC # BLD: 4.16 M/UL — SIGNIFICANT CHANGE UP (ref 3.8–5.2)
RBC # FLD: 13.9 % — SIGNIFICANT CHANGE UP (ref 10.3–14.5)
SODIUM SERPL-SCNC: 130 MMOL/L — LOW (ref 135–145)
SP GR SPEC: 1.01 — SIGNIFICANT CHANGE UP (ref 1–1.03)
UROBILINOGEN FLD QL: 0.2 MG/DL — SIGNIFICANT CHANGE UP (ref 0.2–1)
WBC # BLD: 7.55 K/UL — SIGNIFICANT CHANGE UP (ref 3.8–10.5)
WBC # FLD AUTO: 7.55 K/UL — SIGNIFICANT CHANGE UP (ref 3.8–10.5)

## 2024-02-19 PROCEDURE — 71045 X-RAY EXAM CHEST 1 VIEW: CPT | Mod: 26

## 2024-02-19 PROCEDURE — 99285 EMERGENCY DEPT VISIT HI MDM: CPT | Mod: FS

## 2024-02-19 RX ORDER — SODIUM CHLORIDE 9 MG/ML
1000 INJECTION INTRAMUSCULAR; INTRAVENOUS; SUBCUTANEOUS ONCE
Refills: 0 | Status: COMPLETED | OUTPATIENT
Start: 2024-02-19 | End: 2024-02-19

## 2024-02-19 RX ORDER — MORPHINE SULFATE 50 MG/1
2 CAPSULE, EXTENDED RELEASE ORAL ONCE
Refills: 0 | Status: COMPLETED | OUTPATIENT
Start: 2024-02-19 | End: 2024-02-19

## 2024-02-19 RX ORDER — ONDANSETRON 8 MG/1
4 TABLET, FILM COATED ORAL ONCE
Refills: 0 | Status: COMPLETED | OUTPATIENT
Start: 2024-02-19 | End: 2024-02-19

## 2024-02-19 RX ORDER — MORPHINE SULFATE 50 MG/1
2 CAPSULE, EXTENDED RELEASE ORAL ONCE
Refills: 0 | Status: DISCONTINUED | OUTPATIENT
Start: 2024-02-19 | End: 2024-02-19

## 2024-02-19 RX ADMIN — MORPHINE SULFATE 2 MILLIGRAM(S): 50 CAPSULE, EXTENDED RELEASE ORAL at 23:02

## 2024-02-19 RX ADMIN — SODIUM CHLORIDE 1000 MILLILITER(S): 9 INJECTION INTRAMUSCULAR; INTRAVENOUS; SUBCUTANEOUS at 20:32

## 2024-02-19 RX ADMIN — MORPHINE SULFATE 2 MILLIGRAM(S): 50 CAPSULE, EXTENDED RELEASE ORAL at 22:32

## 2024-02-19 RX ADMIN — ONDANSETRON 4 MILLIGRAM(S): 8 TABLET, FILM COATED ORAL at 22:31

## 2024-02-19 RX ADMIN — SODIUM CHLORIDE 1000 MILLILITER(S): 9 INJECTION INTRAMUSCULAR; INTRAVENOUS; SUBCUTANEOUS at 21:32

## 2024-02-19 NOTE — ED ADULT NURSE NOTE - NSFALLRISKASMT_ED_ALL_ED_DT
Patient: Gary Pham Date: 8/15/2020   : 1973 Attending: Tay Doherty,*   46 year old male      Chief Complaint:  Confusion, heroin overdose    Subjective:   Comfortable, in no distress, alert and oriented x3.  Overnight and this morning heart rate was controlled later on started going up into the 120s range    Problem List:   Patient Active Problem List   Diagnosis   • Alcohol use disorder, severe, in early remission, dependence (CMS/HCC)   • Opioid type dependence, continuous (CMS/HCC)   • ETOH abuse   • Elevated lipase       Allergies: ALLERGIES:  No Known Allergies    Medications/Infusions: Reviewed    Review of Systems: All systems reviewed and negative except for those mentioned in the history of present illness                Vital Last Value 24 Hour Range   Temperature 98.4 °F (36.9 °C) (08/15/20 0933) Temp  Min: 98.1 °F (36.7 °C)  Max: 98.8 °F (37.1 °C)   Pulse (!) 59 (08/15/20 0933) Pulse  Min: 59  Max: 96   Respiratory 20 (08/15/20 0933) Resp  Min: 18  Max: 20   Non-Invasive  Blood Pressure 120/87 (08/15/20 0933) BP  Min: 114/69  Max: 151/109   Pulse Oximetry 97 % (08/15/20 0933) SpO2  Min: 95 %  Max: 98 %     Vital Today Admitted   Weight 109.2 kg (08/15/20 0600) Weight: 115 kg (20)   Height N/A Height: 6' 3\" (190.5 cm) (20)   BMI N/A BMI (Calculated): 31.69 (20)       Intake/Output:      Intake/Output Summary (Last 24 hours) at 8/15/2020 1410  Last data filed at 8/15/2020 1045  Gross per 24 hour   Intake 720 ml   Output 220 ml   Net 500 ml       Physical Exam:   General Appearance:  Alert and oriented to person, place and time. Cooperative, no distress, appears stated age.  Lungs:  Clear to auscultation bilaterally, respirations unlabored.  Chest Wall:  No tenderness or deformity.  Heart:  Regular rate and rhythm, S1 and S2 normal, no murmur, rub or gallop.  Abdomen:  Soft, non-tender, bowel sounds active all four quadrants, no masses, no  hepatasplenomegly.  Extremities:  Normal range of motion, normal strength, tone, stability and palpation to all four extremities.  Atraumatic, no cyanosis or edema.  Pulses: Pedal pulses 2+ and symmetric all extremities.  Skin:  Color, texture, turgor normal.  No rashes or lesions.  Lymph nodes:  Cervical, supraclavicular, and axillary nodes normal.  Neurologic:  Cranial nerves II-XII intact, normal strength, sensation and reflexes throughout.    Laboratory Results:   Recent Labs   Lab 08/15/20  1028 08/15/20  0341 08/14/20  0917 08/14/20  0411  08/13/20  1726 08/13/20  1720   WBC  --  6.4  --  5.5  --   --  8.6   HCT  --  42.7  --  39.3  --   --  42.0   HGB  --  14.2  --  12.8*  --   --  13.7   PLT  --  225  --  206  --   --  292   SODIUM  --  141  --  142  --   --   --    POTASSIUM 3.7 3.8 4.4 3.9   < >  --   --    CHLORIDE  --  111*  --  113*  --   --   --    CO2  --  21  --  22  --   --   --    CALCIUM  --  8.8  --  7.9*  --   --   --    GLUCOSE  --  105*  --  80  --   --   --    BUN  --  10  --  16  --   --   --    CREATININE  --  0.82  --  0.89  --  1.30*  --    AST  --   --   --  26  --   --   --    GPT  --   --   --  36  --   --   --    ALKPT  --   --   --  58  --   --   --    BILIRUBIN  --   --   --  0.3  --   --   --    ALBUMIN  --   --   --  3.1*  --   --   --    LIPA  --   --   --   --   --   --  211    < > = values in this interval not displayed.       Imaging: Xr Chest Ap Or Pa    Result Date: 8/13/2020  XR CHEST AP OR PA INDICATION:  hypoxia COMPARISON: Prior studies with the most recent performed on 7/13/2020. FINDINGS: Overlying EKG leads noted. No consolidations, pneumothoraces, or pleural effusions. Heart size is grossly normal. Aortic arch calcifications. Subcutaneous soft tissues are grossly unremarkable. Osseous structures are unchanged.     IMPRESSION: Unremarkable study.     Ct Chest Pe Imaging    Result Date: 8/13/2020  CT ANGIOGRAM CHEST PE IMAGING- 3D 8/13/2020 9:36 PM HISTORY:  46  years-old Male with presenting history of PE suspected, high pretest prob. COMPARISON: Chest x-ray earlier the same day, CT chest abdomen pelvis 7/13/2020 TECHNIQUE:  Axial CT angiogram of the chest with 75 mL Isovue-370 IV contrast, per the CT pulmonary angiogram protocol. Multiplanar reformats with axial MIP images. FINDINGS: There is good opacification of the pulmonary arteries, but evaluation of subsegmental branches in the lower lobes is limited by motion artifact. The study is felt diagnostic to the level of the subsegmental pulmonary arteries. Vascular:  There are no filling defects identified to suggest pulmonary embolism. Mild dilatation of the main pulmonary artery measuring 3.2 cm. Normal caliber of the aorta. Lungs:  Mild paraseptal emphysematous changes. No focal consolidation. Basilar dependent atelectasis. Pleura:  Unremarkable. Ann Marie/mediastinum:  Unremarkable. Heart/pericardium:  Left atrial enlargement. There is no evidence of right heart strain. Axilla/supraclavicular regions:  Unremarkable. Osseous structures/subcutaneous tissues:  Mild multilevel degenerative spondylosis of the thoracic spine. Healed right seventh-ninth rib fractures. No acute osseous abnormality. Upper abdomen:  Redemonstrated diverticulum from the gastric fundus. Visualized portions of the upper abdomen are otherwise unremarkable.     IMPRESSION: 1.  No evidence of pulmonary embolism or other acute cardiopulmonary findings. 2.  Mild left atrial enlargement, consistent with recently diagnosed atrial fibrillation. 3.  Mild dilatation of main pulmonary artery as can be seen in the setting of underlying pulmonary hypertension. I have personally reviewed the images and modified the resident's report as necessary.        Assessment:   1. Heroin overdose  2. Alcohol intoxication  3. Altered mental status secondary to #1 and #2  4. New onset atrial fibrillation  5. Hypertension    Plans/Recommendations:   Patient found unconscious in  his car on brought to the emergency room for evaluation, admits to drinking beer earlier today and snored heroin in his car before passing out.  Mentation seems back to baseline.  Patient admits to history of hypertension but no AFib which was identified on admission.  Heart rate controlled overnight and this morning on metoprolol, plan was for discharge today but later on he became tachycardic with uncontrolled AFib fibrillation therefore metoprolol dose was increased  TSH elevated with normal free T4  Patient counseled on importance of alcohol and illicit drug cessation, he has been evaluated by Psychiatry earlier today.  Awaiting COVID-19 testing, if negative he will be able to be transferred out of the COVID Unit  Discharge canceled for now until heart rate better controlled     19-Feb-2024 19:50

## 2024-02-19 NOTE — ED PROVIDER NOTE - ATTENDING APP SHARED VISIT CONTRIBUTION OF CARE
59 y/o F with PMH of Hypotension, PSVT s/p ablation, COPD, Raynaud's Syndrome, SLE, Sjogren's Syndrome, Seizure Disorder, Hypothyroidism, PUD s/p GI Bleed, IBS, Hep-C, SCC s/p excision, Nicotine Dependence, and [id number] y/o F with PMH of Hypotension, PSVT s/p ablation, COPD, Raynaud's Syndrome, SLE, Sjogren's Syndrome, Seizure Disorder, Hypothyroidism, PUD s/p GI Bleed, IBS, Hep-C, SCC s/p excision, Nicotine Dependence, and Anxiety presenting with R flank pain   r/o pyelonephritis   r/o kidney stone   Mildly elevated LFTs--> patient s/p cholecystectomy. r/o retained stone  Will check screening labs UA, RUQ US, CT a/p   Disposition as per above evaluation.

## 2024-02-19 NOTE — ED PROVIDER NOTE - OBJECTIVE STATEMENT
57 y/o F with PMH of Hypotension, PSVT s/p ablation, COPD, Raynaud's Syndrome, SLE, Sjogren's Syndrome, Seizure Disorder, Hypothyroidism, PUD s/p GI Bleed, IBS, Hep-C, SCC s/p excision, Nicotine Dependence, and Anxiety Was sent to the ED by urgent care for possible pyelonephritis.  Patient reports she had a UTI 5 weeks ago and was prescribed Macrobid however she did not take it.  She developed worsened dysuria hematuria and right flank pain with a headache 1 week ago and started the Macrobid which she states she took for 5 days.  She denies any fever, vomiting, abdominal pain, chest pain, shortness of breath.  Patient has a fentanyl patch on the right shoulder that she reports is 3 days old and needs to be removed. She is also on oxycodone at home for chronic pain

## 2024-02-19 NOTE — ED PROVIDER NOTE - PROGRESS NOTE DETAILS
fentanyl patch was removed Signout to reevaluate after CAT scan.  CT with ectatic bile ducts and constipation.  Patient reevaluated no significant abdominal tenderness.  Patient sleeping comfortable, when awoken patient reports continued dysuria and hematuria with back pain.  UA with increased white blood cells.  Given symptoms we will treat with antibiotics.  Patient encouraged to follow-up with her outpatient provider and take medications for constipation. fentanyl patch was removed. Signed out to Dr. Newman pending CT results

## 2024-02-19 NOTE — ED ADULT TRIAGE NOTE - CHIEF COMPLAINT QUOTE
Patient brought in by ambulance from home complaining of generalized body pain with fentanyl patch to left shoulder and taking oxycodone

## 2024-02-19 NOTE — ED PROVIDER NOTE - CLINICAL SUMMARY MEDICAL DECISION MAKING FREE TEXT BOX
s/o pending CT to r/o retained stone, pyelonephritis, kidney stone. ct with ectatic  Intrahepatic and extrahepatic biliary ducts, no stone noted. UA with signs of infection and patient with symptoms with cover with antibiotics and have patient follow up as outpatient.

## 2024-02-19 NOTE — ED ADULT NURSE NOTE - NSFALLRISKINTERV_ED_ALL_ED

## 2024-02-19 NOTE — ED ADULT NURSE NOTE - OBJECTIVE STATEMENT
Pt presents to the ED s/p body aches, fentanyl patch on the right shoulder that was removed by NAIN Zabala and disposed.

## 2024-02-19 NOTE — ED PROVIDER NOTE - PATIENT PORTAL LINK FT
You can access the FollowMyHealth Patient Portal offered by [facility] by registering at the following website: [email or web address]. By joining [facility]’s FollowMyHealth portal, you will also be able to view your health information using other applications (apps) compatible with our system.

## 2024-02-19 NOTE — ED PROVIDER NOTE - CARE PROVIDER_API CALL
Oni Reyes  Gastroenterology  [address] [place] [address]  Phone: [phone]  Fax: [phone]  Follow Up Time: 4-6 Days

## 2024-02-19 NOTE — ED PROVIDER NOTE - NSFOLLOWUPINSTRUCTIONS_ED_ALL_ED_FT
Please follow up with your Primary Care Physician and any specialists as discussed.  Please take your medications as prescribed and or instructed.  If your symptoms persist or worsen, please seek care. Either return to the Emergency Department, go to urgent care or see your primary care doctor.  Please refer to general information and instructions attached or below:     Urinary Tract Infection, Adult  ImageA urinary tract infection (UTI) is an infection of any part of the urinary tract, which includes the kidneys, ureters, bladder, and urethra. These organs make, store, and get rid of urine in the body. UTI can be a bladder infection (cystitis) or kidney infection (pyelonephritis).    What are the causes?  This infection may be caused by fungi, viruses, or bacteria. Bacteria are the most common cause of UTIs. This condition can also be caused by repeated incomplete emptying of the bladder during urination.    What increases the risk?  This condition is more likely to develop if:    You ignore your need to urinate or hold urine for long periods of time.  You do not empty your bladder completely during urination.  You wipe back to front after urinating or having a bowel movement, if you are female.  You are uncircumcised, if you are male.  You are constipated.  You have a urinary catheter that stays in place (indwelling).  You have a weak defense (immune) system.  You have a medical condition that affects your bowels, kidneys, or bladder.  You have diabetes.  You take antibiotic medicines frequently or for long periods of time, and the antibiotics no longer work well against certain types of infections (antibiotic resistance).  You take medicines that irritate your urinary tract.  You are exposed to chemicals that irritate your urinary tract.  You are female.    What are the signs or symptoms?  Symptoms of this condition include:    Fever.  Frequent urination or passing small amounts of urine frequently.  Needing to urinate urgently.  Pain or burning with urination.  Urine that smells bad or unusual.  Cloudy urine.  Pain in the lower abdomen or back.  Trouble urinating.  Blood in the urine.  Vomiting or being less hungry than normal.  Diarrhea or abdominal pain.  Vaginal discharge, if you are female.    How is this diagnosed?  This condition is diagnosed with a medical history and physical exam. You will also need to provide a urine sample to test your urine. Other tests may be done, including:    Blood tests.  Sexually transmitted disease (STD) testing.    If you have had more than one UTI, a cystoscopy or imaging studies may be done to determine the cause of the infections.    How is this treated?  Treatment for this condition often includes a combination of two or more of the following:    Antibiotic medicine.  Other medicines to treat less common causes of UTI.  Over-the-counter medicines to treat pain.  Drinking enough water to stay hydrated.    Follow these instructions at home:  Take over-the-counter and prescription medicines only as told by your health care provider.  If you were prescribed an antibiotic, take it as told by your health care provider. Do not stop taking the antibiotic even if you start to feel better.  Avoid alcohol, caffeine, tea, and carbonated beverages. They can irritate your bladder.  Drink enough fluid to keep your urine clear or pale yellow.  Keep all follow-up visits as told by your health care provider. This is important.  ImageMake sure to:    Empty your bladder often and completely. Do not hold urine for long periods of time.  Empty your bladder before and after sex.  Wipe from front to back after a bowel movement if you are female. Use each tissue one time when you wipe.    Contact a health care provider if:  You have back pain.  You have a fever.  You feel nauseous or vomit.  Your symptoms do not get better after 3 days.  Your symptoms go away and then return.  Get help right away if:  You have severe back pain or lower abdominal pain.  You are vomiting and cannot keep down any medicines or water.  This information is not intended to replace advice given to you by your health care provider. Make sure you discuss any questions you have with your health care provider.   ====================================================================  Constipation    WHAT YOU NEED TO KNOW:    Constipation is when you have hard, dry bowel movements, or you go longer than usual between bowel movements.     DISCHARGE INSTRUCTIONS:    Return to the emergency department if:     You have blood in your bowel movements.      You have a fever and abdominal pain with the constipation.    Contact your healthcare provider if:     Your constipation gets worse.       You start to vomit.      You have questions or concerns about your condition or care.    Medicines:     Medicine such as a laxative may help relax and loosen your intestines to help you have a bowel movement. Your provider may recommend you only use laxatives for a short time. Long-term use may make your bowels dependent on the medicine.      Take your medicine as directed. Contact your healthcare provider if you think your medicine is not helping or if you have side effects. Tell him of her if you are allergic to any medicine. Keep a list of the medicines, vitamins, and herbs you take. Include the amounts, and when and why you take them. Bring the list or the pill bottles to follow-up visits. Carry your medicine list with you in case of an emergency.    Relieve constipation:     A suppository may be used to help soften your bowel movements. This may make them easier to pass. A suppository is guided into your rectum through your anus.Suppository for Constipation           An enema is liquid medicine used to clear bowel movement from your rectum. The medicine is put into your rectum through your anus.Enemas         Prevent constipation:     Drink liquids as directed. You may need to drink extra liquids to help soften and move your bowels. Ask how much liquid to drink each day and which liquids are best for you.       Eat high-fiber foods. This may help decrease constipation by adding bulk to your bowel movements. High-fiber foods include fruit, vegetables, whole-grain breads and cereals, and beans. Your healthcare provider or dietitian can help you create a high-fiber meal plan. Your provider may also recommend a fiber supplement if you cannot get enough fiber from food.            Exercise regularly. Regular physical activity can help stimulate your intestines. Ask which exercises are best for you.      Schedule a time each day to have a bowel movement. This may help train your body to have regular bowel movements. Bend forward while you are on the toilet to help move the bowel movement out. Sit on the toilet for at least 10 minutes, even if you do not have a bowel movement.     Follow up with your healthcare provider as directed: Write down your questions so you remember to ask them during your visits.

## 2024-02-19 NOTE — ED PROVIDER NOTE - PHYSICAL EXAMINATION
Gen: Well appearing thin female in NAD.   Head: atraumatic  Heart: s1/s2, RRR  Lung: CTA b/l  Abd: soft, NT/ND, no rebound or guarding, +RCVAT  Msk: no pedal edema. Pt ambulatory in the ED  Neuro: AAO x3, patient moving all extremity equally,  Skin: Normal for race. No rashes  Psych: Alert and oriented

## 2024-02-20 VITALS
TEMPERATURE: 98 F | HEART RATE: 69 BPM | RESPIRATION RATE: 16 BRPM | DIASTOLIC BLOOD PRESSURE: 71 MMHG | SYSTOLIC BLOOD PRESSURE: 100 MMHG | OXYGEN SATURATION: 97 %

## 2024-02-20 PROCEDURE — 99284 EMERGENCY DEPT VISIT MOD MDM: CPT | Mod: 25

## 2024-02-20 PROCEDURE — 96374 THER/PROPH/DIAG INJ IV PUSH: CPT | Mod: XU

## 2024-02-20 PROCEDURE — 71045 X-RAY EXAM CHEST 1 VIEW: CPT

## 2024-02-20 PROCEDURE — 96361 HYDRATE IV INFUSION ADD-ON: CPT

## 2024-02-20 PROCEDURE — 96375 TX/PRO/DX INJ NEW DRUG ADDON: CPT

## 2024-02-20 PROCEDURE — 87086 URINE CULTURE/COLONY COUNT: CPT

## 2024-02-20 PROCEDURE — 74177 CT ABD & PELVIS W/CONTRAST: CPT | Mod: 26,MA

## 2024-02-20 PROCEDURE — 80053 COMPREHEN METABOLIC PANEL: CPT

## 2024-02-20 PROCEDURE — 81001 URINALYSIS AUTO W/SCOPE: CPT

## 2024-02-20 PROCEDURE — 74177 CT ABD & PELVIS W/CONTRAST: CPT | Mod: MA

## 2024-02-20 PROCEDURE — 85025 COMPLETE CBC W/AUTO DIFF WBC: CPT

## 2024-02-20 PROCEDURE — 36415 COLL VENOUS BLD VENIPUNCTURE: CPT

## 2024-02-20 RX ORDER — CEFPODOXIME PROXETIL 100 MG
1 TABLET ORAL
Qty: 20 | Refills: 0
Start: 2024-02-20 | End: 2024-02-29

## 2024-02-20 RX ORDER — POLYETHYLENE GLYCOL 3350 17 G/17G
17 POWDER, FOR SOLUTION ORAL
Qty: 1 | Refills: 0
Start: 2024-02-20 | End: 2024-02-24

## 2024-02-20 RX ORDER — CEFTRIAXONE 500 MG/1
1000 INJECTION, POWDER, FOR SOLUTION INTRAMUSCULAR; INTRAVENOUS ONCE
Refills: 0 | Status: COMPLETED | OUTPATIENT
Start: 2024-02-20 | End: 2024-02-20

## 2024-02-20 RX ADMIN — CEFTRIAXONE 100 MILLIGRAM(S): 500 INJECTION, POWDER, FOR SOLUTION INTRAMUSCULAR; INTRAVENOUS at 03:06

## 2024-02-20 NOTE — ED ADULT NURSE REASSESSMENT NOTE - NS ED NURSE REASSESS COMMENT FT1
Pt upset she is being discharged at this time. Provider made aware, provider stated pt is cleared for discharge and is able to go home. Pt reports she has to call a cab, pt informed she can wait in the waiting room for her cab to arrive to take her home and if necessary we can call her a cab.

## 2024-02-21 LAB
CULTURE RESULTS: NO GROWTH — SIGNIFICANT CHANGE UP
SPECIMEN SOURCE: SIGNIFICANT CHANGE UP

## 2024-02-22 NOTE — PROGRESS NOTE ADULT - MINUTES
Physical Therapy  Cancellation/No-show Note  Patient Name:  Umberto Hanson  :  1981   Date:  2024  Cancelled visits to date: 2  No-shows to date:1    For today's appointment patient:  [x]  Cancelled  []  Rescheduled appointment  []  No-show     Reason given by patient:  []  Patient ill  []  Conflicting appointment  []  No transportation    [x]  Conflict with work  []  No reason given  []  Other:    Comments:      Electronically signed by:  Charlee Wilson PTA     2024, 4:51 PM     2024,4:52 PM   
Patient was on the VM to cancel todays appointment she is at work and will not make it on time.   
38

## 2024-03-05 ENCOUNTER — APPOINTMENT (OUTPATIENT)
Dept: ORTHOPEDIC SURGERY | Facility: CLINIC | Age: 59
End: 2024-03-05

## 2024-04-10 VITALS
WEIGHT: 76.4 LBS | HEIGHT: 59 IN | OXYGEN SATURATION: 97 % | DIASTOLIC BLOOD PRESSURE: 70 MMHG | HEART RATE: 91 BPM | BODY MASS INDEX: 15.4 KG/M2 | TEMPERATURE: 97.9 F | RESPIRATION RATE: 19 BRPM | SYSTOLIC BLOOD PRESSURE: 100 MMHG

## 2024-04-17 ENCOUNTER — APPOINTMENT (OUTPATIENT)
Dept: PAIN MANAGEMENT | Facility: CLINIC | Age: 59
End: 2024-04-17

## 2024-04-17 NOTE — PHYSICAL EXAM
[4___] : right hip flexion 4[unfilled]/5 [] : no ecchymosis [FreeTextEntry3] : significant spinal muscular atrophy [de-identified] : difficulty with any movement of the legs.

## 2024-04-17 NOTE — HISTORY OF PRESENT ILLNESS
[Lower back] : lower back [Left Leg] : left leg [Right Leg] : right leg [10] : 10 [Dull/Aching] : dull/aching [Radiating] : radiating [Shooting] : shooting [Constant] : constant [Household chores] : household chores [Leisure] : leisure [Sleep] : sleep [Social interactions] : social interactions [Heat] : heat [Injection therapy] : injection therapy [Nothing helps with pain getting better] : Nothing helps with pain getting better [Standing] : standing [Walking] : walking [Disabled] : Work status: disabled [] : no [FreeTextEntry1] : ankle. groin [FreeTextEntry6] : weakness.  [FreeTextEntry7] : ankle  [de-identified] : long period of time  [de-identified] : mri

## 2024-04-17 NOTE — HISTORY OF PRESENT ILLNESS
[Lower back] : lower back [Left Leg] : left leg [Right Leg] : right leg [10] : 10 [Dull/Aching] : dull/aching [Radiating] : radiating [Shooting] : shooting [Constant] : constant [Household chores] : household chores [Leisure] : leisure [Sleep] : sleep [Social interactions] : social interactions [Heat] : heat [Injection therapy] : injection therapy [Nothing helps with pain getting better] : Nothing helps with pain getting better [Standing] : standing [Walking] : walking [Disabled] : Work status: disabled [] : no [FreeTextEntry1] : ankle. groin [FreeTextEntry6] : weakness.  [FreeTextEntry7] : ankle  [de-identified] : long period of time  [de-identified] : mri

## 2024-04-17 NOTE — PHYSICAL EXAM
[4___] : right hip flexion 4[unfilled]/5 [] : no ecchymosis [FreeTextEntry3] : significant spinal muscular atrophy [de-identified] : difficulty with any movement of the legs.

## 2024-04-25 NOTE — H&P ADULT - BP NONINVASIVE DIASTOLIC (MM HG)
57 Neurosurgery follow up appointment date/time:  - please call the office to confirm appointment: [phone]    Wound Care:  - You may shower daily starting today   - you have a nasal dressing in place, please change as needed per ENT  -no baths, hot tubs, or saunas     Devices:  - Rolling walker for ambulation     Activity:  - fatigue is common after surgery, rest if you feel tired   - no bending, lifting, twisting or heavy lifting   - walking is recommended, ambulate as tolerated  - you may shower when you get home  - no soaking in a tub/pool/hot tub   - no driving within 24 hours of anesthesia or while taking prescription pain medications   - keep hydrated, drink plenty of water   - skullbase precautions: no nose blowing, sneeze with mouth open, no drinking out of a straw, no straining      Inpatient consults:  - ENT: Dr. Foreman (follow up on post operative day 14 for removal of lopez splints)   -Endocrine: Dr. Abad, follow up in 1 month for repeat labs     Please also follow up with your primary care doctor.     Pain Expectations:  - pain after surgery is expected  - please take pain meds as prescribed     Medications:  - Continue home medications as prescribed: Olanzapine 10mg daily, Amlodipine 5mg daily, Carvedilol 25mg every 12 hours, Losartan 25mg daily, Lantoprost daily drops     New Medications:  -Augmentin 875mg every 12 hours per ENT for 10 days (Can cause GI upset)  -Mills nasal spray 4 times daily     Pain Medications:   -Tylenol 1,000mg every 8 hours as needed for mild pain. Do not exceed maximum daily dose 4,000mg.   -Oxycodone 5mg every 6 hours as needed for severe pain (can cause sleepiness, constipation)     Call the office or come to ED if:  - any drainage or bleeding, abnormal taste in mouth, neurological change, fever (>101), chills, night sweats, syncope, nausea/vomiting, chest pain, shortness of breath      Playback:  - Your discharge instructions are on Playback health winter for your convenience.     WITHIN 24 HOURS OF DISCHARGE, PLEASE CONTACT NEURO PA  WITH ANY QUESTIONS OR CONCERNS: [phone]   OTHERWISE, PLEASE CALL THE OFFICE WITH ANY QUESTIONS OR CONCERNS: [phone]

## 2024-05-01 NOTE — PATIENT PROFILE ADULT. - PACKS PER DAY
Refill Requested:    Disp Refills Start End     methylpheniDATE (RITALIN) 5 MG tablet 30 tablet 0 4/3/2024 --    Sig - Route: Take 1 tablet by mouth every afternoon.            Controlled Med - Routed to Provider due to NO PROTOCOL. Orders have been prepped according to providers note.     Ordered date: 03/29/24 to start 04/03/24  Last RX dispensed (per PDMP): 04/05/24 03/07/24 note verified  Follow Up:Summer  No Show/Cancel:None  Next visit:6/18/2024    Routed to provider      
2

## 2024-05-13 ENCOUNTER — APPOINTMENT (OUTPATIENT)
Dept: PAIN MANAGEMENT | Facility: CLINIC | Age: 59
End: 2024-05-13

## 2024-05-13 NOTE — HISTORY OF PRESENT ILLNESS
[] : no [FreeTextEntry1] : ankle. groin [FreeTextEntry6] : weakness.  [FreeTextEntry7] : ankle  [de-identified] : long period of time  [de-identified] : mri

## 2024-05-13 NOTE — PHYSICAL EXAM
[] : no ecchymosis [FreeTextEntry3] : significant spinal muscular atrophy [de-identified] : difficulty with any movement of the legs.

## 2024-06-24 NOTE — PATIENT PROFILE ADULT - NSPROPTRIGHTBILLOFRIGHTS_GEN_A_NUR
patient
Follow up with your primary care doctor within 1 week  Follow up with an ophthalmologist if symptoms persist, office information attached please call to make an appointment  Apply Ofloxacin eye drops, 2 drops to each eye 4 times a day  Apply erythromycin ointment to lower eyelid at bedtime  Return to the ER with any worsening or concerning symptoms, vision changes, headache, dizziness, fever, swelling or any other concerns.

## 2024-08-12 NOTE — H&P ADULT - I WAS PHYSICALLY PRESENT FOR THE KEY PORTIONS OF THE EVALUATION AND MANAGEMENT (E/M) SERVICE PROVIDED.  I AGREE WITH THE ABOVE HISTORY, PHYSICAL, AND PLAN WHICH I HAVE REVIEWED AND EDITED WHERE APPROPRIATE
Subjective   Patient ID: Rachael Ruiz is a 57 y.o. female who presents for UTI.    UTI   This is a new problem. Associated symptoms include a discharge and frequency. Associated symptoms comments: odor.    Virtual or Telephone Consent    A telephone visit (audio only) between the patient (at the originating site) and the provider (at the distant site) was utilized to provide this telehealth service.   Verbal consent was requested and obtained from Rachael Ruiz on this date, 08/12/24 for a telehealth visit.       She has had a 10-day history of urinary frequency.  Denies any other symptoms including dysuria, urinary urgency, fever or abdominal pain.  No nausea or vomiting.  She is a diabetic and denies any recent elevated glucose readings  She had a urine culture done on 8/7 which showed greater than 100,000 E. coli  Has not been on any recent antibiotics    Review of Systems   Constitutional:  Negative for fever.   Genitourinary:  Positive for frequency.       Objective   There were no vitals taken for this visit.    Physical Exam exam not done due to today's visit being done over the telephone    Assessment/Plan   Assessment & Plan  Acute UTI    Orders:    nitrofurantoin, macrocrystal-monohydrate, (Macrobid) 100 mg capsule; Take 1 capsule (100 mg) by mouth 2 times a day for 7 days.    She presents today with a 10-day history of urinary frequency.  We reviewed her urine culture results from 8/7 which did confirm a UTI with greater than 100,000 E. coli.  We will start treatment with nitrofurantoin twice daily x 7 days.  This should be effective based on culture results.  Advised to call or follow-up in 5 to 7 days if symptoms or not improving  Total time spent today in telephone was between 5 and 10 minutes     Statement Selected

## 2024-08-26 ENCOUNTER — APPOINTMENT (OUTPATIENT)
Dept: PAIN MANAGEMENT | Facility: CLINIC | Age: 59
End: 2024-08-26
Payer: MEDICARE

## 2024-08-26 VITALS — WEIGHT: 82 LBS | BODY MASS INDEX: 17.69 KG/M2 | HEIGHT: 57 IN

## 2024-08-26 DIAGNOSIS — M54.16 RADICULOPATHY, LUMBAR REGION: ICD-10-CM

## 2024-08-26 PROCEDURE — 99215 OFFICE O/P EST HI 40 MIN: CPT | Mod: 25

## 2024-08-26 PROCEDURE — 96372 THER/PROPH/DIAG INJ SC/IM: CPT

## 2024-08-26 NOTE — HISTORY OF PRESENT ILLNESS
[Lower back] : lower back [Left Leg] : left leg [Right Leg] : right leg [10] : 10 [Dull/Aching] : dull/aching [Radiating] : radiating [Shooting] : shooting [Constant] : constant [Household chores] : household chores [Leisure] : leisure [Sleep] : sleep [Social interactions] : social interactions [Heat] : heat [Injection therapy] : injection therapy [Nothing helps with pain getting better] : Nothing helps with pain getting better [Standing] : standing [Walking] : walking [Disabled] : Work status: disabled [6] : 6 [Sitting] : sitting [] : no [FreeTextEntry1] : ankle. groin [FreeTextEntry6] : weakness.  [FreeTextEntry7] : ankle  [de-identified] : long period of time  [de-identified] : mri

## 2024-08-26 NOTE — PHYSICAL EXAM
[4___] : right hip flexion 4[unfilled]/5 [] : no ecchymosis [FreeTextEntry3] : significant spinal muscular atrophy [de-identified] : difficulty with any movement of the legs.

## 2024-08-26 NOTE — ASSESSMENT
[FreeTextEntry1] : After discussing various treatment options with the patient including but not limited to oral medications, physical therapy, exercise, modalities as well as interventional spinal injections, we have decided with the following plan:  1. toradol shot today.   2) Patient is presenting with acute/sub-acute radicular pain with impairment in ADLs and functionality.  The pain has not responded sufficiently to  conservative care including nsaid therapy and/or physical therapy.  There is no bleeding tendency, unstable medical condition, or systemic infection. The purpose of the spinal injections is to facilitate active therapy by providing short term relief through reduction of pain and inflammation.   Injections, by themselves, are not likely to provide long-term relief. Rather, active rehabilitation with modified work achieves long-term relief by increasing active ROM, strength and stability. Patient is presenting with acute/sub-acute radicular pain with impairment in ADLs and functionality.  The pain has not responded sufficiently to  conservative care including nsaid therapy and/or physical therapy.  There is no bleeding tendency, unstable medical condition, or systemic infection. The purpose of the spinal injections is to facilitate active therapy by providing short term relief through reduction of pain and inflammation.   Injections, by themselves, are not likely to provide long-term relief. Rather, active rehabilitation with modified work achieves long-term relief by increasing active ROM, strength and stability. B/L L4/5 TFESI if no relief recommend MBB  3) A CT is indicated as there has been failure of numerous conservative therapies over the last 6-8 weeks. these include but r/o fracture are not limited to medication therapy and physical therapy. It is recognized that repeat imaging studies and other tests may be warranted by the clinical course and/or to follow the progress of treatment in some cases. It may be of value to repeat diagnostic procedures (ie imaging studies) during the course of care to reassess or stage the pathology when there is progression of symptoms or findings, prior to surgical interventions and/or therapeutic injections when clinically indicated.

## 2024-08-26 NOTE — PHYSICAL EXAM
[4___] : right hip flexion 4[unfilled]/5 [] : no ecchymosis [FreeTextEntry3] : significant spinal muscular atrophy [de-identified] : difficulty with any movement of the legs.

## 2024-08-26 NOTE — HISTORY OF PRESENT ILLNESS
[Lower back] : lower back [Left Leg] : left leg [Right Leg] : right leg [10] : 10 [Dull/Aching] : dull/aching [Radiating] : radiating [Shooting] : shooting [Constant] : constant [Household chores] : household chores [Leisure] : leisure [Sleep] : sleep [Social interactions] : social interactions [Heat] : heat [Injection therapy] : injection therapy [Nothing helps with pain getting better] : Nothing helps with pain getting better [Standing] : standing [Walking] : walking [Disabled] : Work status: disabled [6] : 6 [Sitting] : sitting [] : no [FreeTextEntry1] : ankle. groin [FreeTextEntry6] : weakness.  [FreeTextEntry7] : ankle  [de-identified] : long period of time  [de-identified] : mri

## 2024-08-26 NOTE — PROCEDURE
[___ cc    1%] : Lidocaine ~Vcc of 1%  [Therapeutic Injection] : therapeutic injection [Left] : of the left [___ cc    30mg] : Ketorolac (Toradol) ~Vcc of 30 mg

## 2024-08-27 ENCOUNTER — APPOINTMENT (OUTPATIENT)
Dept: MRI IMAGING | Facility: CLINIC | Age: 59
End: 2024-08-27

## 2024-08-27 ENCOUNTER — APPOINTMENT (OUTPATIENT)
Facility: CLINIC | Age: 59
End: 2024-08-27
Payer: MEDICARE

## 2024-08-27 VITALS
DIASTOLIC BLOOD PRESSURE: 91 MMHG | HEIGHT: 57 IN | TEMPERATURE: 98.7 F | SYSTOLIC BLOOD PRESSURE: 108 MMHG | HEART RATE: 66 BPM | OXYGEN SATURATION: 99 % | RESPIRATION RATE: 19 BRPM | BODY MASS INDEX: 18.12 KG/M2 | WEIGHT: 84 LBS

## 2024-08-27 DIAGNOSIS — J38.3 OTHER DISEASES OF VOCAL CORDS: ICD-10-CM

## 2024-08-27 DIAGNOSIS — F17.210 NICOTINE DEPENDENCE, CIGARETTES, UNCOMPLICATED: ICD-10-CM

## 2024-08-27 DIAGNOSIS — F17.200 NICOTINE DEPENDENCE, UNSPECIFIED, UNCOMPLICATED: ICD-10-CM

## 2024-08-27 DIAGNOSIS — R56.9 UNSPECIFIED CONVULSIONS: ICD-10-CM

## 2024-08-27 DIAGNOSIS — J44.9 CHRONIC OBSTRUCTIVE PULMONARY DISEASE, UNSPECIFIED: ICD-10-CM

## 2024-08-27 PROCEDURE — 99214 OFFICE O/P EST MOD 30 MIN: CPT | Mod: 25

## 2024-08-27 PROCEDURE — 99204 OFFICE O/P NEW MOD 45 MIN: CPT | Mod: 25

## 2024-08-27 PROCEDURE — 99406 BEHAV CHNG SMOKING 3-10 MIN: CPT

## 2024-08-27 RX ORDER — FLUTICASONE FUROATE, UMECLIDINIUM BROMIDE AND VILANTEROL TRIFENATATE 200; 62.5; 25 UG/1; UG/1; UG/1
200-62.5-25 POWDER RESPIRATORY (INHALATION) DAILY
Qty: 1 | Refills: 5 | Status: DISCONTINUED | COMMUNITY
Start: 2024-04-29 | End: 2024-08-27

## 2024-08-27 RX ORDER — BUDESONIDE, GLYCOPYRROLATE, AND FORMOTEROL FUMARATE 160; 9; 4.8 UG/1; UG/1; UG/1
160-9-4.8 AEROSOL, METERED RESPIRATORY (INHALATION) TWICE DAILY
Qty: 1 | Refills: 0 | Status: ACTIVE | COMMUNITY
Start: 2024-08-27 | End: 1900-01-01

## 2024-08-27 RX ORDER — ALBUTEROL SULFATE 90 UG/1
108 (90 BASE) INHALANT RESPIRATORY (INHALATION)
Refills: 0 | Status: ACTIVE | COMMUNITY
Start: 2024-08-27

## 2024-08-27 RX ORDER — BUDESONIDE, GLYCOPYRROLATE, AND FORMOTEROL FUMARATE 160; 9; 4.8 UG/1; UG/1; UG/1
160-9-4.8 AEROSOL, METERED RESPIRATORY (INHALATION) TWICE DAILY
Qty: 1 | Refills: 5 | Status: ACTIVE | COMMUNITY
Start: 2024-04-10

## 2024-08-27 RX ORDER — IPRATROPIUM BROMIDE 0.5 MG/2.5ML
0.02 SOLUTION RESPIRATORY (INHALATION)
Qty: 1 | Refills: 5 | Status: ACTIVE | COMMUNITY
Start: 2024-08-27

## 2024-08-27 NOTE — HISTORY OF PRESENT ILLNESS
[Current] : current [>= 20 pack years] : >= 20 pack years [TextBox_4] : Patient with severe oxygen dependent COPD has some cough which is worse at night.  She is using oxygen 24 hours/day.  She is still smoking intermittently.  She had pedal edema and was evaluated by cardiology.

## 2024-08-27 NOTE — ASSESSMENT
[FreeTextEntry1] : Patient has severe oxygen dependent COPD as well as nicotine addiction.  Smoking cessation counseling given.  Patient advised to restart Breztri 2 puffs twice daily.  Will start Otay vera Ohtayvre nebulizer twice daily she will return in 2 months for a PFT and visit

## 2024-08-27 NOTE — PHYSICAL EXAM
[Normal Oropharynx] : normal oropharynx [Normal Appearance] : normal appearance [No Neck Mass] : no neck mass [Normal Rate/Rhythm] : normal rate/rhythm [Normal S1, S2] : normal s1, s2 [No Murmurs] : no murmurs [Normal to Percussion] : normal to percussion [No Abnormalities] : no abnormalities [Benign] : benign [No Clubbing] : no clubbing [No Cyanosis] : no cyanosis [No Edema] : no edema [Normal Color/ Pigmentation] : normal color/ pigmentation [TextBox_2] : thin pt. on O2 mild sob [TextBox_68] : Decreased bs , wheezing on forced expiration

## 2024-08-27 NOTE — COUNSELING
[Cessation strategies including cessation program discussed] : Cessation strategies including cessation program discussed [Encouraged to pick a quit date and identify support needed to quit] : Encouraged to pick a quit date and identify support needed to quit [Yes] : Willing to quit smoking [FreeTextEntry1] : 10

## 2024-08-28 RX ORDER — FLUTICASONE FUROATE, UMECLIDINIUM BROMIDE AND VILANTEROL TRIFENATATE 200; 62.5; 25 UG/1; UG/1; UG/1
200-62.5-25 POWDER RESPIRATORY (INHALATION) DAILY
Qty: 1 | Refills: 5 | Status: ACTIVE | COMMUNITY
Start: 2024-08-28 | End: 1900-01-01

## 2024-09-06 ENCOUNTER — APPOINTMENT (OUTPATIENT)
Age: 59
End: 2024-09-06
Payer: MEDICARE

## 2024-09-06 PROCEDURE — 64483 NJX AA&/STRD TFRM EPI L/S 1: CPT | Mod: 50

## 2024-09-10 ENCOUNTER — APPOINTMENT (OUTPATIENT)
Dept: PAIN MANAGEMENT | Facility: CLINIC | Age: 59
End: 2024-09-10

## 2024-09-10 NOTE — PROCEDURE
[Therapeutic Injection] : therapeutic injection [Left] : of the left [___ cc    30mg] : Ketorolac (Toradol) ~Vcc of 30 mg

## 2024-09-16 NOTE — HISTORY OF PRESENT ILLNESS
[Lower back] : lower back [Left Leg] : left leg [Right Leg] : right leg [10] : 10 [6] : 6 [Dull/Aching] : dull/aching [Radiating] : radiating [Shooting] : shooting [Constant] : constant [Household chores] : household chores [Leisure] : leisure [Sleep] : sleep [Heat] : heat [Injection therapy] : injection therapy [Nothing helps with pain getting better] : Nothing helps with pain getting better [Sitting] : sitting [Standing] : standing [Walking] : walking [Disabled] : Work status: disabled [] : no [FreeTextEntry1] : ankle. groin [FreeTextEntry6] : weakness.  [FreeTextEntry7] : ankle  [de-identified] : long period of time  [de-identified] : mri

## 2024-09-16 NOTE — PHYSICAL EXAM
[4___] : right hip flexion 4[unfilled]/5 [] : no ecchymosis [FreeTextEntry3] : significant spinal muscular atrophy [de-identified] : difficulty with any movement of the legs.

## 2024-09-24 ENCOUNTER — APPOINTMENT (OUTPATIENT)
Dept: PAIN MANAGEMENT | Facility: CLINIC | Age: 59
End: 2024-09-24

## 2024-09-29 NOTE — ED PROVIDER NOTE - NS ED NOTE AC HIGH RISK COUNTRIES
"Goal Outcome Evaluation:  Patient's VSS. Fundus is firm 1 below umbilicus with scant lochia. C/S dressing is clean, dry and intact. Patient is now up ad natalie and performing self-cares independently. Reporting incisional and uterine cramping pain being treated with scheduled tylenol and toradol. See this RN's progress note and provider notification regarding pain management. Patient's spouse, Gino, is in the room with her and infant, attentive/supportive and participating in cares.    Patient aware to fill out birth certificate and PPMA; has not yet completed them.    Patient's AM Hgb is 7.7, a delta drop from 11.1. Dr. [place] notified, no orders that this time. Patient denies lightheadedness and/or shortness of breath.     /79 (BP Location: Right arm, Patient Position: Semi-Whelan's, Cuff Size: Adult Regular)   Pulse 83   Temp 98.3  F (36.8  C) (Oral)   Resp 18   Ht 1.537 m (5' 0.5\")   Wt 85.3 kg (188 lb)   LMP  (LMP Unknown)   SpO2 96%   Breastfeeding Unknown   BMI 36.11 kg/m      Problem: Adult Inpatient Plan of Care  Goal: Optimal Comfort and Wellbeing  2024 by Magy Galeano RN  Outcome: Progressing     Problem: Postpartum ( Delivery)  Goal: Successful Parent Role Transition  2024 by Magy Galeano RN  Outcome: Progressing     Problem: Postpartum ( Delivery)  Goal: Hemostasis  2024 by Magy Galeano RN  Outcome: Progressing     Problem: Postpartum ( Delivery)  Goal: Nausea and Vomiting Relief  2024 by Magy Galeano RN  Outcome: Progressing     Problem: Breastfeeding  Goal: Effective Breastfeeding  Outcome: Progressing       Plan of Care Reviewed With: patient, spouse    Overall Patient Progress: improvingOverall Patient Progress: improving           " No

## 2024-10-03 NOTE — ASU PREOP CHECKLIST - BP NONINVASIVE DIASTOLIC (MM HG)
Preventive Care Visit  [facility]  VIJAYA Veloz CNP, Family Medicine  Oct 3, 2024      Assessment & Plan     Routine general medical examination at a health care facility  On exam, pleasant 34 year old patient. History reviewed. No pertinent past medical history. No acute or concerning findings on today's physical exam. Vital signs at goal.  Discussed BMI elevation, patient is going to work on calorie counting and increased exercise. Follow-up PRN. We did discuss phentermine, Contrave and GLP-1 today, patient will reach out if wanting to try. Annual visit in one year, sooner for acute concerns.   - REVIEW OF HEALTH MAINTENANCE PROTOCOL ORDERS    Screening for hyperlipidemia  - Lipid panel reflex to direct LDL Fasting    Screening for diabetes mellitus  - Basic metabolic panel  (Ca, Cl, CO2, Creat, Gluc, K, Na, BUN)    Encounter for immunization  Patient texted mom who reports he was vaccinated for all routine vaccines as a child, declines Hep B vaccine.   - TDAP 10-64Y (ADACEL,BOOSTRIX)  - INFLUENZA VACCINE,SPLIT VIRUS,TRIVALENT,PF(FLUZONE)    Pain of right thumb  Bony deformity at the proximal joint of the right thumb, secondary to injury several weeks ago. Discussed that based on timeline, likely no benefit from x-ray. Pain with adduction of the thumb. CMS intact, no evidence of compromise. No pain at rest. Full ROM. Discussed orthopedic referral if persisting for possible MRI to evaluate tendon, patent declines at this time. Follow-up PRN. Can use ice/heat/NSAID or tylenol.     Counseling  Appropriate preventive services were addressed with this patient via screening, questionnaire, or discussion as appropriate for fall prevention, nutrition, physical activity, Tobacco-use cessation, social engagement, weight loss and cognition.  Checklist reviewing preventive services available has been given to the patient.  Reviewed patient's diet, addressing concerns and/or questions.   He is  "at risk for lack of exercise and has been provided with information to increase physical activity for the benefit of his well-being.   He is at risk for psychosocial distress and has been provided with information to reduce risk.     Ev De Jesus is a 34 year old, presenting for the following:  Physical (Patient states he would like to get labs done today - BMI. )        10/3/2024     3:01 PM   Additional Questions   Roomed by Akash FELIX MA        Health Care Directive  Patient does not have a Health Care Directive or Living Will: Discussed advance care planning with patient; however, patient declined at this time.    HPI    Bent right thumb back a few weeks ago. Pain with this. Couldn't use the space bar. Hurts with ROM at this time. Has full ROM. No appreciated weakness, \"not too much.\" Sensation intact. Injury with trying to catch a dish.     Exercising two days per week. Treadmill and weights.     Diet is at home, trying to not eat fast food. Cereal in the morning. Lunch is a roast beef sand which. Supper is meat veggie and potato.     Would like to use 30 lbs. Stuck at 245-249.     Works at a desk job.         10/3/2024   General Health   How would you rate your overall physical health? Good   Feel stress (tense, anxious, or unable to sleep) Only a little      (!) STRESS CONCERN      10/3/2024   Nutrition   Three or more servings of calcium each day? (!) NO   Diet: Regular (no restrictions)   How many servings of fruit and vegetables per day? (!) 0-1   How many sweetened beverages each day? 0-1          10/3/2024   Exercise   Days per week of moderate/strenous exercise 2 days      (!) EXERCISE CONCERN      10/3/2024   Social Factors   Frequency of gathering with friends or relatives Once a week   Worry food won't last until get money to buy more No   Food not last or not have enough money for food? No   Do you have housing? (Housing is defined as stable permanent housing and does not include staying ouside " in a car, in a tent, in an abandoned building, in an overnight shelter, or couch-surfing.) Yes   Are you worried about losing your housing? No   Lack of transportation? No   Unable to get utilities (heat,electricity)? No          10/3/2024   Dental   Dentist two times every year? Yes          10/3/2024   TB Screening   Were you born outside of the [place]? No      Today's PHQ-2 Score:       10/3/2024     2:55 PM   PHQ-2 ( 1999 [employer])   Q1: Little interest or pleasure in doing things 0   Q2: Feeling down, depressed or hopeless 0   PHQ-2 Score 0   Q1: Little interest or pleasure in doing things Not at all   Q2: Feeling down, depressed or hopeless Not at all   PHQ-2 Score 0         10/3/2024   Substance Use   Alcohol more than 3/day or more than 7/wk No   Do you use any other substances recreationally? No      Social History     Tobacco Use    Smoking status: Never    Smokeless tobacco: Never   Vaping Use    Vaping status: Never Used   Substance Use Topics    Alcohol use: Yes     Comment: 1 drink per week    Drug use: No         10/3/2024   STI Screening   New sexual partner(s) since last STI/HIV test? No          10/3/2024   Contraception/Family Planning   Questions about contraception or family planning No     Reviewed and updated as needed this visit by Provider   Tobacco   Meds   Med Hx  Surg Hx  Fam Hx  Soc Hx Sexual Activity          Review of Systems   Eyes:  Negative for visual disturbance.        History of cataract surgery on the left, lasik on the right   Respiratory:  Negative for cough and shortness of breath.    Cardiovascular:  Negative for chest pain, palpitations and leg swelling.   Gastrointestinal:  Negative for blood in stool, constipation and diarrhea.   Genitourinary:  Negative for difficulty urinating.        No testicular changes   Musculoskeletal:  Positive for arthralgias (Right thumb pain.).   Neurological:  Negative for headaches.         Objective    Exam  /74 (BP Location: Right  "arm, Patient Position: Sitting, Cuff Size: Adult Large)   Pulse 83   Temp 98.3  F (36.8  C) (Oral)   Resp 16   Ht 1.75 m (5' 8.9\")   Wt 111.8 kg (246 lb 6.4 oz)   SpO2 96%   BMI 36.49 kg/m     Estimated body mass index is 36.49 kg/m  as calculated from the following:    Height as of this encounter: 1.75 m (5' 8.9\").    Weight as of this encounter: 111.8 kg (246 lb 6.4 oz).    Physical Exam  Vitals reviewed.   Constitutional:       General: He is not in acute distress.  HENT:      Right Ear: Tympanic membrane, ear canal and external ear normal.      Left Ear: Tympanic membrane, ear canal and external ear normal.   Eyes:      Extraocular Movements: Extraocular movements intact.      Pupils: Pupils are equal, round, and reactive to light.   Neck:      Thyroid: No thyroid mass, thyromegaly or thyroid tenderness.   Cardiovascular:      Rate and Rhythm: Normal rate and regular rhythm.      Pulses:           Radial pulses are 2+ on the right side and 2+ on the left side.        Posterior tibial pulses are 2+ on the right side and 2+ on the left side.      Heart sounds: Normal heart sounds. No murmur heard.  Pulmonary:      Effort: Pulmonary effort is normal. No respiratory distress.      Breath sounds: No wheezing.   Abdominal:      General: Abdomen is flat. Bowel sounds are normal. There is no distension.   Musculoskeletal:         General: Normal range of motion.      Right hand: Deformity present. No tenderness or bony tenderness. Normal range of motion. Normal strength. Normal sensation. Normal capillary refill. Normal pulse.      Cervical back: Normal range of motion. No rigidity. Normal range of motion.      Right lower leg: No edema.      Left lower leg: No edema.      Comments: Pain with adduction of the thumb. Bony deformity appreciated at the base of the thumb.    Lymphadenopathy:      Cervical: No cervical adenopathy.   Neurological:      General: No focal deficit present.      Mental Status: He is alert. "      Cranial Nerves: No cranial nerve deficit.      Sensory: No sensory deficit.      Motor: No weakness.      Gait: Gait normal.   Psychiatric:         Mood and Affect: Mood normal.         Thought Content: Thought content normal.       At the end of the visit, I confirmed understanding of what was discussed. Neal has no further questions or concerns that were brought up at this time.      Magan Youngblood DNP, APRN, FNP-C       47

## 2024-10-16 NOTE — ED ADULT NURSE NOTE - SEVERITY
Cabozantinib script called to [facility] pharmacist Dai [phone]   MODERATE

## 2024-10-21 ENCOUNTER — APPOINTMENT (OUTPATIENT)
Dept: MRI IMAGING | Facility: CLINIC | Age: 59
End: 2024-10-21

## 2024-10-21 ENCOUNTER — RX RENEWAL (OUTPATIENT)
Age: 59
End: 2024-10-21

## 2024-10-21 ENCOUNTER — APPOINTMENT (OUTPATIENT)
Dept: ORTHOPEDIC SURGERY | Facility: CLINIC | Age: 59
End: 2024-10-21
Payer: MEDICARE

## 2024-10-21 VITALS — WEIGHT: 84 LBS | HEIGHT: 57 IN | BODY MASS INDEX: 18.12 KG/M2

## 2024-10-21 PROCEDURE — 99214 OFFICE O/P EST MOD 30 MIN: CPT

## 2024-10-21 PROCEDURE — 72110 X-RAY EXAM L-2 SPINE 4/>VWS: CPT

## 2024-10-21 RX ORDER — PREDNISONE 20 MG/1
20 TABLET ORAL
Qty: 21 | Refills: 2 | Status: ACTIVE | COMMUNITY
Start: 2024-10-21 | End: 1900-01-01

## 2024-10-22 ENCOUNTER — APPOINTMENT (OUTPATIENT)
Dept: PAIN MANAGEMENT | Facility: CLINIC | Age: 59
End: 2024-10-22

## 2024-10-22 ENCOUNTER — APPOINTMENT (OUTPATIENT)
Dept: MRI IMAGING | Facility: CLINIC | Age: 59
End: 2024-10-22

## 2024-10-22 DIAGNOSIS — M54.16 RADICULOPATHY, LUMBAR REGION: ICD-10-CM

## 2024-10-22 PROCEDURE — 72148 MRI LUMBAR SPINE W/O DYE: CPT | Mod: MH

## 2024-10-22 PROCEDURE — 99214 OFFICE O/P EST MOD 30 MIN: CPT

## 2024-10-28 ENCOUNTER — APPOINTMENT (OUTPATIENT)
Dept: ORTHOPEDIC SURGERY | Facility: CLINIC | Age: 59
End: 2024-10-28
Payer: MEDICARE

## 2024-10-28 VITALS — WEIGHT: 84 LBS | HEIGHT: 57 IN | BODY MASS INDEX: 18.12 KG/M2

## 2024-10-28 DIAGNOSIS — M84.48XA PATHOLOGICAL FRACTURE, OTHER SITE, INITIAL ENCOUNTER FOR FRACTURE: ICD-10-CM

## 2024-10-28 PROCEDURE — 99213 OFFICE O/P EST LOW 20 MIN: CPT

## 2024-11-12 DIAGNOSIS — M25.552 PAIN IN LEFT HIP: ICD-10-CM

## 2024-11-15 ENCOUNTER — APPOINTMENT (OUTPATIENT)
Dept: INTERVENTIONAL RADIOLOGY/VASCULAR | Facility: CLINIC | Age: 59
End: 2024-11-15
Payer: MEDICARE

## 2024-11-15 VITALS — BODY MASS INDEX: 17.26 KG/M2 | WEIGHT: 80 LBS | HEIGHT: 57 IN

## 2024-11-15 DIAGNOSIS — M54.16 RADICULOPATHY, LUMBAR REGION: ICD-10-CM

## 2024-11-15 DIAGNOSIS — J43.9 EMPHYSEMA, UNSPECIFIED: ICD-10-CM

## 2024-11-15 PROCEDURE — 99205 OFFICE O/P NEW HI 60 MIN: CPT

## 2024-11-15 RX ORDER — CELECOXIB 50 MG/1
CAPSULE ORAL
Refills: 0 | Status: ACTIVE | COMMUNITY

## 2024-11-15 RX ORDER — MECLIZINE HYDROCHLORIDE 12.5 MG/1
12.5 TABLET ORAL
Refills: 0 | Status: ACTIVE | COMMUNITY

## 2024-11-21 ENCOUNTER — NON-APPOINTMENT (OUTPATIENT)
Age: 59
End: 2024-11-21

## 2024-12-02 ENCOUNTER — NON-APPOINTMENT (OUTPATIENT)
Age: 59
End: 2024-12-02

## 2024-12-05 ENCOUNTER — APPOINTMENT (OUTPATIENT)
Dept: ORTHOPEDIC SURGERY | Facility: CLINIC | Age: 59
End: 2024-12-05

## 2024-12-06 ENCOUNTER — INPATIENT (INPATIENT)
Facility: HOSPITAL | Age: 59
LOS: 7 days | Discharge: HOME | DRG: 641 | End: 2024-12-14
Attending: HOSPITALIST | Admitting: INTERNAL MEDICINE
Payer: MEDICARE

## 2024-12-06 VITALS
HEART RATE: 63 BPM | TEMPERATURE: 98 F | OXYGEN SATURATION: 100 % | SYSTOLIC BLOOD PRESSURE: 128 MMHG | RESPIRATION RATE: 18 BRPM | HEIGHT: 60 IN | DIASTOLIC BLOOD PRESSURE: 74 MMHG | WEIGHT: 110.01 LBS

## 2024-12-06 DIAGNOSIS — S32.509A UNSPECIFIED FRACTURE OF UNSPECIFIED PUBIS, INITIAL ENCOUNTER FOR CLOSED FRACTURE: ICD-10-CM

## 2024-12-06 DIAGNOSIS — Z95.818 PRESENCE OF OTHER CARDIAC IMPLANTS AND GRAFTS: Chronic | ICD-10-CM

## 2024-12-06 DIAGNOSIS — K52.9 NONINFECTIVE GASTROENTERITIS AND COLITIS, UNSPECIFIED: ICD-10-CM

## 2024-12-06 DIAGNOSIS — S69.91XD UNSPECIFIED INJURY OF RIGHT WRIST, HAND AND FINGER(S), SUBSEQUENT ENCOUNTER: Chronic | ICD-10-CM

## 2024-12-06 DIAGNOSIS — K82.9 DISEASE OF GALLBLADDER, UNSPECIFIED: Chronic | ICD-10-CM

## 2024-12-06 DIAGNOSIS — Z98.890 OTHER SPECIFIED POSTPROCEDURAL STATES: Chronic | ICD-10-CM

## 2024-12-06 DIAGNOSIS — M25.9 JOINT DISORDER, UNSPECIFIED: Chronic | ICD-10-CM

## 2024-12-06 DIAGNOSIS — J38.1 POLYP OF VOCAL CORD AND LARYNX: Chronic | ICD-10-CM

## 2024-12-06 DIAGNOSIS — Z87.81 PERSONAL HISTORY OF (HEALED) TRAUMATIC FRACTURE: ICD-10-CM

## 2024-12-06 DIAGNOSIS — J44.9 CHRONIC OBSTRUCTIVE PULMONARY DISEASE, UNSPECIFIED: ICD-10-CM

## 2024-12-06 DIAGNOSIS — E03.9 HYPOTHYROIDISM, UNSPECIFIED: ICD-10-CM

## 2024-12-06 DIAGNOSIS — Z29.9 ENCOUNTER FOR PROPHYLACTIC MEASURES, UNSPECIFIED: ICD-10-CM

## 2024-12-06 DIAGNOSIS — E87.1 HYPO-OSMOLALITY AND HYPONATREMIA: ICD-10-CM

## 2024-12-06 LAB
ALBUMIN SERPL ELPH-MCNC: 3.4 G/DL — SIGNIFICANT CHANGE UP (ref 3.3–5)
ALP SERPL-CCNC: 138 U/L — HIGH (ref 40–120)
ALT FLD-CCNC: 20 U/L — SIGNIFICANT CHANGE UP (ref 12–78)
ANION GAP SERPL CALC-SCNC: 14 MMOL/L — SIGNIFICANT CHANGE UP (ref 5–17)
APPEARANCE UR: CLEAR — SIGNIFICANT CHANGE UP
APTT BLD: 31.4 SEC — SIGNIFICANT CHANGE UP (ref 24.5–35.6)
AST SERPL-CCNC: 16 U/L — SIGNIFICANT CHANGE UP (ref 15–37)
BASOPHILS # BLD AUTO: 0.04 K/UL — SIGNIFICANT CHANGE UP (ref 0–0.2)
BASOPHILS NFR BLD AUTO: 0.4 % — SIGNIFICANT CHANGE UP (ref 0–2)
BILIRUB SERPL-MCNC: 0.7 MG/DL — SIGNIFICANT CHANGE UP (ref 0.2–1.2)
BILIRUB UR-MCNC: NEGATIVE — SIGNIFICANT CHANGE UP
BUN SERPL-MCNC: 6 MG/DL — LOW (ref 7–23)
CALCIUM SERPL-MCNC: 8.6 MG/DL — SIGNIFICANT CHANGE UP (ref 8.5–10.1)
CHLORIDE SERPL-SCNC: 81 MMOL/L — LOW (ref 96–108)
CO2 SERPL-SCNC: 20 MMOL/L — LOW (ref 22–31)
COLOR SPEC: YELLOW — SIGNIFICANT CHANGE UP
CREAT SERPL-MCNC: 0.43 MG/DL — LOW (ref 0.5–1.3)
DIFF PNL FLD: NEGATIVE — SIGNIFICANT CHANGE UP
EGFR: 111 ML/MIN/1.73M2 — SIGNIFICANT CHANGE UP
EOSINOPHIL # BLD AUTO: 0.02 K/UL — SIGNIFICANT CHANGE UP (ref 0–0.5)
EOSINOPHIL NFR BLD AUTO: 0.2 % — SIGNIFICANT CHANGE UP (ref 0–6)
ETHANOL SERPL-MCNC: <10 MG/DL — SIGNIFICANT CHANGE UP (ref 0–10)
GLUCOSE SERPL-MCNC: 83 MG/DL — SIGNIFICANT CHANGE UP (ref 70–99)
GLUCOSE UR QL: NEGATIVE MG/DL — SIGNIFICANT CHANGE UP
HCT VFR BLD CALC: 34.6 % — SIGNIFICANT CHANGE UP (ref 34.5–45)
HGB BLD-MCNC: 12.9 G/DL — SIGNIFICANT CHANGE UP (ref 11.5–15.5)
IMM GRANULOCYTES NFR BLD AUTO: 1.6 % — HIGH (ref 0–0.9)
INR BLD: 1.05 RATIO — SIGNIFICANT CHANGE UP (ref 0.85–1.16)
KETONES UR-MCNC: 15 MG/DL
LEUKOCYTE ESTERASE UR-ACNC: ABNORMAL
LIDOCAIN IGE QN: 23 U/L — SIGNIFICANT CHANGE UP (ref 13–75)
LYMPHOCYTES # BLD AUTO: 1.78 K/UL — SIGNIFICANT CHANGE UP (ref 1–3.3)
LYMPHOCYTES # BLD AUTO: 17.4 % — SIGNIFICANT CHANGE UP (ref 13–44)
MCHC RBC-ENTMCNC: 32.2 PG — SIGNIFICANT CHANGE UP (ref 27–34)
MCHC RBC-ENTMCNC: 37.3 G/DL — HIGH (ref 32–36)
MCV RBC AUTO: 86.3 FL — SIGNIFICANT CHANGE UP (ref 80–100)
MONOCYTES # BLD AUTO: 0.86 K/UL — SIGNIFICANT CHANGE UP (ref 0–0.9)
MONOCYTES NFR BLD AUTO: 8.4 % — SIGNIFICANT CHANGE UP (ref 2–14)
NEUTROPHILS # BLD AUTO: 7.38 K/UL — SIGNIFICANT CHANGE UP (ref 1.8–7.4)
NEUTROPHILS NFR BLD AUTO: 72 % — SIGNIFICANT CHANGE UP (ref 43–77)
NITRITE UR-MCNC: NEGATIVE — SIGNIFICANT CHANGE UP
NRBC # BLD: 0 /100 WBCS — SIGNIFICANT CHANGE UP (ref 0–0)
NT-PROBNP SERPL-SCNC: 374 PG/ML — HIGH (ref 0–125)
PCP SPEC-MCNC: SIGNIFICANT CHANGE UP
PH UR: 7 — SIGNIFICANT CHANGE UP (ref 5–8)
PLATELET # BLD AUTO: 435 K/UL — HIGH (ref 150–400)
POTASSIUM SERPL-MCNC: 4.1 MMOL/L — SIGNIFICANT CHANGE UP (ref 3.5–5.3)
POTASSIUM SERPL-SCNC: 4.1 MMOL/L — SIGNIFICANT CHANGE UP (ref 3.5–5.3)
PROT SERPL-MCNC: 6.4 G/DL — SIGNIFICANT CHANGE UP (ref 6–8.3)
PROT UR-MCNC: NEGATIVE MG/DL — SIGNIFICANT CHANGE UP
PROTHROM AB SERPL-ACNC: 12.4 SEC — SIGNIFICANT CHANGE UP (ref 9.9–13.4)
RBC # BLD: 4.01 M/UL — SIGNIFICANT CHANGE UP (ref 3.8–5.2)
RBC # FLD: 12.3 % — SIGNIFICANT CHANGE UP (ref 10.3–14.5)
SODIUM SERPL-SCNC: 115 MMOL/L — CRITICAL LOW (ref 135–145)
SP GR SPEC: 1.04 — HIGH (ref 1–1.03)
TROPONIN I, HIGH SENSITIVITY RESULT: 7 NG/L — SIGNIFICANT CHANGE UP
UROBILINOGEN FLD QL: 0.2 MG/DL — SIGNIFICANT CHANGE UP (ref 0.2–1)
WBC # BLD: 10.24 K/UL — SIGNIFICANT CHANGE UP (ref 3.8–10.5)
WBC # FLD AUTO: 10.24 K/UL — SIGNIFICANT CHANGE UP (ref 3.8–10.5)

## 2024-12-06 PROCEDURE — 99285 EMERGENCY DEPT VISIT HI MDM: CPT | Mod: FS

## 2024-12-06 PROCEDURE — 74177 CT ABD & PELVIS W/CONTRAST: CPT | Mod: 26,MC

## 2024-12-06 PROCEDURE — 70450 CT HEAD/BRAIN W/O DYE: CPT | Mod: 26,MC

## 2024-12-06 PROCEDURE — 71045 X-RAY EXAM CHEST 1 VIEW: CPT | Mod: 26

## 2024-12-06 PROCEDURE — 99222 1ST HOSP IP/OBS MODERATE 55: CPT | Mod: GC

## 2024-12-06 PROCEDURE — 93010 ELECTROCARDIOGRAM REPORT: CPT

## 2024-12-06 RX ORDER — HYDROMORPHONE HYDROCHLORIDE 2 MG/1
0.5 TABLET ORAL EVERY 4 HOURS
Refills: 0 | Status: DISCONTINUED | OUTPATIENT
Start: 2024-12-06 | End: 2024-12-07

## 2024-12-06 RX ORDER — ACETAMINOPHEN 500MG 500 MG/1
650 TABLET, COATED ORAL EVERY 6 HOURS
Refills: 0 | Status: DISCONTINUED | OUTPATIENT
Start: 2024-12-06 | End: 2024-12-14

## 2024-12-06 RX ORDER — NALOXONE HCL 0.4 MG/ML
0.4 AMPUL (ML) INJECTION ONCE
Refills: 0 | Status: DISCONTINUED | OUTPATIENT
Start: 2024-12-06 | End: 2024-12-14

## 2024-12-06 RX ORDER — SODIUM CHLORIDE 9 MG/ML
1000 INJECTION, SOLUTION INTRAMUSCULAR; INTRAVENOUS; SUBCUTANEOUS
Refills: 0 | Status: DISCONTINUED | OUTPATIENT
Start: 2024-12-06 | End: 2024-12-07

## 2024-12-06 RX ORDER — HYDROMORPHONE HYDROCHLORIDE 2 MG/1
0.2 TABLET ORAL EVERY 4 HOURS
Refills: 0 | Status: DISCONTINUED | OUTPATIENT
Start: 2024-12-06 | End: 2024-12-07

## 2024-12-06 RX ORDER — ENOXAPARIN SODIUM 30 MG/.3ML
40 INJECTION SUBCUTANEOUS EVERY 24 HOURS
Refills: 0 | Status: DISCONTINUED | OUTPATIENT
Start: 2024-12-06 | End: 2024-12-14

## 2024-12-06 RX ORDER — PIPERACILLIN SODIUM AND TAZOBACTAM SODIUM 4; .5 G/20ML; G/20ML
3.38 INJECTION, POWDER, LYOPHILIZED, FOR SOLUTION INTRAVENOUS ONCE
Refills: 0 | Status: COMPLETED | OUTPATIENT
Start: 2024-12-06 | End: 2024-12-06

## 2024-12-06 RX ORDER — ACETAMINOPHEN 500MG 500 MG/1
750 TABLET, COATED ORAL ONCE
Refills: 0 | Status: COMPLETED | OUTPATIENT
Start: 2024-12-06 | End: 2024-12-06

## 2024-12-06 RX ORDER — PIPERACILLIN SODIUM AND TAZOBACTAM SODIUM 4; .5 G/20ML; G/20ML
3.38 INJECTION, POWDER, LYOPHILIZED, FOR SOLUTION INTRAVENOUS EVERY 8 HOURS
Refills: 0 | Status: DISCONTINUED | OUTPATIENT
Start: 2024-12-07 | End: 2024-12-08

## 2024-12-06 RX ORDER — SENNOSIDES 8.6 MG
2 TABLET ORAL AT BEDTIME
Refills: 0 | Status: DISCONTINUED | OUTPATIENT
Start: 2024-12-06 | End: 2024-12-07

## 2024-12-06 RX ORDER — ALBUTEROL 90 MCG
2 AEROSOL (GRAM) INHALATION EVERY 6 HOURS
Refills: 0 | Status: DISCONTINUED | OUTPATIENT
Start: 2024-12-06 | End: 2024-12-14

## 2024-12-06 RX ORDER — POLYETHYLENE GLYCOL 3350 17 G/17G
17 POWDER, FOR SOLUTION ORAL DAILY
Refills: 0 | Status: DISCONTINUED | OUTPATIENT
Start: 2024-12-06 | End: 2024-12-08

## 2024-12-06 RX ORDER — PIPERACILLIN SODIUM AND TAZOBACTAM SODIUM 4; .5 G/20ML; G/20ML
3.38 INJECTION, POWDER, LYOPHILIZED, FOR SOLUTION INTRAVENOUS ONCE
Refills: 0 | Status: COMPLETED | OUTPATIENT
Start: 2024-12-07 | End: 2024-12-07

## 2024-12-06 RX ADMIN — SODIUM CHLORIDE 60 MILLILITER(S): 9 INJECTION, SOLUTION INTRAMUSCULAR; INTRAVENOUS; SUBCUTANEOUS at 22:56

## 2024-12-06 RX ADMIN — ACETAMINOPHEN 500MG 300 MILLIGRAM(S): 500 TABLET, COATED ORAL at 15:12

## 2024-12-06 RX ADMIN — ENOXAPARIN SODIUM 40 MILLIGRAM(S): 30 INJECTION SUBCUTANEOUS at 20:16

## 2024-12-06 RX ADMIN — HYDROMORPHONE HYDROCHLORIDE 0.5 MILLIGRAM(S): 2 TABLET ORAL at 23:28

## 2024-12-06 RX ADMIN — ACETAMINOPHEN 500MG 750 MILLIGRAM(S): 500 TABLET, COATED ORAL at 15:42

## 2024-12-06 RX ADMIN — SODIUM CHLORIDE 60 MILLILITER(S): 9 INJECTION, SOLUTION INTRAMUSCULAR; INTRAVENOUS; SUBCUTANEOUS at 15:47

## 2024-12-06 RX ADMIN — PIPERACILLIN SODIUM AND TAZOBACTAM SODIUM 200 GRAM(S): 4; .5 INJECTION, POWDER, LYOPHILIZED, FOR SOLUTION INTRAVENOUS at 20:16
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
## 2024-12-06 NOTE — ED ADULT NURSE NOTE - NSFALLASSESSNEED_ED_ALL_ED
Is This A New Presentation, Or A Follow-Up?: Warts
How Severe Are Your Warts?: mild
Treatment Number (Optional): 1
Additional History: States PCP treated them but not gone. Has been over a year since last treatment.
no

## 2024-12-06 NOTE — H&P ADULT - ATTENDING COMMENTS
Patient is seen and examined with the Resident. Agree with above assessment and plan. patient presented with abdominal pain. She has complicated history of substance abuse in the past. Found to have Low sodium and colitis.  #Acute Colitis  #Hyponatremia  #Failure to thrive   -seen by nephro in ED  -started on slow IVF  -follow labs to determine the type of Hyponatremia  -may need ICU consult if symptoms worsen and/or Sodium level worsen  -started on IV Zosyn for Colitis  -follow clinically and labs   -follow nutrition consult for proper nutrition support   -for old pelvic and sacral fractures need follow Ortho follow up either inpt or outpt  -follow PT eval
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
## 2024-12-06 NOTE — ED ADULT NURSE NOTE - NSFALLUNIVINTERV_ED_ALL_ED
Bed/Stretcher in lowest position, wheels locked, appropriate side rails in place/Call bell, personal items and telephone in reach/Instruct patient to call for assistance before getting out of bed/chair/stretcher/Non-slip footwear applied when patient is off stretcher/[place] to call system/Physically safe environment - no spills, clutter or unnecessary equipment/Purposeful proactive rounding/Room/bathroom lighting operational, light cord in reach

## 2024-12-06 NOTE — ED PROVIDER NOTE - PHYSICAL EXAMINATION
Constitutional: Awake, Alert, non-toxic. NAD  HEAD: Normocephalic, atraumatic.   EYES: EOM intact, conjunctiva and sclera are clear bilaterally.   ENT: No rhinorrhea, patent, mucous membranes pink/moist, no drooling or stridor.   NECK: Supple, non-tender  CARDIOVASCULAR: Normal S1, S2; regular rate and rhythm.  RESPIRATORY: Normal respiratory effort; breath sounds CTAB, no wheezes, rhonchi, or rales. Speaking in full sentences. No accessory muscle use.   ABDOMEN: Soft; (+) minimal epigastric TTP. no guarding or rebound TTP. no CVAT   EXTREMITIES: Full passive and active ROM in all extremities; non-tender to palpation; distal pulses palpable and symmetric  SKIN: Warm, dry; good skin turgor, no apparent lesions or rashes, no ecchymosis, brisk capillary refill.  NEURO: A&O x3. Sensory and motor functions are grossly intact. Speech is normal. Appearance and judgement seem appropriate for gender and age. Constitutional: Awake, Alert, non-toxic. NAD  HEAD: Normocephalic, atraumatic.   EYES: EOM intact, conjunctiva and sclera are clear bilaterally.   ENT: No rhinorrhea, patent, mucous membranes pink/moist, no drooling or stridor.   NECK: Supple, non-tender  CARDIOVASCULAR: Normal S1, S2; regular rate and rhythm.  RESPIRATORY: Normal respiratory effort; breath sounds CTAB, no wheezes, rhonchi, or rales. Speaking in full sentences. No accessory muscle use.   ABDOMEN: Soft; (+) minimal epigastric TTP. no guarding or rebound TTP. no CVAT   EXTREMITIES: Full passive and active ROM in all extremities; non-tender to palpation; distal pulses palpable and symmetric  SKIN: Warm, dry; good skin turgor, no apparent lesions or rashes, no ecchymosis, brisk capillary refill.  NEURO: A&O x2, slow to answer questions but alert with redirection. Sensory and motor functions are grossly intact. Speech is normal. CN 2-12 intact, 5/5 motor function, no drift.
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
## 2024-12-06 NOTE — H&P ADULT - PROBLEM SELECTOR PLAN 3
Unspecified history of thyroid disorder  - Hold home synthroid for now  - TSH and studies ordered f/u results Chronic  - Cont home nebs inhalers prn

## 2024-12-06 NOTE — ED PROVIDER NOTE - PROGRESS NOTE DETAILS
Discharge paperwork from [facility]  was provided by the  and reviewed at the bedside.  It reflects prescriptions for pain medication including oxycodone and transdermal fentanyl patch.   states all those medicines were thrown away and patient has not been given any pain medicine since her last admission to [facility] for altered mental status secondary to polypharmacy.  had to leave because he is 89 years old and has to go home and rest.  He provided me his cell phone number area code [phone].  His name is Jaycob

## 2024-12-06 NOTE — H&P ADULT - PROBLEM SELECTOR PLAN 2
Chronic  - Cont home nebs inhalers prn Pt c/o abdominal pain and found to be tender on exam  - CT A/P -->Acute sigmoid and descending colitis. Age-indeterminate left pubic fracture with associated para osseous organized hematoma. Underlying lytic process not excluded. Sacral insufficiency fractures.  - Will start zosyn empirically  - Gi Dr Trinidad Consulted recs appreciated  - ID (Dr Rea) Recs appreciated

## 2024-12-06 NOTE — H&P ADULT - PROBLEM SELECTOR PROBLEM 1
Patient: Armin Terrell  ROBOTIC ASSISTED RADICAL RETROPUBIC PROSTATECTOMY BILATERAL PELVIC LYMPH NODE DISSECTION, LYSIS OF ADHESIONS, REPAIR OF RIGHT INGUINAL HERNIA  Anesthesia type: general    Patient location: PACU   Last vitals:   Vitals:    03/19/19 1215   BP:    Pulse:    Resp:    Temp: 36.9  C (98.5  F)   SpO2:      Post vital signs: stable  Level of consciousness: awake and responds to simple questions  Post-anesthesia pain: pain controlled  Post-anesthesia nausea and vomiting: no  Pulmonary: unassisted, return to baseline  Cardiovascular: stable and blood pressure at baseline  Hydration: adequate  Anesthetic events: no    QCDR Measures:  ASA# 11 - Criselda-op Cardiac Arrest: ASA11B - Patient did NOT experience unanticipated cardiac arrest  ASA# 12 - Criselda-op Mortality Rate: ASA12B - Patient did NOT die  ASA# 13 - PACU Re-Intubation Rate: ASA13B - Patient did NOT require a new airway mgmt  ASA# 10 - Composite Anes Safety: ASA10A - No serious adverse event    Additional Notes:   Hyponatremia

## 2024-12-06 NOTE — ED ADULT TRIAGE NOTE - CHIEF COMPLAINT QUOTE
Patient c/o abd pain and withdrawing from pain medication. Patient states she hasn't taken pain meds in the last two days. Unknown last time used.
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
## 2024-12-06 NOTE — H&P ADULT - NSHPPHYSICALEXAM_GEN_ALL_CORE
VITAL SIGNS:  T(C): 36.7 (12-06-24 @ 12:46), Max: 36.7 (12-06-24 @ 12:46)  T(F): 98.1 (12-06-24 @ 12:46), Max: 98.1 (12-06-24 @ 12:46)  HR: 58 (12-06-24 @ 15:50) (58 - 63)  BP: 144/66 (12-06-24 @ 15:50) (128/74 - 144/66)  BP(mean): --  RR: 17 (12-06-24 @ 15:50) (17 - 18)  SpO2: 100% (12-06-24 @ 15:50) (100% - 100%)  Wt(kg): --    PHYSICAL EXAM:    Constitutional: Awake, Alert, laying in bed  HEAD: Normocephalic, atraumatic.   EYES: EOM intact, conjunctiva and sclera are clear bilaterally.   ENT: No rhinorrhea, patent, mucous membranes pink/moist, no drooling or stridor.   CARDIOVASCULAR: Normal S1, S2; regular rate and rhythm.  RESPIRATORY: Normal respiratory effort; breath sounds CTAB, no wheezes, rhonchi, or rales. No accessory muscle use.   ABDOMEN: Soft; (+) minimal epigastric TTP. no guarding or rebound TTP. no CVAT   EXTREMITIES: Full passive and active ROM in all extremities; non-tender to palpation; distal pulses palpable and symmetric  SKIN: Warm, dry; good skin turgor, no apparent lesions or rashes, no ecchymosis, brisk capillary refill.  NEURO: A&O x2, slow to answer questions but alert with redirection. Sensory and motor functions are grossly intact. Speech is normal

## 2024-12-06 NOTE — H&P ADULT - NSHPREVIEWOFSYSTEMS_GEN_ALL_CORE
CONSTITUTIONAL: denies fever, chills, fatigue, weakness  HEENT: denies blurred vision, sore throat  SKIN: denies new lesions, rash  CARDIOVASCULAR: denies chest pain, chest pressure, palpitations  RESPIRATORY: denies shortness of breath, sputum production  GASTROINTESTINAL: + abd pain  GENITOURINARY: denies dysuria, discharge  NEUROLOGICAL: denies numbness, headache, focal weakness  MUSCULOSKELETAL: denies new joint pain, muscle aches  HEMATOLOGIC: denies gross bleeding, bruising  LYMPHATICS: denies enlarged lymph nodes, extremity swelling  PSYCHIATRIC: denies recent changes in anxiety, depression  ENDOCRINOLOGIC: denies sweating, cold or heat intolerance

## 2024-12-06 NOTE — H&P ADULT - ASSESSMENT
58yo F with PMHx of COPD, Raynaud's, Lupus, Sjogren;s, Nicotine dependence, Anxiety, unspecified seizure history presents to the ED with abdominal pain and confusion. Admitting for hyponatremia/failure to thrive

## 2024-12-06 NOTE — ED ADULT NURSE REASSESSMENT NOTE - NS ED NURSE REASSESS COMMENT FT1
Received pt from [facility], [facility], some nausea but no vomiting, denies pain or discomfort, has normal unlabored respirations. Rails up and wheels locked in place.

## 2024-12-06 NOTE — H&P ADULT - PROBLEM SELECTOR PLAN 4
Recent fracture admitted in [facility]  - Holding home pain meds Unspecified history of thyroid disorder  - Hold home synthroid for now  - TSH and studies ordered f/u results
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
## 2024-12-06 NOTE — H&P ADULT - PROBLEM SELECTOR PLAN 1
-Na 115 in ED, hx of SIADH and low sodium  - PO fluid restriction, Urine Na and osm ordered, IV saline hydration, monitor and trend lytes  - ICU consult if symptoms worsen  - Nephro (Dr Murillo) consulted recs appreciated
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
## 2024-12-06 NOTE — ED PROVIDER NOTE - NS ED ATTENDING STATEMENT MOD
I have seen and examined this patient and fully participated in the care of this patient as the teaching attending.  The service was shared with the JACKIE.  I reviewed and verified the documentation.
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
## 2024-12-07 LAB
ALBUMIN SERPL ELPH-MCNC: 3.5 G/DL — SIGNIFICANT CHANGE UP (ref 3.3–5)
ALP SERPL-CCNC: 150 U/L — HIGH (ref 40–120)
ALT FLD-CCNC: 23 U/L — SIGNIFICANT CHANGE UP (ref 12–78)
ANION GAP SERPL CALC-SCNC: 14 MMOL/L — SIGNIFICANT CHANGE UP (ref 5–17)
AST SERPL-CCNC: 17 U/L — SIGNIFICANT CHANGE UP (ref 15–37)
BILIRUB SERPL-MCNC: 0.9 MG/DL — SIGNIFICANT CHANGE UP (ref 0.2–1.2)
BUN SERPL-MCNC: 6 MG/DL — LOW (ref 7–23)
CALCIUM SERPL-MCNC: 8.7 MG/DL — SIGNIFICANT CHANGE UP (ref 8.5–10.1)
CHLORIDE SERPL-SCNC: 85 MMOL/L — LOW (ref 96–108)
CO2 SERPL-SCNC: 20 MMOL/L — LOW (ref 22–31)
CREAT SERPL-MCNC: 0.58 MG/DL — SIGNIFICANT CHANGE UP (ref 0.5–1.3)
EGFR: 103 ML/MIN/1.73M2 — SIGNIFICANT CHANGE UP
GLUCOSE SERPL-MCNC: 65 MG/DL — LOW (ref 70–99)
OSMOLALITY UR: 361 MOSM/KG — SIGNIFICANT CHANGE UP (ref 50–1200)
POTASSIUM SERPL-MCNC: 3.7 MMOL/L — SIGNIFICANT CHANGE UP (ref 3.5–5.3)
POTASSIUM SERPL-SCNC: 3.7 MMOL/L — SIGNIFICANT CHANGE UP (ref 3.5–5.3)
PROT SERPL-MCNC: 6.7 G/DL — SIGNIFICANT CHANGE UP (ref 6–8.3)
SODIUM SERPL-SCNC: 119 MMOL/L — CRITICAL LOW (ref 135–145)
SODIUM UR-SCNC: 68 MMOL/L — SIGNIFICANT CHANGE UP
T4 FREE+ TSH PNL SERPL: 1.04 UIU/ML — SIGNIFICANT CHANGE UP (ref 0.27–4.2)
TSH SERPL-MCNC: 1 UIU/ML — SIGNIFICANT CHANGE UP (ref 0.36–3.74)

## 2024-12-07 PROCEDURE — 99233 SBSQ HOSP IP/OBS HIGH 50: CPT

## 2024-12-07 PROCEDURE — 99223 1ST HOSP IP/OBS HIGH 75: CPT

## 2024-12-07 RX ORDER — PANTOPRAZOLE SODIUM 40 MG/1
40 TABLET, DELAYED RELEASE ORAL
Refills: 0 | Status: DISCONTINUED | OUTPATIENT
Start: 2024-12-07 | End: 2024-12-09

## 2024-12-07 RX ORDER — ONDANSETRON HYDROCHLORIDE 4 MG/1
4 TABLET, FILM COATED ORAL EVERY 8 HOURS
Refills: 0 | Status: DISCONTINUED | OUTPATIENT
Start: 2024-12-07 | End: 2024-12-14

## 2024-12-07 RX ORDER — INFLUENZA VIRUS VACCINE 15; 15; 15; 15 UG/.5ML; UG/.5ML; UG/.5ML; UG/.5ML
0.5 SUSPENSION INTRAMUSCULAR ONCE
Refills: 0 | Status: DISCONTINUED | OUTPATIENT
Start: 2024-12-07 | End: 2024-12-14

## 2024-12-07 RX ORDER — LIDOCAINE 40 MG/G
1 CREAM TOPICAL DAILY
Refills: 0 | Status: DISCONTINUED | OUTPATIENT
Start: 2024-12-07 | End: 2024-12-14

## 2024-12-07 RX ORDER — LEVOTHYROXINE SODIUM 150 MCG
25 TABLET ORAL DAILY
Refills: 0 | Status: DISCONTINUED | OUTPATIENT
Start: 2024-12-07 | End: 2024-12-14

## 2024-12-07 RX ORDER — MAGNESIUM, ALUMINUM HYDROXIDE 200-225/5
30 SUSPENSION, ORAL (FINAL DOSE FORM) ORAL EVERY 4 HOURS
Refills: 0 | Status: DISCONTINUED | OUTPATIENT
Start: 2024-12-07 | End: 2024-12-14

## 2024-12-07 RX ORDER — SODIUM CHLORIDE 9 MG/ML
1000 INJECTION, SOLUTION INTRAMUSCULAR; INTRAVENOUS; SUBCUTANEOUS
Refills: 0 | Status: DISCONTINUED | OUTPATIENT
Start: 2024-12-07 | End: 2024-12-09

## 2024-12-07 RX ADMIN — PIPERACILLIN SODIUM AND TAZOBACTAM SODIUM 25 GRAM(S): 4; .5 INJECTION, POWDER, LYOPHILIZED, FOR SOLUTION INTRAVENOUS at 01:29

## 2024-12-07 RX ADMIN — SODIUM CHLORIDE 70 MILLILITER(S): 9 INJECTION, SOLUTION INTRAMUSCULAR; INTRAVENOUS; SUBCUTANEOUS at 14:12

## 2024-12-07 RX ADMIN — Medication 30 MILLILITER(S): at 23:55

## 2024-12-07 RX ADMIN — ONDANSETRON HYDROCHLORIDE 4 MILLIGRAM(S): 4 TABLET, FILM COATED ORAL at 00:46

## 2024-12-07 RX ADMIN — PIPERACILLIN SODIUM AND TAZOBACTAM SODIUM 25 GRAM(S): 4; .5 INJECTION, POWDER, LYOPHILIZED, FOR SOLUTION INTRAVENOUS at 13:31

## 2024-12-07 RX ADMIN — PIPERACILLIN SODIUM AND TAZOBACTAM SODIUM 25 GRAM(S): 4; .5 INJECTION, POWDER, LYOPHILIZED, FOR SOLUTION INTRAVENOUS at 22:10

## 2024-12-07 RX ADMIN — ENOXAPARIN SODIUM 40 MILLIGRAM(S): 30 INJECTION SUBCUTANEOUS at 22:09

## 2024-12-07 RX ADMIN — POLYETHYLENE GLYCOL 3350 17 GRAM(S): 17 POWDER, FOR SOLUTION ORAL at 11:54

## 2024-12-07 RX ADMIN — ACETAMINOPHEN 500MG 650 MILLIGRAM(S): 500 TABLET, COATED ORAL at 22:22

## 2024-12-07 RX ADMIN — HYDROMORPHONE HYDROCHLORIDE 0.5 MILLIGRAM(S): 2 TABLET ORAL at 00:30

## 2024-12-07 RX ADMIN — HYDROMORPHONE HYDROCHLORIDE 0.2 MILLIGRAM(S): 2 TABLET ORAL at 08:00

## 2024-12-07 RX ADMIN — ONDANSETRON HYDROCHLORIDE 4 MILLIGRAM(S): 4 TABLET, FILM COATED ORAL at 13:31

## 2024-12-07 RX ADMIN — PIPERACILLIN SODIUM AND TAZOBACTAM SODIUM 25 GRAM(S): 4; .5 INJECTION, POWDER, LYOPHILIZED, FOR SOLUTION INTRAVENOUS at 05:50

## 2024-12-07 NOTE — DIETITIAN INITIAL EVALUATION ADULT - OTHER INFO
"60yo F with PMHx of COPD, Raynaud's, Lupus, Sjogren;s, Nicotine dependence, Anxiety, unspecified seizure history presents to the ED with abdominal pain and confusion. Admitting for hyponatremia/failure to thrive"  CT a/p reveals colitis (acute sigmoid and descending)

## 2024-12-07 NOTE — PATIENT PROFILE ADULT - FUNCTIONAL ASSESSMENT - BASIC MOBILITY 6.
3-calculated by average/Not able to assess (calculate score using [facility] averaging method)

## 2024-12-07 NOTE — DIETITIAN INITIAL EVALUATION ADULT - PROBLEM SELECTOR PLAN 2
Pt c/o abdominal pain and found to be tender on exam  - CT A/P -->Acute sigmoid and descending colitis. Age-indeterminate left pubic fracture with associated para osseous organized hematoma. Underlying lytic process not excluded. Sacral insufficiency fractures.  - Will start zosyn empirically  - Gi Dr Trinidad Consulted recs appreciated  - ID (Dr Rea) Recs appreciated

## 2024-12-07 NOTE — DIETITIAN INITIAL EVALUATION ADULT - PERTINENT MEDS FT
MEDICATIONS  (STANDING):  enoxaparin Injectable 40 milliGRAM(s) SubCutaneous every 24 hours  influenza   Vaccine 0.5 milliLiter(s) IntraMuscular once  naloxone Injectable 0.4 milliGRAM(s) IV Push once  piperacillin/tazobactam IVPB.. 3.375 Gram(s) IV Intermittent every 8 hours  polyethylene glycol 3350 17 Gram(s) Oral daily  senna 2 Tablet(s) Oral at bedtime  sodium chloride 0.9%. 1000 milliLiter(s) (60 mL/Hr) IV Continuous <Continuous>    MEDICATIONS  (PRN):  acetaminophen     Tablet .. 650 milliGRAM(s) Oral every 6 hours PRN Mild Pain (1 - 3)  albuterol    90 MICROgram(s) HFA Inhaler 2 Puff(s) Inhalation every 6 hours PRN Shortness of Breath and/or Wheezing  bisacodyl 5 milliGRAM(s) Oral daily PRN Constipation  HYDROmorphone  Injectable 0.2 milliGRAM(s) IV Push every 4 hours PRN Moderate Pain (4 - 6)  HYDROmorphone  Injectable 0.5 milliGRAM(s) IV Push every 4 hours PRN Severe Pain (7 - 10)  ondansetron Injectable 4 milliGRAM(s) IV Push every 8 hours PRN Nausea and/or Vomiting
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
## 2024-12-07 NOTE — DIETITIAN INITIAL EVALUATION ADULT - PERTINENT LABORATORY DATA
12-07    119[LL]  |  85[L]  |  6[L]  ----------------------------<  65[L]  3.7   |  20[L]  |  0.58    Ca    8.7      07 Dec 2024 10:40    TPro  6.7  /  Alb  3.5  /  TBili  0.9  /  DBili  x   /  AST  17  /  ALT  23  /  AlkPhos  150[H]  12-07

## 2024-12-07 NOTE — PROGRESS NOTE ADULT - SUBJECTIVE AND OBJECTIVE BOX
Subjective: no complaints.       MEDICATIONS  (STANDING):  enoxaparin Injectable 40 milliGRAM(s) SubCutaneous every 24 hours  influenza   Vaccine 0.5 milliLiter(s) IntraMuscular once  naloxone Injectable 0.4 milliGRAM(s) IV Push once  piperacillin/tazobactam IVPB.. 3.375 Gram(s) IV Intermittent every 8 hours  polyethylene glycol 3350 17 Gram(s) Oral daily  senna 2 Tablet(s) Oral at bedtime  sodium chloride 0.9%. 1000 milliLiter(s) (60 mL/Hr) IV Continuous <Continuous>    MEDICATIONS  (PRN):  acetaminophen     Tablet .. 650 milliGRAM(s) Oral every 6 hours PRN Mild Pain (1 - 3)  albuterol    90 MICROgram(s) HFA Inhaler 2 Puff(s) Inhalation every 6 hours PRN Shortness of Breath and/or Wheezing  bisacodyl 5 milliGRAM(s) Oral daily PRN Constipation  HYDROmorphone  Injectable 0.2 milliGRAM(s) IV Push every 4 hours PRN Moderate Pain (4 - 6)  HYDROmorphone  Injectable 0.5 milliGRAM(s) IV Push every 4 hours PRN Severe Pain (7 - 10)  ondansetron Injectable 4 milliGRAM(s) IV Push every 8 hours PRN Nausea and/or Vomiting          T(C): 36.8 (12-07-24 @ 12:31), Max: 36.8 (12-06-24 @ 22:33)  HR: 69 (12-07-24 @ 12:31) (58 - 73)  BP: 130/77 (12-07-24 @ 12:31) (128/74 - 163/82)  RR: 20 (12-07-24 @ 12:31) (17 - 20)  SpO2: 99% (12-07-24 @ 12:31) (96% - 100%)  Wt(kg): --        I&O's Detail           PHYSICAL EXAM:    GENERAL: NAD  NECK: Supple, no inc in JVP  CHEST/LUNG: Clear  HEART: S1S2  ABDOMEN: Soft, Nontender, Nondistended; Bowel sounds present  EXTREMITIES:  no edema.   NEURO: no asterixis      LABS:  CBC Full  -  ( 06 Dec 2024 13:50 )  WBC Count : 10.24 K/uL  RBC Count : 4.01 M/uL  Hemoglobin : 12.9 g/dL  Hematocrit : 34.6 %  Platelet Count - Automated : 435 K/uL  Mean Cell Volume : 86.3 fl  Mean Cell Hemoglobin : 32.2 pg  Mean Cell Hemoglobin Concentration : 37.3 g/dL  Auto Neutrophil # : 7.38 K/uL  Auto Lymphocyte # : 1.78 K/uL  Auto Monocyte # : 0.86 K/uL  Auto Eosinophil # : 0.02 K/uL  Auto Basophil # : 0.04 K/uL  Auto Neutrophil % : 72.0 %  Auto Lymphocyte % : 17.4 %  Auto Monocyte % : 8.4 %  Auto Eosinophil % : 0.2 %  Auto Basophil % : 0.4 %    12-07    119[LL]  |  85[L]  |  6[L]  ----------------------------<  65[L]  3.7   |  20[L]  |  0.58    Ca    8.7      07 Dec 2024 10:40    TPro  6.7  /  Alb  3.5  /  TBili  0.9  /  DBili  x   /  AST  17  /  ALT  23  /  AlkPhos  150[H]  12-07    PT/INR - ( 06 Dec 2024 13:50 )   PT: 12.4 sec;   INR: 1.05 ratio           Impression:  * Severe HypoNa -- DDx per Dr Murillo's initial consult. Correcting at an acceptable rate  * Abd pain, diarrhea.     Recommendations:   Cont saline  Serum Na, serum Uric acid, am cortisol requested.   BMP tomorrow.

## 2024-12-07 NOTE — PROGRESS NOTE ADULT - PROBLEM SELECTOR PLAN 1
-likely due to SIADH and low sodium intake   - IV saline hydration , Na level improved   - Nephro eval noted
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
## 2024-12-07 NOTE — PROGRESS NOTE ADULT - PROBLEM SELECTOR PLAN 6
DVT PPx   Lovenox 40 sq    as per sister, patient has chronic drug use disorder and was on varies medications including fentanyl, oxycodone, xanax, klonopin .etc. Utox on admission negative.  id pain uncontrolled, will have pain management eval

## 2024-12-07 NOTE — DIETITIAN INITIAL EVALUATION ADULT - NSFNSPHYEXAMSKINFT_GEN_A_CORE
Pressure Injury 1: Left:, elbow, Stage I  Pressure Injury 2: Right:, elbow, Stage I  Pressure Injury 3: Left:, buttocks, Stage I  Pressure Injury 4: Right:, buttocks, Stage I
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
## 2024-12-07 NOTE — PHYSICAL THERAPY INITIAL EVALUATION ADULT - GAIT TRAINING, PT EVAL
Patient will ambulate x 100 feet independently with RW in 2 weeks in order to increase mobility at home

## 2024-12-07 NOTE — DIETITIAN INITIAL EVALUATION ADULT - PROBLEM SELECTOR PLAN 4
Unspecified history of thyroid disorder  - Hold home synthroid for now  - TSH and studies ordered f/u results
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
## 2024-12-07 NOTE — PROGRESS NOTE ADULT - SUBJECTIVE AND OBJECTIVE BOX
Patient is a 59y old  Female who presents with a chief complaint of Hyponatremia, hypo-osmolarity, or hypo-osmolar hyponatremia     (07 Dec 2024 13:53)      Subjective:  INTERVAL HPI/OVERNIGHT EVENTS: Patient seen and examined at bedside.  Patient has no complaints at this time.     MEDICATIONS  (STANDING):  enoxaparin Injectable 40 milliGRAM(s) SubCutaneous every 24 hours  influenza   Vaccine 0.5 milliLiter(s) IntraMuscular once  lidocaine   4% Patch 1 Patch Transdermal daily  naloxone Injectable 0.4 milliGRAM(s) IV Push once  pantoprazole    Tablet 40 milliGRAM(s) Oral before breakfast  piperacillin/tazobactam IVPB.. 3.375 Gram(s) IV Intermittent every 8 hours  polyethylene glycol 3350 17 Gram(s) Oral daily  senna 2 Tablet(s) Oral at bedtime  sodium chloride 0.9%. 1000 milliLiter(s) (70 mL/Hr) IV Continuous <Continuous>    MEDICATIONS  (PRN):  acetaminophen     Tablet .. 650 milliGRAM(s) Oral every 6 hours PRN Mild Pain (1 - 3)  albuterol    90 MICROgram(s) HFA Inhaler 2 Puff(s) Inhalation every 6 hours PRN Shortness of Breath and/or Wheezing  bisacodyl 5 milliGRAM(s) Oral daily PRN Constipation  ondansetron Injectable 4 milliGRAM(s) IV Push every 8 hours PRN Nausea and/or Vomiting      Allergies    No Known Allergies    Intolerances    aspirin (Stomach Upset)      REVIEW OF SYSTEMS:  CONSTITUTIONAL: No fever or chills  HEENT:  No headache, no sore throat  RESPIRATORY: No cough or shortness of breath  CARDIOVASCULAR: No chest pain or palpitations  GASTROINTESTINAL: No abd pain, nausea, vomiting, or diarrhea      Objective:  Vital Signs Last 24 Hrs  T(C): 36.8 (07 Dec 2024 12:31), Max: 36.8 (06 Dec 2024 22:33)  T(F): 98.3 (07 Dec 2024 12:31), Max: 98.3 (07 Dec 2024 12:31)  HR: 69 (07 Dec 2024 12:31) (64 - 73)  BP: 130/77 (07 Dec 2024 12:31) (130/77 - 163/82)  BP(mean): --  RR: 20 (07 Dec 2024 12:31) (18 - 20)  SpO2: 99% (07 Dec 2024 12:31) (96% - 100%)    Parameters below as of 07 Dec 2024 12:31  Patient On (Oxygen Delivery Method): room air        GENERAL: NAD, lying in bed comfortably  HEAD:  Normocephalic  EYES:  conjunctiva and sclera clear  ENT: Moist mucous membranes  NECK: Supple  CHEST/LUNG: Clear to auscultation bilaterally; No rales or rhonchi; no wheezing. Unlabored respirations  HEART: Regular rate and rhythm; S1S2+  ABDOMEN: Bowel sounds present; Soft, Nontender, Nondistended.   EXTREMITIES:  + distal Peripheral Pulses;  No cyanosis, or edema  NERVOUS SYSTEM:  Alert & Oriented X3;  No gross focal deficits   MSK: moves all extremities  SKIN: No rashes     LABS:    07 Dec 2024 10:40    119    |  85     |  6      ----------------------------<  65     3.7     |  20     |  0.58     Ca    8.7        07 Dec 2024 10:40    TPro  6.7    /  Alb  3.5    /  TBili  0.9    /  DBili  x      /  AST  17     /  ALT  23     /  AlkPhos  150    07 Dec 2024 10:40    PT/INR - ( 06 Dec 2024 13:50 )   PT: 12.4 sec;   INR: 1.05 ratio         PTT - ( 06 Dec 2024 13:50 )  PTT:31.4 sec  Urinalysis Basic - ( 07 Dec 2024 10:40 )    Color: x / Appearance: x / SG: x / pH: x  Gluc: 65 mg/dL / Ketone: x  / Bili: x / Urobili: x   Blood: x / Protein: x / Nitrite: x   Leuk Esterase: x / RBC: x / WBC x   Sq Epi: x / Non Sq Epi: x / Bacteria: x      CAPILLARY BLOOD GLUCOSE            Urinalysis with Rflx Culture (collected 12-06-24 @ 17:20)        RADIOLOGY & ADDITIONAL TESTS:    Personally reviewed.     Consultant(s) Notes Reviewed:  [x] YES  [ ] NO    Plan of care discussed with patient /family sister Dana [phone]; all questions answered

## 2024-12-07 NOTE — PROGRESS NOTE ADULT - PROBLEM SELECTOR PLAN 2
Pt c/o abdominal pain and found to be tender on exam  - CT A/P -->Acute sigmoid and descending colitis. Age-indeterminate left pubic fracture with associated para osseous organized hematoma. Underlying lytic process not excluded. Sacral insufficiency fractures.  - c/w  zosyn empirically  - Gi Dr Trinidad Consult noted   - ID eval noted

## 2024-12-07 NOTE — PATIENT PROFILE ADULT - FALL HARM RISK - HARM RISK INTERVENTIONS
Communicate Risk of Fall with Harm to all staff/Reinforce activity limits and safety measures with patient and family/Tailored Fall Risk Interventions/Visual Cue: Yellow wristband and red socks/Bed in lowest position, wheels locked, appropriate side rails in place/Call bell, personal items and telephone in reach/Instruct patient to call for assistance before getting out of bed or chair/Non-slip footwear when patient is out of bed/[place] to call system/Physically safe environment - no spills, clutter or unnecessary equipment/Purposeful Proactive Rounding/Room/bathroom lighting operational, light cord in reach Assistance OOB with selected safe patient handling equipment/Communicate Risk of Fall with Harm to all staff/Discuss with provider need for PT consult/Monitor gait and stability/Provide patient with walking aids - walker, cane, crutches/Reinforce activity limits and safety measures with patient and family/Tailored Fall Risk Interventions/Visual Cue: Yellow wristband and red socks/Bed in lowest position, wheels locked, appropriate side rails in place/Call bell, personal items and telephone in reach/Instruct patient to call for assistance before getting out of bed or chair/Non-slip footwear when patient is out of bed/[facility] to call system/Physically safe environment - no spills, clutter or unnecessary equipment/Purposeful Proactive Rounding/Room/bathroom lighting operational, light cord in reach

## 2024-12-08 LAB
ANION GAP SERPL CALC-SCNC: 7 MMOL/L — SIGNIFICANT CHANGE UP (ref 5–17)
BUN SERPL-MCNC: 5 MG/DL — LOW (ref 7–23)
C DIFF GDH STL QL: SIGNIFICANT CHANGE UP
C DIFF GDH STL QL: SIGNIFICANT CHANGE UP
CALCIUM SERPL-MCNC: 8.9 MG/DL — SIGNIFICANT CHANGE UP (ref 8.5–10.1)
CHLORIDE SERPL-SCNC: 92 MMOL/L — LOW (ref 96–108)
CO2 SERPL-SCNC: 26 MMOL/L — SIGNIFICANT CHANGE UP (ref 22–31)
CORTIS AM PEAK SERPL-MCNC: 20.3 UG/DL — HIGH (ref 6–18.4)
CREAT SERPL-MCNC: 0.57 MG/DL — SIGNIFICANT CHANGE UP (ref 0.5–1.3)
EGFR: 103 ML/MIN/1.73M2 — SIGNIFICANT CHANGE UP
GI PCR PANEL: ABNORMAL
GI PCR PANEL: ABNORMAL
GI PCR PANEL: SIGNIFICANT CHANGE UP
GLUCOSE SERPL-MCNC: 86 MG/DL — SIGNIFICANT CHANGE UP (ref 70–99)
NOROVIRUS GI+II RNA STL QL NAA+NON-PROBE: ABNORMAL
NOROVIRUS GI+II RNA STL QL NAA+NON-PROBE: ABNORMAL
POTASSIUM SERPL-MCNC: 3.3 MMOL/L — LOW (ref 3.5–5.3)
POTASSIUM SERPL-SCNC: 3.3 MMOL/L — LOW (ref 3.5–5.3)
PROCALCITONIN SERPL-MCNC: <0.02 NG/ML — SIGNIFICANT CHANGE UP
SODIUM SERPL-SCNC: 125 MMOL/L — LOW (ref 135–145)
URATE SERPL-MCNC: 2.3 MG/DL — LOW (ref 2.5–7)
URATE SERPL-MCNC: 2.4 MG/DL — LOW (ref 2.5–7)

## 2024-12-08 PROCEDURE — 99232 SBSQ HOSP IP/OBS MODERATE 35: CPT

## 2024-12-08 PROCEDURE — 99233 SBSQ HOSP IP/OBS HIGH 50: CPT

## 2024-12-08 RX ORDER — ACETAMINOPHEN, DIPHENHYDRAMINE HCL, PHENYLEPHRINE HCL 325; 25; 5 MG/1; MG/1; MG/1
5 TABLET ORAL AT BEDTIME
Refills: 0 | Status: DISCONTINUED | OUTPATIENT
Start: 2024-12-08 | End: 2024-12-14

## 2024-12-08 RX ORDER — VANCOMYCIN HCL 900 MCG/MG
125 POWDER (GRAM) MISCELLANEOUS EVERY 6 HOURS
Refills: 0 | Status: DISCONTINUED | OUTPATIENT
Start: 2024-12-08 | End: 2024-12-14

## 2024-12-08 RX ORDER — POTASSIUM CHLORIDE 600 MG/1
40 TABLET, EXTENDED RELEASE ORAL EVERY 4 HOURS
Refills: 0 | Status: COMPLETED | OUTPATIENT
Start: 2024-12-08 | End: 2024-12-08

## 2024-12-08 RX ADMIN — Medication 30 MILLILITER(S): at 18:13

## 2024-12-08 RX ADMIN — LIDOCAINE 1 PATCH: 40 CREAM TOPICAL at 11:26

## 2024-12-08 RX ADMIN — Medication 25 MICROGRAM(S): at 06:59

## 2024-12-08 RX ADMIN — Medication 30 MILLILITER(S): at 05:10

## 2024-12-08 RX ADMIN — PIPERACILLIN SODIUM AND TAZOBACTAM SODIUM 25 GRAM(S): 4; .5 INJECTION, POWDER, LYOPHILIZED, FOR SOLUTION INTRAVENOUS at 05:11

## 2024-12-08 RX ADMIN — Medication 125 MILLIGRAM(S): at 17:18

## 2024-12-08 RX ADMIN — Medication 125 MILLIGRAM(S): at 23:20

## 2024-12-08 RX ADMIN — SODIUM CHLORIDE 70 MILLILITER(S): 9 INJECTION, SOLUTION INTRAMUSCULAR; INTRAVENOUS; SUBCUTANEOUS at 21:52

## 2024-12-08 RX ADMIN — PANTOPRAZOLE SODIUM 40 MILLIGRAM(S): 40 TABLET, DELAYED RELEASE ORAL at 06:59

## 2024-12-08 RX ADMIN — ACETAMINOPHEN, DIPHENHYDRAMINE HCL, PHENYLEPHRINE HCL 5 MILLIGRAM(S): 325; 25; 5 TABLET ORAL at 21:52

## 2024-12-08 RX ADMIN — ACETAMINOPHEN 500MG 650 MILLIGRAM(S): 500 TABLET, COATED ORAL at 05:11

## 2024-12-08 RX ADMIN — POTASSIUM CHLORIDE 40 MILLIEQUIVALENT(S): 600 TABLET, EXTENDED RELEASE ORAL at 14:25

## 2024-12-08 RX ADMIN — POTASSIUM CHLORIDE 40 MILLIEQUIVALENT(S): 600 TABLET, EXTENDED RELEASE ORAL at 17:18

## 2024-12-08 RX ADMIN — ENOXAPARIN SODIUM 40 MILLIGRAM(S): 30 INJECTION SUBCUTANEOUS at 21:52

## 2024-12-08 RX ADMIN — SODIUM CHLORIDE 70 MILLILITER(S): 9 INJECTION, SOLUTION INTRAMUSCULAR; INTRAVENOUS; SUBCUTANEOUS at 17:18

## 2024-12-08 RX ADMIN — POTASSIUM CHLORIDE 40 MILLIEQUIVALENT(S): 600 TABLET, EXTENDED RELEASE ORAL at 11:27

## 2024-12-08 RX ADMIN — Medication 30 MILLILITER(S): at 22:26

## 2024-12-08 RX ADMIN — Medication 30 MILLILITER(S): at 11:32

## 2024-12-08 RX ADMIN — LIDOCAINE 1 PATCH: 40 CREAM TOPICAL at 23:00

## 2024-12-08 NOTE — PROGRESS NOTE ADULT - SUBJECTIVE AND OBJECTIVE BOX
Patient is a 59y old  Female who presents with a chief complaint of Failure to thrive/Hyponatremia (07 Dec 2024 16:34)      Subjective:  INTERVAL HPI/OVERNIGHT EVENTS: Patient seen and examined at bedside.  Patient c/o abd pain and watery diarrhea   overnight event noted   MEDICATIONS  (STANDING):  enoxaparin Injectable 40 milliGRAM(s) SubCutaneous every 24 hours  influenza   Vaccine 0.5 milliLiter(s) IntraMuscular once  levothyroxine 25 MICROGram(s) Oral daily  lidocaine   4% Patch 1 Patch Transdermal daily  naloxone Injectable 0.4 milliGRAM(s) IV Push once  pantoprazole    Tablet 40 milliGRAM(s) Oral before breakfast  polyethylene glycol 3350 17 Gram(s) Oral daily  potassium chloride    Tablet ER 40 milliEquivalent(s) Oral every 4 hours  sodium chloride 0.9%. 1000 milliLiter(s) (70 mL/Hr) IV Continuous <Continuous>    MEDICATIONS  (PRN):  acetaminophen     Tablet .. 650 milliGRAM(s) Oral every 6 hours PRN Mild Pain (1 - 3)  albuterol    90 MICROgram(s) HFA Inhaler 2 Puff(s) Inhalation every 6 hours PRN Shortness of Breath and/or Wheezing  aluminum hydroxide/magnesium hydroxide/simethicone Suspension 30 milliLiter(s) Oral every 4 hours PRN Upset Stomach  ondansetron Injectable 4 milliGRAM(s) IV Push every 8 hours PRN Nausea and/or Vomiting      Allergies    No Known Allergies    Intolerances    aspirin (Stomach Upset)      REVIEW OF SYSTEMS:  CONSTITUTIONAL: No fever or chills  HEENT:  No headache, no sore throat  RESPIRATORY: No cough or shortness of breath  CARDIOVASCULAR: No chest pain or palpitations  GASTROINTESTINAL: +abd pain,  diarrhea      Objective:  Vital Signs Last 24 Hrs  T(C): 36.8 (08 Dec 2024 04:50), Max: 36.8 (07 Dec 2024 12:31)  T(F): 98.3 (08 Dec 2024 04:50), Max: 98.3 (07 Dec 2024 12:31)  HR: 78 (08 Dec 2024 04:50) (64 - 78)  BP: 132/76 (08 Dec 2024 04:50) (130/71 - 146/74)  BP(mean): --  RR: 20 (08 Dec 2024 04:50) (20 - 20)  SpO2: 98% (08 Dec 2024 04:50) (98% - 100%)    Parameters below as of 08 Dec 2024 04:50  Patient On (Oxygen Delivery Method): room air        GENERAL: NAD, lying in bed comfortably  HEAD:  Normocephalic  EYES:  conjunctiva and sclera clear  ENT: Moist mucous membranes  NECK: Supple  CHEST/LUNG: Clear to auscultation bilaterally; No rales or rhonchi; no wheezing. Unlabored respirations  HEART: Regular rate and rhythm; S1S2+  ABDOMEN: Bowel sounds present; Soft, mild tender periumbilical , Nondistended.   EXTREMITIES:  + distal Peripheral Pulses;  No cyanosis, or edema  NERVOUS SYSTEM:  Alert & Oriented X3;  No gross focal deficits   MSK: moves all extremities  SKIN: No rashes     LABS:    08 Dec 2024 07:12    125    |  92     |  5      ----------------------------<  86     3.3     |  26     |  0.57     Ca    8.9        08 Dec 2024 07:12    TPro  6.7    /  Alb  3.5    /  TBili  0.9    /  DBili  x      /  AST  17     /  ALT  23     /  AlkPhos  150    07 Dec 2024 10:40    PT/INR - ( 06 Dec 2024 13:50 )   PT: 12.4 sec;   INR: 1.05 ratio         PTT - ( 06 Dec 2024 13:50 )  PTT:31.4 sec  Urinalysis Basic - ( 08 Dec 2024 07:12 )    Color: x / Appearance: x / SG: x / pH: x  Gluc: 86 mg/dL / Ketone: x  / Bili: x / Urobili: x   Blood: x / Protein: x / Nitrite: x   Leuk Esterase: x / RBC: x / WBC x   Sq Epi: x / Non Sq Epi: x / Bacteria: x      CAPILLARY BLOOD GLUCOSE            Urinalysis with Rflx Culture (collected 12-06-24 @ 17:20)        RADIOLOGY & ADDITIONAL TESTS:    Personally reviewed.     Consultant(s) Notes Reviewed:  [x] YES  [ ] NO    Plan of care discussed with patient ; all questions answered

## 2024-12-08 NOTE — PROGRESS NOTE ADULT - SUBJECTIVE AND OBJECTIVE BOX
Subjective: no new complaints       MEDICATIONS  (STANDING):  enoxaparin Injectable 40 milliGRAM(s) SubCutaneous every 24 hours  influenza   Vaccine 0.5 milliLiter(s) IntraMuscular once  levothyroxine 25 MICROGram(s) Oral daily  lidocaine   4% Patch 1 Patch Transdermal daily  naloxone Injectable 0.4 milliGRAM(s) IV Push once  pantoprazole    Tablet 40 milliGRAM(s) Oral before breakfast  potassium chloride    Tablet ER 40 milliEquivalent(s) Oral every 4 hours  sodium chloride 0.9%. 1000 milliLiter(s) (70 mL/Hr) IV Continuous <Continuous>    MEDICATIONS  (PRN):  acetaminophen     Tablet .. 650 milliGRAM(s) Oral every 6 hours PRN Mild Pain (1 - 3)  albuterol    90 MICROgram(s) HFA Inhaler 2 Puff(s) Inhalation every 6 hours PRN Shortness of Breath and/or Wheezing  aluminum hydroxide/magnesium hydroxide/simethicone Suspension 30 milliLiter(s) Oral every 4 hours PRN Upset Stomach  ondansetron Injectable 4 milliGRAM(s) IV Push every 8 hours PRN Nausea and/or Vomiting          T(C): 36.5 (12-08-24 @ 12:21), Max: 36.8 (12-08-24 @ 04:50)  HR: 59 (12-08-24 @ 12:21) (59 - 78)  BP: 154/75 (12-08-24 @ 12:21) (130/71 - 154/75)  RR: 20 (12-08-24 @ 12:21) (20 - 20)  SpO2: 100% (12-08-24 @ 12:21) (98% - 100%)  Wt(kg): --        I&O's Detail    07 Dec 2024 07:01  -  08 Dec 2024 07:00  --------------------------------------------------------  IN:  Total IN: 0 mL    OUT:    Voided (mL): 3 mL  Total OUT: 3 mL    Total NET: -3 mL               PHYSICAL EXAM:    GENERAL: NAD  NECK: Supple, no inc in JVP  CHEST/LUNG: Clear  HEART: S1S2  ABDOMEN: Soft, Nontender, Nondistended; Bowel sounds present  EXTREMITIES:  no edema.   NEURO: no asterixis      LABS:  CBC Full  -  ( 06 Dec 2024 13:50 )  WBC Count : 10.24 K/uL  RBC Count : 4.01 M/uL  Hemoglobin : 12.9 g/dL  Hematocrit : 34.6 %  Platelet Count - Automated : 435 K/uL  Mean Cell Volume : 86.3 fl  Mean Cell Hemoglobin : 32.2 pg  Mean Cell Hemoglobin Concentration : 37.3 g/dL  Auto Neutrophil # : 7.38 K/uL  Auto Lymphocyte # : 1.78 K/uL  Auto Monocyte # : 0.86 K/uL  Auto Eosinophil # : 0.02 K/uL  Auto Basophil # : 0.04 K/uL  Auto Neutrophil % : 72.0 %  Auto Lymphocyte % : 17.4 %  Auto Monocyte % : 8.4 %  Auto Eosinophil % : 0.2 %  Auto Basophil % : 0.4 %    12-08    125[L]  |  92[L]  |  5[L]  ----------------------------<  86  3.3[L]   |  26  |  0.57    Ca    8.9      08 Dec 2024 07:12    TPro  6.7  /  Alb  3.5  /  TBili  0.9  /  DBili  x   /  AST  17  /  ALT  23  /  AlkPhos  150[H]  12-07    PT/INR - ( 06 Dec 2024 13:50 )   PT: 12.4 sec;   INR: 1.05 ratio           Impression:  * Severe HypoNa -- DDx per Dr Murillo's initial consult. Correcting at an acceptable rate  * Abd pain, diarrhea.     Recommendations:   Cont saline  Serum Na, serum Uric acid, am cortisol requested.   BMP tomorrow.

## 2024-12-08 NOTE — PROGRESS NOTE ADULT - SUBJECTIVE AND OBJECTIVE BOX
[facility]    Oni Roa NP    [address] [place] [id number]  [phone]      Chief Complaint:  Patient is a 59y old  Female who presents with a chief complaint of Failure to thrive/Hyponatremia (08 Dec 2024 09:56)      HPI/ 24 hr events:   Patient seen and examined at bedside  She reports having >6 stools overnight, as per nursing documentation 10BMs  Stools have been mostly liquid/brown and denies hematochezia   Endorses increased stool urgency and incomplete emptying   Also with intermittent, generalized abd pain associated with nausea   No fever, chills, or vomiting   Tolerating diet       REVIEW OF SYSTEMS:   General: Negative  HEENT: Negative  CV: Negative  Respiratory: Negative  GI: See HPI  : Negative  MSK: Negative  Hematologic: Negative  Skin: Negative    MEDICATIONS:   MEDICATIONS  (STANDING):  enoxaparin Injectable 40 milliGRAM(s) SubCutaneous every 24 hours  influenza   Vaccine 0.5 milliLiter(s) IntraMuscular once  levothyroxine 25 MICROGram(s) Oral daily  lidocaine   4% Patch 1 Patch Transdermal daily  naloxone Injectable 0.4 milliGRAM(s) IV Push once  pantoprazole    Tablet 40 milliGRAM(s) Oral before breakfast  polyethylene glycol 3350 17 Gram(s) Oral daily  potassium chloride    Tablet ER 40 milliEquivalent(s) Oral every 4 hours  sodium chloride 0.9%. 1000 milliLiter(s) (70 mL/Hr) IV Continuous <Continuous>    MEDICATIONS  (PRN):  acetaminophen     Tablet .. 650 milliGRAM(s) Oral every 6 hours PRN Mild Pain (1 - 3)  albuterol    90 MICROgram(s) HFA Inhaler 2 Puff(s) Inhalation every 6 hours PRN Shortness of Breath and/or Wheezing  aluminum hydroxide/magnesium hydroxide/simethicone Suspension 30 milliLiter(s) Oral every 4 hours PRN Upset Stomach  ondansetron Injectable 4 milliGRAM(s) IV Push every 8 hours PRN Nausea and/or Vomiting      ALLERGIES:   Allergies    No Known Allergies    Intolerances    aspirin (Stomach Upset)      VITAL SIGNS:   Vital Signs Last 24 Hrs  T(C): 36.8 (08 Dec 2024 04:50), Max: 36.8 (07 Dec 2024 12:31)  T(F): 98.3 (08 Dec 2024 04:50), Max: 98.3 (07 Dec 2024 12:31)  HR: 78 (08 Dec 2024 04:50) (64 - 78)  BP: 132/76 (08 Dec 2024 04:50) (130/71 - 146/74)  BP(mean): --  RR: 20 (08 Dec 2024 04:50) (20 - 20)  SpO2: 98% (08 Dec 2024 04:50) (98% - 100%)    Parameters below as of 08 Dec 2024 04:50  Patient On (Oxygen Delivery Method): room air      I&O's Summary    07 Dec 2024 07:01  -  08 Dec 2024 07:00  --------------------------------------------------------  IN: 0 mL / OUT: 3 mL / NET: -3 mL        PHYSICAL EXAM:   GENERAL:  No acute distress  HEENT:  NC/AT  CHEST:  No increased effort  HEART:  Regular rate  ABDOMEN:  Soft, +TTP generalized, non-distended, positive bowel sounds, no rebound or guarding  EXTREMITIES: No edema, no cyanosis  SKIN:  Warm, dry  NEURO:  Calm, cooperative    LABS:                        12.9   10.24 )-----------( 435      ( 06 Dec 2024 13:50 )             34.6     12-08    125[L]  |  92[L]  |  5[L]  ----------------------------<  86  3.3[L]   |  26  |  0.57    Ca    8.9      08 Dec 2024 07:12    TPro  6.7  /  Alb  3.5  /  TBili  0.9  /  DBili  x   /  AST  17  /  ALT  23  /  AlkPhos  150[H]  12-07    LIVER FUNCTIONS - ( 07 Dec 2024 10:40 )  Alb: 3.5 g/dL / Pro: 6.7 g/dL / ALK PHOS: 150 U/L / ALT: 23 U/L / AST: 17 U/L / GGT: x           PT/INR - ( 06 Dec 2024 13:50 )   PT: 12.4 sec;   INR: 1.05 ratio         PTT - ( 06 Dec 2024 13:50 )  PTT:31.4 sec    Urinalysis with Rflx Culture (collected 06 Dec 2024 17:20)                      Clostridium difficile GDH Interpretation: This specimen is positive for C. Difficile glutamate dehydrogenase (GDH)  antigen and negative for C. Difficile Toxins A & B, by EIA.  GDH is a  highly sensitive screening marker for C. Difficile that is produced in  large amounts by all C. Difficilestrains, both toxigenic and  nontoxigenic.  Specimens that are GDH positive and toxin negative will be  tested further by PCR to detect toxin gene sequences associated with  toxin producing C. Difficle. (12-08-24 @ 03:00)  GI PCR Panel: Indeterminate GI Panel PCR evaluates for:  Campylobacter, Plesiomonas shigelloides, Salmonella, Vibrio, Yersinia  enterocolitica, Enteroaggregative Escherichia (EAEC), Enteropathogenic E.  coli (EPEC), Enterotoxigenic E. coli (ETEC), Shiga-like toxinproducing  E. coli (STEC), E. coli O157, Shigella/Enteroinvasive E. coli (EIEC),  Adenovirus, Astrovirus, Norovirus, Rotavirus, Sapovirus, Cryptosporidium,  Cyclospora cayetanensis, Entamoeba histolytica, Giardia lamblia.  For culture and susceptibility reports refer to "reflex stool culture". (12-07-24 @ 19:36)  GI PCR Panel: Indeterminate GI Panel PCR evaluates for:  Campylobacter, Plesiomonas shigelloides, Salmonella, Vibrio, Yersinia  enterocolitica, Enteroaggregative Escherichia (EAEC), Enteropathogenic E.  coli (EPEC), Enterotoxigenic E. coli (ETEC), Shiga-like toxinproducing  E. coli (STEC), E. coli O157, Shigella/Enteroinvasive E. coli (EIEC),  Adenovirus, Astrovirus, Norovirus, Rotavirus, Sapovirus, Cryptosporidium,  Cyclospora cayetanensis, Entamoeba histolytica, Giardia lamblia.  For culture and susceptibility reports refer to "reflex stool culture". (12-07-24 @ 19:36)                RADIOLOGY & ADDITIONAL STUDIES:

## 2024-12-08 NOTE — PROGRESS NOTE ADULT - PROBLEM SELECTOR PLAN 1
-likely due to SIADH and low sodium intake   - c/w IV saline hydration , Na level improved   - Nephro eval noted -likely due to SIADH and low sodium intake   - c/w IV saline hydration , Na level improved   - Nephro eval noted      Hypokalemia: potassium supplement

## 2024-12-08 NOTE — CARE COORDINATION ASSESSMENT. - NSPASTMEDSURGHISTORY_GEN_ALL_CORE_FT
PAST MEDICAL & SURGICAL HISTORY:  Hepatitis C  body cleared      Hypothyroidism      Hypotension  in past had syncope related to brain problem-better recently      GERD (gastroesophageal reflux disease)  gastritis, vomits often      Vertigo      Sjogren's disease      Lupus      Gall bladder disease  REMOVAL      Vocal cord polyp  REMOVAL      Nicotine addiction      Neuropathy      Migraine      Anxiety      Glossopharyngeal neuralgia syndrome  s/p brain sx in 12/2018      Injury of right wrist, subsequent encounter  no surgery      H/O lumpectomy  right shouler, axillary, both breasts head, right back      Dvt femoral (deep venous thrombosis)  right arm, past      Emphysema/COPD  on oxygen at night 3L NC      Chronic neck pain  cervical bone fused throught disc      Chronic low back pain with sciatica      Right shoulder pain      Pain, wrist, right  collapse      H/O Raynaud's syndrome      History of IBS  had blockage about 6 months ago-resolved with NG tube, colitis      Osteochondritis dissecans  both ankles      Personal history of skin cancer  toe      History of seizure disorder  last seizure 8 yrs ago      History of weight loss  lost 20 pounds within the last year-unknown cause      H/O osteoporosis  severe, was on forteo      Burning mouth syndrome      Ankle problem  right ankle surgery      S/P cryoablation of arrhythmia      S/P brain surgery  2018, also had gamma knife procedure-microvascular decompression- titanium plate in skull      H/O paroxysmal supraventricular tachycardia      H/O squamous cell carcinoma excision  toe      Status post placement of implantable loop recorder      H/O elbow surgery  10/2020 - LEFT elbow

## 2024-12-08 NOTE — PROGRESS NOTE ADULT - PROBLEM SELECTOR PLAN 5
? injury happen  2month ago   PT->MATTY   pain management: try to avoid narcotic  will try IV Toradol if needed

## 2024-12-08 NOTE — CARE COORDINATION ASSESSMENT. - NSCAREPROVIDERS_GEN_ALL_CORE_FT
CARE PROVIDERS:  Accepting Physician: Moses Fernandez  Administration: Yumiko Yan  Administration: Jonathan Strong  Administration: Nemesio Henry  Administration: Basilio Ortiz  Admitting: Moses Fernandez  Attending: Moses Fernandez  Consultant: Rudy Porter  Consultant: Jani Murillo  Consultant: Ifeanyi Trinidad  Consultant: Kayln Cardenas  Consultant: Che Rea  Consultant: Pippa Lopez  Covering Team: Mira Banegas  Covering Team: Nick Phelan  ED ACP: Parker Boyle  ED Attending: Raphael Hollis  ED Nurse: Flor Domingo  Nurse: Elfego Celeste  Nurse: Martha Nick  Ordered: Doctor, Unknown  Outpatient Provider: Jani Murillo  Outpatient Provider: Dilip Silver  Outpatient Provider: Lorne Pablo  Override: Martha Nick  Override: Keysha Ocasio  Override: Esteban Contreras  Override: Akua Ocampo  Override: Shanna Celis  Primary Team: Dolores Vuong  Primary Team: Ismael Vigil  Primary Team: Linda Jolly  Primary Team: Vamshi Mejía  Registered Dietitian: Macie Wyman  Respiratory Therapy: Ivana Quiroga  Respiratory Therapy: Luan Marquez  : Charly Ferris// Supp. Assoc.: Ann Gar

## 2024-12-08 NOTE — PROGRESS NOTE ADULT - ASSESSMENT
Colitis  Abdominal pain  Hx IBS-D    CT AP w/ IVC (12/6): Acute sigmoid and descending colitis. Age-indeterminate left pubic fracture with associated para osseous organized hematoma.     Recommendations:  - C. Diff GDH+ but negative for toxin A/B, awaiting PCR    - GI PCR indeterminate Norovirus, repeat GI PCR sent  - F/u stool calprotectin  - Supportive care   - Low fiber diet   - Stool count  - Pain management  - Anti-emetics PRN  - ID following, recommend dc zosyn   - GI to follow      I reviewed the overnight course of events on the unit, re-confirming the patient history. I discussed the care with the patient  Differential diagnosis and plan of care discussed with patient after the evaluation  35 minutes spent on total encounter of which more than fifty percent of the encounter was spent counseling and/or coordinating care by the attending physician.

## 2024-12-08 NOTE — PROGRESS NOTE ADULT - PROBLEM SELECTOR PLAN 6
DVT PPx   Lovenox 40 sq    as per sister, patient has chronic drug use disorder and was on varies medications including fentanyl, oxycodone, xanax, Klonopin .etc. Utox on admission negative. patient states she had detox in [facility] couple of weeks ago, no drug use since then   tylenol prn. acute pain seems colitis related, Toradol prn if needed more pain medication, H/O ASA intolerance (stomach discomfort  as per patient ), not allergic reaction

## 2024-12-08 NOTE — CARE COORDINATION ASSESSMENT. - ASSESSMENT CONCERNS TO BE ADDRESSED
Pt is a 59-year-old female who lives home with parents.  r/o c-diff.   Pt is alert and oriented and independent.  She was recently at [facility] for detox for accidental OD as per patient.  She denies any psych history or suicide attempts in past.  Pt does report gambling addiction in past and pain med addiction.  She is not in treatment but agreeable to attend NA/AA meeting./care coordination
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
## 2024-12-08 NOTE — PROGRESS NOTE ADULT - SUBJECTIVE AND OBJECTIVE BOX
[facility]  INFECTIOUS DISEASES   [address] [place] [address]  Tel: [phone]     Fax: [phone]  MD Sue Marshall MD Shah, Kaushal, MD Sunjit, Jaspal, MD  ========================================================  Weekend Coverage  =======================================================      TASHIDOUGLAS [id number]    Follow up: Colitis     + Diarrhea   More awake, alert and less confused       Allergies:  No Known Allergies  aspirin (Stomach Upset)         REVIEW OF SYSTEMS:  CONSTITUTIONAL:  No Fever or chills  HEENT:   No diplopia or blurred vision.  No earache, sore throat or runny nose.  CARDIOVASCULAR:  No Chest Pain  RESPIRATORY:  No cough, shortness of breath  GASTROINTESTINAL:  No nausea, vomiting. + diarrhea.  GENITOURINARY:  No dysuria, frequency or urgency. No Blood in urine  MUSCULOSKELETAL:  no joint aches, no muscle pain  SKIN:  No change in skin, hair or nails.  NEUROLOGIC:  No Headaches      Physical Exam:  GEN: NAD  HEENT: normocephalic and atraumatic. EOMI. PERRL.    NECK: Supple.   LUNGS: CTA B/L.  HEART: RRR  ABDOMEN: Soft, NT, ND.  +BS.    : No CVA tenderness  EXTREMITIES: Without  edema.  MSK: No joint swelling  NEUROLOGIC: No Focal Deficits   SKIN: No rash      Vitals:  T(F): 98.3 (08 Dec 2024 04:50), Max: 98.3 (07 Dec 2024 12:31)  HR: 78 (08 Dec 2024 04:50)  BP: 132/76 (08 Dec 2024 04:50)  RR: 20 (08 Dec 2024 04:50)  SpO2: 98% (08 Dec 2024 04:50) (98% - 100%)  temp max in last 48H T(F): , Max: 98.3 (12-07-24 @ 12:31)    Current Antibiotics:  piperacillin/tazobactam IVPB.. 3.375 Gram(s) IV Intermittent every 8 hours    Other medications:  enoxaparin Injectable 40 milliGRAM(s) SubCutaneous every 24 hours  influenza   Vaccine 0.5 milliLiter(s) IntraMuscular once  levothyroxine 25 MICROGram(s) Oral daily  lidocaine   4% Patch 1 Patch Transdermal daily  naloxone Injectable 0.4 milliGRAM(s) IV Push once  pantoprazole    Tablet 40 milliGRAM(s) Oral before breakfast  polyethylene glycol 3350 17 Gram(s) Oral daily  potassium chloride    Tablet ER 40 milliEquivalent(s) Oral every 4 hours  sodium chloride 0.9%. 1000 milliLiter(s) IV Continuous <Continuous>                            12.9   10.24 )-----------( 435      ( 06 Dec 2024 13:50 )             34.6     12-08    125[L]  |  92[L]  |  5[L]  ----------------------------<  86  3.3[L]   |  26  |  0.57    Ca    8.9      08 Dec 2024 07:12    TPro  6.7  /  Alb  3.5  /  TBili  0.9  /  DBili  x   /  AST  17  /  ALT  23  /  AlkPhos  150[H]  12-07    RECENT CULTURES:      WBC Count: 10.24 K/uL (12-06-24 @ 13:50)    Creatinine: 0.57 mg/dL (12-08-24 @ 07:12)  Creatinine: 0.58 mg/dL (12-07-24 @ 10:40)  Creatinine: 0.43 mg/dL (12-06-24 @ 13:50)    Procalcitonin: <0.02 ng/mL (12-08-24 @ 07:12)     GI PCR Panel Stool (12.07.24 @ 19:36)    GI PCR Panel: Indeterminate   Norovirus GI/GII: Indeterminate Norovirus results are indeterminate, please resubmit another sample if  clinically indicated.

## 2024-12-08 NOTE — PROGRESS NOTE ADULT - PROBLEM SELECTOR PLAN 2
Pt c/o abdominal pain and found to be tender on exam  - CT A/P -->Acute sigmoid and descending colitis. Age-indeterminate left pubic fracture with associated para osseous organized hematoma. Underlying lytic process not excluded. Sacral insufficiency fractures.  - Gi Dr Trinidad Consult noted   - ID eval noted  - GI PCR : indeterminate + Novovirus, C diff, repeat sample sent

## 2024-12-08 NOTE — CHART NOTE - NSCHARTNOTEFT_GEN_A_CORE
Called by RN for patient with multiple loose BMs and abdominal pain. Patient admitted for colitis and abdominal pain, started on Zosyn. Patient seen and examined at bedside. RN reports about 5 loose BMs since start of shift. Patient endorsing abdominal pain, however was nontender on exam.     - Notably, on admission patient was complaining of constipation and was given Miralax + Senna today  - Loose BMs likely 2/2 infectious colitis + laxative use, however given persistence and number of loose BMs tonight, cannot r/o C. diff. Patient has also spent several weeks in hospitals and healthcare facilities.   - Ordered C. diff study, started isolation precautions   - Cont IV fluid, anti-pyretics prn.   - Dc laxatives   - Hold off anti-diarrheals  - RN to call with any changes

## 2024-12-09 ENCOUNTER — TRANSCRIPTION ENCOUNTER (OUTPATIENT)
Age: 59
End: 2024-12-09

## 2024-12-09 DIAGNOSIS — G93.41 METABOLIC ENCEPHALOPATHY: ICD-10-CM

## 2024-12-09 LAB
ALBUMIN SERPL ELPH-MCNC: 3.3 G/DL — SIGNIFICANT CHANGE UP (ref 3.3–5)
ALP SERPL-CCNC: 133 U/L — HIGH (ref 40–120)
ALT FLD-CCNC: 23 U/L — SIGNIFICANT CHANGE UP (ref 12–78)
ANION GAP SERPL CALC-SCNC: 8 MMOL/L — SIGNIFICANT CHANGE UP (ref 5–17)
AST SERPL-CCNC: 23 U/L — SIGNIFICANT CHANGE UP (ref 15–37)
BASOPHILS # BLD AUTO: 0.07 K/UL — SIGNIFICANT CHANGE UP (ref 0–0.2)
BASOPHILS NFR BLD AUTO: 0.7 % — SIGNIFICANT CHANGE UP (ref 0–2)
BILIRUB SERPL-MCNC: 0.5 MG/DL — SIGNIFICANT CHANGE UP (ref 0.2–1.2)
BUN SERPL-MCNC: 5 MG/DL — LOW (ref 7–23)
CALCIUM SERPL-MCNC: 9.1 MG/DL — SIGNIFICANT CHANGE UP (ref 8.5–10.1)
CHLORIDE SERPL-SCNC: 93 MMOL/L — LOW (ref 96–108)
CO2 SERPL-SCNC: 21 MMOL/L — LOW (ref 22–31)
CREAT SERPL-MCNC: 0.39 MG/DL — LOW (ref 0.5–1.3)
EGFR: 113 ML/MIN/1.73M2 — SIGNIFICANT CHANGE UP
EOSINOPHIL # BLD AUTO: 0.08 K/UL — SIGNIFICANT CHANGE UP (ref 0–0.5)
EOSINOPHIL NFR BLD AUTO: 0.8 % — SIGNIFICANT CHANGE UP (ref 0–6)
GLUCOSE SERPL-MCNC: 68 MG/DL — LOW (ref 70–99)
HCT VFR BLD CALC: 35.4 % — SIGNIFICANT CHANGE UP (ref 34.5–45)
HGB BLD-MCNC: 13 G/DL — SIGNIFICANT CHANGE UP (ref 11.5–15.5)
IMM GRANULOCYTES NFR BLD AUTO: 1.4 % — HIGH (ref 0–0.9)
LYMPHOCYTES # BLD AUTO: 2.21 K/UL — SIGNIFICANT CHANGE UP (ref 1–3.3)
LYMPHOCYTES # BLD AUTO: 22.9 % — SIGNIFICANT CHANGE UP (ref 13–44)
MAGNESIUM SERPL-MCNC: 2.4 MG/DL — SIGNIFICANT CHANGE UP (ref 1.6–2.6)
MCHC RBC-ENTMCNC: 32.4 PG — SIGNIFICANT CHANGE UP (ref 27–34)
MCHC RBC-ENTMCNC: 36.7 G/DL — HIGH (ref 32–36)
MCV RBC AUTO: 88.3 FL — SIGNIFICANT CHANGE UP (ref 80–100)
MONOCYTES # BLD AUTO: 0.91 K/UL — HIGH (ref 0–0.9)
MONOCYTES NFR BLD AUTO: 9.4 % — SIGNIFICANT CHANGE UP (ref 2–14)
NEUTROPHILS # BLD AUTO: 6.25 K/UL — SIGNIFICANT CHANGE UP (ref 1.8–7.4)
NEUTROPHILS NFR BLD AUTO: 64.8 % — SIGNIFICANT CHANGE UP (ref 43–77)
NRBC # BLD: 0 /100 WBCS — SIGNIFICANT CHANGE UP (ref 0–0)
PHOSPHATE SERPL-MCNC: 1.4 MG/DL — LOW (ref 2.5–4.5)
PLATELET # BLD AUTO: 160 K/UL — SIGNIFICANT CHANGE UP (ref 150–400)
POTASSIUM SERPL-MCNC: 4.3 MMOL/L — SIGNIFICANT CHANGE UP (ref 3.5–5.3)
POTASSIUM SERPL-MCNC: 5.6 MMOL/L — HIGH (ref 3.5–5.3)
POTASSIUM SERPL-SCNC: 4.3 MMOL/L — SIGNIFICANT CHANGE UP (ref 3.5–5.3)
POTASSIUM SERPL-SCNC: 5.6 MMOL/L — HIGH (ref 3.5–5.3)
PROT SERPL-MCNC: 6.3 G/DL — SIGNIFICANT CHANGE UP (ref 6–8.3)
RBC # BLD: 4.01 M/UL — SIGNIFICANT CHANGE UP (ref 3.8–5.2)
RBC # FLD: 12.8 % — SIGNIFICANT CHANGE UP (ref 10.3–14.5)
SODIUM SERPL-SCNC: 122 MMOL/L — LOW (ref 135–145)
WBC # BLD: 9.66 K/UL — SIGNIFICANT CHANGE UP (ref 3.8–10.5)
WBC # FLD AUTO: 9.66 K/UL — SIGNIFICANT CHANGE UP (ref 3.8–10.5)

## 2024-12-09 PROCEDURE — 99233 SBSQ HOSP IP/OBS HIGH 50: CPT

## 2024-12-09 RX ORDER — HEPARIN SODIUM 5000 [USP'U]/.5ML
30 INJECTION, SOLUTION INTRAVENOUS; SUBCUTANEOUS ONCE
Refills: 0 | Status: COMPLETED | OUTPATIENT
Start: 2024-12-09 | End: 2024-12-09

## 2024-12-09 RX ORDER — SODIUM CHLORIDE 9 MG/ML
1 INJECTION, SOLUTION INTRAMUSCULAR; INTRAVENOUS; SUBCUTANEOUS THREE TIMES A DAY
Refills: 0 | Status: DISCONTINUED | OUTPATIENT
Start: 2024-12-09 | End: 2024-12-10

## 2024-12-09 RX ORDER — SODIUM CHLORIDE 9 MG/ML
1 INJECTION, SOLUTION INTRAMUSCULAR; INTRAVENOUS; SUBCUTANEOUS EVERY 6 HOURS
Refills: 0 | Status: COMPLETED | OUTPATIENT
Start: 2024-12-09 | End: 2024-12-09

## 2024-12-09 RX ADMIN — SODIUM CHLORIDE 1 GRAM(S): 9 INJECTION, SOLUTION INTRAMUSCULAR; INTRAVENOUS; SUBCUTANEOUS at 17:01

## 2024-12-09 RX ADMIN — Medication 25 MICROGRAM(S): at 05:47

## 2024-12-09 RX ADMIN — ACETAMINOPHEN 500MG 650 MILLIGRAM(S): 500 TABLET, COATED ORAL at 12:27

## 2024-12-09 RX ADMIN — ACETAMINOPHEN, DIPHENHYDRAMINE HCL, PHENYLEPHRINE HCL 5 MILLIGRAM(S): 325; 25; 5 TABLET ORAL at 23:18

## 2024-12-09 RX ADMIN — Medication 30 MILLILITER(S): at 02:39

## 2024-12-09 RX ADMIN — LIDOCAINE 1 PATCH: 40 CREAM TOPICAL at 11:18

## 2024-12-09 RX ADMIN — PANTOPRAZOLE SODIUM 40 MILLIGRAM(S): 40 TABLET, DELAYED RELEASE ORAL at 05:47

## 2024-12-09 RX ADMIN — HEPARIN SODIUM 102 MILLIMOLE(S): 5000 INJECTION, SOLUTION INTRAVENOUS; SUBCUTANEOUS at 16:20

## 2024-12-09 RX ADMIN — Medication 125 MILLIGRAM(S): at 11:17

## 2024-12-09 RX ADMIN — LIDOCAINE 1 PATCH: 40 CREAM TOPICAL at 19:19

## 2024-12-09 RX ADMIN — SODIUM CHLORIDE 1 GRAM(S): 9 INJECTION, SOLUTION INTRAMUSCULAR; INTRAVENOUS; SUBCUTANEOUS at 15:29

## 2024-12-09 RX ADMIN — Medication 125 MILLIGRAM(S): at 05:49

## 2024-12-09 RX ADMIN — LIDOCAINE 1 PATCH: 40 CREAM TOPICAL at 22:57

## 2024-12-09 RX ADMIN — ACETAMINOPHEN 500MG 650 MILLIGRAM(S): 500 TABLET, COATED ORAL at 23:07

## 2024-12-09 RX ADMIN — ENOXAPARIN SODIUM 40 MILLIGRAM(S): 30 INJECTION SUBCUTANEOUS at 21:27

## 2024-12-09 RX ADMIN — ACETAMINOPHEN 500MG 650 MILLIGRAM(S): 500 TABLET, COATED ORAL at 22:37

## 2024-12-09 RX ADMIN — Medication 30 MILLILITER(S): at 22:37

## 2024-12-09 RX ADMIN — Medication 30 MILLILITER(S): at 11:38

## 2024-12-09 RX ADMIN — LIDOCAINE 1 PATCH: 40 CREAM TOPICAL at 03:34

## 2024-12-09 RX ADMIN — Medication 125 MILLIGRAM(S): at 17:00

## 2024-12-09 RX ADMIN — ACETAMINOPHEN 500MG 650 MILLIGRAM(S): 500 TABLET, COATED ORAL at 11:27

## 2024-12-09 NOTE — SOCIAL WORK PROGRESS NOTE - NSSWPROGRESSNOTE_GEN_ALL_CORE
SW met with pt at bedside to discuss resources. Pt receptive to outpt substance use resources. SW provided list and will remain available if pt requires assistance with coordination. SW to follow for any needs.

## 2024-12-09 NOTE — DISCHARGE NOTE PROVIDER - NSDCFUSCHEDAPPT_GEN_ALL_CORE_FT
[place]  [facility]  LAUREN PEREZ Radiology IRD  Scheduled Appointment: 12/10/2024     [facility]  [facility] Preadmit  Scheduled Appointment: 12/16/2024    [facility]  [facility] Preadmit  Scheduled Appointment: 12/26/2024

## 2024-12-09 NOTE — PROGRESS NOTE ADULT - PROBLEM SELECTOR PLAN 6
DVT PPx   Lovenox 40 sq    as per sister, patient has chronic drug use disorder and was on varies medications including fentanyl, oxycodone, xanax, Klonopin .etc. Utox on admission negative. patient states she [facility] ago, no drug use since then   tylenol prn. acute pain seems colitis related, Toradol prn if needed more pain medication, H/O ASA intolerance (stomach discomfort  as per patient ), not allergic reaction ? injury happen  2month ago   PT->MATTY   pain management: try to avoid narcotic  will try IV Toradol if needed
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
## 2024-12-09 NOTE — DISCHARGE NOTE PROVIDER - NSDCMRMEDTOKEN_GEN_ALL_CORE_FT
fentaNYL 12 mcg/hr transdermal film, extended release: 1 film(s) transdermal every 72 hours  Fioricet oral tablet: 1 tab(s) orally 2 times a day, As Needed  ipratropium 500 mcg/2.5 mL inhalation solution: 2.5 milliliter(s) inhaled 4 times a day, As Needed  levothyroxine 25 mcg (0.025 mg) oral capsule: 1 cap(s) orally once a day  Lyrica 100 mg oral capsule: 1 cap(s) orally 3 times a day as needed for  severe pain  Soma 350 mg oral tablet: 1 tab(s) orally 2 times a day  Trelegy Ellipta 100 mcg-62.5 mcg-25 mcg/inh inhalation powder: 1 puff(s) inhaled once a day, As Needed  Ventolin HFA 90 mcg/inh inhalation aerosol: 2 puff(s) inhaled every 6 hours, As Needed   ipratropium 500 mcg/2.5 mL inhalation solution: 2.5 milliliter(s) inhaled 4 times a day, As Needed  levothyroxine 25 mcg (0.025 mg) oral capsule: 1 cap(s) orally once a day  Lyrica 100 mg oral capsule: 1 cap(s) orally 3 times a day as needed for  severe pain  Trelegy Ellipta 100 mcg-62.5 mcg-25 mcg/inh inhalation powder: 1 puff(s) inhaled once a day, As Needed  vancomycin 125 mg oral capsule: 1 cap(s) orally 4 times a day  vancomycin 125 mg oral capsule: 1 cap(s) orally 2 times a day from 12/19/2024 for 7 days  vancomycin 125 mg oral capsule: 1 cap(s) orally once a day Start on 12/26 for 14 days  Ventolin HFA 90 mcg/inh inhalation aerosol: 2 puff(s) inhaled every 6 hours, As Needed

## 2024-12-09 NOTE — PROGRESS NOTE ADULT - PROBLEM SELECTOR PLAN 2
Pt c/o abdominal pain and found to be tender on exam  - CT A/P -->Acute sigmoid and descending colitis. Age-indeterminate left pubic fracture with associated para osseous organized hematoma. Underlying lytic process not excluded. Sacral insufficiency fractures.  - Gi Dr Trinidad Consult noted   - ID eval noted  - GI PCR : + Novovirus, C diff +  - started vanco po -likely due to SIADH and low sodium intake , now with diarrhea   - c/w IV saline hydration , Na level improved   - Nephro eval noted      Hypokalemia: potassium supplement, now mildly elevated, will repeat.

## 2024-12-09 NOTE — DISCHARGE NOTE PROVIDER - NSDCCPCAREPLAN_GEN_ALL_CORE_FT
PRINCIPAL DISCHARGE DIAGNOSIS  Diagnosis: Acute metabolic encephalopathy  Assessment and Plan of Treatment: due to infectious process and hyponatremia. improved after underlying condition addressed      SECONDARY DISCHARGE DIAGNOSES  Diagnosis: Colitis  Assessment and Plan of Treatment: GI PCR positive for novovirus and C.diff. you were seen by GI and ID, started in IV hydration, vancomycin po.    Diagnosis: Hyponatremia  Assessment and Plan of Treatment: you were seen by nephrologist. likely due to SIADH , decrease oral intake and GI loss. received IV NS infusion. Na level improved     PRINCIPAL DISCHARGE DIAGNOSIS  Diagnosis: Acute metabolic encephalopathy  Assessment and Plan of Treatment: due to infectious process and hyponatremia. improved after underlying condition addressed. Now improved and normal   Please complete course of Oral antibiotics for C diff colitis as prescribed, tapering doses. Please f/u with ID and GI for any concerns number is attached   Your will have IR guided bone biopsy on 12/16/2024. You will be contacted by IR team member on Monday 12/16/2024 to schedule appointment for presurgical testing . F/u with Oncologist Dr. yaa Hernandez as scheduled   please f/u with orthopedics for sacral fracture that was noted on your MRI  f/u with PCP in 3- 5 days      SECONDARY DISCHARGE DIAGNOSES  Diagnosis: Colitis  Assessment and Plan of Treatment: GI PCR positive for novovirus and C.diff. you were seen by GI and ID, started in IV hydration, vancomycin po.    Diagnosis: Hyponatremia  Assessment and Plan of Treatment: you were seen by nephrologist. likely due to SIADH , decrease oral intake and GI loss. received IV NS infusion. Na level improved

## 2024-12-09 NOTE — PROGRESS NOTE ADULT - ASSESSMENT
Severe Hyponatremia: Low solute intake, SIADH  Abdominal pain/Diarrhea: C diff    Improving sodium levels. PO fluid restriction. D/c IVF. To continue current meds. Rx for C. Diff.  Will follow electrolytes and renal function trend.

## 2024-12-09 NOTE — PROGRESS NOTE ADULT - SUBJECTIVE AND OBJECTIVE BOX
[facility]                                                       Dr. Murillo, Dr. Porter, Dr. Bustillo, Dr. Ocasio, Dr. Lugo, Dr. Clinton                                      [facility], [facility], [address]                                                 [address] [address]                                      Ph: [phone]  Fax: [phone]                                         Ph: [phone]  Fax: [phone]      Patient is a 59y old  Female who presents with a chief complaint of Failure to thrive/Hyponatremia (09 Dec 2024 12:27)  Patient seen in follow up for hyponatremia.        PAST MEDICAL HISTORY:  Lupus    Sjogren's disease    Vertigo    UTI (urinary tract infection)    GERD (gastroesophageal reflux disease)    Hypotension    Hypothyroidism    Gallstones    Hepatitis C    Anxiety    Migraine    Neuropathy    COPD (chronic obstructive pulmonary disease)    Nicotine addiction    Glossopharyngeal neuralgia syndrome    Dvt femoral (deep venous thrombosis)    Lupus    Emphysema/COPD    Burning mouth syndrome    H/O osteoporosis    History of weight loss    History of seizure disorder    Personal history of skin cancer    Osteochondritis dissecans    History of IBS    H/O Raynaud's syndrome    Pain, wrist, right    Right shoulder pain    Chronic low back pain with sciatica    Chronic neck pain    H/O paroxysmal supraventricular tachycardia      MEDICATIONS  (STANDING):  enoxaparin Injectable 40 milliGRAM(s) SubCutaneous every 24 hours  influenza   Vaccine 0.5 milliLiter(s) IntraMuscular once  levothyroxine 25 MICROGram(s) Oral daily  lidocaine   4% Patch 1 Patch Transdermal daily  naloxone Injectable 0.4 milliGRAM(s) IV Push once  sodium chloride 0.9%. 1000 milliLiter(s) (70 mL/Hr) IV Continuous <Continuous>  vancomycin    Solution 125 milliGRAM(s) Oral every 6 hours    MEDICATIONS  (PRN):  acetaminophen     Tablet .. 650 milliGRAM(s) Oral every 6 hours PRN Mild Pain (1 - 3)  albuterol    90 MICROgram(s) HFA Inhaler 2 Puff(s) Inhalation every 6 hours PRN Shortness of Breath and/or Wheezing  aluminum hydroxide/magnesium hydroxide/simethicone Suspension 30 milliLiter(s) Oral every 4 hours PRN Upset Stomach  melatonin 5 milliGRAM(s) Oral at bedtime PRN Insomnia  ondansetron Injectable 4 milliGRAM(s) IV Push every 8 hours PRN Nausea and/or Vomiting    T(C): 36.6 (12-09-24 @ 12:36), Max: 36.8 (12-08-24 @ 04:50)  HR: 59 (12-09-24 @ 12:36) (59 - 78)  BP: 150/79 (12-09-24 @ 12:36) (103/60 - 154/75)  RR: 20 (12-09-24 @ 12:36) (20 - 20)  SpO2: 99% (12-09-24 @ 12:36) (98% - 100%)  Wt(kg): --  I&O's Detail    08 Dec 2024 07:01  -  09 Dec 2024 07:00  --------------------------------------------------------  IN:    sodium chloride 0.9%: 630 mL  Total IN: 630 mL    OUT:  Total OUT: 0 mL    Total NET: 630 mL          PHYSICAL EXAM:  General: No distress  Respiratory: b/l air entry  Cardiovascular: S1 S2  Gastrointestinal: soft  Extremities:  no edema                              13.0   9.66  )-----------( 160      ( 09 Dec 2024 06:05 )             35.4     12-09    x   |  x   |  x   ----------------------------<  x   4.3   |  x   |  x     Ca    9.1      09 Dec 2024 06:05  Phos  1.4     12-09  Mg     2.4     12-09    TPro  6.3  /  Alb  3.3  /  TBili  0.5  /  DBili  x   /  AST  23  /  ALT  23  /  AlkPhos  133[H]  12-09        LIVER FUNCTIONS - ( 09 Dec 2024 06:05 )  Alb: 3.3 g/dL / Pro: 6.3 g/dL / ALK PHOS: 133 U/L / ALT: 23 U/L / AST: 23 U/L / GGT: x           Urinalysis Basic - ( 09 Dec 2024 06:05 )    Color: x / Appearance: x / SG: x / pH: x  Gluc: 68 mg/dL / Ketone: x  / Bili: x / Urobili: x   Blood: x / Protein: x / Nitrite: x   Leuk Esterase: x / RBC: x / WBC x   Sq Epi: x / Non Sq Epi: x / Bacteria: x        Sodium, Serum: 122 (12-09 @ 06:05)  Sodium, Serum: 125 (12-08 @ 07:12)  Sodium, Serum: 119 (12-07 @ 10:40)  Sodium, Serum: 115 (12-06 @ 13:50)    Creatinine, Serum: 0.39 (12-09 @ 06:05)  Creatinine, Serum: 0.57 (12-08 @ 07:12)  Creatinine, Serum: 0.58 (12-07 @ 10:40)  Creatinine, Serum: 0.43 (12-06 @ 13:50)    Potassium, Serum: 4.3 (12-09 @ 09:15)  Potassium, Serum: 5.6 (12-09 @ 06:05)  Potassium, Serum: 3.3 (12-08 @ 07:12)  Potassium, Serum: 3.7 (12-07 @ 10:40)    Hemoglobin: 13.0 (12-09 @ 06:05)  Hemoglobin: 12.9 (12-06 @ 13:50)

## 2024-12-09 NOTE — PROGRESS NOTE ADULT - SUBJECTIVE AND OBJECTIVE BOX
[facility]    Oni Roa NP    [address] [place] [address]  [phone]      Chief Complaint:  Patient is a 59y old  Female who presents with a chief complaint of Failure to thrive/Hyponatremia (09 Dec 2024 08:38)      HPI/ 24 hr events:   Patient seen and examined at bedside  Had multiple episodes of watery, green stools overnight  Endorses intermittent generalized abdominal pain   Denies fever, chills, nausea or vomiting       REVIEW OF SYSTEMS:   General: Negative  HEENT: Negative  CV: Negative  Respiratory: Negative  GI: See HPI  : Negative  MSK: Negative  Hematologic: Negative  Skin: Negative    MEDICATIONS:   MEDICATIONS  (STANDING):  enoxaparin Injectable 40 milliGRAM(s) SubCutaneous every 24 hours  influenza   Vaccine 0.5 milliLiter(s) IntraMuscular once  levothyroxine 25 MICROGram(s) Oral daily  lidocaine   4% Patch 1 Patch Transdermal daily  naloxone Injectable 0.4 milliGRAM(s) IV Push once  pantoprazole    Tablet 40 milliGRAM(s) Oral before breakfast  sodium chloride 0.9%. 1000 milliLiter(s) (70 mL/Hr) IV Continuous <Continuous>  vancomycin    Solution 125 milliGRAM(s) Oral every 6 hours    MEDICATIONS  (PRN):  acetaminophen     Tablet .. 650 milliGRAM(s) Oral every 6 hours PRN Mild Pain (1 - 3)  albuterol    90 MICROgram(s) HFA Inhaler 2 Puff(s) Inhalation every 6 hours PRN Shortness of Breath and/or Wheezing  aluminum hydroxide/magnesium hydroxide/simethicone Suspension 30 milliLiter(s) Oral every 4 hours PRN Upset Stomach  melatonin 5 milliGRAM(s) Oral at bedtime PRN Insomnia  ondansetron Injectable 4 milliGRAM(s) IV Push every 8 hours PRN Nausea and/or Vomiting      ALLERGIES:   Allergies    No Known Allergies    Intolerances    aspirin (Stomach Upset)      VITAL SIGNS:   Vital Signs Last 24 Hrs  T(C): 36.8 (09 Dec 2024 05:03), Max: 36.8 (09 Dec 2024 05:03)  T(F): 98.3 (09 Dec 2024 05:03), Max: 98.3 (09 Dec 2024 05:03)  HR: 63 (09 Dec 2024 05:03) (63 - 66)  BP: 148/85 (09 Dec 2024 05:03) (103/60 - 148/85)  BP(mean): --  RR: 20 (09 Dec 2024 05:03) (20 - 20)  SpO2: 99% (09 Dec 2024 05:03) (98% - 99%)    Parameters below as of 09 Dec 2024 05:03  Patient On (Oxygen Delivery Method): room air      I&O's Summary    08 Dec 2024 07:01  -  09 Dec 2024 07:00  --------------------------------------------------------  IN: 630 mL / OUT: 0 mL / NET: 630 mL        PHYSICAL EXAM:   GENERAL:  No acute distress  HEENT:  NC/AT  CHEST:  No increased effort  HEART:  Regular rate  ABDOMEN:  Soft, +TTP generalized, non-distended, positive bowel sounds  EXTREMITIES: No edema, no cyanosis  SKIN:  Warm, dry  NEURO:  Calm, cooperative    LABS:                        13.0   9.66  )-----------( 160      ( 09 Dec 2024 06:05 )             35.4     12-09    x   |  x   |  x   ----------------------------<  x   4.3   |  x   |  x     Ca    9.1      09 Dec 2024 06:05  Phos  1.4     12-09  Mg     2.4     12-09    TPro  6.3  /  Alb  3.3  /  TBili  0.5  /  DBili  x   /  AST  23  /  ALT  23  /  AlkPhos  133[H]  12-09    LIVER FUNCTIONS - ( 09 Dec 2024 06:05 )  Alb: 3.3 g/dL / Pro: 6.3 g/dL / ALK PHOS: 133 U/L / ALT: 23 U/L / AST: 23 U/L / GGT: x               Culture - Stool (collected 07 Dec 2024 19:36)  Source: .Stool  Preliminary Report (09 Dec 2024 07:56):    No enteric pathogens to date: Final culture pending    Urinalysis with Rflx Culture (collected 06 Dec 2024 17:20)                      GI PCR Panel: [facility] (12-08-24 @ 09:22)  Clostridium difficile GDH Interpretation: This specimen is positive for C. Difficile glutamate dehydrogenase (GDH)  antigen and negative for C. Difficile Toxins A & B, by EIA.  GDH is a  highly sensitive screening marker for C. Difficile that is produced in  large amounts by all C. Difficilestrains, both toxigenic and  nontoxigenic.  Specimens that are GDH positive and toxin negative will be  tested further by PCR to detect toxin gene sequences associated with  toxin producing C. Difficle. (12-08-24 @ 03:00)  GI PCR Panel: Indeterminate GI Panel PCR evaluates for:  Campylobacter, Plesiomonas shigelloides, Salmonella, Vibrio, Yersinia  enterocolitica, Enteroaggregative Escherichia (EAEC), Enteropathogenic E.  coli (EPEC), Enterotoxigenic E. coli (ETEC), Shiga-like toxinproducing  E. coli (STEC), E. coli O157, Shigella/Enteroinvasive E. coli (EIEC),  Adenovirus, Astrovirus, Norovirus, Rotavirus, Sapovirus, Cryptosporidium,  Cyclospora cayetanensis, Entamoeba histolytica, Giardia lamblia.  For culture and susceptibility reports refer to "reflex stool culture". (12-07-24 @ 19:36)  GI PCR Panel: Indeterminate GI Panel PCR evaluates for:  Campylobacter, Plesiomonas shigelloides, Salmonella, Vibrio, Yersinia  enterocolitica, Enteroaggregative Escherichia (EAEC), Enteropathogenic E.  coli (EPEC), Enterotoxigenic E. coli (ETEC), Shiga-like toxinproducing  E. coli (STEC), E. coli O157, Shigella/Enteroinvasive E. coli (EIEC),  Adenovirus, Astrovirus, Norovirus, Rotavirus, Sapovirus, Cryptosporidium,  Cyclospora cayetanensis, Entamoeba histolytica, Giardia lamblia.  For culture and susceptibility reports refer to "reflex stool culture". (12-07-24 @ 19:36)                RADIOLOGY & ADDITIONAL STUDIES:

## 2024-12-09 NOTE — PROGRESS NOTE ADULT - ASSESSMENT
Colitis  Abdominal pain  Hx IBS-D    CT AP w/ IVC (12/6): Acute sigmoid and descending colitis. Age-indeterminate left pubic fracture with associated para osseous organized hematoma.     Recommendations:  - C. Diff PCR positive   - ID following, c/w PO vancomycin   - Initial GI PCR indeterminate Norovirus, repeat GI PCR negative   - F/u stool calprotectin  - Supportive care   - Low fiber diet   - Stool count  - Pain management  - Anti-emetics PRN  - GI to follow      I reviewed the overnight course of events on the unit, re-confirming the patient history. I discussed the care with the patient  Differential diagnosis and plan of care discussed with patient after the evaluation  35 minutes spent on total encounter of which more than fifty percent of the encounter was spent counseling and/or coordinating care by the attending physician.

## 2024-12-09 NOTE — PHARMACOTHERAPY INTERVENTION NOTE - COMMENTS
Patient is a 59y F presenting with C. difficile being treated with vancomycin 125 mg PO Q6H. Patient also ordered pantoprazole 40 mg PO daily. Discussed indication for pantoprazole with Dr. Mejía as PPIs can increase risk for C. difficile infection, recommended discontinuation. Accepted and order d/raymond.

## 2024-12-09 NOTE — PROGRESS NOTE ADULT - SUBJECTIVE AND OBJECTIVE BOX
[facility]  INFECTIOUS DISEASES - [address], [place] [address]  Tel: [phone]     Fax: [phone]  =======================================================    DOUGLAS AKINS [id number]    Follow up: No fevers. Feels like abdominal and diarrhea are less today. Had some nausea previously but denies any vomiting.    Allergies:  No Known Allergies  aspirin (Stomach Upset)      Antibiotics:  acetaminophen     Tablet .. 650 milliGRAM(s) Oral every 6 hours PRN  albuterol    90 MICROgram(s) HFA Inhaler 2 Puff(s) Inhalation every 6 hours PRN  aluminum hydroxide/magnesium hydroxide/simethicone Suspension 30 milliLiter(s) Oral every 4 hours PRN  enoxaparin Injectable 40 milliGRAM(s) SubCutaneous every 24 hours  influenza   Vaccine 0.5 milliLiter(s) IntraMuscular once  levothyroxine 25 MICROGram(s) Oral daily  lidocaine   4% Patch 1 Patch Transdermal daily  melatonin 5 milliGRAM(s) Oral at bedtime PRN  naloxone Injectable 0.4 milliGRAM(s) IV Push once  ondansetron Injectable 4 milliGRAM(s) IV Push every 8 hours PRN  pantoprazole    Tablet 40 milliGRAM(s) Oral before breakfast  sodium chloride 0.9%. 1000 milliLiter(s) IV Continuous <Continuous>  vancomycin    Solution 125 milliGRAM(s) Oral every 6 hours       REVIEW OF SYSTEMS:  CONSTITUTIONAL:  No Fever or chills  CARDIOVASCULAR:  No chest pain or SOB  RESPIRATORY:  No cough, shortness of breath  GASTROINTESTINAL:  see history  GENITOURINARY:  No dysuria, frequency or urgency  MUSCULOSKELETAL:  + back pain  NEUROLOGIC:  No headache or dizziness  PSYCHIATRIC:  No disorder of thought or mood.     Physical Exam:  ICU Vital Signs Last 24 Hrs  T(C): 36.8 (09 Dec 2024 05:03), Max: 36.8 (09 Dec 2024 05:03)  T(F): 98.3 (09 Dec 2024 05:03), Max: 98.3 (09 Dec 2024 05:03)  HR: 63 (09 Dec 2024 05:03) (63 - 66)  BP: 148/85 (09 Dec 2024 05:03) (103/60 - 148/85)  BP(mean): --  ABP: --  ABP(mean): --  RR: 20 (09 Dec 2024 05:03) (20 - 20)  SpO2: 99% (09 Dec 2024 05:03) (98% - 99%)    O2 Parameters below as of 09 Dec 2024 05:03  Patient On (Oxygen Delivery Method): room air           GEN: NAD  HEENT: normocephalic and atraumatic.  NECK: Supple.    LUNGS: Normal respiratory effort  HEART: Regular rate and rhythm   ABDOMEN: Soft, nontender, and nondistended.    EXTREMITIES: No leg edema.  NEUROLOGIC: grossly intact.  PSYCHIATRIC: Appropriate affect .      Labs:  12-09    x   |  x   |  x   ----------------------------<  x   4.3   |  x   |  x     Ca    9.1      09 Dec 2024 06:05  Phos  1.4     12-09  Mg     2.4     12-09    TPro  6.3  /  Alb  3.3  /  TBili  0.5  /  DBili  x   /  AST  23  /  ALT  23  /  AlkPhos  133[H]  12-09                          13.0   9.66  )-----------( 160      ( 09 Dec 2024 06:05 )             35.4       Urinalysis Basic - ( 09 Dec 2024 06:05 )    Color: x / Appearance: x / SG: x / pH: x  Gluc: 68 mg/dL / Ketone: x  / Bili: x / Urobili: x   Blood: x / Protein: x / Nitrite: x   Leuk Esterase: x / RBC: x / WBC x   Sq Epi: x / Non Sq Epi: x / Bacteria: x      LIVER FUNCTIONS - ( 09 Dec 2024 06:05 )  Alb: 3.3 g/dL / Pro: 6.3 g/dL / ALK PHOS: 133 U/L / ALT: 23 U/L / AST: 23 U/L / GGT: x             RECENT CULTURES:  12-07 @ 19:36 .Stool     No enteric pathogens to date: Final culture pending              All imaging and data are reviewed.
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
## 2024-12-09 NOTE — DISCHARGE NOTE PROVIDER - CARE PROVIDER_API CALL
Jani Murillo  Nephrology  [address] [facility], [address]  [place] [address]  Phone: [phone]  Fax: [phone]  Follow Up Time:     Tricia Nunez  Infectious Disease  [address], [address]  [place] [address]  Phone: [phone]  Fax: [phone]  Follow Up Time:     Ifeanyi Trinidad  Gastroenterology  [address] [place] [address]  Phone: [phone]  Fax: [phone]  Follow Up Time:     [place]  Orthopaedic Surgery  [address] [facility], [address]  [place] [address]  Phone: [phone]  Fax: [phone]  Follow Up Time:

## 2024-12-09 NOTE — DISCHARGE NOTE PROVIDER - PROVIDER TOKENS
PROVIDER:[TOKEN:[[id number]]],PROVIDER:[TOKEN:[[id number]]],PROVIDER:[TOKEN:[[id number]]],PROVIDER:[TOKEN:[[id number]]]

## 2024-12-09 NOTE — PROGRESS NOTE ADULT - SUBJECTIVE AND OBJECTIVE BOX
Patient is a 59y old  Female who presents with a chief complaint of Failure to thrive/Hyponatremia (08 Dec 2024 12:33)      Subjective:  INTERVAL HPI/OVERNIGHT EVENTS: Patient seen and examined at bedside.  Patient c/o frequent diarrhea last night and this morning   MEDICATIONS  (STANDING):  enoxaparin Injectable 40 milliGRAM(s) SubCutaneous every 24 hours  influenza   Vaccine 0.5 milliLiter(s) IntraMuscular once  levothyroxine 25 MICROGram(s) Oral daily  lidocaine   4% Patch 1 Patch Transdermal daily  naloxone Injectable 0.4 milliGRAM(s) IV Push once  pantoprazole    Tablet 40 milliGRAM(s) Oral before breakfast  sodium chloride 0.9%. 1000 milliLiter(s) (70 mL/Hr) IV Continuous <Continuous>  vancomycin    Solution 125 milliGRAM(s) Oral every 6 hours    MEDICATIONS  (PRN):  acetaminophen     Tablet .. 650 milliGRAM(s) Oral every 6 hours PRN Mild Pain (1 - 3)  albuterol    90 MICROgram(s) HFA Inhaler 2 Puff(s) Inhalation every 6 hours PRN Shortness of Breath and/or Wheezing  aluminum hydroxide/magnesium hydroxide/simethicone Suspension 30 milliLiter(s) Oral every 4 hours PRN Upset Stomach  melatonin 5 milliGRAM(s) Oral at bedtime PRN Insomnia  ondansetron Injectable 4 milliGRAM(s) IV Push every 8 hours PRN Nausea and/or Vomiting      Allergies    No Known Allergies    Intolerances    aspirin (Stomach Upset)      REVIEW OF SYSTEMS:  CONSTITUTIONAL: No fever or chills  HEENT:  No headache, no sore throat  RESPIRATORY: No cough or shortness of breath  CARDIOVASCULAR: No chest pain or palpitations  GASTROINTESTINAL: No abd pain, nausea, vomiting, or diarrhea      Objective:  Vital Signs Last 24 Hrs  T(C): 36.8 (09 Dec 2024 05:03), Max: 36.8 (09 Dec 2024 05:03)  T(F): 98.3 (09 Dec 2024 05:03), Max: 98.3 (09 Dec 2024 05:03)  HR: 63 (09 Dec 2024 05:03) (59 - 66)  BP: 148/85 (09 Dec 2024 05:03) (103/60 - 154/75)  BP(mean): --  RR: 20 (09 Dec 2024 05:03) (20 - 20)  SpO2: 99% (09 Dec 2024 05:03) (98% - 100%)    Parameters below as of 09 Dec 2024 05:03  Patient On (Oxygen Delivery Method): room air        GENERAL: NAD, lying in bed comfortably  HEAD:  Normocephalic  EYES:  conjunctiva and sclera clear  ENT: Moist mucous membranes  NECK: Supple  CHEST/LUNG: Clear to auscultation bilaterally; No rales or rhonchi; no wheezing. Unlabored respirations  HEART: Regular rate and rhythm; S1S2+  ABDOMEN: Bowel sounds present; Soft, Nontender, Nondistended.   EXTREMITIES:  + distal Peripheral Pulses;  No cyanosis, or edema  NERVOUS SYSTEM:  Alert & Oriented X3;  No gross focal deficits   MSK: moves all extremities  SKIN: No rashes     LABS:                        13.0   9.66  )-----------( 160      ( 09 Dec 2024 06:05 )             35.4     09 Dec 2024 06:05    122    |  93     |  5      ----------------------------<  68     5.6     |  21     |  0.39     Ca    9.1        09 Dec 2024 06:05  Phos  1.4       09 Dec 2024 06:05  Mg     2.4       09 Dec 2024 06:05    TPro  6.3    /  Alb  3.3    /  TBili  0.5    /  DBili  x      /  AST  23     /  ALT  23     /  AlkPhos  133    09 Dec 2024 06:05      Urinalysis Basic - ( 09 Dec 2024 06:05 )    Color: x / Appearance: x / SG: x / pH: x  Gluc: 68 mg/dL / Ketone: x  / Bili: x / Urobili: x   Blood: x / Protein: x / Nitrite: x   Leuk Esterase: x / RBC: x / WBC x   Sq Epi: x / Non Sq Epi: x / Bacteria: x      CAPILLARY BLOOD GLUCOSE            Culture - Stool (collected 12-07-24 @ 19:36)  Source: .Stool  Preliminary Report (12-09-24 @ 07:56):    No enteric pathogens to date: Final culture pending    Urinalysis with Rflx Culture (collected 12-06-24 @ 17:20)        RADIOLOGY & ADDITIONAL TESTS:    Personally reviewed.     Consultant(s) Notes Reviewed:  [x] YES  [ ] NO    Plan of care discussed with patient ; all questions answered
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
## 2024-12-09 NOTE — PROGRESS NOTE ADULT - PROBLEM SELECTOR PLAN 1
-likely due to SIADH and low sodium intake , now with diarrhea   - c/w IV saline hydration , Na level improved   - Nephro eval noted      Hypokalemia: potassium supplement, now mildly elevated, will repeat. presented with AMS and confusion, now improved.    likely due to infectious process and hyponatremia   treat underlying condition as below

## 2024-12-09 NOTE — PROGRESS NOTE ADULT - PROBLEM SELECTOR PLAN 3
Chronic  - Cont home nebs inhalers prn Pt c/o abdominal pain and found to be tender on exam  - CT A/P -->Acute sigmoid and descending colitis. Age-indeterminate left pubic fracture with associated para osseous organized hematoma. Underlying lytic process not excluded. Sacral insufficiency fractures.  - Gi Dr Trinidad Consult noted   - ID eval noted  - GI PCR : + Novovirus, C diff +  - started vanco po

## 2024-12-09 NOTE — DISCHARGE NOTE PROVIDER - CARE PROVIDERS DIRECT ADDRESSES
,DirectAddress_Unknown,DirectAddress_Unknown,DirectAddress_Unknown,[email or web address]

## 2024-12-09 NOTE — DISCHARGE NOTE PROVIDER - DETAILS OF MALNUTRITION DIAGNOSIS/DIAGNOSES
This patient has been assessed with a concern for Malnutrition and was treated during this hospitalization for the following Nutrition diagnosis/diagnoses:     -  12/07/2024: Severe protein-calorie malnutrition   -  12/07/2024: Underweight (BMI < 19)

## 2024-12-10 LAB
ALBUMIN SERPL ELPH-MCNC: 3.6 G/DL — SIGNIFICANT CHANGE UP (ref 3.3–5)
ALBUMIN SERPL ELPH-MCNC: 3.9 G/DL — SIGNIFICANT CHANGE UP (ref 3.3–5)
ALP SERPL-CCNC: 141 U/L — HIGH (ref 40–120)
ALP SERPL-CCNC: 151 U/L — HIGH (ref 40–120)
ALT FLD-CCNC: 22 U/L — SIGNIFICANT CHANGE UP (ref 12–78)
ALT FLD-CCNC: 33 U/L — SIGNIFICANT CHANGE UP (ref 12–78)
ANION GAP SERPL CALC-SCNC: 10 MMOL/L — SIGNIFICANT CHANGE UP (ref 5–17)
ANION GAP SERPL CALC-SCNC: 13 MMOL/L — SIGNIFICANT CHANGE UP (ref 5–17)
AST SERPL-CCNC: 15 U/L — SIGNIFICANT CHANGE UP (ref 15–37)
AST SERPL-CCNC: 25 U/L — SIGNIFICANT CHANGE UP (ref 15–37)
BASOPHILS # BLD AUTO: 0.06 K/UL — SIGNIFICANT CHANGE UP (ref 0–0.2)
BASOPHILS NFR BLD AUTO: 0.6 % — SIGNIFICANT CHANGE UP (ref 0–2)
BILIRUB SERPL-MCNC: 0.8 MG/DL — SIGNIFICANT CHANGE UP (ref 0.2–1.2)
BILIRUB SERPL-MCNC: 0.8 MG/DL — SIGNIFICANT CHANGE UP (ref 0.2–1.2)
BUN SERPL-MCNC: 4 MG/DL — LOW (ref 7–23)
BUN SERPL-MCNC: 7 MG/DL — SIGNIFICANT CHANGE UP (ref 7–23)
CALCIUM SERPL-MCNC: 9.3 MG/DL — SIGNIFICANT CHANGE UP (ref 8.5–10.1)
CALCIUM SERPL-MCNC: 9.6 MG/DL — SIGNIFICANT CHANGE UP (ref 8.5–10.1)
CHLORIDE SERPL-SCNC: 82 MMOL/L — LOW (ref 96–108)
CHLORIDE SERPL-SCNC: 93 MMOL/L — LOW (ref 96–108)
CO2 SERPL-SCNC: 20 MMOL/L — LOW (ref 22–31)
CO2 SERPL-SCNC: 23 MMOL/L — SIGNIFICANT CHANGE UP (ref 22–31)
CREAT SERPL-MCNC: 0.45 MG/DL — LOW (ref 0.5–1.3)
CREAT SERPL-MCNC: 0.49 MG/DL — LOW (ref 0.5–1.3)
CULTURE RESULTS: SIGNIFICANT CHANGE UP
EGFR: 107 ML/MIN/1.73M2 — SIGNIFICANT CHANGE UP
EGFR: 109 ML/MIN/1.73M2 — SIGNIFICANT CHANGE UP
EOSINOPHIL # BLD AUTO: 0.06 K/UL — SIGNIFICANT CHANGE UP (ref 0–0.5)
EOSINOPHIL NFR BLD AUTO: 0.6 % — SIGNIFICANT CHANGE UP (ref 0–6)
GLUCOSE SERPL-MCNC: 85 MG/DL — SIGNIFICANT CHANGE UP (ref 70–99)
GLUCOSE SERPL-MCNC: 87 MG/DL — SIGNIFICANT CHANGE UP (ref 70–99)
HCT VFR BLD CALC: 34.4 % — LOW (ref 34.5–45)
HCT VFR BLD CALC: 37.3 % — SIGNIFICANT CHANGE UP (ref 34.5–45)
HGB BLD-MCNC: 12.8 G/DL — SIGNIFICANT CHANGE UP (ref 11.5–15.5)
HGB BLD-MCNC: 14.1 G/DL — SIGNIFICANT CHANGE UP (ref 11.5–15.5)
IMM GRANULOCYTES NFR BLD AUTO: 1.8 % — HIGH (ref 0–0.9)
LYMPHOCYTES # BLD AUTO: 2.28 K/UL — SIGNIFICANT CHANGE UP (ref 1–3.3)
LYMPHOCYTES # BLD AUTO: 22.3 % — SIGNIFICANT CHANGE UP (ref 13–44)
MAGNESIUM SERPL-MCNC: 2.4 MG/DL — SIGNIFICANT CHANGE UP (ref 1.6–2.6)
MCHC RBC-ENTMCNC: 31.5 PG — SIGNIFICANT CHANGE UP (ref 27–34)
MCHC RBC-ENTMCNC: 32.1 PG — SIGNIFICANT CHANGE UP (ref 27–34)
MCHC RBC-ENTMCNC: 37.2 G/DL — HIGH (ref 32–36)
MCHC RBC-ENTMCNC: 37.8 G/DL — HIGH (ref 32–36)
MCV RBC AUTO: 84.7 FL — SIGNIFICANT CHANGE UP (ref 80–100)
MCV RBC AUTO: 85 FL — SIGNIFICANT CHANGE UP (ref 80–100)
MONOCYTES # BLD AUTO: 0.86 K/UL — SIGNIFICANT CHANGE UP (ref 0–0.9)
MONOCYTES NFR BLD AUTO: 8.4 % — SIGNIFICANT CHANGE UP (ref 2–14)
NEUTROPHILS # BLD AUTO: 6.8 K/UL — SIGNIFICANT CHANGE UP (ref 1.8–7.4)
NEUTROPHILS NFR BLD AUTO: 66.3 % — SIGNIFICANT CHANGE UP (ref 43–77)
NRBC # BLD: 0 /100 WBCS — SIGNIFICANT CHANGE UP (ref 0–0)
NRBC # BLD: 0 /100 WBCS — SIGNIFICANT CHANGE UP (ref 0–0)
PHOSPHATE SERPL-MCNC: 3.7 MG/DL — SIGNIFICANT CHANGE UP (ref 2.5–4.5)
PLATELET # BLD AUTO: 529 K/UL — HIGH (ref 150–400)
PLATELET # BLD AUTO: 599 K/UL — HIGH (ref 150–400)
POTASSIUM SERPL-MCNC: 4 MMOL/L — SIGNIFICANT CHANGE UP (ref 3.5–5.3)
POTASSIUM SERPL-MCNC: 4.1 MMOL/L — SIGNIFICANT CHANGE UP (ref 3.5–5.3)
POTASSIUM SERPL-SCNC: 4 MMOL/L — SIGNIFICANT CHANGE UP (ref 3.5–5.3)
POTASSIUM SERPL-SCNC: 4.1 MMOL/L — SIGNIFICANT CHANGE UP (ref 3.5–5.3)
PROT SERPL-MCNC: 6.4 G/DL — SIGNIFICANT CHANGE UP (ref 6–8.3)
PROT SERPL-MCNC: 7.1 G/DL — SIGNIFICANT CHANGE UP (ref 6–8.3)
RBC # BLD: 4.06 M/UL — SIGNIFICANT CHANGE UP (ref 3.8–5.2)
RBC # BLD: 4.39 M/UL — SIGNIFICANT CHANGE UP (ref 3.8–5.2)
RBC # FLD: 11.9 % — SIGNIFICANT CHANGE UP (ref 10.3–14.5)
RBC # FLD: 12 % — SIGNIFICANT CHANGE UP (ref 10.3–14.5)
SODIUM SERPL-SCNC: 115 MMOL/L — CRITICAL LOW (ref 135–145)
SODIUM SERPL-SCNC: 126 MMOL/L — LOW (ref 135–145)
SPECIMEN SOURCE: SIGNIFICANT CHANGE UP
WBC # BLD: 10.24 K/UL — SIGNIFICANT CHANGE UP (ref 3.8–10.5)
WBC # BLD: 12.44 K/UL — HIGH (ref 3.8–10.5)
WBC # FLD AUTO: 10.24 K/UL — SIGNIFICANT CHANGE UP (ref 3.8–10.5)
WBC # FLD AUTO: 12.44 K/UL — HIGH (ref 3.8–10.5)

## 2024-12-10 PROCEDURE — 99233 SBSQ HOSP IP/OBS HIGH 50: CPT

## 2024-12-10 PROCEDURE — 70450 CT HEAD/BRAIN W/O DYE: CPT | Mod: 26

## 2024-12-10 RX ORDER — SODIUM CHLORIDE 9 MG/ML
1000 INJECTION, SOLUTION INTRAMUSCULAR; INTRAVENOUS; SUBCUTANEOUS
Refills: 0 | Status: DISCONTINUED | OUTPATIENT
Start: 2024-12-10 | End: 2024-12-10

## 2024-12-10 RX ORDER — TOLVAPTAN 15 MG/1
15 TABLET ORAL ONCE
Refills: 0 | Status: COMPLETED | OUTPATIENT
Start: 2024-12-10 | End: 2024-12-10

## 2024-12-10 RX ORDER — 0.9 % SODIUM CHLORIDE 0.9 %
1000 INTRAVENOUS SOLUTION INTRAVENOUS
Refills: 0 | Status: DISCONTINUED | OUTPATIENT
Start: 2024-12-10 | End: 2024-12-11

## 2024-12-10 RX ORDER — 0.9 % SODIUM CHLORIDE 0.9 %
1000 INTRAVENOUS SOLUTION INTRAVENOUS
Refills: 0 | Status: DISCONTINUED | OUTPATIENT
Start: 2024-12-10 | End: 2024-12-10

## 2024-12-10 RX ADMIN — ENOXAPARIN SODIUM 40 MILLIGRAM(S): 30 INJECTION SUBCUTANEOUS at 21:10

## 2024-12-10 RX ADMIN — Medication 25 MICROGRAM(S): at 05:21

## 2024-12-10 RX ADMIN — ONDANSETRON HYDROCHLORIDE 4 MILLIGRAM(S): 4 TABLET, FILM COATED ORAL at 13:53

## 2024-12-10 RX ADMIN — LIDOCAINE 1 PATCH: 40 CREAM TOPICAL at 11:38

## 2024-12-10 RX ADMIN — Medication 125 MILLIGRAM(S): at 05:21

## 2024-12-10 RX ADMIN — Medication 250 MILLILITER(S): at 22:33

## 2024-12-10 RX ADMIN — Medication 30 MILLILITER(S): at 22:01

## 2024-12-10 RX ADMIN — ACETAMINOPHEN 500MG 650 MILLIGRAM(S): 500 TABLET, COATED ORAL at 19:50

## 2024-12-10 RX ADMIN — Medication 125 MILLIGRAM(S): at 23:41

## 2024-12-10 RX ADMIN — LIDOCAINE 1 PATCH: 40 CREAM TOPICAL at 20:06

## 2024-12-10 RX ADMIN — ONDANSETRON HYDROCHLORIDE 4 MILLIGRAM(S): 4 TABLET, FILM COATED ORAL at 00:03

## 2024-12-10 RX ADMIN — SODIUM CHLORIDE 50 MILLILITER(S): 9 INJECTION, SOLUTION INTRAMUSCULAR; INTRAVENOUS; SUBCUTANEOUS at 11:43

## 2024-12-10 RX ADMIN — Medication 125 MILLIGRAM(S): at 17:43

## 2024-12-10 RX ADMIN — LIDOCAINE 1 PATCH: 40 CREAM TOPICAL at 23:20

## 2024-12-10 RX ADMIN — ACETAMINOPHEN 500MG 650 MILLIGRAM(S): 500 TABLET, COATED ORAL at 20:20

## 2024-12-10 RX ADMIN — TOLVAPTAN 15 MILLIGRAM(S): 15 TABLET ORAL at 13:52

## 2024-12-10 RX ADMIN — Medication 125 MILLIGRAM(S): at 00:54

## 2024-12-10 RX ADMIN — SODIUM CHLORIDE 60 MILLILITER(S): 9 INJECTION, SOLUTION INTRAMUSCULAR; INTRAVENOUS; SUBCUTANEOUS at 10:11

## 2024-12-10 RX ADMIN — SODIUM CHLORIDE 1 GRAM(S): 9 INJECTION, SOLUTION INTRAMUSCULAR; INTRAVENOUS; SUBCUTANEOUS at 05:21

## 2024-12-10 RX ADMIN — Medication 125 MILLIGRAM(S): at 11:40

## 2024-12-10 NOTE — PROVIDER CONTACT NOTE (CRITICAL VALUE NOTIFICATION) - ACTION/TREATMENT ORDERED:
No new order at this time
Restart IVF Normal saline 60ml/hr
OK, will review chart. No further orders at this time

## 2024-12-10 NOTE — PROGRESS NOTE ADULT - ASSESSMENT
Colitis  Abdominal pain  Hx IBS-D    CT AP w/ IVC (12/6): Acute sigmoid and descending colitis. Age-indeterminate left pubic fracture with associated para osseous organized hematoma.     Recommendations:  - C. Diff PCR positive   - ID following, c/w PO vancomycin   - Initial GI PCR indeterminate Norovirus, repeat GI PCR negative   - F/u stool calprotectin  - Supportive care   - Low fiber diet   - Pain management  - Anti-emetics PRN  - D/c planning once diarrhea improving    I reviewed the overnight course of events on the unit, re-confirming the patient history. I discussed the care with the patient  Differential diagnosis and plan of care discussed with patient after the evaluation  35 minutes spent on total encounter of which more than fifty percent of the encounter was spent counseling and/or coordinating care by the attending physician.

## 2024-12-10 NOTE — PROVIDER CONTACT NOTE (OTHER) - SITUATION
Pt A&Ox3, very restless, frequently getting up from bed. Frequent reorientation provided. Side rails padded for safety, bed locked in lowest position, bed alarm on. Bedside commode provided.
MD made aware Pt states she hit her head on side rail while in bed
Multiple stool, abdominal pain and discomfort.

## 2024-12-10 NOTE — PROGRESS NOTE ADULT - PROBLEM SELECTOR PLAN 2
-likely due to SIADH and low sodium intake , now with diarrhea   - saline IV was held yesterday , Na back to 115 today    - discussed with renal , start IV saline at 50cc/hr , start Samsca      Hypokalemia: potassium supplement, now mildly elevated, will repeat.

## 2024-12-10 NOTE — PROGRESS NOTE ADULT - PROBLEM SELECTOR PLAN 3
Pt c/o abdominal pain and found to be tender on exam  - CT A/P -->Acute sigmoid and descending colitis. Age-indeterminate left pubic fracture with associated para osseous organized hematoma. Underlying lytic process not excluded. Sacral insufficiency fractures.  - Gi Dr Trinidad Consult noted   - ID eval noted  - GI PCR : + Novovirus, C diff +  - started vanco po

## 2024-12-10 NOTE — PROGRESS NOTE ADULT - ASSESSMENT
Severe Hyponatremia: Low solute intake, SIADH  Abdominal pain/Diarrhea: C diff    12/09/24: Improving sodium levels. PO fluid restriction. D/c IVF. To continue current meds. Rx for C. Diff.  Will follow electrolytes and renal function trend.   12/10/24: Acute drop in sodium levels. ? Compliance with PO fluid restriction. Will dose samsca. Avoid hypotonic fluids.
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
## 2024-12-10 NOTE — PROGRESS NOTE ADULT - SUBJECTIVE AND OBJECTIVE BOX
[facility]                                                       Dr. Murillo, Dr. Porter, Dr. Bustillo, Dr. Ocasio, Dr. Lugo, Dr. Clinton                                      [facility], [facility], [address]                                                 [address] [address]                                      Ph: [phone]  Fax: [phone]                                         Ph: [phone]  Fax: [phone]      Patient is a 59y old  Female who presents with a chief complaint of Failure to thrive/Hyponatremia (09 Dec 2024 12:27)  Patient seen in follow up for hyponatremia.        PAST MEDICAL HISTORY:  Lupus    Sjogren's disease    Vertigo    UTI (urinary tract infection)    GERD (gastroesophageal reflux disease)    Hypotension    Hypothyroidism    Gallstones    Hepatitis C    Anxiety    Migraine    Neuropathy    COPD (chronic obstructive pulmonary disease)    Nicotine addiction    Glossopharyngeal neuralgia syndrome    Dvt femoral (deep venous thrombosis)    Lupus    Emphysema/COPD    Burning mouth syndrome    H/O osteoporosis    History of weight loss    History of seizure disorder    Personal history of skin cancer    Osteochondritis dissecans    History of IBS    H/O Raynaud's syndrome    Pain, wrist, right    Right shoulder pain    Chronic low back pain with sciatica    Chronic neck pain    H/O paroxysmal supraventricular tachycardia      MEDICATIONS  (STANDING):  enoxaparin Injectable 40 milliGRAM(s) SubCutaneous every 24 hours  influenza   Vaccine 0.5 milliLiter(s) IntraMuscular once  levothyroxine 25 MICROGram(s) Oral daily  lidocaine   4% Patch 1 Patch Transdermal daily  naloxone Injectable 0.4 milliGRAM(s) IV Push once  sodium chloride 0.9%. 1000 milliLiter(s) (50 mL/Hr) IV Continuous <Continuous>  vancomycin    Solution 125 milliGRAM(s) Oral every 6 hours    MEDICATIONS  (PRN):  acetaminophen     Tablet .. 650 milliGRAM(s) Oral every 6 hours PRN Mild Pain (1 - 3)  albuterol    90 MICROgram(s) HFA Inhaler 2 Puff(s) Inhalation every 6 hours PRN Shortness of Breath and/or Wheezing  aluminum hydroxide/magnesium hydroxide/simethicone Suspension 30 milliLiter(s) Oral every 4 hours PRN Upset Stomach  melatonin 5 milliGRAM(s) Oral at bedtime PRN Insomnia  ondansetron Injectable 4 milliGRAM(s) IV Push every 8 hours PRN Nausea and/or Vomiting    T(C): 36.7 (12-10-24 @ 11:42), Max: 36.8 (12-09-24 @ 05:03)  HR: 69 (12-10-24 @ 11:42) (59 - 69)  BP: 147/84 (12-10-24 @ 11:42) (103/60 - 154/75)  RR: 18 (12-10-24 @ 11:42)  SpO2: 100% (12-10-24 @ 11:42)  Wt(kg): --  I&O's Detail            PHYSICAL EXAM:  General: No distress  Respiratory: b/l air entry  Cardiovascular: S1 S2  Gastrointestinal: soft  Extremities:  no edema                            LABORATORY:                        12.8   10.24 )-----------( 529      ( 10 Dec 2024 07:36 )             34.4     12-10    115[LL]  |  82[L]  |  4[L]  ----------------------------<  85  4.0   |  23  |  0.45[L]    Ca    9.3      10 Dec 2024 07:36  Phos  1.4     12-09  Mg     2.4     12-09    TPro  6.4  /  Alb  3.6  /  TBili  0.8  /  DBili  x   /  AST  15  /  ALT  22  /  AlkPhos  141[H]  12-10    Sodium: 115 mmol/L (12-10 @ 07:36)  Sodium: 122 mmol/L (12-09 @ 06:05)    Potassium: 4.0 mmol/L (12-10 @ 07:36)  Potassium: 4.3 mmol/L (12-09 @ 09:15)  Potassium: 5.6 mmol/L (12-09 @ 06:05)    Hemoglobin: 12.8 g/dL (12-10 @ 07:36)  Hemoglobin: 13.0 g/dL (12-09 @ 06:05)    Creatinine, Serum 0.45 (12-10 @ 07:36)  Creatinine, Serum 0.39 (12-09 @ 06:05)  Creatinine, Serum 0.57 (12-08 @ 07:12)        LIVER FUNCTIONS - ( 10 Dec 2024 07:36 )  Alb: 3.6 g/dL / Pro: 6.4 g/dL / ALK PHOS: 141 U/L / ALT: 22 U/L / AST: 15 U/L / GGT: x           Urinalysis Basic - ( 10 Dec 2024 07:36 )    Color: x / Appearance: x / SG: x / pH: x  Gluc: 85 mg/dL / Ketone: x  / Bili: x / Urobili: x   Blood: x / Protein: x / Nitrite: x   Leuk Esterase: x / RBC: x / WBC x   Sq Epi: x / Non Sq Epi: x / Bacteria: x

## 2024-12-10 NOTE — PROGRESS NOTE ADULT - SUBJECTIVE AND OBJECTIVE BOX
[facility], Division of Infectious Diseases  YOUNG [place] Y. Patel, S. Shah, G. [facility]  [phone]    Name: DOUGLAS AKINS  Age: 59y  Gender: Female  MRN: [id number]    Interval History:  No acute overnight events.   Afebrile  Notes reviewed    Antibiotics:  vancomycin    Solution 125 milliGRAM(s) Oral every 6 hours      Medications:  acetaminophen     Tablet .. 650 milliGRAM(s) Oral every 6 hours PRN  albuterol    90 MICROgram(s) HFA Inhaler 2 Puff(s) Inhalation every 6 hours PRN  aluminum hydroxide/magnesium hydroxide/simethicone Suspension 30 milliLiter(s) Oral every 4 hours PRN  enoxaparin Injectable 40 milliGRAM(s) SubCutaneous every 24 hours  influenza   Vaccine 0.5 milliLiter(s) IntraMuscular once  levothyroxine 25 MICROGram(s) Oral daily  lidocaine   4% Patch 1 Patch Transdermal daily  melatonin 5 milliGRAM(s) Oral at bedtime PRN  naloxone Injectable 0.4 milliGRAM(s) IV Push once  ondansetron Injectable 4 milliGRAM(s) IV Push every 8 hours PRN  sodium chloride 0.9%. 1000 milliLiter(s) IV Continuous <Continuous>  tolvaptan 15 milliGRAM(s) Oral once  vancomycin    Solution 125 milliGRAM(s) Oral every 6 hours      Review of Systems:  A 10-point review of systems was obtained.   Review of systems otherwise negative except as previously noted.    Allergies: No Known Allergies    For details regarding the patient's past medical history, social history, family history, and other miscellaneous elements, please refer the initial infectious diseases consultation and/or the admitting history and physical examination for this admission.    Objective:  Vitals:   T(C): 36.7 (12-10-24 @ 11:42), Max: 36.7 (12-10-24 @ 04:55)  HR: 69 (12-10-24 @ 11:42) (59 - 69)  BP: 147/84 (12-10-24 @ 11:42) (134/74 - 147/84)  RR: 18 (12-10-24 @ 11:42) (18 - 20)  SpO2: 100% (12-10-24 @ 11:42) (98% - 100%)    Physical Examination:  General: no acute distress  HEENT: NC/AT, EOMI,   Cardio: RRR  Resp: decreased breath sounds  Abd: soft, NT, ND  Ext: no edema or cyanosis  Skin: warm, dry, no visible rash      Laboratory Studies:  CBC:                       12.8   10.24 )-----------( 529      ( 10 Dec 2024 07:36 )             34.4     CMP: 12-10    115[LL]  |  82[L]  |  4[L]  ----------------------------<  85  4.0   |  23  |  0.45[L]    Ca    9.3      10 Dec 2024 07:36  Phos  1.4     12-09  Mg     2.4     12-09    TPro  6.4  /  Alb  3.6  /  TBili  0.8  /  DBili  x   /  AST  15  /  ALT  22  /  AlkPhos  141[H]  12-10    LIVER FUNCTIONS - ( 10 Dec 2024 07:36 )  Alb: 3.6 g/dL / Pro: 6.4 g/dL / ALK PHOS: 141 U/L / ALT: 22 U/L / AST: 15 U/L / GGT: x           Urinalysis Basic - ( 10 Dec 2024 07:36 )    Color: x / Appearance: x / SG: x / pH: x  Gluc: 85 mg/dL / Ketone: x  / Bili: x / Urobili: x   Blood: x / Protein: x / Nitrite: x   Leuk Esterase: x / RBC: x / WBC x   Sq Epi: x / Non Sq Epi: x / Bacteria: x        Microbiology: reviewed    Culture - Stool (collected 12-07-24 @ 19:36)  Source: .Stool  Final Report (12-10-24 @ 10:24):    No enteric pathogens isolated.    (Stool culture examined for Salmonella,    Shigella, Campylobacter, Aeromonas, Plesiomonas,    Vibrio, E.coli O157 and Yersinia)    Urinalysis with Rflx Culture (collected 12-06-24 @ 17:20)          Radiology: reviewed

## 2024-12-10 NOTE — PROGRESS NOTE ADULT - PROBLEM SELECTOR PLAN 1
presented with AMS and confusion, now improved.    likely due to infectious process and hyponatremia   treat underlying condition as below

## 2024-12-10 NOTE — PROVIDER CONTACT NOTE (OTHER) - REASON
Confusion. Restlessness.
Multiple stools , and pain
Pt states she hit her head on side rail while in bed

## 2024-12-10 NOTE — CHART NOTE - NSCHARTNOTEFT_GEN_A_CORE
Assessment: 58 y/o female adm with FTT, hyponatremia. PMH anxiety, chronic low back pain with sciatica, chronic neck pain, DVT, COPD, GERD, IBS.   Pt visited at bedside this morning. Pt's sister present. As per sister, pt has been confused. Na 115 (fluid restriction ordered). Pt's sister brought in a large cup of tea for pt. Explained the importance of limiting fluid intake 2/2 hyponatremia. Pt without an appetite. Obtained some food preferences and menus have been adjusted. Pt still with diarrhea and nausea (cdiff+). Provided pt and pt's sister with verbal and written information re: low fiber diet with some sources of soluble fibers which may be beneficial. Pt and pt's sister aware of diet.     Factors impacting intake: [ ] none [x ] nausea  [ ] vomiting [ x] diarrhea [ ] constipation  [ ]chewing problems [ ] swallowing issues  [ x] other: decreased appetite     Diet Presciption: Diet, Regular:   1200mL Fluid Restriction ([id number]) (12-09-24 @ 14:27)    Intake: poor     Current Weight: Weight (kg): 49.9 (12-06 @ 12:46)  % Weight Change    Pertinent Medications: MEDICATIONS  (STANDING):  enoxaparin Injectable 40 milliGRAM(s) SubCutaneous every 24 hours  influenza   Vaccine 0.5 milliLiter(s) IntraMuscular once  levothyroxine 25 MICROGram(s) Oral daily  lidocaine   4% Patch 1 Patch Transdermal daily  naloxone Injectable 0.4 milliGRAM(s) IV Push once  sodium chloride 0.9%. 1000 milliLiter(s) (50 mL/Hr) IV Continuous <Continuous>  vancomycin    Solution 125 milliGRAM(s) Oral every 6 hours    MEDICATIONS  (PRN):  acetaminophen     Tablet .. 650 milliGRAM(s) Oral every 6 hours PRN Mild Pain (1 - 3)  albuterol    90 MICROgram(s) HFA Inhaler 2 Puff(s) Inhalation every 6 hours PRN Shortness of Breath and/or Wheezing  aluminum hydroxide/magnesium hydroxide/simethicone Suspension 30 milliLiter(s) Oral every 4 hours PRN Upset Stomach  melatonin 5 milliGRAM(s) Oral at bedtime PRN Insomnia  ondansetron Injectable 4 milliGRAM(s) IV Push every 8 hours PRN Nausea and/or Vomiting    Pertinent Labs: 12-10 Na115 mmol/L[LL] Glu 85 mg/dL K+ 4.0 mmol/L Cr  0.45 mg/dL[L] BUN 4 mg/dL[L] 12-09 Phos 1.4 mg/dL[L] 12-10 Alb 3.6 g/dL     CAPILLARY BLOOD GLUCOSE        Skin: Stage I to right/left elbow; and right/left buttocks     Estimated Needs:   [ x] no change since previous assessment  [ ] recalculated:     Previous Nutrition Diagnosis:   [ ] Inadequate Energy Intake [ ]Inadequate Oral Intake [ ] Excessive Energy Intake   [ ] Underweight [ ] Increased Nutrient Needs [ ] Overweight/Obesity   [ ] Altered GI Function [ ] Unintended Weight Loss [ ] Food & Nutrition Related Knowledge Deficit [ x] Malnutrition     Nutrition Diagnosis is [x ] ongoing  [ ] resolved [ ] not applicable     New Nutrition Diagnosis: [x ] not applicable       Interventions:   Recommend  [ ] Change Diet To:  [x ] Nutrition Supplement: 2/2 hyponatremia and c diff, providing pt with yogurt TID   [ ] Nutrition Support  [ x] Other: food preferences obtained and menus adjusted.   continue to encourage po intake     Monitoring and Evaluation:   [x ] PO intake [ x ] Tolerance to diet prescription [ x ] weights [ x ] labs[ x ] follow up per protocol  [ ] other:

## 2024-12-10 NOTE — CHART NOTE - NSCHARTNOTEFT_GEN_A_CORE
called by nursing, patient appears more confused today and reported hit her head on the side of the bed when in bed. no fall.   vital stable (please see nursing note).   stat CT ordered no acute intracranial pathology. neuro check q4h for 24h.   stat lab level still pending, will follow. called by nursing, patient appears more confused today and reported hit her head on the side of the bed when in bed. no fall.   vital stable (please see nursing note).   stat CT ordered no acute intracranial pathology. neuro check q4h for 24h.   stat lab elevated wbc and Na level 126, changed fluid from NS to D5, goal correction 6-8meg/24h.   Signed out to night shift

## 2024-12-10 NOTE — PROVIDER CONTACT NOTE (CRITICAL VALUE NOTIFICATION) - SITUATION
MD made aware Sodium 119 pt on continuos IVF NS @ 60
MD made aware sodium 115 pt not eating or drinking much
Pt is stable awake, alert & oriented x 4, Pt rec'd Vanco Po as ordered, Contact Precautions maintained.

## 2024-12-10 NOTE — PROGRESS NOTE ADULT - SUBJECTIVE AND OBJECTIVE BOX
[facility]  Bert Sainz PA-C  [address] [place] [address]  [phone]      INTERVAL HPI/OVERNIGHT EVENTS:  Pt s/e  Diarrhea improving, less frequent but still loose    MEDICATIONS  (STANDING):  enoxaparin Injectable 40 milliGRAM(s) SubCutaneous every 24 hours  influenza   Vaccine 0.5 milliLiter(s) IntraMuscular once  levothyroxine 25 MICROGram(s) Oral daily  lidocaine   4% Patch 1 Patch Transdermal daily  naloxone Injectable 0.4 milliGRAM(s) IV Push once  sodium chloride 0.9%. 1000 milliLiter(s) (50 mL/Hr) IV Continuous <Continuous>  vancomycin    Solution 125 milliGRAM(s) Oral every 6 hours    MEDICATIONS  (PRN):  acetaminophen     Tablet .. 650 milliGRAM(s) Oral every 6 hours PRN Mild Pain (1 - 3)  albuterol    90 MICROgram(s) HFA Inhaler 2 Puff(s) Inhalation every 6 hours PRN Shortness of Breath and/or Wheezing  aluminum hydroxide/magnesium hydroxide/simethicone Suspension 30 milliLiter(s) Oral every 4 hours PRN Upset Stomach  melatonin 5 milliGRAM(s) Oral at bedtime PRN Insomnia  ondansetron Injectable 4 milliGRAM(s) IV Push every 8 hours PRN Nausea and/or Vomiting      Allergies  No Known Allergies    Intolerances  aspirin (Stomach Upset)      PHYSICAL EXAM:   Vital Signs:  Vital Signs Last 24 Hrs  T(C): 36.7 (10 Dec 2024 04:55), Max: 36.7 (10 Dec 2024 04:55)  T(F): 98.1 (10 Dec 2024 04:55), Max: 98.1 (10 Dec 2024 04:55)  HR: 65 (10 Dec 2024 04:55) (59 - 65)  BP: 145/95 (10 Dec 2024 04:55) (134/74 - 150/79)  BP(mean): --  RR: 19 (10 Dec 2024 04:55) (19 - 20)  SpO2: 100% (10 Dec 2024 04:55) (98% - 100%)    Parameters below as of 10 Dec 2024 04:55  Patient On (Oxygen Delivery Method): room air      Daily     Daily Weight in k.1 (10 Dec 2024 04:55)    GENERAL:  Appears stated age  HEENT:  NC/AT  CHEST:  Full & symmetric excursion  HEART:  Regular rhythm  ABDOMEN:  Soft, non-tender, non-distended  EXTEREMITIES:  no cyanosis  SKIN:  No rash  NEURO:  Alert      LABS:                        12.8   10.24 )-----------( 529      ( 10 Dec 2024 07:36 )             34.4     12-10    115[LL]  |  82[L]  |  4[L]  ----------------------------<  85  4.0   |  23  |  0.45[L]    Ca    9.3      10 Dec 2024 07:36  Phos  1.4     12-09  Mg     2.4     12-09    TPro  6.4  /  Alb  3.6  /  TBili  0.8  /  DBili  x   /  AST  15  /  ALT  22  /  AlkPhos  141[H]  12-10      Urinalysis Basic - ( 10 Dec 2024 07:36 )    Color: x / Appearance: x / SG: x / pH: x  Gluc: 85 mg/dL / Ketone: x  / Bili: x / Urobili: x   Blood: x / Protein: x / Nitrite: x   Leuk Esterase: x / RBC: x / WBC x   Sq Epi: x / Non Sq Epi: x / Bacteria: x

## 2024-12-10 NOTE — PROGRESS NOTE ADULT - SUBJECTIVE AND OBJECTIVE BOX
Patient is a 59y old  Female who presents with a chief complaint of Failure to thrive/Hyponatremia (10 Dec 2024 13:45)      Subjective:  INTERVAL HPI/OVERNIGHT EVENTS: Patient seen and examined at bedside.  Patient states she still has diarrhea but much less, abd discomfort. as per nursing, patient not eating well.   MEDICATIONS  (STANDING):  enoxaparin Injectable 40 milliGRAM(s) SubCutaneous every 24 hours  influenza   Vaccine 0.5 milliLiter(s) IntraMuscular once  levothyroxine 25 MICROGram(s) Oral daily  lidocaine   4% Patch 1 Patch Transdermal daily  naloxone Injectable 0.4 milliGRAM(s) IV Push once  sodium chloride 0.9%. 1000 milliLiter(s) (50 mL/Hr) IV Continuous <Continuous>  vancomycin    Solution 125 milliGRAM(s) Oral every 6 hours    MEDICATIONS  (PRN):  acetaminophen     Tablet .. 650 milliGRAM(s) Oral every 6 hours PRN Mild Pain (1 - 3)  albuterol    90 MICROgram(s) HFA Inhaler 2 Puff(s) Inhalation every 6 hours PRN Shortness of Breath and/or Wheezing  aluminum hydroxide/magnesium hydroxide/simethicone Suspension 30 milliLiter(s) Oral every 4 hours PRN Upset Stomach  melatonin 5 milliGRAM(s) Oral at bedtime PRN Insomnia  ondansetron Injectable 4 milliGRAM(s) IV Push every 8 hours PRN Nausea and/or Vomiting      Allergies    No Known Allergies    Intolerances    aspirin (Stomach Upset)      REVIEW OF SYSTEMS:  CONSTITUTIONAL: No fever or chills  HEENT:  No headache, no sore throat  RESPIRATORY: No cough or shortness of breath  CARDIOVASCULAR: No chest pain or palpitations  GASTROINTESTINAL: No abd pain, nausea, vomiting, or diarrhea      Objective:  Vital Signs Last 24 Hrs  T(C): 36.7 (10 Dec 2024 18:13), Max: 36.7 (10 Dec 2024 04:55)  T(F): 98 (10 Dec 2024 18:13), Max: 98.1 (10 Dec 2024 04:55)  HR: 81 (10 Dec 2024 18:13) (59 - 81)  BP: 110/60 (10 Dec 2024 18:13) (110/60 - 147/84)  BP(mean): --  RR: 18 (10 Dec 2024 18:13) (18 - 20)  SpO2: 97% (10 Dec 2024 18:13) (97% - 100%)    Parameters below as of 10 Dec 2024 18:13  Patient On (Oxygen Delivery Method): room air        GENERAL: NAD, lying in bed comfortably  HEAD:  Normocephalic  EYES:  conjunctiva and sclera clear  ENT: Moist mucous membranes  NECK: Supple  CHEST/LUNG: Clear to auscultation bilaterally; No rales or rhonchi; no wheezing. Unlabored respirations  HEART: Regular rate and rhythm; S1S2+  ABDOMEN: Bowel sounds present; Soft, Nontender, Nondistended.   EXTREMITIES:  + distal Peripheral Pulses;  No cyanosis, or edema  NERVOUS SYSTEM:  Alert & Oriented X3;  No gross focal deficits   MSK: moves all extremities  SKIN: No rashes     LABS:                        12.8   10.24 )-----------( 529      ( 10 Dec 2024 07:36 )             34.4     10 Dec 2024 07:36    115    |  82     |  4      ----------------------------<  85     4.0     |  23     |  0.45     Ca    9.3        10 Dec 2024 07:36    TPro  6.4    /  Alb  3.6    /  TBili  0.8    /  DBili  x      /  AST  15     /  ALT  22     /  AlkPhos  141    10 Dec 2024 07:36      Urinalysis Basic - ( 10 Dec 2024 07:36 )    Color: x / Appearance: x / SG: x / pH: x  Gluc: 85 mg/dL / Ketone: x  / Bili: x / Urobili: x   Blood: x / Protein: x / Nitrite: x   Leuk Esterase: x / RBC: x / WBC x   Sq Epi: x / Non Sq Epi: x / Bacteria: x      CAPILLARY BLOOD GLUCOSE            Culture - Stool (collected 12-07-24 @ 19:36)  Source: .Stool  Final Report (12-10-24 @ 10:24):    No enteric pathogens isolated.    (Stool culture examined for Salmonella,    Shigella, Campylobacter, Aeromonas, Plesiomonas,    Vibrio, E.coli O157 and Yersinia)    Urinalysis with Rflx Culture (collected 12-06-24 @ 17:20)        RADIOLOGY & ADDITIONAL TESTS:    Personally reviewed.     Consultant(s) Notes Reviewed:  [x] YES  [ ] NO    Plan of care discussed with patient ; all questions answered

## 2024-12-10 NOTE — CHART NOTE - NSCHARTNOTEFT_GEN_A_CORE
was signed out pt had rapid correction of Na 115-126 mEQ in 12 hrs   pt received Tolvaptan today and gentle IV NS hydration   IV NS was d/c per primary team   will give 250 ml bolus of D5W  run over 1 hr given rapid and overcorrection of sodium level   repeated BMP to be followed at 00:00 am was signed out pt had rapid correction of Na 115-126 mEQ in 12 hrs   pt received Tolvaptan today and gentle IV NS hydration   IV NS was d/c per primary team   will give 250 ml bolus of D5W  run over 1 hr given rapid and overcorrection of sodium level   repeated BMP to be followed at 00:00 am    update   repeated lab at midnight Na was 128   another 250 ml of D5W bolus   repeating BMP in 4 hrs   at 4 am was up to 129 despite of total 500 ml D5w given as above likely sec to Tolvaptan   discussed over the phone with nephrologist on call  that agreed on D5W 500 ml bolus for now     patient seen and examined at bedside sleepy but that likely as she was asleep and waken her up , AOx2 person and place   not fully neurology evaluated her as she wanted to sleep     to f/u BMP at 8 am and adjust accordingly, f/u further nephrology recommendations in am   will sign to day team

## 2024-12-11 LAB
ANION GAP SERPL CALC-SCNC: 11 MMOL/L — SIGNIFICANT CHANGE UP (ref 5–17)
ANION GAP SERPL CALC-SCNC: 11 MMOL/L — SIGNIFICANT CHANGE UP (ref 5–17)
ANION GAP SERPL CALC-SCNC: 8 MMOL/L — SIGNIFICANT CHANGE UP (ref 5–17)
BUN SERPL-MCNC: 13 MG/DL — SIGNIFICANT CHANGE UP (ref 7–23)
BUN SERPL-MCNC: 7 MG/DL — SIGNIFICANT CHANGE UP (ref 7–23)
BUN SERPL-MCNC: 7 MG/DL — SIGNIFICANT CHANGE UP (ref 7–23)
CALCIUM SERPL-MCNC: 9.5 MG/DL — SIGNIFICANT CHANGE UP (ref 8.5–10.1)
CALCIUM SERPL-MCNC: 9.6 MG/DL — SIGNIFICANT CHANGE UP (ref 8.5–10.1)
CALCIUM SERPL-MCNC: 9.9 MG/DL — SIGNIFICANT CHANGE UP (ref 8.5–10.1)
CALPROTECTIN STL-MCNT: 150 UG/G — HIGH (ref 0–120)
CHLORIDE SERPL-SCNC: 89 MMOL/L — LOW (ref 96–108)
CHLORIDE SERPL-SCNC: 96 MMOL/L — SIGNIFICANT CHANGE UP (ref 96–108)
CHLORIDE SERPL-SCNC: 97 MMOL/L — SIGNIFICANT CHANGE UP (ref 96–108)
CO2 SERPL-SCNC: 22 MMOL/L — SIGNIFICANT CHANGE UP (ref 22–31)
CO2 SERPL-SCNC: 23 MMOL/L — SIGNIFICANT CHANGE UP (ref 22–31)
CO2 SERPL-SCNC: 26 MMOL/L — SIGNIFICANT CHANGE UP (ref 22–31)
CREAT SERPL-MCNC: 0.6 MG/DL — SIGNIFICANT CHANGE UP (ref 0.5–1.3)
CREAT SERPL-MCNC: 0.72 MG/DL — SIGNIFICANT CHANGE UP (ref 0.5–1.3)
CREAT SERPL-MCNC: 0.95 MG/DL — SIGNIFICANT CHANGE UP (ref 0.5–1.3)
EGFR: 102 ML/MIN/1.73M2 — SIGNIFICANT CHANGE UP
EGFR: 68 ML/MIN/1.73M2 — SIGNIFICANT CHANGE UP
EGFR: 95 ML/MIN/1.73M2 — SIGNIFICANT CHANGE UP
GLUCOSE SERPL-MCNC: 108 MG/DL — HIGH (ref 70–99)
GLUCOSE SERPL-MCNC: 133 MG/DL — HIGH (ref 70–99)
GLUCOSE SERPL-MCNC: 156 MG/DL — HIGH (ref 70–99)
POTASSIUM SERPL-MCNC: 3.4 MMOL/L — LOW (ref 3.5–5.3)
POTASSIUM SERPL-MCNC: 3.6 MMOL/L — SIGNIFICANT CHANGE UP (ref 3.5–5.3)
POTASSIUM SERPL-MCNC: 3.7 MMOL/L — SIGNIFICANT CHANGE UP (ref 3.5–5.3)
POTASSIUM SERPL-SCNC: 3.4 MMOL/L — LOW (ref 3.5–5.3)
POTASSIUM SERPL-SCNC: 3.6 MMOL/L — SIGNIFICANT CHANGE UP (ref 3.5–5.3)
POTASSIUM SERPL-SCNC: 3.7 MMOL/L — SIGNIFICANT CHANGE UP (ref 3.5–5.3)
SODIUM SERPL-SCNC: 126 MMOL/L — LOW (ref 135–145)
SODIUM SERPL-SCNC: 128 MMOL/L — LOW (ref 135–145)
SODIUM SERPL-SCNC: 129 MMOL/L — LOW (ref 135–145)

## 2024-12-11 PROCEDURE — 72195 MRI PELVIS W/O DYE: CPT | Mod: 26

## 2024-12-11 PROCEDURE — 99233 SBSQ HOSP IP/OBS HIGH 50: CPT

## 2024-12-11 RX ORDER — 0.9 % SODIUM CHLORIDE 0.9 %
1000 INTRAVENOUS SOLUTION INTRAVENOUS
Refills: 0 | Status: DISCONTINUED | OUTPATIENT
Start: 2024-12-11 | End: 2024-12-11

## 2024-12-11 RX ORDER — 0.9 % SODIUM CHLORIDE 0.9 %
250 INTRAVENOUS SOLUTION INTRAVENOUS ONCE
Refills: 0 | Status: COMPLETED | OUTPATIENT
Start: 2024-12-11 | End: 2024-12-11

## 2024-12-11 RX ORDER — ACETAMINOPHEN 500MG 500 MG/1
750 TABLET, COATED ORAL ONCE
Refills: 0 | Status: COMPLETED | OUTPATIENT
Start: 2024-12-11 | End: 2024-12-11

## 2024-12-11 RX ORDER — POTASSIUM CHLORIDE 600 MG/1
40 TABLET, EXTENDED RELEASE ORAL ONCE
Refills: 0 | Status: COMPLETED | OUTPATIENT
Start: 2024-12-11 | End: 2024-12-11

## 2024-12-11 RX ADMIN — Medication 50 MILLILITER(S): at 09:36

## 2024-12-11 RX ADMIN — ENOXAPARIN SODIUM 40 MILLIGRAM(S): 30 INJECTION SUBCUTANEOUS at 19:57

## 2024-12-11 RX ADMIN — LIDOCAINE 1 PATCH: 40 CREAM TOPICAL at 20:12

## 2024-12-11 RX ADMIN — ACETAMINOPHEN 500MG 750 MILLIGRAM(S): 500 TABLET, COATED ORAL at 19:49

## 2024-12-11 RX ADMIN — ACETAMINOPHEN 500MG 650 MILLIGRAM(S): 500 TABLET, COATED ORAL at 09:35

## 2024-12-11 RX ADMIN — Medication 125 MILLIGRAM(S): at 17:56

## 2024-12-11 RX ADMIN — Medication 500 MILLILITER(S): at 02:20

## 2024-12-11 RX ADMIN — Medication 125 MILLIGRAM(S): at 11:49

## 2024-12-11 RX ADMIN — ACETAMINOPHEN 500MG 300 MILLIGRAM(S): 500 TABLET, COATED ORAL at 19:01

## 2024-12-11 RX ADMIN — POTASSIUM CHLORIDE 40 MILLIEQUIVALENT(S): 600 TABLET, EXTENDED RELEASE ORAL at 17:56

## 2024-12-11 RX ADMIN — ACETAMINOPHEN, DIPHENHYDRAMINE HCL, PHENYLEPHRINE HCL 5 MILLIGRAM(S): 325; 25; 5 TABLET ORAL at 22:15

## 2024-12-11 RX ADMIN — ACETAMINOPHEN 500MG 650 MILLIGRAM(S): 500 TABLET, COATED ORAL at 10:05

## 2024-12-11 RX ADMIN — Medication 125 MILLIGRAM(S): at 05:26

## 2024-12-11 RX ADMIN — LIDOCAINE 1 PATCH: 40 CREAM TOPICAL at 11:49

## 2024-12-11 RX ADMIN — ONDANSETRON HYDROCHLORIDE 4 MILLIGRAM(S): 4 TABLET, FILM COATED ORAL at 19:56

## 2024-12-11 RX ADMIN — Medication 25 MICROGRAM(S): at 05:26

## 2024-12-11 RX ADMIN — Medication 500 MILLILITER(S): at 06:10

## 2024-12-11 NOTE — PROGRESS NOTE ADULT - ASSESSMENT
Severe Hyponatremia: Low solute intake, SIADH  Abdominal pain/Diarrhea: C diff    12/09/24: Improving sodium levels. PO fluid restriction. D/c IVF. To continue current meds. Rx for C. Diff.  Will follow electrolytes and renal function trend.   12/10/24: Acute drop in sodium levels. ? Compliance with PO fluid restriction. Will dose samsca. Avoid hypotonic fluids.   12/11/24: Sodium levels improved after acute drop. Will d/c IVF and monitor. Pt advised to avoid excessive PO fluids.

## 2024-12-11 NOTE — PROGRESS NOTE ADULT - PROBLEM SELECTOR PLAN 1
presented with AMS and confusion, now improved.    likely due to infectious process and hyponatremia   treat underlying condition as below  CT head Negative   Neuro consulted

## 2024-12-11 NOTE — PROGRESS NOTE ADULT - PROBLEM SELECTOR PLAN 2
-likely due to SIADH and low sodium intake , now with diarrhea   - S/ IVF, Tolvaptan   -D/w Nephro Dr. Murillo, no need for more IVF. Monitor -likely due to SIADH and low sodium intake , now with diarrhea   - S/ IVF, Tolvaptan   -D/w Nephro Dr. Murillo, started D5W, will monitor Na

## 2024-12-11 NOTE — PROGRESS NOTE ADULT - SUBJECTIVE AND OBJECTIVE BOX
[facility]  Bert Sainz PA-C  [address] [place] [address]  [phone]      INTERVAL HPI/OVERNIGHT EVENTS:  Pt s/e  Diarrhea resolving  Pt feels well without GI complaints    MEDICATIONS  (STANDING):  dextrose 5%. 1000 milliLiter(s) (50 mL/Hr) IV Continuous <Continuous>  enoxaparin Injectable 40 milliGRAM(s) SubCutaneous every 24 hours  influenza   Vaccine 0.5 milliLiter(s) IntraMuscular once  levothyroxine 25 MICROGram(s) Oral daily  lidocaine   4% Patch 1 Patch Transdermal daily  naloxone Injectable 0.4 milliGRAM(s) IV Push once  vancomycin    Solution 125 milliGRAM(s) Oral every 6 hours    MEDICATIONS  (PRN):  acetaminophen     Tablet .. 650 milliGRAM(s) Oral every 6 hours PRN Mild Pain (1 - 3)  albuterol    90 MICROgram(s) HFA Inhaler 2 Puff(s) Inhalation every 6 hours PRN Shortness of Breath and/or Wheezing  aluminum hydroxide/magnesium hydroxide/simethicone Suspension 30 milliLiter(s) Oral every 4 hours PRN Upset Stomach  melatonin 5 milliGRAM(s) Oral at bedtime PRN Insomnia  ondansetron Injectable 4 milliGRAM(s) IV Push every 8 hours PRN Nausea and/or Vomiting      Allergies    No Known Allergies    Intolerances    aspirin (Stomach Upset)      PHYSICAL EXAM:   Vital Signs:  Vital Signs Last 24 Hrs  T(C): 36.4 (11 Dec 2024 04:37), Max: 36.7 (10 Dec 2024 11:42)  T(F): 97.6 (11 Dec 2024 04:37), Max: 98.1 (10 Dec 2024 11:42)  HR: 86 (11 Dec 2024 04:37) (69 - 96)  BP: 101/68 (11 Dec 2024 04:37) (101/68 - 147/84)  BP(mean): --  RR: 19 (11 Dec 2024 04:37) (18 - 19)  SpO2: 99% (11 Dec 2024 04:37) (97% - 100%)    Parameters below as of 11 Dec 2024 04:37  Patient On (Oxygen Delivery Method): room air      Daily     Daily Weight in k (11 Dec 2024 04:37)    GENERAL:  Appears stated age  HEENT:  NC/AT  CHEST:  Full & symmetric excursion  HEART:  Regular rhythm  ABDOMEN:  Soft, non-tender, non-distended  EXTEREMITIES:  no cyanosis  SKIN:  No rash  NEURO:  Alert      LABS:                        14.1   12.44 )-----------( 599      ( 10 Dec 2024 19:42 )             37.3     12-11    129[L]  |  96  |  7   ----------------------------<  156[H]  3.7   |  22  |  0.72    Ca    9.5      11 Dec 2024 04:25  Phos  3.7     12-10  Mg     2.4     12-10    TPro  7.1  /  Alb  3.9  /  TBili  0.8  /  DBili  x   /  AST  25  /  ALT  33  /  AlkPhos  151[H]  12-10      Urinalysis Basic - ( 11 Dec 2024 04:25 )    Color: x / Appearance: x / SG: x / pH: x  Gluc: 156 mg/dL / Ketone: x  / Bili: x / Urobili: x   Blood: x / Protein: x / Nitrite: x   Leuk Esterase: x / RBC: x / WBC x   Sq Epi: x / Non Sq Epi: x / Bacteria: x

## 2024-12-11 NOTE — PROGRESS NOTE ADULT - SUBJECTIVE AND OBJECTIVE BOX
[facility]                                                       Dr. Murillo, Dr. Porter, Dr. Bustillo, Dr. Ocasio, Dr. Lugo, Dr. Clinton                                      [facility], [facility], [address]                                                 [address] [address]                                      Ph: [phone]  Fax: [phone]                                         Ph: [phone]  Fax: [phone]      Patient is a 59y old  Female who presents with a chief complaint of Failure to thrive/Hyponatremia (09 Dec 2024 12:27)  Patient seen in follow up for hyponatremia.        PAST MEDICAL HISTORY:  Lupus    Sjogren's disease    Vertigo    UTI (urinary tract infection)    GERD (gastroesophageal reflux disease)    Hypotension    Hypothyroidism    Gallstones    Hepatitis C    Anxiety    Migraine    Neuropathy    COPD (chronic obstructive pulmonary disease)    Nicotine addiction    Glossopharyngeal neuralgia syndrome    Dvt femoral (deep venous thrombosis)    Lupus    Emphysema/COPD    Burning mouth syndrome    H/O osteoporosis    History of weight loss    History of seizure disorder    Personal history of skin cancer    Osteochondritis dissecans    History of IBS    H/O Raynaud's syndrome    Pain, wrist, right    Right shoulder pain    Chronic low back pain with sciatica    Chronic neck pain    H/O paroxysmal supraventricular tachycardia        MEDICATIONS  (STANDING):  dextrose 5%. 1000 milliLiter(s) (50 mL/Hr) IV Continuous <Continuous>  enoxaparin Injectable 40 milliGRAM(s) SubCutaneous every 24 hours  influenza   Vaccine 0.5 milliLiter(s) IntraMuscular once  levothyroxine 25 MICROGram(s) Oral daily  lidocaine   4% Patch 1 Patch Transdermal daily  naloxone Injectable 0.4 milliGRAM(s) IV Push once  vancomycin    Solution 125 milliGRAM(s) Oral every 6 hours    MEDICATIONS  (PRN):  acetaminophen     Tablet .. 650 milliGRAM(s) Oral every 6 hours PRN Mild Pain (1 - 3)  albuterol    90 MICROgram(s) HFA Inhaler 2 Puff(s) Inhalation every 6 hours PRN Shortness of Breath and/or Wheezing  aluminum hydroxide/magnesium hydroxide/simethicone Suspension 30 milliLiter(s) Oral every 4 hours PRN Upset Stomach  melatonin 5 milliGRAM(s) Oral at bedtime PRN Insomnia  ondansetron Injectable 4 milliGRAM(s) IV Push every 8 hours PRN Nausea and/or Vomiting    T(C): 36.5 (12-11-24 @ 13:05), Max: 36.7 (12-10-24 @ 04:55)  HR: 80 (12-11-24 @ 13:05) (59 - 96)  BP: 100/64 (12-11-24 @ 13:05) (100/64 - 147/84)  RR: 18 (12-11-24 @ 13:05)  SpO2: 98% (12-11-24 @ 13:05)  Wt(kg): --  I&O's Detail    10 Dec 2024 07:01  -  11 Dec 2024 07:00  --------------------------------------------------------  IN:    dextrose 5%: 500 mL    dextrose 5%: 500 mL  Total IN: 1000 mL    OUT:  Total OUT: 0 mL    Total NET: 1000 mL      11 Dec 2024 07:01  -  11 Dec 2024 16:03  --------------------------------------------------------  IN:    dextrose 5%: 300 mL  Total IN: 300 mL    OUT:  Total OUT: 0 mL    Total NET: 300 mL                  PHYSICAL EXAM:  General: No distress  Respiratory: b/l air entry  Cardiovascular: S1 S2  Gastrointestinal: soft  Extremities:  no edema                            LABORATORY:                        14.1   12.44 )-----------( 599      ( 10 Dec 2024 19:42 )             37.3     12-11    126[L]  |  89[L]  |  13  ----------------------------<  133[H]  3.4[L]   |  26  |  0.95    Ca    9.6      11 Dec 2024 12:55  Phos  3.7     12-10  Mg     2.4     12-10    TPro  7.1  /  Alb  3.9  /  TBili  0.8  /  DBili  x   /  AST  25  /  ALT  33  /  AlkPhos  151[H]  12-10    Sodium: 126 mmol/L (12-11 @ 12:55)  Sodium: 129 mmol/L (12-11 @ 04:25)  Sodium: 128 mmol/L (12-11 @ 00:56)  Sodium: 126 mmol/L (12-10 @ 19:42)    Potassium: 3.4 mmol/L (12-11 @ 12:55)  Potassium: 3.7 mmol/L (12-11 @ 04:25)  Potassium: 3.6 mmol/L (12-11 @ 00:56)  Potassium: 4.1 mmol/L (12-10 @ 19:42)    Hemoglobin: 14.1 g/dL (12-10 @ 19:42)  Hemoglobin: 12.8 g/dL (12-10 @ 07:36)  Hemoglobin: 13.0 g/dL (12-09 @ 06:05)    Creatinine, Serum 0.95 (12-11 @ 12:55)  Creatinine, Serum 0.72 (12-11 @ 04:25)  Creatinine, Serum 0.60 (12-11 @ 00:56)  Creatinine, Serum 0.49 (12-10 @ 19:42)        LIVER FUNCTIONS - ( 10 Dec 2024 19:42 )  Alb: 3.9 g/dL / Pro: 7.1 g/dL / ALK PHOS: 151 U/L / ALT: 33 U/L / AST: 25 U/L / GGT: x           Urinalysis Basic - ( 11 Dec 2024 12:55 )    Color: x / Appearance: x / SG: x / pH: x  Gluc: 133 mg/dL / Ketone: x  / Bili: x / Urobili: x   Blood: x / Protein: x / Nitrite: x   Leuk Esterase: x / RBC: x / WBC x   Sq Epi: x / Non Sq Epi: x / Bacteria: x
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
## 2024-12-11 NOTE — POST DISCHARGE NOTE - NOTIFICATION:
Notified Dr. Bolton of patient's CT findings.  Please contact [email or web address] or [phone] or select “[facility]” on the discharge disposition sheet when the patient is close to discharge for assistance in outpatient care.
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
## 2024-12-11 NOTE — PROGRESS NOTE ADULT - ASSESSMENT
Colitis  Abdominal pain  Hx IBS-D    CT AP w/ IVC (12/6): Acute sigmoid and descending colitis. Age-indeterminate left pubic fracture with associated para osseous organized hematoma.     Recommendations:  - C. Diff PCR positive   - ID following, c/w PO vancomycin   - Initial GI PCR indeterminate Norovirus, repeat GI PCR negative   - Supportive care   - Low fiber diet   - Pain management  - Anti-emetics PRN  - From GI standpoint no objection to begin d/c plan    I reviewed the overnight course of events on the unit, re-confirming the patient history. I discussed the care with the patient  Differential diagnosis and plan of care discussed with patient after the evaluation  35 minutes spent on total encounter of which more than fifty percent of the encounter was spent counseling and/or coordinating care by the attending physician.

## 2024-12-11 NOTE — PROGRESS NOTE ADULT - SUBJECTIVE AND OBJECTIVE BOX
[facility], Division of Infectious Diseases  YOUNG [place] Y. Patel, S. Shah, G. [facility]  [phone]    Name: DOUGLAS AKINS  Age: 59y  Gender: Female  MRN: [id number]    Interval History:  No acute overnight events.   Notes reviewed    Antibiotics:  vancomycin    Solution 125 milliGRAM(s) Oral every 6 hours      Medications:  acetaminophen     Tablet .. 650 milliGRAM(s) Oral every 6 hours PRN  albuterol    90 MICROgram(s) HFA Inhaler 2 Puff(s) Inhalation every 6 hours PRN  aluminum hydroxide/magnesium hydroxide/simethicone Suspension 30 milliLiter(s) Oral every 4 hours PRN  dextrose 5%. 1000 milliLiter(s) IV Continuous <Continuous>  enoxaparin Injectable 40 milliGRAM(s) SubCutaneous every 24 hours  influenza   Vaccine 0.5 milliLiter(s) IntraMuscular once  levothyroxine 25 MICROGram(s) Oral daily  lidocaine   4% Patch 1 Patch Transdermal daily  melatonin 5 milliGRAM(s) Oral at bedtime PRN  naloxone Injectable 0.4 milliGRAM(s) IV Push once  ondansetron Injectable 4 milliGRAM(s) IV Push every 8 hours PRN  vancomycin    Solution 125 milliGRAM(s) Oral every 6 hours      Review of Systems:  A 10-point review of systems was obtained.   Review of systems otherwise negative except as previously noted.    Allergies: No Known Allergies    For details regarding the patient's past medical history, social history, family history, and other miscellaneous elements, please refer the initial infectious diseases consultation and/or the admitting history and physical examination for this admission.    Objective:  Vitals:   T(C): 36.5 (12-11-24 @ 13:05), Max: 36.7 (12-10-24 @ 18:13)  HR: 80 (12-11-24 @ 13:05) (80 - 96)  BP: 100/64 (12-11-24 @ 13:05) (100/64 - 136/73)  RR: 18 (12-11-24 @ 13:05) (18 - 19)  SpO2: 98% (12-11-24 @ 13:05) (97% - 99%)    Physical Examination:  General: no acute distress  HEENT: NC/AT, EOMI  Cardio: S1, S2 heard, RRR, no murmurs  Resp: breath sounds heard bilaterally, no rales, wheezes or rhonchi  Abd: soft, NT, ND,  Ext: no edema or cyanosis  Skin: warm, dry, no visible rash      Laboratory Studies:  CBC:                       14.1   12.44 )-----------( 599      ( 10 Dec 2024 19:42 )             37.3     CMP: 12-11    129[L]  |  96  |  7   ----------------------------<  156[H]  3.7   |  22  |  0.72    Ca    9.5      11 Dec 2024 04:25  Phos  3.7     12-10  Mg     2.4     12-10    TPro  7.1  /  Alb  3.9  /  TBili  0.8  /  DBili  x   /  AST  25  /  ALT  33  /  AlkPhos  151[H]  12-10    LIVER FUNCTIONS - ( 10 Dec 2024 19:42 )  Alb: 3.9 g/dL / Pro: 7.1 g/dL / ALK PHOS: 151 U/L / ALT: 33 U/L / AST: 25 U/L / GGT: x           Urinalysis Basic - ( 11 Dec 2024 04:25 )    Color: x / Appearance: x / SG: x / pH: x  Gluc: 156 mg/dL / Ketone: x  / Bili: x / Urobili: x   Blood: x / Protein: x / Nitrite: x   Leuk Esterase: x / RBC: x / WBC x   Sq Epi: x / Non Sq Epi: x / Bacteria: x        Microbiology: reviewed    Culture - Stool (collected 12-07-24 @ 19:36)  Source: .Stool  Final Report (12-10-24 @ 10:24):    No enteric pathogens isolated.    (Stool culture examined for Salmonella,    Shigella, Campylobacter, Aeromonas, Plesiomonas,    Vibrio, E.coli O157 and Yersinia)    Urinalysis with Rflx Culture (collected 12-06-24 @ 17:20)          Radiology: reviewed

## 2024-12-11 NOTE — PROGRESS NOTE ADULT - PROBLEM SELECTOR PLAN 3
Pt c/o abdominal pain and found to be tender on exam  - CT A/P -->Acute sigmoid and descending colitis. Age-indeterminate left pubic fracture with associated para osseous organized hematoma. Underlying lytic process not excluded. Sacral insufficiency fractures.  - Gi Dr Trinidad Consult noted   - ID eval noted  - GI PCR : + Novovirus, C diff +  - On  vanco po

## 2024-12-11 NOTE — PROGRESS NOTE ADULT - SUBJECTIVE AND OBJECTIVE BOX
Patient is a 59y old  Female who presents with a chief complaint of Failure to thrive/Hyponatremia (10 Dec 2024 13:45)       INTERVAL HPI/OVERNIGHT EVENTS: Patient seen and examined at bedside. Awake, alert, says no to all ROS.     MEDICATIONS  (STANDING):  dextrose 5%. 1000 milliLiter(s) (500 mL/Hr) IV Continuous <Continuous>  dextrose 5%. 1000 milliLiter(s) (250 mL/Hr) IV Continuous <Continuous>  enoxaparin Injectable 40 milliGRAM(s) SubCutaneous every 24 hours  influenza   Vaccine 0.5 milliLiter(s) IntraMuscular once  levothyroxine 25 MICROGram(s) Oral daily  lidocaine   4% Patch 1 Patch Transdermal daily  naloxone Injectable 0.4 milliGRAM(s) IV Push once  vancomycin    Solution 125 milliGRAM(s) Oral every 6 hours    MEDICATIONS  (PRN):  acetaminophen     Tablet .. 650 milliGRAM(s) Oral every 6 hours PRN Mild Pain (1 - 3)  albuterol    90 MICROgram(s) HFA Inhaler 2 Puff(s) Inhalation every 6 hours PRN Shortness of Breath and/or Wheezing  aluminum hydroxide/magnesium hydroxide/simethicone Suspension 30 milliLiter(s) Oral every 4 hours PRN Upset Stomach  melatonin 5 milliGRAM(s) Oral at bedtime PRN Insomnia  ondansetron Injectable 4 milliGRAM(s) IV Push every 8 hours PRN Nausea and/or Vomiting      Allergies    No Known Allergies    Intolerances    aspirin (Stomach Upset)      REVIEW OF SYSTEMS:  CONSTITUTIONAL: No fever or chills  HEENT:  No headache, no sore throat  RESPIRATORY: No cough or shortness of breath  CARDIOVASCULAR: No chest pain or palpitations  GASTROINTESTINAL: No abd pain, nausea, vomiting, or diarrhea  Skin: no rash, itching   Vital Signs Last 24 Hrs  T(C): 36.4 (11 Dec 2024 04:37), Max: 36.7 (10 Dec 2024 11:42)  T(F): 97.6 (11 Dec 2024 04:37), Max: 98.1 (10 Dec 2024 11:42)  HR: 86 (11 Dec 2024 04:37) (69 - 96)  BP: 101/68 (11 Dec 2024 04:37) (101/68 - 147/84)  BP(mean): --  RR: 19 (11 Dec 2024 04:37) (18 - 19)  SpO2: 99% (11 Dec 2024 04:37) (97% - 100%)    Parameters below as of 11 Dec 2024 04:37  Patient On (Oxygen Delivery Method): room air        PHYSICAL EXAM:  GENERAL: NAD, awake, alert   HEAD:  Normocephalic  EYES:  conjunctiva and sclera clear  ENT: Moist mucous membranes  NECK: Supple  CHEST/LUNG: Clear to auscultation bilaterally; No rales or rhonchi; no wheezing. Unlabored respirations  HEART: Regular rate and rhythm; S1S2+  ABDOMEN: Bowel sounds present; Soft, Nontender, Nondistended.   EXTREMITIES:  + distal Peripheral Pulses;  No cyanosis, or edema  NERVOUS SYSTEM:  Alert & Awake,   No gross focal deficits   MSK: moves all extremities  SKIN: No rashes   LABS:                        14.1   12.44 )-----------( 599      ( 10 Dec 2024 19:42 )             37.3     11 Dec 2024 04:25    129    |  96     |  7      ----------------------------<  156    3.7     |  22     |  0.72     Ca    9.5        11 Dec 2024 04:25  Phos  3.7       10 Dec 2024 19:42  Mg     2.4       10 Dec 2024 19:42    TPro  7.1    /  Alb  3.9    /  TBili  0.8    /  DBili  x      /  AST  25     /  ALT  33     /  AlkPhos  151    10 Dec 2024 19:42      CAPILLARY BLOOD GLUCOSE        BLOOD CULTURE  12-07 @ 19:36   No enteric pathogens isolated.  (Stool culture examined for Salmonella,  Shigella, Campylobacter, Aeromonas, Plesiomonas,  Vibrio, E.coli O157 and Yersinia)  --  --    RADIOLOGY & ADDITIONAL TESTS:    Imaging Personally Reviewed:  [ ] YES     Consultant(s) Notes Reviewed:      Care Discussed with Consultants/Other Providers:

## 2024-12-12 LAB
ANION GAP SERPL CALC-SCNC: 10 MMOL/L — SIGNIFICANT CHANGE UP (ref 5–17)
BUN SERPL-MCNC: 9 MG/DL — SIGNIFICANT CHANGE UP (ref 7–23)
CALCIUM SERPL-MCNC: 9 MG/DL — SIGNIFICANT CHANGE UP (ref 8.5–10.1)
CHLORIDE SERPL-SCNC: 96 MMOL/L — SIGNIFICANT CHANGE UP (ref 96–108)
CO2 SERPL-SCNC: 22 MMOL/L — SIGNIFICANT CHANGE UP (ref 22–31)
CREAT SERPL-MCNC: 0.51 MG/DL — SIGNIFICANT CHANGE UP (ref 0.5–1.3)
EGFR: 106 ML/MIN/1.73M2 — SIGNIFICANT CHANGE UP
ERYTHROCYTE [SEDIMENTATION RATE] IN BLOOD: 5 MM/HR — SIGNIFICANT CHANGE UP (ref 0–20)
FOLATE SERPL-MCNC: 7.8 NG/ML — SIGNIFICANT CHANGE UP
GLUCOSE SERPL-MCNC: 89 MG/DL — SIGNIFICANT CHANGE UP (ref 70–99)
HCT VFR BLD CALC: 35.9 % — SIGNIFICANT CHANGE UP (ref 34.5–45)
HGB BLD-MCNC: 13.3 G/DL — SIGNIFICANT CHANGE UP (ref 11.5–15.5)
MCHC RBC-ENTMCNC: 32.1 PG — SIGNIFICANT CHANGE UP (ref 27–34)
MCHC RBC-ENTMCNC: 37 G/DL — HIGH (ref 32–36)
MCV RBC AUTO: 86.7 FL — SIGNIFICANT CHANGE UP (ref 80–100)
NRBC # BLD: 0 /100 WBCS — SIGNIFICANT CHANGE UP (ref 0–0)
PLATELET # BLD AUTO: 519 K/UL — HIGH (ref 150–400)
POTASSIUM SERPL-MCNC: 4.4 MMOL/L — SIGNIFICANT CHANGE UP (ref 3.5–5.3)
POTASSIUM SERPL-SCNC: 4.4 MMOL/L — SIGNIFICANT CHANGE UP (ref 3.5–5.3)
RBC # BLD: 4.14 M/UL — SIGNIFICANT CHANGE UP (ref 3.8–5.2)
RBC # FLD: 12.7 % — SIGNIFICANT CHANGE UP (ref 10.3–14.5)
SODIUM SERPL-SCNC: 128 MMOL/L — LOW (ref 135–145)
TSH SERPL-MCNC: 1.01 UIU/ML — SIGNIFICANT CHANGE UP (ref 0.36–3.74)
VIT B12 SERPL-MCNC: 1062 PG/ML — SIGNIFICANT CHANGE UP (ref 232–1245)
WBC # BLD: 12.16 K/UL — HIGH (ref 3.8–10.5)
WBC # FLD AUTO: 12.16 K/UL — HIGH (ref 3.8–10.5)

## 2024-12-12 PROCEDURE — 99233 SBSQ HOSP IP/OBS HIGH 50: CPT

## 2024-12-12 RX ORDER — ACETAMINOPHEN 500MG 500 MG/1
750 TABLET, COATED ORAL ONCE
Refills: 0 | Status: COMPLETED | OUTPATIENT
Start: 2024-12-12 | End: 2024-12-12

## 2024-12-12 RX ORDER — PANTOPRAZOLE SODIUM 40 MG/1
40 TABLET, DELAYED RELEASE ORAL
Refills: 0 | Status: DISCONTINUED | OUTPATIENT
Start: 2024-12-12 | End: 2024-12-14

## 2024-12-12 RX ORDER — SODIUM CHLORIDE 9 MG/ML
2 INJECTION, SOLUTION INTRAMUSCULAR; INTRAVENOUS; SUBCUTANEOUS EVERY 6 HOURS
Refills: 0 | Status: COMPLETED | OUTPATIENT
Start: 2024-12-12 | End: 2024-12-13

## 2024-12-12 RX ADMIN — Medication 125 MILLIGRAM(S): at 05:54

## 2024-12-12 RX ADMIN — LIDOCAINE 1 PATCH: 40 CREAM TOPICAL at 13:01

## 2024-12-12 RX ADMIN — LIDOCAINE 1 PATCH: 40 CREAM TOPICAL at 00:24

## 2024-12-12 RX ADMIN — SODIUM CHLORIDE 2 GRAM(S): 9 INJECTION, SOLUTION INTRAMUSCULAR; INTRAVENOUS; SUBCUTANEOUS at 16:22

## 2024-12-12 RX ADMIN — Medication 25 MICROGRAM(S): at 05:54

## 2024-12-12 RX ADMIN — Medication 125 MILLIGRAM(S): at 01:53

## 2024-12-12 RX ADMIN — Medication 125 MILLIGRAM(S): at 20:01

## 2024-12-12 RX ADMIN — ACETAMINOPHEN 500MG 750 MILLIGRAM(S): 500 TABLET, COATED ORAL at 19:08

## 2024-12-12 RX ADMIN — LIDOCAINE 1 PATCH: 40 CREAM TOPICAL at 19:00

## 2024-12-12 RX ADMIN — ENOXAPARIN SODIUM 40 MILLIGRAM(S): 30 INJECTION SUBCUTANEOUS at 20:50

## 2024-12-12 RX ADMIN — ACETAMINOPHEN 500MG 300 MILLIGRAM(S): 500 TABLET, COATED ORAL at 18:38

## 2024-12-12 RX ADMIN — Medication 125 MILLIGRAM(S): at 13:01
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
## 2024-12-12 NOTE — PROGRESS NOTE ADULT - SUBJECTIVE AND OBJECTIVE BOX
Patient is a 59y old  Female who presents with a chief complaint of Failure to thrive/Hyponatremia (12 Dec 2024 07:34)       INTERVAL HPI/OVERNIGHT EVENTS: Patient seen and examined at bedside. AAOx3 , wants to go home. Denies chest pain, palpitations, n/v/d/c     MEDICATIONS  (STANDING):  enoxaparin Injectable 40 milliGRAM(s) SubCutaneous every 24 hours  influenza   Vaccine 0.5 milliLiter(s) IntraMuscular once  levothyroxine 25 MICROGram(s) Oral daily  lidocaine   4% Patch 1 Patch Transdermal daily  naloxone Injectable 0.4 milliGRAM(s) IV Push once  vancomycin    Solution 125 milliGRAM(s) Oral every 6 hours    MEDICATIONS  (PRN):  acetaminophen     Tablet .. 650 milliGRAM(s) Oral every 6 hours PRN Mild Pain (1 - 3)  albuterol    90 MICROgram(s) HFA Inhaler 2 Puff(s) Inhalation every 6 hours PRN Shortness of Breath and/or Wheezing  aluminum hydroxide/magnesium hydroxide/simethicone Suspension 30 milliLiter(s) Oral every 4 hours PRN Upset Stomach  melatonin 5 milliGRAM(s) Oral at bedtime PRN Insomnia  ondansetron Injectable 4 milliGRAM(s) IV Push every 8 hours PRN Nausea and/or Vomiting      Allergies    No Known Allergies    Intolerances    aspirin (Stomach Upset)      REVIEW OF SYSTEMS:  Per HPI, all other ROS noted Negative   Vital Signs Last 24 Hrs  T(C): 37.2 (12 Dec 2024 04:53), Max: 37.2 (12 Dec 2024 04:53)  T(F): 99 (12 Dec 2024 04:53), Max: 99 (12 Dec 2024 04:53)  HR: 73 (12 Dec 2024 04:53) (69 - 80)  BP: 116/70 (12 Dec 2024 04:53) (100/64 - 125/85)  BP(mean): --  RR: 18 (12 Dec 2024 04:53) (18 - 18)  SpO2: 96% (12 Dec 2024 04:53) (96% - 99%)    Parameters below as of 12 Dec 2024 04:53  Patient On (Oxygen Delivery Method): room air        PHYSICAL EXAM:  GENERAL: NAD, awake, alert   HEAD:  Normocephalic  EYES:  conjunctiva and sclera clear  ENT: Moist mucous membranes  NECK: Supple  CHEST/LUNG: Clear to auscultation bilaterally; No rales or rhonchi; no wheezing. Unlabored respirations  HEART: Regular rate and rhythm; S1S2+  ABDOMEN: Bowel sounds present; Soft, Nontender, Nondistended.   EXTREMITIES:  + distal Peripheral Pulses;  No cyanosis, or edema  NERVOUS SYSTEM:  Alert & Awake,   No gross focal deficits   MSK: moves all extremities  SKIN: No rashes     LABS:                        13.3   12.16 )-----------( 519      ( 12 Dec 2024 07:20 )             35.9     12 Dec 2024 07:20    128    |  96     |  9      ----------------------------<  89     4.4     |  22     |  0.51     Ca    9.0        12 Dec 2024 07:20        CAPILLARY BLOOD GLUCOSE        BLOOD CULTURE    RADIOLOGY & ADDITIONAL TESTS:    Imaging Personally Reviewed:  [ ] YES     Consultant(s) Notes Reviewed:      Care Discussed with Consultants/Other Providers:

## 2024-12-12 NOTE — PROGRESS NOTE ADULT - SUBJECTIVE AND OBJECTIVE BOX
Neurology follow up note    DOUGLAS [id number]      Interval History:    Patient feels ok no new complaints.    Allergies    No Known Allergies    Intolerances    aspirin (Stomach Upset)      MEDICATIONS    acetaminophen     Tablet .. 650 milliGRAM(s) Oral every 6 hours PRN  albuterol    90 MICROgram(s) HFA Inhaler 2 Puff(s) Inhalation every 6 hours PRN  aluminum hydroxide/magnesium hydroxide/simethicone Suspension 30 milliLiter(s) Oral every 4 hours PRN  enoxaparin Injectable 40 milliGRAM(s) SubCutaneous every 24 hours  influenza   Vaccine 0.5 milliLiter(s) IntraMuscular once  levothyroxine 25 MICROGram(s) Oral daily  lidocaine   4% Patch 1 Patch Transdermal daily  melatonin 5 milliGRAM(s) Oral at bedtime PRN  naloxone Injectable 0.4 milliGRAM(s) IV Push once  ondansetron Injectable 4 milliGRAM(s) IV Push every 8 hours PRN  vancomycin    Solution 125 milliGRAM(s) Oral every 6 hours              Vital Signs Last 24 Hrs  T(C): 37.2 (12 Dec 2024 04:53), Max: 37.2 (12 Dec 2024 04:53)  T(F): 99 (12 Dec 2024 04:53), Max: 99 (12 Dec 2024 04:53)  HR: 73 (12 Dec 2024 04:53) (69 - 80)  BP: 116/70 (12 Dec 2024 04:53) (100/64 - 125/85)  BP(mean): --  RR: 18 (12 Dec 2024 04:53) (18 - 18)  SpO2: 96% (12 Dec 2024 04:53) (96% - 99%)    Parameters below as of 12 Dec 2024 04:53  Patient On (Oxygen Delivery Method): room air      REVIEW OF SYSTEMS:  CONSTITUTIONAL:  The patient denies fever, chills, or night sweats.  HEAD:  No headache.  EYES:  No double vision or blurry vision.  EARS:  No ringing in her ears.  NECK:  No neck pain.  CARDIOVASCULAR:  No chest pain.  RESPIRATORY:  No shortness of breath.  ABDOMEN:  No nausea, vomiting, or abdominal pain.  EXTREMITIES/NEUROLOGICAL:  No numbness or tingling.  MUSCULOSKELETAL:  No joint pain.    PHYSICAL EXAMINATION:  HEAD:  Normocephalic, atraumatic.  EYES:  No scleral icterus.  EARS:  Hearing appear to be intact.  NECK:  Supple.  CARDIOVASCULAR:  S1 and S2 heard.  RESPIRATORY:  Air entry bilaterally.  ABDOMEN:  Soft, nontender.  EXTREMITIES:  No clubbing or cyanosis were noted.    NEUROLOGIC:  The patient is awake, alert, location [facility], year 2024, month was November, 5 nickels in a quarter, dog backward spells god.  Recall was 3/3 within 5 minutes.  Able to name simple objects.  Able to follow simple and complex commands.  Speech was fluent.  Smile symmetric.  Motor, bilateral upper and lower 5/5.  Sensory, bilateral upper and lower intact to light touch, no drift.      LABS:  CBC Full  -  ( 10 Dec 2024 19:42 )  WBC Count : 12.44 K/uL  RBC Count : 4.39 M/uL  Hemoglobin : 14.1 g/dL  Hematocrit : 37.3 %  Platelet Count - Automated : 599 K/uL  Mean Cell Volume : 85.0 fl  Mean Cell Hemoglobin : 32.1 pg  Mean Cell Hemoglobin Concentration : 37.8 g/dL  Auto Neutrophil # : x  Auto Lymphocyte # : x  Auto Monocyte # : x  Auto Eosinophil # : x  Auto Basophil # : x  Auto Neutrophil % : x  Auto Lymphocyte % : x  Auto Monocyte % : x  Auto Eosinophil % : x  Auto Basophil % : x    Urinalysis Basic - ( 11 Dec 2024 12:55 )    Color: x / Appearance: x / SG: x / pH: x  Gluc: 133 mg/dL / Ketone: x  / Bili: x / Urobili: x   Blood: x / Protein: x / Nitrite: x   Leuk Esterase: x / RBC: x / WBC x   Sq Epi: x / Non Sq Epi: x / Bacteria: x      12-11    126[L]  |  89[L]  |  13  ----------------------------<  133[H]  3.4[L]   |  26  |  0.95    Ca    9.6      11 Dec 2024 12:55  Phos  3.7     12-10  Mg     2.4     12-10    TPro  7.1  /  Alb  3.9  /  TBili  0.8  /  DBili  x   /  AST  25  /  ALT  33  /  AlkPhos  151[H]  12-10    Hemoglobin A1C:     LIVER FUNCTIONS - ( 10 Dec 2024 19:42 )  Alb: 3.9 g/dL / Pro: 7.1 g/dL / ALK PHOS: 151 U/L / ALT: 33 U/L / AST: 25 U/L / GGT: x           Vitamin B12         RADIOLOGY  ANALYSIS AND PLAN:  This is a 59-year-old with episode of altered mental status.    1.For episode of altered mental status, most likely metabolic encephalopathy, suspect possibly secondary to electrolyte abnormalities, questionable if any hyponatremia eventually to a subclinical seizure event with a postictal state.  2.For history of hyponatremia, slowly correct sodium, less than 10 millimoles a day to prevent central pontine myelinolysis.  3.We will check another electrolytes for altered mental status.  4.For history of migraines, continue the patient on Fioricet.  5.For history of hypothyroid, patient is on Synthroid.  6.For history of neuropathy, continue on Lyrica.      52 minutes of time was spent with the patient, plan of care, reviewing data, speaking to multidisciplinary healthcare team with greater than 50% of time in counseling, care coordination.    Thank you for courtesy of consultation.  PATIENT HAS NOT YET BEEN SEEN AND EXAMINED TODAY. NOTE AND CHART REVIEWED IN AM AND EXAM FORM PREVIOUS.  ONCE PATIENT SEEN, CHART WILL BE UPDATE AT PRESENT NOTE IS INCOMPLETE     Neurology follow up note    DOUGLAS [id number]      Interval History:    Patient feels ok no new complaints having breakfast     Allergies    No Known Allergies    Intolerances    aspirin (Stomach Upset)      MEDICATIONS    acetaminophen     Tablet .. 650 milliGRAM(s) Oral every 6 hours PRN  albuterol    90 MICROgram(s) HFA Inhaler 2 Puff(s) Inhalation every 6 hours PRN  aluminum hydroxide/magnesium hydroxide/simethicone Suspension 30 milliLiter(s) Oral every 4 hours PRN  enoxaparin Injectable 40 milliGRAM(s) SubCutaneous every 24 hours  influenza   Vaccine 0.5 milliLiter(s) IntraMuscular once  levothyroxine 25 MICROGram(s) Oral daily  lidocaine   4% Patch 1 Patch Transdermal daily  melatonin 5 milliGRAM(s) Oral at bedtime PRN  naloxone Injectable 0.4 milliGRAM(s) IV Push once  ondansetron Injectable 4 milliGRAM(s) IV Push every 8 hours PRN  vancomycin    Solution 125 milliGRAM(s) Oral every 6 hours              Vital Signs Last 24 Hrs  T(C): 37.2 (12 Dec 2024 04:53), Max: 37.2 (12 Dec 2024 04:53)  T(F): 99 (12 Dec 2024 04:53), Max: 99 (12 Dec 2024 04:53)  HR: 73 (12 Dec 2024 04:53) (69 - 80)  BP: 116/70 (12 Dec 2024 04:53) (100/64 - 125/85)  BP(mean): --  RR: 18 (12 Dec 2024 04:53) (18 - 18)  SpO2: 96% (12 Dec 2024 04:53) (96% - 99%)    Parameters below as of 12 Dec 2024 04:53  Patient On (Oxygen Delivery Method): room air      REVIEW OF SYSTEMS:  CONSTITUTIONAL:  The patient denies fever, chills, or night sweats.  HEAD:  No headache.  EYES:  No double vision or blurry vision.  EARS:  No ringing in her ears.  NECK:  No neck pain.  CARDIOVASCULAR:  No chest pain.  RESPIRATORY:  No shortness of breath.  ABDOMEN:  No nausea, vomiting, or abdominal pain.  EXTREMITIES/NEUROLOGICAL:  No numbness or tingling.  MUSCULOSKELETAL:  No joint pain.    PHYSICAL EXAMINATION:  HEAD:  Normocephalic, atraumatic.  EYES:  No scleral icterus.  EARS:  Hearing appear to be intact.  NECK:  Supple.  CARDIOVASCULAR:  S1 and S2 heard.  RESPIRATORY:  Air entry bilaterally.  ABDOMEN:  Soft, nontender.  EXTREMITIES:  No clubbing or cyanosis were noted.    NEUROLOGIC:  The patient is awake, alert, [facility] [place], year 2024, month was November, 5 nickels in a quarter, dog backward spells god.  Recall was 3/3 within 5 minutes.  Able to name simple objects.  Able to follow simple and complex commands.  Speech was fluent.  Smile symmetric.  Motor, bilateral upper and lower 5/5.  Sensory, bilateral upper and lower intact to light touch, no drift.      LABS:  CBC Full  -  ( 10 Dec 2024 19:42 )  WBC Count : 12.44 K/uL  RBC Count : 4.39 M/uL  Hemoglobin : 14.1 g/dL  Hematocrit : 37.3 %  Platelet Count - Automated : 599 K/uL  Mean Cell Volume : 85.0 fl  Mean Cell Hemoglobin : 32.1 pg  Mean Cell Hemoglobin Concentration : 37.8 g/dL  Auto Neutrophil # : x  Auto Lymphocyte # : x  Auto Monocyte # : x  Auto Eosinophil # : x  Auto Basophil # : x  Auto Neutrophil % : x  Auto Lymphocyte % : x  Auto Monocyte % : x  Auto Eosinophil % : x  Auto Basophil % : x    Urinalysis Basic - ( 11 Dec 2024 12:55 )    Color: x / Appearance: x / SG: x / pH: x  Gluc: 133 mg/dL / Ketone: x  / Bili: x / Urobili: x   Blood: x / Protein: x / Nitrite: x   Leuk Esterase: x / RBC: x / WBC x   Sq Epi: x / Non Sq Epi: x / Bacteria: x      12-11    126[L]  |  89[L]  |  13  ----------------------------<  133[H]  3.4[L]   |  26  |  0.95    Ca    9.6      11 Dec 2024 12:55  Phos  3.7     12-10  Mg     2.4     12-10    TPro  7.1  /  Alb  3.9  /  TBili  0.8  /  DBili  x   /  AST  25  /  ALT  33  /  AlkPhos  151[H]  12-10    Hemoglobin A1C:     LIVER FUNCTIONS - ( 10 Dec 2024 19:42 )  Alb: 3.9 g/dL / Pro: 7.1 g/dL / ALK PHOS: 151 U/L / ALT: 33 U/L / AST: 25 U/L / GGT: x           Vitamin B12         RADIOLOGY  ANALYSIS AND PLAN:  This is a 59-year-old with episode of altered mental status.    1.For episode of altered mental status, most likely metabolic encephalopathy, suspect possibly secondary to electrolyte abnormalities, questionable if any hyponatremia eventually to a subclinical seizure event with a postictal state.  2.For history of hyponatremia, slowly correct sodium, less than 10 millimoles a day to prevent central pontine myelinolysis.  3.We will check another electrolytes for altered mental status.  4.For history of migraines, continue the patient on Fioricet.  5.For history of hypothyroid, patient is on Synthroid.  6.For history of neuropathy, continue on Lyrica.  7 neurologic  wise stable       52 minutes of time was spent with the patient, plan of care, reviewing data, speaking to multidisciplinary healthcare team with greater than 50% of time in counseling, care coordination.    Thank you for courtesy of consultation.

## 2024-12-12 NOTE — PROGRESS NOTE ADULT - SUBJECTIVE AND OBJECTIVE BOX
Interval History:  no new complaints    Chart reviewed and events noted;   Overnight events:    MEDICATIONS  (STANDING):  enoxaparin Injectable 40 milliGRAM(s) SubCutaneous every 24 hours  influenza   Vaccine 0.5 milliLiter(s) IntraMuscular once  levothyroxine 25 MICROGram(s) Oral daily  lidocaine   4% Patch 1 Patch Transdermal daily  naloxone Injectable 0.4 milliGRAM(s) IV Push once  pantoprazole    Tablet 40 milliGRAM(s) Oral before breakfast  sodium chloride 2 Gram(s) Oral every 6 hours  vancomycin    Solution 125 milliGRAM(s) Oral every 6 hours    MEDICATIONS  (PRN):  acetaminophen     Tablet .. 650 milliGRAM(s) Oral every 6 hours PRN Mild Pain (1 - 3)  albuterol    90 MICROgram(s) HFA Inhaler 2 Puff(s) Inhalation every 6 hours PRN Shortness of Breath and/or Wheezing  aluminum hydroxide/magnesium hydroxide/simethicone Suspension 30 milliLiter(s) Oral every 4 hours PRN Upset Stomach  melatonin 5 milliGRAM(s) Oral at bedtime PRN Insomnia  ondansetron Injectable 4 milliGRAM(s) IV Push every 8 hours PRN Nausea and/or Vomiting      Vital Signs Last 24 Hrs  T(C): 37.2 (12 Dec 2024 12:25), Max: 37.2 (12 Dec 2024 04:53)  T(F): 99 (12 Dec 2024 12:25), Max: 99 (12 Dec 2024 04:53)  HR: 76 (12 Dec 2024 12:25) (69 - 76)  BP: 110/76 (12 Dec 2024 12:25) (110/76 - 125/85)  BP(mean): --  RR: 18 (12 Dec 2024 12:25) (18 - 18)  SpO2: 96% (12 Dec 2024 12:25) (96% - 99%)    Parameters below as of 12 Dec 2024 12:25  Patient On (Oxygen Delivery Method): room air        PHYSICAL EXAM  General: adult in NAD  HEENT: clear oropharynx, anicteric sclera, pink conjunctivae  Neck: supple  CV: normal S1S2 with no murmur rubs or gallops  Lungs: clear to auscultation, no wheezes, no rhales  Abdomen: soft non-tender non-distended, no hepato/splenomegaly  Ext: no clubbing cyanosis or edema  Skin: no rashes and no petichiae  Neuro: alert and oriented X3 no focal deficits      LABS:  CBC Full  -  ( 12 Dec 2024 07:20 )  WBC Count : 12.16 K/uL  RBC Count : 4.14 M/uL  Hemoglobin : 13.3 g/dL  Hematocrit : 35.9 %  Platelet Count - Automated : 519 K/uL  Mean Cell Volume : 86.7 fl  Mean Cell Hemoglobin : 32.1 pg  Mean Cell Hemoglobin Concentration : 37.0 g/dL  Auto Neutrophil # : x  Auto Lymphocyte # : x  Auto Monocyte # : x  Auto Eosinophil # : x  Auto Basophil # : x  Auto Neutrophil % : x  Auto Lymphocyte % : x  Auto Monocyte % : x  Auto Eosinophil % : x  Auto Basophil % : x    12-12    128[L]  |  96  |  9   ----------------------------<  89  4.4   |  22  |  0.51    Ca    9.0      12 Dec 2024 07:20  Phos  3.7     12-10  Mg     2.4     12-10    TPro  7.1  /  Alb  3.9  /  TBili  0.8  /  DBili  x   /  AST  25  /  ALT  33  /  AlkPhos  151[H]  12-10        fe studies      WBC trend  12.16 K/uL (12-12-24 @ 07:20)  12.44 K/uL (12-10-24 @ 19:42)  10.24 K/uL (12-10-24 @ 07:36)      Hgb trend  13.3 g/dL (12-12-24 @ 07:20)  14.1 g/dL (12-10-24 @ 19:42)  12.8 g/dL (12-10-24 @ 07:36)      plt trend  519 K/uL (12-12-24 @ 07:20)  599 K/uL (12-10-24 @ 19:42)  529 K/uL (12-10-24 @ 07:36)        RADIOLOGY & ADDITIONAL STUDIES:  < from: MR Pelvis Bony Only No Cont (12.11.24 @ 17:45) >    ACC: [id number] EXAM:  MR PELVIS BONY ONLY   ORDERED BY: ALETA HAYDEN     PROCEDURE DATE:  12/11/2024          INTERPRETATION:  History: Lytic lesion on CT abdomen/pelvis    Technique: Multiplanar and multi-sequence MRI images were obtained   throughthe pelvis without intravenous contrast.    Comparison: Comparison abdomen/pelvis CT dated 12/6/2024    Findings:    Evaluation is limited due to motion artifact.    Bones/joints:    When correlated with recent CT, there is an acute on chronic displaced   fracture of the left superior pubic ramus/pubic body. There is extensive   associated marrow edema, which extends throughout the left superior pubic   ramus, left pubic body, and left inferior pubic ramus. Underlying   osteomyelitis within the left superior ramus/left pubic body and left   inferior pubic ramus cannot be excluded and is also within differential.   Additionally, an underlying bony neoplasm within the left superior pubic   ramus/left pubic body cannot be excluded and is also within the   differential.    Acute on chronic H-shaped fracture of the sacrum involving bilateral   sacral ala and extending through the S3 vertebral body. Additional area   of marrow edema within the posterior right iliac bone, which may be   related to bone contusion or stress reaction with underlying nondisplaced   trabecular microfracturing. Extensive T1 marrow placement within the   sacrum and posterior right iliac bone.    There is transitional lumbosacral anatomy. For reference purposes of this   dictation, there is pseudoarticulation of bilateral transverse processes   of S1 with S2. The transverse processes of L5 do not articulate with S1.   Acute on chronic fractures of bilateral transverse processes of S1.    Mild marrow edema withinthe iliac bones adjacent to the sacroiliac   joints bilaterally, which may be related to contusion, stress reaction,   or sacroiliitis.    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    Hip joint spaces are relatively preserved. Partially imaged degenerative   changes within the lower lumbar spine.    Soft tissues:    Complex collection within the left proximal thigh adductor musculature,   measuring 5.9 x 2.6 cm transaxially, with surrounding edema, which may   represent an evolving hematoma within the left thigh adductor musculature   or be related to an abscess. Evaluation for drainable fluid collection is   limited without the use of intravenous contrast. Additionally, an   underlying mass/neoplasm within this collection cannot be excluded and is   within the differential.    Mild tendinosis of the left hamstring tendon origin. Right hamstring   tendon origin is intact. Gluteus minimus/medius insertions are grossly   intact. Iliopsoas insertions are intact. Rectus femoris origins are   grossly intact.    Neurovascular structures are unremarkable.    Colitis of the descending colon and rectosigmoid colon is better   evaluated on recent CT. Please refer to recent abdomen/pelvis CT   dictation regarding the findings within the remaining soft tissues of the   abdomen/pelvis.    IMPRESSION:    When correlated with recent CT, there is an acuteon chronic displaced   fracture of the left superior pubic ramus/pubic body. There is extensive   associated marrow edema, which extends throughout the left superior pubic   ramus, left pubic body, and left inferior pubic ramus. Underlying   osteomyelitis within the left superior ramus/left pubic body and left   inferior pubic ramus cannot be excluded and is also within differential.   Additionally, an underlying bony neoplasm within the left superior pubic   ramus/left pubic body cannot be excluded and is also within the   differential.    Complex collection within the left proximal thigh adductor musculature   with surrounding edema, which may represent an evolving hematoma within   the left thigh adductor musculature or be related to an abscess.   Evaluation for a drainable fluid collection is limited without the use of   intravenous contrast. Additionally, an underlying mass/neoplasm within   this collection cannot be excluded and is within the differential.    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    Acute on chronic H-shaped fracture of the sacrum involving bilateral   sacral ala andextending through the S3 vertebral body. Additional area   of marrow edema within the posterior right iliac bone, which may be   related to bone contusion or stress reaction with underlying nondisplaced   trabecular microfracturing. Extensive T1 marrow placement within the   sacrum and posterior right iliac bone. Extensive T1 marrow placement may   be related to the associated marrow edema. However, an underlying bony   neoplasm within the sacrum/posterior right iliac bone cannot be excluded   and is within the differential. Recommend short-term interval follow-up   MRI of the pelvis without and with contrast in 8-12 weeks to demonstrate   improvement/resolution and to rule out underlying bony neoplasm.    There is transitional lumbosacral anatomy, as above. Acute on chronic   fractures of bilateral transverse processes of S1.    Mild marrow edema within the iliac bones adjacent to the sacroiliac   joints bilaterally, which may be related to contusion, stress reaction,   or sacroiliitis.    Colitis of the descending colon and rectosigmoid colon is better   evaluated on recent CT. Please refer to recent abdomen/pelvis CT   dictation regarding the findings within the remaining soft tissues of the   abdomen/pelvis.    Other findings as above.    --- End of Report ---            MANUEL ESCALONA M.D., ATTENDING RADIOLOGIST  This document has been electronically signed. Dec 11 2024  6:29PM    < end of copied text >

## 2024-12-12 NOTE — PROGRESS NOTE ADULT - ASSESSMENT
Severe Hyponatremia: Low solute intake, SIADH  Abdominal pain/Diarrhea: C diff    12/09/24: Improving sodium levels. PO fluid restriction. D/c IVF. To continue current meds. Rx for C. Diff.  Will follow electrolytes and renal function trend.   12/10/24: Acute drop in sodium levels. ? Compliance with PO fluid restriction. Will dose samsca. Avoid hypotonic fluids.   12/11/24: Sodium levels improved after acute drop. Will d/c IVF and monitor. Pt advised to avoid excessive PO fluids.   12/12/24: Improving renal indices. Salt tabs. PO fluid restriction. No objection to d/c if serum sodium greater than 130.

## 2024-12-12 NOTE — PROGRESS NOTE ADULT - SUBJECTIVE AND OBJECTIVE BOX
[facility]    Oni Roa NP    [address] [place] [address]  [phone]      Chief Complaint:  Patient is a 59y old  Female who presents with a chief complaint of Failure to thrive/Hyponatremia (12 Dec 2024 08:27)      HPI/ 24 hr events:   Patient seen and examined at bedside  Reports feeling well this morning   Stools are now soft and brown, LBM yesterday   Tolerating diet   Endorses intermittent nausea but no vomiting  Denies abdominal pain at rest, only with palpation   No fevers or chills      REVIEW OF SYSTEMS:   General: Negative  HEENT: Negative  CV: Negative  Respiratory: Negative  GI: See HPI  : Negative  MSK: Negative  Hematologic: Negative  Skin: Negative    MEDICATIONS:   MEDICATIONS  (STANDING):  enoxaparin Injectable 40 milliGRAM(s) SubCutaneous every 24 hours  influenza   Vaccine 0.5 milliLiter(s) IntraMuscular once  levothyroxine 25 MICROGram(s) Oral daily  lidocaine   4% Patch 1 Patch Transdermal daily  naloxone Injectable 0.4 milliGRAM(s) IV Push once  vancomycin    Solution 125 milliGRAM(s) Oral every 6 hours    MEDICATIONS  (PRN):  acetaminophen     Tablet .. 650 milliGRAM(s) Oral every 6 hours PRN Mild Pain (1 - 3)  albuterol    90 MICROgram(s) HFA Inhaler 2 Puff(s) Inhalation every 6 hours PRN Shortness of Breath and/or Wheezing  aluminum hydroxide/magnesium hydroxide/simethicone Suspension 30 milliLiter(s) Oral every 4 hours PRN Upset Stomach  melatonin 5 milliGRAM(s) Oral at bedtime PRN Insomnia  ondansetron Injectable 4 milliGRAM(s) IV Push every 8 hours PRN Nausea and/or Vomiting      ALLERGIES:   Allergies    No Known Allergies    Intolerances    aspirin (Stomach Upset)      VITAL SIGNS:   Vital Signs Last 24 Hrs  T(C): 37.2 (12 Dec 2024 04:53), Max: 37.2 (12 Dec 2024 04:53)  T(F): 99 (12 Dec 2024 04:53), Max: 99 (12 Dec 2024 04:53)  HR: 73 (12 Dec 2024 04:53) (69 - 80)  BP: 116/70 (12 Dec 2024 04:53) (100/64 - 125/85)  BP(mean): --  RR: 18 (12 Dec 2024 04:53) (18 - 18)  SpO2: 96% (12 Dec 2024 04:53) (96% - 99%)    Parameters below as of 12 Dec 2024 04:53  Patient On (Oxygen Delivery Method): room air      I&O's Summary    11 Dec 2024 07:01  -  12 Dec 2024 07:00  --------------------------------------------------------  IN: 350 mL / OUT: 0 mL / NET: 350 mL        PHYSICAL EXAM:   GENERAL:  No acute distress  HEENT:  NC/AT  CHEST:  No increased effort  HEART:  Regular rate  ABDOMEN:  Soft, +TTP generalized abdomen, non-distended  EXTREMITIES: No cyanosis  SKIN:  Warm, dry  NEURO:  Calm, cooperative    LABS:                        13.3   12.16 )-----------( 519      ( 12 Dec 2024 07:20 )             35.9     12-12    128[L]  |  96  |  9   ----------------------------<  89  4.4   |  22  |  0.51    Ca    9.0      12 Dec 2024 07:20  Phos  3.7     12-10  Mg     2.4     12-10    TPro  7.1  /  Alb  3.9  /  TBili  0.8  /  DBili  x   /  AST  25  /  ALT  33  /  AlkPhos  151[H]  12-10    LIVER FUNCTIONS - ( 10 Dec 2024 19:42 )  Alb: 3.9 g/dL / Pro: 7.1 g/dL / ALK PHOS: 151 U/L / ALT: 33 U/L / AST: 25 U/L / GGT: x                                             RADIOLOGY & ADDITIONAL STUDIES:   [facility]    Oni Roa NP    [address] [place] [address]  [phone]      Chief Complaint:  Patient is a 59y old  Female who presents with a chief complaint of Failure to thrive/Hyponatremia (12 Dec 2024 08:27)      HPI/ 24 hr events:   Patient seen and examined at bedside  Reports feeling well this morning   Stools are now soft and brown, LBM yesterday   Tolerating diet   Endorses intermittent nausea and belching (worse at night) but no vomiting  Denies abdominal pain at rest, only with palpation   No fevers or chills      REVIEW OF SYSTEMS:   General: Negative  HEENT: Negative  CV: Negative  Respiratory: Negative  GI: See HPI  : Negative  MSK: Negative  Hematologic: Negative  Skin: Negative    MEDICATIONS:   MEDICATIONS  (STANDING):  enoxaparin Injectable 40 milliGRAM(s) SubCutaneous every 24 hours  influenza   Vaccine 0.5 milliLiter(s) IntraMuscular once  levothyroxine 25 MICROGram(s) Oral daily  lidocaine   4% Patch 1 Patch Transdermal daily  naloxone Injectable 0.4 milliGRAM(s) IV Push once  vancomycin    Solution 125 milliGRAM(s) Oral every 6 hours    MEDICATIONS  (PRN):  acetaminophen     Tablet .. 650 milliGRAM(s) Oral every 6 hours PRN Mild Pain (1 - 3)  albuterol    90 MICROgram(s) HFA Inhaler 2 Puff(s) Inhalation every 6 hours PRN Shortness of Breath and/or Wheezing  aluminum hydroxide/magnesium hydroxide/simethicone Suspension 30 milliLiter(s) Oral every 4 hours PRN Upset Stomach  melatonin 5 milliGRAM(s) Oral at bedtime PRN Insomnia  ondansetron Injectable 4 milliGRAM(s) IV Push every 8 hours PRN Nausea and/or Vomiting      ALLERGIES:   Allergies    No Known Allergies    Intolerances    aspirin (Stomach Upset)      VITAL SIGNS:   Vital Signs Last 24 Hrs  T(C): 37.2 (12 Dec 2024 04:53), Max: 37.2 (12 Dec 2024 04:53)  T(F): 99 (12 Dec 2024 04:53), Max: 99 (12 Dec 2024 04:53)  HR: 73 (12 Dec 2024 04:53) (69 - 80)  BP: 116/70 (12 Dec 2024 04:53) (100/64 - 125/85)  BP(mean): --  RR: 18 (12 Dec 2024 04:53) (18 - 18)  SpO2: 96% (12 Dec 2024 04:53) (96% - 99%)    Parameters below as of 12 Dec 2024 04:53  Patient On (Oxygen Delivery Method): room air      I&O's Summary    11 Dec 2024 07:01  -  12 Dec 2024 07:00  --------------------------------------------------------  IN: 350 mL / OUT: 0 mL / NET: 350 mL        PHYSICAL EXAM:   GENERAL:  No acute distress  HEENT:  NC/AT  CHEST:  No increased effort  HEART:  Regular rate  ABDOMEN:  Soft, +TTP generalized abdomen, non-distended  EXTREMITIES: No cyanosis  SKIN:  Warm, dry  NEURO:  Calm, cooperative    LABS:                        13.3   12.16 )-----------( 519      ( 12 Dec 2024 07:20 )             35.9     12-12    128[L]  |  96  |  9   ----------------------------<  89  4.4   |  22  |  0.51    Ca    9.0      12 Dec 2024 07:20  Phos  3.7     12-10  Mg     2.4     12-10    TPro  7.1  /  Alb  3.9  /  TBili  0.8  /  DBili  x   /  AST  25  /  ALT  33  /  AlkPhos  151[H]  12-10    LIVER FUNCTIONS - ( 10 Dec 2024 19:42 )  Alb: 3.9 g/dL / Pro: 7.1 g/dL / ALK PHOS: 151 U/L / ALT: 33 U/L / AST: 25 U/L / GGT: x                                             RADIOLOGY & ADDITIONAL STUDIES:

## 2024-12-12 NOTE — PROGRESS NOTE ADULT - SUBJECTIVE AND OBJECTIVE BOX
[facility], Division of Infectious Diseases  YOUNG [place] Y. Patel, S. Shah, G. [facility]  [phone]    Name: DOUGLAS AKINS  Age: 59y  Gender: Female  MRN: [id number]    Interval History:  No acute overnight events.   Notes reviewed    Antibiotics:  vancomycin    Solution 125 milliGRAM(s) Oral every 6 hours      Medications:  acetaminophen     Tablet .. 650 milliGRAM(s) Oral every 6 hours PRN  albuterol    90 MICROgram(s) HFA Inhaler 2 Puff(s) Inhalation every 6 hours PRN  aluminum hydroxide/magnesium hydroxide/simethicone Suspension 30 milliLiter(s) Oral every 4 hours PRN  enoxaparin Injectable 40 milliGRAM(s) SubCutaneous every 24 hours  influenza   Vaccine 0.5 milliLiter(s) IntraMuscular once  levothyroxine 25 MICROGram(s) Oral daily  lidocaine   4% Patch 1 Patch Transdermal daily  melatonin 5 milliGRAM(s) Oral at bedtime PRN  naloxone Injectable 0.4 milliGRAM(s) IV Push once  ondansetron Injectable 4 milliGRAM(s) IV Push every 8 hours PRN  pantoprazole    Tablet 40 milliGRAM(s) Oral before breakfast  vancomycin    Solution 125 milliGRAM(s) Oral every 6 hours      Review of Systems:  A 10-point review of systems was obtained.   Review of systems otherwise negative except as previously noted.    Allergies: No Known Allergies    For details regarding the patient's past medical history, social history, family history, and other miscellaneous elements, please refer the initial infectious diseases consultation and/or the admitting history and physical examination for this admission.    Objective:  Vitals:   T(C): 37.2 (12-12-24 @ 12:25), Max: 37.2 (12-12-24 @ 04:53)  HR: 76 (12-12-24 @ 12:25) (69 - 76)  BP: 110/76 (12-12-24 @ 12:25) (110/76 - 125/85)  RR: 18 (12-12-24 @ 12:25) (18 - 18)  SpO2: 96% (12-12-24 @ 12:25) (96% - 99%)    Physical Examination:  General: no acute distress  HEENT: NC/AT, EOMI,  Cardio: S1, S2 heard, RRR, no murmurs  Resp: breath sounds heard bilaterally, no rales, wheezes or rhonchi  Abd: soft, NT, ND  Ext: no edema or cyanosis  Skin: warm, dry, no visible rash      Laboratory Studies:  CBC:                       13.3   12.16 )-----------( 519      ( 12 Dec 2024 07:20 )             35.9     CMP: 12-12    128[L]  |  96  |  9   ----------------------------<  89  4.4   |  22  |  0.51    Ca    9.0      12 Dec 2024 07:20  Phos  3.7     12-10  Mg     2.4     12-10    TPro  7.1  /  Alb  3.9  /  TBili  0.8  /  DBili  x   /  AST  25  /  ALT  33  /  AlkPhos  151[H]  12-10    LIVER FUNCTIONS - ( 10 Dec 2024 19:42 )  Alb: 3.9 g/dL / Pro: 7.1 g/dL / ALK PHOS: 151 U/L / ALT: 33 U/L / AST: 25 U/L / GGT: x           Urinalysis Basic - ( 12 Dec 2024 07:20 )    Color: x / Appearance: x / SG: x / pH: x  Gluc: 89 mg/dL / Ketone: x  / Bili: x / Urobili: x   Blood: x / Protein: x / Nitrite: x   Leuk Esterase: x / RBC: x / WBC x   Sq Epi: x / Non Sq Epi: x / Bacteria: x        Microbiology: reviewed    Culture - Stool (collected 12-07-24 @ 19:36)  Source: .Stool  Final Report (12-10-24 @ 10:24):    No enteric pathogens isolated.    (Stool culture examined for Salmonella,    Shigella, Campylobacter, Aeromonas, Plesiomonas,    Vibrio, E.coli O157 and Yersinia)    Urinalysis with Rflx Culture (collected 12-06-24 @ 17:20)          Radiology: reviewed    < from: MR Pelvis Bony Only No Cont (12.11.24 @ 17:45) >    ACC: [id number] EXAM:  MR PELVIS BONY ONLY   ORDERED BY: ALETA HAYDEN     PROCEDURE DATE:  12/11/2024          INTERPRETATION:  History: Lytic lesion on CT abdomen/pelvis    Technique: Multiplanar and multi-sequence MRI images were obtained   throughthe pelvis without intravenous contrast.    Comparison: Comparison abdomen/pelvis CT dated 12/6/2024    Findings:    Evaluation is limited due to motion artifact.    Bones/joints:    When correlated with recent CT, there is an acute on chronic displaced   fracture of the left superior pubic ramus/pubic body. There is extensive   associated marrow edema, which extends throughout the left superior pubic   ramus, left pubic body, and left inferior pubic ramus. Underlying   osteomyelitis within the left superior ramus/left pubic body and left   inferior pubic ramus cannot be excluded and is also within differential.   Additionally, an underlying bony neoplasm within the left superior pubic   ramus/left pubic body cannot be excluded and is also within the   differential.    Acute on chronic H-shaped fracture of the sacrum involving bilateral   sacral ala and extending through the S3 vertebral body. Additional area   of marrow edema within the posterior right iliac bone, which may be   related to bone contusion or stress reaction with underlying nondisplaced   trabecular microfracturing. Extensive T1 marrow placement within the   sacrum and posterior right iliac bone.    There is transitional lumbosacral anatomy. For reference purposes of this   dictation, there is pseudoarticulation of bilateral transverse processes   of S1 with S2. The transverse processes of L5 do not articulate with S1.   Acute on chronic fractures of bilateral transverse processes of S1.    Mild marrow edema withinthe iliac bones adjacent to the sacroiliac   joints bilaterally, which may be related to contusion, stress reaction,   or sacroiliitis.    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    Hip joint spaces are relatively preserved. Partially imaged degenerative   changes within the lower lumbar spine.    Soft tissues:    Complex collection within the left proximal thigh adductor musculature,   measuring 5.9 x 2.6 cm transaxially, with surrounding edema, which may   represent an evolving hematoma within the left thigh adductor musculature   or be related to an abscess. Evaluation for drainable fluid collection is   limited without the use of intravenous contrast. Additionally, an   underlying mass/neoplasm within this collection cannot be excluded and is   within the differential.    Mild tendinosis of the left hamstring tendon origin. Right hamstring   tendon origin is intact. Gluteus minimus/medius insertions are grossly   intact. Iliopsoas insertions are intact. Rectus femoris origins are   grossly intact.    Neurovascular structures are unremarkable.    Colitis of the descending colon and rectosigmoid colon is better   evaluated on recent CT. Please refer to recent abdomen/pelvis CT   dictation regarding the findings within the remaining soft tissues of the   abdomen/pelvis.    IMPRESSION:    When correlated with recent CT, there is an acuteon chronic displaced   fracture of the left superior pubic ramus/pubic body. There is extensive   associated marrow edema, which extends throughout the left superior pubic   ramus, left pubic body, and left inferior pubic ramus. Underlying   osteomyelitis within the left superior ramus/left pubic body and left   inferior pubic ramus cannot be excluded and is also within differential.   Additionally, an underlying bony neoplasm within the left superior pubic   ramus/left pubic body cannot be excluded and is also within the   differential.    Complex collection within the left proximal thigh adductor musculature   with surrounding edema, which may represent an evolving hematoma within   the left thigh adductor musculature or be related to an abscess.   Evaluation for a drainable fluid collection is limited without the use of   intravenous contrast. Additionally, an underlying mass/neoplasm within   this collection cannot be excluded and is within the differential.    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    Acute on chronic H-shaped fracture of the sacrum involving bilateral   sacral ala andextending through the S3 vertebral body. Additional area   of marrow edema within the posterior right iliac bone, which may be   related to bone contusion or stress reaction with underlying nondisplaced   trabecular microfracturing. Extensive T1 marrow placement within the   sacrum and posterior right iliac bone. Extensive T1 marrow placement may   be related to the associated marrow edema. However, an underlying bony   neoplasm within the sacrum/posterior right iliac bone cannot be excluded   and is within the differential. Recommend short-term interval follow-up   MRI of the pelvis without and with contrast in 8-12 weeks to demonstrate   improvement/resolution and to rule out underlying bony neoplasm.    There is transitional lumbosacral anatomy, as above. Acute on chronic   fractures of bilateral transverse processes of S1.    Mild marrow edema within the iliac bones adjacent to the sacroiliac   joints bilaterally, which may be related to contusion, stress reaction,   or sacroiliitis.    Colitis of the descending colon and rectosigmoid colon is better   evaluated on recent CT. Please refer to recent abdomen/pelvis CT   dictation regarding the findings within the remaining soft tissues of the   abdomen/pelvis.    Other findings as above.    --- End of Report ---            MANUEL ESCALONA M.D., ATTENDING RADIOLOGIST  This document has been electronically signed. Dec 11 2024  6:29PM    < end of copied text >
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
## 2024-12-12 NOTE — CONSULT NOTE ADULT - CONSULT REASON
Colitis
Hyponatremia
Acute on Chronic Sacrum fracture
C79.5 Bone metastasis  S32.5 Pubic bone fracture  S32.11 Sacral insufficiency fracture  D75.838 Reactive thrombocytosis  A04.71 C. difficile infection  D52.0 Folate deficiency, dietary  I82.419 Dvt femoral (deep venous thrombosis)
colitis

## 2024-12-12 NOTE — PROGRESS NOTE ADULT - PROBLEM SELECTOR PLAN 1
presented with AMS and confusion, now improved.  AAOx3   likely due to infectious process and hyponatremia   treat underlying condition as below  CT head Negative   Neuro and Psych consult appreciated

## 2024-12-12 NOTE — PROGRESS NOTE ADULT - PROBLEM SELECTOR PLAN 2
-likely due to SIADH and low sodium intake , now with diarrhea   - S/ IVF, Tolvaptan   -D/w Nephro Dr. Murillo, sodium improved too quickly, s/p D5W. Monitor   -Off fluids now  -Po fluid restriction
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
## 2024-12-12 NOTE — PROGRESS NOTE ADULT - ASSESSMENT
Colitis  Abdominal pain  Hx IBS-D    CT AP w/ IVC (12/6): Acute sigmoid and descending colitis. Age-indeterminate left pubic fracture with associated para osseous organized hematoma.     Recommendations:  - C. Diff PCR positive   - ID following, c/w PO vancomycin   - Initial GI PCR indeterminate Norovirus, repeat GI PCR negative   - Supportive care   - Low fiber diet   - Pain management  - Anti-emetics PRN  - From GI standpoint no objection to begin d/c plan      I reviewed the overnight course of events on the unit, re-confirming the patient history. I discussed the care with the patient  Differential diagnosis and plan of care discussed with patient after the evaluation  35 minutes spent on total encounter of which more than fifty percent of the encounter was spent counseling and/or coordinating care by the attending physician. Colitis  Abdominal pain  Hx IBS-D    CT AP w/ IVC (12/6): Acute sigmoid and descending colitis. Age-indeterminate left pubic fracture with associated para osseous organized hematoma.     Recommendations:  - C. Diff PCR positive   - ID following, c/w PO vancomycin   - Initial GI PCR indeterminate Norovirus, repeat GI PCR negative   - Supportive care   - Low fiber diet   - PPI for GI mucosal protection, on Prilosec QD at home for GERD  - Pain management  - Anti-emetics PRN  - From GI standpoint no objection to begin d/c plan      I reviewed the overnight course of events on the unit, re-confirming the patient history. I discussed the care with the patient  Differential diagnosis and plan of care discussed with patient after the evaluation  35 minutes spent on total encounter of which more than fifty percent of the encounter was spent counseling and/or coordinating care by the attending physician.

## 2024-12-12 NOTE — PROGRESS NOTE ADULT - PROBLEM SELECTOR PLAN 3
Pt c/o abdominal pain and found to be tender on exam  - CT A/P -->Acute sigmoid and descending colitis. Age-indeterminate left pubic fracture with associated para osseous organized hematoma. Underlying lytic process not excluded. Sacral insufficiency fractures.  - Gi Dr Trinidad Consult noted   - ID eval noted  - GI PCR : + Novovirus, C diff +  - On  vanco po  - CT noted for ? lytic lesion pubic bone. d/w Hem/onc Dr. Hollingsworth, MRI performed. No lytic lesion noted but suspected OM, acute on chronic displaced fracture. ID f/u requested. PT suggested MATTY. No indication for Orthopedic surgical intervention Pt c/o abdominal pain and found to be tender on exam  - CT A/P -->Acute sigmoid and descending colitis. Age-indeterminate left pubic fracture with associated para osseous organized hematoma. Underlying lytic process not excluded. Sacral insufficiency fractures.  - Gi Dr Trinidad Consult noted   - ID eval noted  - GI PCR : + Novovirus, C diff +  - On  vanco po  - CT noted for ? lytic lesion pubic bone. d/w Hem/onc Dr. Hollingsworth, MRI performed. No lytic lesion noted but suspected OM vs neoplasm,  acute on chronic displaced fracture. ID f/u requested. PT suggested MATTY. No indication for Orthopedic surgical intervention. D/w Oncologist Dr. Carrillo. Recommended bone biopsy by Ortho. Consulted

## 2024-12-12 NOTE — CONSULT NOTE ADULT - REASON FOR ADMISSION
Failure to thrive/Hyponatremia

## 2024-12-13 LAB
ALBUMIN SERPL ELPH-MCNC: 3.6 G/DL — SIGNIFICANT CHANGE UP (ref 3.3–5)
ALP SERPL-CCNC: 127 U/L — HIGH (ref 40–120)
ALT FLD-CCNC: 22 U/L — SIGNIFICANT CHANGE UP (ref 12–78)
ANION GAP SERPL CALC-SCNC: 8 MMOL/L — SIGNIFICANT CHANGE UP (ref 5–17)
AST SERPL-CCNC: 8 U/L — LOW (ref 15–37)
BILIRUB SERPL-MCNC: 0.3 MG/DL — SIGNIFICANT CHANGE UP (ref 0.2–1.2)
BUN SERPL-MCNC: 12 MG/DL — SIGNIFICANT CHANGE UP (ref 7–23)
CALCIUM SERPL-MCNC: 9.2 MG/DL — SIGNIFICANT CHANGE UP (ref 8.5–10.1)
CHLORIDE SERPL-SCNC: 97 MMOL/L — SIGNIFICANT CHANGE UP (ref 96–108)
CO2 SERPL-SCNC: 25 MMOL/L — SIGNIFICANT CHANGE UP (ref 22–31)
CREAT SERPL-MCNC: 0.58 MG/DL — SIGNIFICANT CHANGE UP (ref 0.5–1.3)
CRP SERPL-MCNC: <3 MG/L — SIGNIFICANT CHANGE UP
EGFR: 103 ML/MIN/1.73M2 — SIGNIFICANT CHANGE UP
GLUCOSE SERPL-MCNC: 85 MG/DL — SIGNIFICANT CHANGE UP (ref 70–99)
HCT VFR BLD CALC: 34.5 % — SIGNIFICANT CHANGE UP (ref 34.5–45)
HGB BLD-MCNC: 12.4 G/DL — SIGNIFICANT CHANGE UP (ref 11.5–15.5)
LDH SERPL L TO P-CCNC: 172 U/L — SIGNIFICANT CHANGE UP (ref 50–242)
MCHC RBC-ENTMCNC: 32 PG — SIGNIFICANT CHANGE UP (ref 27–34)
MCHC RBC-ENTMCNC: 35.9 G/DL — SIGNIFICANT CHANGE UP (ref 32–36)
MCV RBC AUTO: 89.1 FL — SIGNIFICANT CHANGE UP (ref 80–100)
NRBC # BLD: 0 /100 WBCS — SIGNIFICANT CHANGE UP (ref 0–0)
PLATELET # BLD AUTO: 503 K/UL — HIGH (ref 150–400)
POTASSIUM SERPL-MCNC: 4.5 MMOL/L — SIGNIFICANT CHANGE UP (ref 3.5–5.3)
POTASSIUM SERPL-SCNC: 4.5 MMOL/L — SIGNIFICANT CHANGE UP (ref 3.5–5.3)
PROT SERPL-MCNC: 5.9 G/DL — LOW (ref 6–8.3)
PROT SERPL-MCNC: 6.3 G/DL — SIGNIFICANT CHANGE UP (ref 6–8.3)
RBC # BLD: 3.87 M/UL — SIGNIFICANT CHANGE UP (ref 3.8–5.2)
RBC # FLD: 13.1 % — SIGNIFICANT CHANGE UP (ref 10.3–14.5)
SODIUM SERPL-SCNC: 130 MMOL/L — LOW (ref 135–145)
WBC # BLD: 11.69 K/UL — HIGH (ref 3.8–10.5)
WBC # FLD AUTO: 11.69 K/UL — HIGH (ref 3.8–10.5)

## 2024-12-13 PROCEDURE — 36573 INSJ PICC RS&I 5 YR+: CPT

## 2024-12-13 PROCEDURE — 72170 X-RAY EXAM OF PELVIS: CPT | Mod: 26

## 2024-12-13 PROCEDURE — 72220 X-RAY EXAM SACRUM TAILBONE: CPT | Mod: 26

## 2024-12-13 PROCEDURE — 76937 US GUIDE VASCULAR ACCESS: CPT | Mod: 26,59

## 2024-12-13 PROCEDURE — 99233 SBSQ HOSP IP/OBS HIGH 50: CPT

## 2024-12-13 PROCEDURE — 71260 CT THORAX DX C+: CPT | Mod: 26

## 2024-12-13 RX ORDER — ONDANSETRON HYDROCHLORIDE 4 MG/1
4 TABLET, FILM COATED ORAL ONCE
Refills: 0 | Status: COMPLETED | OUTPATIENT
Start: 2024-12-13 | End: 2024-12-13

## 2024-12-13 RX ORDER — IBUPROFEN 200 MG
400 TABLET ORAL ONCE
Refills: 0 | Status: COMPLETED | OUTPATIENT
Start: 2024-12-13 | End: 2024-12-13

## 2024-12-13 RX ORDER — MECLIZINE HCL 12.5 MG
12.5 TABLET ORAL ONCE
Refills: 0 | Status: COMPLETED | OUTPATIENT
Start: 2024-12-13 | End: 2024-12-13

## 2024-12-13 RX ORDER — SODIUM CHLORIDE 9 MG/ML
1 INJECTION, SOLUTION INTRAMUSCULAR; INTRAVENOUS; SUBCUTANEOUS
Refills: 0 | Status: DISCONTINUED | OUTPATIENT
Start: 2024-12-13 | End: 2024-12-14

## 2024-12-13 RX ADMIN — SODIUM CHLORIDE 1 GRAM(S): 9 INJECTION, SOLUTION INTRAMUSCULAR; INTRAVENOUS; SUBCUTANEOUS at 17:59

## 2024-12-13 RX ADMIN — Medication 400 MILLIGRAM(S): at 04:40

## 2024-12-13 RX ADMIN — LIDOCAINE 1 PATCH: 40 CREAM TOPICAL at 01:27

## 2024-12-13 RX ADMIN — SODIUM CHLORIDE 2 GRAM(S): 9 INJECTION, SOLUTION INTRAMUSCULAR; INTRAVENOUS; SUBCUTANEOUS at 02:24

## 2024-12-13 RX ADMIN — Medication 25 MICROGRAM(S): at 05:44

## 2024-12-13 RX ADMIN — PANTOPRAZOLE SODIUM 40 MILLIGRAM(S): 40 TABLET, DELAYED RELEASE ORAL at 05:45

## 2024-12-13 RX ADMIN — ACETAMINOPHEN 500MG 650 MILLIGRAM(S): 500 TABLET, COATED ORAL at 23:15

## 2024-12-13 RX ADMIN — Medication 125 MILLIGRAM(S): at 18:00

## 2024-12-13 RX ADMIN — ONDANSETRON HYDROCHLORIDE 4 MILLIGRAM(S): 4 TABLET, FILM COATED ORAL at 02:17

## 2024-12-13 RX ADMIN — Medication 125 MILLIGRAM(S): at 02:18

## 2024-12-13 RX ADMIN — ENOXAPARIN SODIUM 40 MILLIGRAM(S): 30 INJECTION SUBCUTANEOUS at 22:15

## 2024-12-13 RX ADMIN — SODIUM CHLORIDE 1 GRAM(S): 9 INJECTION, SOLUTION INTRAMUSCULAR; INTRAVENOUS; SUBCUTANEOUS at 13:34

## 2024-12-13 RX ADMIN — Medication 12.5 MILLIGRAM(S): at 04:10

## 2024-12-13 RX ADMIN — Medication 400 MILLIGRAM(S): at 04:10

## 2024-12-13 RX ADMIN — LIDOCAINE 1 PATCH: 40 CREAM TOPICAL at 13:35

## 2024-12-13 RX ADMIN — Medication 125 MILLIGRAM(S): at 23:57

## 2024-12-13 RX ADMIN — Medication 125 MILLIGRAM(S): at 13:34

## 2024-12-13 RX ADMIN — Medication 30 MILLILITER(S): at 18:12

## 2024-12-13 RX ADMIN — ACETAMINOPHEN 500MG 650 MILLIGRAM(S): 500 TABLET, COATED ORAL at 22:15

## 2024-12-13 NOTE — PROGRESS NOTE ADULT - ASSESSMENT
60yo F with PMHx of COPD, Raynaud's, Lupus, Sjogren;s, Nicotine dependence, Anxiety, unspecified seizure history presents to the ED with abdominal pain and confusion. Admitting for hyponatremia/failure to thrive, found to have sacral fracture and lytic lesion     1) AMS/ Metabolic Encephalopathy: Resolved. AAO x3   2) Hyponatremia: Improved. On salt tablets    3) C diff colitis , on Po vancomycin   4) Pubic Bone fracture/ lesion, suspected neoplasm, plan for bone biopsy

## 2024-12-13 NOTE — PROGRESS NOTE ADULT - SUBJECTIVE AND OBJECTIVE BOX
Patient is a 59y old  Female who presents with a chief complaint of Failure to thrive/Hyponatremia (13 Dec 2024 06:43)       INTERVAL HPI/OVERNIGHT EVENTS: Patient seen and examined at bedside. AAOx3. Denies any symptoms, complaints. Tylenol helps with back pain. No chest pain, palpitations sob, n/v/d/c     MEDICATIONS  (STANDING):  enoxaparin Injectable 40 milliGRAM(s) SubCutaneous every 24 hours  influenza   Vaccine 0.5 milliLiter(s) IntraMuscular once  levothyroxine 25 MICROGram(s) Oral daily  lidocaine   4% Patch 1 Patch Transdermal daily  naloxone Injectable 0.4 milliGRAM(s) IV Push once  pantoprazole    Tablet 40 milliGRAM(s) Oral before breakfast  vancomycin    Solution 125 milliGRAM(s) Oral every 6 hours    MEDICATIONS  (PRN):  acetaminophen     Tablet .. 650 milliGRAM(s) Oral every 6 hours PRN Mild Pain (1 - 3)  albuterol    90 MICROgram(s) HFA Inhaler 2 Puff(s) Inhalation every 6 hours PRN Shortness of Breath and/or Wheezing  aluminum hydroxide/magnesium hydroxide/simethicone Suspension 30 milliLiter(s) Oral every 4 hours PRN Upset Stomach  melatonin 5 milliGRAM(s) Oral at bedtime PRN Insomnia  ondansetron Injectable 4 milliGRAM(s) IV Push every 8 hours PRN Nausea and/or Vomiting      Allergies    No Known Allergies    Intolerances    aspirin (Stomach Upset)      REVIEW OF SYSTEMS:  Per HPI. All other ROS noted Negative   Vital Signs Last 24 Hrs  T(C): 36.8 (13 Dec 2024 05:44), Max: 37.2 (12 Dec 2024 12:25)  T(F): 98.2 (13 Dec 2024 05:44), Max: 99 (12 Dec 2024 12:25)  HR: 70 (13 Dec 2024 05:44) (69 - 76)  BP: 118/79 (13 Dec 2024 05:44) (110/76 - 118/79)  BP(mean): --  RR: 18 (13 Dec 2024 05:44) (18 - 18)  SpO2: 99% (13 Dec 2024 05:44) (96% - 99%)    Parameters below as of 13 Dec 2024 05:44  Patient On (Oxygen Delivery Method): room air        PHYSICAL EXAM:  GENERAL: NAD, AAOx 3   HEAD:  Atraumatic, Normocephalic  EYES: EOMI, PERRLA, conjunctiva and sclera clear  ENMT: No tonsillar erythema, exudates, or enlargement; Moist mucous membranes, Good dentition, No lesions  NECK: Supple, No JVD, Normal thyroid  NERVOUS SYSTEM:  Alert & Oriented X3, Good concentration; Motor Strength 5/5 B/L upper and lower extremities; DTRs 2+ intact and symmetric  CHEST/LUNG: Clear to auscultation bilaterally; No rales, rhonchi, wheezing, or rubs  HEART: Regular rate and rhythm; No murmurs, rubs, or gallops  ABDOMEN: Soft, Nontender, Nondistended; Bowel sounds present  EXTREMITIES:  2+ Peripheral Pulses, No clubbing, cyanosis, or edema  LYMPH: No lymphadenopathy noted  SKIN: No rashes or lesions    LABS:                        12.4   11.69 )-----------( 503      ( 13 Dec 2024 06:33 )             34.5     13 Dec 2024 06:33    130    |  97     |  12     ----------------------------<  85     4.5     |  25     |  0.58     Ca    9.2        13 Dec 2024 06:33    TPro  6.3    /  Alb  3.6    /  TBili  0.3    /  DBili  x      /  AST  8      /  ALT  22     /  AlkPhos  127    13 Dec 2024 06:33      CAPILLARY BLOOD GLUCOSE        BLOOD CULTURE    RADIOLOGY & ADDITIONAL TESTS:    Imaging Personally Reviewed:  [ ] YES     Consultant(s) Notes Reviewed:      Care Discussed with Consultants/Other Providers:

## 2024-12-13 NOTE — PROGRESS NOTE ADULT - SUBJECTIVE AND OBJECTIVE BOX
[facility], Division of Infectious Diseases  [facility] [place] [facility]  [phone]    Name: DOUGLAS AKINS  Age: 59y  Gender: Female  MRN: [id number]    Interval History:  No acute overnight events.   Notes reviewed    Antibiotics:  vancomycin    Solution 125 milliGRAM(s) Oral every 6 hours      Medications:  acetaminophen     Tablet .. 650 milliGRAM(s) Oral every 6 hours PRN  albuterol    90 MICROgram(s) HFA Inhaler 2 Puff(s) Inhalation every 6 hours PRN  aluminum hydroxide/magnesium hydroxide/simethicone Suspension 30 milliLiter(s) Oral every 4 hours PRN  enoxaparin Injectable 40 milliGRAM(s) SubCutaneous every 24 hours  influenza   Vaccine 0.5 milliLiter(s) IntraMuscular once  levothyroxine 25 MICROGram(s) Oral daily  lidocaine   4% Patch 1 Patch Transdermal daily  melatonin 5 milliGRAM(s) Oral at bedtime PRN  naloxone Injectable 0.4 milliGRAM(s) IV Push once  ondansetron Injectable 4 milliGRAM(s) IV Push every 8 hours PRN  pantoprazole    Tablet 40 milliGRAM(s) Oral before breakfast  sodium chloride 1 Gram(s) Oral two times a day  vancomycin    Solution 125 milliGRAM(s) Oral every 6 hours      Review of Systems:  A 10-point review of systems was obtained.   Review of systems otherwise negative except as previously noted.    Allergies: No Known Allergies    For details regarding the patient's past medical history, social history, family history, and other miscellaneous elements, please refer the initial infectious diseases consultation and/or the admitting history and physical examination for this admission.    Objective:  Vitals:   T(C): 36.8 (12-13-24 @ 05:44), Max: 37.2 (12-12-24 @ 12:25)  HR: 70 (12-13-24 @ 05:44) (69 - 76)  BP: 118/79 (12-13-24 @ 05:44) (110/76 - 118/79)  RR: 18 (12-13-24 @ 05:44) (18 - 18)  SpO2: 99% (12-13-24 @ 05:44) (96% - 99%)    Physical Examination:  General: no acute distress  HEENT: NC/AT, EOMI,   Cardio: S1, S2 heard, RRR, no murmurs  Resp: breath sounds heard bilaterally, no rales, wheezes or rhonchi  Abd: soft, NT, ND,  Ext: no edema or cyanosis  Skin: warm, dry, no visible rash      Laboratory Studies:  CBC:                       12.4   11.69 )-----------( 503      ( 13 Dec 2024 06:33 )             34.5     CMP: 12-13    130[L]  |  97  |  12  ----------------------------<  85  4.5   |  25  |  0.58    Ca    9.2      13 Dec 2024 06:33    TPro  6.3  /  Alb  3.6  /  TBili  0.3  /  DBili  x   /  AST  8[L]  /  ALT  22  /  AlkPhos  127[H]  12-13    LIVER FUNCTIONS - ( 13 Dec 2024 06:33 )  Alb: 3.6 g/dL / Pro: 6.3 g/dL / ALK PHOS: 127 U/L / ALT: 22 U/L / AST: 8 U/L / GGT: x           Urinalysis Basic - ( 13 Dec 2024 06:33 )    Color: x / Appearance: x / SG: x / pH: x  Gluc: 85 mg/dL / Ketone: x  / Bili: x / Urobili: x   Blood: x / Protein: x / Nitrite: x   Leuk Esterase: x / RBC: x / WBC x   Sq Epi: x / Non Sq Epi: x / Bacteria: x        Microbiology: reviewed    Culture - Stool (collected 12-07-24 @ 19:36)  Source: .Stool  Final Report (12-10-24 @ 10:24):    No enteric pathogens isolated.    (Stool culture examined for Salmonella,    Shigella, Campylobacter, Aeromonas, Plesiomonas,    Vibrio, E.coli O157 and Yersinia)    Urinalysis with Rflx Culture (collected 12-06-24 @ 17:20)          Radiology: reviewed

## 2024-12-13 NOTE — PROGRESS NOTE ADULT - SUBJECTIVE AND OBJECTIVE BOX
Neurology follow up note    DOUGLAS [id number]      Interval History:    Patient feels ok no new complaints.    Allergies    No Known Allergies    Intolerances    aspirin (Stomach Upset)      MEDICATIONS    acetaminophen     Tablet .. 650 milliGRAM(s) Oral every 6 hours PRN  albuterol    90 MICROgram(s) HFA Inhaler 2 Puff(s) Inhalation every 6 hours PRN  aluminum hydroxide/magnesium hydroxide/simethicone Suspension 30 milliLiter(s) Oral every 4 hours PRN  enoxaparin Injectable 40 milliGRAM(s) SubCutaneous every 24 hours  influenza   Vaccine 0.5 milliLiter(s) IntraMuscular once  levothyroxine 25 MICROGram(s) Oral daily  lidocaine   4% Patch 1 Patch Transdermal daily  melatonin 5 milliGRAM(s) Oral at bedtime PRN  naloxone Injectable 0.4 milliGRAM(s) IV Push once  ondansetron Injectable 4 milliGRAM(s) IV Push every 8 hours PRN  pantoprazole    Tablet 40 milliGRAM(s) Oral before breakfast  sodium chloride 1 Gram(s) Oral two times a day  vancomycin    Solution 125 milliGRAM(s) Oral every 6 hours              Vital Signs Last 24 Hrs  T(C): 36.7 (13 Dec 2024 11:47), Max: 36.8 (13 Dec 2024 05:44)  T(F): 98.1 (13 Dec 2024 11:47), Max: 98.2 (13 Dec 2024 05:44)  HR: 68 (13 Dec 2024 11:47) (68 - 70)  BP: 118/62 (13 Dec 2024 11:47) (111/80 - 118/79)  BP(mean): --  RR: 20 (13 Dec 2024 11:47) (18 - 20)  SpO2: 98% (13 Dec 2024 11:47) (98% - 99%)    Parameters below as of 13 Dec 2024 11:47  Patient On (Oxygen Delivery Method): room air    REVIEW OF SYSTEMS:  CONSTITUTIONAL:  The patient denies fever, chills, or night sweats.  HEAD:  No headache.  EYES:  No double vision or blurry vision.  EARS:  No ringing in her ears.  NECK:  No neck pain.  CARDIOVASCULAR:  No chest pain.  RESPIRATORY:  No shortness of breath.  ABDOMEN:  No nausea, vomiting, or abdominal pain.  EXTREMITIES/NEUROLOGICAL:  No numbness or tingling.  MUSCULOSKELETAL:  No joint pain.    PHYSICAL EXAMINATION:  HEAD:  Normocephalic, atraumatic.  EYES:  No scleral icterus.  EARS:  Hearing appear to be intact.  NECK:  Supple.  CARDIOVASCULAR:  S1 and S2 heard.  RESPIRATORY:  Air entry bilaterally.  ABDOMEN:  Soft, nontender.  EXTREMITIES:  No clubbing or cyanosis were noted.    NEUROLOGIC:  The patient is awake, alert, location [facility], year 2024, month was November, 5 nickels in a quarter, dog backward spells god.  Recall was 3/3 within 5 minutes.  Able to name simple objects.  Able to follow simple and complex commands.  Speech was fluent.  Smile symmetric.  Motor, bilateral upper and lower 5/5.  Sensory, bilateral upper and lower intact to light touch, no drift.      LABS:  CBC Full  -  ( 13 Dec 2024 06:33 )  WBC Count : 11.69 K/uL  RBC Count : 3.87 M/uL  Hemoglobin : 12.4 g/dL  Hematocrit : 34.5 %  Platelet Count - Automated : 503 K/uL  Mean Cell Volume : 89.1 fl  Mean Cell Hemoglobin : 32.0 pg  Mean Cell Hemoglobin Concentration : 35.9 g/dL  Auto Neutrophil # : x  Auto Lymphocyte # : x  Auto Monocyte # : x  Auto Eosinophil # : x  Auto Basophil # : x  Auto Neutrophil % : x  Auto Lymphocyte % : x  Auto Monocyte % : x  Auto Eosinophil % : x  Auto Basophil % : x    Urinalysis Basic - ( 13 Dec 2024 06:33 )    Color: x / Appearance: x / SG: x / pH: x  Gluc: 85 mg/dL / Ketone: x  / Bili: x / Urobili: x   Blood: x / Protein: x / Nitrite: x   Leuk Esterase: x / RBC: x / WBC x   Sq Epi: x / Non Sq Epi: x / Bacteria: x      12-13    130[L]  |  97  |  12  ----------------------------<  85  4.5   |  25  |  0.58    Ca    9.2      13 Dec 2024 06:33    TPro  6.3  /  Alb  3.6  /  TBili  0.3  /  DBili  x   /  AST  8[L]  /  ALT  22  /  AlkPhos  127[H]  12-13    Hemoglobin A1C:     LIVER FUNCTIONS - ( 13 Dec 2024 06:33 )  Alb: 3.6 g/dL / Pro: 6.3 g/dL / ALK PHOS: 127 U/L / ALT: 22 U/L / AST: 8 U/L / GGT: x           Vitamin B12         RADIOLOGY  ANALYSIS AND PLAN:  This is a 59-year-old with episode of altered mental status.    1.For episode of altered mental status, most likely metabolic encephalopathy, suspect possibly secondary to electrolyte abnormalities, questionable if any hyponatremia eventually to a subclinical seizure event with a postictal state.  2.For history of hyponatremia, slowly correct sodium, less than 10 millimoles a day to prevent central pontine myelinolysis.  3.We will check another electrolytes for altered mental status.  4.For history of migraines, continue the patient on Fioricet.  5.For history of hypothyroid, patient is on Synthroid.  6.For history of neuropathy, continue on Lyrica.  7 neurologic  wise stable     47 minutes of time was spent with the patient, plan of care, reviewing data, speaking to multidisciplinary healthcare team with greater than 50% of time in counseling, care coordination.    Thank you for courtesy of consultation.

## 2024-12-13 NOTE — PROGRESS NOTE ADULT - PROBLEM SELECTOR PLAN 1
presented with AMS and confusion, now improved.  AAOx3   likely due to metabolic encephalopathy  and hyponatremia   At baseline mental status  CT head Negative   Neuro and Psych consult appreciated

## 2024-12-13 NOTE — PROGRESS NOTE ADULT - ASSESSMENT
58 yo woman w hx COPD, Raynaud, Lupus, Sjogren, recent [facility] adm for detox, adm w c dif, abdomen pain, confusion  CT scan and MRI left pubic ramis lesion/fxm marrow edema    -seen by Ortho, feels c/w chronic sacrum fracture w ?pubic ramis lesion. IR bx advised  -spoke w IR wrt above, schedue as outpatient bx 12/26     will followup as outpatient pending bx pathology result  will sign off for now, please call if any questions

## 2024-12-13 NOTE — CASE MANAGEMENT PROGRESS NOTE - NSCMPROGRESSNOTE_GEN_ALL_CORE
Patient discussed in AM rounds, remains medically acute. Scheduled for PICC line today; IR ?Monday for pubic lesion. Pending CT chest/MRI pelvis. Anticipated DC plan unclear pending clinical course. CM will continue to follow.

## 2024-12-13 NOTE — PROGRESS NOTE ADULT - SUBJECTIVE AND OBJECTIVE BOX
Pt seen and examined at bedside. No acute events overnight. Pain controlled, denies any numbness or tingling. Denies nausea, vomiting, chest pain, or shortness of breath.         LABS:                        13.3   12.16 )-----------( 519      ( 12 Dec 2024 07:20 )             35.9     12-12    128[L]  |  96  |  9   ----------------------------<  89  4.4   |  22  |  0.51    Ca    9.0      12 Dec 2024 07:20    TPro  5.9[L]  /  Alb  x   /  TBili  x   /  DBili  x   /  AST  x   /  ALT  x   /  AlkPhos  x   12-12      Urinalysis Basic - ( 12 Dec 2024 07:20 )    Color: x / Appearance: x / SG: x / pH: x  Gluc: 89 mg/dL / Ketone: x  / Bili: x / Urobili: x   Blood: x / Protein: x / Nitrite: x   Leuk Esterase: x / RBC: x / WBC x   Sq Epi: x / Non Sq Epi: x / Bacteria: x        VITAL SIGNS:  T(C): 36.8 (12-13-24 @ 05:44), Max: 37.2 (12-12-24 @ 12:25)  HR: 70 (12-13-24 @ 05:44) (69 - 76)  BP: 118/79 (12-13-24 @ 05:44) (110/76 - 118/79)  RR: 18 (12-13-24 @ 05:44) (18 - 18)  SpO2: 99% (12-13-24 @ 05:44) (96% - 99%)        EXAM:  Gen: lying comfortably, in NAD    Motor:                   C5                C6              C7               C8           T1   R            5/5                5/5            5/5             5/5          5/5  L             5/5               5/5             5/5             5/5          5/5                L2             L3             L4               L5            S1  R         5/5           5/5          5/5             5/5           5/5  L          5/5          5/5           5/5             5/5           5/5    Sensory:            C5         C6         C7      C8       T1        (0=absent, 1=impaired, 2=normal, NT=not testable)  R         2            2           2        2         2  L          2            2           2        2         2               L2          L3         L4      L5       S1         (0=absent, 1=impaired, 2=normal, NT=not testable)  R         2            2            2        2        2  L          2            2           2        2         2    Negative Manzanares, Babinski, and clonus bilaterally  Radial 2+ bilaterally  DP 2+ bilaterally     Bilateral Lower Extremities:  Soft compartments  Negative calf ttp  +EHL/FHL/TA/GSc  SILT SPN, DPN, Tib, Saph, Jeffrey  DP+           A/P:  Pt is a  59y Female with Acute on Chronic Sacrum Fracture with associated possible lesion of pubic rami    -pain management prn  -pelvis and inlet outlet xrays, MRI pelvis with and without contrast  -recommend protective weight bearing- TTWB with RW  -recommend IR biopsy of pubic lesion  -tumor lab workup  -- FU SPEP, UPEP, LDH, PET Scan, CT chest  -Reached out to orthopedic oncologist Dr. Cox for further recommendations    Discussed with Dr. Land and will update with any further recommendations  Pt seen and examined at bedside. No acute events overnight. Pain controlled, denies any numbness or tingling. Denies nausea, vomiting, chest pain, or shortness of breath.         LABS:                        13.3   12.16 )-----------( 519      ( 12 Dec 2024 07:20 )             35.9     12-12    128[L]  |  96  |  9   ----------------------------<  89  4.4   |  22  |  0.51    Ca    9.0      12 Dec 2024 07:20    TPro  5.9[L]  /  Alb  x   /  TBili  x   /  DBili  x   /  AST  x   /  ALT  x   /  AlkPhos  x   12-12      Urinalysis Basic - ( 12 Dec 2024 07:20 )    Color: x / Appearance: x / SG: x / pH: x  Gluc: 89 mg/dL / Ketone: x  / Bili: x / Urobili: x   Blood: x / Protein: x / Nitrite: x   Leuk Esterase: x / RBC: x / WBC x   Sq Epi: x / Non Sq Epi: x / Bacteria: x        VITAL SIGNS:  T(C): 36.8 (12-13-24 @ 05:44), Max: 37.2 (12-12-24 @ 12:25)  HR: 70 (12-13-24 @ 05:44) (69 - 76)  BP: 118/79 (12-13-24 @ 05:44) (110/76 - 118/79)  RR: 18 (12-13-24 @ 05:44) (18 - 18)  SpO2: 99% (12-13-24 @ 05:44) (96% - 99%)        EXAM:  Gen: lying comfortably, in NAD    Motor:                   C5                C6              C7               C8           T1   R            5/5                5/5            5/5             5/5          5/5  L             5/5               5/5             5/5             5/5          5/5                L2             L3             L4               L5            S1  R         5/5           5/5          5/5             5/5           5/5  L          4*/5          5/5           5/5             4*/5           5/5    Sensory:            C5         C6         C7      C8       T1        (0=absent, 1=impaired, 2=normal, NT=not testable)  R         2            2           2        2         2  L          2            2           2        2         2               L2          L3         L4      L5       S1         (0=absent, 1=impaired, 2=normal, NT=not testable)  R         2            2            2        2        2  L          2            2           2        2         2    Negative Manzanares, Babinski, and clonus bilaterally  Radial 2+ bilaterally  DP 2+ bilaterally     Bilateral Lower Extremities:  Soft compartments  Negative calf ttp  +EHL/FHL/TA/GSc  SILT SPN, DPN, Tib, Saph, Jeffrey  DP+           A/P:  Pt is a  59y Female with Acute on Chronic Sacrum Fracture with associated possible lesion of pubic rami    -pain management prn  -pelvis and inlet outlet xrays, MRI pelvis with and without contrast  -recommend protective weight bearing- TTWB with RW  -recommend IR biopsy of pubic lesion  -tumor lab workup  -- FU SPEP, UPEP, LDH, PET Scan, CT chest  -Reached out to orthopedic oncologist Dr. Cox for further recommendations    Discussed with Dr. Land and will update with any further recommendations  Pt seen and examined at bedside. No acute events overnight. Pain controlled, denies any numbness or tingling. Denies nausea, vomiting, chest pain, or shortness of breath.         LABS:                        13.3   12.16 )-----------( 519      ( 12 Dec 2024 07:20 )             35.9     12-12    128[L]  |  96  |  9   ----------------------------<  89  4.4   |  22  |  0.51    Ca    9.0      12 Dec 2024 07:20    TPro  5.9[L]  /  Alb  x   /  TBili  x   /  DBili  x   /  AST  x   /  ALT  x   /  AlkPhos  x   12-12      Urinalysis Basic - ( 12 Dec 2024 07:20 )    Color: x / Appearance: x / SG: x / pH: x  Gluc: 89 mg/dL / Ketone: x  / Bili: x / Urobili: x   Blood: x / Protein: x / Nitrite: x   Leuk Esterase: x / RBC: x / WBC x   Sq Epi: x / Non Sq Epi: x / Bacteria: x        VITAL SIGNS:  T(C): 36.8 (12-13-24 @ 05:44), Max: 37.2 (12-12-24 @ 12:25)  HR: 70 (12-13-24 @ 05:44) (69 - 76)  BP: 118/79 (12-13-24 @ 05:44) (110/76 - 118/79)  RR: 18 (12-13-24 @ 05:44) (18 - 18)  SpO2: 99% (12-13-24 @ 05:44) (96% - 99%)        EXAM:  Gen: lying comfortably, in NAD    Motor:                   C5                C6              C7               C8           T1   R            5/5                5/5            5/5             5/5          5/5  L             5/5               5/5             5/5             5/5          5/5                L2             L3             L4               L5            S1  R         5/5           5/5          5/5             5/5           5/5  L          5/5          5/5           5/5             5/5           5/5    Sensory:            C5         C6         C7      C8       T1        (0=absent, 1=impaired, 2=normal, NT=not testable)  R         2            2           2        2         2  L          2            2           2        2         2               L2          L3         L4      L5       S1         (0=absent, 1=impaired, 2=normal, NT=not testable)  R         1            1           1        1        1  L          1            1           1       1        1  Baseline chronic neuropathy    Negative Manzanares, Babinski, and clonus bilaterally  Radial 2+ bilaterally  DP 2+ bilaterally     Bilateral Lower Extremities:  Soft compartments  Negative calf ttp  +EHL/FHL/TA/GSc  SILT SPN, DPN, Tib, Saph, Jeffrey  DP+           A/P:  Pt is a  59y Female with Acute on Chronic Sacrum Fracture with associated possible lesion of pubic rami    -pain management prn  -pelvis and inlet outlet xrays  -patient at this time declining MRI pelvis with and without contrast, discussed risks of postponing further studies and patient understands the risks and would continue to decline further imaging, would like to pursue outpatient with her other orthopedic surgeon, Dr. Hemphill  -recommend protective weight bearing - WBAT with rolling walker  -recommend IR biopsy of pubic lesion  -tumor lab workup  -- FU SPEP, UPEP, LDH, PET Scan, CT chest  -Reached out to orthopedic oncologist Dr. Cox for further recommendations    Discussed with Dr. Land and will update with any further recommendations   No acute orthopedic surgical intervention at this time  Will continue to follow the chart for this patient, please call about any clinical concerns
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
## 2024-12-13 NOTE — PROGRESS NOTE ADULT - ASSESSMENT
olitis  Abdominal pain  Hx IBS-D    CT AP w/ IVC (12/6): Acute sigmoid and descending colitis. Age-indeterminate left pubic fracture with associated para osseous organized hematoma.     Recommendations:  - C. Diff PCR positive   - ID following, c/w PO vancomycin   - Initial GI PCR indeterminate Norovirus, repeat GI PCR negative   - Supportive care   - Low fiber diet   - PPI for GI mucosal protection, on Prilosec QD at home for GERD  - Pain management  - Anti-emetics PRN  - From GI standpoint no objection to begin d/c plan      I reviewed the overnight course of events on the unit, re-confirming the patient history. I discussed the care with the patient  Differential diagnosis and plan of care discussed with patient after the evaluation  35 minutes spent on total encounter of which more than fifty percent of the encounter was spent counseling and/or coordinating care by the attending physician.

## 2024-12-13 NOTE — CASE MANAGEMENT PROGRESS NOTE - NSCMPROGRESSNOTE_GEN_ALL_CORE
Per MD, patient is anticipated for DC home tomorrow. Patient to have outpatient follow up on 12/26 for IR for pubic lesion. PICC line will be discontinued prior to DC; she is planned to be DC home with WILLIAM Denton. No anticipated DC needs identified at present. Patient confirmed that when DC is confirmed, she will arrange either a friend or her sister to transport her home. IMM reviewed and explained to patient; she verbalized understanding. She is in agreement to DC plan. CM remains available.  Per MD, patient is anticipated for DC home tomorrow. Patient to have outpatient follow up on 12/26 for IR for biopsy of pubic lesion. PICC line will be discontinued prior to DC; she is planned to be DC home with WILILAM Denton. No anticipated DC needs identified at present. Patient confirmed that when DC is confirmed, she will arrange either a friend or her sister to transport her home. IMM reviewed and explained to patient; she verbalized understanding. She is in agreement to DC plan. CM remains available.

## 2024-12-13 NOTE — PROGRESS NOTE ADULT - TIME BILLING
Note written by attending. Meds, labs, vitals, chart reviewed. D/w consultants, patient, SW team
direct patient care including but not limited to reviewing chart, medications ,laboratory data, imaging reports, discussion of plan of care with consultants on the case, coordination of care with multidisciplinary team involved in the case and discussion of plan with patient.  Patient and family agreeable to plan of care and verbalized understanding the anticipated hospital course and treatment plan.
Note written by attending. Meds, labs, vitals, chart reviewed. D/w consultants
Note written by attending. Meds, labs, vitals, chart reviewed. D/w consultants

## 2024-12-13 NOTE — PROGRESS NOTE ADULT - ASSESSMENT
Severe Hyponatremia: Low solute intake, SIADH  Abdominal pain/Diarrhea: C diff  Pubic Bone fracture/ lesion, suspected neoplasm    12/09/24: Improving sodium levels. PO fluid restriction. D/c IVF. To continue current meds. Rx for C. Diff.  Will follow electrolytes and renal function trend.   12/10/24: Acute drop in sodium levels. ? Compliance with PO fluid restriction. Will dose samsca. Avoid hypotonic fluids.   12/11/24: Sodium levels improved after acute drop. Will d/c IVF and monitor. Pt advised to avoid excessive PO fluids.   12/12/24: Improving renal indices. Salt tabs. PO fluid restriction. No objection to d/c if serum sodium greater than 130.   12/13/24: Sodium levels better. To continue current meds. Continue salt tabs. For bone biopsy.

## 2024-12-13 NOTE — PROGRESS NOTE ADULT - SUBJECTIVE AND OBJECTIVE BOX
All interim records and events noted.    up in bed, haroldo hips pain ernesto left pubic region      MEDICATIONS  (STANDING):  enoxaparin Injectable 40 milliGRAM(s) SubCutaneous every 24 hours  influenza   Vaccine 0.5 milliLiter(s) IntraMuscular once  levothyroxine 25 MICROGram(s) Oral daily  lidocaine   4% Patch 1 Patch Transdermal daily  naloxone Injectable 0.4 milliGRAM(s) IV Push once  pantoprazole    Tablet 40 milliGRAM(s) Oral before breakfast  sodium chloride 1 Gram(s) Oral two times a day  vancomycin    Solution 125 milliGRAM(s) Oral every 6 hours    MEDICATIONS  (PRN):  acetaminophen     Tablet .. 650 milliGRAM(s) Oral every 6 hours PRN Mild Pain (1 - 3)  albuterol    90 MICROgram(s) HFA Inhaler 2 Puff(s) Inhalation every 6 hours PRN Shortness of Breath and/or Wheezing  aluminum hydroxide/magnesium hydroxide/simethicone Suspension 30 milliLiter(s) Oral every 4 hours PRN Upset Stomach  melatonin 5 milliGRAM(s) Oral at bedtime PRN Insomnia  ondansetron Injectable 4 milliGRAM(s) IV Push every 8 hours PRN Nausea and/or Vomiting      Vital Signs Last 24 Hrs  T(C): 36.7 (13 Dec 2024 11:47), Max: 36.8 (13 Dec 2024 05:44)  T(F): 98.1 (13 Dec 2024 11:47), Max: 98.2 (13 Dec 2024 05:44)  HR: 68 (13 Dec 2024 11:47) (68 - 70)  BP: 118/62 (13 Dec 2024 11:47) (111/80 - 118/79)  BP(mean): --  RR: 20 (13 Dec 2024 11:47) (18 - 20)  SpO2: 98% (13 Dec 2024 11:47) (98% - 99%)    Parameters below as of 13 Dec 2024 11:47  Patient On (Oxygen Delivery Method): room air        PHYSICAL EXAM  General: in no acute distress  Head: atraumatic, normocephalic  ENT: sclera anicteric, buccal mucosa moist  Neck: supple, trachea midline  CV: S1 S2, regular rate and rhythm  Lungs: clear to auscultation, no wheezes/rhonchi  Abdomen: soft, nontender, bowel sounds present, no palpable masses  Extrem: no clubbing/cyanosis/edema  Skin: no significant increased ecchymosis/petechiae  Neuro: alert and oriented X3,  no focal deficits      LABS:             12.4   11.69 )-----------( 503      ( 12-13 @ 06:33 )             34.5                13.3   12.16 )-----------( 519      ( 12-12 @ 07:20 )             35.9                14.1   12.44 )-----------( 599      ( 12-10 @ 19:42 )             37.3       12-13    130[L]  |  97  |  12  ----------------------------<  85  4.5   |  25  |  0.58    Ca    9.2      13 Dec 2024 06:33    TPro  6.3  /  Alb  3.6  /  TBili  0.3  /  DBili  x   /  AST  8[L]  /  ALT  22  /  AlkPhos  127[H]  12-13        RADIOLOGY & ADDITIONAL STUDIES:  < from: MR Pelvis Bony Only No Cont (12.11.24 @ 17:45) >    ACC: [id number] EXAM:  MR PELVIS BONY ONLY   ORDERED BY: ALETA HAYDEN     PROCEDURE DATE:  12/11/2024          INTERPRETATION:  History: Lytic lesion on CT abdomen/pelvis    Technique: Multiplanar and multi-sequence MRI images were obtained   throughthe pelvis without intravenous contrast.    Comparison: Comparison abdomen/pelvis CT dated 12/6/2024    Findings:    Evaluation is limited due to motion artifact.    Bones/joints:    When correlated with recent CT, there is an acute on chronic displaced   fracture of the left superior pubic ramus/pubic body. There is extensive   associated marrow edema, which extends throughout the left superior pubic   ramus, left pubic body, and left inferior pubic ramus. Underlying   osteomyelitis within the left superior ramus/left pubic body and left   inferior pubic ramus cannot be excluded and is also within differential.   Additionally, an underlying bony neoplasm within the left superior pubic   ramus/left pubic body cannot be excluded and is also within the   differential.    Acute on chronic H-shaped fracture of the sacrum involving bilateral   sacral ala and extending through the S3 vertebral body. Additional area   of marrow edema within the posterior right iliac bone, which may be   related to bone contusion or stress reaction with underlying nondisplaced   trabecular microfracturing. Extensive T1 marrow placement within the   sacrum and posterior right iliac bone.    There is transitional lumbosacral anatomy. For reference purposes of this   dictation, there is pseudoarticulation of bilateral transverse processes   of S1 with S2. The transverse processes of L5 do not articulate with S1.   Acute on chronic fractures of bilateral transverse processes of S1.    Mild marrow edema withinthe iliac bones adjacent to the sacroiliac   joints bilaterally, which may be related to contusion, stress reaction,   or sacroiliitis.    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    Hip joint spaces are relatively preserved. Partially imaged degenerative   changes within the lower lumbar spine.    Soft tissues:    Complex collection within the left proximal thigh adductor musculature,   measuring 5.9 x 2.6 cm transaxially, with surrounding edema, which may   represent an evolving hematoma within the left thigh adductor musculature   or be related to an abscess. Evaluation for drainable fluid collection is   limited without the use of intravenous contrast. Additionally, an   underlying mass/neoplasm within this collection cannot be excluded and is   within the differential.    Mild tendinosis of the left hamstring tendon origin. Right hamstring   tendon origin is intact. Gluteus minimus/medius insertions are grossly   intact. Iliopsoas insertions are intact. Rectus femoris origins are   grossly intact.    Neurovascular structures are unremarkable.    Colitis of the descending colon and rectosigmoid colon is better   evaluated on recent CT. Please refer to recent abdomen/pelvis CT   dictation regarding the findings within the remaining soft tissues of the   abdomen/pelvis.    IMPRESSION:    When correlated with recent CT, there is an acuteon chronic displaced   fracture of the left superior pubic ramus/pubic body. There is extensive   associated marrow edema, which extends throughout the left superior pubic   ramus, left pubic body, and left inferior pubic ramus. Underlying   osteomyelitis within the left superior ramus/left pubic body and left   inferior pubic ramus cannot be excluded and is also within differential.   Additionally, an underlying bony neoplasm within the left superior pubic   ramus/left pubic body cannot be excluded and is also within the   differential.    Complex collection within the left proximal thigh adductor musculature   with surrounding edema, which may represent an evolving hematoma within   the left thigh adductor musculature or be related to an abscess.   Evaluation for a drainable fluid collection is limited without the use of   intravenous contrast. Additionally, an underlying mass/neoplasm within   this collection cannot be excluded and is within the differential.    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    Acute on chronic H-shaped fracture of the sacrum involving bilateral   sacral ala andextending through the S3 vertebral body. Additional area   of marrow edema within the posterior right iliac bone, which may be   related to bone contusion or stress reaction with underlying nondisplaced   trabecular microfracturing. Extensive T1 marrow placement within the   sacrum and posterior right iliac bone. Extensive T1 marrow placement may   be related to the associated marrow edema. However, an underlying bony   neoplasm within the sacrum/posterior right iliac bone cannot be excluded   and is within the differential. Recommend short-term interval follow-up   MRI of the pelvis without and with contrast in 8-12 weeks to demonstrate   improvement/resolution and to rule out underlying bony neoplasm.    There is transitional lumbosacral anatomy, as above. Acute on chronic   fractures of bilateral transverse processes of S1.    Mild marrow edema within the iliac bones adjacent to the sacroiliac   joints bilaterally, which may be related to contusion, stress reaction,   or sacroiliitis.    Colitis of the descending colon and rectosigmoid colon is better   evaluated on recent CT. Please refer to recent abdomen/pelvis CT   dictation regarding the findings within the remaining soft tissues of the   abdomen/pelvis.    Other findings as above.    --- End of Report ---        < end of copied text >      IMPRESSION/RECOMMENDATIONS:

## 2024-12-13 NOTE — PROGRESS NOTE ADULT - PROBLEM SELECTOR PLAN 7
DVT PPx   Lovenox 40 sq
DVT PPx   Lovenox 40 sq    as per sister, patient has chronic drug use disorder and was on varies medications including fentanyl, oxycodone, xanax, Klonopin .etc. Utox on admission negative. patient states she had detox in [facility] couple of weeks ago, no drug use since then   tylenol prn. acute pain seems colitis related, Toradol prn if needed more pain medication, H/O ASA intolerance (stomach discomfort  as per patient ), not allergic reaction
DVT PPx   Lovenox 40 sq
DVT PPx   Lovenox 40 sq    as per sister, patient has chronic drug use disorder and was on varies medications including fentanyl, oxycodone, xanax, Klonopin .etc. Utox on admission negative. patient states she had detox in [facility] couple of weeks ago, no drug use since then   tylenol prn. acute pain seems colitis related, Toradol prn if needed more pain medication, H/O ASA intolerance (stomach discomfort  as per patient ), not allergic reaction
DVT PPx   Lovenox 40 sq    as per sister, patient has chronic drug use disorder and was on varies medications including fentanyl, oxycodone, xanax, Klonopin .etc. Utox on admission negative. patient states she had detox in [facility] couple of weeks ago, no drug use since then   tylenol prn. acute pain seems colitis related, Toradol prn if needed more pain medication, H/O ASA intolerance (stomach discomfort  as per patient ), not allergic reaction

## 2024-12-13 NOTE — CHART NOTE - NSCHARTNOTEFT_GEN_A_CORE
Assessment: patient seen for malnutrition follow up. chart reviewed. hospital course noted   59y old  Female who presents with a chief complaint of Failure to thrive/Hyponatremia , colitis   12/10 BM per flow sheet  per RN patient eats fairly well patient particular about food choices per RN.  in IR for PICC placement at time of visit  B12 and folate WNL  Na 130  salt tabs now completed on fluid restriction        Factors impacting intake: [ x] none [ ] nausea  [ ] vomiting [ ] diarrhea [ ] constipation  [ ]chewing problems [ ] swallowing issues  [ ] other:     Diet Prescription: Diet, Regular:   1000mL Fluid Restriction ([id number]) (12-11-24 @ 16:05)    Intake:   PO up to 100% of meals taken per flow sheets   Current Weight: Weight (kg): 49.9 (12-06 @ 12:46)  % Weight Change    Pertinent Medications: MEDICATIONS  (STANDING):  enoxaparin Injectable 40 milliGRAM(s) SubCutaneous every 24 hours  influenza   Vaccine 0.5 milliLiter(s) IntraMuscular once  levothyroxine 25 MICROGram(s) Oral daily  lidocaine   4% Patch 1 Patch Transdermal daily  naloxone Injectable 0.4 milliGRAM(s) IV Push once  pantoprazole    Tablet 40 milliGRAM(s) Oral before breakfast  vancomycin    Solution 125 milliGRAM(s) Oral every 6 hours    MEDICATIONS  (PRN):  acetaminophen     Tablet .. 650 milliGRAM(s) Oral every 6 hours PRN Mild Pain (1 - 3)  albuterol    90 MICROgram(s) HFA Inhaler 2 Puff(s) Inhalation every 6 hours PRN Shortness of Breath and/or Wheezing  aluminum hydroxide/magnesium hydroxide/simethicone Suspension 30 milliLiter(s) Oral every 4 hours PRN Upset Stomach  melatonin 5 milliGRAM(s) Oral at bedtime PRN Insomnia  ondansetron Injectable 4 milliGRAM(s) IV Push every 8 hours PRN Nausea and/or Vomiting    Pertinent Labs: Na 130  Skin: multiple stage 1 pressure injuries     Estimated Needs:   [x ] no change since previous assessment      Previous Nutrition Diagnosis:  [ x] Malnutrition chronic severe    Nutrition Diagnosis is [x ] ongoing  [ ] resolved [ ] not applicable     New Nutrition Diagnosis: [x] not applicable       Interventions:   Recommend  [ ] Change Diet To:  [ ] Nutrition Supplement  [ ] Nutrition Support  [x ] Other: continue to provide yogurt TID per RD plan this admit, patient continues on fluid restriction, trend Na level    Monitoring and Evaluation:   [ x] PO intake [ x ] Tolerance to diet prescription [ x ] weights [ x ] labs[ x ] follow up per protocol  [ ] other:

## 2024-12-13 NOTE — PROGRESS NOTE ADULT - PROBLEM SELECTOR PLAN 3
- GI, ID following   - GI PCR : + Novovirus, C diff +  - On  vanco po  - CT noted for ? lytic lesion pubic bone. d/w Hem/onc Dr. Hollingsworth, MRI performed. No lytic lesion noted but suspected OM vs neoplasm,  acute on chronic displaced fracture. Work up in progress. as per discussion  with Oncology, Dr. hess recommended Ortho consult for possible bone biopsy, d/w Ortho suggested IR guided biopsy, IR wont be able to do it until next week. D/w Patient , she is willing to stay for now.

## 2024-12-13 NOTE — PROCEDURE NOTE - PROCEDURE FINDINGS AND DETAILS
__20___cm bard power midline inserted into patent left brachial vein under sterile conditions and ultrasound guidance.  Midline catheter can be accessed.

## 2024-12-13 NOTE — CHART NOTE - NSCHARTNOTEFT_GEN_A_CORE
Called by RN for Pt c/o refusing IV in CT scan. Patient had CT chest w/ IV contrast ordered by ortho, patient IV blew and reattempts were made and patient no longer wants to be stuck.         T(C): 36.3 (12-12-24 @ 20:13), Max: 37.2 (12-12-24 @ 04:53)  HR: 69 (12-12-24 @ 20:13) (69 - 76)  BP: 111/80 (12-12-24 @ 20:13) (110/76 - 116/70)  RR: 18 (12-12-24 @ 20:13) (18 - 18)  SpO2: 99% (12-12-24 @ 20:13) (96% - 99%)  Wt(kg): --  LABS:                        13.3   12.16 )-----------( 519      ( 12 Dec 2024 07:20 )             35.9     12-12    128[L]  |  96  |  9   ----------------------------<  89  4.4   |  22  |  0.51    Ca    9.0      12 Dec 2024 07:20        Urinalysis Basic - ( 12 Dec 2024 07:20 )    Color: x / Appearance: x / SG: x / pH: x  Gluc: 89 mg/dL / Ketone: x  / Bili: x / Urobili: x   Blood: x / Protein: x / Nitrite: x   Leuk Esterase: x / RBC: x / WBC x   Sq Epi: x / Non Sq Epi: x / Bacteria: x              Assessment/Plan  59yFemale admitted for hyponatremia   1. IV access   - Patient in CT for CT chest w/ con, IV blew  - Attempts made to replaced IV unsuccessful and patient refusing IV    - Will reattempt in AM with day/primary team.

## 2024-12-13 NOTE — PROGRESS NOTE ADULT - SUBJECTIVE AND OBJECTIVE BOX
[facility]    Oni Roa NP    [address] [place] [address]  [phone]      Chief Complaint:  Patient is a 59y old  Female who presents with a chief complaint of Failure to thrive/Hyponatremia (13 Dec 2024 11:38)      HPI/ 24 hr events:   Patient seen and examined at bedside  Reports feeling well this morning   Stools are now formed/brown, LBM yesterday   No fever or chills  No nausea, vomiting or abdominal pain       REVIEW OF SYSTEMS:   General: Negative  HEENT: Negative  CV: Negative  Respiratory: Negative  GI: See HPI  : Negative  MSK: Negative  Hematologic: Negative  Skin: Negative    MEDICATIONS:   MEDICATIONS  (STANDING):  enoxaparin Injectable 40 milliGRAM(s) SubCutaneous every 24 hours  influenza   Vaccine 0.5 milliLiter(s) IntraMuscular once  levothyroxine 25 MICROGram(s) Oral daily  lidocaine   4% Patch 1 Patch Transdermal daily  naloxone Injectable 0.4 milliGRAM(s) IV Push once  pantoprazole    Tablet 40 milliGRAM(s) Oral before breakfast  sodium chloride 1 Gram(s) Oral two times a day  vancomycin    Solution 125 milliGRAM(s) Oral every 6 hours    MEDICATIONS  (PRN):  acetaminophen     Tablet .. 650 milliGRAM(s) Oral every 6 hours PRN Mild Pain (1 - 3)  albuterol    90 MICROgram(s) HFA Inhaler 2 Puff(s) Inhalation every 6 hours PRN Shortness of Breath and/or Wheezing  aluminum hydroxide/magnesium hydroxide/simethicone Suspension 30 milliLiter(s) Oral every 4 hours PRN Upset Stomach  melatonin 5 milliGRAM(s) Oral at bedtime PRN Insomnia  ondansetron Injectable 4 milliGRAM(s) IV Push every 8 hours PRN Nausea and/or Vomiting      ALLERGIES:   Allergies    No Known Allergies    Intolerances    aspirin (Stomach Upset)      VITAL SIGNS:   Vital Signs Last 24 Hrs  T(C): 36.7 (13 Dec 2024 11:47), Max: 37.2 (12 Dec 2024 12:25)  T(F): 98.1 (13 Dec 2024 11:47), Max: 99 (12 Dec 2024 12:25)  HR: 68 (13 Dec 2024 11:47) (68 - 76)  BP: 118/62 (13 Dec 2024 11:47) (110/76 - 118/79)  BP(mean): --  RR: 20 (13 Dec 2024 11:47) (18 - 20)  SpO2: 98% (13 Dec 2024 11:47) (96% - 99%)    Parameters below as of 13 Dec 2024 11:47  Patient On (Oxygen Delivery Method): room air      I&O's Summary      PHYSICAL EXAM:   GENERAL:  No acute distress  HEENT:  NC/AT  CHEST:  No increased effort  HEART:  Regular rate  ABDOMEN:  Soft, non-tender, non-distended, positive bowel sounds  EXTREMITIES: No cyanosis  SKIN:  Warm, dry  NEURO:  Calm, cooperative    LABS:                        12.4   11.69 )-----------( 503      ( 13 Dec 2024 06:33 )             34.5     12-13    130[L]  |  97  |  12  ----------------------------<  85  4.5   |  25  |  0.58    Ca    9.2      13 Dec 2024 06:33    TPro  6.3  /  Alb  3.6  /  TBili  0.3  /  DBili  x   /  AST  8[L]  /  ALT  22  /  AlkPhos  127[H]  12-13    LIVER FUNCTIONS - ( 13 Dec 2024 06:33 )  Alb: 3.6 g/dL / Pro: 6.3 g/dL / ALK PHOS: 127 U/L / ALT: 22 U/L / AST: 8 U/L / GGT: x                                             RADIOLOGY & ADDITIONAL STUDIES:

## 2024-12-13 NOTE — PROGRESS NOTE ADULT - SUBJECTIVE AND OBJECTIVE BOX
[facility]                                                       Dr. Murillo, Dr. Porter, Dr. Bustillo, Dr. Ocasio, Dr. Lugo, Dr. Clinton                                      [facility], [facility], [address]                                                 [address] [address]                                      Ph: [phone]  Fax: [phone]                                         Ph: [phone]  Fax: [phone]      Patient is a 59y old  Female who presents with a chief complaint of Failure to thrive/Hyponatremia (09 Dec 2024 12:27)  Patient seen in follow up for hyponatremia.        PAST MEDICAL HISTORY:  Lupus    Sjogren's disease    Vertigo    UTI (urinary tract infection)    GERD (gastroesophageal reflux disease)    Hypotension    Hypothyroidism    Gallstones    Hepatitis C    Anxiety    Migraine    Neuropathy    COPD (chronic obstructive pulmonary disease)    Nicotine addiction    Glossopharyngeal neuralgia syndrome    Dvt femoral (deep venous thrombosis)    Lupus    Emphysema/COPD    Burning mouth syndrome    H/O osteoporosis    History of weight loss    History of seizure disorder    Personal history of skin cancer    Osteochondritis dissecans    History of IBS    H/O Raynaud's syndrome    Pain, wrist, right    Right shoulder pain    Chronic low back pain with sciatica    Chronic neck pain    H/O paroxysmal supraventricular tachycardia      MEDICATIONS  (STANDING):  enoxaparin Injectable 40 milliGRAM(s) SubCutaneous every 24 hours  influenza   Vaccine 0.5 milliLiter(s) IntraMuscular once  levothyroxine 25 MICROGram(s) Oral daily  lidocaine   4% Patch 1 Patch Transdermal daily  naloxone Injectable 0.4 milliGRAM(s) IV Push once  pantoprazole    Tablet 40 milliGRAM(s) Oral before breakfast  sodium chloride 1 Gram(s) Oral two times a day  vancomycin    Solution 125 milliGRAM(s) Oral every 6 hours    MEDICATIONS  (PRN):  acetaminophen     Tablet .. 650 milliGRAM(s) Oral every 6 hours PRN Mild Pain (1 - 3)  albuterol    90 MICROgram(s) HFA Inhaler 2 Puff(s) Inhalation every 6 hours PRN Shortness of Breath and/or Wheezing  aluminum hydroxide/magnesium hydroxide/simethicone Suspension 30 milliLiter(s) Oral every 4 hours PRN Upset Stomach  melatonin 5 milliGRAM(s) Oral at bedtime PRN Insomnia  ondansetron Injectable 4 milliGRAM(s) IV Push every 8 hours PRN Nausea and/or Vomiting    T(C): 36.7 (12-13-24 @ 11:47), Max: 37.2 (12-12-24 @ 04:53)  HR: 68 (12-13-24 @ 11:47) (68 - 76)  BP: 118/62 (12-13-24 @ 11:47) (110/76 - 125/85)  RR: 20 (12-13-24 @ 11:47)  SpO2: 98% (12-13-24 @ 11:47)  Wt(kg): --  I&O's Detail          PHYSICAL EXAM:  General: No distress  Respiratory: b/l air entry  Cardiovascular: S1 S2  Gastrointestinal: soft  Extremities:  no edema          LABORATORY:                        12.4   11.69 )-----------( 503      ( 13 Dec 2024 06:33 )             34.5     12-13    130[L]  |  97  |  12  ----------------------------<  85  4.5   |  25  |  0.58    Ca    9.2      13 Dec 2024 06:33    TPro  6.3  /  Alb  3.6  /  TBili  0.3  /  DBili  x   /  AST  8[L]  /  ALT  22  /  AlkPhos  127[H]  12-13    Sodium: 130 mmol/L (12-13 @ 06:33)  Sodium: 128 mmol/L (12-12 @ 07:20)    Potassium: 4.5 mmol/L (12-13 @ 06:33)  Potassium: 4.4 mmol/L (12-12 @ 07:20)    Hemoglobin: 12.4 g/dL (12-13 @ 06:33)  Hemoglobin: 13.3 g/dL (12-12 @ 07:20)  Hemoglobin: 14.1 g/dL (12-10 @ 19:42)    Creatinine, Serum 0.58 (12-13 @ 06:33)  Creatinine, Serum 0.51 (12-12 @ 07:20)  Creatinine, Serum 0.95 (12-11 @ 12:55)  Creatinine, Serum 0.72 (12-11 @ 04:25)        LIVER FUNCTIONS - ( 13 Dec 2024 06:33 )  Alb: 3.6 g/dL / Pro: 6.3 g/dL / ALK PHOS: 127 U/L / ALT: 22 U/L / AST: 8 U/L / GGT: x           Urinalysis Basic - ( 13 Dec 2024 06:33 )    Color: x / Appearance: x / SG: x / pH: x  Gluc: 85 mg/dL / Ketone: x  / Bili: x / Urobili: x   Blood: x / Protein: x / Nitrite: x   Leuk Esterase: x / RBC: x / WBC x   Sq Epi: x / Non Sq Epi: x / Bacteria: x                 Winlevi Pregnancy And Lactation Text: This medication is considered safe during pregnancy and breastfeeding.

## 2024-12-13 NOTE — PROGRESS NOTE ADULT - NUTRITIONAL ASSESSMENT
This patient has been assessed with a concern for Malnutrition and has been determined to have a diagnosis/diagnoses of Severe protein-calorie malnutrition and Underweight (BMI < 19).    This patient is being managed with:   Diet Regular-  Entered: Dec  6 2024  6:39PM  
This patient has been assessed with a concern for Malnutrition and has been determined to have a diagnosis/diagnoses of Severe protein-calorie malnutrition and Underweight (BMI < 19).    This patient is being managed with:   Diet Regular-  Entered: Dec  6 2024  6:39PM  
This patient has been assessed with a concern for Malnutrition and has been determined to have a diagnosis/diagnoses of Severe protein-calorie malnutrition and Underweight (BMI < 19).    This patient is being managed with:   Diet Regular-  1000mL Fluid Restriction ([id number])  Entered: Dec 11 2024  4:06PM  
This patient has been assessed with a concern for Malnutrition and has been determined to have a diagnosis/diagnoses of Severe protein-calorie malnutrition and Underweight (BMI < 19).    This patient is being managed with:   Diet Regular-  1000mL Fluid Restriction ([id number])  Entered: Dec 11 2024  4:06PM  
This patient has been assessed with a concern for Malnutrition and has been determined to have a diagnosis/diagnoses of Severe protein-calorie malnutrition and Underweight (BMI < 19).    This patient is being managed with:   Diet Regular-  Low Sodium  Entered: Dec 11 2024  5:46AM  
This patient has been assessed with a concern for Malnutrition and has been determined to have a diagnosis/diagnoses of Severe protein-calorie malnutrition and Underweight (BMI < 19).    This patient is being managed with:   Diet Regular-  Entered: Dec  6 2024  6:39PM  
This patient has been assessed with a concern for Malnutrition and has been determined to have a diagnosis/diagnoses of Severe protein-calorie malnutrition and Underweight (BMI < 19).    This patient is being managed with:   Diet Regular-  Entered: Dec 10 2024 12:18PM

## 2024-12-13 NOTE — PROGRESS NOTE ADULT - PROBLEM SELECTOR PLAN 2
-likely due to SIADH and low sodium intake , now with diarrhea   - S/ IVF, Tolvaptan   -D/w Nephro Dr. Murillo, sodium improved too quickly, s/p D5W. Monitor   -Off fluids now  -Po fluid restriction  -Oral salt tablets. d/w Nephro  -Monitor sodium level
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
## 2024-12-13 NOTE — DISCHARGE NOTE NURSING/CASE MANAGEMENT/SOCIAL WORK - HAS THE PATIENT USED TOBACCO IN THE PAST 30 DAYS?
Depression Screening Follow-up Plan: Patient's depression screening was positive with a PHQ-9 score of 9. Patient assessed for underlying major depression. They have no active suicidal ideations. Brief counseling provided and recommend additional follow-up/re-evaluation next office visit.    Orders:    escitalopram (LEXAPRO) 10 mg tablet; Take 1 tablet (10 mg total) by mouth daily     Yes

## 2024-12-14 ENCOUNTER — TRANSCRIPTION ENCOUNTER (OUTPATIENT)
Age: 59
End: 2024-12-14

## 2024-12-14 VITALS
SYSTOLIC BLOOD PRESSURE: 134 MMHG | RESPIRATION RATE: 19 BRPM | OXYGEN SATURATION: 99 % | WEIGHT: 67.24 LBS | DIASTOLIC BLOOD PRESSURE: 81 MMHG | HEART RATE: 71 BPM | TEMPERATURE: 98 F

## 2024-12-14 PROCEDURE — 84100 ASSAY OF PHOSPHORUS: CPT

## 2024-12-14 PROCEDURE — 87507 IADNA-DNA/RNA PROBE TQ 12-25: CPT

## 2024-12-14 PROCEDURE — 97530 THERAPEUTIC ACTIVITIES: CPT

## 2024-12-14 PROCEDURE — 95816 EEG AWAKE AND DROWSY: CPT

## 2024-12-14 PROCEDURE — 72195 MRI PELVIS W/O DYE: CPT | Mod: MC

## 2024-12-14 PROCEDURE — 82533 TOTAL CORTISOL: CPT

## 2024-12-14 PROCEDURE — 70450 CT HEAD/BRAIN W/O DYE: CPT | Mod: MC

## 2024-12-14 PROCEDURE — 36415 COLL VENOUS BLD VENIPUNCTURE: CPT

## 2024-12-14 PROCEDURE — 80307 DRUG TEST PRSMV CHEM ANLYZR: CPT

## 2024-12-14 PROCEDURE — 74177 CT ABD & PELVIS W/CONTRAST: CPT | Mod: MC

## 2024-12-14 PROCEDURE — 71260 CT THORAX DX C+: CPT | Mod: MC

## 2024-12-14 PROCEDURE — 83880 ASSAY OF NATRIURETIC PEPTIDE: CPT

## 2024-12-14 PROCEDURE — 83690 ASSAY OF LIPASE: CPT

## 2024-12-14 PROCEDURE — 72220 X-RAY EXAM SACRUM TAILBONE: CPT

## 2024-12-14 PROCEDURE — 83993 ASSAY FOR CALPROTECTIN FECAL: CPT

## 2024-12-14 PROCEDURE — 87046 STOOL CULTR AEROBIC BACT EA: CPT

## 2024-12-14 PROCEDURE — 96374 THER/PROPH/DIAG INJ IV PUSH: CPT

## 2024-12-14 PROCEDURE — 99285 EMERGENCY DEPT VISIT HI MDM: CPT

## 2024-12-14 PROCEDURE — 84550 ASSAY OF BLOOD/URIC ACID: CPT

## 2024-12-14 PROCEDURE — 85025 COMPLETE CBC W/AUTO DIFF WBC: CPT

## 2024-12-14 PROCEDURE — 86140 C-REACTIVE PROTEIN: CPT

## 2024-12-14 PROCEDURE — C1751: CPT

## 2024-12-14 PROCEDURE — 83521 IG LIGHT CHAINS FREE EACH: CPT

## 2024-12-14 PROCEDURE — 84145 PROCALCITONIN (PCT): CPT

## 2024-12-14 PROCEDURE — 87045 FECES CULTURE AEROBIC BACT: CPT

## 2024-12-14 PROCEDURE — 85652 RBC SED RATE AUTOMATED: CPT

## 2024-12-14 PROCEDURE — 72170 X-RAY EXAM OF PELVIS: CPT

## 2024-12-14 PROCEDURE — 97116 GAIT TRAINING THERAPY: CPT

## 2024-12-14 PROCEDURE — 97162 PT EVAL MOD COMPLEX 30 MIN: CPT

## 2024-12-14 PROCEDURE — 83615 LACTATE (LD) (LDH) ENZYME: CPT

## 2024-12-14 PROCEDURE — 84132 ASSAY OF SERUM POTASSIUM: CPT

## 2024-12-14 PROCEDURE — 87324 CLOSTRIDIUM AG IA: CPT

## 2024-12-14 PROCEDURE — 99239 HOSP IP/OBS DSCHRG MGMT >30: CPT

## 2024-12-14 PROCEDURE — 84300 ASSAY OF URINE SODIUM: CPT

## 2024-12-14 PROCEDURE — 84165 PROTEIN E-PHORESIS SERUM: CPT

## 2024-12-14 PROCEDURE — 80053 COMPREHEN METABOLIC PANEL: CPT

## 2024-12-14 PROCEDURE — 83735 ASSAY OF MAGNESIUM: CPT

## 2024-12-14 PROCEDURE — 84155 ASSAY OF PROTEIN SERUM: CPT

## 2024-12-14 PROCEDURE — 87493 C DIFF AMPLIFIED PROBE: CPT

## 2024-12-14 PROCEDURE — 76937 US GUIDE VASCULAR ACCESS: CPT

## 2024-12-14 PROCEDURE — 85610 PROTHROMBIN TIME: CPT

## 2024-12-14 PROCEDURE — 85027 COMPLETE CBC AUTOMATED: CPT

## 2024-12-14 PROCEDURE — 84484 ASSAY OF TROPONIN QUANT: CPT

## 2024-12-14 PROCEDURE — 84443 ASSAY THYROID STIM HORMONE: CPT

## 2024-12-14 PROCEDURE — 82746 ASSAY OF FOLIC ACID SERUM: CPT

## 2024-12-14 PROCEDURE — 93005 ELECTROCARDIOGRAM TRACING: CPT

## 2024-12-14 PROCEDURE — 82607 VITAMIN B-12: CPT

## 2024-12-14 PROCEDURE — 71045 X-RAY EXAM CHEST 1 VIEW: CPT

## 2024-12-14 PROCEDURE — 87040 BLOOD CULTURE FOR BACTERIA: CPT

## 2024-12-14 PROCEDURE — 36573 INSJ PICC RS&I 5 YR+: CPT

## 2024-12-14 PROCEDURE — 87449 NOS EACH ORGANISM AG IA: CPT

## 2024-12-14 PROCEDURE — 85730 THROMBOPLASTIN TIME PARTIAL: CPT

## 2024-12-14 PROCEDURE — 81001 URINALYSIS AUTO W/SCOPE: CPT

## 2024-12-14 PROCEDURE — 80048 BASIC METABOLIC PNL TOTAL CA: CPT

## 2024-12-14 PROCEDURE — 86334 IMMUNOFIX E-PHORESIS SERUM: CPT

## 2024-12-14 PROCEDURE — 83935 ASSAY OF URINE OSMOLALITY: CPT

## 2024-12-14 RX ORDER — VANCOMYCIN HCL 900 MCG/MG
1 POWDER (GRAM) MISCELLANEOUS
Qty: 20 | Refills: 0
Start: 2024-12-14 | End: 2024-12-18

## 2024-12-14 RX ORDER — VANCOMYCIN HCL 900 MCG/MG
1 POWDER (GRAM) MISCELLANEOUS
Qty: 20 | Refills: 0 | DISCHARGE
Start: 2024-12-14 | End: 2024-12-18

## 2024-12-14 RX ORDER — TRAZODONE HYDROCHLORIDE 150 MG/1
50 TABLET ORAL ONCE
Refills: 0 | Status: COMPLETED | OUTPATIENT
Start: 2024-12-14 | End: 2024-12-14

## 2024-12-14 RX ORDER — KETOROLAC TROMETHAMINE 30 MG/ML
15 INJECTION INTRAMUSCULAR; INTRAVENOUS ONCE
Refills: 0 | Status: DISCONTINUED | OUTPATIENT
Start: 2024-12-14 | End: 2024-12-14

## 2024-12-14 RX ADMIN — SODIUM CHLORIDE 1 GRAM(S): 9 INJECTION, SOLUTION INTRAMUSCULAR; INTRAVENOUS; SUBCUTANEOUS at 06:05

## 2024-12-14 RX ADMIN — KETOROLAC TROMETHAMINE 15 MILLIGRAM(S): 30 INJECTION INTRAMUSCULAR; INTRAVENOUS at 01:54

## 2024-12-14 RX ADMIN — KETOROLAC TROMETHAMINE 15 MILLIGRAM(S): 30 INJECTION INTRAMUSCULAR; INTRAVENOUS at 00:54

## 2024-12-14 RX ADMIN — Medication 125 MILLIGRAM(S): at 06:06

## 2024-12-14 RX ADMIN — Medication 25 MICROGRAM(S): at 06:05

## 2024-12-14 RX ADMIN — TRAZODONE HYDROCHLORIDE 50 MILLIGRAM(S): 150 TABLET ORAL at 00:54

## 2024-12-14 RX ADMIN — ONDANSETRON HYDROCHLORIDE 4 MILLIGRAM(S): 4 TABLET, FILM COATED ORAL at 06:13

## 2024-12-14 RX ADMIN — PANTOPRAZOLE SODIUM 40 MILLIGRAM(S): 40 TABLET, DELAYED RELEASE ORAL at 06:06

## 2024-12-14 NOTE — CHART NOTE - NSCHARTNOTEFT_GEN_A_CORE
Midline catheter removed from left upper extremity. Patient was explained about procedure and tolerated procedure well. Midline catheter removed with no purulence or drainage noted on catheter tip. No blood or pus noted at site. Pressure held at site for >2 minutes, no bleeding noted. Occlusive dressing placed at site.

## 2024-12-14 NOTE — DISCHARGE NOTE NURSING/CASE MANAGEMENT/SOCIAL WORK - NSDCVIVACCINE_GEN_ALL_CORE_FT
influenza, injectable, quadrivalent, preservative free; 17-Oct-2018 11:53; Abbey Martinez (RN); [employer]; [id number] (Exp. Date: 30-Jun-2019); IntraMuscular; Deltoid Left.; 0.5 milliLiter(s); VIS (VIS Published: 07-Aug-2015, VIS Presented: 17-Oct-2018);   pneumococcal polysaccharide PPV23; 04-Jun-2014 10:54; Yani Jain (HERMILO); [id number]; IntraMuscular; Deltoid Left.; 0.5 milliLiter(s);

## 2024-12-14 NOTE — CHART NOTE - NSCHARTNOTESELECT_GEN_ALL_CORE
Event Note
Event Note
Midline Removal/Event Note
Nutrition Services
Event Note
Event Note
Nutrition Services

## 2024-12-14 NOTE — PROGRESS NOTE ADULT - REASON FOR ADMISSION
Failure to thrive/Hyponatremia

## 2024-12-14 NOTE — DISCHARGE NOTE NURSING/CASE MANAGEMENT/SOCIAL WORK - FINANCIAL ASSISTANCE
[facility] provides services at a reduced cost to those who are determined to be eligible through [facility]’s financial assistance program. Information regarding [facility]’s financial assistance program can be found by going to [email or web address] or by calling [phone].

## 2024-12-14 NOTE — DISCHARGE NOTE NURSING/CASE MANAGEMENT/SOCIAL WORK - NSDCPEFALRISK_GEN_ALL_CORE
For information on Fall & Injury Prevention, visit: [email or web address] OR  [email or web address] OR  https://www.cdc.gov/steadi/patient.html

## 2024-12-14 NOTE — DISCHARGE NOTE NURSING/CASE MANAGEMENT/SOCIAL WORK - PATIENT PORTAL LINK FT
You can access the FollowMyHealth Patient Portal offered by [facility] by registering at the following website: [email or web address]. By joining [employer]’s FollowMyHealth portal, you will also be able to view your health information using other applications (apps) compatible with our system.

## 2024-12-14 NOTE — PROGRESS NOTE ADULT - SUBJECTIVE AND OBJECTIVE BOX
Neurology follow up note    DOUGLAS [id number]      Interval History:    Patient feels ok no new complaints.    Allergies    No Known Allergies    Intolerances    aspirin (Stomach Upset)      MEDICATIONS    acetaminophen     Tablet .. 650 milliGRAM(s) Oral every 6 hours PRN  albuterol    90 MICROgram(s) HFA Inhaler 2 Puff(s) Inhalation every 6 hours PRN  aluminum hydroxide/magnesium hydroxide/simethicone Suspension 30 milliLiter(s) Oral every 4 hours PRN  enoxaparin Injectable 40 milliGRAM(s) SubCutaneous every 24 hours  influenza   Vaccine 0.5 milliLiter(s) IntraMuscular once  levothyroxine 25 MICROGram(s) Oral daily  lidocaine   4% Patch 1 Patch Transdermal daily  melatonin 5 milliGRAM(s) Oral at bedtime PRN  naloxone Injectable 0.4 milliGRAM(s) IV Push once  ondansetron Injectable 4 milliGRAM(s) IV Push every 8 hours PRN  pantoprazole    Tablet 40 milliGRAM(s) Oral before breakfast  sodium chloride 1 Gram(s) Oral two times a day  vancomycin    Solution 125 milliGRAM(s) Oral every 6 hours              Vital Signs Last 24 Hrs  T(C): 36.7 (14 Dec 2024 04:19), Max: 36.7 (13 Dec 2024 11:47)  T(F): 98 (14 Dec 2024 04:19), Max: 98.1 (13 Dec 2024 11:47)  HR: 71 (14 Dec 2024 04:19) (66 - 71)  BP: 134/81 (14 Dec 2024 04:19) (118/62 - 134/81)  BP(mean): --  RR: 19 (14 Dec 2024 04:19) (19 - 20)  SpO2: 99% (14 Dec 2024 04:19) (98% - 99%)    Parameters below as of 14 Dec 2024 04:19  Patient On (Oxygen Delivery Method): room air    REVIEW OF SYSTEMS:  CONSTITUTIONAL:  The patient denies fever, chills, or night sweats.  HEAD:  No headache.  EYES:  No double vision or blurry vision.  EARS:  No ringing in her ears.  NECK:  No neck pain.  CARDIOVASCULAR:  No chest pain.  RESPIRATORY:  No shortness of breath.  ABDOMEN:  No nausea, vomiting, or abdominal pain.  EXTREMITIES/NEUROLOGICAL:  No numbness or tingling.  MUSCULOSKELETAL:  No joint pain.    PHYSICAL EXAMINATION:  HEAD:  Normocephalic, atraumatic.  EYES:  No scleral icterus.  EARS:  Hearing appear to be intact.  NECK:  Supple.  CARDIOVASCULAR:  S1 and S2 heard.  RESPIRATORY:  Air entry bilaterally.  ABDOMEN:  Soft, nontender.  EXTREMITIES:  No clubbing or cyanosis were noted.    NEUROLOGIC:  The patient is awake, alert, location hospital, Recall was 3/3 within 5 minutes.  Able to name simple objects.  Able to follow simple and complex commands.  Speech was fluent.  Smile symmetric.  Motor, bilateral upper and lower 5/5.  Sensory, bilateral upper and lower intact to light touch, no drift.      LABS:  CBC Full  -  ( 13 Dec 2024 06:33 )  WBC Count : 11.69 K/uL  RBC Count : 3.87 M/uL  Hemoglobin : 12.4 g/dL  Hematocrit : 34.5 %  Platelet Count - Automated : 503 K/uL  Mean Cell Volume : 89.1 fl  Mean Cell Hemoglobin : 32.0 pg  Mean Cell Hemoglobin Concentration : 35.9 g/dL  Auto Neutrophil # : x  Auto Lymphocyte # : x  Auto Monocyte # : x  Auto Eosinophil # : x  Auto Basophil # : x  Auto Neutrophil % : x  Auto Lymphocyte % : x  Auto Monocyte % : x  Auto Eosinophil % : x  Auto Basophil % : x    Urinalysis Basic - ( 13 Dec 2024 06:33 )    Color: x / Appearance: x / SG: x / pH: x  Gluc: 85 mg/dL / Ketone: x  / Bili: x / Urobili: x   Blood: x / Protein: x / Nitrite: x   Leuk Esterase: x / RBC: x / WBC x   Sq Epi: x / Non Sq Epi: x / Bacteria: x      12-13    130[L]  |  97  |  12  ----------------------------<  85  4.5   |  25  |  0.58    Ca    9.2      13 Dec 2024 06:33    TPro  6.3  /  Alb  3.6  /  TBili  0.3  /  DBili  x   /  AST  8[L]  /  ALT  22  /  AlkPhos  127[H]  12-13    Hemoglobin A1C:     LIVER FUNCTIONS - ( 13 Dec 2024 06:33 )  Alb: 3.6 g/dL / Pro: 6.3 g/dL / ALK PHOS: 127 U/L / ALT: 22 U/L / AST: 8 U/L / GGT: x           Vitamin B12         RADIOLOGY  ANALYSIS AND PLAN:  This is a 59-year-old with episode of altered mental status.    1.For episode of altered mental status, most likely metabolic encephalopathy, suspect possibly secondary to electrolyte abnormalities, questionable if any hyponatremia eventually to a subclinical seizure event with a postictal state.  2.For history of hyponatremia, slowly correct sodium, less than 10 millimoles a day to prevent central pontine myelinolysis.  3.We will check another electrolytes for altered mental status.  4.For history of migraines, continue the patient on Fioricet.  5.For history of hypothyroid, patient is on Synthroid.  6.For history of neuropathy, continue on Lyrica.  7 neurologic  wise stable     47 minutes of time was spent with the patient, plan of care, reviewing data, speaking to multidisciplinary healthcare team with greater than 50% of time in counseling, care coordination.    Thank you for courtesy of consultation.    PATIENT HAS NOT YET BEEN SEEN AND EXAMINED TODAY. NOTE AND CHART REVIEWED IN AM AND EXAM FORM PREVIOUS.  ONCE PATIENT SEEN, CHART WILL BE UPDATE AT PRESENT NOTE IS INCOMPLETE

## 2024-12-17 LAB
% ALBUMIN: 64.4 % — SIGNIFICANT CHANGE UP
% ALPHA 1: 5.2 % — SIGNIFICANT CHANGE UP
% ALPHA 2: 12.1 % — SIGNIFICANT CHANGE UP
% BETA: 9.5 % — SIGNIFICANT CHANGE UP
% GAMMA: 8.8 % — SIGNIFICANT CHANGE UP
ALBUMIN SERPL ELPH-MCNC: 3.8 G/DL — SIGNIFICANT CHANGE UP (ref 3.6–5.5)
ALBUMIN/GLOB SERPL ELPH: 1.8 RATIO — SIGNIFICANT CHANGE UP
ALPHA1 GLOB SERPL ELPH-MCNC: 0.3 G/DL — SIGNIFICANT CHANGE UP (ref 0.1–0.4)
ALPHA2 GLOB SERPL ELPH-MCNC: 0.7 G/DL — SIGNIFICANT CHANGE UP (ref 0.5–1)
B-GLOBULIN SERPL ELPH-MCNC: 0.6 G/DL — SIGNIFICANT CHANGE UP (ref 0.5–1)
GAMMA GLOBULIN: 0.5 G/DL — LOW (ref 0.6–1.6)
PROT PATTERN SERPL ELPH-IMP: SIGNIFICANT CHANGE UP
PROT SERPL-MCNC: 5.9 G/DL — LOW (ref 6–8.3)

## 2024-12-19 RX ORDER — VANCOMYCIN HCL 900 MCG/MG
1 POWDER (GRAM) MISCELLANEOUS
Qty: 14 | Refills: 0
Start: 2024-12-19 | End: 2024-12-25

## 2024-12-20 ENCOUNTER — OUTPATIENT (OUTPATIENT)
Dept: OUTPATIENT SERVICES | Facility: HOSPITAL | Age: 59
LOS: 1 days | End: 2024-12-20
Payer: MEDICARE

## 2024-12-20 VITALS
HEART RATE: 70 BPM | OXYGEN SATURATION: 96 % | SYSTOLIC BLOOD PRESSURE: 139 MMHG | TEMPERATURE: 97 F | WEIGHT: 66.36 LBS | DIASTOLIC BLOOD PRESSURE: 79 MMHG | HEIGHT: 57 IN | RESPIRATION RATE: 14 BRPM

## 2024-12-20 DIAGNOSIS — K82.9 DISEASE OF GALLBLADDER, UNSPECIFIED: Chronic | ICD-10-CM

## 2024-12-20 DIAGNOSIS — Z98.890 OTHER SPECIFIED POSTPROCEDURAL STATES: Chronic | ICD-10-CM

## 2024-12-20 DIAGNOSIS — Z95.818 PRESENCE OF OTHER CARDIAC IMPLANTS AND GRAFTS: Chronic | ICD-10-CM

## 2024-12-20 DIAGNOSIS — E87.1 HYPO-OSMOLALITY AND HYPONATREMIA: ICD-10-CM

## 2024-12-20 DIAGNOSIS — S69.91XD UNSPECIFIED INJURY OF RIGHT WRIST, HAND AND FINGER(S), SUBSEQUENT ENCOUNTER: Chronic | ICD-10-CM

## 2024-12-20 DIAGNOSIS — J38.1 POLYP OF VOCAL CORD AND LARYNX: Chronic | ICD-10-CM

## 2024-12-20 DIAGNOSIS — M25.9 JOINT DISORDER, UNSPECIFIED: Chronic | ICD-10-CM

## 2024-12-20 LAB
ANION GAP SERPL CALC-SCNC: 8 MMOL/L — SIGNIFICANT CHANGE UP (ref 5–17)
APTT BLD: 29.5 SEC — SIGNIFICANT CHANGE UP (ref 24.5–35.6)
BUN SERPL-MCNC: 11 MG/DL — SIGNIFICANT CHANGE UP (ref 7–23)
CALCIUM SERPL-MCNC: 9 MG/DL — SIGNIFICANT CHANGE UP (ref 8.5–10.1)
CHLORIDE SERPL-SCNC: 90 MMOL/L — LOW (ref 96–108)
CO2 SERPL-SCNC: 25 MMOL/L — SIGNIFICANT CHANGE UP (ref 22–31)
CREAT SERPL-MCNC: 0.74 MG/DL — SIGNIFICANT CHANGE UP (ref 0.5–1.3)
EGFR: 92 ML/MIN/1.73M2 — SIGNIFICANT CHANGE UP
GLUCOSE SERPL-MCNC: 102 MG/DL — HIGH (ref 70–99)
HCT VFR BLD CALC: 31.9 % — LOW (ref 34.5–45)
HGB BLD-MCNC: 11.6 G/DL — SIGNIFICANT CHANGE UP (ref 11.5–15.5)
INR BLD: 0.99 RATIO — SIGNIFICANT CHANGE UP (ref 0.85–1.16)
MCHC RBC-ENTMCNC: 32.3 PG — SIGNIFICANT CHANGE UP (ref 27–34)
MCHC RBC-ENTMCNC: 36.4 G/DL — HIGH (ref 32–36)
MCV RBC AUTO: 88.9 FL — SIGNIFICANT CHANGE UP (ref 80–100)
NRBC # BLD: 0 /100 WBCS — SIGNIFICANT CHANGE UP (ref 0–0)
PLATELET # BLD AUTO: 443 K/UL — HIGH (ref 150–400)
POTASSIUM SERPL-MCNC: 3.7 MMOL/L — SIGNIFICANT CHANGE UP (ref 3.5–5.3)
POTASSIUM SERPL-SCNC: 3.7 MMOL/L — SIGNIFICANT CHANGE UP (ref 3.5–5.3)
PROTHROM AB SERPL-ACNC: 11.7 SEC — SIGNIFICANT CHANGE UP (ref 9.9–13.4)
RBC # BLD: 3.59 M/UL — LOW (ref 3.8–5.2)
RBC # FLD: 12.9 % — SIGNIFICANT CHANGE UP (ref 10.3–14.5)
SODIUM SERPL-SCNC: 123 MMOL/L — LOW (ref 135–145)
WBC # BLD: 10.36 K/UL — SIGNIFICANT CHANGE UP (ref 3.8–10.5)
WBC # FLD AUTO: 10.36 K/UL — SIGNIFICANT CHANGE UP (ref 3.8–10.5)

## 2024-12-20 PROCEDURE — G0463: CPT

## 2024-12-20 PROCEDURE — 36415 COLL VENOUS BLD VENIPUNCTURE: CPT

## 2024-12-20 PROCEDURE — 93010 ELECTROCARDIOGRAM REPORT: CPT

## 2024-12-20 PROCEDURE — 80048 BASIC METABOLIC PNL TOTAL CA: CPT

## 2024-12-20 PROCEDURE — 93005 ELECTROCARDIOGRAM TRACING: CPT

## 2024-12-20 PROCEDURE — 85610 PROTHROMBIN TIME: CPT

## 2024-12-20 PROCEDURE — 85730 THROMBOPLASTIN TIME PARTIAL: CPT

## 2024-12-20 PROCEDURE — 85027 COMPLETE CBC AUTOMATED: CPT

## 2024-12-20 NOTE — H&P PST ADULT - RESPIRATORY AND THORAX
details… Quality 226: Preventive Care And Screening: Tobacco Use: Screening And Cessation Intervention: Patient screened for tobacco use and is an ex/non-smoker Detail Level: Detailed

## 2024-12-20 NOTE — H&P PST ADULT - ASSESSMENT
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
## 2024-12-20 NOTE — H&P PST ADULT - PROBLEM SELECTOR PLAN 1
PST labs; CBC, BMP, EKG ( CXR on chart from 12/6/2024). Assisted pt with making appointment for medical clearance with PCP Dr López on 12/23/2024 @ 12:15PM. Will fax over PST results to PCP for review and medical clearance. Pt instructed to stop any NSAIDS/Herbal Supplements between now and procedure. May take Tylenol if needed for any pain between now and procedure. Morning of procedure she may take her Levothyroxine with small sip of water. She was also instructed to use Trelegy Ellipta morning of procedure as well as Ventolin if needed. pre-op instructions as well as pre-op wash instructions given to pt with understanding verbalized. All questions addressed with pt prior to her leaving the PST department today.

## 2024-12-20 NOTE — H&P PST ADULT - GENERAL COMMENTS
Denies any travel in the last 2 weeks - Denies Denies any travel in the last 2 weeks Denies any current covid SX

## 2024-12-20 NOTE — H&P PST ADULT - LAST STRESS TEST
Notes prior H/O Nuclear Stress Test  many years ago with Dr Machado which she notes was fine at that time

## 2024-12-20 NOTE — H&P PST ADULT - NSICDXPASTMEDICALHX_GEN_ALL_CORE_FT
PAST MEDICAL HISTORY:  2019 novel coronavirus disease (COVID-19)     Anxiety     Burning mouth syndrome     Chronic low back pain with sciatica     Chronic neck pain cervical bone fused throught disc    Dvt femoral (deep venous thrombosis) right arm, past    Emphysema/COPD on oxygen at night 3L NC    GERD (gastroesophageal reflux disease) gastritis, vomits often    Glossopharyngeal neuralgia syndrome s/p brain sx in 12/2018    H/O osteoporosis severe, was on forteo    H/O paroxysmal supraventricular tachycardia     H/O Raynaud's syndrome     Hepatitis C body cleared    History of IBS had blockage about 6 months ago-resolved with NG tube, colitis    History of seizure disorder last seizure 8 yrs ago    History of weight loss lost 20 pounds within the last year-unknown cause    Hypo-osmolality and hyponatremia     Hypotension in past had syncope related to brain problem-better recently    Hypothyroidism     Lupus     Migraine     Neuropathy     Nicotine addiction     Osteochondritis dissecans both ankles    Pain, wrist, right collapse    Personal history of skin cancer toe    Right shoulder pain     Sjogren's disease     Vertigo

## 2024-12-20 NOTE — H&P PST ADULT - LAST ECHOCARDIOGRAM
Notes prior H/o ECHO with Dr LARA and they were fine Notes prior H/o ECHO many years ago with Dr LARA and it were fine

## 2024-12-23 NOTE — ASU PATIENT PROFILE, ADULT - FALL HARM RISK - DEVICES
Prior Authorization Approval    Authorization Effective Date: 7/29/2021  Authorization Expiration Date: 10/27/2022  Medication: diclofenac (VOLTAREN) 1 % topical gel- APPROVED   Approved Dose/Quantity:   Reference #:     Insurance Company: [employer] - Phone [phone] Fax [phone]  Expected CoPay:       CoPay Card Available:      Foundation Assistance Needed:    Which Pharmacy is filling the prescription (Not needed for infusion/clinic administered): [facility] [place] [address]  Pharmacy Notified: Yes  Patient Notified:  **Instructed pharmacy to notify patient when script is ready to /ship.**     Walker

## 2024-12-23 NOTE — ASU PATIENT PROFILE, ADULT - FALL HARM RISK - HARM RISK INTERVENTIONS

## 2024-12-24 PROBLEM — F10.90 ALCOHOL USE: Status: ACTIVE | Noted: 2017-08-04

## 2024-12-26 ENCOUNTER — OUTPATIENT (OUTPATIENT)
Dept: OUTPATIENT SERVICES | Facility: HOSPITAL | Age: 59
LOS: 1 days | End: 2024-12-26

## 2024-12-26 ENCOUNTER — INPATIENT (INPATIENT)
Facility: HOSPITAL | Age: 59
LOS: 0 days | Discharge: LEFT BEFORE DISCHARGE | DRG: 641 | End: 2024-12-26
Attending: INTERNAL MEDICINE | Admitting: INTERNAL MEDICINE
Payer: MEDICARE

## 2024-12-26 VITALS
RESPIRATION RATE: 18 BRPM | DIASTOLIC BLOOD PRESSURE: 81 MMHG | HEART RATE: 76 BPM | OXYGEN SATURATION: 98 % | SYSTOLIC BLOOD PRESSURE: 150 MMHG | TEMPERATURE: 98 F

## 2024-12-26 VITALS
OXYGEN SATURATION: 98 % | HEART RATE: 70 BPM | TEMPERATURE: 98 F | DIASTOLIC BLOOD PRESSURE: 73 MMHG | SYSTOLIC BLOOD PRESSURE: 146 MMHG | WEIGHT: 65.92 LBS | RESPIRATION RATE: 19 BRPM | HEIGHT: 56 IN

## 2024-12-26 VITALS
TEMPERATURE: 97 F | WEIGHT: 67.02 LBS | DIASTOLIC BLOOD PRESSURE: 66 MMHG | OXYGEN SATURATION: 100 % | HEART RATE: 66 BPM | SYSTOLIC BLOOD PRESSURE: 145 MMHG | RESPIRATION RATE: 14 BRPM | HEIGHT: 56 IN

## 2024-12-26 DIAGNOSIS — J43.9 EMPHYSEMA, UNSPECIFIED: ICD-10-CM

## 2024-12-26 DIAGNOSIS — E03.9 HYPOTHYROIDISM, UNSPECIFIED: ICD-10-CM

## 2024-12-26 DIAGNOSIS — K21.9 GASTRO-ESOPHAGEAL REFLUX DISEASE WITHOUT ESOPHAGITIS: ICD-10-CM

## 2024-12-26 DIAGNOSIS — E87.1 HYPO-OSMOLALITY AND HYPONATREMIA: ICD-10-CM

## 2024-12-26 DIAGNOSIS — Z98.890 OTHER SPECIFIED POSTPROCEDURAL STATES: Chronic | ICD-10-CM

## 2024-12-26 DIAGNOSIS — J38.1 POLYP OF VOCAL CORD AND LARYNX: Chronic | ICD-10-CM

## 2024-12-26 DIAGNOSIS — Z95.818 PRESENCE OF OTHER CARDIAC IMPLANTS AND GRAFTS: Chronic | ICD-10-CM

## 2024-12-26 DIAGNOSIS — S69.91XD UNSPECIFIED INJURY OF RIGHT WRIST, HAND AND FINGER(S), SUBSEQUENT ENCOUNTER: Chronic | ICD-10-CM

## 2024-12-26 DIAGNOSIS — K82.9 DISEASE OF GALLBLADDER, UNSPECIFIED: Chronic | ICD-10-CM

## 2024-12-26 DIAGNOSIS — F19.10 OTHER PSYCHOACTIVE SUBSTANCE ABUSE, UNCOMPLICATED: ICD-10-CM

## 2024-12-26 DIAGNOSIS — M25.9 JOINT DISORDER, UNSPECIFIED: Chronic | ICD-10-CM

## 2024-12-26 DIAGNOSIS — Z29.9 ENCOUNTER FOR PROPHYLACTIC MEASURES, UNSPECIFIED: ICD-10-CM

## 2024-12-26 LAB
ALBUMIN SERPL ELPH-MCNC: 4.3 G/DL — SIGNIFICANT CHANGE UP (ref 3.3–5)
ALP SERPL-CCNC: 130 U/L — HIGH (ref 40–120)
ALT FLD-CCNC: 22 U/L — SIGNIFICANT CHANGE UP (ref 12–78)
ANION GAP SERPL CALC-SCNC: 7 MMOL/L — SIGNIFICANT CHANGE UP (ref 5–17)
AST SERPL-CCNC: 13 U/L — LOW (ref 15–37)
BASOPHILS # BLD AUTO: 0.08 K/UL — SIGNIFICANT CHANGE UP (ref 0–0.2)
BASOPHILS NFR BLD AUTO: 1 % — SIGNIFICANT CHANGE UP (ref 0–2)
BILIRUB SERPL-MCNC: 0.5 MG/DL — SIGNIFICANT CHANGE UP (ref 0.2–1.2)
BUN SERPL-MCNC: 8 MG/DL — SIGNIFICANT CHANGE UP (ref 7–23)
CALCIUM SERPL-MCNC: 9.3 MG/DL — SIGNIFICANT CHANGE UP (ref 8.5–10.1)
CHLORIDE SERPL-SCNC: 96 MMOL/L — SIGNIFICANT CHANGE UP (ref 96–108)
CO2 SERPL-SCNC: 25 MMOL/L — SIGNIFICANT CHANGE UP (ref 22–31)
CREAT SERPL-MCNC: 0.54 MG/DL — SIGNIFICANT CHANGE UP (ref 0.5–1.3)
EGFR: 105 ML/MIN/1.73M2 — SIGNIFICANT CHANGE UP
EOSINOPHIL # BLD AUTO: 0.33 K/UL — SIGNIFICANT CHANGE UP (ref 0–0.5)
EOSINOPHIL NFR BLD AUTO: 4.1 % — SIGNIFICANT CHANGE UP (ref 0–6)
GLUCOSE SERPL-MCNC: 76 MG/DL — SIGNIFICANT CHANGE UP (ref 70–99)
HCT VFR BLD CALC: 31.8 % — LOW (ref 34.5–45)
HGB BLD-MCNC: 11.5 G/DL — SIGNIFICANT CHANGE UP (ref 11.5–15.5)
IMM GRANULOCYTES NFR BLD AUTO: 0.4 % — SIGNIFICANT CHANGE UP (ref 0–0.9)
LIDOCAIN IGE QN: 34 U/L — SIGNIFICANT CHANGE UP (ref 13–75)
LYMPHOCYTES # BLD AUTO: 2.4 K/UL — SIGNIFICANT CHANGE UP (ref 1–3.3)
LYMPHOCYTES # BLD AUTO: 30 % — SIGNIFICANT CHANGE UP (ref 13–44)
MAGNESIUM SERPL-MCNC: 2.3 MG/DL — SIGNIFICANT CHANGE UP (ref 1.6–2.6)
MCHC RBC-ENTMCNC: 32.2 PG — SIGNIFICANT CHANGE UP (ref 27–34)
MCHC RBC-ENTMCNC: 36.2 G/DL — HIGH (ref 32–36)
MCV RBC AUTO: 89.1 FL — SIGNIFICANT CHANGE UP (ref 80–100)
MONOCYTES # BLD AUTO: 0.78 K/UL — SIGNIFICANT CHANGE UP (ref 0–0.9)
MONOCYTES NFR BLD AUTO: 9.8 % — SIGNIFICANT CHANGE UP (ref 2–14)
NEUTROPHILS # BLD AUTO: 4.37 K/UL — SIGNIFICANT CHANGE UP (ref 1.8–7.4)
NEUTROPHILS NFR BLD AUTO: 54.7 % — SIGNIFICANT CHANGE UP (ref 43–77)
NRBC # BLD: 0 /100 WBCS — SIGNIFICANT CHANGE UP (ref 0–0)
PLATELET # BLD AUTO: 435 K/UL — HIGH (ref 150–400)
POTASSIUM SERPL-MCNC: 3.7 MMOL/L — SIGNIFICANT CHANGE UP (ref 3.5–5.3)
POTASSIUM SERPL-SCNC: 3.7 MMOL/L — SIGNIFICANT CHANGE UP (ref 3.5–5.3)
PROT SERPL-MCNC: 6.9 G/DL — SIGNIFICANT CHANGE UP (ref 6–8.3)
RBC # BLD: 3.57 M/UL — LOW (ref 3.8–5.2)
RBC # FLD: 14.6 % — HIGH (ref 10.3–14.5)
SODIUM SERPL-SCNC: 128 MMOL/L — LOW (ref 135–145)
WBC # BLD: 7.99 K/UL — SIGNIFICANT CHANGE UP (ref 3.8–10.5)
WBC # FLD AUTO: 7.99 K/UL — SIGNIFICANT CHANGE UP (ref 3.8–10.5)

## 2024-12-26 PROCEDURE — 70450 CT HEAD/BRAIN W/O DYE: CPT | Mod: 26,MC

## 2024-12-26 PROCEDURE — 36415 COLL VENOUS BLD VENIPUNCTURE: CPT

## 2024-12-26 PROCEDURE — 99285 EMERGENCY DEPT VISIT HI MDM: CPT

## 2024-12-26 PROCEDURE — 85025 COMPLETE CBC W/AUTO DIFF WBC: CPT

## 2024-12-26 PROCEDURE — 99223 1ST HOSP IP/OBS HIGH 75: CPT | Mod: GC

## 2024-12-26 PROCEDURE — 93005 ELECTROCARDIOGRAM TRACING: CPT

## 2024-12-26 PROCEDURE — 80053 COMPREHEN METABOLIC PANEL: CPT

## 2024-12-26 PROCEDURE — 83735 ASSAY OF MAGNESIUM: CPT

## 2024-12-26 PROCEDURE — 99285 EMERGENCY DEPT VISIT HI MDM: CPT | Mod: 25

## 2024-12-26 PROCEDURE — 70450 CT HEAD/BRAIN W/O DYE: CPT | Mod: MC

## 2024-12-26 PROCEDURE — 83690 ASSAY OF LIPASE: CPT

## 2024-12-26 RX ORDER — ALBUTEROL SULFATE 90 UG/1
2 INHALANT RESPIRATORY (INHALATION) EVERY 6 HOURS
Refills: 0 | Status: DISCONTINUED | OUTPATIENT
Start: 2024-12-26 | End: 2024-12-26

## 2024-12-26 RX ORDER — GINKGO BILOBA 40 MG
3 CAPSULE ORAL AT BEDTIME
Refills: 0 | Status: DISCONTINUED | OUTPATIENT
Start: 2024-12-26 | End: 2024-12-26

## 2024-12-26 RX ORDER — PANTOPRAZOLE 40 MG/1
20 TABLET, DELAYED RELEASE ORAL
Refills: 0 | Status: DISCONTINUED | OUTPATIENT
Start: 2024-12-26 | End: 2024-12-26

## 2024-12-26 RX ORDER — NICOTINE POLACRILEX 4 MG/1
14 LOZENGE ORAL DAILY
Refills: 0 | Status: DISCONTINUED | OUTPATIENT
Start: 2024-12-26 | End: 2024-12-26

## 2024-12-26 RX ORDER — PREGABALIN 75 MG/1
1 CAPSULE ORAL
Refills: 0 | DISCHARGE

## 2024-12-26 RX ORDER — VANCOMYCIN HCL 900 MCG/MG
1 POWDER (GRAM) MISCELLANEOUS
Qty: 14 | Refills: 0
Start: 2024-12-26 | End: 2025-01-08

## 2024-12-26 RX ORDER — IPRATROPIUM BROMIDE 0.2 MG/ML
1 SOLUTION, NON-ORAL INHALATION EVERY 6 HOURS
Refills: 0 | Status: DISCONTINUED | OUTPATIENT
Start: 2024-12-26 | End: 2024-12-26

## 2024-12-26 RX ORDER — LEVOTHYROXINE SODIUM 175 UG/1
25 TABLET ORAL DAILY
Refills: 0 | Status: DISCONTINUED | OUTPATIENT
Start: 2024-12-26 | End: 2024-12-26

## 2024-12-26 RX ORDER — ENOXAPARIN SODIUM 60 MG/.6ML
40 INJECTION INTRAVENOUS; SUBCUTANEOUS EVERY 24 HOURS
Refills: 0 | Status: DISCONTINUED | OUTPATIENT
Start: 2024-12-26 | End: 2024-12-26

## 2024-12-26 RX ORDER — MAG HYDROX/ALUMINUM HYD/SIMETH 200-200-20
30 SUSPENSION, ORAL (FINAL DOSE FORM) ORAL EVERY 4 HOURS
Refills: 0 | Status: DISCONTINUED | OUTPATIENT
Start: 2024-12-26 | End: 2024-12-26

## 2024-12-26 RX ORDER — ACETAMINOPHEN 80 MG/.8ML
650 SOLUTION/ DROPS ORAL EVERY 6 HOURS
Refills: 0 | Status: DISCONTINUED | OUTPATIENT
Start: 2024-12-26 | End: 2024-12-26

## 2024-12-26 RX ORDER — ONDANSETRON 4 MG/1
4 TABLET ORAL EVERY 8 HOURS
Refills: 0 | Status: DISCONTINUED | OUTPATIENT
Start: 2024-12-26 | End: 2024-12-26

## 2024-12-26 RX ORDER — PANTOPRAZOLE 40 MG/1
1 TABLET, DELAYED RELEASE ORAL
Refills: 0 | DISCHARGE

## 2024-12-26 RX ORDER — SODIUM CHLORIDE 9 MG/ML
1 INJECTION, SOLUTION INTRAMUSCULAR; INTRAVENOUS; SUBCUTANEOUS EVERY 12 HOURS
Refills: 0 | Status: DISCONTINUED | OUTPATIENT
Start: 2024-12-26 | End: 2024-12-26

## 2024-12-26 RX ORDER — VANCOMYCIN HYDROCHLORIDE 5 G/100ML
125 INJECTION, POWDER, LYOPHILIZED, FOR SOLUTION INTRAVENOUS DAILY
Refills: 0 | Status: DISCONTINUED | OUTPATIENT
Start: 2024-12-26 | End: 2024-12-26

## 2024-12-26 RX ADMIN — SODIUM CHLORIDE 1 GRAM(S): 9 INJECTION, SOLUTION INTRAMUSCULAR; INTRAVENOUS; SUBCUTANEOUS at 20:16

## 2024-12-26 RX ADMIN — ENOXAPARIN SODIUM 40 MILLIGRAM(S): 60 INJECTION INTRAVENOUS; SUBCUTANEOUS at 20:17

## 2024-12-26 NOTE — CHART NOTE - NSCHARTNOTEFT_GEN_A_CORE
DOUGLAS AKINS is a 59y year old Female presenting with hyponatremia Patient is a 59y old  Female who presents with a chief complaint of hyponatremia (26 Dec 2024 16:20)  .    A Code Grey 1033pm cleared 2246pm - was initiated due to patient agitation for being admitted for hyponatremia. Patient adamantly states that her sodium runs low. Pt admitted 12/6-12/14/24 for hyponatremia and  failure to thrive. Pt also stating was also scheduled to have bone biopsy with IR.    During the code patient was AOx4. Emergency Contact (Aminah Darby) also on the phone. Patient declining admission and requesting to leave against medical advice. Aminah, sister, also in agreement with patient's wishes.  Risks of leaving against medical advice, including possibility of death, explained to the patient for her condition. Benefits of receiving medical treatment including hyponatremia and other medical ailments explained. Patient continued to decline admission to the hospital and wishing to leave the hospital. Explained patient can return to the hospital at any time she wish to do so under any circumstance. Patient signed against medical advice form.    -RN notified as witness to signing of Against medical advice form, to call for any changes.

## 2024-12-26 NOTE — H&P ADULT - PROBLEM SELECTOR PLAN 7
DVT: lovenox  Social work consult DVT ppx: lovenox  Social work consult - DVT ppx: lovenox  - Social work consult

## 2024-12-26 NOTE — H&P ADULT - PROBLEM SELECTOR PLAN 3
Patient with history of substance abuse including fentanyl patches and clonazepam, recent admission to [facility] for detox  Denies current substance use  Utox pending, follow up  Patient acutely agitated but denies hallucinations, suicidal ideations, nausea/ vomiting, diarrhea  Will consult Dr. Grewal (psychiatry) Patient with history of substance abuse including fentanyl patches and clonazepam, recent admission to [facility] for detox  Denies current substance use  Utox pending, follow up  Patient currently smoking just under a pack per day, will initiate nicotine replacement with patch  Patient acutely agitated but denies hallucinations, suicidal ideations, nausea/ vomiting, diarrhea  Will consult Dr. Grewal (psychiatry) Patient with history of substance abuse including fentanyl patches and clonazepam, recent admission to [facility] for detox  Denies current substance use  Utox pending, follow up  Patient currently smoking just under a pack per day, will initiate nicotine replacement with patch  Patient acutely agitated but denies hallucinations, suicidal ideations, nausea/ vomiting, diarrhea   Dr. Grewal (psychiatry) consulted, f/u recs Patient with history of substance abuse including fentanyl patches and clonazepam, recent admission to [facility] for detox  - Denies current substance use  - Utox pending, follow up  - Patient currently smoking just under a pack per day, will initiate nicotine replacement with patch  - Patient acutely agitated but denies hallucinations, suicidal ideations, nausea/ vomiting, diarrhea   - Dr. Grewal (psychiatry) consulted, f/u recs

## 2024-12-26 NOTE — ED ADULT NURSE NOTE - OBJECTIVE STATEMENT
59 yr old female arrives to ED c/o abnormal labs, pt notes to have been getting blood work and was instructed sodium to be 121, pt notes to feel jittery, pt placed on bedside cardiac monitor, speech clear but notes to have difficulty staying still in stretcher, pt able to follow simple instructions. pt denies fever chills, chest pain or sob at this time. Gokul AKHTAR

## 2024-12-26 NOTE — H&P ADULT - NSHPREVIEWOFSYSTEMS_GEN_ALL_CORE
REVIEW OF SYSTEMS:  Constitutional: denies fever, chills, diaphoresis  HEENT: denies sore throat, runny nose, blurry vision, double vision    Respiratory: denies SOB, VILLALOBOS, cough, wheezing, hemoptysis  Cardiovascular: denies CP, palpitations, LE edema  Gastrointestinal:  denies nausea, vomiting, diarrhea, constipation, abdominal pain, melena, hematochezia   Genitourinary: denies dysuria, hematuria  Skin/Breast: denies rash, hives, itching   Musculoskeletal: denies myalgias, arthritis, joint swelling, muscle weakness  Neurologic: denies syncope, LOC, headache, weakness, dizziness REVIEW OF SYSTEMS:  Constitutional: denies fever, chills, diaphoresis  HEENT: denies sore throat, runny nose, blurry vision, double vision    Respiratory: denies SOB, VILLALOBOS, cough, wheezing, hemoptysis  Cardiovascular: denies CP, palpitations, LE edema  Gastrointestinal:  denies nausea, vomiting, diarrhea, constipation, abdominal pain, melena, hematochezia   Genitourinary: denies dysuria, hematuria  Skin/Breast: denies rash, hives, itching   Musculoskeletal: denies myalgias, arthritis, joint swelling, muscle weakness  Neurologic: denies syncope, LOC, weakness, dizziness

## 2024-12-26 NOTE — H&P ADULT - PROBLEM SELECTOR PLAN 2
Patient has been scheduled and she will bring in her disk of the MRI before appointment.   Patient with recent admission for C diff colitis still taking PO vancomycin 125mg 1 capsule/ day for 14 days starting 12/26/24, continue  Monitor for signs of worsening infection Patient with recent admission for C diff colitis still taking PO vancomycin 125mg 1 capsule/ day for 14 days starting 12/26/24,   - continue  - Monitor for signs of worsening infection

## 2024-12-26 NOTE — H&P ADULT - NSHPPHYSICALEXAM_GEN_ALL_CORE
VITALS:   T(C): 36.8 (12-26-24 @ 09:15), Max: 36.8 (12-26-24 @ 09:15)  HR: 70 (12-26-24 @ 09:15) (66 - 70)  BP: 146/73 (12-26-24 @ 09:15) (145/66 - 146/73)  RR: 19 (12-26-24 @ 09:15) (14 - 19)  SpO2: 98% (12-26-24 @ 09:15) (98% - 100%)    GENERAL: NAD, well-groomed, well-developed.  HEAD:  Atraumatic, Norm cephalic.  EYES: PERRLA, conjunctiva clear.  ENMT: no nasal discharge, MMM.   NECK: Supple, No JVD.  NERVOUS SYSTEM:  Alert & oriented X3, neurologically intact grossly.  CHEST/LUNG: Good air entry B/L, no rales, rhonchi, or wheezing.  HEART: Normal S1 & S2, no murmurs, or extra sounds.  ABDOMEN: Soft, non-tender, non-distended; bowel sounds present, no palpable masses or organomegaly, no suprapubic or CVA tenderness.  EXTREMITIES:  No clubbing, cyanosis, or edema.  VASCULAR: 2+ radial, DPA / PTA pulses B/L.  SKIN: No rashes or lesions.  PSYCH: normal affect & behavior. VITALS:   T(C): 36.8 (12-26-24 @ 09:15), Max: 36.8 (12-26-24 @ 09:15)  HR: 70 (12-26-24 @ 09:15) (66 - 70)  BP: 146/73 (12-26-24 @ 09:15) (145/66 - 146/73)  RR: 19 (12-26-24 @ 09:15) (14 - 19)  SpO2: 98% (12-26-24 @ 09:15) (98% - 100%)    GENERAL: NAD, poorly kempt  HEAD:  Atraumatic, Norm cephalic.  EYES: PERRLA, conjunctiva clear.  ENMT: no nasal discharge, MMM.   NECK: Supple, No JVD.  NERVOUS SYSTEM:  Alert & oriented X4, CN 2-12 intact, no ataxia, strength and sensation full and symmetric, resting tremor noted, present on intentional motion  CHEST/LUNG: Good air entry B/L, no rales, rhonchi, or wheezing.  HEART: Normal S1 & S2, no murmurs, or extra sounds.  ABDOMEN: Soft, non-tender, non-distended; bowel sounds present, no palpable masses or organomegaly, no suprapubic or CVA tenderness.  EXTREMITIES:  No clubbing, cyanosis, or edema.  VASCULAR: 2+ radial, DPA / PTA pulses B/L.  SKIN: No rashes or lesions.  PSYCH: agitated, interrupts, fidgety, adamant that staying in the hospital is pointless, denies hallucinations or suicidal ideation, Statement Selected

## 2024-12-26 NOTE — H&P ADULT - PROBLEM SELECTOR PLAN 4
Continue home synthroid 25mcg  Patient states she takes her synthroid "when she remembers"  Follow up AM TSH Continue home synthroid 25mcg  - Patient states she takes her synthroid "when she remembers"  - Follow up AM TSH

## 2024-12-26 NOTE — ED PROVIDER NOTE - OBJECTIVE STATEMENT
Patient is a 59-year-old male who presents to the emergency room for hyponatremia.  Past medical history of paroxysmal SVT hypotension hypothyroidism COPD chronic low back pain Sjogren's disease vertigo GERD anxiety migraines neuropathy osteoporosis glossopharyngeal neuralgia history of femoral DVT seizure disorder skin cancer osteochondritis IBS Raynaud's history of hyponatremia, lupus.  Patient was undergoing preop testing for bone biopsy for possible metastatic lesions to the pelvis.  Reports that since detoxing from her medications at [facility] she has had restless tremors most notably at night and she reports she has not been sleeping for the last several nights.  She does endorse 1 day she took an extra dose of her sodium pills but has not since.  Denies any headache visual changes nausea vomiting chest pain shortness of breath or abdominal pain.

## 2024-12-26 NOTE — H&P ADULT - TIME BILLING
- Reviewing, and interpreting labs and testing.  - Independently obtaining a review of systems and performing a physical exam  - Reviewing consultant documentation/recommendations in addition to discussing plan of care with consultants.  - Counselling and educating patient and family regarding interpretation of aforementioned items and plan of care.  - review of labs, imaging, notes, discussion of plan with team, which are independent of time spent teaching

## 2024-12-26 NOTE — H&P ADULT - ASSESSMENT
Patient is a 58 yo male with PMHx of paroxysmal SVT, hypotension, hypothyroidism, COPD, chronic low back pain, Sjogren's syndrome, vertigo, GERD, anxiety, migraines, neuropathy, osteoporosis, glossopharyngeal neuralgia, H/O femoral DVT, seizure disorder, skin cancer, osteochondritis, IBS, Raynaud's, lupus, history of hyponatremia, undergoing preop testing for bone biopsy for possible metastatic lesions to the pelvis, admitted for the correction of hyponatremia.  Patient is a 58 yo female long PMH Paroxysmal SVT (H/O Loop Recorder, no longer active), Hypotension, Hypothyroidism, Emphysema/COPD, Right Wrist Pain, Right Shoulder pain, Chronic Low back Pain with Sciatica, Chronic Neck Pain, Lupus, Sjogren's Disease, Vertigo, GERD,  H/O hepatitis C, Anxiety, Migraines, Neuropathy, Nicotine  dependence, Osteoporosis, Burning Mouth Syndrome, Glossopharyngeal Neuralgia Syndrome, Femoral DVT, H/O Weight loss, Seizure Disorder, H/O Skin Cancer, osteochondritis Dissecans, IBS, Raynaud's Syndrome, Hypo-Osmolality And Hyponatremia, presented to the ED hyponatremia. , undergoing preop testing for bone biopsy for possible metastatic lesions to the pelvis, admitted for the correction of hyponatremia.

## 2024-12-26 NOTE — H&P ADULT - NSHPSOCIALHISTORY_GEN_ALL_CORE
Current smoker < 1ppd, extensive smoking history  H/o polysubstance use  Lives with parents who are both > 89Y/o  Independent ADLs  Ambulates freely

## 2024-12-26 NOTE — H&P ADULT - PROBLEM SELECTOR PLAN 5
Continue home ipratropium and ventolin inhalers chronic  - Continue home ipratropium and ventolin inhalers

## 2024-12-26 NOTE — H&P ADULT - PROBLEM SELECTOR PLAN 1
Na on admission of 128  Patient has been hyponatremia for the past month, as low as 115, baseline around 128  Patient with symptoms consistent with mild hyponatremia including headache. No evidence of polydipsia and not taking any medications that could be contributing to hyponatremia  Start with fluid restriction  Hold off on hypertonic saline at this time given lack of extensive symptoms of hyponatremia Na on admission of 128  Patient has been hyponatremia for the past month, as low as 115, baseline around 128  Patient with symptoms consistent with mild hyponatremia including headache. No evidence of polydipsia and not taking any medications that could be contributing to hyponatremia  Diet with fluid restriction (1000 cc/day)  Hold off on hypertonic saline at this time given lack of extensive symptoms of hyponatremia Na on admission of 128  - Patient has been hyponatremia for the past month, as low as 115, baseline around 128  - Patient with symptoms consistent with mild hyponatremia including headache. No evidence of polydipsia and not taking any medications that could be contributing to hyponatremia  - Diet with fluid restriction (1000 cc/day)  - Hold off on hypertonic saline at this time given lack of extensive symptoms of hyponatremia  - Nephro (Dr. Murillo) consulted, f/u recs Na on admission of 128  - Patient has been hyponatremia for the past month, as low as 115, baseline around 128  - Patient with symptoms consistent with mild hyponatremia including headache. No evidence of polydipsia and not taking any medications that could be contributing to hyponatremia  - Diet with fluid restriction (1000 cc/day)  - NaCl 1g PO BID  - Hold off on hypertonic saline at this time given lack of extensive symptoms of hyponatremia  - Nephro (Dr. Murillo) consulted, f/u recs

## 2024-12-26 NOTE — H&P ADULT - HISTORY OF PRESENT ILLNESS
Patient is a 60 yo male with PMHx of paroxysmal SVT, hypotension, hypothyroidism, COPD, chronic low back pain, Sjogren's syndrome, vertigo, GERD, anxiety, migraines, neuropathy, osteoporosis, glossopharyngeal neuralgia, H/O femoral DVT, seizure disorder, skin cancer, osteochondritis, IBS, Raynaud's, lupus, history of hyponatremia, presented to the ED hyponatremia. Patient was undergoing preop testing for bone biopsy for possible metastatic lesions to the pelvis.  Reports that since detoxing from her medications at [facility] she has had restless tremors most notably at night and she reports she has not been sleeping for the last several nights. She does endorse 1 day she took an extra dose of her sodium pills but has not since. Denies any headache visual changes nausea vomiting chest pain shortness of breath or abdominal pain.     Denies fever, chills, chest pain, palpitations, SOB, cough, abdominal pain, nausea, vomiting, diarrhea, constipation, urinary frequency, urgency, or dysuria, headaches, changes in vision, dizziness, numbness, tingling.  Denies recent travel, recent antibiotic use, or sick contacts.    ED Course:   Vitals: BP: 146/73, HR: 70, Temp: 98.2, RR: 19, SpO2: 98% RAon   Labs:  Na 128, ,   CTH neg  UA: pending   EKG: SR @ 74,      59 year old female long PMH Paroxysmal SVT (H/O Loop Recorder, no longer active), Hypotension, Hypothyroidism, Emphysema/COPD, Right Wrist Pain, Right Shoulder pain, Chronic Low back Pain with Sciatica, Chronic Neck Pain, Lupus, Sjogren's Disease, Vertigo, GERD,  H/O hepatitis C, Anxiety, Migraines, Neuropathy, Nicotine  dependence, Osteoporosis, Burning Mouth Syndrome, Glossopharyngeal Neuralgia Syndrome, Femoral DVT, H/O Weight loss, Seizure Disorder, H/O Skin Cancer, osteochondritis Dissecans, IBS, Raynaud's Syndrome, Hypo-Osmolality And Hyponatremia, presented to the ED hyponatremia. Patient was undergoing preop testing for bone biopsy for possible metastatic lesions to the pelvis.  Reports that since detoxing from her medications at [facility] she has had restless tremors most notably at night and she reports she has not been sleeping for the last several nights. She does endorse 1 day she took an extra dose of her sodium pills but has not since. Denies any headache visual changes nausea vomiting chest pain shortness of breath or abdominal pain.     Denies fever, chills, chest pain, palpitations, SOB, cough, abdominal pain, nausea, vomiting, diarrhea, constipation, urinary frequency, urgency, or dysuria, headaches, changes in vision, dizziness, numbness, tingling.  Denies recent travel, recent antibiotic use, or sick contacts.    ED Course:   Vitals: BP: 146/73, HR: 70, Temp: 98.2, RR: 19, SpO2: 98% RAon   Labs:  Na 128, ,   CTH neg  UA: pending   EKG: SR @ 74,      59 year old female long PMH Paroxysmal SVT (H/O Loop Recorder, no longer active), Hypotension, Hypothyroidism, Emphysema/COPD, Right Wrist Pain, Right Shoulder pain, Chronic Low back Pain with Sciatica, Chronic Neck Pain, Lupus, Sjogren's Disease, Vertigo, GERD,  H/O hepatitis C, Anxiety, Migraines, Neuropathy, Nicotine  dependence, Osteoporosis, Burning Mouth Syndrome, Glossopharyngeal Neuralgia Syndrome, Femoral DVT, H/O Weight loss, Seizure Disorder, H/O Skin Cancer, osteochondritis Dissecans, IBS, Raynaud's Syndrome, Hypo-Osmolality And Hyponatremia, presented to the ED hyponatremia. Patient was undergoing preop testing for bone biopsy for possible metastatic lesions to the pelvis.  Reports that since detoxing from fentanyl patch and klonopin at [facility] she has had restless tremors most notably at night and she reports she has not been sleeping for the last several nights. She does endorse 1 day she took an extra dose of her sodium pills but has not since. She does endorse a mild headache for the last three days. Denies seizures, visual changes, nausea vomiting, excessive fluid intake, chest pain shortness of breath or abdominal pain. Currently completing course of PO vancomycin for recent admission for C. diff.       ED Course:   Vitals: BP: 146/73, HR: 70, Temp: 98.2, RR: 19, SpO2: 98% Garcia  Labs:  Na 128, ,   CTH neg  UA: pending   EKG: SR @ 74,

## 2024-12-26 NOTE — H&P ADULT - ATTENDING COMMENTS
Patient is seen and examined with the Resident. Agree with above assessment and plan. Patient admitted with Hyponatremia. She has chronic low sodium issue. Will give Oral supplement and restrict free water intake. No new neuro finding. Will follow repeat level at am.

## 2024-12-27 PROBLEM — U07.1 COVID-19: Chronic | Status: ACTIVE | Noted: 2024-12-20

## 2025-01-13 NOTE — DIETITIAN NUTRITION RISK NOTIFICATION - MALNUTRITION EVALUATION AS DEMONSTRATED BY (ADULTS > 20 YEARS OF AGE)
Patient called in stating that he is coughing up blood every morning. He is wanting to know if he needs to come in and see you or if you can send in an abx.    Your first available appointment is not until 2/12/25   Weight loss.../Inadequate energy intake.../Loss of subcutaneous fat.../Loss of muscle...

## 2025-01-14 ENCOUNTER — APPOINTMENT (OUTPATIENT)
Dept: PAIN MANAGEMENT | Facility: CLINIC | Age: 60
End: 2025-01-14

## 2025-01-14 DIAGNOSIS — M51.26 OTHER INTERVERTEBRAL DISC DISPLACEMENT, LUMBAR REGION: ICD-10-CM

## 2025-02-07 ENCOUNTER — RX RENEWAL (OUTPATIENT)
Age: 60
End: 2025-02-07

## 2025-02-10 NOTE — ED ADULT NURSE REASSESSMENT NOTE - CONDITION
unchanged
improved
[FreeTextEntry1] : HTN - pt stopped cardizem 2 days ago due to itching sensation which has improved. BP is currently normotensive. Will hold pharmacotherapy for now and f/u with BP check in 1 week Hx of breast cancer - on tamoxifen, f/u with oncology

## 2025-02-18 NOTE — DISCHARGE NOTE PROVIDER - ATTENDING ATTESTATION STATEMENT
Problem: Pain  Goal: Verbalizes/displays adequate comfort level or baseline comfort level  Outcome: Progressing     Problem: Wound:  Goal: Will show signs of wound healing; wound closure and no evidence of infection  Description: Will show signs of wound healing; wound closure and no evidence of infection  Outcome: Progressing     Problem: Blood Glucose:  Goal: Ability to maintain appropriate glucose levels will improve  Description: Ability to maintain appropriate glucose levels will improve  Outcome: Progressing     
I have personally seen and examined the patient. I have collaborated with and supervised the

## 2025-02-26 ENCOUNTER — APPOINTMENT (OUTPATIENT)
Dept: PAIN MANAGEMENT | Facility: CLINIC | Age: 60
End: 2025-02-26

## 2025-02-26 DIAGNOSIS — M54.12 RADICULOPATHY, CERVICAL REGION: ICD-10-CM

## 2025-02-26 DIAGNOSIS — M51.26 OTHER INTERVERTEBRAL DISC DISPLACEMENT, LUMBAR REGION: ICD-10-CM

## 2025-02-26 PROCEDURE — 20552 NJX 1/MLT TRIGGER POINT 1/2: CPT | Mod: LT

## 2025-02-26 PROCEDURE — J3490M: CUSTOM | Mod: NC

## 2025-02-26 PROCEDURE — 99214 OFFICE O/P EST MOD 30 MIN: CPT | Mod: 25

## 2025-03-12 ENCOUNTER — OUTPATIENT (OUTPATIENT)
Dept: OUTPATIENT SERVICES | Facility: HOSPITAL | Age: 60
LOS: 1 days | End: 2025-03-12
Payer: MEDICARE

## 2025-03-12 ENCOUNTER — APPOINTMENT (OUTPATIENT)
Dept: MRI IMAGING | Facility: CLINIC | Age: 60
End: 2025-03-12

## 2025-03-12 DIAGNOSIS — Z95.818 PRESENCE OF OTHER CARDIAC IMPLANTS AND GRAFTS: Chronic | ICD-10-CM

## 2025-03-12 DIAGNOSIS — Z98.890 OTHER SPECIFIED POSTPROCEDURAL STATES: Chronic | ICD-10-CM

## 2025-03-12 DIAGNOSIS — S69.91XD UNSPECIFIED INJURY OF RIGHT WRIST, HAND AND FINGER(S), SUBSEQUENT ENCOUNTER: Chronic | ICD-10-CM

## 2025-03-12 DIAGNOSIS — D63.8 ANEMIA IN OTHER CHRONIC DISEASES CLASSIFIED ELSEWHERE: ICD-10-CM

## 2025-03-12 DIAGNOSIS — J38.1 POLYP OF VOCAL CORD AND LARYNX: Chronic | ICD-10-CM

## 2025-03-12 DIAGNOSIS — M25.9 JOINT DISORDER, UNSPECIFIED: Chronic | ICD-10-CM

## 2025-03-12 DIAGNOSIS — K82.9 DISEASE OF GALLBLADDER, UNSPECIFIED: Chronic | ICD-10-CM

## 2025-03-12 PROCEDURE — A9585: CPT

## 2025-03-12 PROCEDURE — 72197 MRI PELVIS W/O & W/DYE: CPT | Mod: 26

## 2025-03-12 PROCEDURE — 72197 MRI PELVIS W/O & W/DYE: CPT | Mod: MH

## 2025-04-02 ENCOUNTER — APPOINTMENT (OUTPATIENT)
Dept: PAIN MANAGEMENT | Facility: CLINIC | Age: 60
End: 2025-04-02

## 2025-04-07 ENCOUNTER — RX RENEWAL (OUTPATIENT)
Age: 60
End: 2025-04-07

## 2025-04-09 ENCOUNTER — APPOINTMENT (OUTPATIENT)
Dept: PAIN MANAGEMENT | Facility: CLINIC | Age: 60
End: 2025-04-09
Payer: MEDICARE

## 2025-04-09 VITALS — BODY MASS INDEX: 17.26 KG/M2 | WEIGHT: 80 LBS | HEIGHT: 57 IN

## 2025-04-09 DIAGNOSIS — M54.16 RADICULOPATHY, LUMBAR REGION: ICD-10-CM

## 2025-04-09 PROCEDURE — 96372 THER/PROPH/DIAG INJ SC/IM: CPT

## 2025-04-09 PROCEDURE — 99214 OFFICE O/P EST MOD 30 MIN: CPT | Mod: 25

## 2025-04-09 NOTE — PROGRESS NOTE ADULT - ASSESSMENT
Writer contacted patient with results/recommendations. Patient verbalizes understanding. No further questions/concerns.     Patient scheduled for next labs on 6/30. Updated standing lab orders to reflect ok to draw a few days early of three months on 6/30/25.    56 y/o female with a PMHx of Paroxysmal SVT, Chronic neck pain and low back pain with sciatica, Raynaud's syndrome, IBS, Seizure disorder, Neuropathy, Emphysema/COPD, Migraine, Anxiety, Hepatitis C, Hypothyroidism, GERD, Sjogren's disease, Lupus    #Right elbow abscess/cellulitis  For I&D 12/28.  Continue vancomycin  f/u vanco trough.  f/u cultures    #hypotension  asymptomatics. Patint states her BP is always on low side.    #Hyponatremia  Resolved.    #hx of paraxoysmal SVT  telemetry  cardiac clearance    #Chronic neck and low back pain  continue morphine ER and IR  will not give fentanyl secondary to low BP.     #Raynaud's syndrome/Sjogren's disease/Lupus  continue hydroxychloroquine    #COPD/Emphysema  ventolin and symbicort  continous pulse ox    #Migraine/anxiety  fioricet PRN  valium    #Hypothyroidism  continue levothyroxine    #GERD  Protonix    #DVT proph  scds    Patient had abnormal whole near to epiglottis, as per anesthesia, pic was sent to  (ENT) and he will scope her in his office. Patient is advise to make an appointment as soon as she is discharged.

## 2025-04-18 ENCOUNTER — APPOINTMENT (OUTPATIENT)
Age: 60
End: 2025-04-18

## 2025-04-24 ENCOUNTER — APPOINTMENT (OUTPATIENT)
Dept: ORTHOPEDIC SURGERY | Facility: AMBULATORY SURGERY CENTER | Age: 60
End: 2025-04-24

## 2025-05-09 ENCOUNTER — APPOINTMENT (OUTPATIENT)
Age: 60
End: 2025-05-09
Payer: MEDICARE

## 2025-05-09 PROCEDURE — 64483 NJX AA&/STRD TFRM EPI L/S 1: CPT | Mod: 50

## 2025-05-10 ENCOUNTER — RX RENEWAL (OUTPATIENT)
Age: 60
End: 2025-05-10

## 2025-05-21 ENCOUNTER — APPOINTMENT (OUTPATIENT)
Dept: PAIN MANAGEMENT | Facility: CLINIC | Age: 60
End: 2025-05-21

## 2025-06-04 ENCOUNTER — RX RENEWAL (OUTPATIENT)
Age: 60
End: 2025-06-04

## 2025-06-06 ENCOUNTER — APPOINTMENT (OUTPATIENT)
Dept: ORTHOPEDIC SURGERY | Facility: CLINIC | Age: 60
End: 2025-06-06
Payer: MEDICARE

## 2025-06-06 VITALS — HEIGHT: 57 IN | BODY MASS INDEX: 18.34 KG/M2 | WEIGHT: 85 LBS

## 2025-06-06 PROCEDURE — 72100 X-RAY EXAM L-S SPINE 2/3 VWS: CPT

## 2025-06-06 PROCEDURE — 72170 X-RAY EXAM OF PELVIS: CPT

## 2025-06-06 PROCEDURE — 99214 OFFICE O/P EST MOD 30 MIN: CPT

## 2025-06-06 RX ORDER — METHYLPREDNISOLONE 4 MG/1
4 TABLET ORAL
Qty: 1 | Refills: 0 | Status: ACTIVE | COMMUNITY
Start: 2025-06-06 | End: 1900-01-01

## 2025-06-10 ENCOUNTER — APPOINTMENT (OUTPATIENT)
Dept: PAIN MANAGEMENT | Facility: CLINIC | Age: 60
End: 2025-06-10
Payer: MEDICARE

## 2025-06-10 VITALS — WEIGHT: 85 LBS | BODY MASS INDEX: 18.34 KG/M2 | HEIGHT: 57 IN

## 2025-06-10 PROCEDURE — 99214 OFFICE O/P EST MOD 30 MIN: CPT | Mod: 25

## 2025-06-10 PROCEDURE — J3490M: CUSTOM | Mod: NC

## 2025-06-10 PROCEDURE — 20552 NJX 1/MLT TRIGGER POINT 1/2: CPT

## 2025-06-10 RX ORDER — DULOXETINE HYDROCHLORIDE 30 MG/1
30 CAPSULE, DELAYED RELEASE PELLETS ORAL
Qty: 60 | Refills: 2 | Status: ACTIVE | COMMUNITY
Start: 2025-06-10 | End: 1900-01-01

## 2025-07-02 ENCOUNTER — APPOINTMENT (OUTPATIENT)
Dept: PAIN MANAGEMENT | Facility: CLINIC | Age: 60
End: 2025-07-02

## 2025-07-02 VITALS — WEIGHT: 85 LBS | HEIGHT: 57 IN | BODY MASS INDEX: 18.34 KG/M2

## 2025-07-02 PROBLEM — M54.6 THORACIC BACK PAIN: Status: ACTIVE | Noted: 2025-07-02

## 2025-07-02 PROCEDURE — 96372 THER/PROPH/DIAG INJ SC/IM: CPT

## 2025-07-02 PROCEDURE — 99214 OFFICE O/P EST MOD 30 MIN: CPT | Mod: 25

## 2025-07-07 ENCOUNTER — RX RENEWAL (OUTPATIENT)
Age: 60
End: 2025-07-07

## 2025-07-07 ENCOUNTER — APPOINTMENT (OUTPATIENT)
Dept: MRI IMAGING | Facility: CLINIC | Age: 60
End: 2025-07-07
Payer: MEDICARE

## 2025-07-07 PROCEDURE — 72146 MRI CHEST SPINE W/O DYE: CPT

## 2025-07-07 PROCEDURE — 72148 MRI LUMBAR SPINE W/O DYE: CPT

## 2025-08-05 ENCOUNTER — APPOINTMENT (OUTPATIENT)
Dept: MRI IMAGING | Facility: CLINIC | Age: 60
End: 2025-08-05

## 2025-08-08 ENCOUNTER — APPOINTMENT (OUTPATIENT)
Age: 60
End: 2025-08-08
Payer: MEDICARE

## 2025-08-08 PROCEDURE — 64483 NJX AA&/STRD TFRM EPI L/S 1: CPT | Mod: 50

## 2025-08-12 ENCOUNTER — APPOINTMENT (OUTPATIENT)
Facility: CLINIC | Age: 60
End: 2025-08-12
Payer: MEDICARE

## 2025-08-12 VITALS
BODY MASS INDEX: 18.34 KG/M2 | HEIGHT: 57 IN | HEART RATE: 91 BPM | SYSTOLIC BLOOD PRESSURE: 112 MMHG | DIASTOLIC BLOOD PRESSURE: 77 MMHG | OXYGEN SATURATION: 97 % | TEMPERATURE: 97.7 F | RESPIRATION RATE: 18 BRPM | WEIGHT: 85 LBS

## 2025-08-12 DIAGNOSIS — I73.9 PERIPHERAL VASCULAR DISEASE, UNSPECIFIED: ICD-10-CM

## 2025-08-12 DIAGNOSIS — M32.9 SYSTEMIC LUPUS ERYTHEMATOSUS, UNSPECIFIED: ICD-10-CM

## 2025-08-12 DIAGNOSIS — K21.9 GASTRO-ESOPHAGEAL REFLUX DISEASE W/OUT ESOPHAGITIS: ICD-10-CM

## 2025-08-12 DIAGNOSIS — F17.210 NICOTINE DEPENDENCE, CIGARETTES, UNCOMPLICATED: ICD-10-CM

## 2025-08-12 DIAGNOSIS — F17.200 NICOTINE DEPENDENCE, UNSPECIFIED, UNCOMPLICATED: ICD-10-CM

## 2025-08-12 DIAGNOSIS — R56.9 UNSPECIFIED CONVULSIONS: ICD-10-CM

## 2025-08-12 DIAGNOSIS — M35.00 SICCA SYNDROME, UNSPECIFIED: ICD-10-CM

## 2025-08-12 DIAGNOSIS — J44.9 CHRONIC OBSTRUCTIVE PULMONARY DISEASE, UNSPECIFIED: ICD-10-CM

## 2025-08-12 PROCEDURE — 99407 BEHAV CHNG SMOKING > 10 MIN: CPT

## 2025-08-12 PROCEDURE — 94729 DIFFUSING CAPACITY: CPT

## 2025-08-12 PROCEDURE — 94727 GAS DIL/WSHOT DETER LNG VOL: CPT

## 2025-08-12 PROCEDURE — 99215 OFFICE O/P EST HI 40 MIN: CPT | Mod: 25

## 2025-08-12 PROCEDURE — 94060 EVALUATION OF WHEEZING: CPT

## 2025-08-12 PROCEDURE — ZZZZZ: CPT

## 2025-08-12 RX ORDER — ALBUTEROL SULFATE 90 UG/1
108 (90 BASE) INHALANT RESPIRATORY (INHALATION)
Qty: 1 | Refills: 5 | Status: ACTIVE | COMMUNITY
Start: 2025-08-12 | End: 1900-01-01

## 2025-08-12 RX ORDER — NICOTINE 21 MG/24HR
21 PATCH, TRANSDERMAL 24 HOURS TRANSDERMAL DAILY
Qty: 30 | Refills: 5 | Status: ACTIVE | COMMUNITY
Start: 2025-08-12 | End: 1900-01-01

## 2025-08-14 ENCOUNTER — RX RENEWAL (OUTPATIENT)
Age: 60
End: 2025-08-14

## 2025-08-19 ENCOUNTER — APPOINTMENT (OUTPATIENT)
Dept: PAIN MANAGEMENT | Facility: CLINIC | Age: 60
End: 2025-08-19
Payer: MEDICARE

## 2025-08-19 VITALS — BODY MASS INDEX: 18.34 KG/M2 | HEIGHT: 57 IN | WEIGHT: 85 LBS

## 2025-08-19 DIAGNOSIS — M54.16 RADICULOPATHY, LUMBAR REGION: ICD-10-CM

## 2025-08-19 PROCEDURE — 72040 X-RAY EXAM NECK SPINE 2-3 VW: CPT

## 2025-08-19 PROCEDURE — 72070 X-RAY EXAM THORAC SPINE 2VWS: CPT

## 2025-08-19 PROCEDURE — 99214 OFFICE O/P EST MOD 30 MIN: CPT

## 2025-08-26 ENCOUNTER — APPOINTMENT (OUTPATIENT)
Dept: MRI IMAGING | Facility: CLINIC | Age: 60
End: 2025-08-26

## 2025-08-26 DIAGNOSIS — M54.2 CERVICALGIA: ICD-10-CM

## (undated) DEVICE — TRAY EPIDURAL SINGLE DOSE

## (undated) DEVICE — NDL SPNL WHIT 22GX3.5IN

## (undated) DEVICE — SOL INJ LR 500ML

## (undated) DEVICE — PACK IV START WITH CHG

## (undated) DEVICE — CATH IV SAFE BC BLU 22GAX1.0"

## (undated) DEVICE — CATH IV SAFE BC 22G X 1" (BLUE)

## (undated) DEVICE — STYLET  ENDOTRACH 7.5MM X 10MM

## (undated) DEVICE — TUBING ALARIS PUMP MODULE NON-DEHP

## (undated) DEVICE — GLV 7.5 ULTRAFREE MAX

## (undated) DEVICE — GLV 8.5 PROTEXIS (WHITE)

## (undated) DEVICE — TUBING IV EXTENSION MACRO W CLAVE 7"

## (undated) DEVICE — CATH IV SAFE INSYTE 24G X 3/4" (YELLOW)

## (undated) DEVICE — SYR IV FLUSH SALINE 10ML 30/TY

## (undated) DEVICE — GLV 8 ULTRAFREE MAX

## (undated) DEVICE — NDL SPINAL 22G X 3.5" QUINCKE

## (undated) DEVICE — CATH IV SAFE 24GX3/4